# Patient Record
Sex: MALE | Race: WHITE | NOT HISPANIC OR LATINO | Employment: OTHER | URBAN - METROPOLITAN AREA
[De-identification: names, ages, dates, MRNs, and addresses within clinical notes are randomized per-mention and may not be internally consistent; named-entity substitution may affect disease eponyms.]

---

## 2017-01-02 ENCOUNTER — ALLIED HEALTH/NURSE VISIT (OUTPATIENT)
Dept: NURSING | Facility: CLINIC | Age: 82
End: 2017-01-02
Payer: COMMERCIAL

## 2017-01-02 DIAGNOSIS — E53.8 VITAMIN B12 DEFICIENCY (NON ANEMIC): ICD-10-CM

## 2017-01-02 PROCEDURE — 96372 THER/PROPH/DIAG INJ SC/IM: CPT

## 2017-01-02 PROCEDURE — 99207 ZZC NO CHARGE LOS: CPT

## 2017-01-02 NOTE — PROGRESS NOTES
The following medication was given:     MEDICATION: Vitamin B12  1000mcg  ROUTE: IM  SITE: [facility]  DOSE: 1ml  LOT #: [id number]  :  [employer]  EXPIRATION DATE:  7/2018  NDC#: 7480-8747-09      Given at 8:20am, next appt 2/06/2017.     Hanane Murrell CMA

## 2017-02-06 ENCOUNTER — ALLIED HEALTH/NURSE VISIT (OUTPATIENT)
Dept: NURSING | Facility: CLINIC | Age: 82
End: 2017-02-06
Payer: COMMERCIAL

## 2017-02-06 DIAGNOSIS — D51.0 PERNICIOUS ANEMIA: Primary | ICD-10-CM

## 2017-02-06 PROCEDURE — 96372 THER/PROPH/DIAG INJ SC/IM: CPT

## 2017-02-06 PROCEDURE — 99207 ZZC NO CHARGE LOS: CPT

## 2017-02-06 NOTE — PROGRESS NOTES
The following medication was given:     MEDICATION: Vitamin B12  1000mcg  ROUTE: IM  SITE: [facility]  DOSE: 1ml  LOT #: [id number]  :  [employer]  EXPIRATION DATE:  7/2018  NDC#: 5397-8626-34.     Given at 8:27am, next inj. In 1 month.     Hanane Murrell CMA

## 2017-02-07 DIAGNOSIS — K21.9 GASTROESOPHAGEAL REFLUX DISEASE WITHOUT ESOPHAGITIS: Primary | ICD-10-CM

## 2017-02-20 ENCOUNTER — OFFICE VISIT (OUTPATIENT)
Dept: FAMILY MEDICINE | Facility: CLINIC | Age: 82
End: 2017-02-20
Payer: COMMERCIAL

## 2017-02-20 ENCOUNTER — OFFICE VISIT (OUTPATIENT)
Dept: OPTOMETRY | Facility: CLINIC | Age: 82
End: 2017-02-20
Payer: COMMERCIAL

## 2017-02-20 VITALS
OXYGEN SATURATION: 96 % | WEIGHT: 181 LBS | BODY MASS INDEX: 23.99 KG/M2 | SYSTOLIC BLOOD PRESSURE: 102 MMHG | HEIGHT: 73 IN | HEART RATE: 165 BPM | TEMPERATURE: 98 F | DIASTOLIC BLOOD PRESSURE: 65 MMHG

## 2017-02-20 DIAGNOSIS — D51.0 PERNICIOUS ANEMIA: ICD-10-CM

## 2017-02-20 DIAGNOSIS — Z00.00 ROUTINE GENERAL MEDICAL EXAMINATION AT A HEALTH CARE FACILITY: Primary | ICD-10-CM

## 2017-02-20 DIAGNOSIS — K21.9 GASTROESOPHAGEAL REFLUX DISEASE WITHOUT ESOPHAGITIS: ICD-10-CM

## 2017-02-20 DIAGNOSIS — Z13.6 CARDIOVASCULAR SCREENING; LDL GOAL LESS THAN 160: ICD-10-CM

## 2017-02-20 DIAGNOSIS — H01.02A SQUAMOUS BLEPHARITIS OF UPPER AND LOWER EYELIDS OF BOTH EYES: Primary | ICD-10-CM

## 2017-02-20 DIAGNOSIS — E55.9 VITAMIN D DEFICIENCY: ICD-10-CM

## 2017-02-20 DIAGNOSIS — R09.A2 GLOBUS SENSATION: ICD-10-CM

## 2017-02-20 DIAGNOSIS — H01.02B SQUAMOUS BLEPHARITIS OF UPPER AND LOWER EYELIDS OF BOTH EYES: Primary | ICD-10-CM

## 2017-02-20 LAB
ALBUMIN SERPL-MCNC: 4 G/DL (ref 3.4–5)
ALP SERPL-CCNC: 69 U/L (ref 40–150)
ALT SERPL W P-5'-P-CCNC: 22 U/L (ref 0–70)
ANION GAP SERPL CALCULATED.3IONS-SCNC: 4 MMOL/L (ref 3–14)
AST SERPL W P-5'-P-CCNC: 19 U/L (ref 0–45)
BASOPHILS # BLD AUTO: 0 10E9/L (ref 0–0.2)
BASOPHILS NFR BLD AUTO: 0.2 %
BILIRUB SERPL-MCNC: 0.3 MG/DL (ref 0.2–1.3)
BUN SERPL-MCNC: 18 MG/DL (ref 7–30)
CALCIUM SERPL-MCNC: 9.2 MG/DL (ref 8.5–10.1)
CHLORIDE SERPL-SCNC: 102 MMOL/L (ref 94–109)
CHOLEST SERPL-MCNC: 211 MG/DL
CO2 SERPL-SCNC: 32 MMOL/L (ref 20–32)
CREAT SERPL-MCNC: 1.06 MG/DL (ref 0.66–1.25)
DEPRECATED CALCIDIOL+CALCIFEROL SERPL-MC: 55 UG/L (ref 20–75)
DIFFERENTIAL METHOD BLD: ABNORMAL
EOSINOPHIL # BLD AUTO: 0.1 10E9/L (ref 0–0.7)
EOSINOPHIL NFR BLD AUTO: 2 %
ERYTHROCYTE [DISTWIDTH] IN BLOOD BY AUTOMATED COUNT: 12.4 % (ref 10–15)
GFR SERPL CREATININE-BSD FRML MDRD: 67 ML/MIN/1.7M2
GLUCOSE SERPL-MCNC: 102 MG/DL (ref 70–99)
HCT VFR BLD AUTO: 40.5 % (ref 40–53)
HDLC SERPL-MCNC: 69 MG/DL
HGB BLD-MCNC: 13.2 G/DL (ref 13.3–17.7)
LDLC SERPL CALC-MCNC: 121 MG/DL
LYMPHOCYTES # BLD AUTO: 1.3 10E9/L (ref 0.8–5.3)
LYMPHOCYTES NFR BLD AUTO: 26.5 %
MCH RBC QN AUTO: 32.3 PG (ref 26.5–33)
MCHC RBC AUTO-ENTMCNC: 32.6 G/DL (ref 31.5–36.5)
MCV RBC AUTO: 99 FL (ref 78–100)
MONOCYTES # BLD AUTO: 0.5 10E9/L (ref 0–1.3)
MONOCYTES NFR BLD AUTO: 10.2 %
NEUTROPHILS # BLD AUTO: 3.1 10E9/L (ref 1.6–8.3)
NEUTROPHILS NFR BLD AUTO: 61.1 %
NONHDLC SERPL-MCNC: 142 MG/DL
PLATELET # BLD AUTO: 177 10E9/L (ref 150–450)
POTASSIUM SERPL-SCNC: 4.3 MMOL/L (ref 3.4–5.3)
PROT SERPL-MCNC: 7.3 G/DL (ref 6.8–8.8)
RBC # BLD AUTO: 4.09 10E12/L (ref 4.4–5.9)
SODIUM SERPL-SCNC: 138 MMOL/L (ref 133–144)
TRIGL SERPL-MCNC: 104 MG/DL
VIT B12 SERPL-MCNC: 423 PG/ML (ref 193–986)
WBC # BLD AUTO: 5 10E9/L (ref 4–11)

## 2017-02-20 PROCEDURE — 99213 OFFICE O/P EST LOW 20 MIN: CPT | Performed by: OPTOMETRIST

## 2017-02-20 PROCEDURE — 80053 COMPREHEN METABOLIC PANEL: CPT | Performed by: INTERNAL MEDICINE

## 2017-02-20 PROCEDURE — 36415 COLL VENOUS BLD VENIPUNCTURE: CPT | Performed by: INTERNAL MEDICINE

## 2017-02-20 PROCEDURE — 99213 OFFICE O/P EST LOW 20 MIN: CPT | Mod: 25 | Performed by: INTERNAL MEDICINE

## 2017-02-20 PROCEDURE — 85025 COMPLETE CBC W/AUTO DIFF WBC: CPT | Performed by: INTERNAL MEDICINE

## 2017-02-20 PROCEDURE — 99397 PER PM REEVAL EST PAT 65+ YR: CPT | Performed by: INTERNAL MEDICINE

## 2017-02-20 PROCEDURE — 82306 VITAMIN D 25 HYDROXY: CPT | Performed by: INTERNAL MEDICINE

## 2017-02-20 PROCEDURE — 80061 LIPID PANEL: CPT | Performed by: INTERNAL MEDICINE

## 2017-02-20 PROCEDURE — 82607 VITAMIN B-12: CPT | Performed by: INTERNAL MEDICINE

## 2017-02-20 ASSESSMENT — REFRACTION_MANIFEST
OS_SPHERE: PLANO
OD_AXIS: 160
OS_AXIS: 180
OD_CYLINDER: +0.75
OD_SPHERE: -1.00
OS_CYLINDER: +0.50

## 2017-02-20 ASSESSMENT — REFRACTION_WEARINGRX
OD_CYLINDER: +1.00
OD_SPHERE: -0.75
OD_AXIS: 160
OS_AXIS: 180
OS_CYLINDER: +0.50
OS_SPHERE: PLANO
OD_ADD: +2.75
OS_ADD: +2.75

## 2017-02-20 ASSESSMENT — EXTERNAL EXAM - LEFT EYE: OS_EXAM: NORMAL

## 2017-02-20 ASSESSMENT — VISUAL ACUITY
CORRECTION_TYPE: GLASSES
OD_CC+: -1
OD_CC: 20/30
OS_CC: 20/25
METHOD: SNELLEN - LINEAR
OS_CC+: -2

## 2017-02-20 ASSESSMENT — EXTERNAL EXAM - RIGHT EYE: OD_EXAM: NORMAL

## 2017-02-20 NOTE — NURSING NOTE
"Chief Complaint   Patient presents with     Physical       Initial /65 (BP Location: Left arm, Patient Position: Chair, Cuff Size: Adult Regular)  Pulse 165  Temp 98  F (36.7  C) (Oral)  Ht 6' 1\" (1.854 m)  Wt 181 lb (82.1 kg)  SpO2 96%  BMI 23.88 kg/m2 Estimated body mass index is 23.88 kg/(m^2) as calculated from the following:    Height as of this encounter: 6' 1\" (1.854 m).    Weight as of this encounter: 181 lb (82.1 kg).  Medication Reconciliation: complete     Kelechi Francisco MA      "

## 2017-02-20 NOTE — MR AVS SNAPSHOT
After Visit Summary   2/20/2017    Preston Cai    MRN: [id number]           Patient Information     Date Of Birth          1935        Visit Information        Provider Department      2/20/2017 8:20 AM Tyrel Manning MD [facility]        Today's Diagnoses     Routine general medical examination at a health care facility    -  1    Gastroesophageal reflux disease without esophagitis        Globus sensation        Vitamin D deficiency        Vitamin B12 deficiency (non anemic)        CARDIOVASCULAR SCREENING; LDL GOAL LESS THAN 160          Care Instructions      Preventive Health Recommendations:   Male Ages 65 and over    Yearly exam:             See your health care provider every year in order to  o   Review health changes.   o   Discuss preventive care.    o   Review your medicines if your doctor has prescribed any.    Talk with your health care provider about whether you should have a test to screen for prostate cancer (PSA).    Every 3 years, have a diabetes test (fasting glucose). If you are at risk for diabetes, you should have this test more often.    Every 5 years, have a cholesterol test. Have this test more often if you are at risk for high cholesterol or heart disease.     Every 10 years, have a colonoscopy. Or, have a yearly FIT test (stool test). These exams will check for colon cancer.    Talk to with your health care provider about screening for Abdominal Aortic Aneurysm if you have a family history of AAA or have a history of smoking.    Shots:     Get a flu shot each year.     Get a tetanus shot every 10 years.     Talk to your doctor about your pneumonia vaccines. There are now two you should receive - Pneumovax (PPSV 23) and Prevnar (PCV 13).     Talk to your doctor about a shingles vaccine.     Talk to your doctor about the hepatitis B vaccine.  Nutrition:     Eat at least 5 servings of fruits and vegetables each day.     Eat whole-grain bread,  whole-wheat pasta and brown rice instead of white grains and rice.     Talk to your provider about Calcium and Vitamin D.   Lifestyle    Exercise for at least 150 minutes a week (30 minutes a day, 5 days a week). This will help you control your weight and prevent disease.     Limit alcohol to one drink per day.     No smoking.     Wear sunscreen to prevent skin cancer.     See your dentist every six months for an exam and cleaning.     See your eye doctor every 1 to 2 years to screen for conditions such as glaucoma, macular degeneration, cataracts, etc     This summary includes the important diagnoses, test, medications and other important parts of your medical history.  Below are a few good we sites you can use to learn more about these.     Www.[employer] (T.O.H.L.) : Up to date and easily searchable information on multiple topics.  Www.[employer] (T.O.H.L.)/Pharmacy/c_539084.asp : [facility] $4.99 medications  Www.Topicmarks.gov : medication info, interactive tutorials, watch real surgeries online  Www.familydoctor.org : good info from the Academy of Family Physicians  Www.ERPLYinic.com : good info from the [facility]  Www.cdc.gov : public health info, travel advisories, epidemics (H1N1)  Www.aap.org : children's health info, normal development, vaccinations  Www.health.[facility].[place].us : [place] dept of heatl, public health issues in [place], N1N1    Based on your medical history and these are the current health maintenance or preventive care services that you are due for (some may have been done at this visit:)  There are no preventive care reminders to display for this patient.  =================================================================================  Normal Values   Blood pressure  <140/90 for most adults    <130/80 for some chronic diseases (ask your care team about yours)    BMI (body mass index)  18.5-25 kg/m2 (based on height and weight)     Thank you for visiting [facility]    Normal or  non-critical lab and imaging results will be communicated to you by MyChart, letter or phone within 7 days.  If you do not hear from us within 10 days, please call the clinic. If you have a critical or abnormal lab result, we will notify you by phone as soon as possible.     If you have any questions regarding your visit please contact:     Team Comfort:   Clinic Hours Telephone Number   Dr. Jed Winn   7am-5pm  Monday - Friday [phone]  David VARGAS   Pharmacy 8am-8pm Monday-Thursday      8am-6pm Friday  9am-5pm Saturday-Sunday [phone]   Urgent Care 11am-8pm Monday-Friday        9am-5pm Saturday-Sunday [phone]     After hours, weekend or if you need to make an appointment with your primary provider please call [phone].   After Hours nurse advise: call [facility]: [phone]    Medication Refills:  Call your pharmacy and they will forward the refill to us. Please allow 3 business days for your refills to be completed.    Use [employer] (secure email communication and access to your chart) to send your primary care provider a message or make an appointment. Ask someone on your Team how to sign up for T[employer]Club. To log on to [employer] or for more information in [employer] please visit the website at [email or web address].  As of October 8, 2013, all password changes, disabled accounts, or ID changes in TidyClub/MyHealth will be done by our Access Services Department.   If you need help with your account or password, call: [phone]. Clinic staff no longer has the ability to change passwords.           Follow-ups after your visit        Your next 10 appointments already scheduled     Mar 06, 2017  8:20 AM CST   Nurse Only with BK ANCILLARY   [facility]    [address] [place] [id number] [phone]              Future tests that  "were ordered for you today     Open Future Orders        Priority Expected Expires Ordered    XR Esophagram w Upper GI Routine  2/20/2018 2/20/2017            Who to contact     If you have questions or need follow up information about today's clinic visit or your schedule please contact [facility] directly at [phone].  Normal or non-critical lab and imaging results will be communicated to you by MyChart, letter or phone within 4 business days after the clinic has received the results. If you do not hear from us within 7 days, please contact the clinic through [employer] or phone. If you have a critical or abnormal lab result, we will notify you by phone as soon as possible.  Submit refill requests through [employer] or call your pharmacy and they will forward the refill request to us. Please allow 3 business days for your refill to be completed.          Additional Information About Your Visit        MyChart Information     [employer] gives you secure access to your electronic health record. If you see a primary care provider, you can also send messages to your care team and make appointments. If you have questions, please call your primary care clinic.  If you do not have a primary care provider, please call [phone] and they will assist you.        Care EveryWhere ID     This is your Care EveryWhere ID. This could be used by other organizations to access your [place] medical records  [id number]        Your Vitals Were     Pulse Temperature Height Pulse Oximetry BMI (Body Mass Index)       165 98  F (36.7  C) (Oral) 6' 1\" (1.854 m) 96% 23.88 kg/m2        Blood Pressure from Last 3 Encounters:   02/20/17 102/65   10/10/16 102/67   09/22/16 107/67    Weight from Last 3 Encounters:   02/20/17 181 lb (82.1 kg)   10/10/16 182 lb (82.6 kg)   09/22/16 182 lb 3 oz (82.6 kg)              We Performed the Following     CBC with platelets and differential     Comprehensive metabolic panel (BMP + Alb, " Alk Phos, ALT, AST, Total. Bili, TP)     Lipid Profile     Vitamin B12     Vitamin D Deficiency        Primary Care Provider Office Phone # Fax #    Tyrel Manning [phone]       [facility] [address]  [facility] [address]        Thank you!     Thank you for choosing [facility]  for your care. Our goal is always to provide you with excellent care. Hearing back from our patients is one way we can continue to improve our services. Please take a few minutes to complete the written survey that you may receive in the mail after your visit with us. Thank you!             Your Updated Medication List - Protect others around you: Learn how to safely use, store and throw away your medicines at www.disposemymeds.org.          This list is accurate as of: 2/20/17  9:24 AM.  Always use your most recent med list.                   Brand Name Dispense Instructions for use    aspirin 81 MG tablet      1 TABLET DAILY       cyanocobalamin 1000 MCG/ML injection    VITAMIN B12    0.9 mL    Inject 1 mL (1,000 mcg) into the muscle every 30 days       neomycin-polymixin-dexamethasone ophthalmic ointment    MAXITROL    1 Tube    Apply to both eye lids margin at bedtime       primidone 250 MG tablet    MYSOLINE    90 tablet    Take 1 tablet (250 mg) by mouth At Bedtime       RABEprazole 20 MG EC tablet    ACIPHEX    30 tablet    Take 1 tablet (20 mg) by mouth daily       VITAMIN D (CHOLECALCIFEROL) PO      Take 2,000 Units by mouth daily

## 2017-02-20 NOTE — MR AVS SNAPSHOT
After Visit Summary   2/20/2017    Preston Cai    MRN: [id number]           Patient Information     Date Of Birth          1935        Visit Information        Provider Department      2/20/2017 9:20 AM Carlos James OD [facility]        Care Instructions    Use one drop of artificial tears both eyes 3-4 x daily.  Continue to use the drops regardless if your eyes are comfortable.  Artificial tears work best as a preventative and not as well after your eyes are starting to bother you.  It may take 4- 6 weeks of using the drops before you notice improvement.  If after that time you are still having problems schedule an appointment for an evaluation and discussion of different treatments.  Dry eyes are a chronic condition and you may have more symptoms at certain times of the year.    Claridex eyelid scrubs 2 x day for 8 weeks.  You can purchase it online at [employer] or [employer] foam eyelid cleanser.    How to Use Tea Tree Oil for Eyelid Cleaning: always dilute it at least 50% with water or olive oil or coconut oil              This is how to carefully use DILUTED Tea Tree Oil on your eyelashes (or skin which has no ulcers or cuts). Ideally cleaning your eyelids with diluted Tea Tree Oil should be done after you have applied warm/hot compresses (not hot enough to burn skin) to your eyelids/eyelashes for 2-5 minutes before.       1. With clean hands, put 1 drops of Tea Tree Oil in a cup and 2-3 drops of water (or olive oil or coconut oil)  2. CLOSE YOUR EYES  3. Gently cleanse your closed eyelashes.  4. Leave on for about 1 minute.   5. Rinse with warm water.    Return in 4-6 weeks for comprehensive eye exam or sooner if needed.    Carlos James OD            Follow-ups after your visit        Your next 10 appointments already scheduled     Mar 06, 2017  8:20 AM CST   Nurse Only with BK ANCILLARY   [facility]    [address]  [facility] [id number] [phone]              Future tests that were ordered for you today     Open Future Orders        Priority Expected Expires Ordered    XR Esophagram w Upper GI Routine  2/20/2018 2/20/2017            Who to contact     If you have questions or need follow up information about today's clinic visit or your schedule please contact [facility] directly at [phone].  Normal or non-critical lab and imaging results will be communicated to you by Scholarship Consultantshart, letter or phone within 4 business days after the clinic has received the results. If you do not hear from us within 7 days, please contact the clinic through [employer] or phone. If you have a critical or abnormal lab result, we will notify you by phone as soon as possible.  Submit refill requests through [facility] or call your pharmacy and they will forward the refill request to us. Please allow 3 business days for your refill to be completed.          Additional Information About Your Visit        [employer]ants[employer] Information     [facility] gives you secure access to your electronic health record. If you see a primary care provider, you can also send messages to your care team and make appointments. If you have questions, please call your primary care clinic.  If you do not have a primary care provider, please call [phone] and they will assist you.        Care EveryWhere ID     This is your Care EveryWhere ID. This could be used by other organizations to access your [place] medical records  [id number]         Blood Pressure from Last 3 Encounters:   02/20/17 102/65   10/10/16 102/67   09/22/16 107/67    Weight from Last 3 Encounters:   02/20/17 82.1 kg (181 lb)   10/10/16 82.6 kg (182 lb)   09/22/16 82.6 kg (182 lb 3 oz)              Today, you had the following     No orders found for display       Primary Care Provider Office Phone # Fax #    Tyrel Manning [phone]        [facility] [address] [facility] [address]  [facility] [address]        Thank you!     Thank you for choosing [facility] [address] [facility]  for your care. Our goal is always to provide you with excellent care. Hearing back from our patients is one way we can continue to improve our services. Please take a few minutes to complete the written survey that you may receive in the mail after your visit with us. Thank you!             Your Updated Medication List - Protect others around you: Learn how to safely use, store and throw away your medicines at www.disposemymeds.org.          This list is accurate as of: 2/20/17 10:05 AM.  Always use your most recent med list.                   Brand Name Dispense Instructions for use    aspirin 81 MG tablet      1 TABLET DAILY       cyanocobalamin 1000 MCG/ML injection    VITAMIN B12    0.9 mL    Inject 1 mL (1,000 mcg) into the muscle every 30 days       neomycin-polymixin-dexamethasone ophthalmic ointment    MAXITROL    1 Tube    Apply to both eye lids margin at bedtime       primidone 250 MG tablet    MYSOLINE    90 tablet    Take 1 tablet (250 mg) by mouth At Bedtime       RABEprazole 20 MG EC tablet    ACIPHEX    30 tablet    Take 1 tablet (20 mg) by mouth daily       VITAMIN D (CHOLECALCIFEROL) PO      Take 2,000 Units by mouth daily

## 2017-02-20 NOTE — PATIENT INSTRUCTIONS
Preventive Health Recommendations:   Male Ages 65 and over    Yearly exam:             See your health care provider every year in order to  o   Review health changes.   o   Discuss preventive care.    o   Review your medicines if your doctor has prescribed any.    Talk with your health care provider about whether you should have a test to screen for prostate cancer (PSA).    Every 3 years, have a diabetes test (fasting glucose). If you are at risk for diabetes, you should have this test more often.    Every 5 years, have a cholesterol test. Have this test more often if you are at risk for high cholesterol or heart disease.     Every 10 years, have a colonoscopy. Or, have a yearly FIT test (stool test). These exams will check for colon cancer.    Talk to with your health care provider about screening for Abdominal Aortic Aneurysm if you have a family history of AAA or have a history of smoking.    Shots:     Get a flu shot each year.     Get a tetanus shot every 10 years.     Talk to your doctor about your pneumonia vaccines. There are now two you should receive - Pneumovax (PPSV 23) and Prevnar (PCV 13).     Talk to your doctor about a shingles vaccine.     Talk to your doctor about the hepatitis B vaccine.  Nutrition:     Eat at least 5 servings of fruits and vegetables each day.     Eat whole-grain bread, whole-wheat pasta and brown rice instead of white grains and rice.     Talk to your provider about Calcium and Vitamin D.   Lifestyle    Exercise for at least 150 minutes a week (30 minutes a day, 5 days a week). This will help you control your weight and prevent disease.     Limit alcohol to one drink per day.     No smoking.     Wear sunscreen to prevent skin cancer.     See your dentist every six months for an exam and cleaning.     See your eye doctor every 1 to 2 years to screen for conditions such as glaucoma, macular degeneration, cataracts, etc     This summary includes the important diagnoses, test,  medications and other important parts of your medical history.  Below are a few good we sites you can use to learn more about these.     Www.IPXI.org : Up to date and easily searchable information on multiple topics.  Www.IPXI.org/Pharmacy/c_539084.asp : [facility] $4.99 medications  Www.medlineplus.gov : medication info, interactive tutorials, watch real surgeries online  Www.familydoctor.org : good info from the Academy of Family Physicians  Www.mayoBoosterMediainic.com : good info from the [facility]  Www.cdc.gov : public health info, travel advisories, epidemics (H1N1)  Www.aap.org : children's health info, normal development, vaccinations  Www.health.[facility].[place].us : [place] dept of heat, public health issues in [place], N1N1    Based on your medical history and these are the current health maintenance or preventive care services that you are due for (some may have been done at this visit:)  There are no preventive care reminders to display for this patient.  =================================================================================  Normal Values   Blood pressure  <140/90 for most adults    <130/80 for some chronic diseases (ask your care team about yours)    BMI (body mass index)  18.5-25 kg/m2 (based on height and weight)     Thank you for visiting [facility]    Normal or non-critical lab and imaging results will be communicated to you by MyChart, letter or phone within 7 days.  If you do not hear from us within 10 days, please call the clinic. If you have a critical or abnormal lab result, we will notify you by phone as soon as possible.     If you have any questions regarding your visit please contact:     Team Comfort:   Clinic Hours Telephone Number   Dr. Jed Winn   7am-5pm  Monday - Friday [phone]  David RN   Pharmacy 8am-8pm Monday-Thursday      8am-6pm Friday  9am-5pm Saturday-Sunday [phone]   Urgent Care 11am-8pm Monday-Friday        9am-5pm Saturday-Sunday [phone]     After hours, weekend or if you need to make an appointment with your primary provider please call [phone].   After Hours nurse advise: call [facility]: [phone]    Medication Refills:  Call your pharmacy and they will forward the refill to us. Please allow 3 business days for your refills to be completed.    Use [employer] (secure email communication and access to your chart) to send your primary care provider a message or make an appointment. Ask someone on your Team how to sign up for [employer]. To log on to [employer] or for more information in [employer] please visit the website at [email or web address].  As of October 8, 2013, all password changes, disabled accounts, or ID changes in [employer]/MyHealth will be done by our Access Services Department.   If you need help with your account or password, call: [phone]. Clinic staff no longer has the ability to change passwords.

## 2017-02-20 NOTE — PROGRESS NOTES
Chief Complaint   Patient presents with     Eye Problem     6 month jose per rlr       HPI    Affected eye(s):  Both   Symptoms:     Tearing   Burning   Eye discharge      Duration:  4 months   Frequency:  Constant       Do you have eye pain now?:  No      Comments:  Os eye straning all the time. od is worse for burning and watering more after cataract surgery os.           Uses Theratears- occasionally    Medical, surgical and family histories reviewed and updated 2/20/2017.       OBJECTIVE: See Ophthalmology exam    ASSESSMENT:    ICD-10-CM    1. 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  Continue to use the drops regardless if your eyes are comfortable.  Artificial tears work best as a preventative and not as well after your eyes are starting to bother you.  It may take 4- 6 weeks of using the drops before you notice improvement.  If after that time you are still having problems schedule an appointment for an evaluation and discussion of different treatments.  Dry eyes are a chronic condition and you may have more symptoms at certain times of the year.    Claridex eyelid scrubs 2 x day for 8 weeks.  You can purchase it online at [employer] or [employer] foam eyelid cleanser.    Return in 6-8 weeks for recheck/annual exam or sooner if needed.    Carlos James, OD

## 2017-02-20 NOTE — PATIENT INSTRUCTIONS
Use one drop of artificial tears both eyes 3-4 x daily.  Continue to use the drops regardless if your eyes are comfortable.  Artificial tears work best as a preventative and not as well after your eyes are starting to bother you.  It may take 4- 6 weeks of using the drops before you notice improvement.  If after that time you are still having problems schedule an appointment for an evaluation and discussion of different treatments.  Dry eyes are a chronic condition and you may have more symptoms at certain times of the year.    Claridex eyelid scrubs 2 x day for 8 weeks.  You can purchase it online at [employer]-film or Ocusoft Oust foam eyelid cleanser.    Return in 6-8 weeks for recheck/annual exam or sooner if needed.    Carlos James, OD    How to Use Tea Tree Oil for Eyelid Cleaning: always dilute it at least 50% with water or olive oil or coconut oil              This is how to carefully use DILUTED Tea Tree Oil on your eyelashes (or skin which has no ulcers or cuts). Ideally cleaning your eyelids with diluted Tea Tree Oil should be done after you have applied warm/hot compresses (not hot enough to burn skin) to your eyelids/eyelashes for 2-5 minutes before.       1. With clean hands, put 1 drops of Tea Tree Oil in a cup and 2-3 drops of water (or olive oil or coconut oil)  2. CLOSE YOUR EYES  3. Gently cleanse your closed eyelashes.  4. Leave on for about 1 minute.   5. Rinse with warm water.

## 2017-02-27 ENCOUNTER — RADIANT APPOINTMENT (OUTPATIENT)
Dept: GENERAL RADIOLOGY | Facility: CLINIC | Age: 82
End: 2017-02-27
Attending: INTERNAL MEDICINE
Payer: COMMERCIAL

## 2017-02-27 ENCOUNTER — TELEPHONE (OUTPATIENT)
Dept: PSYCHOLOGY | Facility: CLINIC | Age: 82
End: 2017-02-27

## 2017-02-27 DIAGNOSIS — K21.9 GASTROESOPHAGEAL REFLUX DISEASE WITHOUT ESOPHAGITIS: ICD-10-CM

## 2017-02-27 DIAGNOSIS — R09.A2 GLOBUS SENSATION: ICD-10-CM

## 2017-02-27 DIAGNOSIS — K29.50 OTHER CHRONIC GASTRITIS WITHOUT HEMORRHAGE: Primary | ICD-10-CM

## 2017-02-27 PROCEDURE — 74240 X-RAY XM UPR GI TRC 1CNTRST: CPT | Performed by: RADIOLOGY

## 2017-02-27 RX ORDER — DEXLANSOPRAZOLE 60 MG/1
60 CAPSULE, DELAYED RELEASE ORAL DAILY
Qty: 30 CAPSULE | Refills: 11 | Status: SHIPPED | OUTPATIENT
Start: 2017-02-27 | End: 2017-03-06

## 2017-02-27 NOTE — TELEPHONE ENCOUNTER
Pursue prior authorization for Dexilant.  Indication: Chronic gastritis.  Therapies tried: Omeprazole, Aciphex, Ranitidine, Famotidine (all are not effective)  Rationale for Dexilant: Dual action release and better oral availability, making it more effective than other proton pump inhibitors.

## 2017-02-28 DIAGNOSIS — R09.A2 GLOBUS SENSATION: Primary | ICD-10-CM

## 2017-02-28 NOTE — TELEPHONE ENCOUNTER
Medication: dexlansoprazole (DEXILANT) 60 MG CPDR CR capsule    Diagnosis & Code : Other chronic gastritis without hemorrhage [K29.50]  - Primary         Provider: What other medications tried, failed, when and why discontinue?      Prior Authorization Starts (date): 2/28/2017    Insurance Plan: [employer]/medicare   Patient ID: [id number]  Phone: [phone]  On the phone  Route it to the team comfort   Postponed for 3 business days as protocol.  Katherine Hill

## 2017-02-28 NOTE — TELEPHONE ENCOUNTER
Prior Authorization for dexlansoprazole (DEXILANT) 60 MG CPDR CR capsule was approved on 1/29/2017.  Effective date: 2/28/2018  PA reference #: [id number]  Pharmacy was notified by plan  Patient will be notified by the insurance plan. Letter sent by insurance company and letter forwarded to Abstracting team.  Katherine Hill

## 2017-03-06 ENCOUNTER — ALLIED HEALTH/NURSE VISIT (OUTPATIENT)
Dept: NURSING | Facility: CLINIC | Age: 82
End: 2017-03-06
Payer: COMMERCIAL

## 2017-03-06 DIAGNOSIS — D51.0 PERNICIOUS ANEMIA: ICD-10-CM

## 2017-03-06 DIAGNOSIS — D51.0 PERNICIOUS ANEMIA: Primary | ICD-10-CM

## 2017-03-06 DIAGNOSIS — K29.50 OTHER CHRONIC GASTRITIS WITHOUT HEMORRHAGE: ICD-10-CM

## 2017-03-06 PROCEDURE — 96372 THER/PROPH/DIAG INJ SC/IM: CPT | Performed by: INTERNAL MEDICINE

## 2017-03-06 PROCEDURE — 96372 THER/PROPH/DIAG INJ SC/IM: CPT

## 2017-03-06 RX ORDER — DEXLANSOPRAZOLE 60 MG/1
60 CAPSULE, DELAYED RELEASE ORAL DAILY
Qty: 30 CAPSULE | Refills: 11 | Status: SHIPPED | OUTPATIENT
Start: 2017-03-06 | End: 2017-04-03

## 2017-03-06 NOTE — NURSING NOTE
The following medication was given at 8:23am     MEDICATION: Vitamin B12  2000mcg  ROUTE: IM  SITE: LEFT GLUT  DOSE: 2ML   LOT #: [id number]  :Norstel    EXPIRATION DATE:  July/2018  NDC#: 0945-0427-33    The medication has been administered and the patient was instructed to wait 20 minutes before leaving the building in the event of an allergic reaction.    DONALD Hannon MA    New orders per Dr. Manning

## 2017-03-06 NOTE — MR AVS SNAPSHOT
After Visit Summary   3/6/2017    Preston Cai    MRN: [id number]           Patient Information     Date Of Birth          1935        Visit Information        Provider Department      3/6/2017 8:20 AM BK ANCILLARY [facility]        Today's Diagnoses     Pernicious anemia    -  1       Follow-ups after your visit        Your next 10 appointments already scheduled     Mar 14, 2017 10:30 AM CDT   New Visit with Siddharth Ndiaye MD   [facility] ([facility])    [address] [facility] [address] [facility] [id number] [phone]            Apr 03, 2017  9:00 AM CDT   Nurse Only with BK ANCILLARY   [facility] ([facility])    [address] [facility] [id number] [phone]              Who to contact     If you have questions or need follow up information about today's clinic visit or your schedule please contact [facility] directly at [phone].  Normal or non-critical lab and imaging results will be communicated to you by NeoChordhart, letter or phone within 4 business days after the clinic has received the results. If you do not hear from us within 7 days, please contact the clinic through [email or web address] or phone. If you have a critical or abnormal lab result, we will notify you by phone as soon as possible.  Submit refill requests through Inter[employer] or call your pharmacy and they will forward the refill request to us. Please allow 3 business days for your refill to be completed.          Additional Information About Your Visit        NeoChordhart Information     [employer] gives you secure access to your electronic health record. If you see a primary care provider, you can also send messages to your care team and make appointments. If you have questions, please call your primary care clinic.  If you do not have a primary care provider, please call [phone] and they will  assist you.        Care EveryWhere ID     This is your Care EveryWhere ID. This could be used by other organizations to access your [facility] medical records  [id number]         Blood Pressure from Last 3 Encounters:   02/20/17 102/65   10/10/16 102/67   09/22/16 107/67    Weight from Last 3 Encounters:   02/20/17 181 lb (82.1 kg)   10/10/16 182 lb (82.6 kg)   09/22/16 182 lb 3 oz (82.6 kg)              We Performed the Following     B12 - 1000 MCG          Where to get your medicines      These medications were sent [place] [facility] [place] - [address]  [address], [facility] [address]     Phone:  [phone]     dexlansoprazole 60 MG Cpdr CR capsule          Primary Care Provider Office Phone # Fax #    Tyrel Manning [phone]       [facility] [address]  [facility] [address]        Thank you!     Thank you for choosing [facility]  for your care. Our goal is always to provide you with excellent care. Hearing back from our patients is one way we can continue to improve our services. Please take a few minutes to complete the written survey that you may receive in the mail after your visit with us. Thank you!             Your Updated Medication List - Protect others around you: Learn how to safely use, store and throw away your medicines at www.disposemymeds.org.          This list is accurate as of: 3/6/17  3:01 PM.  Always use your most recent med list.                   Brand Name Dispense Instructions for use    aspirin 81 MG tablet      1 TABLET DAILY       cyanocobalamin 1000 MCG/ML injection    VITAMIN B12    0.9 mL    Inject 1 mL (1,000 mcg) into the muscle every 30 days       dexlansoprazole 60 MG Cpdr CR capsule    DEXILANT    30 capsule    Take 1 capsule (60 mg) by mouth daily       neomycin-polymixin-dexamethasone ophthalmic ointment    MAXITROL    1 Tube    Apply to both eye lids  margin at bedtime       primidone 250 MG tablet    MYSOLINE    90 tablet    Take 1 tablet (250 mg) by mouth At Bedtime       RABEprazole 20 MG EC tablet    ACIPHEX    30 tablet    Take 1 tablet (20 mg) by mouth daily       VITAMIN D (CHOLECALCIFEROL) PO      Take 2,000 Units by mouth daily

## 2017-03-14 ENCOUNTER — OFFICE VISIT (OUTPATIENT)
Dept: OTOLARYNGOLOGY | Facility: CLINIC | Age: 82
End: 2017-03-14
Payer: COMMERCIAL

## 2017-03-14 VITALS — BODY MASS INDEX: 23.19 KG/M2 | HEIGHT: 73 IN | WEIGHT: 175 LBS | RESPIRATION RATE: 12 BRPM

## 2017-03-14 DIAGNOSIS — R09.A2 GLOBUS SENSATION: ICD-10-CM

## 2017-03-14 DIAGNOSIS — C32.9 LARYNGEAL CANCER (H): Primary | ICD-10-CM

## 2017-03-14 PROCEDURE — 31575 DIAGNOSTIC LARYNGOSCOPY: CPT | Performed by: OTOLARYNGOLOGY

## 2017-03-14 PROCEDURE — 99214 OFFICE O/P EST MOD 30 MIN: CPT | Mod: 25 | Performed by: OTOLARYNGOLOGY

## 2017-03-14 RX ORDER — PILOCARPINE HYDROCHLORIDE 5 MG/1
5 TABLET, FILM COATED ORAL 2 TIMES DAILY PRN
Qty: 60 TABLET | Refills: 3 | Status: SHIPPED | OUTPATIENT
Start: 2017-03-14 | End: 2017-06-27

## 2017-03-14 RX ORDER — OMEPRAZOLE 40 MG/1
40 CAPSULE, DELAYED RELEASE ORAL DAILY
Qty: 90 CAPSULE | Refills: 1 | Status: CANCELLED | OUTPATIENT
Start: 2017-03-14

## 2017-03-14 NOTE — MR AVS SNAPSHOT
After Visit Summary   3/14/2017    Preston Cai    MRN: [id number]           Patient Information     Date Of Birth          1935        Visit Information        Provider Department      3/14/2017 10:30 AM Siddharth Ndiaye MD [facility]        Today's Diagnoses     Laryngeal cancer (H)    -  1    Globus sensation          Care Instructions    Scheduling Information  To schedule your CT/MRI scan, please contact [facility] at [phone] OR [facility] at [phone]    To schedule your Surgery, please contact our Specialty Schedulers at [phone]      ENT Clinic Locations Clinic Hours Telephone Number     [facility] [place]  [address]  [place] [address]   Monday:           1:00pm -- 5:00pm    Friday:              8:00am - 12:00pm   To schedule/reschedule an appointment with   Dr. Ndiaye,   please contact our   Specialty Scheduling Department at:     [phone]       [facility]  [address] [place] [address] Tuesday:          8:00am -- 2:00pm         Urgent Care Locations Clinic Hours Telephone Numbers     [facility]  [address] [place] [address]     Monday-Friday:     11:00am - 9:00pm    Saturday-Sunday:  9:00am - 5:00pm   [phone]     [facility]  [address] [place] [address]     Monday-Friday:      5:00pm - 9:00pm     Saturday-Sunday:  9:00am - 5:00pm   [phone]               Follow-ups after your visit        Your next 10 appointments already scheduled     Apr 03, 2017  9:00 AM CDT   Nurse Only with BK ANCILLARY   [facility] ([facility])    [address] [id number] [phone]              Future tests that were ordered for you today     Open Future Orders        Priority Expected Expires Ordered    US Carotid Bilateral Routine  3/14/2018 3/14/2017            Who to contact     If you have  "questions or need follow up information about today's clinic visit or your schedule please contact [facility] directly at [phone].  Normal or non-critical lab and imaging results will be communicated to you by Naubohart, letter or phone within 4 business days after the clinic has received the results. If you do not hear from us within 7 days, please contact the clinic through Naubohart or phone. If you have a critical or abnormal lab result, we will notify you by phone as soon as possible.  Submit refill requests through [employer] or call your pharmacy and they will forward the refill request to us. Please allow 3 business days for your refill to be completed.          Additional Information About Your Visit        [employer] Information     [employer] gives you secure access to your electronic health record. If you see a primary care provider, you can also send messages to your care team and make appointments. If you have questions, please call your primary care clinic.  If you do not have a primary care provider, please call [phone] and they will assist you.        Care EveryWhere ID     This is your Care EveryWhere ID. This could be used by other organizations to access your [facility] medical records  [id number]        Your Vitals Were     Respirations Height BMI (Body Mass Index)             12 1.854 m (6' 1\") 23.09 kg/m2          Blood Pressure from Last 3 Encounters:   02/20/17 102/65   10/10/16 102/67   09/22/16 107/67    Weight from Last 3 Encounters:   03/14/17 79.4 kg (175 lb)   02/20/17 82.1 kg (181 lb)   10/10/16 82.6 kg (182 lb)              We Performed the Following     LARYNGOSCOPY FLEX FIBEROPTIC, DIAGNOSTIC          Today's Medication Changes          These changes are accurate as of: 3/14/17 11:05 AM.  If you have any questions, ask your nurse or doctor.               Start taking these medicines.        Dose/Directions    MAGIC MOUTHWASH (ENTER INGREDIENTS IN COMMENTS)   Used " for:  Laryngeal cancer (H), Globus sensation   Started by:  Siddharth Ndiaye MD        Dose:  5-10 mL   Swish and spit 5-10 mLs in mouth every 6 hours as needed   Quantity:  180 mL   Refills:  12       pilocarpine 5 MG tablet   Commonly known as:  SALAGEN   Used for:  Laryngeal cancer (H), Globus sensation   Started by:  Siddharth Ndiaye MD        Dose:  5 mg   Take 1 tablet (5 mg) by mouth 2 times daily as needed   Quantity:  60 tablet   Refills:  3            Where to get your medicines      These medications were sent to [facility] [place] [place] - [address]  [address], [facility] [address]     Phone:  [phone]     pilocarpine 5 MG tablet         Some of these will need a paper prescription and others can be bought over the counter.  Ask your nurse if you have questions.     Bring a paper prescription for each of these medications     MAGIC MOUTHWASH (ENTER INGREDIENTS IN COMMENTS)                Primary Care Provider Office Phone # Fax #    Tyrel Manning [phone]       [facility] [address]  [facility] [address]        Thank you!     Thank you for choosing [facility]  for your care. Our goal is always to provide you with excellent care. Hearing back from our patients is one way we can continue to improve our services. Please take a few minutes to complete the written survey that you may receive in the mail after your visit with us. Thank you!             Your Updated Medication List - Protect others around you: Learn how to safely use, store and throw away your medicines at www.disposemymeds.org.          This list is accurate as of: 3/14/17 11:05 AM.  Always use your most recent med list.                   Brand Name Dispense Instructions for use    aspirin 81 MG tablet      1 TABLET DAILY       cyanocobalamin 1000 MCG/ML injection    VITAMIN B12    0.9 mL    Inject 1 mL (1,000 mcg)  into the muscle every 30 days       dexlansoprazole 60 MG Cpdr CR capsule    DEXILANT    30 capsule    Take 1 capsule (60 mg) by mouth daily       MAGIC MOUTHWASH (ENTER INGREDIENTS IN COMMENTS)     180 mL    Swish and spit 5-10 mLs in mouth every 6 hours as needed       neomycin-polymixin-dexamethasone ophthalmic ointment    MAXITROL    1 Tube    Apply to both eye lids margin at bedtime       pilocarpine 5 MG tablet    SALAGEN    60 tablet    Take 1 tablet (5 mg) by mouth 2 times daily as needed       primidone 250 MG tablet    MYSOLINE    90 tablet    Take 1 tablet (250 mg) by mouth At Bedtime       RABEprazole 20 MG EC tablet    ACIPHEX    30 tablet    Take 1 tablet (20 mg) by mouth daily       VITAMIN D (CHOLECALCIFEROL) PO      Take 2,000 Units by mouth daily

## 2017-03-14 NOTE — PATIENT INSTRUCTIONS
Scheduling Information  To schedule your CT/MRI scan, please contact [facility] at [phone] OR [facility] at [phone]    To schedule your Surgery, please contact our Specialty Schedulers at [phone]      ENT Clinic Locations Clinic Hours Telephone Number     [place]  [address] [place] [address]   Monday:           1:00pm -- 5:00pm    Friday:              8:00am - 12:00pm   To schedule/reschedule an appointment with   Dr. Ndiaye,   please contact our   Specialty Scheduling Department at:     [phone]       [facility]  [address] [place] [address] Tuesday:          8:00am -- 2:00pm         Urgent Care Locations Clinic Hours Telephone Numbers     [facility]  [address] [place] [address]     Monday-Friday:     11:00am - 9:00pm    Saturday-Sunday:  9:00am - 5:00pm   [phone]     [facility]  [address] [place] [address]     Monday-Friday:      5:00pm - 9:00pm     Saturday-Sunday:  9:00am - 5:00pm   [phone]

## 2017-03-14 NOTE — PROGRESS NOTES
History of Present Illness - Preston Cai is a 81 year old male who is here to see me for the first time for foreign body sensation in the throat.  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  He had RTX treatment at [facility] in 1982 for laryngeal CA, and has been MARCELINO since then on serial endoscopies by both Dr. Stewart and Dr. Almazan prior to that.    The main issue at this point is his voice.  He does not like the fact that his voice is often rough and raspy, and he often feels fatigued trying to talk.  He has been managed for laryngopharyngeal reflux by Dr. Manning, and that has helped, but he wanted to see if anything more could be done.  No change in swallowing, no coughing up blood, no pain.    Past Medical History -   Patient Active Problem List   Diagnosis     Seborrheic dermatitis     Allergic rhinitis due to other allergen     Cervical radiculopathy at C6     Laryngeal cancer (H)     Essential tremor     CARDIOVASCULAR SCREENING; LDL GOAL LESS THAN 160     BPH (benign prostatic hypertrophy)     Hoarse voice quality     Health Care Home     Advanced directives, counseling/discussion     History of anemia     Cataract     Pseudophakia     Gastroesophageal reflux disease without esophagitis     BPH without urinary obstruction     Globus sensation     Pernicious anemia     Hypocalcemia     Tremor     H/O magnetic resonance imaging of brain and brain stem     Macular degeneration     Squamous blepharitis of upper and lower eyelids of both eyes       Current Medications -   Current Outpatient Prescriptions:      dexlansoprazole (DEXILANT) 60 MG CPDR CR capsule, Take 1 capsule (60 mg) by mouth daily, Disp: 30 capsule, Rfl: 11     RABEprazole (ACIPHEX) 20 MG EC tablet, Take 1 tablet (20 mg) by mouth daily, Disp: 30 tablet, Rfl: 1     cyanocobalamin (VITAMIN B12) 1000 MCG/ML injection, Inject 1 mL (1,000 mcg) into the muscle every 30 days, Disp: 0.9 mL, Rfl: 11      primidone (MYSOLINE) 250 MG tablet, Take 1 tablet (250 mg) by mouth At Bedtime, Disp: 90 tablet, Rfl: 3     neomycin-polymixin-dexamethasone (MAXITROL) ophthalmic ointment, Apply to both eye lids margin at bedtime, Disp: 1 Tube, Rfl: 0     VITAMIN D, CHOLECALCIFEROL, PO, Take 2,000 Units by mouth daily , Disp: , Rfl:      ASPIRIN 81 MG OR TABS, 1 TABLET DAILY, Disp: , Rfl:     Allergies -   Allergies   Allergen Reactions     Seasonal Allergies      Hay fever        Social History -   Social History     Social History     Marital status:      Spouse name: Edyta Cai     Number of children: N/A     Years of education: N/A     Occupational History      Retired     Social History Main Topics     Smoking status: Former Smoker     Smokeless tobacco: Never Used      Comment: 1968     Alcohol use 0.0 oz/week     0 Standard drinks or equivalent per week      Comment: A beer once in awhile     Drug use: No     Sexual activity: Yes     Partners: Female     Other Topics Concern     Parent/Sibling W/ Cabg, Mi Or Angioplasty Before 65f 55m? No     Social History Narrative    seen by Jose M Diaz. lives in [facility].  to Edyta Cai.    2 sons and 2 daughters    Cancer father    Stroke mother        Daughter in [facility]    Son in [place]    Daughter in [place]    Son in [place]               Family History -   Family History   Problem Relation Age of Onset     CEREBROVASCULAR DISEASE Mother      at 86 yo - possibly TIA - befoer     Macular Degeneration Mother      GASTROINTESTINAL DISEASE Father      Cancer - colorectal Father      Hypertension Father      CANCER Father      CANCER Other      C.A.D. No family hx of      DIABETES No family hx of      Prostate Cancer No family hx of      Glaucoma No family hx of      Thyroid Disease No family hx of        Review of Systems - As per HPI and PMHx, otherwise 10+ system review of the head and neck, and general constitution is negative.    Physical  "Exam  B/P: Data Unavailable, T: Data Unavailable, P: Data Unavailable, R: 12  Vitals: Resp 12  Ht 1.854 m (6' 1\")  Wt 79.4 kg (175 lb)  BMI 23.09 kg/m2  BMI= Body mass index is 23.09 kg/(m^2).    General - The patient is well nourished and well developed, and appears to have good nutritional status.  Alert and oriented to person and place, answers questions and cooperates with examination appropriately.   Head and Face - Normocephalic and atraumatic, with no gross asymmetry noted of the contour of the facial features.  The facial nerve is intact, with strong symmetric movements.  Voice and Breathing - The patient was breathing comfortably without the use of accessory muscles. There was no wheezing, stridor, or stertor.  The patients voice was clear and strong, and had appropriate pitch and quality.  Ears - The tympanic membranes are normal in appearance, bony landmarks are intact.  No retraction, perforation, or masses.  Normal mobility on valsalva maneuver, with no reports of dizziness on insuflation.  No fluid or purulence was seen in the external canal or the middle ear. No evidence of infection of the middle ear or external canal, cerumen was normal in appearance.  Eyes - Extraocular movements intact, and the pupils were reactive to light.  Sclera were not icteric or injected, conjunctiva were pink and moist.  Mouth - Examination of the oral cavity showed pink, healthy oral mucosa. No lesions or ulcerations noted.  The tongue was mobile and midline, and the dentition were in good condition.    Throat - The walls of the oropharynx were smooth, pink, moist, symmetric, and had no lesions or ulcerations.  The tonsillar pillars and soft palate were symmetric.  The uvula was midline on elevation.    Neck - Normal midline excursion of the laryngotracheal complex during swallowing.  Full range of motion on passive movement.  Palpation of the occipital, submental, submandibular, internal jugular chain, and " supraclavicular nodes did not demonstrate any abnormal lymph nodes or masses.  The carotid pulse was palpable bilaterally.  Palpation of the thyroid was soft and smooth, with no nodules or goiter appreciated.  The trachea was mobile and midline.  Nose - External contour is symmetric, no gross deflection or scars.  Nasal mucosa is pink and moist with no abnormal mucus.  The septum was midline and non-obstructive, turbinates of normal size and position.  No polyps, masses, or purulence noted on examination.    Flexible Endoscopy -    Attempts at mirror laryngoscopy were not possible due to gag reflex.  Therefore I proceeded with a fiberoptic examination.  First I sprayed both sides of the nose with a mixture of lidocaine and neosynephrine.  I then passed the scope through the nasal cavity.  Color photographs were taken for the permanent medical record.  The nasal cavity was unremarkable.  The nasopharynx was mucosally covered and symmetric.  The Eustachian tube openings were unobstructed.  Going further down I had a clear view of the base of tongue, which had normal appearing lingual tonsillar tissue.  The base of tongue was free of lesions, and the vallecula was open.  The epiglottis was smooth and mucosally covered.  The supraglottic larynx was then clearly visualized.  The vocal cords moved smoothly and symmetrically, they were slightly edematous but pearly white and no lesions were seen.  I did note a significant amount of edema and redundant mucosa in the interarytenoid soft tissues and posterior surface of the larynx.  The pyriform sinuses were open, and the limited view of the postcricoid region did not show any erosive or mass lesions.        A/P - Preston Cai is a 81 year old male  (C32.9) Laryngeal cancer (H)  (primary encounter diagnosis)  (F45.8) Globus sensation  Comment: I find no evidence of recurrent SCCA of the upper aerodigestive tract.  I suspect that his symptoms are late side effects of  radiation therapy, with subsequent xerostomia causing his symptoms.  I will try magic mouthwash, but also a trial of salagen at a low dose to see if that helps.  follow up in two months to see if this has been effective.    Also, to his recollection he has never had carotid artery imaging done.  Due to the significant increase in incidence as a remote compliaciton of radiation to the neck, I will order a carotid US and call him with results.    Adult lifestyle changes to prevent LPR reviewed      Avoid eating and drinking within two to three hours prior to bedtime    Do not drink alcohol    Eat small meals and slowly    Limit problem foods:    o Caffeine  o Carbonated drinks  o Chocolate  o Peppermint  o Tomato  o Citrus fruits  o Fatty and fried foods      Lose weight    Quit smoking    Wear loose clothing

## 2017-03-14 NOTE — NURSING NOTE
"Chief Complaint   Patient presents with     Consult     throat       Initial Resp 12  Ht 1.854 m (6' 1\")  Wt 79.4 kg (175 lb)  BMI 23.09 kg/m2 Estimated body mass index is 23.09 kg/(m^2) as calculated from the following:    Height as of this encounter: 1.854 m (6' 1\").    Weight as of this encounter: 79.4 kg (175 lb).  Medication Reconciliation: complete     Josr Felton CMA      "

## 2017-03-20 ENCOUNTER — TELEPHONE (OUTPATIENT)
Dept: OTOLARYNGOLOGY | Facility: CLINIC | Age: 82
End: 2017-03-20

## 2017-03-20 ENCOUNTER — RADIANT APPOINTMENT (OUTPATIENT)
Dept: ULTRASOUND IMAGING | Facility: CLINIC | Age: 82
End: 2017-03-20
Attending: OTOLARYNGOLOGY
Payer: COMMERCIAL

## 2017-03-20 DIAGNOSIS — C32.9 LARYNGEAL CANCER (H): ICD-10-CM

## 2017-03-20 DIAGNOSIS — R09.A2 GLOBUS SENSATION: ICD-10-CM

## 2017-03-20 PROCEDURE — 93880 EXTRACRANIAL BILAT STUDY: CPT

## 2017-03-20 NOTE — TELEPHONE ENCOUNTER
New prescription entered and sent to [facility] pharmacy via fax.    Also, I called and spoke with the patient, the Carotid US are normal.

## 2017-03-20 NOTE — TELEPHONE ENCOUNTER
I see that an order for Magic Mouthwash was put in last week but it looks like the rx was printed but never sent to us. Could you please send us the rx for the magic mouthwash so we can make that for the patient?    Thank you,    Fernando Helton, [facility] Ariella Turpin  P. [phone]

## 2017-03-28 ENCOUNTER — MYC MEDICAL ADVICE (OUTPATIENT)
Dept: OTOLARYNGOLOGY | Facility: CLINIC | Age: 82
End: 2017-03-28

## 2017-04-03 ENCOUNTER — ALLIED HEALTH/NURSE VISIT (OUTPATIENT)
Dept: NURSING | Facility: CLINIC | Age: 82
End: 2017-04-03
Payer: COMMERCIAL

## 2017-04-03 DIAGNOSIS — Z86.2 HISTORY OF ANEMIA: Primary | ICD-10-CM

## 2017-04-03 PROCEDURE — 96372 THER/PROPH/DIAG INJ SC/IM: CPT

## 2017-04-03 PROCEDURE — 99207 ZZC NO CHARGE LOS: CPT

## 2017-04-03 NOTE — PROGRESS NOTES
The following medication was given:     MEDICATION: Vitamin B12  1000mcg  ROUTE: IM  SITE: [facility]  DOSE: 1ml  LOT #: [id number]  :  [employer]  EXPIRATION DATE:  7/2018  NDC#: 3224-0182-90    See order placed by MD 4/03/2017.     Hanane Murrell CMA

## 2017-04-03 NOTE — MR AVS SNAPSHOT
After Visit Summary   4/3/2017    Preston Cai    MRN: [id number]           Patient Information     Date Of Birth          1935        Visit Information        Provider Department      4/3/2017 9:00 AM BK ANCILLARY [facility]        Today's Diagnoses     History of anemia    -  1       Follow-ups after your visit        Your next 10 appointments already scheduled     May 01, 2017  9:00 AM CDT   Nurse Only with BK ANCILLARY   [facility] ([facility]    [address] [place] [id number] [phone]              Who to contact     If you have questions or need follow up information about today's clinic visit or your schedule please contact [facility] directly at [phone].  Normal or non-critical lab and imaging results will be communicated to you by [employer], letter or phone within 4 business days after the clinic has received the results. If you do not hear from us within 7 days, please contact the clinic through [employer] or phone. If you have a critical or abnormal lab result, we will notify you by phone as soon as possible.  Submit refill requests through [employer] or call your pharmacy and they will forward the refill request to us. Please allow 3 business days for your refill to be completed.          Additional Information About Your Visit        MyChart Information     [employer] gives you secure access to your electronic health record. If you see a primary care provider, you can also send messages to your care team and make appointments. If you have questions, please call your primary care clinic.  If you do not have a primary care provider, please call [phone] and they will assist you.        Care EveryWhere ID     This is your Care EveryWhere ID. This could be used by other organizations to access your [place] medical records  [id number]         Blood Pressure from Last 3  Encounters:   02/20/17 102/65   10/10/16 102/67   09/22/16 107/67    Weight from Last 3 Encounters:   03/14/17 175 lb (79.4 kg)   02/20/17 181 lb (82.1 kg)   10/10/16 182 lb (82.6 kg)              We Performed the Following     B12 - 1000 MCG        Primary Care Provider Office Phone # Fax #    Tyrel Manning [phone]       [facility] [address]  [facility] [address]        Thank you!     Thank you for choosing [facility]  for your care. Our goal is always to provide you with excellent care. Hearing back from our patients is one way we can continue to improve our services. Please take a few minutes to complete the written survey that you may receive in the mail after your visit with us. Thank you!             Your Updated Medication List - Protect others around you: Learn how to safely use, store and throw away your medicines at www.disposemymeds.org.          This list is accurate as of: 4/3/17  9:12 AM.  Always use your most recent med list.                   Brand Name Dispense Instructions for use    aspirin 81 MG tablet      1 TABLET DAILY       cyanocobalamin 1000 MCG/ML injection    VITAMIN B12    0.9 mL    Inject 1 mL (1,000 mcg) into the muscle every 30 days       dexlansoprazole 60 MG Cpdr CR capsule    DEXILANT    30 capsule    Take 1 capsule (60 mg) by mouth daily       MAGIC MOUTHWASH (ENTER INGREDIENTS IN COMMENTS)     180 mL    Swish and spit 5-10 mLs in mouth every 6 hours as needed       neomycin-polymixin-dexamethasone ophthalmic ointment    MAXITROL    1 Tube    Apply to both eye lids margin at bedtime       pilocarpine 5 MG tablet    SALAGEN    60 tablet    Take 1 tablet (5 mg) by mouth 2 times daily as needed       primidone 250 MG tablet    MYSOLINE    90 tablet    Take 1 tablet (250 mg) by mouth At Bedtime       RABEprazole 20 MG EC tablet    ACIPHEX    30 tablet    Take 1 tablet (20 mg) by mouth daily       VITAMIN D  (CHOLECALCIFEROL) PO      Take 2,000 Units by mouth daily

## 2017-05-01 ENCOUNTER — ALLIED HEALTH/NURSE VISIT (OUTPATIENT)
Dept: NURSING | Facility: CLINIC | Age: 82
End: 2017-05-01
Payer: COMMERCIAL

## 2017-05-01 DIAGNOSIS — D51.0 PERNICIOUS ANEMIA: ICD-10-CM

## 2017-05-01 PROCEDURE — 99207 ZZC NO CHARGE NURSE ONLY: CPT

## 2017-05-01 PROCEDURE — 96372 THER/PROPH/DIAG INJ SC/IM: CPT

## 2017-05-01 NOTE — MR AVS SNAPSHOT
After Visit Summary   5/1/2017    Preston Cai    MRN: [id number]           Patient Information     Date Of Birth          1935        Visit Information        Provider Department      5/1/2017 9:00 AM BK ANCILLARY [facility]        Today's Diagnoses     Pernicious anemia           Follow-ups after your visit        Follow-up notes from your care team     Return for Injection.      Who to contact     If you have questions or need follow up information about today's clinic visit or your schedule please contact [facility] directly at [phone].  Normal or non-critical lab and imaging results will be communicated to you by [employer], letter or phone within 4 business days after the clinic has received the results. If you do not hear from us within 7 days, please contact the clinic through [employer] or phone. If you have a critical or abnormal lab result, we will notify you by phone as soon as possible.  Submit refill requests through [email or web address] or call your pharmacy and they will forward the refill request to us. Please allow 3 business days for your refill to be completed.          Additional Information About Your Visit        MyChart Information     [email or web address] gives you secure access to your electronic health record. If you see a primary care provider, you can also send messages to your care team and make appointments. If you have questions, please call your primary care clinic.  If you do not have a primary care provider, please call [phone] and they will assist you.        Care EveryWhere ID     This is your Care EveryWhere ID. This could be used by other organizations to access your [place] medical records  [id number]         Blood Pressure from Last 3 Encounters:   02/20/17 102/65   10/10/16 102/67   09/22/16 107/67    Weight from Last 3 Encounters:   03/14/17 175 lb (79.4 kg)   02/20/17 181 lb (82.1 kg)   10/10/16 182 lb (82.6 kg)              We  Performed the Following     B12 - 1000 MCG     VITAMIN B12 INJ /1000MCG        Primary Care Provider Office Phone # Fax #    Tyrel Manning [phone]       [facility] [address]  [facility] [address]        Thank you!     Thank you for choosing [facility]  for your care. Our goal is always to provide you with excellent care. Hearing back from our patients is one way we can continue to improve our services. Please take a few minutes to complete the written survey that you may receive in the mail after your visit with us. Thank you!             Your Updated Medication List - Protect others around you: Learn how to safely use, store and throw away your medicines at www.disposemymeds.org.          This list is accurate as of: 5/1/17  9:32 AM.  Always use your most recent med list.                   Brand Name Dispense Instructions for use    aspirin 81 MG tablet      1 TABLET DAILY       cyanocobalamin 1000 MCG/ML injection    VITAMIN B12    0.9 mL    Inject 1 mL (1,000 mcg) into the muscle every 30 days       MAGIC MOUTHWASH (ENTER INGREDIENTS IN COMMENTS)     180 mL    Swish and spit 5-10 mLs in mouth every 6 hours as needed       neomycin-polymixin-dexamethasone ophthalmic ointment    MAXITROL    1 Tube    Apply to both eye lids margin at bedtime       pilocarpine 5 MG tablet    SALAGEN    60 tablet    Take 1 tablet (5 mg) by mouth 2 times daily as needed       primidone 250 MG tablet    MYSOLINE    90 tablet    Take 1 tablet (250 mg) by mouth At Bedtime       RABEprazole 20 MG EC tablet    ACIPHEX    30 tablet    Take 1 tablet (20 mg) by mouth daily       VITAMIN D (CHOLECALCIFEROL) PO      Take 2,000 Units by mouth daily

## 2017-05-01 NOTE — PROGRESS NOTES
The following medication was given:     MEDICATION: Vitamin B12  1,000mcg  ROUTE: IM  SITE: [facility]  DOSE: 1ml  LOT #: [id number]  :  "[employer]  EXPIRATION DATE:  09/2018  NDC#: 7891-4107-66  Katherine Hill

## 2017-06-01 ENCOUNTER — ALLIED HEALTH/NURSE VISIT (OUTPATIENT)
Dept: NURSING | Facility: CLINIC | Age: 82
End: 2017-06-01
Payer: COMMERCIAL

## 2017-06-01 DIAGNOSIS — D51.0 PERNICIOUS ANEMIA: Primary | ICD-10-CM

## 2017-06-01 PROCEDURE — 96372 THER/PROPH/DIAG INJ SC/IM: CPT

## 2017-06-01 PROCEDURE — 99207 ZZC NO CHARGE NURSE ONLY: CPT

## 2017-06-27 ENCOUNTER — OFFICE VISIT (OUTPATIENT)
Dept: FAMILY MEDICINE | Facility: CLINIC | Age: 82
End: 2017-06-27
Payer: COMMERCIAL

## 2017-06-27 ENCOUNTER — RADIANT APPOINTMENT (OUTPATIENT)
Dept: GENERAL RADIOLOGY | Facility: CLINIC | Age: 82
End: 2017-06-27
Attending: INTERNAL MEDICINE
Payer: COMMERCIAL

## 2017-06-27 VITALS
DIASTOLIC BLOOD PRESSURE: 61 MMHG | SYSTOLIC BLOOD PRESSURE: 101 MMHG | HEART RATE: 74 BPM | HEIGHT: 73 IN | WEIGHT: 177 LBS | OXYGEN SATURATION: 100 % | TEMPERATURE: 97.3 F | BODY MASS INDEX: 23.46 KG/M2

## 2017-06-27 DIAGNOSIS — K44.9 HIATAL HERNIA: ICD-10-CM

## 2017-06-27 DIAGNOSIS — G25.0 ESSENTIAL TREMOR: ICD-10-CM

## 2017-06-27 DIAGNOSIS — K29.50 OTHER CHRONIC GASTRITIS: Primary | ICD-10-CM

## 2017-06-27 DIAGNOSIS — D51.0 PERNICIOUS ANEMIA: ICD-10-CM

## 2017-06-27 DIAGNOSIS — M50.122 CERVICAL DISC DISORDER AT C5-C6 LEVEL WITH RADICULOPATHY: ICD-10-CM

## 2017-06-27 DIAGNOSIS — R63.4 WEIGHT LOSS: ICD-10-CM

## 2017-06-27 LAB
ALBUMIN SERPL-MCNC: 4 G/DL (ref 3.4–5)
ALP SERPL-CCNC: 64 U/L (ref 40–150)
ALT SERPL W P-5'-P-CCNC: 23 U/L (ref 0–70)
ANION GAP SERPL CALCULATED.3IONS-SCNC: 8 MMOL/L (ref 3–14)
AST SERPL W P-5'-P-CCNC: 15 U/L (ref 0–45)
BASOPHILS # BLD AUTO: 0 10E9/L (ref 0–0.2)
BASOPHILS NFR BLD AUTO: 0.2 %
BILIRUB SERPL-MCNC: 0.3 MG/DL (ref 0.2–1.3)
BUN SERPL-MCNC: 20 MG/DL (ref 7–30)
CALCIUM SERPL-MCNC: 9.1 MG/DL (ref 8.5–10.1)
CHLORIDE SERPL-SCNC: 101 MMOL/L (ref 94–109)
CO2 SERPL-SCNC: 31 MMOL/L (ref 20–32)
CREAT SERPL-MCNC: 1.09 MG/DL (ref 0.66–1.25)
DIFFERENTIAL METHOD BLD: ABNORMAL
EOSINOPHIL # BLD AUTO: 0.1 10E9/L (ref 0–0.7)
EOSINOPHIL NFR BLD AUTO: 1.1 %
ERYTHROCYTE [DISTWIDTH] IN BLOOD BY AUTOMATED COUNT: 12.5 % (ref 10–15)
GFR SERPL CREATININE-BSD FRML MDRD: 65 ML/MIN/1.7M2
GLUCOSE SERPL-MCNC: 74 MG/DL (ref 70–99)
HCT VFR BLD AUTO: 40.3 % (ref 40–53)
HGB BLD-MCNC: 13.5 G/DL (ref 13.3–17.7)
LIPASE SERPL-CCNC: 128 U/L (ref 73–393)
LYMPHOCYTES # BLD AUTO: 1.7 10E9/L (ref 0.8–5.3)
LYMPHOCYTES NFR BLD AUTO: 32.6 %
MCH RBC QN AUTO: 33 PG (ref 26.5–33)
MCHC RBC AUTO-ENTMCNC: 33.5 G/DL (ref 31.5–36.5)
MCV RBC AUTO: 99 FL (ref 78–100)
MONOCYTES # BLD AUTO: 0.5 10E9/L (ref 0–1.3)
MONOCYTES NFR BLD AUTO: 9.1 %
NEUTROPHILS # BLD AUTO: 3 10E9/L (ref 1.6–8.3)
NEUTROPHILS NFR BLD AUTO: 57 %
PLATELET # BLD AUTO: 172 10E9/L (ref 150–450)
POTASSIUM SERPL-SCNC: 4.4 MMOL/L (ref 3.4–5.3)
PROT SERPL-MCNC: 7.4 G/DL (ref 6.8–8.8)
RBC # BLD AUTO: 4.09 10E12/L (ref 4.4–5.9)
SODIUM SERPL-SCNC: 140 MMOL/L (ref 133–144)
TSH SERPL DL<=0.005 MIU/L-ACNC: 2.43 MU/L (ref 0.4–4)
VIT B12 SERPL-MCNC: 422 PG/ML (ref 193–986)
WBC # BLD AUTO: 5.3 10E9/L (ref 4–11)

## 2017-06-27 PROCEDURE — 83690 ASSAY OF LIPASE: CPT | Performed by: INTERNAL MEDICINE

## 2017-06-27 PROCEDURE — 72040 X-RAY EXAM NECK SPINE 2-3 VW: CPT

## 2017-06-27 PROCEDURE — 36415 COLL VENOUS BLD VENIPUNCTURE: CPT | Performed by: INTERNAL MEDICINE

## 2017-06-27 PROCEDURE — 87338 HPYLORI STOOL AG IA: CPT | Performed by: INTERNAL MEDICINE

## 2017-06-27 PROCEDURE — 99214 OFFICE O/P EST MOD 30 MIN: CPT | Performed by: INTERNAL MEDICINE

## 2017-06-27 PROCEDURE — 82607 VITAMIN B-12: CPT | Performed by: INTERNAL MEDICINE

## 2017-06-27 PROCEDURE — 80050 GENERAL HEALTH PANEL: CPT | Performed by: INTERNAL MEDICINE

## 2017-06-27 RX ORDER — CYANOCOBALAMIN 1000 UG/ML
1 INJECTION, SOLUTION INTRAMUSCULAR; SUBCUTANEOUS
Qty: 2 ML | Refills: 11 | OUTPATIENT
Start: 2017-06-27 | End: 2018-03-12

## 2017-06-27 ASSESSMENT — PAIN SCALES - GENERAL: PAINLEVEL: NO PAIN (0)

## 2017-06-27 NOTE — PROGRESS NOTES
[facility] Internal Medicine Progress Note           Assessment and Plan:     (K29.50) Other chronic gastritis  (primary encounter diagnosis)  Comment: Most probable cause of daily nausea. Denies any epigastric pain or melena. Compliant with Aciphex. Consider adding Sucralfate TID. Proceed with upper endoscopy if symptoms do not improve.  Plan: CBC with platelets differential, Comprehensive         metabolic panel (BMP + Alb, Alk Phos, ALT, AST,        Total. 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 Current complaints of cervical radicular pain is within C5-C6 dermatomal level. Recommend physical therapy since NSAIDs are not effective (and contraindicated due to gastritis). Will start Gabapentin only if symptoms are continuous.  Plan: XR Cervical Spine 2/3 Views            (R63.4) Weight loss  Comment: Rule out intra-abdominal malignancy if first-line tests are unremarkable. Need to rule out H. Pylori. Repeat thyroid function tests.  Plan: CBC with platelets differential, Comprehensive         metabolic panel (BMP + Alb, Alk Phos, ALT, AST,        Total. Bili, TP), Lipase, CT Abdomen Pelvis w         Contrast            (D51.0) Pernicious anemia  Comment: Still complains of weakness two weeks after each Vitamin B12 injection. Since Vitamin B12 level is below recommended range of more than 500, adjust dose of intramuscular cyanocobalamin to 1000 mcg every 2 weeks.  Plan: Vitamin B12            (G25.0) Essential tremor  Comment: Patient is worried that his lower BP is from Primidone.  Plan: Reduce Primidone to 125 mg/day. Revert to usual dose if tremors are worse.    (K44.9) Hiatal hernia  Comment: Etiology of nocturnal cough when laying on his right side. Evident  from 2/2017 esophagram.  Plan: Add Ranitidine 300 mg qHs.               Interval History:     GERD/Heartburn  Onset: got worse in last couple years    Description: sore throat worse when laying on his side.    Burning in chest: no    Intensity: moderate    Progression of Symptoms: worsening    Accompanying Signs & Symptoms:  Does it feel like food gets stuck: no  Nausea: YES  Vomiting (bloody?): no  Abdominal Pain: no  Black-Tarry stools: no:  Bloody stools: no    History:   Previous ulcers: YES    Precipitating factors:   Caffeine use: YES  Alcohol use: YES  NSAID/Aspirin use: YES- 81mg   Tobacco use: no  Worse with no particular food or drink.    Alleviating factors:  none    Therapies Tried and outcome:Aciphex    Neck Pain  Onset: over a year    Description:   Location: both sides  Radiation: into the right shoulder    Intensity: pain with turning head    Progression of Symptoms:  worsening and intermittent    Accompanying Signs & Symptoms:  Burning, prickly sensation (paresthesias) in arm(s): YES right side  Numbness in arm(s): YES- right side  Weakness in arm(s):  no   Fever: no   Headache: no   Nausea and/or vomiting: YES- nausea    History:   Trauma: no   Previous neck pain: no   Previous surgery or injections: no   Previous Imaging (MRI,X ray): no     Precipitating factors:   Does movement increase the pain:  YES    Alleviating factors:  none  Therapies Tried and outcome:  Motrin did not help    Pernicious anemia follow-up      Duration: chronic    Description (location/character/radiation): low normal Vitamin B12 level, associated with positive parietal cell antibody test    Accompanying signs and symptoms: muscular weakness, but denies any memory loss.    History (similar episodes/previous evaluation): YES    Precipitating or alleviating factors: intramuscular Cyanocobalamin but patient still complains of weakness two weeks each injection.       Weight loss      Duration: subacute    Description  (location/character/radiation): persistent weight loss despite excellent appetite    Accompanying signs and symptoms: nausea but denies any melena or hematochezia    History (similar episodes/previous evaluation): no recent CT abdomen/pelvis.    Precipitating or alleviating factors: unknown    Therapies tried and outcome: none                Significant Problems:   Patient Active Problem List   Diagnosis     Seborrheic dermatitis     Allergic rhinitis due to other allergen     Cervical radiculopathy at C6     Laryngeal cancer (H)     Essential tremor     CARDIOVASCULAR SCREENING; LDL GOAL LESS THAN 160     BPH (benign prostatic hypertrophy)     Hoarse voice quality     Health Care Home     Advanced directives, counseling/discussion     History of anemia     Cataract     Pseudophakia     Gastroesophageal reflux disease without esophagitis     BPH without urinary obstruction     Globus sensation     Pernicious anemia     Hypocalcemia     Tremor     H/O magnetic resonance imaging of brain and brain stem     Macular degeneration     Squamous blepharitis of upper and lower eyelids of both eyes              Review of Systems:   C: NEGATIVE for fever, chills.  I: NEGATIVE for worrisome rashes, moles or lesions  E: NEGATIVE for vision changes or irritation  E/M: NEGATIVE for ear, mouth and throat problems  R: NEGATIVE for significant cough or SOB  B: NEGATIVE for masses, tenderness or discharge  CV: NEGATIVE for chest pain, palpitations or peripheral edema  GI: NEGATIVE for hematemesis, hematochezia, jaundice, melena and poor appetite  : NEGATIVE for frequency, dysuria, or hematuria  M: NEGATIVE for significant arthralgias or myalgia  NEURO: NEGATIVE for dysarthria, involuntary movements, gait disturbance, loss of consciousness and memory problems  E: NEGATIVE for temperature intolerance, skin/hair changes  HEME/ALLERGY/IMMUNE: POSITIVE  for pernicious anemia.  P: NEGATIVE for changes in mood or affect             "Medications:     Current Outpatient Prescriptions   Medication Sig     ranitidine HCl 300 MG CAPS Take 1 capsule by mouth At Bedtime     RABEprazole (ACIPHEX) 20 MG EC tablet TAKE ONE TABLET BY MOUTH EVERY DAY     cyanocobalamin (VITAMIN B12) 1000 MCG/ML injection Inject 1 mL (1,000 mcg) into the muscle every 30 days     primidone (MYSOLINE) 250 MG tablet Take 1 tablet (250 mg) by mouth At Bedtime (Patient taking differently: Take 125 mg by mouth At Bedtime )     neomycin-polymixin-dexamethasone (MAXITROL) ophthalmic ointment Apply to both eye lids margin at bedtime     VITAMIN D, CHOLECALCIFEROL, PO Take 2,000 Units by mouth daily      ASPIRIN 81 MG OR TABS 1 TABLET DAILY     No current facility-administered medications for this visit.              Physical Exam:   /61 (BP Location: Left arm, Patient Position: Chair, Cuff Size: Adult Regular)  Pulse 74  Temp 97.3  F (36.3  C) (Oral)  Ht 6' 1\" (1.854 m)  Wt 177 lb (80.3 kg)  SpO2 100%  BMI 23.35 kg/m2   GENERAL: healthy, alert and no distress  EYES: Eyes grossly normal to inspection, PERRL and conjunctivae and sclerae normal  HENT: ear canals and TM's normal, nose and mouth without ulcers or lesions  NECK: no adenopathy, no asymmetry, masses, or scars and thyroid normal to palpation  RESP: lungs clear to auscultation - no rales, rhonchi or wheezes  CV: regular rate and rhythm, normal S1 S2, no S3 or S4, no murmur, click or rub, no peripheral edema and peripheral pulses strong  ABDOMEN: soft, nontender, no hepatosplenomegaly, no masses and bowel sounds normal  MS: no gross musculoskeletal defects noted, no edema  SKIN: no suspicious lesions or rashes  NEURO: Normal strength and tone, mentation intact and speech normal  PSYCH: mentation appears normal, affect normal/bright           Data:   XR ESOPHAGRAM W UPPER GI, 2/27/2017 12:29 PM     Comparison: None.     History: reflux, indigestion, history of gastric ulcers     Fluoroscopy time: 1 min 15 " sec.     Findings:   The esophagus, stomach, and duodenum are unremarkable and free of  filling defects. The fold pattern of the esophagus is normal with no  radiographic evidence of esophagitis. Esophageal motility is slightly  slowed. No tertiary waves. No spontaneous reflux was seen.     Small hiatal hernia.      The rugal pattern within the stomach is prominent with mildly  thickened folds, with no definite ulcer identified. On image 6 of the  gastric body, there are two rounded prominent areas within the rugal  folds, appearance favoring prominent folds and less likely ulceration  or polyps.      No mucosal abnormalities or ulcers were identified in the duodenum.  The visualized jejunum is normal.          Impression:   1. Slightly slowed esophageal motility and small hiatal hernia, with  no definite reflux.   2. Prominent mildly thickened gastric folds/rugae, which can be seen  in the setting of gastritis.   3. There are 2 rounded prominent areas within the rugal folds of the  gastric body, the appearance favors thickened prominent folds, less  likely ulceration or polyps.      KRISTA ROY MD    MRI CERVICAL SPINE WITHOUT CONTRAST  12/30/2008 at 0934 hours     HISTORY: Neck and left arm pain persists after treatment.  Radiculopathy.     TECHNIQUE: Routine pulse sequences without contrast.     FINDINGS: Sagittal images demonstrate normal posterior alignment.  There is no evidence for craniovertebral or cervicomedullary junction  abnormality. The cervical cord is normal in morphology and signal  characteristics. Disc space narrowing is present at C5-C6 and C6-C7.  Bone marrow signal intensity is normal.     C2-C3: Normal.     C3-C4: Minimal posterior osteophyte formation and disc bulging is  present but there is no significant stenosis.     C4-C5: Minimal disc bulging. No stenosis.     C5-C6: Broad-based posterior osteophyte formation and disc bulging is  present causing mild central and mild to moderate  bilateral neural  foraminal stenosis.     C6-C7: Minimal disc bulging and posterior osteophyte formation. No  stenosis.     C7-T1: Normal.     Paraspinal soft tissues: Normal as visualized.     IMPRESSION: Mild multilevel degenerative disc disease most advanced at  C5-C6 where there is mild central and mild/moderate bilateral neural  foraminal stenosis.         Disposition: Follow-up visit if condition worsens.    .    Tyrel Manning MD  Internal Medicine  [facility] Team

## 2017-06-27 NOTE — NURSING NOTE
"Chief Complaint   Patient presents with     Pain     Neck     Gastrophageal Reflux       Initial /61 (BP Location: Left arm, Patient Position: Chair, Cuff Size: Adult Regular)  Pulse 74  Temp 97.3  F (36.3  C) (Oral)  Ht 6' 1\" (1.854 m)  Wt 177 lb (80.3 kg)  SpO2 100%  BMI 23.35 kg/m2 Estimated body mass index is 23.35 kg/(m^2) as calculated from the following:    Height as of this encounter: 6' 1\" (1.854 m).    Weight as of this encounter: 177 lb (80.3 kg).  Medication Reconciliation: complete   DONALD Hannon MA      "

## 2017-06-27 NOTE — PATIENT INSTRUCTIONS
================================================================================  Normal Values   Blood pressure  <140/90 for most adults    <130/80 for some chronic diseases (ask your care team about yours)    BMI (body mass index)  18.5-25 kg/m2 (based on height and weight)     Thank you for visiting [facility]    Normal or non-critical lab and imaging results will be communicated to you by MyChart, letter or phone within 7 days.  If you do not hear from us within 10 days, please call the clinic. If you have a critical or abnormal lab result, we will notify you by phone as soon as possible.     If you have any questions regarding your visit please contact:     Team Comfort:   Clinic Hours Telephone Number   Dr. Jed Manning 7am-5pm  Monday - Friday [phone]  Renetta Warner RN   Pharmacy 8:00am-8pm Monday-Friday    9am-5pm Saturday-Sunday [phone]   Urgent Care 11am-9pm Monday-Friday        9am-5pm Saturday-Sunday [phone]     After hours, weekend or if you need to make an appointment with your primary provider please call [phone].   After Hours nurse advise: call [facility]: [phone]    Medication Refills:  Call your pharmacy and they will forward the refill to us. Please allow 3 business days for your refills to be completed.

## 2017-06-27 NOTE — MR AVS SNAPSHOT
After Visit Summary   6/27/2017    Preston Cai    MRN: [id number]           Patient Information     Date Of Birth          1935        Visit Information        Provider Department      6/27/2017 9:20 AM Tyrel Manning MD [facility]        Today's Diagnoses     Cervical disc disorder at C5-C6 level with radiculopathy    -  1    Weight loss        Other chronic gastritis        Pernicious anemia        Essential tremor        Hiatal hernia          Care Instructions        ================================================================================  Normal Values   Blood pressure  <140/90 for most adults    <130/80 for some chronic diseases (ask your care team about yours)    BMI (body mass index)  18.5-25 kg/m2 (based on height and weight)     Thank you for visiting [facility]    Normal or non-critical lab and imaging results will be communicated to you by MyChart, letter or phone within 7 days.  If you do not hear from us within 10 days, please call the clinic. If you have a critical or abnormal lab result, we will notify you by phone as soon as possible.     If you have any questions regarding your visit please contact:     Team Comfort:   Clinic Hours Telephone Number   Dr. Jed Manning 7am-5pm  Monday - Friday [phone]  Renetta Warner RN   Pharmacy 8:00am-8pm Monday-Friday    9am-5pm Saturday-Sunday [phone]   Urgent Care 11am-9pm Monday-Friday        9am-5pm Saturday-Sunday [phone]     After hours, weekend or if you need to make an appointment with your primary provider please call [phone].   After Hours nurse advise: call [facility]: [phone]    Medication Refills:  Call your pharmacy and they will forward the refill to us. Please allow 3 business days for your refills to be completed.                  Follow-ups after  "your visit        Your next 10 appointments already scheduled     Jul 03, 2017  8:00 AM CDT   Nurse Only with BK ANCILLARY   [facility] ([facility]    [address] [place] [address] [phone]              Who to contact     If you have questions or need follow up information about today's clinic visit or your schedule please contact [facility] directly at [phone].  Normal or non-critical lab and imaging results will be communicated to you by Halon Securityhart, letter or phone within 4 business days after the clinic has received the results. If you do not hear from us within 7 days, please contact the clinic through [employer] or phone. If you have a critical or abnormal lab result, we will notify you by phone as soon as possible.  Submit refill requests through Saisei or call your pharmacy and they will forward the refill request to us. Please allow 3 business days for your refill to be completed.          Additional Information About Your Visit        Halon [employer] Information     Sa[employer] gives you secure access to your electronic health record. If you see a primary care provider, you can also send messages to your care team and make appointments. If you have questions, please call your primary care clinic.  If you do not have a primary care provider, please call [phone] and they will assist you.        Care EveryWhere ID     This is your Care EveryWhere ID. This could be used by other organizations to access your [facility] medical records  [id number]        Your Vitals Were     Pulse Temperature Height Pulse Oximetry BMI (Body Mass Index)       74 97.3  F (36.3  C) (Oral) 6' 1\" (1.854 m) 100% 23.35 kg/m2        Blood Pressure from Last 3 Encounters:   06/27/17 101/61   02/20/17 102/65   10/10/16 102/67    Weight from Last 3 Encounters:   06/27/17 177 lb (80.3 kg)   03/14/17 175 lb (79.4 kg)   02/20/17 181 lb (82.1 kg)              We " Performed the Following     CBC with platelets differential     Comprehensive metabolic panel (BMP + Alb, Alk Phos, ALT, AST, Total. Bili, TP)     Vitamin B12     XR Cervical Spine 2/3 Views          Today's Medication Changes          These changes are accurate as of: 6/27/17  9:54 AM.  If you have any questions, ask your nurse or doctor.               Start taking these medicines.        Dose/Directions    ranitidine HCl 300 MG Caps   Used for:  Other chronic gastritis   Started by:  Tyrel Manning MD        Dose:  1 capsule   Take 1 capsule by mouth At Bedtime   Quantity:  90 capsule   Refills:  1         These medicines have changed or have updated prescriptions.        Dose/Directions    primidone 250 MG tablet   Commonly known as:  MYSOLINE   This may have changed:  how much to take   Used for:  H/O magnetic resonance imaging of brain and brain stem        Dose:  250 mg   Take 1 tablet (250 mg) by mouth At Bedtime   Quantity:  90 tablet   Refills:  3            Where to get your medicines      These medications were sent to [facility] - [place], [place] - [address]  [address], [facility] [address]     Phone:  [phone]     ranitidine HCl 300 MG Caps                Primary Care Provider Office Phone # Fax #    Tyrel Manning [phone]       [facility] [address]  [facility] [address]        Equal Access to Services     JOSE LUIS MEYER : Hadii sridhar ku hadasho Soomaali, waaxda luqadaha, qaybta kaalmada adeegyada, ornny murray. So [facility] [phone].    ATENCIÓN: Si habla español, tiene a dias disposición servicios gratuitos de asistencia lingüística. Llame al [phone].    We comply with applicable federal civil rights laws and [place] laws. We do not discriminate on the basis of race, color, national origin, age, disability sex, sexual orientation or gender identity.            Thank  you!     Thank you for choosing [facility]  for your care. Our goal is always to provide you with excellent care. Hearing back from our patients is one way we can continue to improve our services. Please take a few minutes to complete the written survey that you may receive in the mail after your visit with us. Thank you!             Your Updated Medication List - Protect others around you: Learn how to safely use, store and throw away your medicines at www.disposemymeds.org.          This list is accurate as of: 6/27/17  9:54 AM.  Always use your most recent med list.                   Brand Name Dispense Instructions for use Diagnosis    aspirin 81 MG tablet      1 TABLET DAILY        cyanocobalamin 1000 MCG/ML injection    VITAMIN B12    0.9 mL    Inject 1 mL (1,000 mcg) into the muscle every 30 days    Pernicious anemia       neomycin-polymixin-dexamethasone ophthalmic ointment    MAXITROL    1 Tube    Apply to both eye lids margin at bedtime    Blepharitis of both eyes       primidone 250 MG tablet    MYSOLINE    90 tablet    Take 1 tablet (250 mg) by mouth At Bedtime    H/O magnetic resonance imaging of brain and brain stem       RABEprazole 20 MG EC tablet    ACIPHEX    90 tablet    TAKE ONE TABLET BY MOUTH EVERY DAY    Gastroesophageal reflux disease without esophagitis       ranitidine HCl 300 MG Caps     90 capsule    Take 1 capsule by mouth At Bedtime    Other chronic gastritis       VITAMIN D (CHOLECALCIFEROL) PO      Take 2,000 Units by mouth daily

## 2017-06-28 DIAGNOSIS — K29.50 OTHER CHRONIC GASTRITIS: ICD-10-CM

## 2017-06-29 LAB
H PYLORI AG STL QL IA: NORMAL
MICRO REPORT STATUS: NORMAL
SPECIMEN SOURCE: NORMAL

## 2017-07-03 ENCOUNTER — RADIANT APPOINTMENT (OUTPATIENT)
Dept: CT IMAGING | Facility: CLINIC | Age: 82
End: 2017-07-03
Attending: INTERNAL MEDICINE
Payer: COMMERCIAL

## 2017-07-03 ENCOUNTER — ALLIED HEALTH/NURSE VISIT (OUTPATIENT)
Dept: NURSING | Facility: CLINIC | Age: 82
End: 2017-07-03
Payer: COMMERCIAL

## 2017-07-03 ENCOUNTER — TELEPHONE (OUTPATIENT)
Dept: FAMILY MEDICINE | Facility: CLINIC | Age: 82
End: 2017-07-03

## 2017-07-03 DIAGNOSIS — D51.0 PA (PERNICIOUS ANEMIA): Primary | ICD-10-CM

## 2017-07-03 DIAGNOSIS — D51.0 PERNICIOUS ANEMIA: Primary | ICD-10-CM

## 2017-07-03 DIAGNOSIS — R63.4 WEIGHT LOSS: ICD-10-CM

## 2017-07-03 PROCEDURE — 74177 CT ABD & PELVIS W/CONTRAST: CPT | Performed by: RADIOLOGY

## 2017-07-03 PROCEDURE — 99207 ZZC NO CHARGE LOS: CPT

## 2017-07-03 PROCEDURE — 96372 THER/PROPH/DIAG INJ SC/IM: CPT

## 2017-07-03 RX ORDER — IOPAMIDOL 755 MG/ML
108 INJECTION, SOLUTION INTRAVASCULAR ONCE
Status: COMPLETED | OUTPATIENT
Start: 2017-07-03 | End: 2017-07-03

## 2017-07-03 RX ADMIN — IOPAMIDOL 108 ML: 755 INJECTION, SOLUTION INTRAVASCULAR at 10:37

## 2017-07-03 NOTE — TELEPHONE ENCOUNTER
cyanocobalamin (VITAMIN B12) 1000 MCG/ML injection     --      Sig: Inject 1 mL (1,000 mcg) into the muscle every 14 days     Class: Injection     Route: Intramuscular     Order: [id number]     This is the order listed in the chart.

## 2017-07-03 NOTE — TELEPHONE ENCOUNTER
This writer attempted to contact Preston on 07/03/17      Reason for call update patient B12 dosing  and left message to return call.      When patient calls back, please contact 1st floor [place] Care Team (MA/TC). If no one available, document that pt called and route to care team. routine priority .          Citlaly Hannon MA

## 2017-07-03 NOTE — TELEPHONE ENCOUNTER
Patient was in clinic, 7/03/2017 to received a B12 injection. (see documentation of injection given 7/03/2017)    He said that the provider doubled the dosing after last lab draw.     I reviewed the order placed 6/27/2017, I interpreted the order to read 1ml of B12 every 14 days, to equal 2ml's per month.   Patient asked if it should be 2ml's of B12 every 14 days.     I informed that I would send a message to the provider and clarify the order.     Patient does have 2 more injection appointments set for 7/17/2017, and 7/31/2017.    Hanane Murrell CMA

## 2017-07-03 NOTE — MR AVS SNAPSHOT
After Visit Summary   7/3/2017    Preston Cai    MRN: [id number]           Patient Information     Date Of Birth          1935        Visit Information        Provider Department      7/3/2017 8:00 AM BK ANCILLARY [facility]        Today's Diagnoses     Pernicious anemia    -  1       Follow-ups after your visit        Your next 10 appointments already scheduled     Jul 03, 2017 10:30 AM CDT   CT ABDOMEN PELVIS W CONTRAST with MGCT1   [facility] ([facility])    [id number] [place] [address] [phone]           Please bring any scans or X-rays taken at other hospitals, if similar tests were done. Also bring a list of your medicines, including vitamins, minerals and over-the-counter drugs. It is safest to leave personal items at home.  Be sure to tell your doctor:   If you have any allergies.   If there s any chance you are pregnant.   If you are breastfeeding.   If you have any special needs.  You may have contrast for this exam. To prepare:   Do not eat or drink for 2 hours before your exam. If you need to take medicine, you may take it with small sips of water. (We may ask you to take liquid medicine as well.)   The day before your exam, drink extra fluids at least six 8-ounce glasses (unless your doctor tells you to restrict your fluids).  Patients over 70 or patients with diabetes or kidney problems:   If you haven t had a blood test (creatinine test) within the last 30 days, go to your clinic or Diagnostic Imaging Department for this test.  If you have diabetes:   If your kidney function is normal, continue taking your metformin (Avandamet, Glucophage, Glucovance, Metaglip) on the day of your exam.   If your kidney function is abnormal, wait 48 hours before restarting this medicine.  You will have oral contrast for this exam:   You will drink the contrast at home. Get this from your clinic or Diagnostic Imaging  Department. Please follow the directions given.  Please wear loose clothing, such as a sweat suit or jogging clothes. Avoid snaps, zippers and other metal. We may ask you to undress and put on a hospital gown.  If you have any questions, please call the Imaging Department where you will have your exam.            Jul 17, 2017  9:00 AM CDT   Nurse Only with BK ANCILLARY   [facility] ([facility]    [address] [facility] [id number] [phone]            Jul 31, 2017  9:00 AM CDT   Nurse Only with BK ANCILLARY   [facility] ([facility])    [address] [facility] [address] [phone]              Who to contact     If you have questions or need follow up information about today's clinic visit or your schedule please contact [facility] directly at [phone].  Normal or non-critical lab and imaging results will be communicated to you by [employer] Angelhart, letter or phone within 4 business days after the clinic has received the results. If you do not hear from us within 7 days, please contact the clinic through [employer] or phone. If you have a critical or abnormal lab result, we will notify you by phone as soon as possible.  Submit refill requests through [employer] or call your pharmacy and they will forward the refill request to us. Please allow 3 business days for your refill to be completed.          Additional Information About Your Visit        [employer] Information     [employer] gives you secure access to your electronic health record. If you see a primary care provider, you can also send messages to your care team and make appointments. If you have questions, please call your primary care clinic.  If you do not have a primary care provider, please call [phone] and they will assist you.        Care EveryWhere ID     This is your Care EveryWhere ID. This could be used by other  organizations to access your [place] medical records  [id number]         Blood Pressure from Last 3 Encounters:   06/27/17 101/61   02/20/17 102/65   10/10/16 102/67    Weight from Last 3 Encounters:   06/27/17 177 lb (80.3 kg)   03/14/17 175 lb (79.4 kg)   02/20/17 181 lb (82.1 kg)              We Performed the Following     B12 - 1000 MCG     INJECTION INTRAMUSCULAR OR SUB-Q          Today's Medication Changes          These changes are accurate as of: 7/3/17  8:30 AM.  If you have any questions, ask your nurse or doctor.               These medicines have changed or have updated prescriptions.        Dose/Directions    primidone 250 MG tablet   Commonly known as:  MYSOLINE   This may have changed:  how much to take   Used for:  H/O magnetic resonance imaging of brain and brain stem        Dose:  250 mg   Take 1 tablet (250 mg) by mouth At Bedtime   Quantity:  90 tablet   Refills:  3                Primary Care Provider Office Phone # Fax #    Tyrel Manning [phone]       [facility] [address]  [facility] [address]        Equal Access to Services     [facility] Center[facility]NA : Hadii sridhar miller hadasho Soomaali, waaxda luqadaha, qaybta kaalmada adeegyada, ronny salgado . So [facility] [phone].    ATENCIÓN: Si habla español, tiene a dias disposición servicios gratuitos de asistencia lingüística. Llame al [phone].    We comply with applicable federal civil rights laws and [place] laws. We do not discriminate on the basis of race, color, national origin, age, disability sex, sexual orientation or gender identity.            Thank you!     Thank you for choosing [facility]  for your care. Our goal is always to provide you with excellent care. Hearing back from our patients is one way we can continue to improve our services. Please take a few minutes to complete the written survey that you may receive in the mail after your  visit with us. Thank you!             Your Updated Medication List - Protect others around you: Learn how to safely use, store and throw away your medicines at www.disposemymeds.org.          This list is accurate as of: 7/3/17  8:30 AM.  Always use your most recent med list.                   Brand Name Dispense Instructions for use Diagnosis    aspirin 81 MG tablet      1 TABLET DAILY        cyanocobalamin 1000 MCG/ML injection    VITAMIN B12    2 mL    Inject 1 mL (1,000 mcg) into the muscle every 14 days    Pernicious anemia       neomycin-polymixin-dexamethasone ophthalmic ointment    MAXITROL    1 Tube    Apply to both eye lids margin at bedtime    Blepharitis of both eyes       primidone 250 MG tablet    MYSOLINE    90 tablet    Take 1 tablet (250 mg) by mouth At Bedtime    H/O magnetic resonance imaging of brain and brain stem       RABEprazole 20 MG EC tablet    ACIPHEX    90 tablet    TAKE ONE TABLET BY MOUTH EVERY DAY    Gastroesophageal reflux disease without esophagitis       ranitidine HCl 300 MG Caps     90 capsule    Take 1 capsule by mouth At Bedtime    Other chronic gastritis       VITAMIN D (CHOLECALCIFEROL) PO      Take 2,000 Units by mouth daily

## 2017-07-03 NOTE — TELEPHONE ENCOUNTER
Based on previously administered doses, it appears that the patient was inadvertently given lower doses on 4/3/17 and 5/1/17. Correct dose of 2 ml was last  given on 3/6/17. Therefore, patient is right, it should be 2 ml every two weeks. Standing orders done.       CORNELIO SOLIS May 1, 2017  9:31 AM  The following medication was given:     MEDICATION: Vitamin B12  1,000mcg  ROUTE: IM  SITE: LUQ - Gluteus  DOSE: 1ml  LOT #: [id number]  :  [employer]  EXPIRATION DATE:  09/2018  NDC#: 0877-6873-25  MARIA EUGENIA Knowles Apr 3, 2017  9:05 AM  The following medication was given:     MEDICATION: Vitamin B12  1000mcg  ROUTE: IM  SITE: RUQ - Gluteus  DOSE: 1ml  LOT #: [id number]  :  Sport Ngin  EXPIRATION DATE:  7/2018  NDC#: 3725-4672-86    BRIAN Parkinson CMA   Mon Mar 6, 2017  2:59 PM (CST)  The following medication was given at 8:23am     MEDICATION: Vitamin B12  2000mcg  ROUTE: IM  SITE: LEFT GLUT  DOSE: 2ML   LOT #: [id number]  :Sport Ngin    EXPIRATION DATE:  July/2018  NDC#: 0648-8076-60

## 2017-07-03 NOTE — PROGRESS NOTES
The following medication was given:     MEDICATION: Vitamin B12  1000mcg  ROUTE: IM  SITE: ventral glutel  right  DOSE: 1ml  LOT #: [id number]  :  Leap[employer]   EXPIRATION DATE:  12/2018  NDC#: 5021-6258-27    Given at 8:15am, next due in 14 days,   See physicians order from 6/27/2017.      Hanane Murrell CMA

## 2017-07-17 ENCOUNTER — TELEPHONE (OUTPATIENT)
Dept: FAMILY MEDICINE | Facility: CLINIC | Age: 82
End: 2017-07-17

## 2017-07-17 ENCOUNTER — ALLIED HEALTH/NURSE VISIT (OUTPATIENT)
Dept: NURSING | Facility: CLINIC | Age: 82
End: 2017-07-17
Payer: COMMERCIAL

## 2017-07-17 DIAGNOSIS — D51.0 PA (PERNICIOUS ANEMIA): Primary | ICD-10-CM

## 2017-07-17 PROCEDURE — 99207 ZZC NO CHARGE LOS: CPT

## 2017-07-17 PROCEDURE — 96372 THER/PROPH/DIAG INJ SC/IM: CPT

## 2017-07-17 NOTE — PROGRESS NOTES
The following medication was given:     MEDICATION: Vitamin B12  1000mcg  ROUTE: IM  SITE: Ventrogluteal - Left  DOSE: 1ml  LOT #: [id number]  :  [employer]  EXPIRATION DATE:  12/2018  NDC#: 6855-9278-34  Given at 9:05am, next dose 7/31/2107 in two weeks, per Dr. Jaxon Murrell CMA

## 2017-07-17 NOTE — MR AVS SNAPSHOT
After Visit Summary   7/17/2017    Preston Cai    MRN: [id number]           Patient Information     Date Of Birth          1935        Visit Information        Provider Department      7/17/2017 9:00 AM BK ANCILLARY [facility]        Today's Diagnoses     PA (pernicious anemia)    -  1       Follow-ups after your visit        Your next 10 appointments already scheduled     Jul 31, 2017  9:00 AM CDT   Nurse Only with BK ANCILLARY   [facility] ([facility]    [address] [place] [address] [phone]              Who to contact     If you have questions or need follow up information about today's clinic visit or your schedule please contact [facility] directly at [phone].  Normal or non-critical lab and imaging results will be communicated to you by Flithart, letter or phone within 4 business days after the clinic has received the results. If you do not hear from us within 7 days, please contact the clinic through MyChart or phone. If you have a critical or abnormal lab result, we will notify you by phone as soon as possible.  Submit refill requests through [employer] or call your pharmacy and they will forward the refill request to us. Please allow 3 business days for your refill to be completed.          Additional Information About Your Visit        MyChart Information     [employer] gives you secure access to your electronic health record. If you see a primary care provider, you can also send messages to your care team and make appointments. If you have questions, please call your primary care clinic.  If you do not have a primary care provider, please call [phone] and they will assist you.        Care EveryWhere ID     This is your Care EveryWhere ID. This could be used by other organizations to access your [facility] medical records  [id number]         Blood Pressure from Last 3  Encounters:   06/27/17 101/61   02/20/17 102/65   10/10/16 102/67    Weight from Last 3 Encounters:   06/27/17 177 lb (80.3 kg)   03/14/17 175 lb (79.4 kg)   02/20/17 181 lb (82.1 kg)              We Performed the Following     INJECTION INTRAMUSCULAR OR SUB-Q     Vitamin B12          Today's Medication Changes          These changes are accurate as of: 7/17/17  9:17 AM.  If you have any questions, ask your nurse or doctor.               These medicines have changed or have updated prescriptions.        Dose/Directions    primidone 250 MG tablet   Commonly known as:  MYSOLINE   This may have changed:  how much to take   Used for:  H/O magnetic resonance imaging of brain and brain stem        Dose:  250 mg   Take 1 tablet (250 mg) by mouth At Bedtime   Quantity:  90 tablet   Refills:  3                Primary Care Provider Office Phone # Fax #    Tyrel Manning [phone]       [facility] [address]  [facility] [address]        Equal Access to Services     [facility]: Hadii sridhar ku hadasho Soomaali, waaxda luqadaha, qaybta kaalmada adeegyada, waxay lidain hayjerman salgado . So [facility] [phone].    ATENCIÓN: Si habla español, tiene a dias disposición servicios gratuitos de asistencia lingüística. Llame al [phone].    We comply with applicable federal civil rights laws and [place] laws. We do not discriminate on the basis of race, color, national origin, age, disability sex, sexual orientation or gender identity.            Thank you!     Thank you for choosing [facility]  for your care. Our goal is always to provide you with excellent care. Hearing back from our patients is one way we can continue to improve our services. Please take a few minutes to complete the written survey that you may receive in the mail after your visit with us. Thank you!             Your Updated Medication List - Protect others around you: Learn how to  safely use, store and throw away your medicines at www.disposemymeds.org.          This list is accurate as of: 7/17/17  9:17 AM.  Always use your most recent med list.                   Brand Name Dispense Instructions for use Diagnosis    aspirin 81 MG tablet      1 TABLET DAILY        cyanocobalamin 1000 MCG/ML injection    VITAMIN B12    2 mL    Inject 1 mL (1,000 mcg) into the muscle every 14 days    Pernicious anemia       neomycin-polymixin-dexamethasone ophthalmic ointment    MAXITROL    1 Tube    Apply to both eye lids margin at bedtime    Blepharitis of both eyes       primidone 250 MG tablet    MYSOLINE    90 tablet    Take 1 tablet (250 mg) by mouth At Bedtime    H/O magnetic resonance imaging of brain and brain stem       RABEprazole 20 MG EC tablet    ACIPHEX    90 tablet    TAKE ONE TABLET BY MOUTH EVERY DAY    Gastroesophageal reflux disease without esophagitis       ranitidine HCl 300 MG Caps     90 capsule    Take 1 capsule by mouth At Bedtime    Other chronic gastritis       VITAMIN D (CHOLECALCIFEROL) PO      Take 2,000 Units by mouth daily

## 2017-07-17 NOTE — TELEPHONE ENCOUNTER
While patient was in [facility] 7/17/2017 for an injection, he asked about the results of an X-ray he had done 6/27/2017.     He said he received the results, of the CT, through my-chart, but has not received the X-ray results to date.    Please send through my-chart message, per patient request.     Forwarding to provider to review.       Hanane Murrell CMA

## 2017-07-31 ENCOUNTER — ALLIED HEALTH/NURSE VISIT (OUTPATIENT)
Dept: NURSING | Facility: CLINIC | Age: 82
End: 2017-07-31
Payer: COMMERCIAL

## 2017-07-31 DIAGNOSIS — D51.0 PERNICIOUS ANEMIA: Primary | ICD-10-CM

## 2017-07-31 PROCEDURE — 99207 ZZC NO CHARGE NURSE ONLY: CPT

## 2017-07-31 PROCEDURE — 96372 THER/PROPH/DIAG INJ SC/IM: CPT

## 2017-08-14 ENCOUNTER — ALLIED HEALTH/NURSE VISIT (OUTPATIENT)
Dept: NURSING | Facility: CLINIC | Age: 82
End: 2017-08-14
Payer: COMMERCIAL

## 2017-08-14 DIAGNOSIS — D51.0 PA (PERNICIOUS ANEMIA): ICD-10-CM

## 2017-08-14 PROCEDURE — 96372 THER/PROPH/DIAG INJ SC/IM: CPT

## 2017-08-14 PROCEDURE — 99207 ZZC NO CHARGE LOS: CPT

## 2017-08-14 NOTE — MR AVS SNAPSHOT
After Visit Summary   8/14/2017    Preston Cai    MRN: [id number]           Patient Information     Date Of Birth          1935        Visit Information        Provider Department      8/14/2017 7:20 AM BK ANCILLARY [facility]        Today's Diagnoses     PA (pernicious anemia)           Follow-ups after your visit        Your next 10 appointments already scheduled     Aug 28, 2017  9:00 AM CDT   Nurse Only with BK ANCILLARY   [facility] ([facility]    [address] [place] [address] [phone]              Who to contact     If you have questions or need follow up information about today's clinic visit or your schedule please contact [facility] directly at [phone].  Normal or non-critical lab and imaging results will be communicated to you by MyChart, letter or phone within 4 business days after the clinic has received the results. If you do not hear from us within 7 days, please contact the clinic through MyChart or phone. If you have a critical or abnormal lab result, we will notify you by phone as soon as possible.  Submit refill requests through [employer] or call your pharmacy and they will forward the refill request to us. Please allow 3 business days for your refill to be completed.          Additional Information About Your Visit        MyChart Information     [employer] gives you secure access to your electronic health record. If you see a primary care provider, you can also send messages to your care team and make appointments. If you have questions, please call your primary care clinic.  If you do not have a primary care provider, please call [phone] and they will assist you.        Care EveryWhere ID     This is your Care EveryWhere ID. This could be used by other organizations to access your [place] medical records  [id number]         Blood Pressure from Last 3  Encounters:   06/27/17 101/61   02/20/17 102/65   10/10/16 102/67    Weight from Last 3 Encounters:   06/27/17 177 lb (80.3 kg)   03/14/17 175 lb (79.4 kg)   02/20/17 181 lb (82.1 kg)              We Performed the Following     INJECTION INTRAMUSCULAR OR SUB-Q     Vitamin B12          Today's Medication Changes          These changes are accurate as of: 8/14/17  7:28 AM.  If you have any questions, ask your nurse or doctor.               These medicines have changed or have updated prescriptions.        Dose/Directions    primidone 250 MG tablet   Commonly known as:  MYSOLINE   This may have changed:  how much to take   Used for:  H/O magnetic resonance imaging of brain and brain stem        Dose:  250 mg   Take 1 tablet (250 mg) by mouth At Bedtime   Quantity:  90 tablet   Refills:  3                Primary Care Provider Office Phone # Fax #    Tyrel Manning [phone]       [address]  [facility] [address]        Equal Access to Services     [facility] AH: Hadii sridhar ku hadasho Soomaali, waaxda luqadaha, qaybta kaalmada adeegyada, waxay lidain hayjerman salgado . So [facility] [phone].    ATENCIÓN: Si habla español, tiene a dias disposición servicios gratuitos de asistencia lingüística. Llame al [phone].    We comply with applicable federal civil rights laws and [place] laws. We do not discriminate on the basis of race, color, national origin, age, disability sex, sexual orientation or gender identity.            Thank you!     Thank you for choosing [facility]  for your care. Our goal is always to provide you with excellent care. Hearing back from our patients is one way we can continue to improve our services. Please take a few minutes to complete the written survey that you may receive in the mail after your visit with us. Thank you!             Your Updated Medication List - Protect others around you: Learn how to safely use, store and throw  away your medicines at www.disposemymeds.org.          This list is accurate as of: 8/14/17  7:28 AM.  Always use your most recent med list.                   Brand Name Dispense Instructions for use Diagnosis    aspirin 81 MG tablet      1 TABLET DAILY        cyanocobalamin 1000 MCG/ML injection    VITAMIN B12    2 mL    Inject 1 mL (1,000 mcg) into the muscle every 14 days    Pernicious anemia       neomycin-polymixin-dexamethasone ophthalmic ointment    MAXITROL    1 Tube    Apply to both eye lids margin at bedtime    Blepharitis of both eyes       primidone 250 MG tablet    MYSOLINE    90 tablet    Take 1 tablet (250 mg) by mouth At Bedtime    H/O magnetic resonance imaging of brain and brain stem       RABEprazole 20 MG EC tablet    ACIPHEX    90 tablet    TAKE ONE TABLET BY MOUTH EVERY DAY    Gastroesophageal reflux disease without esophagitis       ranitidine HCl 300 MG Caps     90 capsule    Take 1 capsule by mouth At Bedtime    Other chronic gastritis       VITAMIN D (CHOLECALCIFEROL) PO      Take 2,000 Units by mouth daily

## 2017-08-14 NOTE — PROGRESS NOTES
The following medication was given:     MEDICATION: Vitamin B12  1000mcg  ROUTE: IM  SITE: Ventrogluteal - Left  DOSE: 1ml  LOT #: [id number]  :  [employer]  EXPIRATION DATE:  2/2019  NDC#: 2099-5783-19    Given at 7:25am, next due in 2 weeks.     Hanane Murrell CMA

## 2017-08-28 ENCOUNTER — ALLIED HEALTH/NURSE VISIT (OUTPATIENT)
Dept: NURSING | Facility: CLINIC | Age: 82
End: 2017-08-28
Payer: COMMERCIAL

## 2017-08-28 DIAGNOSIS — D51.0 PA (PERNICIOUS ANEMIA): ICD-10-CM

## 2017-08-28 PROCEDURE — 99207 ZZC NO CHARGE LOS: CPT

## 2017-08-28 PROCEDURE — 96372 THER/PROPH/DIAG INJ SC/IM: CPT

## 2017-08-28 NOTE — PROGRESS NOTES
The following medication was given:     MEDICATION: Vitamin B12  1000mcg  ROUTE: IM  SITE: Vastus Lateralis - Right  DOSE: 1ml  LOT #: [id number]  :  [employer]  EXPIRATION DATE:  9/2018  NDC#: 3555-5007-66    Next due in 2 wks     Hanane Murrell CMA

## 2017-08-28 NOTE — MR AVS SNAPSHOT
After Visit Summary   8/28/2017    Preston Cai    MRN: [id number]           Patient Information     Date Of Birth          1935        Visit Information        Provider Department      8/28/2017 9:00 AM BK ANCILLARY [facility]        Today's Diagnoses     PA (pernicious anemia)           Follow-ups after your visit        Your next 10 appointments already scheduled     Sep 11, 2017  8:40 AM CDT   Nurse Only with BK ANCILLARY   [facility]    [address] [id number] [phone]            Sep 25, 2017  9:00 AM CDT   Nurse Only with BK ANCILLARY   [facility]    [address] [address] [phone]              Who to contact     If you have questions or need follow up information about today's clinic visit or your schedule please contact [facility] directly at [phone].  Normal or non-critical lab and imaging results will be communicated to you by [employer], letter or phone within 4 business days after the clinic has received the results. If you do not hear from us within 7 days, please contact the clinic through [employer] or phone. If you have a critical or abnormal lab result, we will notify you by phone as soon as possible.  Submit refill requests through [employer] or call your pharmacy and they will forward the refill request to us. Please allow 3 business days for your refill to be completed.          Additional Information About Your Visit        [employer] Information     [employer] gives you secure access to your electronic health record. If you see a primary care provider, you can also send messages to your care team and make appointments. If you have questions, please call your primary care clinic.  If you do not have a primary care provider, please call [phone] and they will assist  you.        Care EveryWhere ID     This is your Care EveryWhere ID. This could be used by other organizations to access your [place] medical records  [id number]         Blood Pressure from Last 3 Encounters:   06/27/17 101/61   02/20/17 102/65   10/10/16 102/67    Weight from Last 3 Encounters:   06/27/17 177 lb (80.3 kg)   03/14/17 175 lb (79.4 kg)   02/20/17 181 lb (82.1 kg)              We Performed the Following     INJECTION INTRAMUSCULAR OR SUB-Q     Vitamin B12          Today's Medication Changes          These changes are accurate as of: 8/28/17 10:02 AM.  If you have any questions, ask your nurse or doctor.               These medicines have changed or have updated prescriptions.        Dose/Directions    primidone 250 MG tablet   Commonly known as:  MYSOLINE   This may have changed:  how much to take   Used for:  H/O magnetic resonance imaging of brain and brain stem        Dose:  250 mg   Take 1 tablet (250 mg) by mouth At Bedtime   Quantity:  90 tablet   Refills:  3                Primary Care Provider Office Phone # Fax #    Tyrel Manning [phone]       [address] [place]  [facility] [address]        Equal Access to Services     [facility] AH: Hadii sridhar miller hadasho Sonehemiahali, waaxda luqadaha, qaybta kaalmada adeegyada, ronny murray. So [facility] [phone].    ATENCIÓN: Si habla español, tiene a dias disposición servicios gratuitos de asistencia lingüística. [facility] [phone].    We comply with applicable federal civil rights laws and [place] laws. We do not discriminate on the basis of race, color, national origin, age, disability sex, sexual orientation or gender identity.            Thank you!     Thank you for choosing [facility]  for your care. Our goal is always to provide you with excellent care. Hearing back from our patients is one way we can continue to improve our services. Please take a few minutes to complete the  written survey that you may receive in the mail after your visit with us. Thank you!             Your Updated Medication List - Protect others around you: Learn how to safely use, store and throw away your medicines at www.disposemymeds.org.          This list is accurate as of: 8/28/17 10:02 AM.  Always use your most recent med list.                   Brand Name Dispense Instructions for use Diagnosis    aspirin 81 MG tablet      1 TABLET DAILY        cyanocobalamin 1000 MCG/ML injection    VITAMIN B12    2 mL    Inject 1 mL (1,000 mcg) into the muscle every 14 days    Pernicious anemia       neomycin-polymixin-dexamethasone ophthalmic ointment    MAXITROL    1 Tube    Apply to both eye lids margin at bedtime    Blepharitis of both eyes       primidone 250 MG tablet    MYSOLINE    90 tablet    Take 1 tablet (250 mg) by mouth At Bedtime    H/O magnetic resonance imaging of brain and brain stem       RABEprazole 20 MG EC tablet    ACIPHEX    90 tablet    TAKE ONE TABLET BY MOUTH EVERY DAY    Gastroesophageal reflux disease without esophagitis       ranitidine HCl 300 MG Caps     90 capsule    Take 1 capsule by mouth At Bedtime    Other chronic gastritis       VITAMIN D (CHOLECALCIFEROL) PO      Take 2,000 Units by mouth daily

## 2017-09-11 ENCOUNTER — TELEPHONE (OUTPATIENT)
Dept: FAMILY MEDICINE | Facility: CLINIC | Age: 82
End: 2017-09-11

## 2017-09-11 ENCOUNTER — ALLIED HEALTH/NURSE VISIT (OUTPATIENT)
Dept: NURSING | Facility: CLINIC | Age: 82
End: 2017-09-11
Payer: COMMERCIAL

## 2017-09-11 DIAGNOSIS — D51.0 PA (PERNICIOUS ANEMIA): Primary | ICD-10-CM

## 2017-09-11 PROCEDURE — 96372 THER/PROPH/DIAG INJ SC/IM: CPT

## 2017-09-11 PROCEDURE — 99207 ZZC NO CHARGE LOS: CPT

## 2017-09-11 NOTE — MR AVS SNAPSHOT
After Visit Summary   9/11/2017    Preston Cai    MRN: [id number]           Patient Information     Date Of Birth          1935        Visit Information        Provider Department      9/11/2017 8:40 AM BK ANCILLARY [facility]        Today's Diagnoses     PA (pernicious anemia)    -  1       Follow-ups after your visit        Your next 10 appointments already scheduled     Sep 25, 2017  9:00 AM CDT   Nurse Only with BK ANCILLARY   [facility]    [address] [place] [address] [phone]              Who to contact     If you have questions or need follow up information about today's clinic visit or your schedule please contact [facility] directly at [phone].  Normal or non-critical lab and imaging results will be communicated to you by [employer], letter or phone within 4 business days after the clinic has received the results. If you do not hear from us within 7 days, please contact the clinic through MyChart or phone. If you have a critical or abnormal lab result, we will notify you by phone as soon as possible.  Submit refill requests through [employer] or call your pharmacy and they will forward the refill request to us. Please allow 3 business days for your refill to be completed.          Additional Information About Your Visit        MyChart Information     [employer] gives you secure access to your electronic health record. If you see a primary care provider, you can also send messages to your care team and make appointments. If you have questions, please call your primary care clinic.  If you do not have a primary care provider, please call [phone] and they will assist you.        Care EveryWhere ID     This is your Care EveryWhere ID. This could be used by other organizations to access your [place] medical records  [id number]         Blood Pressure from Last 3  Encounters:   06/27/17 101/61   02/20/17 102/65   10/10/16 102/67    Weight from Last 3 Encounters:   06/27/17 177 lb (80.3 kg)   03/14/17 175 lb (79.4 kg)   02/20/17 181 lb (82.1 kg)              We Performed the Following     INJECTION INTRAMUSCULAR OR SUB-Q     Vitamin B12          Today's Medication Changes          These changes are accurate as of: 9/11/17  8:54 AM.  If you have any questions, ask your nurse or doctor.               These medicines have changed or have updated prescriptions.        Dose/Directions    primidone 250 MG tablet   Commonly known as:  MYSOLINE   This may have changed:  how much to take   Used for:  H/O magnetic resonance imaging of brain and brain stem        Dose:  250 mg   Take 1 tablet (250 mg) by mouth At Bedtime   Quantity:  90 tablet   Refills:  3                Primary Care Provider Office Phone # Fax #    Tyrel Manning [phone]       [address]  [facility] [address]        Equal Access to Services     [facility] AH: Hadii sridhar ku hadasho Soomaali, waaxda luqadaha, qaybta kaalmada adeegyada, waxay lidain hayjerman salgado . So [facility] [phone].    ATENCIÓN: Si habla español, tiene a dias disposición servicios gratuitos de asistencia lingüística. Llame al [phone].    We comply with applicable federal civil rights laws and [place] laws. We do not discriminate on the basis of race, color, national origin, age, disability sex, sexual orientation or gender identity.            Thank you!     Thank you for choosing [facility]  for your care. Our goal is always to provide you with excellent care. Hearing back from our patients is one way we can continue to improve our services. Please take a few minutes to complete the written survey that you may receive in the mail after your visit with us. Thank you!             Your Updated Medication List - Protect others around you: Learn how to safely use, store and throw  away your medicines at www.disposemymeds.org.          This list is accurate as of: 9/11/17  8:54 AM.  Always use your most recent med list.                   Brand Name Dispense Instructions for use Diagnosis    aspirin 81 MG tablet      1 TABLET DAILY        cyanocobalamin 1000 MCG/ML injection    VITAMIN B12    2 mL    Inject 1 mL (1,000 mcg) into the muscle every 14 days    Pernicious anemia       neomycin-polymixin-dexamethasone ophthalmic ointment    MAXITROL    1 Tube    Apply to both eye lids margin at bedtime    Blepharitis of both eyes       primidone 250 MG tablet    MYSOLINE    90 tablet    Take 1 tablet (250 mg) by mouth At Bedtime    H/O magnetic resonance imaging of brain and brain stem       RABEprazole 20 MG EC tablet    ACIPHEX    90 tablet    TAKE ONE TABLET BY MOUTH EVERY DAY    Gastroesophageal reflux disease without esophagitis       ranitidine HCl 300 MG Caps     90 capsule    Take 1 capsule by mouth At Bedtime    Other chronic gastritis       VITAMIN D (CHOLECALCIFEROL) PO      Take 2,000 Units by mouth daily

## 2017-09-11 NOTE — PROGRESS NOTES
The following medication was given:     MEDICATION: Vitamin B12  1000mcg  ROUTE: IM  SITE: Ventrogluteal - Left  DOSE: 1ml  LOT #: [id number]  :  [employer]  EXPIRATION DATE:  9/2018  NDC#: 9842-5815-82    Given at 8:40am, next due in 1 month.     Hanane Murrell CMA

## 2017-09-11 NOTE — TELEPHONE ENCOUNTER
Patient was in clinic for a B12 injection, 9/11/2017.     While I asked when he would need to be seen next.   He said he did not know.     Upon review of chart, he is due for a B12 level.   Would this be a lab only appointment or a viist with the provider.     Please send a my-chart message to patient to inform.     Forwarding to provider to review and address.    Hanane Murrell CMA

## 2017-09-25 DIAGNOSIS — D51.0 PA (PERNICIOUS ANEMIA): ICD-10-CM

## 2017-09-25 LAB — VIT B12 SERPL-MCNC: 592 PG/ML (ref 193–986)

## 2017-09-25 PROCEDURE — 36415 COLL VENOUS BLD VENIPUNCTURE: CPT | Performed by: INTERNAL MEDICINE

## 2017-09-25 PROCEDURE — 82607 VITAMIN B-12: CPT | Performed by: INTERNAL MEDICINE

## 2017-09-27 ENCOUNTER — OFFICE VISIT (OUTPATIENT)
Dept: FAMILY MEDICINE | Facility: CLINIC | Age: 82
End: 2017-09-27
Payer: COMMERCIAL

## 2017-09-27 VITALS
HEART RATE: 61 BPM | HEIGHT: 73 IN | DIASTOLIC BLOOD PRESSURE: 63 MMHG | BODY MASS INDEX: 23.72 KG/M2 | WEIGHT: 179 LBS | TEMPERATURE: 98.2 F | OXYGEN SATURATION: 97 % | SYSTOLIC BLOOD PRESSURE: 119 MMHG

## 2017-09-27 DIAGNOSIS — K44.9 HIATAL HERNIA WITH GASTROESOPHAGEAL REFLUX DISEASE WITHOUT ESOPHAGITIS: Primary | ICD-10-CM

## 2017-09-27 DIAGNOSIS — K21.9 HIATAL HERNIA WITH GASTROESOPHAGEAL REFLUX DISEASE WITHOUT ESOPHAGITIS: Primary | ICD-10-CM

## 2017-09-27 DIAGNOSIS — N40.0 BENIGN PROSTATIC HYPERPLASIA, PRESENCE OF LOWER URINARY TRACT SYMPTOMS UNSPECIFIED: ICD-10-CM

## 2017-09-27 DIAGNOSIS — R49.0 HOARSENESS: ICD-10-CM

## 2017-09-27 DIAGNOSIS — M50.122 CERVICAL DISC DISORDER AT C5-C6 LEVEL WITH RADICULOPATHY: ICD-10-CM

## 2017-09-27 DIAGNOSIS — C32.9 LARYNGEAL CANCER (H): ICD-10-CM

## 2017-09-27 DIAGNOSIS — G25.0 ESSENTIAL TREMOR: ICD-10-CM

## 2017-09-27 PROCEDURE — 99214 OFFICE O/P EST MOD 30 MIN: CPT | Performed by: INTERNAL MEDICINE

## 2017-09-27 RX ORDER — TAMSULOSIN HYDROCHLORIDE 0.4 MG/1
0.4 CAPSULE ORAL AT BEDTIME
Qty: 90 CAPSULE | Refills: 3 | Status: SHIPPED | OUTPATIENT
Start: 2017-09-27 | End: 2018-03-09

## 2017-09-27 NOTE — NURSING NOTE
"Chief Complaint   Patient presents with     Hoarse     losing voice from acid reflux     Musculoskeletal Problem     neck pain     Derm Problem     spot on right arm       Initial /63 (BP Location: Left arm, Patient Position: Chair, Cuff Size: Adult Regular)  Pulse 61  Temp 98.2  F (36.8  C) (Oral)  Ht 6' 1\" (1.854 m)  Wt 179 lb (81.2 kg)  SpO2 97%  BMI 23.62 kg/m2 Estimated body mass index is 23.62 kg/(m^2) as calculated from the following:    Height as of this encounter: 6' 1\" (1.854 m).    Weight as of this encounter: 179 lb (81.2 kg).  Medication Reconciliation: complete     Pa Chi Francisco MA      "

## 2017-09-27 NOTE — PROGRESS NOTES
SUBJECTIVE:   Preston Cai is a 81 year old male who presents to clinic today for the following health issues:  Chief Complaint   Patient presents with     Hoarse     losing voice from acid reflux     Musculoskeletal Problem     neck pain     Derm Problem     spot on right arm     Fatigue     Concern - Hoarse Voice  Onset: ongoing    Description:   Losing voice from acid reflux    Intensity: moderate    Progression of Symptoms:  same    Accompanying Signs & Symptoms:  Soreness and constantly clearing throat    Previous history of similar problem:       Precipitating factors:   Worsened by:     Alleviating factors:  Improved by:     Therapies Tried and outcome: lemon drop, little relief    Fatigue      Duration: chronic    Description (location/character/radiation): Depends on sleep patterns. Sleeps at 10 pm, wakes at 1 am, falls back asleep but no immediately, and then awake by 4 am.      Intensity:  moderate    Accompanying signs and symptoms: Nocturia (2-3x)    History (similar episodes/previous evaluation):     Precipitating or alleviating factors: none    Therapies tried and outcome: Vitamin B12 (     Tremors      Duration: chronic    Description (location/character/radiation): tremors on activity    Intensity:  mild    Accompanying signs and symptoms: none    History (similar episodes/previous evaluation): yes    Precipitating or alleviating factors: Essential tremor    Therapies tried and outcome: Primidone, effective, hence patient reduced dose on his own.         Gerd/Heartburn  Duration of complaint: chronic   Description of symptoms:    food getting stuck: no but infrequent sensation of choking.  nausea/vomiting: none since last visit.  abdominal pain: no   black/tarry or bloody stools: no :  worse with no particular food or drink.  current NSAID/Aspirin use: no   Therapies tried and outcome: Omeprazole (Prilosec), Nexium (not effective)  Aciphex (currently taking and effective) and Zantac (Ranitidine  at bedtime, helpful). However, hoarseness has not resolved.      Urinary () - Male  Duration of complaint: chronic   Description:   Dysuria (painful urination): no   Hematuria (blood in urine): no   Frequency: YES  Are you urinating at night : YES  Hesitancy (delay in urine): YES  Retention (unable to empty): no   Decrease in urinary flow: no   Incontinence: no   Progression of Symptoms:  same  Accompanying Signs & Symptoms:  Fever: no   Back/Flank pain: no   History:   History of frequent UTI's: no   History of kidney stones: no   History of hernias: no   Personal or Family history of Prostate problems: unsure  Sexually active: no   Precipitating factors:   Advanced age.  Alleviating factors:  Urethral discharge: no   Testicle lumps/masses/pain: no   Nausea and/or vomiting: no   Abdominal pain: no   Therapies Tried and outcome: Previously tried Flomax (cannot remember outcome).    Patient Active Problem List   Diagnosis     Seborrheic dermatitis     Allergic rhinitis due to other allergen     Cervical radiculopathy at C6     Laryngeal cancer (H)     Essential tremor     CARDIOVASCULAR SCREENING; LDL GOAL LESS THAN 160     BPH (benign prostatic hypertrophy)     Hoarse voice quality     Health Care Home     Advanced directives, counseling/discussion     History of anemia     Cataract     Pseudophakia     Gastroesophageal reflux disease without esophagitis     BPH without urinary obstruction     Globus sensation     Pernicious anemia     Hypocalcemia     Tremor     H/O magnetic resonance imaging of brain and brain stem     Macular degeneration     Squamous blepharitis of upper and lower eyelids of both eyes     Past Surgical History:   Procedure Laterality Date     APPENDECTOMY  8 years old     Biopsy of the throat - cancerous mass - got radiation for in larynx  1982     CATARACT IOL, RT/LT       COLONOSCOPY  2/25/2004    Normal     COLONOSCOPY N/A 4/28/2015    Procedure: COLONOSCOPY;  Surgeon: Marvin Adams  MD Narayan;  Location: [facility] OR     COLONOSCOPY WITH CO2 INSUFFLATION N/A 4/28/2015    Procedure: COLONOSCOPY WITH CO2 INSUFFLATION;  Surgeon: Marvin Adams MD;  Location: [facility] OR     CYSTOSCOPY  1997    for hematuria - negative     EYE SURGERY Right     Rt eye.macular repair     EYE SURGERY  2003    cataract     GASTROSCOPY,FL  9/2007    hiatal hernia and errosions     NASAL ENDOSCOPY,DX  4/2007    GERD     Pars plana vitrectomy and epiretinal membrane dissection, right eye  11/8/2002     Phacoemulsification with intraocular lens implantation right eye.  3/11/2003     PHACOEMULSIFICATION WITH STANDARD INTRAOCULAR LENS IMPLANT Left 9/22/2016    Procedure: PHACOEMULSIFICATION WITH STANDARD INTRAOCULAR LENS IMPLANT;  Surgeon: Nghia Lara MD;  Location: [facility] OR     Thyroglossal Duct Cyst Removal - 2ndary to radiation.  1982     VASECTOMY  1971       Social History   Substance Use Topics     Smoking status: Former Smoker     Smokeless tobacco: Never Used      Comment: 1969     Alcohol use 0.0 oz/week     0 Standard drinks or equivalent per week      Comment: A beer once in awhile     Family History   Problem Relation Age of Onset     CEREBROVASCULAR DISEASE Mother      at 86 yo - possibly TIA - befoer     Macular Degeneration Mother      GASTROINTESTINAL DISEASE Father      Cancer - colorectal Father      Hypertension Father      CANCER Father      CANCER Other      C.A.D. No family hx of      DIABETES No family hx of      Prostate Cancer No family hx of      Glaucoma No family hx of      Thyroid Disease No family hx of          Allergies   Allergen Reactions     Seasonal Allergies      Hay fever      Recent Labs   Lab Test  06/27/17   1014  06/27/17   1002  02/20/17   0925  09/06/16   0944   06/09/16   1056  02/03/16   0937  03/11/15   0946   02/27/13   1319   A1C   --    --    --    --    --    --    --    --    --   5.3   LDL   --    --   121*   --    --    --   113*  115   < >   --    HDL   --     "--   69   --    --    --   71  65   < >   --    TRIG   --    --   104   --    --    --   79  96   < >   --    ALT   --   23  22  24   --    --   22   --    --    --    CR   --   1.09  1.06  1.09   --    --   1.00   --    --    --    GFRESTIMATED   --   65  67  65   < >   --   72   --    --    --    GFRESTBLACK   --   79  81  79   < >   --   87   --    --    --    POTASSIUM   --   4.4  4.3  4.1   --    --   4.4   --    --    --    TSH  2.43   --    --    --    --   3.21  2.53   --    --    --     < > = values in this interval not displayed.      BP Readings from Last 3 Encounters:   09/27/17 119/63   06/27/17 101/61   02/20/17 102/65    Wt Readings from Last 3 Encounters:   09/27/17 179 lb (81.2 kg)   06/27/17 177 lb (80.3 kg)   03/14/17 175 lb (79.4 kg)                  ROS:  C: NEGATIVE for fever, chills, change in weight  I: NEGATIVE for worrisome rashes, moles or lesions  E: NEGATIVE for vision changes or irritation  ENT/MOUTH: POSITIVE for hoarseness and NEGATIVE for ear pain , sore throat and vertigo  RESP:NEGATIVE for cough-non productive, hemoptysis and wheezing  CV: NEGATIVE for chest pain, palpitations or peripheral edema  GI: NEGATIVE for hematemesis, hematochezia, jaundice and poor appetite  : NEGATIVE for frequency, dysuria, or hematuria  M: NEGATIVE for significant arthralgias or myalgia  N: NEGATIVE for weakness, dizziness or paresthesias  E: NEGATIVE for temperature intolerance, skin/hair changes  H: NEGATIVE for bleeding problems  P: NEGATIVE for changes in mood or affect    OBJECTIVE:     /63 (BP Location: Left arm, Patient Position: Chair, Cuff Size: Adult Regular)  Pulse 61  Temp 98.2  F (36.8  C) (Oral)  Ht 6' 1\" (1.854 m)  Wt 179 lb (81.2 kg)  SpO2 97%  BMI 23.62 kg/m2  Body mass index is 23.62 kg/(m^2).  GENERAL: healthy, alert and no distress  EYES: Eyes grossly normal to inspection and conjunctivae and sclerae normal  HENT: normal cephalic/atraumatic, nose and mouth without " ulcers or lesions and oral mucous membranes moist  NECK: no adenopathy and thyroid normal to palpation  RESP: lungs clear to auscultation - no rales, rhonchi or wheezes  CV: regular rate and rhythm, normal S1 S2, no S3 or S4, no murmur, click or rub, no peripheral edema and peripheral pulses strong  ABDOMEN: soft, nontender, no hepatosplenomegaly, no masses and bowel sounds normal  MS: no gross musculoskeletal defects noted, no edema  SKIN: no suspicious lesions or rashes  NEURO: Normal strength and tone, mentation intact and speech normal    Diagnostic Test Results:  Results for orders placed or performed in visit on 09/25/17   Vitamin B12   Result Value Ref Range    Vitamin B12 592 193 - 986 pg/mL       ASSESSMENT/PLAN:         (K44.9,  K21.9) Hiatal hernia with gastroesophageal reflux disease without esophagitis  (primary encounter diagnosis)  Comment: Improved with current regimen of Aciphex.  Plan: Continue PPIs indefinitely.    (N40.0) Benign prostatic hyperplasia, presence of lower urinary tract symptoms unspecified  Comment: Probable cause of urinary symptoms.  Plan: tamsulosin (FLOMAX) 0.4 MG capsule,    (R49.0) Hoarseness  Comment: Secondary acid reflux.  Plan: OTOLARYNGOLOGY REFERRAL            (C32.9) Laryngeal cancer (H)  Comment: Previous CT of chest shows no recurrence. Due to hoarsness. Re-consult ENT.  Plan: OTOLARYNGOLOGY REFERRAL            (G25.0) Essential tremor  Comment: Patient reduced dose of Primidone  Without  informing his providers.  Plan: Keep Primidone but adjust dose accordingly if worse.    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  Plan: Consult Neurology.    FUTURE APPOINTMENTS:       - Follow-up visit as needed.    Tyrel Manning MD  [facility]

## 2017-09-27 NOTE — PATIENT INSTRUCTIONS
This summary includes the important diagnoses, test, medications and other important parts of your medical history.  Below are a few good we sites you can use to learn more about these.     Www.[employer].org : Up to date and easily searchable information on multiple topics.  Www.[employer].org/Pharmacy/c_[email or web address].asp : [facility] $4.99 medications  Www.medlineplus.gov : medication info, interactive tutorials, watch real surgeries online  Www.familydoctor.org : good info from the Academy of Family Physicians  Www.[employer].com : good info from the [facility]  Www.cdc.gov : public health info, travel advisories, epidemics (H1N1)  Www.aap.org : children's health info, normal development, vaccinations  Www.health.[facility].[place].us : [place] dept of heat, public health issues in [place], N1N1    Based on your medical history and these are the current health maintenance or preventive care services that you are due for (some may have been done at this visit:)  Health Maintenance Due   Topic Date Due     EYE EXAM Q1 YEAR  07/19/2017     INFLUENZA VACCINE (SYSTEM ASSIGNED)  09/01/2017     =================================================================================  Normal Values   Blood pressure  <140/90 for most adults    <130/80 for some chronic diseases (ask your care team about yours)    BMI (body mass index)  18.5-25 kg/m2 (based on height and weight)     Thank you for visiting [facility]    Normal or non-critical lab and imaging results will be communicated to you by MyChart, letter or phone within 7 days.  If you do not hear from us within 10 days, please call the clinic. If you have a critical or abnormal lab result, we will notify you by phone as soon as possible.     If you have any questions regarding your visit please contact:     Team Comfort:   Clinic Hours Telephone Number   Dr. Jed Winn   7am-5pm  Monday -  Friday [phone]  David [facility]ARGAS   Pharmacy 8am-8pm Monday-Thursday      8am-6pm Friday  9am-5pm Saturday-Sunday [phone]   Urgent Care 11am-8pm Monday-Friday        9am-5pm Saturday-Sunday [phone]     After hours, weekend or if you need to make an appointment with your primary provider please call [phone].   After Hours nurse advise: call [facility]: [phone]    Medication Refills:  Call your pharmacy and they will forward the refill to us. Please allow 3 business days for your refills to be completed.    Use [employer] (secure email communication and access to your chart) to send your primary care provider a message or make an appointment. Ask someone on your Team how to sign up for [employer]. To log on to [employer] or for more information in [employer] please visit the website at [email or web address].  As of October 8, 2013, all password changes, disabled accounts, or ID changes in [employer]/MyHealth will be done by our Access Services Department.   If you need help with your account or password, call: [phone]. Clinic staff no longer has the ability to change passwords.

## 2017-09-27 NOTE — MR AVS SNAPSHOT
After Visit Summary   9/27/2017    Preston Cai    MRN: [id number]           Patient Information     Date Of Birth          1935        Visit Information        Provider Department      9/27/2017 9:00 AM Tyrel Manning MD [facility]        Today's Diagnoses     Hiatal hernia with gastroesophageal reflux disease without esophagitis    -  1    Benign prostatic hyperplasia, presence of lower urinary tract symptoms unspecified        Hoarseness        Laryngeal cancer (H)        Essential tremor        Cervical disc disorder at C5-C6 level with right-sided radiculopathy        Need for prophylactic vaccination and inoculation against influenza          Care Instructions    This summary includes the important diagnoses, test, medications and other important parts of your medical history.  Below are a few good we sites you can use to learn more about these.     Www.iHandle.Directa Plus : Up to date and easily searchable information on multiple topics.  Www.iHandle.Directa Plus/Pharmacy/c_539084.asp : [facility] $4.99 medications  Www.SplitSecnd.gov : medication info, interactive tutorials, watch real surgeries online  Www.familydoctor.org : good info from the Academy of Family Physicians  Www.Lenco Mobileinic.Fly Victor : good info from the [facility]  Www.cdc.gov : public health info, travel advisories, epidemics (H1N1)  Www.aap.org : children's health info, normal development, vaccinations  Www.health.[facility].[place].us : [place] dept of heatlh, public health issues in [place], N1N1    Based on your medical history and these are the current health maintenance or preventive care services that you are due for (some may have been done at this visit:)  Health Maintenance Due   Topic Date Due     EYE EXAM Q1 YEAR  07/19/2017     INFLUENZA VACCINE (SYSTEM ASSIGNED)  09/01/2017     =================================================================================  Normal Values   Blood pressure  <140/90 for most  adults    <130/80 for some chronic diseases (ask your care team about yours)    BMI (body mass index)  18.5-25 kg/m2 (based on height and weight)     Thank you for visiting [facility]    Normal or non-critical lab and imaging results will be communicated to you by MyChart, letter or phone within 7 days.  If you do not hear from us within 10 days, please call the clinic. If you have a critical or abnormal lab result, we will notify you by phone as soon as possible.     If you have any questions regarding your visit please contact:     Team Comfort:   Clinic Hours Telephone Number   Dr. Jed Lowry   Anita Winn   7am-5pm  Monday - Friday [phone]  David RN   Pharmacy 8am-8pm Monday-Thursday      8am-6pm Friday  9am-5pm Saturday-Sunday [phone]   Urgent Care 11am-8pm Monday-Friday        9am-5pm Saturday-Sunday [phone]     After hours, weekend or if you need to make an appointment with your primary provider please call [phone].   After Hours nurse advise: call [facility]: [phone]    Medication Refills:  Call your pharmacy and they will forward the refill to us. Please allow 3 business days for your refills to be completed.    Use Reflex (secure email communication and access to your chart) to send your primary care provider a message or make an appointment. Ask someone on your Team how to sign up for Reflex. To log on to Xianguo or for more information in True[employer]and Software please visit the website at [email or web address].  As of October 8, 2013, all password changes, disabled accounts, or ID changes in Reflex/MyHealth will be done by our Access Services Department.   If you need help with your account or password, call: [phone]. Clinic staff no longer has the ability to change passwords.             Follow-ups after your visit        Additional Services     OTOLARYNGOLOGY REFERRAL        Your provider has referred you to: FMG: [facility] [phone]   [email or web address]    Please be aware that coverage of these services is subject to the terms and limitations of your health insurance plan.  Call member services at your health plan with any benefit or coverage questions.      Please bring the following with you to your appointment:    (1) Any X-Rays, CTs or MRIs which have been performed.  Contact the facility where they were done to arrange for  prior to your scheduled appointment.   (2) List of current medications  (3) This referral request   (4) Any documents/labs given to you for this referral                  Your next 10 appointments already scheduled     Oct 02, 2017  9:00 AM CDT   Nurse Only with BK ANCILLARY   [facility]    [address] [address]   [phone]            Oct 16, 2017  9:00 AM CDT   Nurse Only with BK ANCILLARY   [facility]    [address] [address]   [phone]            Oct 18, 2017  9:30 AM CDT   Return Visit with Carlos James OD   [facility]    [address] [place] [id number] [phone]              Future tests that were ordered for you today     Open Future Orders        Priority Expected Expires Ordered    OTOLARYNGOLOGY REFERRAL Routine  9/27/2018 9/27/2017            Who to contact     If you have questions or need follow up information about today's clinic visit or your schedule please contact [facility] directly at [phone].  Normal or non-critical lab and imaging results will be communicated to you by MyChart, letter or phone within 4 business days after the clinic has received the results. If you do not hear from us within 7 days,  "please contact the clinic through Salt Rights or phone. If you have a critical or abnormal lab result, we will notify you by phone as soon as possible.  Submit refill requests through Salt Rights or call your pharmacy and they will forward the refill request to us. Please allow 3 business days for your refill to be completed.          Additional Information About Your Visit        ValeritasharBandPage Information     Salt Rights gives you secure access to your electronic health record. If you see a primary care provider, you can also send messages to your care team and make appointments. If you have questions, please call your primary care clinic.  If you do not have a primary care provider, please call [phone] and they will assist you.        Care EveryWhere ID     This is your Care EveryWhere ID. This could be used by other organizations to access your [place] medical records  [id number]        Your Vitals Were     Pulse Temperature Height Pulse Oximetry BMI (Body Mass Index)       61 98.2  F (36.8  C) (Oral) 6' 1\" (1.854 m) 97% 23.62 kg/m2        Blood Pressure from Last 3 Encounters:   09/27/17 119/63   06/27/17 101/61   02/20/17 102/65    Weight from Last 3 Encounters:   09/27/17 179 lb (81.2 kg)   06/27/17 177 lb (80.3 kg)   03/14/17 175 lb (79.4 kg)                 Today's Medication Changes          These changes are accurate as of: 9/27/17  9:56 AM.  If you have any questions, ask your nurse or doctor.               Start taking these medicines.        Dose/Directions    tamsulosin 0.4 MG capsule   Commonly known as:  FLOMAX   Used for:  Benign prostatic hyperplasia, presence of lower urinary tract symptoms unspecified   Started by:  Tyrel Manning MD        Dose:  0.4 mg   Take 1 capsule (0.4 mg) by mouth At Bedtime   Quantity:  90 capsule   Refills:  3         These medicines have changed or have updated prescriptions.        Dose/Directions    cyanocobalamin 1000 MCG/ML injection   Commonly known as:  VITAMIN B12 "   This may have changed:  how much to take   Used for:  Pernicious anemia        Dose:  1 mL   Inject 1 mL (1,000 mcg) into the muscle every 14 days   Quantity:  2 mL   Refills:  11       primidone 250 MG tablet   Commonly known as:  MYSOLINE   This may have changed:  how much to take   Used for:  H/O magnetic resonance imaging of brain and brain stem        Dose:  250 mg   Take 1 tablet (250 mg) by mouth At Bedtime   Quantity:  90 tablet   Refills:  3       RABEprazole 20 MG EC tablet   Commonly known as:  ACIPHEX   This may have changed:  See the new instructions.   Used for:  Gastroesophageal reflux disease without esophagitis        TAKE ONE TABLET BY MOUTH EVERY DAY   Quantity:  90 tablet   Refills:  3         Stop taking these medicines if you haven't already. Please contact your care team if you have questions.     aspirin 81 MG tablet   Stopped by:  Tyrel Manning MD                Where to get your medicines      These medications were sent to [facility] - [place], [place] - [address]  [address], [facility] [address]     Phone:  [phone]     tamsulosin 0.4 MG capsule                Primary Care Provider Office Phone # Fax #    Tyrel Manning [phone]       [address]  [facility] [address]        Equal Access to Services     JOHN MEYER : Hadii sridhar miller hadasho Soomaali, waaxda luqadaha, qaybta kaalmada adeegyada, ronny salgado . So [facility] [phone].    ATENCIÓN: Si habla español, tiene a dias disposición servicios gratuitos de asistencia lingüística. ame al [phone].    We comply with applicable federal civil rights laws and [place] laws. We do not discriminate on the basis of race, color, national origin, age, disability sex, sexual orientation or gender identity.            Thank you!     Thank you for choosing [facility]  for your care. Our goal is always to provide you  with excellent care. Hearing back from our patients is one way we can continue to improve our services. Please take a few minutes to complete the written survey that you may receive in the mail after your visit with us. Thank you!             Your Updated Medication List - Protect others around you: Learn how to safely use, store and throw away your medicines at www.disposemymeds.org.          This list is accurate as of: 9/27/17  9:56 AM.  Always use your most recent med list.                   Brand Name Dispense Instructions for use Diagnosis    cyanocobalamin 1000 MCG/ML injection    VITAMIN B12    2 mL    Inject 1 mL (1,000 mcg) into the muscle every 14 days    Pernicious anemia       primidone 250 MG tablet    MYSOLINE    90 tablet    Take 1 tablet (250 mg) by mouth At Bedtime    H/O magnetic resonance imaging of brain and brain stem       RABEprazole 20 MG EC tablet    ACIPHEX    90 tablet    TAKE ONE TABLET BY MOUTH EVERY DAY    Gastroesophageal reflux disease without esophagitis       ranitidine HCl 300 MG Caps     90 capsule    Take 1 capsule by mouth At Bedtime    Other chronic gastritis       tamsulosin 0.4 MG capsule    FLOMAX    90 capsule    Take 1 capsule (0.4 mg) by mouth At Bedtime    Benign prostatic hyperplasia, presence of lower urinary tract symptoms unspecified       VITAMIN D (CHOLECALCIFEROL) PO      Take 2,000 Units by mouth daily

## 2017-10-02 ENCOUNTER — ALLIED HEALTH/NURSE VISIT (OUTPATIENT)
Dept: NURSING | Facility: CLINIC | Age: 82
End: 2017-10-02
Payer: COMMERCIAL

## 2017-10-02 DIAGNOSIS — D51.0 PA (PERNICIOUS ANEMIA): ICD-10-CM

## 2017-10-02 PROCEDURE — 96372 THER/PROPH/DIAG INJ SC/IM: CPT

## 2017-10-02 NOTE — NURSING NOTE
The following medication was given at 9:25am:     MEDICATION: Vitamin B12  1000mcg  ROUTE: IM  SITE: Ventrogluteal - Right  DOSE: 1ML  LOT #: [id number]  :  [employer]  EXPIRATION DATE:  12/2018  NDC#: 3054-5243-51    Nolan Fam CMA

## 2017-10-02 NOTE — MR AVS SNAPSHOT
After Visit Summary   10/2/2017    Preston Cai    MRN: [id number]           Patient Information     Date Of Birth          1935        Visit Information        Provider Department      10/2/2017 9:00 AM BK ANCILLARY [facility]        Today's Diagnoses     PA (pernicious anemia)           Follow-ups after your visit        Your next 10 appointments already scheduled     Oct 16, 2017  9:00 AM CDT   Nurse Only with BK ANCILLARY   [facility] ([facility])    [address] [address] [phone]            Oct 18, 2017  9:30 AM CDT   Return Visit with Carlos James OD   [facility]    [address] [address] [place] [address] [phone]              Who to contact     If you have questions or need follow up information about today's clinic visit or your schedule please contact [facility] directly at [phone].  Normal or non-critical lab and imaging results will be communicated to you by YETI Grouphart, letter or phone within 4 business days after the clinic has received the results. If you do not hear from us within 7 days, please contact the clinic through CrowdBouncert or phone. If you have a critical or abnormal lab result, we will notify you by phone as soon as possible.  Submit refill requests through Geofusion or call your pharmacy and they will forward the refill request to us. Please allow 3 business days for your refill to be completed.          Additional Information About Your Visit        YETI Grouphart Information     Geofusion gives you secure access to your electronic health record. If you see a primary care provider, you can also send messages to your care team and make appointments. If you have questions, please call your primary care clinic.  If you do not have a primary care provider, please call [phone] and they will assist  you.        Care EveryWhere ID     This is your Care EveryWhere ID. This could be used by other organizations to access your [place] medical records  [id number]         Blood Pressure from Last 3 Encounters:   09/27/17 119/63   06/27/17 101/61   02/20/17 102/65    Weight from Last 3 Encounters:   09/27/17 179 lb (81.2 kg)   06/27/17 177 lb (80.3 kg)   03/14/17 175 lb (79.4 kg)              We Performed the Following     THER/PROPH/DIAG INJ, SC/IM     Vitamin B12          Today's Medication Changes          These changes are accurate as of: 10/2/17  9:31 AM.  If you have any questions, ask your nurse or doctor.               These medicines have changed or have updated prescriptions.        Dose/Directions    cyanocobalamin 1000 MCG/ML injection   Commonly known as:  VITAMIN B12   This may have changed:  how much to take   Used for:  Pernicious anemia        Dose:  1 mL   Inject 1 mL (1,000 mcg) into the muscle every 14 days   Quantity:  2 mL   Refills:  11       primidone 250 MG tablet   Commonly known as:  MYSOLINE   This may have changed:  how much to take   Used for:  H/O magnetic resonance imaging of brain and brain stem        Dose:  250 mg   Take 1 tablet (250 mg) by mouth At Bedtime   Quantity:  90 tablet   Refills:  3       RABEprazole 20 MG EC tablet   Commonly known as:  ACIPHEX   This may have changed:  See the new instructions.   Used for:  Gastroesophageal reflux disease without esophagitis        TAKE ONE TABLET BY MOUTH EVERY DAY   Quantity:  90 tablet   Refills:  3                Primary Care Provider Office Phone # Fax #    Tyrel Manning [phone]       [address]  [facility] [address]        Equal Access to Services     JOHN [facility] AH: Sussy Romano, lisa cox, ronny marti. So [facility] [phone].    ATENCIÓN: Si habla español, tiene a dias disposición servicios gratuitos de asistencia  lingüística. Myesha al [phone].    We comply with applicable federal civil rights laws and [place] laws. We do not discriminate on the basis of race, color, national origin, age, disability, sex, sexual orientation, or gender identity.            Thank you!     Thank you for choosing [facility]  for your care. Our goal is always to provide you with excellent care. Hearing back from our patients is one way we can continue to improve our services. Please take a few minutes to complete the written survey that you may receive in the mail after your visit with us. Thank you!             Your Updated Medication List - Protect others around you: Learn how to safely use, store and throw away your medicines at www.disposemymeds.org.          This list is accurate as of: 10/2/17  9:31 AM.  Always use your most recent med list.                   Brand Name Dispense Instructions for use Diagnosis    cyanocobalamin 1000 MCG/ML injection    VITAMIN B12    2 mL    Inject 1 mL (1,000 mcg) into the muscle every 14 days    Pernicious anemia       primidone 250 MG tablet    MYSOLINE    90 tablet    Take 1 tablet (250 mg) by mouth At Bedtime    H/O magnetic resonance imaging of brain and brain stem       RABEprazole 20 MG EC tablet    ACIPHEX    90 tablet    TAKE ONE TABLET BY MOUTH EVERY DAY    Gastroesophageal reflux disease without esophagitis       ranitidine HCl 300 MG Caps     90 capsule    Take 1 capsule by mouth At Bedtime    Other chronic gastritis       tamsulosin 0.4 MG capsule    FLOMAX    90 capsule    Take 1 capsule (0.4 mg) by mouth At Bedtime    Benign prostatic hyperplasia, presence of lower urinary tract symptoms unspecified       VITAMIN D (CHOLECALCIFEROL) PO      Take 2,000 Units by mouth daily

## 2017-10-16 ENCOUNTER — ALLIED HEALTH/NURSE VISIT (OUTPATIENT)
Dept: NURSING | Facility: CLINIC | Age: 82
End: 2017-10-16
Payer: COMMERCIAL

## 2017-10-16 DIAGNOSIS — D51.0 PERNICIOUS ANEMIA: ICD-10-CM

## 2017-10-16 PROCEDURE — 99207 ZZC NO CHARGE NURSE ONLY: CPT

## 2017-10-16 PROCEDURE — 96372 THER/PROPH/DIAG INJ SC/IM: CPT

## 2017-10-16 NOTE — NURSING NOTE
The following medication was given at 8:57AM     MEDICATION: Vitamin B12  1000mcg  ROUTE: IM  SITE: Ventrogluteal - Left  DOSE: 1ML   LOT #: [id number]  :[employer]    EXPIRATION DATE:  February/2019  NDC#: 9871-3297-04    The medication has been administered and the patient was instructed to wait 20 minutes before leaving the building in the event of an allergic reaction.    DONALD Hannon MA

## 2017-10-16 NOTE — MR AVS SNAPSHOT
After Visit Summary   10/16/2017    Preston Cai    MRN: [id number]           Patient Information     Date Of Birth          1935        Visit Information        Provider Department      10/16/2017 9:00 AM BK ANCILLARY [facility]        Today's Diagnoses     Pernicious anemia           Follow-ups after your visit        Your next 10 appointments already scheduled     Oct 18, 2017  9:30 AM CDT   Return Visit with Carlos James OD   [facility]    [address] [place] [address]   [phone]            Oct 30, 2017 11:40 AM CDT   Nurse Only with  ANCILLARY   [facility]    [address] [address] [id number] [phone]            Nov 13, 2017  8:20 AM CST   Nurse Only with  ANCILLARY   [facility]    [address] [facility] [address] [phone]              Who to contact     If you have questions or need follow up information about today's clinic visit or your schedule please contact [facility] directly at [phone].  Normal or non-critical lab and imaging results will be communicated to you by Scytlhart, letter or phone within 4 business days after the clinic has received the results. If you do not hear from us within 7 days, please contact the clinic through Scytlhart or phone. If you have a critical or abnormal lab result, we will notify you by phone as soon as possible.  Submit refill requests through Xtraice or call your pharmacy and they will forward the refill request to us. Please allow 3 business days for your refill to be completed.          Additional Information About Your Visit        [email or web address] Information     Xtraice gives you secure access to your electronic health record. If you see a primary care provider, you can also send  messages to your care team and make appointments. If you have questions, please call your primary care clinic.  If you do not have a primary care provider, please call [phone] and they will assist you.        Care EveryWhere ID     This is your Care EveryWhere ID. This could be used by other organizations to access your [place] medical records  [id number]         Blood Pressure from Last 3 Encounters:   09/27/17 119/63   06/27/17 101/61   02/20/17 102/65    Weight from Last 3 Encounters:   09/27/17 179 lb (81.2 kg)   06/27/17 177 lb (80.3 kg)   03/14/17 175 lb (79.4 kg)              We Performed the Following     B12 - 1000 MCG     B12 - 1000 MCG          Today's Medication Changes          These changes are accurate as of: 10/16/17 10:36 AM.  If you have any questions, ask your nurse or doctor.               These medicines have changed or have updated prescriptions.        Dose/Directions    cyanocobalamin 1000 MCG/ML injection   Commonly known as:  VITAMIN B12   This may have changed:  how much to take   Used for:  Pernicious anemia        Dose:  1 mL   Inject 1 mL (1,000 mcg) into the muscle every 14 days   Quantity:  2 mL   Refills:  11       primidone 250 MG tablet   Commonly known as:  MYSOLINE   This may have changed:  how much to take   Used for:  H/O magnetic resonance imaging of brain and brain stem        Dose:  250 mg   Take 1 tablet (250 mg) by mouth At Bedtime   Quantity:  90 tablet   Refills:  3       RABEprazole 20 MG EC tablet   Commonly known as:  ACIPHEX   This may have changed:  See the new instructions.   Used for:  Gastroesophageal reflux disease without esophagitis        TAKE ONE TABLET BY MOUTH EVERY DAY   Quantity:  90 tablet   Refills:  3                Primary Care Provider Office Phone # Fax #    Tyrel Manning [phone] [address]  [facility] [address]        Equal Access to Services     JOSE LUIS MEYER AH: lisa Pascal  brooke kohasmukh rutherfordlevi maryronny ramos. So [facility] [phone].    ATENCIÓN: Si lashae teran, tiene a dias disposición servicios gratuitos de asistencia lingüística. Myesha al [phone].    We comply with applicable federal civil rights laws and [place] laws. We do not discriminate on the basis of race, color, national origin, age, disability, sex, sexual orientation, or gender identity.            Thank you!     Thank you for choosing [facility]  for your care. Our goal is always to provide you with excellent care. Hearing back from our patients is one way we can continue to improve our services. Please take a few minutes to complete the written survey that you may receive in the mail after your visit with us. Thank you!             Your Updated Medication List - Protect others around you: Learn how to safely use, store and throw away your medicines at www.disposemymeds.org.          This list is accurate as of: 10/16/17 10:36 AM.  Always use your most recent med list.                   Brand Name Dispense Instructions for use Diagnosis    cyanocobalamin 1000 MCG/ML injection    VITAMIN B12    2 mL    Inject 1 mL (1,000 mcg) into the muscle every 14 days    Pernicious anemia       primidone 250 MG tablet    MYSOLINE    90 tablet    Take 1 tablet (250 mg) by mouth At Bedtime    H/O magnetic resonance imaging of brain and brain stem       RABEprazole 20 MG EC tablet    ACIPHEX    90 tablet    TAKE ONE TABLET BY MOUTH EVERY DAY    Gastroesophageal reflux disease without esophagitis       ranitidine HCl 300 MG Caps     90 capsule    Take 1 capsule by mouth At Bedtime    Other chronic gastritis       tamsulosin 0.4 MG capsule    FLOMAX    90 capsule    Take 1 capsule (0.4 mg) by mouth At Bedtime    Benign prostatic hyperplasia, presence of lower urinary tract symptoms unspecified       VITAMIN D (CHOLECALCIFEROL) PO      Take 2,000 Units by mouth daily

## 2017-10-18 ENCOUNTER — OFFICE VISIT (OUTPATIENT)
Dept: OPTOMETRY | Facility: CLINIC | Age: 82
End: 2017-10-18
Payer: COMMERCIAL

## 2017-10-18 DIAGNOSIS — H52.4 PRESBYOPIA: ICD-10-CM

## 2017-10-18 DIAGNOSIS — H52.03 HYPERMETROPIA OF BOTH EYES: ICD-10-CM

## 2017-10-18 DIAGNOSIS — H35.30 MACULAR DEGENERATION: ICD-10-CM

## 2017-10-18 DIAGNOSIS — H52.223 REGULAR ASTIGMATISM OF BOTH EYES: Primary | ICD-10-CM

## 2017-10-18 DIAGNOSIS — Z96.1 PSEUDOPHAKIA: ICD-10-CM

## 2017-10-18 PROCEDURE — 92014 COMPRE OPH EXAM EST PT 1/>: CPT | Performed by: OPTOMETRIST

## 2017-10-18 PROCEDURE — 92015 DETERMINE REFRACTIVE STATE: CPT | Performed by: OPTOMETRIST

## 2017-10-18 ASSESSMENT — VISUAL ACUITY
OD_CC+: -1
OD_SC: 20/40
OD_CC: 20/40
OS_SC: 20/25
OS_CC: 20/20
OD_SC+: -1
OD_CC: 20/25
METHOD: SNELLEN - LINEAR
OS_CC: 20/20

## 2017-10-18 ASSESSMENT — REFRACTION_WEARINGRX
OD_CYLINDER: +1.00
OD_AXIS: 160
OD_ADD: +2.75
OS_ADD: +2.75
OS_SPHERE: PLANO
OD_SPHERE: -0.75
OS_AXIS: 180
OS_CYLINDER: +0.50

## 2017-10-18 ASSESSMENT — CONF VISUAL FIELD
METHOD: COUNTING FINGERS
OD_NORMAL: 1
OS_NORMAL: 1

## 2017-10-18 ASSESSMENT — TONOMETRY
OD_IOP_MMHG: 18
IOP_METHOD: TONOPEN
OS_IOP_MMHG: 17

## 2017-10-18 ASSESSMENT — SLIT LAMP EXAM - LIDS
COMMENTS: UPPER LID DERMATOCHALASIS
COMMENTS: UPPER LID DERMATOCHALASIS

## 2017-10-18 ASSESSMENT — CUP TO DISC RATIO
OS_RATIO: 0.25
OD_RATIO: 0.25

## 2017-10-18 ASSESSMENT — EXTERNAL EXAM - RIGHT EYE: OD_EXAM: NORMAL

## 2017-10-18 ASSESSMENT — REFRACTION_MANIFEST
OD_AXIS: 160
OS_CYLINDER: +0.75
OD_SPHERE: -0.75
OS_SPHERE: PLANO
OD_CYLINDER: +1.00
OS_AXIS: 180

## 2017-10-18 ASSESSMENT — EXTERNAL EXAM - LEFT EYE: OS_EXAM: NORMAL

## 2017-10-18 NOTE — PROGRESS NOTES
Chief Complaint   Patient presents with     COMPREHENSIVE EYE EXAM         Last Eye Exam: 9/16  Dilated Previously: Yes    What are you currently using to see?  glasses       Distance Vision Acuity: Noticed gradual change in right eye    Near Vision Acuity: Not satisfied     Eye Comfort: good  Do you use eye drops? : Yes: ann tears- has been cleaning eyelids with baby shampoo- doesn't know if it really helps.  Occupation or Hobbies:  retired           Medical, surgical and family histories reviewed and updated 10/18/2017.       OBJECTIVE: See Ophthalmology exam    ASSESSMENT:    ICD-10-CM    1. Regular astigmatism of both eyes H52.223 REFRACTION   2. 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 Presbyopia H52.4 REFRACTION   4. Macular degeneration H35.30 EYE EXAM (SIMPLE-NONBILLABLE)     VITREORETINAL SURGERY REFERRAL   5. Pseudophakia Z96.1 EYE EXAM (SIMPLE-NONBILLABLE)      PLAN:     Patient Instructions   No change in eyeglass prescription.    Referral to Vitreoretinal Surgery for macula evaluation right eye.    You have mild macular degeneration.  I recommend taking supplements for the eyes, PreserVision AREDS 2 formula daily as recommended dosage on the bottle.  Check with your pharmacist first to make sure there are not any interactions with your medications.    Continue artificial tears 2-4 x day.    Return in 1 year for a complete eye exam or sooner if needed.    Carlos James, CANDI

## 2017-10-18 NOTE — PATIENT INSTRUCTIONS
No change in eyeglass prescription.    Referral to Vitreoretinal Surgery for macula evaluation right eye.    You have mild macular degeneration.  I recommend taking supplements for the eyes, PreserVision AREDS 2 formula daily as recommended dosage on the bottle.  Check with your pharmacist first to make sure there are not any interactions with your medications.    Continue artificial tears 2-4 x day.    Return in 1 year for a complete eye exam or sooner if needed.    Carlos James, CANDI

## 2017-10-18 NOTE — MR AVS SNAPSHOT
After Visit Summary   10/18/2017    Preston Cai    MRN: [id number]           Patient Information     Date Of Birth          1935        Visit Information        Provider Department      10/18/2017 9:30 AM Carlos James, CANDI [facility]        Today's Diagnoses     Regular astigmatism of both eyes    -  1    Hypermetropia of both eyes        Presbyopia        Macular degeneration        Pseudophakia          Care Instructions    No change in eyeglass prescription.    Referral to Vitreoretinal Surgery for macula evaluation right eye.    You have mild macular degeneration.  I recommend taking supplements for the eyes, PreserVision AREDS 2 formula daily as recommended dosage on the bottle.  Check with your pharmacist first to make sure there are not any interactions with your medications.    Continue artificial tears 2-4 x day.    Return in 1 year for a complete eye exam or sooner if needed.    Carlos James OD            Follow-ups after your visit        Additional Services     VITREORETINAL SURGERY REFERRAL       Your provider has referred you to: [facility] [phone]   [email or web address]    Please be aware that coverage of these services is subject to the terms and limitations of your health insurance plan.  Call member services at your health plan with any benefit or coverage questions.      Please bring the following to your appointment:  >>   Any x-rays, CTs or MRIs which have been performed.  Contact the facility where they were done to arrange for  prior to your scheduled appointment.  Any new CT, MRI or other procedures ordered by your specialist must be performed at a [place] facility or coordinated by your clinic's referral office.    >>   List of current medications   >>   This referral request   >>   Any documents/labs given to you for this referral                  Follow-up notes from your care team     Return in  about 1 year (around 10/18/2018), or if symptoms worsen or fail to improve, for Annual Visit.      Your next 10 appointments already scheduled     Oct 30, 2017 11:40 AM CDT   Nurse Only with BK ANCILLARY   [facility]    [address] [facility] [id number] [phone]            Nov 13, 2017  8:20 AM CST   Nurse Only with BK ANCILLARY   [facility] ([facility]    [address] [facility] [id number] [phone]              Who to contact     If you have questions or need follow up information about today's clinic visit or your schedule please contact [facility] directly at [phone].  Normal or non-critical lab and imaging results will be communicated to you by VLinks Mediahart, letter or phone within 4 business days after the clinic has received the results. If you do not hear from us within 7 days, please contact the clinic through VLinks Mediahart or phone. If you have a critical or abnormal lab result, we will notify you by phone as soon as possible.  Submit refill requests through Storactive or call your pharmacy and they will forward the refill request to us. Please allow 3 business days for your refill to be completed.          Additional Information About Your Visit        VLinks Mediahart Information     Storactive gives you secure access to your electronic health record. If you see a primary care provider, you can also send messages to your care team and make appointments. If you have questions, please call your primary care clinic.  If you do not have a primary care provider, please call [phone] and they will assist you.      Storactive is an electronic gateway that provides easy, online access to your medical records. With Storactive, you can request a clinic appointment, read your test results, renew a prescription or communicate with your care team.     To access your existing account,  please contact your [facility] or call [phone] for assistance.        Care EveryWhere ID     This is your Care EveryWhere ID. This could be used by other organizations to access your [place] medical records  [id number]         Blood Pressure from Last 3 Encounters:   09/27/17 119/63   06/27/17 101/61   02/20/17 102/65    Weight from Last 3 Encounters:   09/27/17 81.2 kg (179 lb)   06/27/17 80.3 kg (177 lb)   03/14/17 79.4 kg (175 lb)              We Performed the Following     EYE EXAM (SIMPLE-NONBILLABLE)     REFRACTION     VITREORETINAL SURGERY REFERRAL          Today's Medication Changes          These changes are accurate as of: 10/18/17  1:27 PM.  If you have any questions, ask your nurse or doctor.               These medicines have changed or have updated prescriptions.        Dose/Directions    cyanocobalamin 1000 MCG/ML injection   Commonly known as:  VITAMIN B12   This may have changed:  how much to take   Used for:  Pernicious anemia        Dose:  1 mL   Inject 1 mL (1,000 mcg) into the muscle every 14 days   Quantity:  2 mL   Refills:  11       primidone 250 MG tablet   Commonly known as:  MYSOLINE   This may have changed:  how much to take   Used for:  H/O magnetic resonance imaging of brain and brain stem        Dose:  250 mg   Take 1 tablet (250 mg) by mouth At Bedtime   Quantity:  90 tablet   Refills:  3       RABEprazole 20 MG EC tablet   Commonly known as:  ACIPHEX   This may have changed:  See the new instructions.   Used for:  Gastroesophageal reflux disease without esophagitis        TAKE ONE TABLET BY MOUTH EVERY DAY   Quantity:  90 tablet   Refills:  3                Primary Care Provider Office Phone # Fax #    Tyrel Manning [phone]       [address]  [facility] [address]        Equal Access to Services     JOSE LUIS MEYER : Sussy Romano, lisa cox, ronny marti  valarie salgado ah. So [facility] [phone].    ATENCIÓN: Si lashae teran, tiene a dias disposición servicios gratuitos de asistencia lingüística. Myesha harvey [phone].    We comply with applicable federal civil rights laws and [place] laws. We do not discriminate on the basis of race, color, national origin, age, disability, sex, sexual orientation, or gender identity.            Thank you!     Thank you for choosing [facility]  for your care. Our goal is always to provide you with excellent care. Hearing back from our patients is one way we can continue to improve our services. Please take a few minutes to complete the written survey that you may receive in the mail after your visit with us. Thank you!             Your Updated Medication List - Protect others around you: Learn how to safely use, store and throw away your medicines at www.disposemymeds.org.          This list is accurate as of: 10/18/17  1:27 PM.  Always use your most recent med list.                   Brand Name Dispense Instructions for use Diagnosis    cyanocobalamin 1000 MCG/ML injection    VITAMIN B12    2 mL    Inject 1 mL (1,000 mcg) into the muscle every 14 days    Pernicious anemia       primidone 250 MG tablet    MYSOLINE    90 tablet    Take 1 tablet (250 mg) by mouth At Bedtime    H/O magnetic resonance imaging of brain and brain stem       RABEprazole 20 MG EC tablet    ACIPHEX    90 tablet    TAKE ONE TABLET BY MOUTH EVERY DAY    Gastroesophageal reflux disease without esophagitis       ranitidine HCl 300 MG Caps     90 capsule    Take 1 capsule by mouth At Bedtime    Other chronic gastritis       tamsulosin 0.4 MG capsule    FLOMAX    90 capsule    Take 1 capsule (0.4 mg) by mouth At Bedtime    Benign prostatic hyperplasia, presence of lower urinary tract symptoms unspecified       VITAMIN D (CHOLECALCIFEROL) PO      Take 2,000 Units by mouth daily

## 2017-10-18 NOTE — LETTER
October 18, 2017      Preston Cai    1935       Dear Dr. Gaines,    Your patient was seen in our office on 10/18/2017 for an annual eye exam.  He was last seen in your office 9/17/14.  He has ARMD both eyes.  He has noticed a change in vision right eye with more distortion and blurrier vision than normal.  Metamorphopsia was noted right eye on the [place] [employer], left eye was normal. His vision has dropped to 20/40 with a history of 20/25- 20/30 vision in the past.  He has a history of surgery for a presumed macular hole in 2002.  I have included a copy of my records.      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  Thank you for assisting in his care.          Sincerely,    Carlos James, OD  [address] [address]  [phone]

## 2017-10-30 ENCOUNTER — MYC MEDICAL ADVICE (OUTPATIENT)
Dept: NEUROLOGY | Facility: CLINIC | Age: 82
End: 2017-10-30

## 2017-10-30 ENCOUNTER — ALLIED HEALTH/NURSE VISIT (OUTPATIENT)
Dept: NURSING | Facility: CLINIC | Age: 82
End: 2017-10-30
Payer: COMMERCIAL

## 2017-10-30 DIAGNOSIS — D51.0 PA (PERNICIOUS ANEMIA): ICD-10-CM

## 2017-10-30 DIAGNOSIS — R25.1 TREMOR: Primary | ICD-10-CM

## 2017-10-30 PROCEDURE — 99207 ZZC NO CHARGE LOS: CPT

## 2017-10-30 PROCEDURE — 96372 THER/PROPH/DIAG INJ SC/IM: CPT

## 2017-10-30 NOTE — MR AVS SNAPSHOT
After Visit Summary   10/30/2017    Preston Cai    MRN: [id number]           Patient Information     Date Of Birth          1935        Visit Information        Provider Department      10/30/2017 11:40 AM BK ANCILLARY [facility]        Today's Diagnoses     PA (pernicious anemia)           Follow-ups after your visit        Your next 10 appointments already scheduled     Nov 13, 2017  8:20 AM CST   Nurse Only with BK ANCILLARY   [facility]    [address] [place] [address] [phone]              Who to contact     If you have questions or need follow up information about today's clinic visit or your schedule please contact [facility] directly at [phone].  Normal or non-critical lab and imaging results will be communicated to you by MyChart, letter or phone within 4 business days after the clinic has received the results. If you do not hear from us within 7 days, please contact the clinic through Gifts that Givehart or phone. If you have a critical or abnormal lab result, we will notify you by phone as soon as possible.  Submit refill requests through AltraTech or call your pharmacy and they will forward the refill request to us. Please allow 3 business days for your refill to be completed.          Additional Information About Your Visit        MyChart Information     AltraTech gives you secure access to your electronic health record. If you see a primary care provider, you can also send messages to your care team and make appointments. If you have questions, please call your primary care clinic.  If you do not have a primary care provider, please call [phone] and they will assist you.        Care EveryWhere ID     This is your Care EveryWhere ID. This could be used by other organizations to access your [place] medical records  [id number]         Blood Pressure from Last 3  Encounters:   09/27/17 119/63   06/27/17 101/61   02/20/17 102/65    Weight from Last 3 Encounters:   09/27/17 179 lb (81.2 kg)   06/27/17 177 lb (80.3 kg)   03/14/17 175 lb (79.4 kg)              We Performed the Following     INJECTION INTRAMUSCULAR OR SUB-Q     Vitamin B12          Today's Medication Changes          These changes are accurate as of: 10/30/17 11:52 AM.  If you have any questions, ask your nurse or doctor.               These medicines have changed or have updated prescriptions.        Dose/Directions    cyanocobalamin 1000 MCG/ML injection   Commonly known as:  VITAMIN B12   This may have changed:  how much to take   Used for:  Pernicious anemia        Dose:  1 mL   Inject 1 mL (1,000 mcg) into the muscle every 14 days   Quantity:  2 mL   Refills:  11       primidone 250 MG tablet   Commonly known as:  MYSOLINE   This may have changed:  how much to take   Used for:  H/O magnetic resonance imaging of brain and brain stem        Dose:  250 mg   Take 1 tablet (250 mg) by mouth At Bedtime   Quantity:  90 tablet   Refills:  3       RABEprazole 20 MG EC tablet   Commonly known as:  ACIPHEX   This may have changed:  See the new instructions.   Used for:  Gastroesophageal reflux disease without esophagitis        TAKE ONE TABLET BY MOUTH EVERY DAY   Quantity:  90 tablet   Refills:  3                Primary Care Provider Office Phone # Fax #    Tyrel Manning [phone]       [address] [facility] [address]        Equal Access to Services     JOHN MEYER AH: Sussy millan Soninoska, waaxda luqadaha, qaybta kaalmada aderachel, waxay sasha murray. So [facility] [phone].    ATENCIÓN: Si habla español, tiene a dias disposición servicios gratuitos de asistencia lingüística. Llame al [phone].    We comply with applicable federal civil rights laws and [place] laws. We do not discriminate on the basis of race, color, national origin, age,  disability, sex, sexual orientation, or gender identity.            Thank you!     Thank you for choosing [facility]  for your care. Our goal is always to provide you with excellent care. Hearing back from our patients is one way we can continue to improve our services. Please take a few minutes to complete the written survey that you may receive in the mail after your visit with us. Thank you!             Your Updated Medication List - Protect others around you: Learn how to safely use, store and throw away your medicines at www.disposemymeds.org.          This list is accurate as of: 10/30/17 11:52 AM.  Always use your most recent med list.                   Brand Name Dispense Instructions for use Diagnosis    cyanocobalamin 1000 MCG/ML injection    VITAMIN B12    2 mL    Inject 1 mL (1,000 mcg) into the muscle every 14 days    Pernicious anemia       primidone 250 MG tablet    MYSOLINE    90 tablet    Take 1 tablet (250 mg) by mouth At Bedtime    H/O magnetic resonance imaging of brain and brain stem       RABEprazole 20 MG EC tablet    ACIPHEX    90 tablet    TAKE ONE TABLET BY MOUTH EVERY DAY    Gastroesophageal reflux disease without esophagitis       ranitidine HCl 300 MG Caps     90 capsule    Take 1 capsule by mouth At Bedtime    Other chronic gastritis       tamsulosin 0.4 MG capsule    FLOMAX    90 capsule    Take 1 capsule (0.4 mg) by mouth At Bedtime    Benign prostatic hyperplasia, presence of lower urinary tract symptoms unspecified       VITAMIN D (CHOLECALCIFEROL) PO      Take 2,000 Units by mouth daily

## 2017-10-30 NOTE — PROGRESS NOTES
The following medication was given:     MEDICATION: Vitamin B12  1000mcg  ROUTE: IM  SITE: Ventrogluteal - Right  DOSE: 1ml  LOT #: [id number]  :  [employer]  EXPIRATION DATE:  2/2019  NDC#: 0091-4988-65    Next due in 2 weeks, per Dr. Nigel Murrell CMA

## 2017-11-06 DIAGNOSIS — Z92.89 H/O MAGNETIC RESONANCE IMAGING OF BRAIN AND BRAIN STEM: ICD-10-CM

## 2017-11-06 RX ORDER — PRIMIDONE 250 MG/1
250 TABLET ORAL AT BEDTIME
Qty: 90 TABLET | Refills: 3 | Status: SHIPPED | OUTPATIENT
Start: 2017-11-06 | End: 2018-03-09

## 2017-11-06 NOTE — TELEPHONE ENCOUNTER
Patient needs primidone (MYSOLINE) 250 MG tablet renewed, please send [facility].

## 2017-11-06 NOTE — TELEPHONE ENCOUNTER
Pt sent Sail[employer] message a week ago requesting refill on primidone, pt is now out of meds.  Could someone please look at this?  Thank you very kindly!  Ying Olsen, [facility] [place]

## 2017-11-06 NOTE — TELEPHONE ENCOUNTER
Writer confirmed with the pt that he is going to go back up to the full tablet of Primidone. The refill was entered correctly.  Nikkie Navarro RN

## 2017-11-09 RX ORDER — PRIMIDONE 50 MG/1
TABLET ORAL
Qty: 90 TABLET | Refills: 11 | Status: SHIPPED | OUTPATIENT
Start: 2017-11-09 | End: 2017-11-09

## 2017-11-09 RX ORDER — PRIMIDONE 250 MG/1
250 TABLET ORAL AT BEDTIME
Qty: 90 TABLET | Refills: 3 | Status: SHIPPED | OUTPATIENT
Start: 2017-11-09 | End: 2018-03-09

## 2017-11-09 NOTE — TELEPHONE ENCOUNTER
Pt's MyChart message states he is taking 1/2 the dose of the original 250mg dose (so he is currently taking 125mg). Current Rx states to START 1/2 of a 50mg tablet. Will route to Dr. Munroe to update Rx starting with 125mg and wean up to 250mg dose. Magaly Cyr RN

## 2017-11-09 NOTE — TELEPHONE ENCOUNTER
García Munroe MD   You 2 hours ago (12:18 PM)                 If he is taking 125 - I assume he is taking 1/2 of 250mg so he can just go up to 250mg/day (Routing comment)

## 2017-11-13 ENCOUNTER — ALLIED HEALTH/NURSE VISIT (OUTPATIENT)
Dept: NURSING | Facility: CLINIC | Age: 82
End: 2017-11-13
Payer: COMMERCIAL

## 2017-11-13 DIAGNOSIS — D51.0 PERNICIOUS ANEMIA: Primary | ICD-10-CM

## 2017-11-13 PROCEDURE — 99207 ZZC NO CHARGE LOS: CPT

## 2017-11-13 PROCEDURE — 96372 THER/PROPH/DIAG INJ SC/IM: CPT

## 2017-11-13 NOTE — MR AVS SNAPSHOT
After Visit Summary   11/13/2017    Preston Cai    MRN: [id number]           Patient Information     Date Of Birth          1935        Visit Information        Provider Department      11/13/2017 8:20 AM BK ANCILLARY [facility]        Today's Diagnoses     Pernicious anemia    -  1       Follow-ups after your visit        Your next 10 appointments already scheduled     Nov 27, 2017  8:40 AM CST   Nurse Only with BK ANCILLARY   [facility] ([facility])    [address] [facility] [id number] [phone]            Dec 11, 2017  8:40 AM CST   Nurse Only with BK ANCILLARY   [facility] ([facility])    [address] [facility] [address] [phone]              Who to contact     If you have questions or need follow up information about today's clinic visit or your schedule please contact [facility] directly at [phone].  Normal or non-critical lab and imaging results will be communicated to you by Planitaxhart, letter or phone within 4 business days after the clinic has received the results. If you do not hear from us within 7 days, please contact the clinic through Unique Blog Designst or phone. If you have a critical or abnormal lab result, we will notify you by phone as soon as possible.  Submit refill requests through Hubs1 or call your pharmacy and they will forward the refill request to us. Please allow 3 business days for your refill to be completed.          Additional Information About Your Visit        Planitaxhart Information     Hubs1 gives you secure access to your electronic health record. If you see a primary care provider, you can also send messages to your care team and make appointments. If you have questions, please call your primary care clinic.  If you do not have a primary care provider, please call [phone] and they will assist  you.        Care EveryWhere ID     This is your Care EveryWhere ID. This could be used by other organizations to access your [place] medical records  [id number]         Blood Pressure from Last 3 Encounters:   09/27/17 119/63   06/27/17 101/61   02/20/17 102/65    Weight from Last 3 Encounters:   09/27/17 179 lb (81.2 kg)   06/27/17 177 lb (80.3 kg)   03/14/17 175 lb (79.4 kg)              We Performed the Following     B12 - 1000 MCG     INJECTION INTRAMUSCULAR OR SUB-Q          Today's Medication Changes          These changes are accurate as of: 11/13/17  8:31 AM.  If you have any questions, ask your nurse or doctor.               These medicines have changed or have updated prescriptions.        Dose/Directions    cyanocobalamin 1000 MCG/ML injection   Commonly known as:  VITAMIN B12   This may have changed:  how much to take   Used for:  Pernicious anemia        Dose:  1 mL   Inject 1 mL (1,000 mcg) into the muscle every 14 days   Quantity:  2 mL   Refills:  11       RABEprazole 20 MG EC tablet   Commonly known as:  ACIPHEX   This may have changed:  See the new instructions.   Used for:  Gastroesophageal reflux disease without esophagitis        TAKE ONE TABLET BY MOUTH EVERY DAY   Quantity:  90 tablet   Refills:  3                Primary Care Provider Office Phone # Fax #    Tyrel Manning [phone]       [address] [place]  [facility] [address]        Equal Access to Services     [facility]: Hadii sridhar millan Soninoska, waaxda luqadaha, qaybta kaalmamaddison bhakta, ronny salgado . So [facility] [phone].    ATENCIÓN: Si habla español, tiene a dias disposición servicios gratuitos de asistencia lingüística. Llame al [phone].    We comply with applicable federal civil rights laws and [place] laws. We do not discriminate on the basis of race, color, national origin, age, disability, sex, sexual orientation, or gender identity.            Thank you!      Thank you for choosing [facility]  for your care. Our goal is always to provide you with excellent care. Hearing back from our patients is one way we can continue to improve our services. Please take a few minutes to complete the written survey that you may receive in the mail after your visit with us. Thank you!             Your Updated Medication List - Protect others around you: Learn how to safely use, store and throw away your medicines at www.disposemymeds.org.          This list is accurate as of: 11/13/17  8:31 AM.  Always use your most recent med list.                   Brand Name Dispense Instructions for use Diagnosis    cyanocobalamin 1000 MCG/ML injection    VITAMIN B12    2 mL    Inject 1 mL (1,000 mcg) into the muscle every 14 days    Pernicious anemia       * primidone 250 MG tablet    MYSOLINE    90 tablet    Take 1 tablet (250 mg) by mouth At Bedtime    H/O magnetic resonance imaging of brain and brain stem       * primidone 250 MG tablet    MYSOLINE    90 tablet    Take 1 tablet (250 mg) by mouth At Bedtime    Tremor       RABEprazole 20 MG EC tablet    ACIPHEX    90 tablet    TAKE ONE TABLET BY MOUTH EVERY DAY    Gastroesophageal reflux disease without esophagitis       ranitidine HCl 300 MG Caps     90 capsule    Take 1 capsule by mouth At Bedtime    Other chronic gastritis       tamsulosin 0.4 MG capsule    FLOMAX    90 capsule    Take 1 capsule (0.4 mg) by mouth At Bedtime    Benign prostatic hyperplasia, presence of lower urinary tract symptoms unspecified       VITAMIN D (CHOLECALCIFEROL) PO      Take 2,000 Units by mouth daily        * Notice:  This list has 2 medication(s) that are the same as other medications prescribed for you. Read the directions carefully, and ask your doctor or other care provider to review them with you.

## 2017-11-13 NOTE — PROGRESS NOTES
The following medication was given:     MEDICATION: Vitamin B12  1000mcg  ROUTE: IM  SITE: Ventrogluteal - Left  DOSE: 1ml  LOT #: [id number]  :  [employer]  EXPIRATION DATE:  2/2019  NDC#: 9999-8182-09    Given at 8:19am.  Next due 11/27/2017       Hanane Murrell CMA

## 2017-11-27 ENCOUNTER — ALLIED HEALTH/NURSE VISIT (OUTPATIENT)
Dept: NURSING | Facility: CLINIC | Age: 82
End: 2017-11-27
Payer: COMMERCIAL

## 2017-11-27 DIAGNOSIS — D51.0 PA (PERNICIOUS ANEMIA): ICD-10-CM

## 2017-11-27 PROCEDURE — 99207 ZZC NO CHARGE LOS: CPT

## 2017-11-27 PROCEDURE — 96372 THER/PROPH/DIAG INJ SC/IM: CPT

## 2017-11-27 NOTE — PROGRESS NOTES
The following medication was given:     MEDICATION: Vitamin B12  1000mcg  ROUTE: IM  SITE: Ventrogluteal - Right  DOSE: 1ml  LOT #: [id number]  :  [employer]  EXPIRATION DATE:  2/2019  NDC#: 9177-9694-79    Given at 9:02am, next due in 2 weeks     Hanane Murrell CMA

## 2017-11-27 NOTE — MR AVS SNAPSHOT
After Visit Summary   11/27/2017    Preston Cai    MRN: [id number]           Patient Information     Date Of Birth          1935        Visit Information        Provider Department      11/27/2017 8:40 AM BK ANCILLARY [facility]        Today's Diagnoses     PA (pernicious anemia)           Follow-ups after your visit        Your next 10 appointments already scheduled     Dec 11, 2017  8:40 AM CST   Nurse Only with BK ANCILLARY   [facility] ([facility]    [address] [place] [address] [phone]              Who to contact     If you have questions or need follow up information about today's clinic visit or your schedule please contact [facility] directly at [phone].  Normal or non-critical lab and imaging results will be communicated to you by MyChart, letter or phone within 4 business days after the clinic has received the results. If you do not hear from us within 7 days, please contact the clinic through Quantum Dielectrricshart or phone. If you have a critical or abnormal lab result, we will notify you by phone as soon as possible.  Submit refill requests through GigaBryte or call your pharmacy and they will forward the refill request to us. Please allow 3 business days for your refill to be completed.          Additional Information About Your Visit        MyChart Information     GigaBryte gives you secure access to your electronic health record. If you see a primary care provider, you can also send messages to your care team and make appointments. If you have questions, please call your primary care clinic.  If you do not have a primary care provider, please call [phone] and they will assist you.        Care EveryWhere ID     This is your Care EveryWhere ID. This could be used by other organizations to access your [place] medical records  [id number]         Blood Pressure from Last 3  Encounters:   09/27/17 119/63   06/27/17 101/61   02/20/17 102/65    Weight from Last 3 Encounters:   09/27/17 179 lb (81.2 kg)   06/27/17 177 lb (80.3 kg)   03/14/17 175 lb (79.4 kg)              We Performed the Following     INJECTION INTRAMUSCULAR OR SUB-Q     Vitamin B12          Today's Medication Changes          These changes are accurate as of: 11/27/17  9:40 AM.  If you have any questions, ask your nurse or doctor.               These medicines have changed or have updated prescriptions.        Dose/Directions    cyanocobalamin 1000 MCG/ML injection   Commonly known as:  VITAMIN B12   This may have changed:  how much to take   Used for:  Pernicious anemia        Dose:  1 mL   Inject 1 mL (1,000 mcg) into the muscle every 14 days   Quantity:  2 mL   Refills:  11       RABEprazole 20 MG EC tablet   Commonly known as:  ACIPHEX   This may have changed:  See the new instructions.   Used for:  Gastroesophageal reflux disease without esophagitis        TAKE ONE TABLET BY MOUTH EVERY DAY   Quantity:  90 tablet   Refills:  3                Primary Care Provider Office Phone # Fax #    Tyrel Manning [phone]       [address] [address] [facility] [address]        Equal Access to Services     JOSE LUIS MEYER AH: Sussy miller hadasho Soomaali, waaxda luqadaha, qaybta kaalmada adeegyada, ronny murray. So [facility] [phone].    ATENCIÓN: Si habla español, tiene a dias disposición servicios gratuitos de asistencia lingüística. Llame al [phone].    We comply with applicable federal civil rights laws and [place] laws. We do not discriminate on the basis of race, color, national origin, age, disability, sex, sexual orientation, or gender identity.            Thank you!     Thank you for choosing [facility]  for your care. Our goal is always to provide you with excellent care. Hearing back from our patients is one way we can continue to improve our  services. Please take a few minutes to complete the written survey that you may receive in the mail after your visit with us. Thank you!             Your Updated Medication List - Protect others around you: Learn how to safely use, store and throw away your medicines at www.disposemymeds.org.          This list is accurate as of: 11/27/17  9:40 AM.  Always use your most recent med list.                   Brand Name Dispense Instructions for use Diagnosis    cyanocobalamin 1000 MCG/ML injection    VITAMIN B12    2 mL    Inject 1 mL (1,000 mcg) into the muscle every 14 days    Pernicious anemia       * primidone 250 MG tablet    MYSOLINE    90 tablet    Take 1 tablet (250 mg) by mouth At Bedtime    H/O magnetic resonance imaging of brain and brain stem       * primidone 250 MG tablet    MYSOLINE    90 tablet    Take 1 tablet (250 mg) by mouth At Bedtime    Tremor       RABEprazole 20 MG EC tablet    ACIPHEX    90 tablet    TAKE ONE TABLET BY MOUTH EVERY DAY    Gastroesophageal reflux disease without esophagitis       ranitidine HCl 300 MG Caps     90 capsule    Take 1 capsule by mouth At Bedtime    Other chronic gastritis       tamsulosin 0.4 MG capsule    FLOMAX    90 capsule    Take 1 capsule (0.4 mg) by mouth At Bedtime    Benign prostatic hyperplasia, presence of lower urinary tract symptoms unspecified       VITAMIN D (CHOLECALCIFEROL) PO      Take 2,000 Units by mouth daily        * Notice:  This list has 2 medication(s) that are the same as other medications prescribed for you. Read the directions carefully, and ask your doctor or other care provider to review them with you.

## 2017-12-11 ENCOUNTER — ALLIED HEALTH/NURSE VISIT (OUTPATIENT)
Dept: NURSING | Facility: CLINIC | Age: 82
End: 2017-12-11
Payer: COMMERCIAL

## 2017-12-11 DIAGNOSIS — D51.0 PERNICIOUS ANEMIA: Primary | ICD-10-CM

## 2017-12-11 PROCEDURE — 99207 ZZC NO CHARGE LOS: CPT

## 2017-12-11 PROCEDURE — 96372 THER/PROPH/DIAG INJ SC/IM: CPT

## 2017-12-11 NOTE — PROGRESS NOTES
The following medication was given:     MEDICATION: Vitamin B12  1000mcg  ROUTE: IM  SITE: Ventrogluteal - Right  DOSE: 1ml  LOT #:[id number]   :  iLost  EXPIRATION DATE:  2/2019  NDC#: 2883-2676-54    Given at 8:38am, appointment made for lab drawn before next injection.     Hanane Murrell CMA       
Passed

## 2017-12-11 NOTE — MR AVS SNAPSHOT
After Visit Summary   12/11/2017    Preston Cai    MRN: [id number]           Patient Information     Date Of Birth          1935        Visit Information        Provider Department      12/11/2017 8:40 AM BK ANCILLARY [facility]        Today's Diagnoses     Pernicious anemia    -  1       Follow-ups after your visit        Your next 10 appointments already scheduled     Dec 18, 2017  9:00 AM CST   LAB with BK LAB   [facility]    [address] [place] [address] [phone]           Please do not eat 10-12 hours before your appointment if you are coming in fasting for labs on lipids, cholesterol, or glucose (sugar). This does not apply to pregnant women. Water, hot tea and black coffee (with nothing added) are okay. Do not drink other fluids, diet soda or chew gum.              Who to contact     If you have questions or need follow up information about today's clinic visit or your schedule please contact [facility] directly at [phone].  Normal or non-critical lab and imaging results will be communicated to you by Lingodahart, letter or phone within 4 business days after the clinic has received the results. If you do not hear from us within 7 days, please contact the clinic through Ultralifet or phone. If you have a critical or abnormal lab result, we will notify you by phone as soon as possible.  Submit refill requests through Anchanto or call your pharmacy and they will forward the refill request to us. Please allow 3 business days for your refill to be completed.          Additional Information About Your Visit        Lingodahart Information     Anchanto gives you secure access to your electronic health record. If you see a primary care provider, you can also send messages to your care team and make appointments. If you have questions, please call your primary care clinic.  If you do  not have a primary care provider, please call [phone] and they will assist you.        Care EveryWhere ID     This is your Care EveryWhere ID. This could be used by other organizations to access your [place] medical records  [id number]         Blood Pressure from Last 3 Encounters:   09/27/17 119/63   06/27/17 101/61   02/20/17 102/65    Weight from Last 3 Encounters:   09/27/17 179 lb (81.2 kg)   06/27/17 177 lb (80.3 kg)   03/14/17 175 lb (79.4 kg)              We Performed the Following     B12 - 1000 MCG     INJECTION INTRAMUSCULAR OR SUB-Q          Today's Medication Changes          These changes are accurate as of: 12/11/17  8:48 AM.  If you have any questions, ask your nurse or doctor.               These medicines have changed or have updated prescriptions.        Dose/Directions    cyanocobalamin 1000 MCG/ML injection   Commonly known as:  VITAMIN B12   This may have changed:  how much to take   Used for:  Pernicious anemia        Dose:  1 mL   Inject 1 mL (1,000 mcg) into the muscle every 14 days   Quantity:  2 mL   Refills:  11       RABEprazole 20 MG EC tablet   Commonly known as:  ACIPHEX   This may have changed:  See the new instructions.   Used for:  Gastroesophageal reflux disease without esophagitis        TAKE ONE TABLET BY MOUTH EVERY DAY   Quantity:  90 tablet   Refills:  3                Primary Care Provider Office Phone # Fax #    Tyrel Manning [phone]       [address] [address] [facility] [address]        Equal Access to Services     JOHN MEYER : Hadii sridhar ortegao Soninoska, waaxda luqadaha, qaybta kaalmada livia, waxtarah sasha murray. So [facility] [phone].    ATENCIÓN: Si habla español, tiene a dias disposición servicios gratuitos de asistencia lingüística. Llame al [phone].    We comply with applicable federal civil rights laws and [place] laws. We do not discriminate on the basis of race, color, national origin, age,  disability, sex, sexual orientation, or gender identity.            Thank you!     Thank you for choosing [facility]  for your care. Our goal is always to provide you with excellent care. Hearing back from our patients is one way we can continue to improve our services. Please take a few minutes to complete the written survey that you may receive in the mail after your visit with us. Thank you!             Your Updated Medication List - Protect others around you: Learn how to safely use, store and throw away your medicines at www.disposemymeds.org.          This list is accurate as of: 12/11/17  8:48 AM.  Always use your most recent med list.                   Brand Name Dispense Instructions for use Diagnosis    cyanocobalamin 1000 MCG/ML injection    VITAMIN B12    2 mL    Inject 1 mL (1,000 mcg) into the muscle every 14 days    Pernicious anemia       * primidone 250 MG tablet    MYSOLINE    90 tablet    Take 1 tablet (250 mg) by mouth At Bedtime    H/O magnetic resonance imaging of brain and brain stem       * primidone 250 MG tablet    MYSOLINE    90 tablet    Take 1 tablet (250 mg) by mouth At Bedtime    Tremor       RABEprazole 20 MG EC tablet    ACIPHEX    90 tablet    TAKE ONE TABLET BY MOUTH EVERY DAY    Gastroesophageal reflux disease without esophagitis       ranitidine HCl 300 MG Caps     90 capsule    Take 1 capsule by mouth At Bedtime    Other chronic gastritis       tamsulosin 0.4 MG capsule    FLOMAX    90 capsule    Take 1 capsule (0.4 mg) by mouth At Bedtime    Benign prostatic hyperplasia, presence of lower urinary tract symptoms unspecified       VITAMIN D (CHOLECALCIFEROL) PO      Take 2,000 Units by mouth daily        * Notice:  This list has 2 medication(s) that are the same as other medications prescribed for you. Read the directions carefully, and ask your doctor or other care provider to review them with you.

## 2017-12-18 DIAGNOSIS — D51.0 PERNICIOUS ANEMIA: ICD-10-CM

## 2017-12-18 LAB — VIT B12 SERPL-MCNC: 805 PG/ML (ref 193–986)

## 2017-12-18 PROCEDURE — 82607 VITAMIN B-12: CPT | Performed by: INTERNAL MEDICINE

## 2017-12-18 PROCEDURE — 36415 COLL VENOUS BLD VENIPUNCTURE: CPT | Performed by: INTERNAL MEDICINE

## 2017-12-24 DIAGNOSIS — K21.9 GASTROESOPHAGEAL REFLUX DISEASE WITHOUT ESOPHAGITIS: Primary | ICD-10-CM

## 2017-12-24 NOTE — TELEPHONE ENCOUNTER
Requested Prescriptions   Pending Prescriptions Disp Refills     ranitidine (ZANTAC) 300 MG tablet [Pharmacy Med Name: RANITIDINE HCL 300MG TABS]    Last Written Prescription Date:  6/27/17  Last Fill Quantity: 90,  # refills: 1   Last Office Visit with [facility], [facility] or [facility] prescribing provider:  9/27/17   Future Office Visit:    Next 5 appointments (look out 90 days)     Jan 08, 2018  9:00 AM CST   Nurse Only with BK ANCILLARY   [facility] ([facility])    [address] [address] [facility] [id number]   [phone]            Jan 22, 2018  9:00 AM CST   Nurse Only with BK ANCILLARY   [facility] ([facility])    [address] [address] [id number]   [phone]                  90 tablet 1     Sig: TAKE ONE TABLET BY MOUTH EVERY NIGHT AT BEDTIME    H2 Blockers Protocol Passed    12/24/2017  9:04 AM       Passed - Patient is age 12 or older       Passed - Recent or future visit with authorizing provider's specialty    Patient had office visit in the last year or has a visit in the next 30 days with authorizing provider.  See chart review.                     Valerio Faarax  [facility] Radiology

## 2017-12-28 NOTE — TELEPHONE ENCOUNTER
Prescription approved per [facility] Refill Protocol.  Kourtney Rowland RN

## 2018-01-08 ENCOUNTER — ALLIED HEALTH/NURSE VISIT (OUTPATIENT)
Dept: NURSING | Facility: CLINIC | Age: 83
End: 2018-01-08
Payer: COMMERCIAL

## 2018-01-08 DIAGNOSIS — D51.0 PERNICIOUS ANEMIA: Primary | ICD-10-CM

## 2018-01-08 PROCEDURE — 96372 THER/PROPH/DIAG INJ SC/IM: CPT

## 2018-01-08 PROCEDURE — 99207 ZZC NO CHARGE NURSE ONLY: CPT

## 2018-01-08 NOTE — MR AVS SNAPSHOT
After Visit Summary   1/8/2018    Preston Cai    MRN: [id number]           Patient Information     Date Of Birth          1935        Visit Information        Provider Department      1/8/2018 9:00 AM BK ANCILLARY [facility]        Today's Diagnoses     Pernicious anemia    -  1       Follow-ups after your visit        Your next 10 appointments already scheduled     Jan 22, 2018  9:00 AM CST   Nurse Only with BK ANCILLARY   [facility]    [address] [address] [facility] [id number] [phone]              Who to contact     If you have questions or need follow up information about today's clinic visit or your schedule please contact [facility] directly at [phone].  Normal or non-critical lab and imaging results will be communicated to you by MyChart, letter or phone within 4 business days after the clinic has received the results. If you do not hear from us within 7 days, please contact the clinic through Channel Breezehart or phone. If you have a critical or abnormal lab result, we will notify you by phone as soon as possible.  Submit refill requests through WHOOP or call your pharmacy and they will forward the refill request to us. Please allow 3 business days for your refill to be completed.          Additional Information About Your Visit        MyChart Information     WHOOP gives you secure access to your electronic health record. If you see a primary care provider, you can also send messages to your care team and make appointments. If you have questions, please call your primary care clinic.  If you do not have a primary care provider, please call [phone] and they will assist you.        Care EveryWhere ID     This is your Care EveryWhere ID. This could be used by other organizations to access your [place] medical records  [id number]         Blood Pressure from Last 3  Encounters:   09/27/17 119/63   06/27/17 101/61   02/20/17 102/65    Weight from Last 3 Encounters:   09/27/17 179 lb (81.2 kg)   06/27/17 177 lb (80.3 kg)   03/14/17 175 lb (79.4 kg)              We Performed the Following     INJECTION INTRAMUSCULAR OR SUB-Q     VITAMIN B12 INJ /1000MCG          Today's Medication Changes          These changes are accurate as of: 1/8/18  9:01 AM.  If you have any questions, ask your nurse or doctor.               These medicines have changed or have updated prescriptions.        Dose/Directions    cyanocobalamin 1000 MCG/ML injection   Commonly known as:  VITAMIN B12   This may have changed:  how much to take   Used for:  Pernicious anemia        Dose:  1 mL   Inject 1 mL (1,000 mcg) into the muscle every 14 days   Quantity:  2 mL   Refills:  11       RABEprazole 20 MG EC tablet   Commonly known as:  ACIPHEX   This may have changed:  See the new instructions.   Used for:  Gastroesophageal reflux disease without esophagitis        TAKE ONE TABLET BY MOUTH EVERY DAY   Quantity:  90 tablet   Refills:  3                Primary Care Provider Office Phone # Fax #    Tyrel Manning [phone]       [address] [address] [address]        Equal Access to Services     JOSE LUIS MEYER AH: Hadii sridhar ortegao Sonehemiahali, waaxda luqadaha, qaybta kaalmada adeegyada, ronny murray. So [facility] [phone].    ATENCIÓN: Si habla español, tiene a dias disposición servicios gratuitos de asistencia lingüística. Llame al [phone].    We comply with applicable federal civil rights laws and [place] laws. We do not discriminate on the basis of race, color, national origin, age, disability, sex, sexual orientation, or gender identity.            Thank you!     Thank you for choosing [facility]  for your care. Our goal is always to provide you with excellent care. Hearing back from our patients is one way we can continue to  improve our services. Please take a few minutes to complete the written survey that you may receive in the mail after your visit with us. Thank you!             Your Updated Medication List - Protect others around you: Learn how to safely use, store and throw away your medicines at www.disposemymeds.org.          This list is accurate as of: 1/8/18  9:01 AM.  Always use your most recent med list.                   Brand Name Dispense Instructions for use Diagnosis    cyanocobalamin 1000 MCG/ML injection    VITAMIN B12    2 mL    Inject 1 mL (1,000 mcg) into the muscle every 14 days    Pernicious anemia       * primidone 250 MG tablet    MYSOLINE    90 tablet    Take 1 tablet (250 mg) by mouth At Bedtime    H/O magnetic resonance imaging of brain and brain stem       * primidone 250 MG tablet    MYSOLINE    90 tablet    Take 1 tablet (250 mg) by mouth At Bedtime    Tremor       RABEprazole 20 MG EC tablet    ACIPHEX    90 tablet    TAKE ONE TABLET BY MOUTH EVERY DAY    Gastroesophageal reflux disease without esophagitis       ranitidine 300 MG tablet    ZANTAC    90 tablet    TAKE ONE TABLET BY MOUTH EVERY NIGHT AT BEDTIME    Gastroesophageal reflux disease without esophagitis       tamsulosin 0.4 MG capsule    FLOMAX    90 capsule    Take 1 capsule (0.4 mg) by mouth At Bedtime    Benign prostatic hyperplasia, presence of lower urinary tract symptoms unspecified       VITAMIN D (CHOLECALCIFEROL) PO      Take 2,000 Units by mouth daily        * Notice:  This list has 2 medication(s) that are the same as other medications prescribed for you. Read the directions carefully, and ask your doctor or other care provider to review them with you.

## 2018-01-08 NOTE — PROGRESS NOTES
The following medication was given:     MEDICATION: Vitamin B12  1000mcg  ROUTE: IM  SITE: Deltoid - Left  DOSE: 1ML  LOT #: [id number]  :  [employer]  EXPIRATION DATE:  05/2019  NDC#: 1783-0508-71    Kelechi Francisco MA

## 2018-01-22 ENCOUNTER — ALLIED HEALTH/NURSE VISIT (OUTPATIENT)
Dept: NURSING | Facility: CLINIC | Age: 83
End: 2018-01-22
Payer: COMMERCIAL

## 2018-01-22 DIAGNOSIS — D51.0 PERNICIOUS ANEMIA: Primary | ICD-10-CM

## 2018-01-22 PROCEDURE — 99207 ZZC NO CHARGE NURSE ONLY: CPT

## 2018-01-22 PROCEDURE — 96372 THER/PROPH/DIAG INJ SC/IM: CPT

## 2018-01-22 NOTE — MR AVS SNAPSHOT
After Visit Summary   1/22/2018    Preston Cai    MRN: [id number]           Patient Information     Date Of Birth          1935        Visit Information        Provider Department      1/22/2018 9:00 AM BK ANCILLARY [facility]        Today's Diagnoses     Pernicious anemia    -  1       Follow-ups after your visit        Your next 10 appointments already scheduled     Feb 05, 2018  9:00 AM CST   Nurse Only with BK [facility]LL[facility]   [facility]    [address] [facility] [address] [facility] [id number] [phone]            Feb 19, 2018  9:00 AM CST   Nurse Only with BK ANCILLARY   [facility]    [address] [facility] [address] [facility] [id number] [phone]              Who to contact     If you have questions or need follow up information about today's clinic visit or your schedule please contact [facility] directly at [phone].  Normal or non-critical lab and imaging results will be communicated to you by [employer], letter or phone within 4 business days after the clinic has received the results. If you do not hear from us within 7 days, please contact the clinic through [employer] or phone. If you have a critical or abnormal lab result, we will notify you by phone as soon as possible.  Submit refill requests through [employer] or call your pharmacy and they will forward the refill request to us. Please allow 3 business days for your refill to be completed.          Additional Information About Your Visit        MyChart Information     [employer] gives you secure access to your electronic health record. If you see a primary care provider, you can also send messages to your care team and make appointments. If you have questions, please call your primary care clinic.  If you do not have a primary care provider, please call [phone] and they will assist  you.        Care EveryWhere ID     This is your Care EveryWhere ID. This could be used by other organizations to access your [place] medical records  [id number]         Blood Pressure from Last 3 Encounters:   09/27/17 119/63   06/27/17 101/61   02/20/17 102/65    Weight from Last 3 Encounters:   09/27/17 179 lb (81.2 kg)   06/27/17 177 lb (80.3 kg)   03/14/17 175 lb (79.4 kg)              We Performed the Following     B12 - 1000 MCG     INJECTION INTRAMUSCULAR OR SUB-Q          Today's Medication Changes          These changes are accurate as of: 1/22/18  3:05 PM.  If you have any questions, ask your nurse or doctor.               These medicines have changed or have updated prescriptions.        Dose/Directions    cyanocobalamin 1000 MCG/ML injection   Commonly known as:  VITAMIN B12   This may have changed:  how much to take   Used for:  Pernicious anemia        Dose:  1 mL   Inject 1 mL (1,000 mcg) into the muscle every 14 days   Quantity:  2 mL   Refills:  11       RABEprazole 20 MG EC tablet   Commonly known as:  ACIPHEX   This may have changed:  See the new instructions.   Used for:  Gastroesophageal reflux disease without esophagitis        TAKE ONE TABLET BY MOUTH EVERY DAY   Quantity:  90 tablet   Refills:  3                Primary Care Provider Office Phone # Fax #    Tyrel Manning [phone]       [address] [place]  [facility] [address]        Equal Access to Services     [facility] HospitalTIANNA AH: Hadii sridhar millan Soninoska, waaxda luqadaha, qaybta kaalmamaddison bhakta, ronny salgado . So [facility] [phone].    ATENCIÓN: Si habla español, tiene a dias disposición servicios gratuitos de asistencia lingüística. Llame al [phone].    We comply with applicable federal civil rights laws and [place] laws. We do not discriminate on the basis of race, color, national origin, age, disability, sex, sexual orientation, or gender identity.            Thank you!      Thank you for choosing [facility]  for your care. Our goal is always to provide you with excellent care. Hearing back from our patients is one way we can continue to improve our services. Please take a few minutes to complete the written survey that you may receive in the mail after your visit with us. Thank you!             Your Updated Medication List - Protect others around you: Learn how to safely use, store and throw away your medicines at www.disposemymeds.org.          This list is accurate as of: 1/22/18  3:05 PM.  Always use your most recent med list.                   Brand Name Dispense Instructions for use Diagnosis    cyanocobalamin 1000 MCG/ML injection    VITAMIN B12    2 mL    Inject 1 mL (1,000 mcg) into the muscle every 14 days    Pernicious anemia       * primidone 250 MG tablet    MYSOLINE    90 tablet    Take 1 tablet (250 mg) by mouth At Bedtime    H/O magnetic resonance imaging of brain and brain stem       * primidone 250 MG tablet    MYSOLINE    90 tablet    Take 1 tablet (250 mg) by mouth At Bedtime    Tremor       RABEprazole 20 MG EC tablet    ACIPHEX    90 tablet    TAKE ONE TABLET BY MOUTH EVERY DAY    Gastroesophageal reflux disease without esophagitis       ranitidine 300 MG tablet    ZANTAC    90 tablet    TAKE ONE TABLET BY MOUTH EVERY NIGHT AT BEDTIME    Gastroesophageal reflux disease without esophagitis       tamsulosin 0.4 MG capsule    FLOMAX    90 capsule    Take 1 capsule (0.4 mg) by mouth At Bedtime    Benign prostatic hyperplasia, presence of lower urinary tract symptoms unspecified       VITAMIN D (CHOLECALCIFEROL) PO      Take 2,000 Units by mouth daily        * Notice:  This list has 2 medication(s) that are the same as other medications prescribed for you. Read the directions carefully, and ask your doctor or other care provider to review them with you.

## 2018-01-22 NOTE — NURSING NOTE
The following medication was given at 9:25am     MEDICATION: Vitamin B12  1000mcg  ROUTE: IM  SITE: Arm - Left  DOSE: 1ML   LOT #: [id number]  :[employer]    EXPIRATION DATE:  May/2019  NDC#: 5645-2168-58    The medication has been administered and the patient was instructed to wait 20 minutes before leaving the building in the event of an allergic reaction.    DONALD Hannon MA

## 2018-02-05 ENCOUNTER — ALLIED HEALTH/NURSE VISIT (OUTPATIENT)
Dept: NURSING | Facility: CLINIC | Age: 83
End: 2018-02-05
Payer: COMMERCIAL

## 2018-02-05 DIAGNOSIS — D51.0 PA (PERNICIOUS ANEMIA): ICD-10-CM

## 2018-02-05 PROCEDURE — 96372 THER/PROPH/DIAG INJ SC/IM: CPT

## 2018-02-05 PROCEDURE — 99207 ZZC NO CHARGE NURSE ONLY: CPT

## 2018-02-05 NOTE — MR AVS SNAPSHOT
After Visit Summary   2/5/2018    Preston Cai    MRN: [id number]           Patient Information     Date Of Birth          1935        Visit Information        Provider Department      2/5/2018 9:00 AM BK ANCILLARY [facility]        Today's Diagnoses     PA (pernicious anemia)           Follow-ups after your visit        Your next 10 appointments already scheduled     Feb 19, 2018  9:00 AM CST   Nurse Only with BK ANCILLARY   [facility]    [address] [address] [address] [phone]              Who to contact     If you have questions or need follow up information about today's clinic visit or your schedule please contact [facility] directly at [phone].  Normal or non-critical lab and imaging results will be communicated to you by MyChart, letter or phone within 4 business days after the clinic has received the results. If you do not hear from us within 7 days, please contact the clinic through MyChart or phone. If you have a critical or abnormal lab result, we will notify you by phone as soon as possible.  Submit refill requests through [employer] or call your pharmacy and they will forward the refill request to us. Please allow 3 business days for your refill to be completed.          Additional Information About Your Visit        MyChart Information     [employer] gives you secure access to your electronic health record. If you see a primary care provider, you can also send messages to your care team and make appointments. If you have questions, please call your primary care clinic.  If you do not have a primary care provider, please call [phone] and they will assist you.        Care EveryWhere ID     This is your Care EveryWhere ID. This could be used by other organizations to access your [place] medical records  [id number]         Blood Pressure from Last 3  Encounters:   09/27/17 119/63   06/27/17 101/61   02/20/17 102/65    Weight from Last 3 Encounters:   09/27/17 179 lb (81.2 kg)   06/27/17 177 lb (80.3 kg)   03/14/17 175 lb (79.4 kg)              We Performed the Following     INJECTION INTRAMUSCULAR OR SUB-Q     Vitamin B12          Today's Medication Changes          These changes are accurate as of 2/5/18  9:46 AM.  If you have any questions, ask your nurse or doctor.               These medicines have changed or have updated prescriptions.        Dose/Directions    cyanocobalamin 1000 MCG/ML injection   Commonly known as:  VITAMIN B12   This may have changed:  how much to take   Used for:  Pernicious anemia        Dose:  1 mL   Inject 1 mL (1,000 mcg) into the muscle every 14 days   Quantity:  2 mL   Refills:  11       RABEprazole 20 MG EC tablet   Commonly known as:  ACIPHEX   This may have changed:  See the new instructions.   Used for:  Gastroesophageal reflux disease without esophagitis        TAKE ONE TABLET BY MOUTH EVERY DAY   Quantity:  90 tablet   Refills:  3                Primary Care Provider Office Phone # Fax #    Tyrel Manning [phone]       [address] [facility] [address]        Equal Access to Services     JOSE LUIS MEYER AH: Hadii aad ku hadasho Soomaali, waaxda luqadaha, qaybta kaalmada adeegyada, ronny murray. So [facility] [phone].    ATENCIÓN: Si habla español, tiene a dias disposición servicios gratuitos de asistencia lingüística. Llame al [phone].    We comply with applicable federal civil rights laws and [place] laws. We do not discriminate on the basis of race, color, national origin, age, disability, sex, sexual orientation, or gender identity.            Thank you!     Thank you for choosing [facility]  for your care. Our goal is always to provide you with excellent care. Hearing back from our patients is one way we can continue to improve our  services. Please take a few minutes to complete the written survey that you may receive in the mail after your visit with us. Thank you!             Your Updated Medication List - Protect others around you: Learn how to safely use, store and throw away your medicines at www.disposemymeds.org.          This list is accurate as of 2/5/18  9:46 AM.  Always use your most recent med list.                   Brand Name Dispense Instructions for use Diagnosis    cyanocobalamin 1000 MCG/ML injection    VITAMIN B12    2 mL    Inject 1 mL (1,000 mcg) into the muscle every 14 days    Pernicious anemia       * primidone 250 MG tablet    MYSOLINE    90 tablet    Take 1 tablet (250 mg) by mouth At Bedtime    H/O magnetic resonance imaging of brain and brain stem       * primidone 250 MG tablet    MYSOLINE    90 tablet    Take 1 tablet (250 mg) by mouth At Bedtime    Tremor       RABEprazole 20 MG EC tablet    ACIPHEX    90 tablet    TAKE ONE TABLET BY MOUTH EVERY DAY    Gastroesophageal reflux disease without esophagitis       ranitidine 300 MG tablet    ZANTAC    90 tablet    TAKE ONE TABLET BY MOUTH EVERY NIGHT AT BEDTIME    Gastroesophageal reflux disease without esophagitis       tamsulosin 0.4 MG capsule    FLOMAX    90 capsule    Take 1 capsule (0.4 mg) by mouth At Bedtime    Benign prostatic hyperplasia, presence of lower urinary tract symptoms unspecified       VITAMIN D (CHOLECALCIFEROL) PO      Take 2,000 Units by mouth daily        * Notice:  This list has 2 medication(s) that are the same as other medications prescribed for you. Read the directions carefully, and ask your doctor or other care provider to review them with you.

## 2018-02-05 NOTE — PROGRESS NOTES
The following medication was given:     MEDICATION: Vitamin B12  1000 mcg  ROUTE: IM  SITE: Deltoid - Left  DOSE: 1 ml  LOT #: [id number]  :  [employer] Plus  EXPIRATION DATE:  02/19  NDC: 2163-0521-58      Oxana Cadet 9:43 AM 2/5/2018

## 2018-02-19 ENCOUNTER — ALLIED HEALTH/NURSE VISIT (OUTPATIENT)
Dept: NURSING | Facility: CLINIC | Age: 83
End: 2018-02-19
Payer: COMMERCIAL

## 2018-02-19 DIAGNOSIS — D51.0 PA (PERNICIOUS ANEMIA): ICD-10-CM

## 2018-02-19 PROCEDURE — 96372 THER/PROPH/DIAG INJ SC/IM: CPT

## 2018-02-19 PROCEDURE — 99207 ZZC NO CHARGE NURSE ONLY: CPT

## 2018-02-19 NOTE — MR AVS SNAPSHOT
After Visit Summary   2/19/2018    Preston Cai    MRN: [id number]           Patient Information     Date Of Birth          1935        Visit Information        Provider Department      2/19/2018 9:00 AM BK ANCILLARY [facility]        Today's Diagnoses     PA (pernicious anemia)           Follow-ups after your visit        Your next 10 appointments already scheduled     Mar 05, 2018  9:00 AM CST   Nurse Only with  ANCILLARY   [facility]    [address] [id number] [phone]            Mar 19, 2018  9:00 AM CDT   Nurse Only with BK ANCILLARY   [facility]    [address] [address] [phone]              Who to contact     If you have questions or need follow up information about today's clinic visit or your schedule please contact [facility] directly at [phone].  Normal or non-critical lab and imaging results will be communicated to you by [employer], letter or phone within 4 business days after the clinic has received the results. If you do not hear from us within 7 days, please contact the clinic through [employer] or phone. If you have a critical or abnormal lab result, we will notify you by phone as soon as possible.  Submit refill requests through [employer] or call your pharmacy and they will forward the refill request to us. Please allow 3 business days for your refill to be completed.          Additional Information About Your Visit        MyChart Information     [employer] gives you secure access to your electronic health record. If you see a primary care provider, you can also send messages to your care team and make appointments. If you have questions, please call your primary care clinic.  If you do not have a primary care provider, please call [phone] and they will assist  you.        Care EveryWhere ID     This is your Care EveryWhere ID. This could be used by other organizations to access your [place] medical records  [id number]         Blood Pressure from Last 3 Encounters:   09/27/17 119/63   06/27/17 101/61   02/20/17 102/65    Weight from Last 3 Encounters:   09/27/17 179 lb (81.2 kg)   06/27/17 177 lb (80.3 kg)   03/14/17 175 lb (79.4 kg)              We Performed the Following     B12 - 1000 MCG     Vitamin B12          Today's Medication Changes          These changes are accurate as of 2/19/18 10:01 AM.  If you have any questions, ask your nurse or doctor.               These medicines have changed or have updated prescriptions.        Dose/Directions    cyanocobalamin 1000 MCG/ML injection   Commonly known as:  VITAMIN B12   This may have changed:  how much to take   Used for:  Pernicious anemia        Dose:  1 mL   Inject 1 mL (1,000 mcg) into the muscle every 14 days   Quantity:  2 mL   Refills:  11       RABEprazole 20 MG EC tablet   Commonly known as:  ACIPHEX   This may have changed:  See the new instructions.   Used for:  Gastroesophageal reflux disease without esophagitis        TAKE ONE TABLET BY MOUTH EVERY DAY   Quantity:  90 tablet   Refills:  3                Primary Care Provider Office Phone # Fax #    Tyrel Manning [phone]       [address] [place]  [facility] [address]        Equal Access to Services     JOSE LUIS MEYER AH: Sussy millan Soninoska, waaxda luqadaha, qaybta kaalsheron bhakta, ronny murray. So [facility] [phone].    ATENCIÓN: Si habla español, tiene a dias disposición servicios gratuitos de asistencia lingüística. Myesha al [phone].    We comply with applicable federal civil rights laws and [place] laws. We do not discriminate on the basis of race, color, national origin, age, disability, sex, sexual orientation, or gender identity.            Thank you!     Thank you for choosing  [facility]  for your care. Our goal is always to provide you with excellent care. Hearing back from our patients is one way we can continue to improve our services. Please take a few minutes to complete the written survey that you may receive in the mail after your visit with us. Thank you!             Your Updated Medication List - Protect others around you: Learn how to safely use, store and throw away your medicines at www.disposemymeds.org.          This list is accurate as of 2/19/18 10:01 AM.  Always use your most recent med list.                   Brand Name Dispense Instructions for use Diagnosis    cyanocobalamin 1000 MCG/ML injection    VITAMIN B12    2 mL    Inject 1 mL (1,000 mcg) into the muscle every 14 days    Pernicious anemia       * primidone 250 MG tablet    MYSOLINE    90 tablet    Take 1 tablet (250 mg) by mouth At Bedtime    H/O magnetic resonance imaging of brain and brain stem       * primidone 250 MG tablet    MYSOLINE    90 tablet    Take 1 tablet (250 mg) by mouth At Bedtime    Tremor       RABEprazole 20 MG EC tablet    ACIPHEX    90 tablet    TAKE ONE TABLET BY MOUTH EVERY DAY    Gastroesophageal reflux disease without esophagitis       ranitidine 300 MG tablet    ZANTAC    90 tablet    TAKE ONE TABLET BY MOUTH EVERY NIGHT AT BEDTIME    Gastroesophageal reflux disease without esophagitis       tamsulosin 0.4 MG capsule    FLOMAX    90 capsule    Take 1 capsule (0.4 mg) by mouth At Bedtime    Benign prostatic hyperplasia, presence of lower urinary tract symptoms unspecified       VITAMIN D (CHOLECALCIFEROL) PO      Take 2,000 Units by mouth daily        * Notice:  This list has 2 medication(s) that are the same as other medications prescribed for you. Read the directions carefully, and ask your doctor or other care provider to review them with you.

## 2018-02-19 NOTE — NURSING NOTE
The following medication was given at 9:05am     MEDICATION: Vitamin B12  1000mcg  ROUTE: IM  SITE: Arm - Right  DOSE: 1ML   LOT #: [id number]  :"[employer]    EXPIRATION DATE:  May/2019  NDC#: 6669-8519-97    The medication has been administered and the patient was instructed to wait 20 minutes before leaving the building in the event of an allergic reaction.    DONALD Hannon MA

## 2018-03-05 ENCOUNTER — ALLIED HEALTH/NURSE VISIT (OUTPATIENT)
Dept: NURSING | Facility: CLINIC | Age: 83
End: 2018-03-05
Payer: COMMERCIAL

## 2018-03-05 DIAGNOSIS — D51.0 PERNICIOUS ANEMIA: ICD-10-CM

## 2018-03-05 PROCEDURE — 96372 THER/PROPH/DIAG INJ SC/IM: CPT

## 2018-03-05 NOTE — NURSING NOTE
The following medication was given at 8:58am     MEDICATION: Vitamin B12  1000mcg  ROUTE: IM  SITE: Arm - Left  DOSE: 1ML   LOT #: [id number]  :[employer]    EXPIRATION DATE:  May/2019  NDC#: 9475-2288-79    The medication has been administered and the patient was instructed to wait 20 minutes before leaving the building in the event of an allergic reaction.    DONALD Hannon MA

## 2018-03-05 NOTE — MR AVS SNAPSHOT
After Visit Summary   3/5/2018    Preston Cai    MRN: [id number]           Patient Information     Date Of Birth          1935        Visit Information        Provider Department      3/5/2018 9:00 AM BK ANCILLARY [facility]        Today's Diagnoses     Pernicious anemia           Follow-ups after your visit        Your next 10 appointments already scheduled     Mar 09, 2018  9:20 AM CST   PHYSICAL with Tyrel Manning MD   [facility]    [address] [address] [phone]            Mar 19, 2018  9:00 AM CDT   Nurse Only with BK ANCILLARY   [facility]    [address] [address] [phone]              Who to contact     If you have questions or need follow up information about today's clinic visit or your schedule please contact [facility] directly at [phone].  Normal or non-critical lab and imaging results will be communicated to you by MyChart, letter or phone within 4 business days after the clinic has received the results. If you do not hear from us within 7 days, please contact the clinic through [email or web address] or phone. If you have a critical or abnormal lab result, we will notify you by phone as soon as possible.  Submit refill requests through [employer] or call your pharmacy and they will forward the refill request to us. Please allow 3 business days for your refill to be completed.          Additional Information About Your Visit        MyChart Information     [employer] gives you secure access to your electronic health record. If you see a primary care provider, you can also send messages to your care team and make appointments. If you have questions, please call your primary care clinic.  If you do not have a primary care provider, please call [phone] and they will  assist you.        Care EveryWhere ID     This is your Care EveryWhere ID. This could be used by other organizations to access your [facility] medical records  [id number]         Blood Pressure from Last 3 Encounters:   09/27/17 119/63   06/27/17 101/61   02/20/17 102/65    Weight from Last 3 Encounters:   09/27/17 179 lb (81.2 kg)   06/27/17 177 lb (80.3 kg)   03/14/17 175 lb (79.4 kg)              We Performed the Following     VITAMIN B12 INJ /1000MCG          Today's Medication Changes          These changes are accurate as of 3/5/18 12:24 PM.  If you have any questions, ask your nurse or doctor.               These medicines have changed or have updated prescriptions.        Dose/Directions    cyanocobalamin 1000 MCG/ML injection   Commonly known as:  VITAMIN B12   This may have changed:  how much to take   Used for:  Pernicious anemia        Dose:  1 mL   Inject 1 mL (1,000 mcg) into the muscle every 14 days   Quantity:  2 mL   Refills:  11       RABEprazole 20 MG EC tablet   Commonly known as:  ACIPHEX   This may have changed:  See the new instructions.   Used for:  Gastroesophageal reflux disease without esophagitis        TAKE ONE TABLET BY MOUTH EVERY DAY   Quantity:  90 tablet   Refills:  3                Primary Care Provider Office Phone # Fax #    Tyrel Manning [phone]       [address] [place]  [facility] [address]        Equal Access to Services     JOSE LUIS MEYER AH: Sussy Romano, waaxda brooke, qaybta kaalsheron bhakta, ronny salgado . So [facility] [phone].    ATENCIÓN: Si habla español, tiene a dias disposición servicios gratuitos de asistencia lingüística. Myesha al [phone].    We comply with applicable federal civil rights laws and [place] laws. We do not discriminate on the basis of race, color, national origin, age, disability, sex, sexual orientation, or gender identity.            Thank you!     Thank you for choosing  [facility]  for your care. Our goal is always to provide you with excellent care. Hearing back from our patients is one way we can continue to improve our services. Please take a few minutes to complete the written survey that you may receive in the mail after your visit with us. Thank you!             Your Updated Medication List - Protect others around you: Learn how to safely use, store and throw away your medicines at www.disposemymeds.org.          This list is accurate as of 3/5/18 12:24 PM.  Always use your most recent med list.                   Brand Name Dispense Instructions for use Diagnosis    cyanocobalamin 1000 MCG/ML injection    VITAMIN B12    2 mL    Inject 1 mL (1,000 mcg) into the muscle every 14 days    Pernicious anemia       * primidone 250 MG tablet    MYSOLINE    90 tablet    Take 1 tablet (250 mg) by mouth At Bedtime    H/O magnetic resonance imaging of brain and brain stem       * primidone 250 MG tablet    MYSOLINE    90 tablet    Take 1 tablet (250 mg) by mouth At Bedtime    Tremor       RABEprazole 20 MG EC tablet    ACIPHEX    90 tablet    TAKE ONE TABLET BY MOUTH EVERY DAY    Gastroesophageal reflux disease without esophagitis       ranitidine 300 MG tablet    ZANTAC    90 tablet    TAKE ONE TABLET BY MOUTH EVERY NIGHT AT BEDTIME    Gastroesophageal reflux disease without esophagitis       tamsulosin 0.4 MG capsule    FLOMAX    90 capsule    Take 1 capsule (0.4 mg) by mouth At Bedtime    Benign prostatic hyperplasia, presence of lower urinary tract symptoms unspecified       VITAMIN D (CHOLECALCIFEROL) PO      Take 2,000 Units by mouth daily        * Notice:  This list has 2 medication(s) that are the same as other medications prescribed for you. Read the directions carefully, and ask your doctor or other care provider to review them with you.

## 2018-03-09 ENCOUNTER — OFFICE VISIT (OUTPATIENT)
Dept: FAMILY MEDICINE | Facility: CLINIC | Age: 83
End: 2018-03-09
Payer: COMMERCIAL

## 2018-03-09 VITALS
TEMPERATURE: 97 F | WEIGHT: 180 LBS | OXYGEN SATURATION: 97 % | HEIGHT: 73 IN | BODY MASS INDEX: 23.86 KG/M2 | DIASTOLIC BLOOD PRESSURE: 74 MMHG | HEART RATE: 68 BPM | SYSTOLIC BLOOD PRESSURE: 114 MMHG

## 2018-03-09 DIAGNOSIS — Z00.00 ROUTINE GENERAL MEDICAL EXAMINATION AT A HEALTH CARE FACILITY: Primary | ICD-10-CM

## 2018-03-09 DIAGNOSIS — Z13.6 CARDIOVASCULAR SCREENING; LDL GOAL LESS THAN 160: ICD-10-CM

## 2018-03-09 DIAGNOSIS — L82.1 SEBORRHEIC KERATOSIS: ICD-10-CM

## 2018-03-09 DIAGNOSIS — Z12.11 SPECIAL SCREENING FOR MALIGNANT NEOPLASMS, COLON: ICD-10-CM

## 2018-03-09 DIAGNOSIS — D51.0 PA (PERNICIOUS ANEMIA): ICD-10-CM

## 2018-03-09 DIAGNOSIS — C32.9 LARYNGEAL CANCER (H): ICD-10-CM

## 2018-03-09 DIAGNOSIS — K21.9 GASTROESOPHAGEAL REFLUX DISEASE WITHOUT ESOPHAGITIS: ICD-10-CM

## 2018-03-09 DIAGNOSIS — G25.0 ESSENTIAL TREMOR: ICD-10-CM

## 2018-03-09 LAB
ALBUMIN SERPL-MCNC: 4 G/DL (ref 3.4–5)
ALP SERPL-CCNC: 67 U/L (ref 40–150)
ALT SERPL W P-5'-P-CCNC: 22 U/L (ref 0–70)
ANION GAP SERPL CALCULATED.3IONS-SCNC: 7 MMOL/L (ref 3–14)
AST SERPL W P-5'-P-CCNC: 19 U/L (ref 0–45)
BASOPHILS # BLD AUTO: 0 10E9/L (ref 0–0.2)
BASOPHILS NFR BLD AUTO: 0.2 %
BILIRUB SERPL-MCNC: 0.4 MG/DL (ref 0.2–1.3)
BUN SERPL-MCNC: 17 MG/DL (ref 7–30)
CALCIUM SERPL-MCNC: 8.9 MG/DL (ref 8.5–10.1)
CHLORIDE SERPL-SCNC: 101 MMOL/L (ref 94–109)
CHOLEST SERPL-MCNC: 186 MG/DL
CO2 SERPL-SCNC: 31 MMOL/L (ref 20–32)
CREAT SERPL-MCNC: 1 MG/DL (ref 0.66–1.25)
DIFFERENTIAL METHOD BLD: ABNORMAL
EOSINOPHIL # BLD AUTO: 0.1 10E9/L (ref 0–0.7)
EOSINOPHIL NFR BLD AUTO: 1.2 %
ERYTHROCYTE [DISTWIDTH] IN BLOOD BY AUTOMATED COUNT: 12.5 % (ref 10–15)
GFR SERPL CREATININE-BSD FRML MDRD: 72 ML/MIN/1.7M2
GLUCOSE SERPL-MCNC: 102 MG/DL (ref 70–99)
HCT VFR BLD AUTO: 41.5 % (ref 40–53)
HDLC SERPL-MCNC: 78 MG/DL
HGB BLD-MCNC: 13.6 G/DL (ref 13.3–17.7)
LDLC SERPL CALC-MCNC: 90 MG/DL
LYMPHOCYTES # BLD AUTO: 1.3 10E9/L (ref 0.8–5.3)
LYMPHOCYTES NFR BLD AUTO: 26.9 %
MCH RBC QN AUTO: 32.7 PG (ref 26.5–33)
MCHC RBC AUTO-ENTMCNC: 32.8 G/DL (ref 31.5–36.5)
MCV RBC AUTO: 100 FL (ref 78–100)
MONOCYTES # BLD AUTO: 0.5 10E9/L (ref 0–1.3)
MONOCYTES NFR BLD AUTO: 9.6 %
NEUTROPHILS # BLD AUTO: 3.1 10E9/L (ref 1.6–8.3)
NEUTROPHILS NFR BLD AUTO: 62.1 %
NONHDLC SERPL-MCNC: 108 MG/DL
PLATELET # BLD AUTO: 175 10E9/L (ref 150–450)
POTASSIUM SERPL-SCNC: 4.6 MMOL/L (ref 3.4–5.3)
PROT SERPL-MCNC: 7.5 G/DL (ref 6.8–8.8)
RBC # BLD AUTO: 4.16 10E12/L (ref 4.4–5.9)
SODIUM SERPL-SCNC: 139 MMOL/L (ref 133–144)
TRIGL SERPL-MCNC: 90 MG/DL
VIT B12 SERPL-MCNC: 896 PG/ML (ref 193–986)
WBC # BLD AUTO: 4.9 10E9/L (ref 4–11)

## 2018-03-09 PROCEDURE — 80053 COMPREHEN METABOLIC PANEL: CPT | Performed by: INTERNAL MEDICINE

## 2018-03-09 PROCEDURE — 36415 COLL VENOUS BLD VENIPUNCTURE: CPT | Performed by: INTERNAL MEDICINE

## 2018-03-09 PROCEDURE — 99397 PER PM REEVAL EST PAT 65+ YR: CPT | Performed by: INTERNAL MEDICINE

## 2018-03-09 PROCEDURE — 85025 COMPLETE CBC W/AUTO DIFF WBC: CPT | Performed by: INTERNAL MEDICINE

## 2018-03-09 PROCEDURE — 82607 VITAMIN B-12: CPT | Performed by: INTERNAL MEDICINE

## 2018-03-09 PROCEDURE — 80061 LIPID PANEL: CPT | Performed by: INTERNAL MEDICINE

## 2018-03-09 RX ORDER — TRIAMCINOLONE ACETONIDE 5 MG/G
CREAM TOPICAL
Qty: 30 G | Refills: 5 | Status: SHIPPED | OUTPATIENT
Start: 2018-03-09 | End: 2018-10-17

## 2018-03-09 RX ORDER — PRIMIDONE 250 MG/1
250 TABLET ORAL AT BEDTIME
Qty: 90 TABLET | Refills: 3 | Status: SHIPPED | OUTPATIENT
Start: 2018-03-09 | End: 2019-03-11

## 2018-03-09 ASSESSMENT — ANXIETY QUESTIONNAIRES
7. FEELING AFRAID AS IF SOMETHING AWFUL MIGHT HAPPEN: NOT AT ALL
GAD7 TOTAL SCORE: 1
2. NOT BEING ABLE TO STOP OR CONTROL WORRYING: NOT AT ALL
IF YOU CHECKED OFF ANY PROBLEMS ON THIS QUESTIONNAIRE, HOW DIFFICULT HAVE THESE PROBLEMS MADE IT FOR YOU TO DO YOUR WORK, TAKE CARE OF THINGS AT HOME, OR GET ALONG WITH OTHER PEOPLE: NOT DIFFICULT AT ALL
1. FEELING NERVOUS, ANXIOUS, OR ON EDGE: NOT AT ALL
6. BECOMING EASILY ANNOYED OR IRRITABLE: SEVERAL DAYS
5. BEING SO RESTLESS THAT IT IS HARD TO SIT STILL: NOT AT ALL
3. WORRYING TOO MUCH ABOUT DIFFERENT THINGS: NOT AT ALL

## 2018-03-09 ASSESSMENT — PATIENT HEALTH QUESTIONNAIRE - PHQ9: 5. POOR APPETITE OR OVEREATING: NOT AT ALL

## 2018-03-09 ASSESSMENT — PAIN SCALES - GENERAL: PAINLEVEL: NO PAIN (0)

## 2018-03-09 NOTE — PATIENT INSTRUCTIONS
Preventive Health Recommendations:   Male Ages 65 and over    Yearly exam:             See your health care provider every year in order to  o   Review health changes.   o   Discuss preventive care.    o   Review your medicines if your doctor has prescribed any.    Talk with your health care provider about whether you should have a test to screen for prostate cancer (PSA).    Every 3 years, have a diabetes test (fasting glucose). If you are at risk for diabetes, you should have this test more often.    Every 5 years, have a cholesterol test. Have this test more often if you are at risk for high cholesterol or heart disease.     Every 10 years, have a colonoscopy. Or, have a yearly FIT test (stool test). These exams will check for colon cancer.    Talk to with your health care provider about screening for Abdominal Aortic Aneurysm if you have a family history of AAA or have a history of smoking.    Shots:     Get a flu shot each year.     Get a tetanus shot every 10 years.     Talk to your doctor about your pneumonia vaccines. There are now two you should receive - Pneumovax (PPSV 23) and Prevnar (PCV 13).     Talk to your doctor about a shingles vaccine.     Talk to your doctor about the hepatitis B vaccine.  Nutrition:     Eat at least 5 servings of fruits and vegetables each day.     Eat whole-grain bread, whole-wheat pasta and brown rice instead of white grains and rice.     Talk to your provider about Calcium and Vitamin D.   Lifestyle    Exercise for at least 150 minutes a week (30 minutes a day, 5 days a week). This will help you control your weight and prevent disease.     Limit alcohol to one drink per day.     No smoking.     Wear sunscreen to prevent skin cancer.     See your dentist every six months for an exam and cleaning.     See your eye doctor every 1 to 2 years to screen for conditions such as glaucoma, macular degeneration, cataracts, etc     At [facility], we strive to  deliver an exceptional experience to you, every time we see you.  If you receive a survey in the mail, please send us back your thoughts. We really do value your feedback.    Based on your medical history, these are the current health maintenance/preventive care services that you are due for (some may have been done at this visit.)  There are no preventive care reminders to display for this patient.      Suggested websites for health information:  Www.[employer].org : Up to date and easily searchable information on multiple topics.  Www.medlineplus.gov : medication info, interactive tutorials, watch real surgeries online  Www.familydoctor.org : good info from the Academy of Family Physicians  Www.cdc.gov : public health info, travel advisories, epidemics (H1N1)  Www.aap.org : children's health info, normal development, vaccinations  Www.health.[facility].[place].us : [place] dept of health, public health issues in [place], N1N1    Your care team:                            Family Medicine Internal Medicine   MD Tyrel Mckeon MD Shantel Branch-Fleming, MD Katya Georgiev PA-C Nam Ho, MD Pediatrics   JARROD Lakhani, KRYSTAL Restrepo APRN CNP   MD Yulissa Cali MD Deborah Mielke, MD Kim Thein, APRN CNP      Clinic hours: Monday - Thursday 7 am-7 pm; Fridays 7 am-5 pm.   Urgent care: Monday - Friday 11 am-9 pm; Saturday and Sunday 9 am-5 pm.  Pharmacy : Monday -Thursday 8 am-8 pm; Friday 8 am-6 pm; Saturday and Sunday 9 am-5 pm.     Clinic: [phone]   Pharmacy: [phone]

## 2018-03-09 NOTE — MR AVS SNAPSHOT
After Visit Summary   3/9/2018    Preston Cai    MRN: [id number]           Patient Information     Date Of Birth          1935        Visit Information        Provider Department      3/9/2018 9:20 AM Tyrel Manning MD [facility]        Today's Diagnoses     Routine general medical examination at a health care facility    -  1    Special screening for malignant neoplasms, colon        Benign prostatic hyperplasia with nocturia        Gastroesophageal reflux disease without esophagitis        Laryngeal cancer (H)        CARDIOVASCULAR SCREENING; LDL GOAL LESS THAN 160        PA (pernicious anemia)        Seborrheic keratosis        Essential tremor          Care Instructions      Preventive Health Recommendations:   Male Ages 65 and over    Yearly exam:             See your health care provider every year in order to  o   Review health changes.   o   Discuss preventive care.    o   Review your medicines if your doctor has prescribed any.    Talk with your health care provider about whether you should have a test to screen for prostate cancer (PSA).    Every 3 years, have a diabetes test (fasting glucose). If you are at risk for diabetes, you should have this test more often.    Every 5 years, have a cholesterol test. Have this test more often if you are at risk for high cholesterol or heart disease.     Every 10 years, have a colonoscopy. Or, have a yearly FIT test (stool test). These exams will check for colon cancer.    Talk to with your health care provider about screening for Abdominal Aortic Aneurysm if you have a family history of AAA or have a history of smoking.    Shots:     Get a flu shot each year.     Get a tetanus shot every 10 years.     Talk to your doctor about your pneumonia vaccines. There are now two you should receive - Pneumovax (PPSV 23) and Prevnar (PCV 13).     Talk to your doctor about a shingles vaccine.     Talk to your doctor about the  hepatitis B vaccine.  Nutrition:     Eat at least 5 servings of fruits and vegetables each day.     Eat whole-grain bread, whole-wheat pasta and brown rice instead of white grains and rice.     Talk to your provider about Calcium and Vitamin D.   Lifestyle    Exercise for at least 150 minutes a week (30 minutes a day, 5 days a week). This will help you control your weight and prevent disease.     Limit alcohol to one drink per day.     No smoking.     Wear sunscreen to prevent skin cancer.     See your dentist every six months for an exam and cleaning.     See your eye doctor every 1 to 2 years to screen for conditions such as glaucoma, macular degeneration, cataracts, etc     At [facility], we strive to deliver an exceptional experience to you, every time we see you.  If you receive a survey in the mail, please send us back your thoughts. We really do value your feedback.    Based on your medical history, these are the current health maintenance/preventive care services that you are due for (some may have been done at this visit.)  There are no preventive care reminders to display for this patient.      Suggested websites for health information:  Www.[facility].org : Up to date and easily searchable information on multiple topics.  Www.medlineplus.gov : medication info, interactive tutorials, watch real surgeries online  Www.familydoctor.org : good info from the Academy of Family Physicians  Www.cdc.gov : public health info, travel advisories, epidemics (H1N1)  Www.aap.org : children's health info, normal development, vaccinations  Www.health.[facility].[place].us : [place] dept of health, public health issues in [place], N1N1    Your care team:                            Family Medicine Internal Medicine   MD Tyrel Mckeon MD Shantel Branch-Fleming, MD Katya Georgiev PA-C Nam Ho, MD Pediatrics   JARROD Lakhani, MD Yulissa Omalley CNP  MD Isa Bustillos MD Kim Thein, APRN CNP      Clinic hours: Monday - Thursday 7 am-7 pm; Fridays 7 am-5 pm.   Urgent care: Monday - Friday 11 am-9 pm; Saturday and Sunday 9 am-5 pm.  Pharmacy : Monday -Thursday 8 am-8 pm; Friday 8 am-6 pm; Saturday and Sunday 9 am-5 pm.     Clinic: [phone]   Pharmacy: [phone]            Follow-ups after your visit        Your next 10 appointments already scheduled     Mar 19, 2018  9:00 AM CDT   Nurse Only with BK ANCILLARY   [facility]    [address] [place] [address] [phone]              Future tests that were ordered for you today     Open Future Orders        Priority Expected Expires Ordered    Fecal colorectal cancer screen (FIT) Routine  6/1/2018 3/9/2018            Who to contact     If you have questions or need follow up information about today's clinic visit or your schedule please contact [facility] directly at [phone].  Normal or non-critical lab and imaging results will be communicated to you by MyChart, letter or phone within 4 business days after the clinic has received the results. If you do not hear from us within 7 days, please contact the clinic through [employer] or phone. If you have a critical or abnormal lab result, we will notify you by phone as soon as possible.  Submit refill requests through [employer] or call your pharmacy and they will forward the refill request to us. Please allow 3 business days for your refill to be completed.          Additional Information About Your Visit        [employer] Information     [employer] gives you secure access to your electronic health record. If you see a primary care provider, you can also send messages to your care team and make appointments. If you have questions, please call your primary care clinic.  If you do not have a primary care provider, please call [phone] and they will assist  "you.        Care EveryWhere ID     This is your Care EveryWhere ID. This could be used by other organizations to access your [place] medical records  [id number]        Your Vitals Were     Pulse Temperature Height Pulse Oximetry BMI (Body Mass Index)       68 97  F (36.1  C) (Oral) 6' 1\" (1.854 m) 97% 23.75 kg/m2        Blood Pressure from Last 3 Encounters:   03/09/18 114/74   09/27/17 119/63   06/27/17 101/61    Weight from Last 3 Encounters:   03/09/18 180 lb (81.6 kg)   09/27/17 179 lb (81.2 kg)   06/27/17 177 lb (80.3 kg)              We Performed the Following     CBC with platelets and differential     Comprehensive metabolic panel (BMP + Alb, Alk Phos, ALT, AST, Total. Bili, TP)     Lipid Profile (Chol, Trig, HDL, LDL calc)     Vitamin B12          Today's Medication Changes          These changes are accurate as of 3/9/18 10:03 AM.  If you have any questions, ask your nurse or doctor.               Start taking these medicines.        Dose/Directions    triamcinolone 0.5 % cream   Commonly known as:  KENALOG   Used for:  Seborrheic keratosis   Started by:  Tyrel Manning MD        Apply sparingly to affected area three times daily.   Quantity:  30 g   Refills:  5         These medicines have changed or have updated prescriptions.        Dose/Directions    cyanocobalamin 1000 MCG/ML injection   Commonly known as:  VITAMIN B12   This may have changed:  how much to take   Used for:  Pernicious anemia        Dose:  1 mL   Inject 1 mL (1,000 mcg) into the muscle every 14 days   Quantity:  2 mL   Refills:  11       ranitidine 300 MG tablet   Commonly known as:  ZANTAC   This may have changed:  See the new instructions.   Used for:  Gastroesophageal reflux disease without esophagitis   Changed by:  Tyrel Manning MD        TAKE ONE TABLET BY MOUTH EVERY NIGHT AT BEDTIME   Quantity:  90 tablet   Refills:  3            Where to get your medicines      These medications were [place] [facility] [place] [address]  [address], [facility] [address]     Phone:  [phone]     primidone 250 MG tablet    ranitidine 300 MG tablet    triamcinolone 0.5 % cream                Primary Care Provider Office Phone # Fax #    Tyrel Radha Manning [phone]       [address]  [facility] [address]        Equal Access to Services     [facility] : Hadii aad ku hadasho Soomaali, waaxda luqadaha, qaybta kaalmada adeegyada, waxay idiin hayaan adeeg kharash ladavid . So [facility] [phone].    ATENCIÓN: Si habla español, tiene a dias disposición servicios gratuitos de asistencia lingüística. [facility] [phone].    We comply with applicable federal civil rights laws and [place] laws. We do not discriminate on the basis of race, color, national origin, age, disability, sex, sexual orientation, or gender identity.            Thank you!     Thank you for choosing [facility]  for your care. Our goal is always to provide you with excellent care. Hearing back from our patients is one way we can continue to improve our services. Please take a few minutes to complete the written survey that you may receive in the mail after your visit with us. Thank you!             Your Updated Medication List - Protect others around you: Learn how to safely use, store and throw away your medicines at www.disposemymeds.org.          This list is accurate as of 3/9/18 10:03 AM.  Always use your most recent med list.                   Brand Name Dispense Instructions for use Diagnosis    cyanocobalamin 1000 MCG/ML injection    VITAMIN B12    2 mL    Inject 1 mL (1,000 mcg) into the muscle every 14 days    Pernicious anemia       primidone 250 MG tablet    MYSOLINE    90 tablet    Take 1 tablet (250 mg) by mouth At Bedtime    Essential tremor       ranitidine 300 MG tablet    ZANTAC    90 tablet    TAKE ONE TABLET BY MOUTH EVERY NIGHT AT BEDTIME     Gastroesophageal reflux disease without esophagitis       triamcinolone 0.5 % cream    KENALOG    30 g    Apply sparingly to affected area three times daily.    Seborrheic keratosis       VITAMIN D (CHOLECALCIFEROL) PO      Take 2,000 Units by mouth daily

## 2018-03-09 NOTE — PROGRESS NOTES
SUBJECTIVE:   Preston Cai is a 82 year old male who presents for Preventive Visit.    Are you in the first 12 months of your Medicare Part B coverage?  No    Healthy Habits:    Do you get at least three servings of calcium containing foods daily (dairy, green leafy vegetables, etc.)? yes    Amount of exercise or daily activities, outside of work: 5 day(s) per week    Problems taking medications regularly No    Medication side effects: No    Have you had an eye exam in the past two years? yes    Do you see a dentist twice per year? yes    Do you have sleep apnea, excessive snoring or daytime drowsiness?yes, snoring      Ability to successfully perform activities of daily living: Yes, no assistance needed    Home safety:  none identified     Hearing impairment: No    Fall risk:           COGNITIVE SCREEN  1) Repeat 3 items (Banana, Sunrise, Chair)    2) Clock draw: NORMAL  3) 3 item recall: Recalls 3 objects  Results: 3 items recalled: COGNITIVE IMPAIRMENT LESS LIKELY    Mini-CogTM Copyright S Tony. Licensed by the author for use in [facility] [employer]; reprinted with permission ([email or web address]@[email or web address]). All rights reserved.            Rash  Duration of complaint: 3 weeks ago  Description:   Location: back and chest  Character: round, red  Itching (Pruritis): YES  Progression of Symptoms:  worsening  Accompanying Signs & Symptoms:  Fever: no   Body aches or joint pain: no   Sore throat symptoms: no   Recent cold symptoms: no   History:   Previous similar rash: no   Precipitating factors:   Exposure to similar rash: no   New exposures: None   Recent travel: no   Alleviating factors: none  Therapies Tried and outcome: cortisone 10, some relieve    Reviewed and updated as needed this visit by clinical staff  Tobacco  Allergies  Meds  Med Hx  Surg Hx  Fam Hx  Soc Hx        Reviewed and updated as needed this visit by Provider        Social History   Substance Use Topics     Smoking status: Former Smoker      Smokeless tobacco: Never Used      Comment: 1969     Alcohol use 0.0 oz/week     0 Standard drinks or equivalent per week      Comment: A beer once in awhile       If you drink alcohol do you typically have >3 drinks per day or >7 drinks per week? No                        Today's PHQ-2 Score:   PHQ-2 ( 1999 Pfizer) 2/20/2017 2/17/2017   Q1: Little interest or pleasure in doing things 0 -   Q2: Feeling down, depressed or hopeless 0 -   PHQ-2 Score 0 -   Q1: Little interest or pleasure in doing things - Not at all   Q2: Feeling down, depressed or hopeless - Not at all   PHQ-2 Score - 0       Do you feel safe in your environment - Yes    Do you have a Health Care Directive?: No: Advance care planning was reviewed with patient; patient declined at this time.    Current providers sharing in care for this patient include:   Patient Care Team:  Tyrel Manning MD as PCP - General (Internal Medicine)    The following health maintenance items are reviewed in Epic and correct as of today:  Health Maintenance   Topic Date Due     FALL RISK ASSESSMENT  06/27/2018     INFLUENZA VACCINE (SYSTEM ASSIGNED)  09/01/2018     EYE EXAM Q1 YEAR  10/18/2018     ADVANCE DIRECTIVE PLANNING Q5 YRS  03/20/2019     TETANUS IMMUNIZATION (SYSTEM ASSIGNED)  03/07/2022     PNEUMOCOCCAL  Completed     Labs reviewed in EPIC  BP Readings from Last 3 Encounters:   03/09/18 114/74   09/27/17 119/63   06/27/17 101/61    Wt Readings from Last 3 Encounters:   03/09/18 180 lb (81.6 kg)   09/27/17 179 lb (81.2 kg)   06/27/17 177 lb (80.3 kg)                  Patient Active Problem List   Diagnosis     Seborrheic dermatitis     Allergic rhinitis due to other allergen     Cervical radiculopathy at C6     Laryngeal cancer (H)     Essential tremor     CARDIOVASCULAR SCREENING; LDL GOAL LESS THAN 160     Benign prostatic hyperplasia     Hoarse voice quality     Health Care Home     Advanced directives, counseling/discussion     History of anemia      Pseudophakia     Gastroesophageal reflux disease without esophagitis     BPH without urinary obstruction     Globus sensation     Pernicious anemia     Hypocalcemia     Tremor     H/O magnetic resonance imaging of brain and brain stem     Macular degeneration     Squamous blepharitis of upper and lower eyelids of both eyes     Past Surgical History:   Procedure Laterality Date     APPENDECTOMY  8 years old     Biopsy of the throat - cancerous mass - got radiation for in larynx  1982     CATARACT IOL, RT/LT       COLONOSCOPY  2/25/2004    Normal     COLONOSCOPY N/A 4/28/2015    Procedure: COLONOSCOPY;  Surgeon: Marvin Adams MD;  Location: [facility] OR     COLONOSCOPY WITH CO2 INSUFFLATION N/A 4/28/2015    Procedure: COLONOSCOPY WITH CO2 INSUFFLATION;  Surgeon: Marvin Adams MD;  Location: [facility] OR     CYSTOSCOPY  1997    for hematuria - negative     EYE SURGERY Right     Rt eye.macular repair     EYE SURGERY  2003    cataract     GASTROSCOPY,FL  9/2007    hiatal hernia and errosions     NASAL ENDOSCOPY,DX  4/2007    GERD     Pars plana vitrectomy and epiretinal membrane dissection, right eye  11/8/2002     Phacoemulsification with intraocular lens implantation right eye.  3/11/2003     PHACOEMULSIFICATION WITH STANDARD INTRAOCULAR LENS IMPLANT Left 9/22/2016    Procedure: PHACOEMULSIFICATION WITH STANDARD INTRAOCULAR LENS IMPLANT;  Surgeon: Nghia Lara MD;  Location: [facility] OR     Thyroglossal Duct Cyst Removal - 2ndary to radiation.  1982     VASECTOMY  1971       Social History   Substance Use Topics     Smoking status: Former Smoker     Smokeless tobacco: Never Used      Comment: 1969     Alcohol use 0.0 oz/week     0 Standard drinks or equivalent per week      Comment: A beer once in awhile     Family History   Problem Relation Age of Onset     CEREBROVASCULAR DISEASE Mother      at 84 yo - possibly TIA - befoer     Macular Degeneration Mother      GASTROINTESTINAL DISEASE Father      Cancer  - colorectal Father      Hypertension Father      CANCER Father      CANCER Other      C.A.D. No family hx of      DIABETES No family hx of      Prostate Cancer No family hx of      Glaucoma No family hx of      Thyroid Disease No family hx of          Allergies   Allergen Reactions     Seasonal Allergies      Hay fever      Recent Labs   Lab Test  03/09/18   1006  06/27/17   1014  06/27/17   1002  02/20/17   0925   06/09/16   1056  02/03/16   0937   02/27/13   1319   A1C   --    --    --    --    --    --    --    --   5.3   LDL  90   --    --   121*   --    --   113*   < >   --    HDL  78   --    --   69   --    --   71   < >   --    TRIG  90   --    --   104   --    --   79   < >   --    ALT  22   --   23  22   < >   --   22   --    --    CR  1.00   --   1.09  1.06   < >   --   1.00   --    --    GFRESTIMATED  72   --   65  67   < >   --   72   --    --    GFRESTBLACK  87   --   79  81   < >   --   87   --    --    POTASSIUM  4.6   --   4.4  4.3   < >   --   4.4   --    --    TSH   --   2.43   --    --    --   3.21  2.53   --    --     < > = values in this interval not displayed.            ROS:  CONSTITUTIONAL: NEGATIVE for fever, chills, change in weight  INTEGUMENTARY/SKIN: NEGATIVE for worrisome rashes, moles or lesions  EYES: NEGATIVE for vision changes or irritation  ENT/MOUTH: POSITIVE for chronic hoarseness, most likely due to globus sensation. POSITIVE for history of laryngeal cancer, with recently negative CT of neck (soft tissues) last 2016).  RESP: NEGATIVE for significant cough or SOB  CV: NEGATIVE for chest pain, palpitations or peripheral edema  GI: POSITIVE for chronic GERD.  : NEGATIVE for frequency, dysuria, or hematuria  MUSCULOSKELETAL: NEGATIVE for significant arthralgias or myalgia  NEURO: POSITIVE for essential tremors.  ENDOCRINE: POSITIVE  for pernicious anemia.  HEME: NEGATIVE for bleeding problems  PSYCHIATRIC: NEGATIVE for changes in mood or affect    OBJECTIVE:   /74 (BP  "Location: Left arm, Patient Position: Chair, Cuff Size: Adult Regular)  Pulse 68  Temp 97  F (36.1  C) (Oral)  Ht 6' 1\" (1.854 m)  Wt 180 lb (81.6 kg)  SpO2 97%  BMI 23.75 kg/m2 Estimated body mass index is 23.75 kg/(m^2) as calculated from the following:    Height as of this encounter: 6' 1\" (1.854 m).    Weight as of this encounter: 180 lb (81.6 kg).  EXAM:   GENERAL: healthy, alert and no distress  EYES: Eyes grossly normal to inspection, PERRL and conjunctivae and sclerae normal  HENT: ear canals and TM's normal, nose and mouth without ulcers or lesions  NECK: no adenopathy, no asymmetry, masses, or scars and thyroid normal to palpation  RESP: lungs clear to auscultation - no rales, rhonchi or wheezes  CV: regular rate and rhythm, normal S1 S2, no S3 or S4, no murmur, click or rub, no peripheral edema and peripheral pulses strong  ABDOMEN: soft, nontender, no hepatosplenomegaly, no masses and bowel sounds normal  MS: no gross musculoskeletal defects noted, no edema  SKIN: no suspicious lesions or rashes  NEURO: Normal strength and tone, mentation intact and speech normal  PSYCH: mentation appears normal, affect normal/bright    ASSESSMENT / PLAN:   (Z00.00) Routine general medical examination at a health care facility  (primary encounter diagnosis)  Comment: No family history of premature atherosclerotic heart disease or early-onset colon cancer.  Plan: CBC with platelets and differential,         Comprehensive metabolic panel (BMP + Alb, Alk         Phos, ALT, AST, Total. Bili, TP), Lipid Profile        (Chol, Trig, HDL, LDL calc)            (Z12.11) Special screening for malignant neoplasms, colon  Comment: Due for test.  Plan: Fecal colorectal cancer screen (FIT)            (Z13.6) CARDIOVASCULAR SCREENING; LDL GOAL LESS THAN 160  Comment: Treat accordingly if abnormal.  Plan: Lipid Profile (Chol, Trig, HDL, LDL calc)            (K21.9) Gastroesophageal reflux disease without esophagitis  Comment: No " "complications such as upper GI bleeding.  Plan: ranitidine (ZANTAC) 300 MG tablet          (D51.0) PA (pernicious anemia)  Comment: Stable with current regimen.  Plan: Vitamin B12          (C32.9) Laryngeal cancer (H)  Comment: Previous CT of neck soft tissue does now any recurrence of laryngeal cancer.  Plan: CBC with platelets and differential            (L82.1) Seborrheic keratosis  Comment: Patient reassured about benign nature of such lesions.  Plan: triamcinolone (KENALOG) 0.5 % cream            (G25.0) Essential tremor  Comment: Defer to Neurology for dose adjustment of Primidone.  Plan: primidone (MYSOLINE) 250 MG tablet              End of Life Planning:  Patient currently has an advanced directive: Yes.  Practitioner is supportive of decision.    COUNSELING:  Special attention given to:       Regular exercise       Healthy diet/nutrition       Colon cancer screening       The ASCVD Risk score ([place]braulio LATIF Jr, et al., 2013) failed to calculate for the following reasons:    The 2013 ASCVD risk score is only valid for ages 40 to 79        Estimated body mass index is 23.75 kg/(m^2) as calculated from the following:    Height as of this encounter: 6' 1\" (1.854 m).    Weight as of this encounter: 180 lb (81.6 kg).       reports that he has quit smoking. He has never used smokeless tobacco.      Appropriate preventive services were discussed with this patient, including applicable screening as appropriate for cardiovascular disease, diabetes, osteopenia/osteoporosis, and glaucoma.  As appropriate for age/gender, discussed screening for colorectal cancer, prostate cancer, breast cancer, and cervical cancer. Checklist reviewing preventive services available has been given to the patient.    Reviewed patients plan of care and provided an AVS. The Basic Care Plan (routine screening as documented in Health Maintenance) for Preston meets the Care Plan requirement. This Care Plan has been established and reviewed with the " Patient.    Counseling Resources:  ATP IV Guidelines  Pooled Cohorts Equation Calculator  Breast Cancer Risk Calculator  FRAX Risk Assessment  ICSI Preventive Guidelines  Dietary Guidelines for Americans, 2010  USDA's MyPlate  ASA Prophylaxis  Lung CA Screening    Tyrel Manning MD  [facility]

## 2018-03-10 ASSESSMENT — ANXIETY QUESTIONNAIRES: GAD7 TOTAL SCORE: 1

## 2018-03-10 ASSESSMENT — PATIENT HEALTH QUESTIONNAIRE - PHQ9: SUM OF ALL RESPONSES TO PHQ QUESTIONS 1-9: 1

## 2018-03-12 RX ORDER — LANOLIN ALCOHOL/MO/W.PET/CERES
1000 CREAM (GRAM) TOPICAL DAILY
COMMUNITY
End: 2019-04-18

## 2018-03-13 DIAGNOSIS — Z12.11 SPECIAL SCREENING FOR MALIGNANT NEOPLASMS, COLON: ICD-10-CM

## 2018-03-13 LAB — HEMOCCULT STL QL IA: NEGATIVE

## 2018-03-13 PROCEDURE — 82274 ASSAY TEST FOR BLOOD FECAL: CPT | Performed by: INTERNAL MEDICINE

## 2018-06-19 ENCOUNTER — OFFICE VISIT (OUTPATIENT)
Dept: OPTOMETRY | Facility: CLINIC | Age: 83
End: 2018-06-19
Payer: COMMERCIAL

## 2018-06-19 DIAGNOSIS — H01.02B SQUAMOUS BLEPHARITIS OF UPPER AND LOWER EYELIDS OF BOTH EYES: ICD-10-CM

## 2018-06-19 DIAGNOSIS — H35.30 MACULAR DEGENERATION: ICD-10-CM

## 2018-06-19 DIAGNOSIS — H01.02A SQUAMOUS BLEPHARITIS OF UPPER AND LOWER EYELIDS OF BOTH EYES: ICD-10-CM

## 2018-06-19 DIAGNOSIS — H10.13 ALLERGIC CONJUNCTIVITIS, BILATERAL: Primary | ICD-10-CM

## 2018-06-19 PROCEDURE — 99213 OFFICE O/P EST LOW 20 MIN: CPT | Performed by: OPTOMETRIST

## 2018-06-19 ASSESSMENT — EXTERNAL EXAM - RIGHT EYE: OD_EXAM: NORMAL

## 2018-06-19 ASSESSMENT — REFRACTION_WEARINGRX
OS_SPHERE: PLANO
OD_CYLINDER: +1.00
OD_CYLINDER: +1.00
OD_AXIS: 160
OS_CYLINDER: +0.75
OS_AXIS: 180
OD_SPHERE: -0.75
OS_CYLINDER: +0.75
OD_SPHERE: -0.75
OS_AXIS: 180
OD_AXIS: 160
OS_SPHERE: PLANO

## 2018-06-19 ASSESSMENT — SLIT LAMP EXAM - LIDS
COMMENTS: UPPER LID DERMATOCHALASIS, 1+ BLEPHARITIS
COMMENTS: UPPER LID DERMATOCHALASIS, 1+ BLEPHARITIS

## 2018-06-19 ASSESSMENT — VISUAL ACUITY
OS_CC: 20/20
OD_CC+: -2
CORRECTION_TYPE: GLASSES
METHOD: SNELLEN - LINEAR
OD_CC: 20/25

## 2018-06-19 ASSESSMENT — EXTERNAL EXAM - LEFT EYE: OS_EXAM: NORMAL

## 2018-06-19 ASSESSMENT — REFRACTION_MANIFEST
OS_SPHERE: PLANO
OS_AXIS: 180
OS_CYLINDER: +0.75
OD_SPHERE: -0.75
OD_CYLINDER: +1.00
OD_AXIS: 160

## 2018-06-19 ASSESSMENT — CUP TO DISC RATIO
OS_RATIO: 0.25
OD_RATIO: 0.25

## 2018-06-19 NOTE — PROGRESS NOTES
Chief Complaint   Patient presents with     Eye Problem     od eye blurry x 3 months,watery       HPI    Affected eye(s):  Right   Symptoms:     Blurred vision   Floaters   No flashes   Tearing   Burning      Duration:  3 months   Frequency:  Constant       Do you have eye pain now?:  No              Has not used any AREDS vitamins-  Use TheraTears- as needed-  Medical, surgical and family histories reviewed and updated 6/19/2018.       OBJECTIVE: See Ophthalmology exam    ASSESSMENT:    ICD-10-CM    1. Allergic conjunctivitis, bilateral H10.13 ketotifen (ZADITOR) 0.025 % SOLN ophthalmic solution   2. 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 Macular degeneration H35.30 VITREORETINAL SURGERY REFERRAL      PLAN:     Patient Instructions   Zaditor- 1 drop both eyes 2 x day for 2 weeks then as needed for itchy eyes.    Use one drop of artificial tears both eyes -4 x daily.  Continue to use the drops regardless if your eyes are comfortable.  Artificial tears work best as a preventative and not as well after your eyes are starting to bother you.  It may take 4- 6 weeks of using the drops before you notice improvement.  If after that time you are still having problems schedule an appointment for an evaluation and discussion of different treatments.  Dry eyes are a chronic condition and you may have more symptoms at certain times of the year.    Cleans eyelids with warm washcloth or baby shampoo 2 x day.    Schedule appointment with Vitreoretinal Surgery for evaluation OCT.    Optional single vision reading glasses.    Recommend annual eye exams.    Carlos James, OD

## 2018-06-19 NOTE — LETTER
6/19/2018         RE: Preston Cai  [address]  [facility] [address]        Dear Colleague,    Thank you for referring your patient, Preston Cai, to the [facility]. Please see a copy of my visit note below.    Chief Complaint   Patient presents with     Eye Problem     od eye blurry x 3 months,watery       HPI    Affected eye(s):  Right   Symptoms:     Blurred vision   Floaters   No flashes   Tearing   Burning      Duration:  3 months   Frequency:  Constant       Do you have eye pain now?:  No              Has not used any AREDS vitamins-  Use TheraTears- as needed-  Medical, surgical and family histories reviewed and updated 6/19/2018.       OBJECTIVE: See Ophthalmology exam    ASSESSMENT:    ICD-10-CM    1. Allergic conjunctivitis, bilateral H10.13 ketotifen (ZADITOR) 0.025 % SOLN ophthalmic solution   2. 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 Macular degeneration H35.30 VITREORETINAL SURGERY REFERRAL      PLAN:     Patient Instructions   Zaditor- 1 drop both eyes 2 x day for 2 weeks then as needed for itchy eyes.    Use one drop of artificial tears both eyes -4 x daily.  Continue to use the drops regardless if your eyes are comfortable.  Artificial tears work best as a preventative and not as well after your eyes are starting to bother you.  It may take 4- 6 weeks of using the drops before you notice improvement.  If after that time you are still having problems schedule an appointment for an evaluation and discussion of different treatments.  Dry eyes are a chronic condition and you may have more symptoms at certain times of the year.    Cleans eyelids with warm washcloth or baby shampoo 2 x day.    Schedule appointment with Vitreoretinal Surgery for evaluation OCT.    Optional single vision reading glasses.    Recommend annual eye exams.    Carlos James, OD             Again, thank you for allowing me  to participate in the care of your patient.        Sincerely,        Carlos James OD

## 2018-06-19 NOTE — MR AVS SNAPSHOT
After Visit Summary   6/19/2018    Preston Cai    MRN: [id number]           Patient Information     Date Of Birth          1935        Visit Information        Provider Department      6/19/2018 9:20 AM Carlos James OD [facility]        Today's Diagnoses     Allergic conjunctivitis, bilateral    -  1      Care Instructions    Zaditor- 1 drop both eyes 2 x day for 2 weeks then as needed for itchy eyes.    Use one drop of artificial tears both eyes -4 x daily.  Continue to use the drops regardless if your eyes are comfortable.  Artificial tears work best as a preventative and not as well after your eyes are starting to bother you.  It may take 4- 6 weeks of using the drops before you notice improvement.  If after that time you are still having problems schedule an appointment for an evaluation and discussion of different treatments.  Dry eyes are a chronic condition and you may have more symptoms at certain times of the year.    Cleans eyelids with warm washcloth or baby shampoo 2 x day.    Schedule appointment with Vitreoretinal Surgery for evaluation OCT.    Optional single vision reading glasses.    Recommend annual eye exams.    Carlos James OD              Follow-ups after your visit        Your next 10 appointments already scheduled     Nov 13, 2018  2:30 PM CST   Return Visit with Arabella Sainz MD   [facility] ([facility]    [address] [place] [id number] [phone]              Who to contact     If you have questions or need follow up information about today's clinic visit or your schedule please contact [facility] directly at [phone].  Normal or non-critical lab and imaging results will be communicated to you by MyChart, letter or phone within 4 business days after the clinic has received the results. If you do not hear from us within 7 days, please contact the clinic through [phone]  or phone. If you have a critical or abnormal lab result, we will notify you by phone as soon as possible.  Submit refill requests through [employer] or call your pharmacy and they will forward the refill request to us. Please allow 3 business days for your refill to be completed.          Additional Information About Your Visit        Riskalyzehart Information     [employer] gives you secure access to your electronic health record. If you see a primary care provider, you can also send messages to your care team and make appointments. If you have questions, please call your primary care clinic.  If you do not have a primary care provider, please call [phone] and they will assist you.        Care EveryWhere ID     This is your Care EveryWhere ID. This could be used by other organizations to access your [facility] medical records  [id number]         Blood Pressure from Last 3 Encounters:   03/09/18 114/74   09/27/17 119/63   06/27/17 101/61    Weight from Last 3 Encounters:   03/09/18 81.6 kg (180 lb)   09/27/17 81.2 kg (179 lb)   06/27/17 80.3 kg (177 lb)              Today, you had the following     No orders found for display         Today's Medication Changes          These changes are accurate as of 6/19/18  9:48 AM.  If you have any questions, ask your nurse or doctor.               Start taking these medicines.        Dose/Directions    ketotifen 0.025 % Soln ophthalmic solution   Commonly known as:  ZADITOR   Used for:  Allergic conjunctivitis, bilateral        Dose:  1 drop   Place 1 drop into both eyes 2 times daily as needed for itching   Quantity:  1 Bottle   Refills:  6            Where to get your medicines      These medications were sent to [facility] - [place], [place] - [address]  [address], [facility] [address]     Phone:  [phone]     ketotifen 0.025 % Soln ophthalmic solution                Primary Care Provider Office Phone # Fax #    Tyrel Manning MD  [phone]       [address]  [facility] [address]        Equal Access to Services     JOSE LUIS MEYER : Hadii aad ku hadraman Sonehemiahali, waaxda luqadaha, qaybta kaalmada livia, ronny murray. So [facility] [phone].    ATENCIÓN: Si habla español, tiene a dias disposición servicios gratuitos de asistencia lingüística. Llame al [phone].    We comply with applicable federal civil rights laws and [place] laws. We do not discriminate on the basis of race, color, national origin, age, disability, sex, sexual orientation, or gender identity.            Thank you!     Thank you for choosing [facility]  for your care. Our goal is always to provide you with excellent care. Hearing back from our patients is one way we can continue to improve our services. Please take a few minutes to complete the written survey that you may receive in the mail after your visit with us. Thank you!             Your Updated Medication List - Protect others around you: Learn how to safely use, store and throw away your medicines at www.disposemymeds.org.          This list is accurate as of 6/19/18  9:48 AM.  Always use your most recent med list.                   Brand Name Dispense Instructions for use Diagnosis    cyanocobalamin 1000 MCG tablet    vitamin  B-12     Take 1,000 mcg by mouth daily        ketotifen 0.025 % Soln ophthalmic solution    ZADITOR    1 Bottle    Place 1 drop into both eyes 2 times daily as needed for itching    Allergic conjunctivitis, bilateral       primidone 250 MG tablet    MYSOLINE    90 tablet    Take 1 tablet (250 mg) by mouth At Bedtime    Essential tremor       ranitidine 300 MG tablet    ZANTAC    90 tablet    TAKE ONE TABLET BY MOUTH EVERY NIGHT AT BEDTIME    Gastroesophageal reflux disease without esophagitis       triamcinolone 0.5 % cream    KENALOG    30 g    Apply sparingly to affected area three times daily.    Seborrheic keratosis        VITAMIN D (CHOLECALCIFEROL) PO      Take 2,000 Units by mouth daily

## 2018-06-19 NOTE — PATIENT INSTRUCTIONS
Zaditor- 1 drop both eyes 2 x day for 2 weeks then as needed for itchy eyes.    Use one drop of artificial tears both eyes -4 x daily.  Continue to use the drops regardless if your eyes are comfortable.  Artificial tears work best as a preventative and not as well after your eyes are starting to bother you.  It may take 4- 6 weeks of using the drops before you notice improvement.  If after that time you are still having problems schedule an appointment for an evaluation and discussion of different treatments.  Dry eyes are a chronic condition and you may have more symptoms at certain times of the year.    Cleans eyelids with warm washcloth or baby shampoo 2 x day.    Schedule appointment with Vitreoretinal Surgery for evaluation OCT.    Optional single vision reading glasses.    Recommend annual eye exams.    Carlos James, CANDI

## 2018-07-17 ENCOUNTER — TRANSFERRED RECORDS (OUTPATIENT)
Dept: HEALTH INFORMATION MANAGEMENT | Facility: CLINIC | Age: 83
End: 2018-07-17

## 2018-07-31 ENCOUNTER — MYC MEDICAL ADVICE (OUTPATIENT)
Dept: FAMILY MEDICINE | Facility: CLINIC | Age: 83
End: 2018-07-31

## 2018-10-17 ENCOUNTER — OFFICE VISIT (OUTPATIENT)
Dept: FAMILY MEDICINE | Facility: CLINIC | Age: 83
End: 2018-10-17
Payer: COMMERCIAL

## 2018-10-17 VITALS
RESPIRATION RATE: 18 BRPM | SYSTOLIC BLOOD PRESSURE: 109 MMHG | BODY MASS INDEX: 23.86 KG/M2 | TEMPERATURE: 97.6 F | HEIGHT: 73 IN | HEART RATE: 65 BPM | WEIGHT: 180 LBS | OXYGEN SATURATION: 100 % | DIASTOLIC BLOOD PRESSURE: 69 MMHG

## 2018-10-17 DIAGNOSIS — E55.9 VITAMIN D DEFICIENCY: ICD-10-CM

## 2018-10-17 DIAGNOSIS — Z23 NEED FOR SHINGLES VACCINE: ICD-10-CM

## 2018-10-17 DIAGNOSIS — E53.8 VITAMIN B12 DEFICIENCY (NON ANEMIC): Primary | ICD-10-CM

## 2018-10-17 DIAGNOSIS — R35.1 BENIGN PROSTATIC HYPERPLASIA WITH NOCTURIA: ICD-10-CM

## 2018-10-17 DIAGNOSIS — N40.1 BENIGN PROSTATIC HYPERPLASIA WITH NOCTURIA: ICD-10-CM

## 2018-10-17 LAB — VIT B12 SERPL-MCNC: 388 PG/ML (ref 193–986)

## 2018-10-17 PROCEDURE — 83921 ORGANIC ACID SINGLE QUANT: CPT | Mod: 90 | Performed by: INTERNAL MEDICINE

## 2018-10-17 PROCEDURE — 99000 SPECIMEN HANDLING OFFICE-LAB: CPT | Performed by: INTERNAL MEDICINE

## 2018-10-17 PROCEDURE — 82306 VITAMIN D 25 HYDROXY: CPT | Performed by: INTERNAL MEDICINE

## 2018-10-17 PROCEDURE — 99213 OFFICE O/P EST LOW 20 MIN: CPT | Mod: 25 | Performed by: INTERNAL MEDICINE

## 2018-10-17 PROCEDURE — 82607 VITAMIN B-12: CPT | Performed by: INTERNAL MEDICINE

## 2018-10-17 PROCEDURE — 90750 HZV VACC RECOMBINANT IM: CPT | Performed by: INTERNAL MEDICINE

## 2018-10-17 PROCEDURE — 90471 IMMUNIZATION ADMIN: CPT | Performed by: INTERNAL MEDICINE

## 2018-10-17 PROCEDURE — 36415 COLL VENOUS BLD VENIPUNCTURE: CPT | Performed by: INTERNAL MEDICINE

## 2018-10-17 ASSESSMENT — PAIN SCALES - GENERAL: PAINLEVEL: NO PAIN (0)

## 2018-10-17 NOTE — NURSING NOTE
Screening Questionnaire for Adult Immunization    Are you sick today?   No   Do you have allergies to medications, food, a vaccine component or latex?   No   Have you ever had a serious reaction after receiving a vaccination?   No   Do you have a long-term health problem with heart disease, lung disease, asthma, kidney disease, metabolic disease (e.g. diabetes), anemia, or other blood disorder?   No   Do you have cancer, leukemia, HIV/AIDS, or any other immune system problem?   No   In the past 3 months, have you taken medications that affect  your immune system, such as prednisone, other steroids, or anticancer drugs; drugs for the treatment of rheumatoid arthritis, Crohn s disease, or psoriasis; or have you had radiation treatments?   No   Have you had a seizure, or a brain or other nervous system problem?   No   During the past year, have you received a transfusion of blood or blood     products, or been given immune (gamma) globulin or antiviral drug?   No   For women: Are you pregnant or is there a chance you could become        pregnant during the next month?   No   Have you received any vaccinations in the past 4 weeks?   Yes     Immunization questionnaire was positive for at least one answer.  Notified Nigel.        Per orders of Dr. Manning, injection of Shingrix  given by Pema Hernandez. Patient instructed to remain in clinic for 15 minutes afterwards, and to report any adverse reaction to me immediately.       Screening performed by Pema Hernandez on 10/17/2018 at 9:54 AM.

## 2018-10-17 NOTE — PROGRESS NOTES
[facility] Internal Medicine Progress Note           Assessment and Plan:     (E53.8) Vitamin B12 deficiency (non anemic)  (primary encounter diagnosis)  Comment: Patient is asymptomatic despite discontinuation of oral cyanocobalamin, which is expected due to his normal methylmalonic acid  level.  Plan: Methylmalonic acid, Vitamin B12          (Z23) Need for shingles vaccine  Comment: No absolute contraindications.  Plan: ZOSTER VACCINE RECOMBINANT ADJUVANTED IM NJX,         ADMIN 1st VACCINE          (N40.1,  R35.1) Benign prostatic hyperplasia with nocturia  Comment: Improves with reduced nocturnal fluid intake. IPSS is 1.  Plan:Consider restarting Flomax if symptoms worsen.    (E55.9) Vitamin D deficiency  Comment: Level suggests insufficiency.  Plan: Vitamin D Deficiency            Recommend Cholecalciferol 1000 units once daily.        Interval History:        This is an 82 year old male who comes in today for a follow-up regarding history of Vitamin B12. The patient states that he did not took oral Cyanocobalamin after receiving his last intramuscular dose 7 months ago. Preston claims that he did not experienced any worsening fatigue.        Mr. Cai was first seen by this provider last 2/3/2016 during an annual physical exam. During that visit, the patient stated to this provider about his worsening chronic fatigue, associated with insomnia. Initial workup shows normal EKG, normal thyroid function tests, low normal Vitamin D level and low normal Vitamin B12 level. The patient was then started on intramuscular Cyanocobalamin with eventual improvement of his fatigue, along with rising Vitamin B12 levels. Due to his stable Vitamin B12 levels, this provider recommended switching to oral form 7 months ago. Other concerns include his persistent nocturia secondary to BPH. His previous International Prostate Symptom Score (IPSS) last 2/3/2016 was 17. He does not take any alpha-blockers. Due to  history of Vitamin D insufficiency, the patient is also requesting a repeat Vitamin D level.            Significant Problems:   Patient Active Problem List   Diagnosis     Seborrheic dermatitis     Allergic rhinitis due to other allergen     Cervical radiculopathy at C6     Personal history of malignant neoplasm of larynx     Essential tremor     CARDIOVASCULAR SCREENING; LDL GOAL LESS THAN 160     Benign prostatic hyperplasia     Hoarse voice quality     Advanced directives, counseling/discussion     Pseudophakia     Gastroesophageal reflux disease without esophagitis     Macular degeneration              Review of Systems:   CONSTITUTIONAL: NEGATIVE for fever, chills, change in weight  INTEGUMENTARY/SKIN: NEGATIVE for worrisome rashes, moles or lesions  EYES: NEGATIVE for vision changes or irritation  ENT/MOUTH: NEGATIVE for ear, mouth and throat problems  RESP: NEGATIVE for significant cough or SOB  CV: NEGATIVE for chest pain, palpitations or peripheral edema  GI: NEGATIVE for nausea, abdominal pain, heartburn, or change in bowel habits  : NEGATIVE for frequency, dysuria, or hematuria  MUSCULOSKELETAL: NEGATIVE for significant arthralgias or myalgia  NEURO: NEGATIVE for weakness, dizziness or paresthesias  ENDOCRINE: NEGATIVE for temperature intolerance, skin/hair changes  HEME: NEGATIVE for bleeding problems  PSYCHIATRIC: NEGATIVE for changes in mood or affect            Medications:     Current Outpatient Prescriptions   Medication Sig     primidone (MYSOLINE) 250 MG tablet Take 1 tablet (250 mg) by mouth At Bedtime     ranitidine (ZANTAC) 300 MG tablet TAKE ONE TABLET BY MOUTH EVERY NIGHT AT BEDTIME     cyanocobalamin (VITAMIN  B-12) 1000 MCG tablet Take 1,000 mcg by mouth daily     No current facility-administered medications for this visit.              Physical Exam:   /69 (BP Location: Right arm, Patient Position: Sitting, Cuff Size: Adult Large)  Pulse 65  Temp 97.6  F (36.4  C) (Oral)  Resp  "18  Ht 6' 1\" (1.854 m)  Wt 180 lb (81.6 kg)  SpO2 100%  BMI 23.75 kg/m2  Constitutional: awake  Eyes: sclera clear and conjunctiva normal  ENT: normocepalic, without obvious abnormality  Lungs: No increased work of breathing, good air exchange, clear to auscultation bilaterally, no crackles or wheezing.  Cardiovascular: Regular rate and rhythm, normal S1 and S2, no S3 or S4, and no murmur noted.  Musculoskeletal: no lower extremity pitting edema present  Neurologic: Motor Exam:  moves all extremities well and symmetrically  Sensory:  Sensory intact  Neuropsychiatric: Normal affect, mood, orientation, memory and insight.  Skin: No rashes, erythema, pallor, petechia or purpura.          Data:   International Prostate Symptom Score (IPSS)  1 -7: mild  8-19: moderate  20-35 severe    Incomplete emptying - 0  Frequency - 3  Intermittency- 1  Urgency - 4  Weak stream - 4  Straining - 0  Nocturia - 2    Total score = 14 (moderate symptoms)       Disposition:  Follow-up visit if condition worsens.    Tyrel Manning MD  Internal Medicine  [facility]        "

## 2018-10-17 NOTE — MR AVS SNAPSHOT
After Visit Summary   10/17/2018    Preston Cai    MRN: [id number]           Patient Information     Date Of Birth          1935        Visit Information        Provider Department      10/17/2018 8:40 AM Nigel, Tyrel Garcia MD [facility]        Today's Diagnoses     Vitamin B12 deficiency (non anemic)    -  1    Need for shingles vaccine        Benign prostatic hyperplasia with nocturia        Vitamin D deficiency           Follow-ups after your visit        Your next 10 appointments already scheduled     Nov 13, 2018  2:30 PM CST   Return Visit with Arabella Sainz MD   [facility]    [id number] [place] [address] [phone]              Future tests that were ordered for you today     Open Standing Orders        Priority Remaining Interval Expires Ordered    ZOSTER VACCINE RECOMBINANT ADJUVANTED IM NJX Routine 1/2 every 6 months 4/30/2019 10/17/2018            Who to contact     If you have questions or need follow up information about today's clinic visit or your schedule please contact [facility] directly at [phone].  Normal or non-critical lab and imaging results will be communicated to you by [employer], letter or phone within 4 business days after the clinic has received the results. If you do not hear from us within 7 days, please contact the clinic through [employer] or phone. If you have a critical or abnormal lab result, we will notify you by phone as soon as possible.  Submit refill requests through [employer] or call your pharmacy and they will forward the refill request to us. Please allow 3 business days for your refill to be completed.          Additional Information About Your Visit        [employer] Information     [employer] gives you secure access to your electronic health record. If you see a primary care provider, you can also send messages to your care team and make  "appointments. If you have questions, please call your primary care clinic.  If you do not have a primary care provider, please call [phone] and they will assist you.        Care EveryWhere ID     This is your Care EveryWhere ID. This could be used by other organizations to access your [place] medical records  [id number]        Your Vitals Were     Pulse Temperature Respirations Height Pulse Oximetry BMI (Body Mass Index)    65 97.6  F (36.4  C) (Oral) 18 6' 1\" (1.854 m) 100% 23.75 kg/m2       Blood Pressure from Last 3 Encounters:   10/17/18 109/69   03/09/18 114/74   09/27/17 119/63    Weight from Last 3 Encounters:   10/17/18 180 lb (81.6 kg)   03/09/18 180 lb (81.6 kg)   09/27/17 179 lb (81.2 kg)              We Performed the Following     Methylmalonic acid     Vitamin B12     Vitamin D Deficiency        Primary Care Provider Office Phone # Fax #    Tyrel Manning [phone]       [address] [facility] [address]        Equal Access to Services     [facility]: Hadii aad ku hadasho Soomaali, waaxda luqadaha, qaybta kaalmada adeegyada, ronny salgado . So [facility] [phone].    ATENCIÓN: Si habla español, tiene a dias disposición servicios gratuitos de asistencia lingüística. Ll[facility] [phone].    We comply with applicable federal civil rights laws and [place] laws. We do not discriminate on the basis of race, color, national origin, age, disability, sex, sexual orientation, or gender identity.            Thank you!     Thank you for choosing [facility]  for your care. Our goal is always to provide you with excellent care. Hearing back from our patients is one way we can continue to improve our services. Please take a few minutes to complete the written survey that you may receive in the mail after your visit with us. Thank you!             Your Updated Medication List - Protect others around you: Learn how to safely use, " store and throw away your medicines at www.disposemymeds.org.          This list is accurate as of 10/17/18  9:41 AM.  Always use your most recent med list.                   Brand Name Dispense Instructions for use Diagnosis    cyanocobalamin 1000 MCG tablet    vitamin  B-12     Take 1,000 mcg by mouth daily        primidone 250 MG tablet    MYSOLINE    90 tablet    Take 1 tablet (250 mg) by mouth At Bedtime    Essential tremor       ranitidine 300 MG tablet    ZANTAC    90 tablet    TAKE ONE TABLET BY MOUTH EVERY NIGHT AT BEDTIME    Gastroesophageal reflux disease without esophagitis

## 2018-10-18 LAB — DEPRECATED CALCIDIOL+CALCIFEROL SERPL-MC: 27 UG/L (ref 20–75)

## 2018-10-19 LAB — METHYLMALONATE SERPL-SCNC: 0.19 UMOL/L (ref 0–0.4)

## 2018-11-13 ENCOUNTER — OFFICE VISIT (OUTPATIENT)
Dept: DERMATOLOGY | Facility: CLINIC | Age: 83
End: 2018-11-13
Payer: COMMERCIAL

## 2018-11-13 DIAGNOSIS — L30.9 DERMATITIS: ICD-10-CM

## 2018-11-13 DIAGNOSIS — D48.5 NEOPLASM OF UNCERTAIN BEHAVIOR OF SKIN: Primary | ICD-10-CM

## 2018-11-13 DIAGNOSIS — L57.0 AK (ACTINIC KERATOSIS): ICD-10-CM

## 2018-11-13 PROCEDURE — 99213 OFFICE O/P EST LOW 20 MIN: CPT | Mod: 25 | Performed by: DERMATOLOGY

## 2018-11-13 PROCEDURE — 11101 HC BIOPSY SKIN/SUBQ/MUC MEM, EACH ADDTL LESION: CPT | Performed by: DERMATOLOGY

## 2018-11-13 PROCEDURE — 17000 DESTRUCT PREMALG LESION: CPT | Performed by: DERMATOLOGY

## 2018-11-13 PROCEDURE — 11100 HC BIOPSY SKIN/SUBQ/MUC MEM, SINGLE LESION: CPT | Mod: 59 | Performed by: DERMATOLOGY

## 2018-11-13 PROCEDURE — 88305 TISSUE EXAM BY PATHOLOGIST: CPT | Mod: TC | Performed by: DERMATOLOGY

## 2018-11-13 PROCEDURE — 17003 DESTRUCT PREMALG LES 2-14: CPT | Performed by: DERMATOLOGY

## 2018-11-13 RX ORDER — TRIAMCINOLONE ACETONIDE 1 MG/G
CREAM TOPICAL
Qty: 454 G | Refills: 0 | Status: SHIPPED | OUTPATIENT
Start: 2018-11-13 | End: 2018-12-20

## 2018-11-13 RX ORDER — TRIAMCINOLONE ACETONIDE 1 MG/G
CREAM TOPICAL 2 TIMES DAILY
COMMUNITY
End: 2018-11-13

## 2018-11-13 ASSESSMENT — PAIN SCALES - GENERAL: PAINLEVEL: NO PAIN (0)

## 2018-11-13 NOTE — NURSING NOTE
Preston Cai's goals for this visit include:   Chief Complaint   Patient presents with     Skin Check     areas of concern on right neck and back hx BCC     Rash     itchy rash had since March seen at [facility] and started on Triamcinolone without improvement       He requests these members of his care team be copied on today's visit information:     PCP: Tyrel Manning    Referring Provider:  No referring provider defined for this encounter.    There were no vitals taken for this visit.    Do you need any medication refills at today's visit? Heather Jane LPN

## 2018-11-13 NOTE — MR AVS SNAPSHOT
After Visit Summary   11/13/2018    Preston Cai    MRN: [id number]           Patient Information     Date Of Birth          1935        Visit Information        Provider Department      11/13/2018 2:30 PM Arabella Sainz MD [facility]        Today's Diagnoses     Neoplasm of uncertain behavior of skin    -  1    Dermatitis        AK (actinic keratosis)          Care Instructions    Sarna-with menthol, recommend for grovers  Cetaphil wash    Cryotherapy    What is it?    Use of a very cold liquid, such as liquid nitrogen, to freeze and destroy abnormal skin cells that need to be removed    What should I expect?    Tenderness and redness    A small blister that might grow and fill with dark purple blood. There may be crusting.    More than one treatment may be needed if the lesions do not go away.    How do I care for the treated area?    Gently wash the area with your hands when bathing.    Use a thin layer of Vaseline to help with healing. You may use a Band-Aid.     The area should heal within 7-10 days and may leave behind a pink or lighter color.     Do not use an antibiotic or Neosporin ointment.     You may take acetaminophen (Tylenol) for pain.     Call your Doctor if you have:    Severe pain    Signs of infection (warmth, redness, cloudy yellow drainage, and or a bad smell)    Questions or concerns    Who should I call with questions?       [facility]: [phone]       [facility]: [phone]       For urgent needs outside of business hours call the [facility] at [phone]        and ask for the dermatology resident on call  Wound Care After a Biopsy    What is a skin biopsy?  A skin biopsy allows the doctor to examine a very small piece of tissue under the microscope to determine the diagnosis and the best treatment for the skin condition. A local anesthetic (numbing medicine)  is injected  with a very small needle into the skin area to be tested. A small piece of skin is taken from the area. Sometimes a suture (stitch) is used.     What are the risks of a skin biopsy?  I will experience scar, bleeding, swelling, pain, crusting and redness. I may experience incomplete removal or recurrence. Risks of this procedure are excessive bleeding, bruising, infection, nerve damage, numbness, thick (hypertrophic or keloidal) scar and non-diagnostic biopsy.    How should I care for my wound for the first 24 hours?    Keep the wound dry and covered for 24 hours    If it bleeds, hold direct pressure on the area for 15 minutes. If bleeding does not stop then go to the emergency room    Avoid strenuous exercise the first 1-2 days or as your doctor instructs you    How should I care for the wound after 24 hours?    After 24 hours, remove the bandage    You may bathe or shower as normal    If you had a scalp biopsy, you can shampoo as usual and can use shower water to clean the biopsy site daily    Clean the wound twice a day with gentle soap and water    Do not scrub, be gentle    Apply white petroleum/Vaseline after cleaning the wound with a cotton swab or a clean finger, and keep the site covered with a Bandaid /bandage. Bandages are not necessary with a scalp biopsy    If you are unable to cover the site with a Bandaid /bandage, re-apply ointment 2-3 times a day to keep the site moist. Moisture will help with healing    Avoid strenuous activity for first 1-2 days    Avoid lakes, rivers, pools, and oceans until the stitches are removed or the site is healed    How do I clean my wound?    Wash hands thoroughly with soap or use hand  before all wound care    Clean the wound with gentle soap and water    Apply white petroleum/Vaseline  to wound after it is clean    Replace the Bandaid /bandage to keep the wound covered for the first few days or as instructed by your doctor    If you had a scalp biopsy, warm  shower water to the area on a daily basis should suffice    What should I use to clean my wound?     Cotton-tipped applicators (Qtips )    White petroleum jelly (Vaseline ). Use a clean new container and use Q-tips to apply.    Bandaids   as needed    Gentle soap     How should I care for my wound long term?    Do not get your wound dirty    Keep up with wound care for one week or until the area is healed.    A small scab will form and fall off by itself when the area is completely healed. The area will be red and will become pink in color as it heals. Sun protection is very important for how your scar will turn out. Sunscreen with an SPF 30 or greater is recommended once the area is healed.    If you have stitches, stitches need to be removed in 5-7 days. You may return to our clinic for this or you may have it done locally at your doctor s office.    You should have some soreness but it should be mild and slowly go away over several days. Talk to your doctor about using tylenol for pain,    When should I call my doctor?  If you have increased:     Pain or swelling    Pus or drainage (clear or slightly yellow drainage is ok)    Temperature over 100F    Spreading redness or warmth around wound    When will I hear about my results?  The biopsy results can take 2-3 weeks to come back. The clinic will call you with the results, send you a MessageGate message, or have you schedule a follow-up clinic or phone time to discuss the results. Contact our clinics if you do not hear from us in 3 weeks.     Who should I call with questions?    [facility]: [phone]     [facility]: [phone]    For urgent needs outside of business hours call the [facility] at [phone] and ask for the dermatology resident on call              Follow-ups after your visit        Follow-up notes from your care team     Return in about 3 months (around 2/13/2019) for Skin  Check - Hx of non melanoma skin cancer.      Your next 10 appointments already scheduled     Nov 23, 2018  9:00 AM CST   Nurse Only with NURSE ONLY [facility] DERM   [facility]    [address] [facility] [address] [phone]            Feb 15, 2019  9:00 AM CST   Return Visit with Arabella Sainz MD   [facility])    [address] [facility] [address] [phone]              Who to contact     If you have questions or need follow up information about today's clinic visit or your schedule please contact [facility] directly at [phone].  Normal or non-critical lab and imaging results will be communicated to you by [employer], letter or phone within 4 business days after the clinic has received the results. If you do not hear from us within 7 days, please contact the clinic through [employer] or phone. If you have a critical or abnormal lab result, we will notify you by phone as soon as possible.  Submit refill requests through [employer] or call your pharmacy and they will forward the refill request to us. Please allow 3 business days for your refill to be completed.          Additional Information About Your Visit        [employer] Information     [employer] gives you secure access to your electronic health record. If you see a primary care provider, you can also send messages to your care team and make appointments. If you have questions, please call your primary care clinic.  If you do not have a primary care provider, please call [phone] and they will assist you.      [employer] is an electronic gateway that provides easy, online access to your medical records. With Issue, you can request a clinic appointment, read your test results, renew a prescription or communicate with your care team.     To access your existing account, please contact your [facility]  or call [phone] for assistance.        Care EveryWhere ID     This is your Care EveryWhere ID. This could be used by other organizations to access your [place] medical records  [id number]         Blood Pressure from Last 3 Encounters:   10/17/18 109/69   03/09/18 114/74   09/27/17 119/63    Weight from Last 3 Encounters:   10/17/18 81.6 kg (180 lb)   03/09/18 81.6 kg (180 lb)   09/27/17 81.2 kg (179 lb)              We Performed the Following     BIOPSY SKIN/SUBQ/MUC MEM, EACH ADDTL LESION     BIOPSY SKIN/SUBQ/MUC MEM, SINGLE LESION     Dermatological path order and indications     DESTRUCT PREMALIGNANT LESION, 2-14     DESTRUCT PREMALIGNANT LESION, FIRST          Today's Medication Changes          These changes are accurate as of 11/13/18  3:15 PM.  If you have any questions, ask your nurse or doctor.               These medicines have changed or have updated prescriptions.        Dose/Directions    triamcinolone 0.1 % cream   Commonly known as:  KENALOG   This may have changed:    - how to take this  - when to take this  - additional instructions   Used for:  Dermatitis   Changed by:  Arabella Sainz MD        Apply twice daily for 2 weeks to shoulder   Quantity:  454 g   Refills:  0            Where to get your medicines      These medications were sent to [facility] - [place], [place] - [address]  [address], [facility] [address]     Phone:  [phone]     triamcinolone 0.1 % cream                Primary Care Provider Office Phone # Fax #    Tyrel Manning [phone]       [address]  [facility] [address]        Equal Access to Services     [facility]: Hadii sridhar miller hadashmerrill Sonehemiahali, waaxda luqadaha, qaybta kaalmada adeegyada, ronny murray. So [facility] [phone].    ATENCIÓN: Si habla español, tiene a dias disposición servicios gratuitos de asistencia lingüística. Llame al [phone].    We comply with  applicable federal civil rights laws and [place] laws. We do not discriminate on the basis of race, color, national origin, age, disability, sex, sexual orientation, or gender identity.            Thank you!     Thank you for choosing [facility]  for your care. Our goal is always to provide you with excellent care. Hearing back from our patients is one way we can continue to improve our services. Please take a few minutes to complete the written survey that you may receive in the mail after your visit with us. Thank you!             Your Updated Medication List - Protect others around you: Learn how to safely use, store and throw away your medicines at www.disposemymeds.org.          This list is accurate as of 11/13/18  3:15 PM.  Always use your most recent med list.                   Brand Name Dispense Instructions for use Diagnosis    cyanocobalamin 1000 MCG tablet    vitamin  B-12     Take 1,000 mcg by mouth daily        primidone 250 MG tablet    MYSOLINE    90 tablet    Take 1 tablet (250 mg) by mouth At Bedtime    Essential tremor       ranitidine 300 MG tablet    ZANTAC    90 tablet    TAKE ONE TABLET BY MOUTH EVERY NIGHT AT BEDTIME    Gastroesophageal reflux disease without esophagitis       triamcinolone 0.1 % cream    KENALOG    454 g    Apply twice daily for 2 weeks to shoulder    Dermatitis       VITAMIN D (CHOLECALCIFEROL) PO      Take 1,000 Units by mouth daily

## 2018-11-13 NOTE — PROGRESS NOTES
[facility] Dermatology Note      Dermatology Problem List:  1. Actinic keratosis  -s/p cryotherapy  2. HAK, right hand, 5/2009  -s/p excision   3. Inflamed seborrheic keratosis, anterior, lateral and posterior scalp  -s/p removal 4/9/12  4. Hx of NMSC, outside system  - BCC, back, treated with ED & C    Encounter Date: Nov 13, 2018    CC:  Chief Complaint   Patient presents with     Skin Check     areas of concern on right neck and back hx BCC     Rash     itchy rash had since March seen at [facility] Dr. Varags and started on Triamcinolone without improvement         History of Present Illness:  Mr. 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 The patient was last seen in our system in 2015 when he had a few SKs biopsied. Today, the patient reports that he has diagnosed with Angus's Disease prior to this appointment and was prescribed triamcinolone. This seems to help with the pruritus, but the rash is still present. Other than this, the patient reports that he had a BCC on the back treated with ED & C this year. No other specific skin concerns.     Past Medical History:   Patient Active Problem List   Diagnosis     Seborrheic dermatitis     Allergic rhinitis due to other allergen     Cervical radiculopathy at C6     Personal history of malignant neoplasm of larynx     Essential tremor     CARDIOVASCULAR SCREENING; LDL GOAL LESS THAN 160     Benign prostatic hyperplasia     Hoarse voice quality     Advanced directives, counseling/discussion     Pseudophakia     Gastroesophageal reflux disease without esophagitis     Macular degeneration     Past Medical History:   Diagnosis Date     Acid reflux disease      Allergic rhinitis, cause unspecified      H/O magnetic resonance imaging of brain and brain stem 10/10/2016    MRI BRAIN WITH AND WITHOUT CONTRAST August 17, 2009 8:07:00 PM   HISTORY: Severe dizzy spells with imbalance and  vomiting.   TECHNIQUE: Routine pulse sequences without and with contrast were performed including thin section images through the IACs. 20 mL Magnevist given.   FINDINGS: Diffusion-weighted images are normal. The brain parenchyma, brainstem, ventricular system, and subarachnoid spaces a     Hematuria     Hematuria  - in 1990's      History of anemia 2002    Anemia-Iron Deficit     History of gastric ulcer      Macular degeneration      Malignant neoplasm of laryngeal cartilages (H)     Laryngeal CA (Radiation 1982)     Nonsenile cataract      PONV (postoperative nausea and vomiting)      Tremor 10/3/2016     Past Surgical History:   Procedure Laterality Date     APPENDECTOMY  8 years old     Biopsy of the throat - cancerous mass - got radiation for in larynx  1982     CATARACT IOL, RT/LT       COLONOSCOPY  2/25/2004    Normal     COLONOSCOPY N/A 4/28/2015    Procedure: COLONOSCOPY;  Surgeon: Marvin Adams MD;  Location: [facility] OR     COLONOSCOPY WITH CO2 INSUFFLATION N/A 4/28/2015    Procedure: COLONOSCOPY WITH CO2 INSUFFLATION;  Surgeon: Marvin Adams MD;  Location: [facility] OR     CYSTOSCOPY  1997    for hematuria - negative     EYE SURGERY Right     Rt eye.macular repair     EYE SURGERY  2003    cataract     NASAL ENDOSCOPY,DX  4/2007    GERD     Pars plana vitrectomy and epiretinal membrane dissection, right eye  11/8/2002     Phacoemulsification with intraocular lens implantation right eye.  3/11/2003     PHACOEMULSIFICATION WITH STANDARD INTRAOCULAR LENS IMPLANT Left 9/22/2016    Procedure: PHACOEMULSIFICATION WITH STANDARD INTRAOCULAR LENS IMPLANT;  Surgeon: Nghia Lara MD;  Location: [facility] OR     Thyroglossal Duct Cyst Removal - 2ndary to radiation.  1982     VASECTOMY  1971     ZZ GASTROSCOPY,FL  9/2007    hiatal hernia and errosions       Social History:  Patient  reports that he has quit smoking. He has never used smokeless tobacco. He reports that he drinks alcohol. He reports that  he does not use illicit drugs.    Family History:  Family History   Problem Relation Age of Onset     Cerebrovascular Disease Mother      at 86 yo - possibly TIA - befoer     Macular Degeneration Mother      GASTROINTESTINAL DISEASE Father      Cancer - colorectal Father      Hypertension Father      Cancer Father      Cancer Other      C.A.D. No family hx of      Diabetes No family hx of      Prostate Cancer No family hx of      Glaucoma No family hx of      Thyroid Disease No family hx of        Medications:  Current Outpatient Prescriptions   Medication Sig Dispense Refill     primidone (MYSOLINE) 250 MG tablet Take 1 tablet (250 mg) by mouth At Bedtime 90 tablet 3     ranitidine (ZANTAC) 300 MG tablet TAKE ONE TABLET BY MOUTH EVERY NIGHT AT BEDTIME 90 tablet 3     triamcinolone (KENALOG) 0.1 % cream Apply topically 2 times daily       VITAMIN D, CHOLECALCIFEROL, PO Take 1,000 Units by mouth daily       cyanocobalamin (VITAMIN  B-12) 1000 MCG tablet Take 1,000 mcg by mouth daily         Allergies   Allergen Reactions     Seasonal Allergies      Hay fever        Review of Systems:  -Constitutional: Otherwise feeling well today, in usual state of health.     -Skin: As above in HPI. No additional skin concerns.    Physical exam:  Vitals: There were no vitals taken for this visit.  GEN: This is a well developed, well-nourished male in no acute distress, in a pleasant mood.    SKIN: Total skin excluding the undergarment areas was performed. The exam included the head/face, neck, both arms, chest, back, abdomen, both legs, digits and/or nails. Declines genital exam  - On the right postauricular there is a 5 mm keratotic papule.    - Ill defined red patch on the right posterior shoulder, 1.3 cm.   -There is an erythematous macule with overyling adherent scale on the left cheek, right tragus  - Ill defined pigmented patch, 4 x 3 cm, on the left jaw.   - Eczematous macules and patches, upper back and shoulders  -There are  waxy stuck on tan to brown papules on the back.  - Verrucous papule on the right ventral forearm, 5mm  -No other lesions of concern on areas examined.       Impression/Plan:  1. Hx of NMSC, no evidence of recurrence.     Recommend sunscreens SPF #30 or greater, protective clothing and avoidance of tanning beds.    2. On the right postauricular there is a 5 mm keratotic papule. NUB. Differential includes SCC vs verruca    Shave biopsy:  After discussion of benefits and risks including but not limited to bleeding/bruising, pain/swelling, infection, scar, incomplete removal, nerve damage/numbness, recurrence, and non-diagnostic biopsy, written consent, verbal consent and photographs were obtained. Time-out was performed. The area was cleaned with isopropyl alcohol. 0.5mL of 1% lidocaine with epinephrine was injected to obtain adequate anesthesia of the lesion on the right postauricular. A shave biopsy was performed. Hemostasis was achieved with aluminium chloride. Vaseline and a sterile dressing were applied. The patient tolerated the procedure and no complications were noted. The patient was provided with verbal and written post care instructions.     3. Ill defined pigmented patch, 4 x 3 cm, on the left jaw. Neoplasm of uncertain behavior. Differential includes pigmented AK vs lentigo maligna.   Punch biopsy:  After discussion of benefits and risks including but not limited to bleeding/bruising, pain/swelling, infection, scar, incomplete removal, nerve damage/numbness, recurrence, and non-diagnostic biopsy, written consent, verbal consent and photographs were obtained. Time-out was performed. The area was cleaned with isopropyl alcohol. 0.5mL of 1% lidocaine with epinephrine was injected to obtain adequate anesthesia of the lesion on the left jaw. Two 3 mm punch biopsies were performed.  6-0 prolene sutures were utilized to approximate the epidermal edges.  White petroleum jelly/VaselineTM and a bandage was applied  to the wound.  Explicit verbal and written wound care instructions were provided.  The patient left the Dermatology Clinic in good condition. The patient was counseled to follow up for suture removal in approximately 5-7 days.    4. Actinic keratoses, right tragus, left cheek  Cryotherapy procedure note: After verbal consent and discussion of risks and benefits including but no limited to dyspigmentation/scar, blister, and pain, 2 was(were) treated with 1-2mm freeze border for 2 cycles with liquid nitrogen. Post cryotherapy instructions were provided.     5. Verrucous papule on the right ventral forearm  approx 5m NUB. Differential includes verruca or sk or other    Shave biopsy:  After discussion of benefits and risks including but not limited to bleeding/bruising, pain/swelling, infection, scar, incomplete removal, nerve damage/numbness, recurrence, and non-diagnostic biopsy, written consent, verbal consent and photographs were obtained. Time-out was performed. The area was cleaned with isopropyl alcohol. 0.5mL of 1% lidocaine with epinephrine was injected to obtain adequate anesthesia of the lesion on the right ventral forearm. A shave biopsy was performed. Hemostasis was achieved with aluminium chloride. Vaseline and a sterile dressing were applied. The patient tolerated the procedure and no complications were noted. The patient was provided with verbal and written post care instructions.     6. Eczematous dermatitis. No Midland's observed today, I think he needs more regimen oriented treatment of his dermatitis. IF this fails, then biopsy and or patch testing.     Start triamcinolone 0.1% cream, apply BID to the affected areas, then apply Vaseline on top    If this does not improve in 2 weeks, we will reconsider a biopsy of the right posterior shoulder. He will message us via [employer] in 2 weeks.     7. Seborrheic keratoses, back    No further intervention needed.     Follow-up in 3 months, earlier for new or  changing lesions.       Staff Involved:  Scribe/Staff    Scribe Disclosure  I, Marvin Abraham, am serving as a scribe to document services personally performed by Dr. Arabella Sainz MD, based on data collection and the provider's statements to me.     Provider Disclosure:   The documentation recorded by the scribe accurately reflects the services I personally performed and the decisions made by me.    Arabella Sainz MD    Department of Dermatology  [facility]: Phone: [phone], Fax:[phone]  [facility]: Phone: [phone], Fax: [phone]

## 2018-11-13 NOTE — PATIENT INSTRUCTIONS
Sarna-with menthol, recommend for iglesia  Cetaphil wash    Cryotherapy    What is it?    Use of a very cold liquid, such as liquid nitrogen, to freeze and destroy abnormal skin cells that need to be removed    What should I expect?    Tenderness and redness    A small blister that might grow and fill with dark purple blood. There may be crusting.    More than one treatment may be needed if the lesions do not go away.    How do I care for the treated area?    Gently wash the area with your hands when bathing.    Use a thin layer of Vaseline to help with healing. You may use a Band-Aid.     The area should heal within 7-10 days and may leave behind a pink or lighter color.     Do not use an antibiotic or Neosporin ointment.     You may take acetaminophen (Tylenol) for pain.     Call your Doctor if you have:    Severe pain    Signs of infection (warmth, redness, cloudy yellow drainage, and or a bad smell)    Questions or concerns    Who should I call with questions?       [facility]: [phone]       [facility]: [phone]       For urgent needs outside of business hours call the [facility] at [phone]        and ask for the dermatology resident on call  Wound Care After a Biopsy    What is a skin biopsy?  A skin biopsy allows the doctor to examine a very small piece of tissue under the microscope to determine the diagnosis and the best treatment for the skin condition. A local anesthetic (numbing medicine)  is injected with a very small needle into the skin area to be tested. A small piece of skin is taken from the area. Sometimes a suture (stitch) is used.     What are the risks of a skin biopsy?  I will experience scar, bleeding, swelling, pain, crusting and redness. I may experience incomplete removal or recurrence. Risks of this procedure are excessive bleeding, bruising, infection, nerve damage, numbness, thick (hypertrophic or  keloidal) scar and non-diagnostic biopsy.    How should I care for my wound for the first 24 hours?    Keep the wound dry and covered for 24 hours    If it bleeds, hold direct pressure on the area for 15 minutes. If bleeding does not stop then go to the emergency room    Avoid strenuous exercise the first 1-2 days or as your doctor instructs you    How should I care for the wound after 24 hours?    After 24 hours, remove the bandage    You may bathe or shower as normal    If you had a scalp biopsy, you can shampoo as usual and can use shower water to clean the biopsy site daily    Clean the wound twice a day with gentle soap and water    Do not scrub, be gentle    Apply white petroleum/Vaseline after cleaning the wound with a cotton swab or a clean finger, and keep the site covered with a Bandaid /bandage. Bandages are not necessary with a scalp biopsy    If you are unable to cover the site with a Bandaid /bandage, re-apply ointment 2-3 times a day to keep the site moist. Moisture will help with healing    Avoid strenuous activity for first 1-2 days    Avoid lakes, rivers, pools, and oceans until the stitches are removed or the site is healed    How do I clean my wound?    Wash hands thoroughly with soap or use hand  before all wound care    Clean the wound with gentle soap and water    Apply white petroleum/Vaseline  to wound after it is clean    Replace the Bandaid /bandage to keep the wound covered for the first few days or as instructed by your doctor    If you had a scalp biopsy, warm shower water to the area on a daily basis should suffice    What should I use to clean my wound?     Cotton-tipped applicators (Qtips )    White petroleum jelly (Vaseline ). Use a clean new container and use Q-tips to apply.    Bandaids   as needed    Gentle soap     How should I care for my wound long term?    Do not get your wound dirty    Keep up with wound care for one week or until the area is healed.    A small scab  will form and fall off by itself when the area is completely healed. The area will be red and will become pink in color as it heals. Sun protection is very important for how your scar will turn out. Sunscreen with an SPF 30 or greater is recommended once the area is healed.    If you have stitches, stitches need to be removed in 5-7 days. You may return to our clinic for this or you may have it done locally at your doctor s office.    You should have some soreness but it should be mild and slowly go away over several days. Talk to your doctor about using tylenol for pain,    When should I call my doctor?  If you have increased:     Pain or swelling    Pus or drainage (clear or slightly yellow drainage is ok)    Temperature over 100F    Spreading redness or warmth around wound    When will I hear about my results?  The biopsy results can take 2-3 weeks to come back. The clinic will call you with the results, send you a [employer] message, or have you schedule a follow-up clinic or phone time to discuss the results. Contact our clinics if you do not hear from us in 3 weeks.     Who should I call with questions?    [facility]: [phone]     [facility]: [phone]    For urgent needs outside of business hours call the [facility] at [phone] and ask for the dermatology resident on call

## 2018-11-14 RX ORDER — TAMSULOSIN HYDROCHLORIDE 0.4 MG/1
0.4 CAPSULE ORAL DAILY
Qty: 90 CAPSULE | Refills: 3 | Status: SHIPPED | OUTPATIENT
Start: 2018-11-14 | End: 2019-03-11

## 2018-11-16 LAB — COPATH REPORT: NORMAL

## 2018-11-19 ENCOUNTER — TELEPHONE (OUTPATIENT)
Dept: DERMATOLOGY | Facility: CLINIC | Age: 83
End: 2018-11-19

## 2018-11-19 DIAGNOSIS — L57.0 AK (ACTINIC KERATOSIS): Primary | ICD-10-CM

## 2018-11-19 RX ORDER — FLUOROURACIL 50 MG/G
CREAM TOPICAL
Qty: 40 G | Refills: 0 | Status: SHIPPED | OUTPATIENT
Start: 2018-11-19 | End: 2018-12-20

## 2018-11-19 NOTE — TELEPHONE ENCOUNTER
Notes Recorded by Katia Jane LPN on 11/19/2018 at 10:55 AM  Patient returned clinics call and was informed of results. Patient verbalized understanding and will make one month post efudex follow up at suture removal on 11-23. Patient requesting medication be sent to [facility]. No further questions or concerns per patient at this time.     Katia Jane LPN    ------    Notes Recorded by Katia Jane LPN on 11/19/2018 at 10:37 AM  Left message for patient to call  [facility] in [place] back at [phone]    Katia Jane LPN    ------    Notes Recorded by Jeremiah Sanders MD on 11/16/2018 at 12:48 PM  Lesion is precancer on jawline. When healed, lets do efudex for 3 weeks.     After biopsy site has healed, start Efudex twice daily for 3 weeks or until the onset of irritation. Discussed that we would expect irritation form this medications. Recommend the patient wash hands after use or use gloves to apply. Keep medication away from pets. Avoid sun exposure to treated area. Do not occlude treated area with bandages. Follow up 4 weeks after the last application.      a and b are harmless          6d ago       Copath Report Patient Name: GINA SHOOK   MR#: [id number]   Specimen #: [id number]   Collected: 11/13/2018   Received: 11/14/2018   Reported: 11/16/2018 11:56   Ordering Phy(s): JEREMIAH SANDERS     For improved result formatting, select 'View Enhanced Report Format' under    Linked Documents section.     SPECIMEN(S):   A: Shave, right post auricular   B: Shave, right ventral forearm   C: Punch biopsy, left jaw     FINAL DIAGNOSIS:   A. Shave, right post auricular:   - Verrucous keratosis, inflamed - (see description)     B. Shave, right ventral forearm:   - Seborrheic keratosis, inflamed - (see description)     C. Punch biopsy, left jaw:   - Pigmented actinic keratosis - (see description               Katia Jane LPN

## 2018-11-23 ENCOUNTER — ALLIED HEALTH/NURSE VISIT (OUTPATIENT)
Dept: NURSING | Facility: CLINIC | Age: 83
End: 2018-11-23
Payer: COMMERCIAL

## 2018-11-23 DIAGNOSIS — Z48.02 ENCOUNTER FOR REMOVAL OF SUTURES: Primary | ICD-10-CM

## 2018-11-23 PROCEDURE — 99207 ZZC NO CHARGE NURSE ONLY: CPT

## 2018-11-23 ASSESSMENT — PAIN SCALES - GENERAL: PAINLEVEL: NO PAIN (0)

## 2018-11-23 NOTE — NURSING NOTE
Preston Cai's goals for this visit include:   Chief Complaint   Patient presents with     Allied Health Visit     Suture removal left jaw      He requests these members of his care team be copied on today's visit information:     PCP: Tyrel Manning    Referring Provider:  Arabella Sainz MD  [address]  [place] [address]    There were no vitals taken for this visit.    Do you need any medication refills at today's visit? No    Preston Cai comes into clinic today at the request of Dr. Sainz Ordering Provider for suture removal.    This service provided today was under the supervising provider of the day Dr. Del Rosario, who was available if needed.      Dermatology Suture Removal Record  S: Preston presents to the clinic today for  removal of sutures.  The patient has had the sutures in place for 10 days.    There has been no history of infection or drainage.    O: 4 sutures are seen located on the left jaw.  The wound is healing well with no signs of infection.      A: Suture removal.    P:  All sutures were easily removed today.  Wound appeared to be healing well, no signs or symptoms of infection noted. Routine wound care discussed.  The patient will follow up as scheduled. Appointment made to follow up in 1 month post effudex treatment to left jaw.     Tierra Mcdaniel LPN

## 2018-11-23 NOTE — MR AVS SNAPSHOT
After Visit Summary   11/23/2018    Preston Cai    MRN: [id number]           Patient Information     Date Of Birth          1935        Visit Information        Provider Department      11/23/2018 9:00 AM NURSE ONLY [facility]         Follow-ups after your visit        Your next 10 appointments already scheduled     Dec 20, 2018 10:30 AM CST   [facility] Arabella [facility]z [facility]   [facility]    [address] [place] [id number] [phone]            Feb 15, 2019  9:00 AM CST   Return Visit with Arabella Sainz MD   [facility]    [address] [address] [address] [phone]              Who to contact     If you have questions or need follow up information about today's clinic visit or your schedule please contact [facility] directly at [phone].  Normal or non-critical lab and imaging results will be communicated to you by [facility] [employer], letter or phone within 4 business days after the clinic has received the results. If you do not hear from us within 7 days, please contact the clinic through [employer] or phone. If you have a critical or abnormal lab result, we will notify you by phone as soon as possible.  Submit refill requests through [employer] or call your pharmacy and they will forward the refill request to us. Please allow 3 business days for your refill to be completed.          Additional Information About Your Visit        ENT [employer] Information     [employer] gives you secure access to your electronic health record. If you see a primary care provider, you can also send messages to your care team and make appointments. If you have questions, please call your primary care clinic.  If you do not have a primary care provider, please call [phone] and they will assist you.      [employer] is an electronic gateway that provides  easy, online access to your medical records. With [employer], you can request a clinic appointment, read your test results, renew a prescription or communicate with your care team.     To access your existing account, please contact your [facility] or call [phone] for assistance.        Care EveryWhere ID     This is your Care EveryWhere ID. This could be used by other organizations to access your [facility] medical records  [id number]         Blood Pressure from Last 3 Encounters:   10/17/18 109/69   03/09/18 114/74   09/27/17 119/63    Weight from Last 3 Encounters:   10/17/18 81.6 kg (180 lb)   03/09/18 81.6 kg (180 lb)   09/27/17 81.2 kg (179 lb)              Today, you had the following     No orders found for display       Primary Care Provider Office Phone # Fax #    Tyrel Manning [phone]       [address]  [facility] [address]        Equal Access to Services     [facility] CenterTIANNA : Hadii aad ku hadasho Soomaali, waaxda luqadaha, qaybta kaalmada adeegyada, waxay idiin hayjerman simpsonarastacey salgado . So [facility] [phone].    ATENCIÓN: Si habla español, tiene a dias disposición servicios gratuitos de asistencia lingüística. Llame al [phone].    We comply with applicable federal civil rights laws and [place] laws. We do not discriminate on the basis of race, color, national origin, age, disability, sex, sexual orientation, or gender identity.            Thank you!     Thank you for choosing [facility]  for your care. Our goal is always to provide you with excellent care. Hearing back from our patients is one way we can continue to improve our services. Please take a few minutes to complete the written survey that you may receive in the mail after your visit with us. Thank you!             Your Updated Medication List - Protect others around you: Learn how to safely use, store and throw away your medicines at  www.disposemymeds.org.          This list is accurate as of 11/23/18  9:01 AM.  Always use your most recent med list.                   Brand Name Dispense Instructions for use Diagnosis    cyanocobalamin 1000 MCG tablet    vitamin  B-12     Take 1,000 mcg by mouth daily        fluorouracil 5 % cream    EFUDEX    40 g    Once left jawline is healed apply twice daily for 3 weeks or until onset of irritation.    AK (actinic keratosis)       primidone 250 MG tablet    MYSOLINE    90 tablet    Take 1 tablet (250 mg) by mouth At Bedtime    Essential tremor       ranitidine 300 MG tablet    ZANTAC    90 tablet    TAKE ONE TABLET BY MOUTH EVERY NIGHT AT BEDTIME    Gastroesophageal reflux disease without esophagitis       tamsulosin 0.4 MG capsule    FLOMAX    90 capsule    Take 1 capsule (0.4 mg) by mouth daily    Benign prostatic hyperplasia with nocturia       triamcinolone 0.1 % cream    KENALOG    454 g    Apply twice daily for 2 weeks to shoulder    Dermatitis       VITAMIN D (CHOLECALCIFEROL) PO      Take 1,000 Units by mouth daily

## 2018-12-20 ENCOUNTER — OFFICE VISIT (OUTPATIENT)
Dept: DERMATOLOGY | Facility: CLINIC | Age: 83
End: 2018-12-20
Payer: COMMERCIAL

## 2018-12-20 DIAGNOSIS — L82.1 SEBORRHEIC KERATOSIS: ICD-10-CM

## 2018-12-20 DIAGNOSIS — R21 RASH: ICD-10-CM

## 2018-12-20 DIAGNOSIS — L57.0 ACTINIC KERATOSIS: Primary | ICD-10-CM

## 2018-12-20 PROCEDURE — 99213 OFFICE O/P EST LOW 20 MIN: CPT | Performed by: DERMATOLOGY

## 2018-12-20 ASSESSMENT — PAIN SCALES - GENERAL: PAINLEVEL: NO PAIN (0)

## 2018-12-20 NOTE — NURSING NOTE
Preston Cai's goals for this visit include:   Chief Complaint   Patient presents with     RECHECK     S/p Efudex on left jawline.  Used for 2.5 weeks.     Derm Problem     Itchy spots on back and scalp       He requests these members of his care team be copied on today's visit information: Tyrel Manning      PCP: Tyrel Manning    Referring Provider:  No referring provider defined for this encounter.    Angeles Irizarry RN

## 2018-12-20 NOTE — LETTER
"    12/20/2018         RE: Preston Cai  [address]  [place] [address]        Dear Colleague,    Thank you for referring your patient, Preston Cai, to the [facility]. Please see a copy of my visit note below.    [facility] Note      Dermatology Problem List:  1. Actinic keratosis  -s/p cryotherapy  2. HAK, right hand, 5/2009  -s/p excision   3. Inflamed seborrheic keratosis, anterior, lateral and posterior scalp  -s/p removal 4/9/12  4. Hx of NMSC, outside system  - BCC, back, treated with ED & C    Encounter Date: Dec 20, 2018    CC:  Chief Complaint   Patient presents with     RECHECK     S/p Efudex on left jawline.  Used for 2.5 weeks.     Derm Problem     Itchy spots on back and scalp         History of Present Illness:  Mr. 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 Last seen when he had an AK on the left jaw biopsied and started Efudex. Today, the patient reports that he used this cream for 2.5 weeks; just stopped using it on the 16th (four days ago). This site turned red after the 2.5 weeks. The rash previously diagnosed as Angus's has \"cleared up\" significantly, he thinks.  In addition to these concerns, the patient has some itchy spots on the back and scalp that he would like looked at.     Past Medical History:   Patient Active Problem List   Diagnosis     Seborrheic dermatitis     Allergic rhinitis due to other allergen     Cervical radiculopathy at C6     Personal history of malignant neoplasm of larynx     Essential tremor     CARDIOVASCULAR SCREENING; LDL GOAL LESS THAN 160     Benign prostatic hyperplasia     Hoarse voice quality     Advanced directives, counseling/discussion     Pseudophakia     Gastroesophageal reflux disease without esophagitis     Macular degeneration     Past Medical History:   Diagnosis Date     Acid reflux disease      Allergic rhinitis, cause " unspecified      H/O magnetic resonance imaging of brain and brain stem 10/10/2016    MRI BRAIN WITH AND WITHOUT CONTRAST August 17, 2009 8:07:00 PM   HISTORY: Severe dizzy spells with imbalance and vomiting.   TECHNIQUE: Routine pulse sequences without and with contrast were performed including thin section images through the IACs. 20 mL Magnevist given.   FINDINGS: Diffusion-weighted images are normal. The brain parenchyma, brainstem, ventricular system, and subarachnoid spaces a     Hematuria     Hematuria  - in 1990's      History of anemia 2002    Anemia-Iron Deficit     History of gastric ulcer      Macular degeneration      Malignant neoplasm of laryngeal cartilages (H)     Laryngeal CA (Radiation 1982)     Nonsenile cataract      PONV (postoperative nausea and vomiting)      Tremor 10/3/2016     Past Surgical History:   Procedure Laterality Date     APPENDECTOMY  8 years old     Biopsy of the throat - cancerous mass - got radiation for in larynx  1982     CATARACT IOL, RT/LT       COLONOSCOPY  2/25/2004    Normal     COLONOSCOPY N/A 4/28/2015    Procedure: COLONOSCOPY;  Surgeon: Marvin Adams MD;  Location: [facility]     COLONOSCOPY WITH CO2 INSUFFLATION N/A 4/28/2015    Procedure: COLONOSCOPY WITH CO2 INSUFFLATION;  Surgeon: Marvin Adams MD;  Location: [facility] OR     CYSTOSCOPY  1997    for hematuria - negative     EYE SURGERY Right     Rt eye.macular repair     EYE SURGERY  2003    cataract     NASAL ENDOSCOPY,DX  4/2007    GERD     Pars plana vitrectomy and epiretinal membrane dissection, right eye  11/8/2002     Phacoemulsification with intraocular lens implantation right eye.  3/11/2003     PHACOEMULSIFICATION WITH STANDARD INTRAOCULAR LENS IMPLANT Left 9/22/2016    Procedure: PHACOEMULSIFICATION WITH STANDARD INTRAOCULAR LENS IMPLANT;  Surgeon: Nghia Lara MD;  Location: [facility] OR     Thyroglossal Duct Cyst Removal - 2ndary to radiation.  1982     VASECTOMY  1971     ZZ  GASTROSCOPY,FL  9/2007    hiatal hernia and errosions       Social History:  Patient  reports that he has quit smoking. he has never used smokeless tobacco. He reports that he drinks alcohol. He reports that he does not use drugs.    Family History:  Family History   Problem Relation Age of Onset     Cerebrovascular Disease Mother         at 84 yo - possibly TIA - befoer     Macular Degeneration Mother      Gastrointestinal Disease Father      Cancer - colorectal Father      Hypertension Father      Cancer Father      Cancer Other      C.A.D. No family hx of      Diabetes No family hx of      Prostate Cancer No family hx of      Glaucoma No family hx of      Thyroid Disease No family hx of        Medications:  Current Outpatient Medications   Medication Sig Dispense Refill     cyanocobalamin (VITAMIN  B-12) 1000 MCG tablet Take 1,000 mcg by mouth daily       primidone (MYSOLINE) 250 MG tablet Take 1 tablet (250 mg) by mouth At Bedtime 90 tablet 3     ranitidine (ZANTAC) 300 MG tablet TAKE ONE TABLET BY MOUTH EVERY NIGHT AT BEDTIME 90 tablet 3     tamsulosin (FLOMAX) 0.4 MG capsule Take 1 capsule (0.4 mg) by mouth daily 90 capsule 3     VITAMIN D, CHOLECALCIFEROL, PO Take 1,000 Units by mouth daily         Allergies   Allergen Reactions     Seasonal Allergies      Hay fever        Review of Systems:  -Constitutional: Otherwise feeling well today, in usual state of health.  -Skin: As above in HPI. No additional skin concerns.    Physical exam:  Vitals: There were no vitals taken for this visit.  GEN: This is a well developed, well-nourished male in no acute distress, in a pleasant mood.    SKIN: Focused exam of the face, back, scalp was performed.  - Erythematous patch, 2.5 cm with hemorrhagic crust on left jaw  - There are waxy stuck on tan to brown papules on the back.  - Keratotic papule on the right forehead s/p Efudex     -No other lesions of concern on areas examined.       Impression/Plan:  1. Hx of NMSC, no  evidence of recurrence.     Recommend sunscreens SPF #30 or greater, protective clothing and avoidance of tanning beds.    2. Pigmented AK s/p biopsy and Efudex- still healing    We will recheck this spot in 2-3 months    3. Papule,  right forehead s/p Efudex-pt self treated, will await area to settle after efudex use to assess    We will recheck this spot in 2-3 months. USe vaseline    4. Eczematous dermatitis - none observed today     Was on triamcinolone    5. Seborrheic keratoses, back    No further intervention needed.     Follow-up in march, recheck right forehead and left jawline after efudex      Staff Involved:  Scribe/Staff    Scribe Disclosure  I, Marvin Abraham, am serving as a scribe to document services personally performed by Dr. Arabella Sainz MD, based on data collection and the provider's statements to me.     Provider Disclosure:   The documentation recorded by the scribe accurately reflects the services I personally performed and the decisions made by me.    Arabella Sainz MD    Department of Dermatology  [facility]: Phone: [phone], Fax:[phone]  [facility]: Phone: [phone], Fax: [phone]        Again, thank you for allowing me to participate in the care of your patient.        Sincerely,        Arabella Sainz MD

## 2018-12-20 NOTE — PROGRESS NOTES
"[facility] Dermatology Note      Dermatology Problem List:  1. Actinic keratosis  -s/p cryotherapy  2. HAK, right hand, 5/2009  -s/p excision   3. Inflamed seborrheic keratosis, anterior, lateral and posterior scalp  -s/p removal 4/9/12  4. Hx of NMSC, outside system  - BCC, back, treated with ED & C    Encounter Date: Dec 20, 2018    CC:  Chief Complaint   Patient presents with     RECHECK     S/p Efudex on left jawline.  Used for 2.5 weeks.     Derm Problem     Itchy spots on back and scalp         History of Present Illness:  Mr. 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 Last seen when he had an AK on the left jaw biopsied and started Efudex. Today, the patient reports that he used this cream for 2.5 weeks; just stopped using it on the 16th (four days ago). This site turned red after the 2.5 weeks. The rash previously diagnosed as Angus's has \"cleared up\" significantly, he thinks.  In addition to these concerns, the patient has some itchy spots on the back and scalp that he would like looked at.     Past Medical History:   Patient Active Problem List   Diagnosis     Seborrheic dermatitis     Allergic rhinitis due to other allergen     Cervical radiculopathy at C6     Personal history of malignant neoplasm of larynx     Essential tremor     CARDIOVASCULAR SCREENING; LDL GOAL LESS THAN 160     Benign prostatic hyperplasia     Hoarse voice quality     Advanced directives, counseling/discussion     Pseudophakia     Gastroesophageal reflux disease without esophagitis     Macular degeneration     Past Medical History:   Diagnosis Date     Acid reflux disease      Allergic rhinitis, cause unspecified      H/O magnetic resonance imaging of brain and brain stem 10/10/2016    MRI BRAIN WITH AND WITHOUT CONTRAST August 17, 2009 8:07:00 PM   HISTORY: Severe dizzy spells with imbalance and vomiting.   TECHNIQUE: Routine pulse sequences " without and with contrast were performed including thin section images through the IACs. 20 mL Magnevist given.   FINDINGS: Diffusion-weighted images are normal. The brain parenchyma, brainstem, ventricular system, and subarachnoid spaces a     Hematuria     Hematuria  - in 1990's      History of anemia 2002    Anemia-Iron Deficit     History of gastric ulcer      Macular degeneration      Malignant neoplasm of laryngeal cartilages (H)     Laryngeal CA (Radiation 1982)     Nonsenile cataract      PONV (postoperative nausea and vomiting)      Tremor 10/3/2016     Past Surgical History:   Procedure Laterality Date     APPENDECTOMY  8 years old     Biopsy of the throat - cancerous mass - got radiation for in larynx  1982     CATARACT IOL, RT/LT       COLONOSCOPY  2/25/2004    Normal     COLONOSCOPY N/A 4/28/2015    Procedure: COLONOSCOPY;  Surgeon: Marvin Adams MD;  Location: [facility]     COLONOSCOPY WITH CO2 INSUFFLATION N/A 4/28/2015    Procedure: COLONOSCOPY WITH CO2 INSUFFLATION;  Surgeon: Marvin Adams MD;  Location: [facility] OR     CYSTOSCOPY  1997    for hematuria - negative     EYE SURGERY Right     Rt eye.macular repair     EYE SURGERY  2003    cataract     NASAL ENDOSCOPY,DX  4/2007    GERD     Pars plana vitrectomy and epiretinal membrane dissection, right eye  11/8/2002     Phacoemulsification with intraocular lens implantation right eye.  3/11/2003     PHACOEMULSIFICATION WITH STANDARD INTRAOCULAR LENS IMPLANT Left 9/22/2016    Procedure: PHACOEMULSIFICATION WITH STANDARD INTRAOCULAR LENS IMPLANT;  Surgeon: Nghia Lara MD;  Location: [facility] OR     Thyroglossal Duct Cyst Removal - 2ndary to radiation.  1982     VASECTOMY  1971     ZZ GASTROSCOPY,FL  9/2007    hiatal hernia and errosions       Social History:  Patient  reports that he has quit smoking. he has never used smokeless tobacco. He reports that he drinks alcohol. He reports that he does not use drugs.    Family  History:  Family History   Problem Relation Age of Onset     Cerebrovascular Disease Mother         at 86 yo - possibly TIA - befoer     Macular Degeneration Mother      Gastrointestinal Disease Father      Cancer - colorectal Father      Hypertension Father      Cancer Father      Cancer Other      C.A.D. No family hx of      Diabetes No family hx of      Prostate Cancer No family hx of      Glaucoma No family hx of      Thyroid Disease No family hx of        Medications:  Current Outpatient Medications   Medication Sig Dispense Refill     cyanocobalamin (VITAMIN  B-12) 1000 MCG tablet Take 1,000 mcg by mouth daily       primidone (MYSOLINE) 250 MG tablet Take 1 tablet (250 mg) by mouth At Bedtime 90 tablet 3     ranitidine (ZANTAC) 300 MG tablet TAKE ONE TABLET BY MOUTH EVERY NIGHT AT BEDTIME 90 tablet 3     tamsulosin (FLOMAX) 0.4 MG capsule Take 1 capsule (0.4 mg) by mouth daily 90 capsule 3     VITAMIN D, CHOLECALCIFEROL, PO Take 1,000 Units by mouth daily         Allergies   Allergen Reactions     Seasonal Allergies      Hay fever        Review of Systems:  -Constitutional: Otherwise feeling well today, in usual state of health.  -Skin: As above in HPI. No additional skin concerns.    Physical exam:  Vitals: There were no vitals taken for this visit.  GEN: This is a well developed, well-nourished male in no acute distress, in a pleasant mood.    SKIN: Focused exam of the face, back, scalp was performed.  - Erythematous patch, 2.5 cm with hemorrhagic crust on left jaw  - There are waxy stuck on tan to brown papules on the back.  - Keratotic papule on the right forehead s/p Efudex     -No other lesions of concern on areas examined.       Impression/Plan:  1. Hx of NMSC, no evidence of recurrence.     Recommend sunscreens SPF #30 or greater, protective clothing and avoidance of tanning beds.    2. Pigmented AK s/p biopsy and Efudex- still healing    We will recheck this spot in 2-3 months    3. Papule,  right  forehead s/p Efudex-pt self treated, will await area to settle after efudex use to assess    We will recheck this spot in 2-3 months. USe vaseline    4. Eczematous dermatitis - none observed today     Was on triamcinolone    5. Seborrheic keratoses, back    No further intervention needed.     Follow-up in march, recheck right forehead and left jawline after efudex      Staff Involved:  Scribe/Staff    Scribe Disclosure  I, Marvin Abraham, am serving as a scribe to document services personally performed by Dr. Arabella Sainz MD, based on data collection and the provider's statements to me.     Provider Disclosure:   The documentation recorded by the scribe accurately reflects the services I personally performed and the decisions made by me.    Arabella Sainz MD    Department of Dermatology  [facility]: Phone: [phone], Fax:[phone]  [facility]: Phone: [phone], Fax: [phone]

## 2019-02-23 ENCOUNTER — DOCUMENTATION ONLY (OUTPATIENT)
Dept: LAB | Facility: CLINIC | Age: 84
End: 2019-02-23

## 2019-02-23 DIAGNOSIS — E53.8 VITAMIN B12 DEFICIENCY (NON ANEMIC): ICD-10-CM

## 2019-02-23 DIAGNOSIS — Z00.00 ANNUAL PHYSICAL EXAM: Primary | ICD-10-CM

## 2019-02-23 DIAGNOSIS — Z13.6 CARDIOVASCULAR SCREENING; LDL GOAL LESS THAN 100: ICD-10-CM

## 2019-02-23 DIAGNOSIS — E55.9 VITAMIN D DEFICIENCY: ICD-10-CM

## 2019-02-23 NOTE — PROGRESS NOTES
Patient has a lab appointment on 03/04/2019. Per the lab schedule scrubbing protocol they are not due for anything. Please review chart and send orders or let patient know appointment is not needed.    Thank you,  Nelda Hernandez

## 2019-03-04 DIAGNOSIS — Z13.6 CARDIOVASCULAR SCREENING; LDL GOAL LESS THAN 100: ICD-10-CM

## 2019-03-04 DIAGNOSIS — E55.9 VITAMIN D DEFICIENCY: ICD-10-CM

## 2019-03-04 DIAGNOSIS — E53.8 VITAMIN B12 DEFICIENCY (NON ANEMIC): ICD-10-CM

## 2019-03-04 DIAGNOSIS — Z00.00 ANNUAL PHYSICAL EXAM: ICD-10-CM

## 2019-03-04 LAB
ALBUMIN SERPL-MCNC: 4 G/DL (ref 3.4–5)
ALP SERPL-CCNC: 77 U/L (ref 40–150)
ALT SERPL W P-5'-P-CCNC: 21 U/L (ref 0–70)
ANION GAP SERPL CALCULATED.3IONS-SCNC: 5 MMOL/L (ref 3–14)
AST SERPL W P-5'-P-CCNC: 23 U/L (ref 0–45)
BASOPHILS # BLD AUTO: 0 10E9/L (ref 0–0.2)
BASOPHILS NFR BLD AUTO: 0.2 %
BILIRUB SERPL-MCNC: 0.4 MG/DL (ref 0.2–1.3)
BUN SERPL-MCNC: 21 MG/DL (ref 7–30)
CALCIUM SERPL-MCNC: 8.8 MG/DL (ref 8.5–10.1)
CHLORIDE SERPL-SCNC: 102 MMOL/L (ref 94–109)
CHOLEST SERPL-MCNC: 197 MG/DL
CO2 SERPL-SCNC: 31 MMOL/L (ref 20–32)
CREAT SERPL-MCNC: 1.22 MG/DL (ref 0.66–1.25)
DEPRECATED CALCIDIOL+CALCIFEROL SERPL-MC: 31 UG/L (ref 20–75)
DIFFERENTIAL METHOD BLD: ABNORMAL
EOSINOPHIL # BLD AUTO: 0.1 10E9/L (ref 0–0.7)
EOSINOPHIL NFR BLD AUTO: 1.4 %
ERYTHROCYTE [DISTWIDTH] IN BLOOD BY AUTOMATED COUNT: 12.4 % (ref 10–15)
GFR SERPL CREATININE-BSD FRML MDRD: 55 ML/MIN/{1.73_M2}
GLUCOSE SERPL-MCNC: 94 MG/DL (ref 70–99)
HCT VFR BLD AUTO: 41.2 % (ref 40–53)
HDLC SERPL-MCNC: 71 MG/DL
HGB BLD-MCNC: 13.9 G/DL (ref 13.3–17.7)
LDLC SERPL CALC-MCNC: 109 MG/DL
LYMPHOCYTES # BLD AUTO: 1.1 10E9/L (ref 0.8–5.3)
LYMPHOCYTES NFR BLD AUTO: 27 %
MCH RBC QN AUTO: 33.3 PG (ref 26.5–33)
MCHC RBC AUTO-ENTMCNC: 33.7 G/DL (ref 31.5–36.5)
MCV RBC AUTO: 99 FL (ref 78–100)
MONOCYTES # BLD AUTO: 0.4 10E9/L (ref 0–1.3)
MONOCYTES NFR BLD AUTO: 8.9 %
NEUTROPHILS # BLD AUTO: 2.6 10E9/L (ref 1.6–8.3)
NEUTROPHILS NFR BLD AUTO: 62.5 %
NONHDLC SERPL-MCNC: 126 MG/DL
PLATELET # BLD AUTO: 183 10E9/L (ref 150–450)
POTASSIUM SERPL-SCNC: 4 MMOL/L (ref 3.4–5.3)
PROT SERPL-MCNC: 7.5 G/DL (ref 6.8–8.8)
RBC # BLD AUTO: 4.17 10E12/L (ref 4.4–5.9)
SODIUM SERPL-SCNC: 138 MMOL/L (ref 133–144)
TRIGL SERPL-MCNC: 85 MG/DL
VIT B12 SERPL-MCNC: 390 PG/ML (ref 193–986)
WBC # BLD AUTO: 4.2 10E9/L (ref 4–11)

## 2019-03-04 PROCEDURE — 99000 SPECIMEN HANDLING OFFICE-LAB: CPT | Performed by: INTERNAL MEDICINE

## 2019-03-04 PROCEDURE — 85025 COMPLETE CBC W/AUTO DIFF WBC: CPT | Performed by: INTERNAL MEDICINE

## 2019-03-04 PROCEDURE — 83921 ORGANIC ACID SINGLE QUANT: CPT | Mod: 90 | Performed by: INTERNAL MEDICINE

## 2019-03-04 PROCEDURE — 36415 COLL VENOUS BLD VENIPUNCTURE: CPT | Performed by: INTERNAL MEDICINE

## 2019-03-04 PROCEDURE — 82607 VITAMIN B-12: CPT | Performed by: INTERNAL MEDICINE

## 2019-03-04 PROCEDURE — 80053 COMPREHEN METABOLIC PANEL: CPT | Performed by: INTERNAL MEDICINE

## 2019-03-04 PROCEDURE — 82306 VITAMIN D 25 HYDROXY: CPT | Performed by: INTERNAL MEDICINE

## 2019-03-04 PROCEDURE — 80061 LIPID PANEL: CPT | Performed by: INTERNAL MEDICINE

## 2019-03-06 LAB — METHYLMALONATE SERPL-SCNC: 0.18 UMOL/L (ref 0–0.4)

## 2019-03-11 ENCOUNTER — OFFICE VISIT (OUTPATIENT)
Dept: FAMILY MEDICINE | Facility: CLINIC | Age: 84
End: 2019-03-11
Payer: COMMERCIAL

## 2019-03-11 VITALS
WEIGHT: 179 LBS | RESPIRATION RATE: 16 BRPM | SYSTOLIC BLOOD PRESSURE: 104 MMHG | HEIGHT: 73 IN | BODY MASS INDEX: 23.72 KG/M2 | HEART RATE: 77 BPM | DIASTOLIC BLOOD PRESSURE: 64 MMHG | TEMPERATURE: 97.8 F | OXYGEN SATURATION: 97 %

## 2019-03-11 DIAGNOSIS — R35.1 BENIGN PROSTATIC HYPERPLASIA WITH NOCTURIA: ICD-10-CM

## 2019-03-11 DIAGNOSIS — K21.9 GASTROESOPHAGEAL REFLUX DISEASE WITHOUT ESOPHAGITIS: ICD-10-CM

## 2019-03-11 DIAGNOSIS — M54.17 LUMBOSACRAL RADICULOPATHY AT L5: ICD-10-CM

## 2019-03-11 DIAGNOSIS — Z00.00 ANNUAL PHYSICAL EXAM: Primary | ICD-10-CM

## 2019-03-11 DIAGNOSIS — N40.1 BENIGN PROSTATIC HYPERPLASIA WITH NOCTURIA: ICD-10-CM

## 2019-03-11 DIAGNOSIS — G25.0 ESSENTIAL TREMOR: ICD-10-CM

## 2019-03-11 DIAGNOSIS — S46.012S ROTATOR CUFF STRAIN, LEFT, SEQUELA: ICD-10-CM

## 2019-03-11 PROCEDURE — G0439 PPPS, SUBSEQ VISIT: HCPCS | Performed by: INTERNAL MEDICINE

## 2019-03-11 RX ORDER — TAMSULOSIN HYDROCHLORIDE 0.4 MG/1
0.8 CAPSULE ORAL DAILY
Qty: 180 CAPSULE | Refills: 3 | Status: SHIPPED | OUTPATIENT
Start: 2019-03-11 | End: 2020-03-02

## 2019-03-11 RX ORDER — PRIMIDONE 250 MG/1
250 TABLET ORAL AT BEDTIME
Qty: 90 TABLET | Refills: 3 | Status: SHIPPED | OUTPATIENT
Start: 2019-03-11 | End: 2020-03-02

## 2019-03-11 ASSESSMENT — MIFFLIN-ST. JEOR: SCORE: 1565.82

## 2019-03-11 ASSESSMENT — PAIN SCALES - GENERAL: PAINLEVEL: EXTREME PAIN (8)

## 2019-03-11 NOTE — PROGRESS NOTES
"  SUBJECTIVE:   Preston Cai is a 83 year old male who presents for Preventive Visit.    Are you in the first 12 months of your Medicare Part B coverage?  No    Physical Health:    In general, how would you rate your overall physical health? good    Outside of work, how many days during the week do you exercise? 2-3 days/week    Outside of work, approximately how many minutes a day do you exercise?30-45 minutes    If you drink alcohol do you typically have >3 drinks per day or >7 drinks per week? No    Do you usually eat at least 4 servings of fruit and vegetables a day, include whole grains & fiber and avoid regularly eating high fat or \"junk\" foods? Yes    Do you have any problems taking medications regularly?  No    Do you have any side effects from medications? drowsy     Needs assistance for the following daily activities: no assistance needed    Which of the following safety concerns are present in your home?  none identified     Hearing impairment: No    In the past 6 months, have you been bothered by leaking of urine? no    Mental Health:    In general, how would you rate your overall mental or emotional health? good  PHQ-2 Score:      Do you feel safe in your environment? Yes    Do you have a Health Care Directive? No: Advance care planning was reviewed with patient; patient declined at this time.    Additional concerns to address?  YES    Fall risk:  Fallen 2 or more times in the past year?: No  Any fall with injury in the past year?: No  click delete button to remove this line now  Cognitive Screenin) Repeat 3 items (Leader, Season, Table)    2) Clock draw: NORMAL  3) 3 item recall: Recalls 3 objects  Results: 3 items recalled: COGNITIVE IMPAIRMENT LESS LIKELY    Mini-CogTM Copyright NEENA Jimenez. Licensed by the author for use in [facility]; reprinted with permission ([email or web address]). All rights reserved.      Do you have sleep apnea, excessive snoring or daytime drowsiness?: " yes            Reviewed and updated as needed this visit by clinical staff         Reviewed and updated as needed this visit by Provider        Social History     Tobacco Use     Smoking status: Former Smoker     Smokeless tobacco: Never Used     Tobacco comment: 1969   Substance Use Topics     Alcohol use: Yes     Alcohol/week: 0.0 oz     Comment: A beer once in awhile                           Current providers sharing in care for this patient include:   Patient Care Team:  Tyrel Manning MD as PCP - General (Internal Medicine)  Tyrel Manning MD as Assigned PCP    The following health maintenance items are reviewed in Epic and correct as of today:  Health Maintenance   Topic Date Due     ZOSTER IMMUNIZATION (3 of 3) 12/12/2018     MEDICARE ANNUAL WELLNESS VISIT  03/09/2019     FALL RISK ASSESSMENT  03/09/2019     PHQ-2 Q1 YR  03/09/2019     ADVANCE DIRECTIVE PLANNING Q5 YRS  03/20/2019     EYE EXAM Q1 YEAR  06/19/2019     DTAP/TDAP/TD IMMUNIZATION (2 - Td) 03/07/2022     INFLUENZA VACCINE  Completed     IPV IMMUNIZATION  Aged Out     MENINGITIS IMMUNIZATION  Aged Out     Labs reviewed in EPIC  BP Readings from Last 3 Encounters:   03/11/19 104/64   10/17/18 109/69   03/09/18 114/74    Wt Readings from Last 3 Encounters:   03/11/19 81.2 kg (179 lb)   10/17/18 81.6 kg (180 lb)   03/09/18 81.6 kg (180 lb)                  Patient Active Problem List   Diagnosis     Seborrheic dermatitis     Allergic rhinitis due to other allergen     Cervical radiculopathy at C6     Personal history of malignant neoplasm of larynx     Essential tremor     CARDIOVASCULAR SCREENING; LDL GOAL LESS THAN 160     Benign prostatic hyperplasia     Hoarse voice quality     Advanced directives, counseling/discussion     Pseudophakia     Gastroesophageal reflux disease without esophagitis     Macular degeneration     Past Surgical History:   Procedure Laterality Date     APPENDECTOMY  8 years old     Biopsy of the throat -  cancerous mass - got radiation for in larynx  1982     CATARACT IOL, RT/LT       COLONOSCOPY  2/25/2004    Normal     COLONOSCOPY N/A 4/28/2015    Procedure: COLONOSCOPY;  Surgeon: Marvin Adams MD;  Location: [facility] OR     COLONOSCOPY WITH CO2 INSUFFLATION N/A 4/28/2015    Procedure: COLONOSCOPY WITH CO2 INSUFFLATION;  Surgeon: Marvin Adams MD;  Location: [facility] OR     CYSTOSCOPY  1997    for hematuria - negative     EYE SURGERY Right     Rt eye.macular repair     EYE SURGERY  2003    cataract     NASAL ENDOSCOPY,DX  4/2007    GERD     Pars plana vitrectomy and epiretinal membrane dissection, right eye  11/8/2002     Phacoemulsification with intraocular lens implantation right eye.  3/11/2003     PHACOEMULSIFICATION WITH STANDARD INTRAOCULAR LENS IMPLANT Left 9/22/2016    Procedure: PHACOEMULSIFICATION WITH STANDARD INTRAOCULAR LENS IMPLANT;  Surgeon: Nghia Lara MD;  Location: [facility] OR     Thyroglossal Duct Cyst Removal - 2ndary to radiation.  1982     VASECTOMY  1971     ZZ GASTROSCOPY,FL  9/2007    hiatal hernia and errosions       Social History     Tobacco Use     Smoking status: Former Smoker     Smokeless tobacco: Never Used     Tobacco comment: 1969   Substance Use Topics     Alcohol use: Yes     Alcohol/week: 0.0 oz     Comment: A beer once in awhile     Family History   Problem Relation Age of Onset     Cerebrovascular Disease Mother         at 84 yo - possibly TIA - befoer     Macular Degeneration Mother      Gastrointestinal Disease Father      Cancer - colorectal Father      Hypertension Father      Cancer Father      Cancer Other      C.A.D. No family hx of      Diabetes No family hx of      Prostate Cancer No family hx of      Glaucoma No family hx of      Thyroid Disease No family hx of          Allergies   Allergen Reactions     Seasonal Allergies      Hay fever      Recent Labs   Lab Test 03/04/19  0750 03/09/18  1006 06/27/17  1014 06/27/17  1002 02/20/17  0925   "06/09/16  1056  02/27/13  1319   A1C  --   --   --   --   --   --   --   --  5.3   * 90  --   --  121*  --   --    < >  --    HDL 71 78  --   --  69  --   --    < >  --    TRIG 85 90  --   --  104  --   --    < >  --    ALT 21 22  --  23 22   < >  --    < >  --    CR 1.22 1.00  --  1.09 1.06   < >  --    < >  --    GFRESTIMATED 55* 72  --  65 67   < >  --    < >  --    GFRESTBLACK 64 87  --  79 81   < >  --    < >  --    POTASSIUM 4.0 4.6  --  4.4 4.3   < >  --    < >  --    TSH  --   --  2.43  --   --   --  3.21   < >  --     < > = values in this interval not displayed.          ROS:  CONSTITUTIONAL: NEGATIVE for fever, chills, change in weight  INTEGUMENTARY/SKIN: NEGATIVE for worrisome rashes, moles or lesions  EYES: NEGATIVE for vision changes or irritation  ENT/MOUTH: NEGATIVE for ear, mouth and throat problems  RESP: NEGATIVE for significant cough or SOB  CV: NEGATIVE for chest pain, palpitations or peripheral edema  GI: NEGATIVE for nausea, abdominal pain, heartburn, or change in bowel habits  : NEGATIVE for frequency, dysuria, or hematuria  MUSCULOSKELETAL: NEGATIVE for significant arthralgias or myalgia  NEURO: NEGATIVE for weakness, dizziness or paresthesias  ENDOCRINE: NEGATIVE for temperature intolerance, skin/hair changes  HEME: NEGATIVE for bleeding problems  PSYCHIATRIC: NEGATIVE for changes in mood or affect    OBJECTIVE:   There were no vitals taken for this visit. Estimated body mass index is 23.75 kg/m  as calculated from the following:    Height as of 10/17/18: 1.854 m (6' 1\").    Weight as of 10/17/18: 81.6 kg (180 lb).  EXAM:   GENERAL: healthy, alert and no distress  EYES: Eyes grossly normal to inspection, PERRL and conjunctivae and sclerae normal  HENT: ear canals and TM's normal, nose and mouth without ulcers or lesions  NECK: no adenopathy, no asymmetry, masses, or scars and thyroid normal to palpation  RESP: lungs clear to auscultation - no rales, rhonchi or wheezes  CV: regular " rate and rhythm, normal S1 S2, no S3 or S4, no murmur, click or rub, no peripheral edema and peripheral pulses strong  ABDOMEN: soft, nontender, no hepatosplenomegaly, no masses and bowel sounds normal  MS: no gross musculoskeletal defects noted, no edema  SKIN: no suspicious lesions or rashes  NEURO: Normal strength and tone, mentation intact and speech normal  PSYCH: mentation appears normal, affect normal/bright    Diagnostic Test Results:  Results for orders placed or performed in visit on 03/04/19   Lipid panel reflex to direct LDL Non-fasting   Result Value Ref Range    Cholesterol 197 <200 mg/dL    Triglycerides 85 <150 mg/dL    HDL Cholesterol 71 >39 mg/dL    LDL Cholesterol Calculated 109 (H) <100 mg/dL    Non HDL Cholesterol 126 <130 mg/dL   Comprehensive metabolic panel (BMP + Alb, Alk Phos, ALT, AST, Total. Bili, TP)   Result Value Ref Range    Sodium 138 133 - 144 mmol/L    Potassium 4.0 3.4 - 5.3 mmol/L    Chloride 102 94 - 109 mmol/L    Carbon Dioxide 31 20 - 32 mmol/L    Anion Gap 5 3 - 14 mmol/L    Glucose 94 70 - 99 mg/dL    Urea Nitrogen 21 7 - 30 mg/dL    Creatinine 1.22 0.66 - 1.25 mg/dL    GFR Estimate 55 (L) >60 mL/min/[1.73_m2]    GFR Estimate If Black 64 >60 mL/min/[1.73_m2]    Calcium 8.8 8.5 - 10.1 mg/dL    Bilirubin Total 0.4 0.2 - 1.3 mg/dL    Albumin 4.0 3.4 - 5.0 g/dL    Protein Total 7.5 6.8 - 8.8 g/dL    Alkaline Phosphatase 77 40 - 150 U/L    ALT 21 0 - 70 U/L    AST 23 0 - 45 U/L   CBC with platelets and differential   Result Value Ref Range    WBC 4.2 4.0 - 11.0 10e9/L    RBC Count 4.17 (L) 4.4 - 5.9 10e12/L    Hemoglobin 13.9 13.3 - 17.7 g/dL    Hematocrit 41.2 40.0 - 53.0 %    MCV 99 78 - 100 fl    MCH 33.3 (H) 26.5 - 33.0 pg    MCHC 33.7 31.5 - 36.5 g/dL    RDW 12.4 10.0 - 15.0 %    Platelet Count 183 150 - 450 10e9/L    % Neutrophils 62.5 %    % Lymphocytes 27.0 %    % Monocytes 8.9 %    % Eosinophils 1.4 %    % Basophils 0.2 %    Absolute Neutrophil 2.6 1.6 - 8.3 10e9/L     Absolute Lymphocytes 1.1 0.8 - 5.3 10e9/L    Absolute Monocytes 0.4 0.0 - 1.3 10e9/L    Absolute Eosinophils 0.1 0.0 - 0.7 10e9/L    Absolute Basophils 0.0 0.0 - 0.2 10e9/L    Diff Method Automated Method    Vitamin D Deficiency   Result Value Ref Range    Vitamin D Deficiency screening 31 20 - 75 ug/L   Methylmalonic acid   Result Value Ref Range    Methylmalonic Acid 0.18 0.00 - 0.40 umol/L   Vitamin B12   Result Value Ref Range    Vitamin B12 390 193 - 986 pg/mL       ASSESSMENT / PLAN:   1. Annual physical exam  Comments: No family history of premature atherosclerotic heart disease or early-onset colon cancer.  -CBC, Comprehensive metabolic panel, lipid panel.    2. Rotator cuff strain, left, sequela  Comments: Suspected cause of left shoulder pain.  - ORTHOPEDICS ADULT REFERRAL; Future    3. Lumbosacral radiculopathy at L5  Comments: Probable cause of paresthesia of both legs.  - NEUROLOGY ADULT REFERRAL; Future  - EMG; Future    4. Essential tremor  Comments: No drug side effects.  - primidone (MYSOLINE) 250 MG tablet; Take 1 tablet (250 mg) by mouth At Bedtime  Dispense: 90 tablet; Refill: 3    5. Gastroesophageal reflux disease without esophagitis  Comments: H2 receptor blockers are better long-term option than PPIs.  - ranitidine (ZANTAC) 300 MG tablet; TAKE ONE TABLET BY MOUTH EVERY NIGHT AT BEDTIME  Dispense: 90 tablet; Refill: 3    6. Benign prostatic hyperplasia with nocturia  Comments: No complaints of urinary retention.  - tamsulosin (FLOMAX) 0.4 MG capsule; Take 2 capsules (0.8 mg) by mouth daily  Dispense: 180 capsule; Refill: 3    End of Life Planning:  Patient currently has an advanced directive: Yes.  Practitioner is supportive of decision.    COUNSELING:  Special attention given to:       Regular exercise       Healthy diet/nutrition       The ASCVD Risk score (Stacey SALOMON Jr., et al., 2013) failed to calculate for the following reasons:    The 2013 ASCVD risk score is only valid for ages 40 to  "79    BP Readings from Last 1 Encounters:   10/17/18 109/69     Estimated body mass index is 23.75 kg/m  as calculated from the following:    Height as of 10/17/18: 1.854 m (6' 1\").    Weight as of 10/17/18: 81.6 kg (180 lb).           reports that he has quit smoking. he has never used smokeless tobacco.      Appropriate preventive services were discussed with this patient, including applicable screening as appropriate for cardiovascular disease, diabetes, osteopenia/osteoporosis, and glaucoma.  As appropriate for age/gender, discussed screening for colorectal cancer, prostate cancer, breast cancer, and cervical cancer. Checklist reviewing preventive services available has been given to the patient.    Reviewed patients plan of care and provided an AVS. The Basic Care Plan (routine screening as documented in Health Maintenance) for Preston meets the Care Plan requirement. This Care Plan has been established and reviewed with the Patient.    Counseling Resources:  ATP IV Guidelines  Pooled Cohorts Equation Calculator  Breast Cancer Risk Calculator  FRAX Risk Assessment  ICSI Preventive Guidelines  Dietary Guidelines for Americans, 2010  USDA's MyPlate  ASA Prophylaxis  Lung CA Screening    Tyrel Manning MD  [facility]  "

## 2019-03-11 NOTE — PATIENT INSTRUCTIONS
At [facility], we strive to deliver an exceptional experience to you, every time we see you.  If you receive a survey in the mail, please send us back your thoughts. We really do value your feedback.    Based on your medical history, these are the current health maintenance/preventive care services that you are due for (some may have been done at this visit.)  Health Maintenance Due   Topic Date Due     ZOSTER IMMUNIZATION (3 of 3) 12/12/2018     MEDICARE ANNUAL WELLNESS VISIT  03/09/2019     FALL RISK ASSESSMENT  03/09/2019     PHQ-2 Q1 YR  03/09/2019     ADVANCE DIRECTIVE PLANNING Q5 YRS  03/20/2019         Suggested websites for health information:  Www.[email or web address].org : Up to date and easily searchable information on multiple topics.  Www.medlineplus.gov : medication info, interactive tutorials, watch real surgeries online  Www.familydoctor.org : good info from the Academy of Family Physicians  Www.cdc.gov : public health info, travel advisories, epidemics (H1N1)  Www.aap.org : children's health info, normal development, vaccinations  Www.health.[facility].[place].us : [place] dept of health, public health issues in [place], N1N1    Your care team:                            Family Medicine Internal Medicine   MD Tyrel Mckeon MD Shantel Branch-Fleming, MD Katya Georgiev PA-C Nam Ho, MD Pediatrics   JARROD Lakhani, KRYSTAL Restrepo APRN MD Yulissa Prabhakar MD Deborah Mielke, MD Kim Thein, APRN [facility]      Clinic hours: Monday - Thursday 7 am-7 pm; Fridays 7 am-5 pm.   Urgent care: Monday - Friday 11 am-9 pm; Saturday and Sunday 9 am-5 pm.  Pharmacy : Monday -Thursday 8 am-8 pm; Friday 8 am-6 pm; Saturday and Sunday 9 am-5 pm.     Clinic: [phone]   Pharmacy: [phone]    Preventive Health Recommendations:     See your health care provider every year to    Review health changes.     Discuss preventive care.      Review your medicines  if your doctor has prescribed any.      Talk with your health care provider about whether you should have a test to screen for prostate cancer (PSA).    Every 3 years, have a diabetes test (fasting glucose). If you are at risk for diabetes, you should have this test more often.    Every 5 years, have a cholesterol test. Have this test more often if you are at risk for high cholesterol or heart disease.     Every 10 years, have a colonoscopy. Or, have a yearly FIT test (stool test). These exams will check for colon cancer.    Talk to with your health care provider about screening for Abdominal Aortic Aneurysm if you have a family history of AAA or have a history of smoking.    Shots:     Get a flu shot each year.     Get a tetanus shot every 10 years.     Talk to your doctor about your pneumonia vaccines. There are now two you should receive - Pneumovax (PPSV 23) and Prevnar (PCV 13).     Talk to your pharmacist about a shingles vaccine.     Talk to your doctor about the hepatitis B vaccine.  Nutrition:     Eat at least 5 servings of fruits and vegetables each day.     Eat whole-grain bread, whole-wheat pasta and brown rice instead of white grains and rice.     Get adequate Calcium and Vitamin D.   Lifestyle    Exercise for at least 150 minutes a week (30 minutes a day, 5 days a week). This will help you control your weight and prevent disease.     Limit alcohol to one drink per day.     No smoking.     Wear sunscreen to prevent skin cancer.    See your dentist every six months for an exam and cleaning.    See your eye doctor every 1 to 2 years to screen for conditions such as glaucoma, macular degeneration, cataracts, etc.    Personalized Prevention Plan  You are due for the preventive services outlined below.  Your care team is available to assist you in scheduling these services.  If you have already completed any of these items, please share that information with your care team to update in your medical  record.  Health Maintenance Due   Topic Date Due     Zoster (Shingles) Vaccine (3 of 3) 12/12/2018     Annual Wellness Visit  03/09/2019     FALL RISK ASSESSMENT  03/09/2019     Depression Assessment 2 - yearly  03/09/2019     Discuss Advance Directive Planning  03/20/2019

## 2019-03-19 ENCOUNTER — OFFICE VISIT (OUTPATIENT)
Dept: DERMATOLOGY | Facility: CLINIC | Age: 84
End: 2019-03-19
Payer: COMMERCIAL

## 2019-03-19 DIAGNOSIS — L82.1 SEBORRHEIC KERATOSIS: Primary | ICD-10-CM

## 2019-03-19 PROCEDURE — 88305 TISSUE EXAM BY PATHOLOGIST: CPT | Mod: TC | Performed by: DERMATOLOGY

## 2019-03-19 PROCEDURE — 11311 SHAVE SKIN LESION 0.6-1.0 CM: CPT | Mod: 59 | Performed by: DERMATOLOGY

## 2019-03-19 PROCEDURE — 17110 DESTRUCTION B9 LES UP TO 14: CPT | Performed by: DERMATOLOGY

## 2019-03-19 NOTE — LETTER
"    Patient:  Preston Shook  :   1935  MRN:     [id number]        Mr.Jesse ANGELITA Shook  [address]  [place] [address]        2019    Dear ,    We are writing to inform you of your test results that show a harmless keratosis.     Thank you for taking the time to be seen in our dermatology clinic. If you have further questions or concerns, please contact the clinic(see phone number listed below).       Sincerely,     Arabella Sainz MD      Department of Dermatology   [facility]: Phone: [phone], Fax:[phone]   [facility]: Phone: [phone], Fax: [phone]     Resulted Orders   Dermatological path order and indications   Result Value Ref Range    Copath Report       Patient Name: PRESTON SHOOK  MR#: [id number]  Specimen #: [id number]  Collected: 3/19/2019  Received: 3/20/2019  Reported: 3/21/2019 12:15  Ordering Phy(s): ARABELLA SAINZ    For improved result formatting, select 'View Enhanced Report Format' under   Linked Documents section.    SPECIMEN(S):  Skin, left forehead    FINAL DIAGNOSIS:  Skin, left forehead:  - Seborrheic keratosis, inflamed - (see description)    I have personally reviewed all specimens and/or slides, including the   listed special stains, and used them  with my medical judgement to determine or confirm the final diagnosis.    Electronically signed out by:    Siddharth Monsalve M.D., [facility]    CLINICAL HISTORY:  The patient is an 83-year-old male.    GROSS:  The specimen is received in formalin with proper patient identification,   labeled \"L forehead\" and the specimen  consists of a single, irregular skin shave measuring 0.9 x 0.7 cm.  The   skin surface displays a 0.7 x 0.5 cm  blue tan lesion.  The resection margin  is inked black.  It is trisected   and entirely submitted in cassette A1.  (Dictated by: Nilda Kothari 3/20/2019 " 09:08 AM)    MICROSCOPIC:  The specimen exhibits compact orthokeratosis, papillomatous epidermal   hyperplasia with horn cyst formation and  a patchy lichenoid lymphocytic infiltrate.    The technical component of this testing was completed at the [facility], with the professional component performed   at the [facility], [address] [place] [id number] ([phone])    CPT Codes:  A: 07301-QV6.P, 02812-HZ0.T    COLLECTION SITE:  Client: [facility]  Location: [facility] ()

## 2019-03-19 NOTE — LETTER
3/19/2019         RE: Preston Cai  [address]  [place] [address]        Dear Colleague,    Thank you for referring your patient, Preston Cai, to the [facility]. Please see a copy of my visit note below.    [facility] Dermatology Note      Dermatology Problem List:  1. Actinic keratosis  -s/p cryotherapy  2. HAK, right hand, 5/2009  -s/p excision   3. Inflamed seborrheic keratosis, anterior, lateral and posterior scalp  -s/p removal 4/9/12  4. Hx of NMSC, outside system  - BCC, back, treated with ED & C    Encounter Date: Mar 19, 2019    CC:  Chief Complaint   Patient presents with     Derm Problem     spots on back scalp and face          History of Present Illness:  Mr. Preston Cai is a 83 year old male who presents as a follow-up for spot check of lesions on the left jaw line and right forehead. He was last seen in dermatology on 12/20/18 when these lesions were identified. Today, the patient has several areas of concern, all of which are very itchy. One on the right cheek was previously treated with cryotherapy, he thinks. There are similar itchy lesions on the left temple, right lower cheek, left preauricular, right preauricular, scalp and on the back, although these might not have been treated in the past, he says. The left jaw line has cleared up since the last visit.     Past Medical History:   Patient Active Problem List   Diagnosis     Seborrheic dermatitis     Allergic rhinitis due to other allergen     Cervical radiculopathy at C6     Personal history of malignant neoplasm of larynx     Essential tremor     CARDIOVASCULAR SCREENING; LDL GOAL LESS THAN 160     Benign prostatic hyperplasia     Hoarse voice quality     Advanced directives, counseling/discussion     Pseudophakia     Gastroesophageal reflux disease without esophagitis     Macular degeneration     Past Medical History:   Diagnosis Date     Acid reflux disease      Allergic  rhinitis, cause unspecified      H/O magnetic resonance imaging of brain and brain stem 10/10/2016    MRI BRAIN WITH AND WITHOUT CONTRAST August 17, 2009 8:07:00 PM   HISTORY: Severe dizzy spells with imbalance and vomiting.   TECHNIQUE: Routine pulse sequences without and with contrast were performed including thin section images through the IACs. 20 mL Magnevist given.   FINDINGS: Diffusion-weighted images are normal. The brain parenchyma, brainstem, ventricular system, and subarachnoid spaces a     Hematuria     Hematuria  - in 1990's      History of anemia 2002    Anemia-Iron Deficit     History of gastric ulcer      Macular degeneration      Malignant neoplasm of laryngeal cartilages (H)     Laryngeal CA (Radiation 1982)     Nonsenile cataract      PONV (postoperative nausea and vomiting)      Tremor 10/3/2016     Past Surgical History:   Procedure Laterality Date     APPENDECTOMY  8 years old     Biopsy of the throat - cancerous mass - got radiation for in larynx  1982     CATARACT IOL, RT/LT       COLONOSCOPY  2/25/2004    Normal     COLONOSCOPY N/A 4/28/2015    Procedure: COLONOSCOPY;  Surgeon: Marvin Adams MD;  Location: [facility] OR     COLONOSCOPY WITH CO2 INSUFFLATION N/A 4/28/2015    Procedure: COLONOSCOPY WITH CO2 INSUFFLATION;  Surgeon: Marvin Adams MD;  Location: [facility] OR     CYSTOSCOPY  1997    for hematuria - negative     EYE SURGERY Right     Rt eye.macular repair     EYE SURGERY  2003    cataract     NASAL ENDOSCOPY,DX  4/2007    GERD     Pars plana vitrectomy and epiretinal membrane dissection, right eye  11/8/2002     Phacoemulsification with intraocular lens implantation right eye.  3/11/2003     PHACOEMULSIFICATION WITH STANDARD INTRAOCULAR LENS IMPLANT Left 9/22/2016    Procedure: PHACOEMULSIFICATION WITH STANDARD INTRAOCULAR LENS IMPLANT;  Surgeon: Nghia Lara MD;  Location: [facility] OR     Thyroglossal Duct Cyst Removal - 2ndary to radiation.  1982     VASECTOMY  1971      SHIV GASTROSCOPY,FL  9/2007    hiatal hernia and errosions       Social History:  Patient  reports that he has quit smoking. he has never used smokeless tobacco. He reports that he drinks alcohol. He reports that he does not use drugs.    Family History:  Family History   Problem Relation Age of Onset     Cerebrovascular Disease Mother         at 86 yo - possibly TIA - befoer     Macular Degeneration Mother      Gastrointestinal Disease Father      Cancer - colorectal Father      Hypertension Father      Cancer Father      Cancer Other      C.A.D. No family hx of      Diabetes No family hx of      Prostate Cancer No family hx of      Glaucoma No family hx of      Thyroid Disease No family hx of        Medications:  Current Outpatient Medications   Medication Sig Dispense Refill     primidone (MYSOLINE) 250 MG tablet Take 1 tablet (250 mg) by mouth At Bedtime 90 tablet 3     ranitidine (ZANTAC) 300 MG tablet TAKE ONE TABLET BY MOUTH EVERY NIGHT AT BEDTIME 90 tablet 3     tamsulosin (FLOMAX) 0.4 MG capsule Take 2 capsules (0.8 mg) by mouth daily 180 capsule 3     VITAMIN D, CHOLECALCIFEROL, PO Take 1,000 Units by mouth daily       cyanocobalamin (VITAMIN  B-12) 1000 MCG tablet Take 1,000 mcg by mouth daily         Allergies   Allergen Reactions     Seasonal Allergies      Hay fever        Review of Systems:  -Constitutional: Otherwise feeling well today, in usual state of health.  -Skin: As above in HPI. No additional skin concerns.    Physical exam:  Vitals: There were no vitals taken for this visit.  GEN: This is a well developed, well-nourished male in no acute distress, in a pleasant mood.    SKIN: Focused exam of the face, scalp, back  - Left forehead, there is a stuck on 6 mm macule  - Left jaw line, right forehead are clear  - There are waxy stuck on tan to brown papules on the left temple, right lower cheek, left preauricular, right preauricular, frontal scalp, left mid back, right mid back x8, right lower  cheek.  - There is a tan to brown waxy stuck on papule with surrounding erythema on the left posterior thigh.   -No other lesions of concern on areas examined.       Impression/Plan:  1. Hx of NMSC, not addressed - skin exam due 11/2019.     2. Pigmented AK s/p biopsy and Efudex- resolved.     No further intervention needed.     3. Papule,  right forehead s/p Efudex - resolved.     No further intervention needed.     4.  Left forehead, there is a stuck on 6 mm macule. Likely an SK - hx of pruritus    After discussion of benefits and risks including but not limited to bleeding, infection, scar, incomplete removal, and non-diagnostic biopsy, written consent and photographs were obtained. The area was cleaned with isopropyl alcohol. 0.5 mL of 1% lidocaine was injected to obtain adequate anesthesia of the lesion on the left forehead, measuring 6 mm. A shave removal was performed. Hemostasis was achieved with aluminium chloride. Vaseline and a sterile dressing were applied. The patient tolerated the procedure and no complications were noted. The patient was provided with verbal and written post care instructions.     5. Seborrheic keratoses, left temple, right lower cheek, left preauricular, right preauricular, frontal scalp, left mid back, right mid back x8, right lower cheek.all itchy  Cryotherapy procedure note: After verbal consent and discussion of risks and benefits including but no limited to dyspigmentation/scar, blister, and pain, 14 was(were) treated with 1-2mm freeze border for 2 cycles with liquid nitrogen. Post cryotherapy instructions were provided.     6. Likely an ISK on the left posterior thigh - the patient self treated this area with Efudex    Will recheck at the patient's next appt.     Follow-up in 6 months due for skin exam      Staff Involved:  Scribe/Staff    Scribe Disclosure  I, Marvin Abraham, am serving as a scribe to document services personally performed by Dr. Arabella Sainz MD, based on data  collection and the provider's statements to me.     Provider Disclosure:   The documentation recorded by the scribe accurately reflects the services I personally performed and the decisions made by me.    Arabella Sainz MD    Department of Dermatology  [facility]: Phone: [phone], Fax:[phone]  [facility]: Phone: [phone], Fax: [phone]          Again, thank you for allowing me to participate in the care of your patient.        Sincerely,        Arabella Sainz MD

## 2019-03-19 NOTE — PROGRESS NOTES
[facility] Dermatology Note      Dermatology Problem List:  1. Actinic keratosis  -s/p cryotherapy  2. HAK, right hand, 5/2009  -s/p excision   3. Inflamed seborrheic keratosis, anterior, lateral and posterior scalp  -s/p removal 4/9/12  4. Hx of NMSC, outside system  - BCC, back, treated with ED & C    Encounter Date: Mar 19, 2019    CC:  Chief Complaint   Patient presents with     Derm Problem     spots on back scalp and face          History of Present Illness:  Mr. Preston Cai is a 83 year old male who presents as a follow-up for spot check of lesions on the left jaw line and right forehead. He was last seen in dermatology on 12/20/18 when these lesions were identified. Today, the patient has several areas of concern, all of which are very itchy. One on the right cheek was previously treated with cryotherapy, he thinks. There are similar itchy lesions on the left temple, right lower cheek, left preauricular, right preauricular, scalp and on the back, although these might not have been treated in the past, he says. The left jaw line has cleared up since the last visit.     Past Medical History:   Patient Active Problem List   Diagnosis     Seborrheic dermatitis     Allergic rhinitis due to other allergen     Cervical radiculopathy at C6     Personal history of malignant neoplasm of larynx     Essential tremor     CARDIOVASCULAR SCREENING; LDL GOAL LESS THAN 160     Benign prostatic hyperplasia     Hoarse voice quality     Advanced directives, counseling/discussion     Pseudophakia     Gastroesophageal reflux disease without esophagitis     Macular degeneration     Past Medical History:   Diagnosis Date     Acid reflux disease      Allergic rhinitis, cause unspecified      H/O magnetic resonance imaging of brain and brain stem 10/10/2016    MRI BRAIN WITH AND WITHOUT CONTRAST August 17, 2009 8:07:00 PM   HISTORY: Severe dizzy spells with imbalance and vomiting.   TECHNIQUE: Routine pulse  sequences without and with contrast were performed including thin section images through the IACs. 20 mL Magnevist given.   FINDINGS: Diffusion-weighted images are normal. The brain parenchyma, brainstem, ventricular system, and subarachnoid spaces a     Hematuria     Hematuria  - in 1990's      History of anemia 2002    Anemia-Iron Deficit     History of gastric ulcer      Macular degeneration      Malignant neoplasm of laryngeal cartilages (H)     Laryngeal CA (Radiation 1982)     Nonsenile cataract      PONV (postoperative nausea and vomiting)      Tremor 10/3/2016     Past Surgical History:   Procedure Laterality Date     APPENDECTOMY  8 years old     Biopsy of the throat - cancerous mass - got radiation for in larynx  1982     CATARACT IOL, RT/LT       COLONOSCOPY  2/25/2004    Normal     COLONOSCOPY N/A 4/28/2015    Procedure: COLONOSCOPY;  Surgeon: Marvin Adams MD;  Location: [facility] OR     COLONOSCOPY WITH CO2 INSUFFLATION N/A 4/28/2015    Procedure: COLONOSCOPY WITH CO2 INSUFFLATION;  Surgeon: Marvin Adams MD;  Location: [facility] OR     CYSTOSCOPY  1997    for hematuria - negative     EYE SURGERY Right     Rt eye.macular repair     EYE SURGERY  2003    cataract     NASAL ENDOSCOPY,DX  4/2007    GERD     Pars plana vitrectomy and epiretinal membrane dissection, right eye  11/8/2002     Phacoemulsification with intraocular lens implantation right eye.  3/11/2003     PHACOEMULSIFICATION WITH STANDARD INTRAOCULAR LENS IMPLANT Left 9/22/2016    Procedure: PHACOEMULSIFICATION WITH STANDARD INTRAOCULAR LENS IMPLANT;  Surgeon: Nghia Lara MD;  Location: [facility] OR     Thyroglossal Duct Cyst Removal - 2ndary to radiation.  1982     VASECTOMY  1971     ZZ GASTROSCOPY,FL  9/2007    hiatal hernia and errosions       Social History:  Patient  reports that he has quit smoking. he has never used smokeless tobacco. He reports that he drinks alcohol. He reports that he does not use drugs.    Family  History:  Family History   Problem Relation Age of Onset     Cerebrovascular Disease Mother         at 84 yo - possibly TIA - befoer     Macular Degeneration Mother      Gastrointestinal Disease Father      Cancer - colorectal Father      Hypertension Father      Cancer Father      Cancer Other      C.A.D. No family hx of      Diabetes No family hx of      Prostate Cancer No family hx of      Glaucoma No family hx of      Thyroid Disease No family hx of        Medications:  Current Outpatient Medications   Medication Sig Dispense Refill     primidone (MYSOLINE) 250 MG tablet Take 1 tablet (250 mg) by mouth At Bedtime 90 tablet 3     ranitidine (ZANTAC) 300 MG tablet TAKE ONE TABLET BY MOUTH EVERY NIGHT AT BEDTIME 90 tablet 3     tamsulosin (FLOMAX) 0.4 MG capsule Take 2 capsules (0.8 mg) by mouth daily 180 capsule 3     VITAMIN D, CHOLECALCIFEROL, PO Take 1,000 Units by mouth daily       cyanocobalamin (VITAMIN  B-12) 1000 MCG tablet Take 1,000 mcg by mouth daily         Allergies   Allergen Reactions     Seasonal Allergies      Hay fever        Review of Systems:  -Constitutional: Otherwise feeling well today, in usual state of health.  -Skin: As above in HPI. No additional skin concerns.    Physical exam:  Vitals: There were no vitals taken for this visit.  GEN: This is a well developed, well-nourished male in no acute distress, in a pleasant mood.    SKIN: Focused exam of the face, scalp, back  - Left forehead, there is a stuck on 6 mm macule  - Left jaw line, right forehead are clear  - There are waxy stuck on tan to brown papules on the left temple, right lower cheek, left preauricular, right preauricular, frontal scalp, left mid back, right mid back x8, right lower cheek.  - There is a tan to brown waxy stuck on papule with surrounding erythema on the left posterior thigh.   -No other lesions of concern on areas examined.       Impression/Plan:  1. Hx of NMSC, not addressed - skin exam due 11/2019.     2.  Pigmented AK s/p biopsy and Efudex- resolved.     No further intervention needed.     3. Papule,  right forehead s/p Efudex - resolved.     No further intervention needed.     4.  Left forehead, there is a stuck on 6 mm macule. Likely an SK - hx of pruritus    After discussion of benefits and risks including but not limited to bleeding, infection, scar, incomplete removal, and non-diagnostic biopsy, written consent and photographs were obtained. The area was cleaned with isopropyl alcohol. 0.5 mL of 1% lidocaine was injected to obtain adequate anesthesia of the lesion on the left forehead, measuring 6 mm. A shave removal was performed. Hemostasis was achieved with aluminium chloride. Vaseline and a sterile dressing were applied. The patient tolerated the procedure and no complications were noted. The patient was provided with verbal and written post care instructions.     5. Seborrheic keratoses, left temple, right lower cheek, left preauricular, right preauricular, frontal scalp, left mid back, right mid back x8, right lower cheek.all itchy  Cryotherapy procedure note: After verbal consent and discussion of risks and benefits including but no limited to dyspigmentation/scar, blister, and pain, 14 was(were) treated with 1-2mm freeze border for 2 cycles with liquid nitrogen. Post cryotherapy instructions were provided.     6. Likely an ISK on the left posterior thigh - the patient self treated this area with Efudex    Will recheck at the patient's next appt.     Follow-up in 6 months due for skin exam      Staff Involved:  Scribe/Staff    Scribe Disclosure  I, Marvin Abraham, am serving as a scribe to document services personally performed by Dr. Arabella Sainz MD, based on data collection and the provider's statements to me.     Provider Disclosure:   The documentation recorded by the scribe accurately reflects the services I personally performed and the decisions made by me.    Arabella Sainz MD  Assistant  Professor  Department of Dermatology  [facility]: Phone: [phone], Fax:[phone]  [facility]: Phone: [phone], Fax: [phone]

## 2019-03-19 NOTE — PATIENT INSTRUCTIONS
Cryotherapy    What is it?    Use of a very cold liquid, such as liquid nitrogen, to freeze and destroy abnormal skin cells that need to be removed    What should I expect?    Tenderness and redness    A small blister that might grow and fill with dark purple blood. There may be crusting.    More than one treatment may be needed if the lesions do not go away.    How do I care for the treated area?    Gently wash the area with your hands when bathing.    Use a thin layer of Vaseline to help with healing. You may use a Band-Aid.     The area should heal within 7-10 days and may leave behind a pink or lighter color.     Do not use an antibiotic or Neosporin ointment.     You may take acetaminophen (Tylenol) for pain.     Call your Doctor if you have:    Severe pain    Signs of infection (warmth, redness, cloudy yellow drainage, and or a bad smell)    Questions or concerns    Who should I call with questions?       [facility]: [phone]       [facility]: [phone]       For urgent needs outside of business hours call the [facility] at [phone]        and ask for the dermatology resident on call    Wound Care After a Biopsy    What is a skin biopsy?  A skin biopsy allows the doctor to examine a very small piece of tissue under the microscope to determine the diagnosis and the best treatment for the skin condition. A local anesthetic (numbing medicine)  is injected with a very small needle into the skin area to be tested. A small piece of skin is taken from the area. Sometimes a suture (stitch) is used.     What are the risks of a skin biopsy?  I will experience scar, bleeding, swelling, pain, crusting and redness. I may experience incomplete removal or recurrence. Risks of this procedure are excessive bleeding, bruising, infection, nerve damage, numbness, thick (hypertrophic or keloidal) scar and non-diagnostic biopsy.    How should I care  for my wound for the first 24 hours?    Keep the wound dry and covered for 24 hours    If it bleeds, hold direct pressure on the area for 15 minutes. If bleeding does not stop then go to the emergency room    Avoid strenuous exercise the first 1-2 days or as your doctor instructs you    How should I care for the wound after 24 hours?    After 24 hours, remove the bandage    You may bathe or shower as normal    If you had a scalp biopsy, you can shampoo as usual and can use shower water to clean the biopsy site daily    Clean the wound twice a day with gentle soap and water    Do not scrub, be gentle    Apply white petroleum/Vaseline after cleaning the wound with a cotton swab or a clean finger, and keep the site covered with a Bandaid /bandage. Bandages are not necessary with a scalp biopsy    If you are unable to cover the site with a Bandaid /bandage, re-apply ointment 2-3 times a day to keep the site moist. Moisture will help with healing    Avoid strenuous activity for first 1-2 days    Avoid lakes, rivers, pools, and oceans until the stitches are removed or the site is healed    How do I clean my wound?    Wash hands thoroughly with soap or use hand  before all wound care    Clean the wound with gentle soap and water    Apply white petroleum/Vaseline  to wound after it is clean    Replace the Bandaid /bandage to keep the wound covered for the first few days or as instructed by your doctor    If you had a scalp biopsy, warm shower water to the area on a daily basis should suffice    What should I use to clean my wound?     Cotton-tipped applicators (Qtips )    White petroleum jelly (Vaseline ). Use a clean new container and use Q-tips to apply.    Bandaids   as needed    Gentle soap     How should I care for my wound long term?    Do not get your wound dirty    Keep up with wound care for one week or until the area is healed.    A small scab will form and fall off by itself when the area is completely  healed. The area will be red and will become pink in color as it heals. Sun protection is very important for how your scar will turn out. Sunscreen with an SPF 30 or greater is recommended once the area is healed.    You should have some soreness but it should be mild and slowly go away over several days. Talk to your doctor about using tylenol for pain,    When should I call my doctor?  If you have increased:     Pain or swelling    Pus or drainage (clear or slightly yellow drainage is ok)    Temperature over 100F    Spreading redness or warmth around wound    When will I hear about my results?  The biopsy results can take 2-3 weeks to come back. The clinic will call you with the results, send you a [employer] message, or have you schedule a follow-up clinic or phone time to discuss the results. Contact our clinics if you do not hear from us in 3 weeks.     Who should I call with questions?    [facility]: [phone]     [facility]: [phone]    For urgent needs outside of business hours call the [facility] at [phone] and ask for the dermatology resident on call

## 2019-03-20 ENCOUNTER — ANCILLARY PROCEDURE (OUTPATIENT)
Dept: GENERAL RADIOLOGY | Facility: CLINIC | Age: 84
End: 2019-03-20
Attending: ORTHOPAEDIC SURGERY
Payer: COMMERCIAL

## 2019-03-20 ENCOUNTER — OFFICE VISIT (OUTPATIENT)
Dept: ORTHOPEDICS | Facility: CLINIC | Age: 84
End: 2019-03-20
Payer: COMMERCIAL

## 2019-03-20 VITALS
OXYGEN SATURATION: 97 % | SYSTOLIC BLOOD PRESSURE: 122 MMHG | BODY MASS INDEX: 23.72 KG/M2 | HEIGHT: 73 IN | WEIGHT: 179 LBS | RESPIRATION RATE: 16 BRPM | DIASTOLIC BLOOD PRESSURE: 76 MMHG | HEART RATE: 81 BPM

## 2019-03-20 DIAGNOSIS — G89.29 CHRONIC LEFT SHOULDER PAIN: Primary | ICD-10-CM

## 2019-03-20 DIAGNOSIS — M25.512 CHRONIC LEFT SHOULDER PAIN: ICD-10-CM

## 2019-03-20 DIAGNOSIS — M25.512 CHRONIC LEFT SHOULDER PAIN: Primary | ICD-10-CM

## 2019-03-20 DIAGNOSIS — G89.29 CHRONIC LEFT SHOULDER PAIN: ICD-10-CM

## 2019-03-20 PROCEDURE — 73030 X-RAY EXAM OF SHOULDER: CPT | Mod: LT

## 2019-03-20 PROCEDURE — 99203 OFFICE O/P NEW LOW 30 MIN: CPT | Mod: 25 | Performed by: ORTHOPAEDIC SURGERY

## 2019-03-20 PROCEDURE — 20610 DRAIN/INJ JOINT/BURSA W/O US: CPT | Mod: LT | Performed by: ORTHOPAEDIC SURGERY

## 2019-03-20 RX ORDER — BUPIVACAINE HYDROCHLORIDE 2.5 MG/ML
3 INJECTION, SOLUTION INFILTRATION; PERINEURAL
Status: DISCONTINUED | OUTPATIENT
Start: 2019-03-20 | End: 2020-01-22

## 2019-03-20 RX ORDER — TRIAMCINOLONE ACETONIDE 40 MG/ML
80 INJECTION, SUSPENSION INTRA-ARTICULAR; INTRAMUSCULAR
Status: DISCONTINUED | OUTPATIENT
Start: 2019-03-20 | End: 2020-01-22

## 2019-03-20 RX ORDER — LIDOCAINE HYDROCHLORIDE 10 MG/ML
4 INJECTION, SOLUTION INFILTRATION; PERINEURAL
Status: DISCONTINUED | OUTPATIENT
Start: 2019-03-20 | End: 2020-01-22

## 2019-03-20 RX ADMIN — LIDOCAINE HYDROCHLORIDE 4 ML: 10 INJECTION, SOLUTION INFILTRATION; PERINEURAL at 15:36

## 2019-03-20 RX ADMIN — BUPIVACAINE HYDROCHLORIDE 3 ML: 2.5 INJECTION, SOLUTION INFILTRATION; PERINEURAL at 15:36

## 2019-03-20 RX ADMIN — TRIAMCINOLONE ACETONIDE 80 MG: 40 INJECTION, SUSPENSION INTRA-ARTICULAR; INTRAMUSCULAR at 15:36

## 2019-03-20 ASSESSMENT — PAIN SCALES - GENERAL: PAINLEVEL: EXTREME PAIN (8)

## 2019-03-20 ASSESSMENT — MIFFLIN-ST. JEOR: SCORE: 1565.82

## 2019-03-20 NOTE — PROGRESS NOTES
CHIEF COMPLAINT:   Chief Complaint   Patient presents with     Left Shoulder - Pain     Onset: many years. Pain is on top of the shoulder and down the upper arm. Has a lot of soreness in his neck. His right shoulder will hurt at night too. No treatments. At times he will have N/T in his arms depending on how he holds his neck.     Shoulder Pain     Has pain when he lifts his arm up.        HISTORY:  Preston Cai is a 83 year old male, right  -hand dominant, who is seen in consultation at the request of Tyrel Marmolejo  for left shoulder pain that started many years ago. Locates pain side/front of shoulder with reaching, at night. No specific injury. Also as neck pain, arthritis. Ok at rest. Will have numbness and tingling in his arm depending on how he holds his neck. No treatment.    Onset: years ago.  Symptoms have been worsening since that time.  Aggrevated by: reaching, lifting, night.  Relieved by: rest  Present symptoms: pain with ADL's (dressing),  pain with overhead activities,  pain reaching behind back,  pain reaching out or away from body (flexion/ abduction),  positional night pain,  pain lifting,  Pain location: lateral shoulder, deltoid and upper arm and anterior  Pain severity: 8/10  Pain quality: sharp  Frequency of symptoms: frequently  Associated symptoms: neck pain, numbness/tingling down the arm.    Treatment up to this point:nothing    Usual level of work activity: retired.    Other PMH:  has a past medical history of Acid reflux disease, Allergic rhinitis, cause unspecified, H/O magnetic resonance imaging of brain and brain stem (10/10/2016), Hematuria, History of anemia (2002), History of gastric ulcer, Macular degeneration, Malignant neoplasm of laryngeal cartilages (H), Nonsenile cataract, PONV (postoperative nausea and vomiting), and Tremor (10/3/2016).  Patient Active Problem List    Diagnosis Date Noted     Macular degeneration 10/18/2016     Priority: Medium      "Gastroesophageal reflux disease without esophagitis 06/17/2015     Priority: Medium     Pseudophakia 07/02/2014     Priority: Medium     Advanced directives, counseling/discussion 02/17/2014     Priority: Medium     Advance Care Planning:   ACP Review and Resources Provided:  Reviewed chart for advance care plan.  Preston Cai has no plan or code status on file. Discussed available resources and provided with information on 02/17/2014. Confirmed code status reflects current choices pending further ACP discussions.  Confirmed/documented designated decision maker(s). See permanent comments section of demographics in clinical tab.   Added by Lindsey Gongora on 3/20/2014  Patient does not have an Advance/Health Care Directive (HCD), given \"What is Advance Care Planning?\" flyer and requests blank HCD form.  Angelina Anthony  February 17, 2014       Hoarse voice quality 03/07/2012     Priority: Medium     Benign prostatic hyperplasia 03/24/2011     Priority: Medium     CARDIOVASCULAR SCREENING; LDL GOAL LESS THAN 160 10/31/2010     Priority: Medium     Essential tremor 04/28/2010     Priority: Medium     Followed by Dr Diaz ( [facility] ) : takes primodone        Personal history of malignant neoplasm of larynx 10/05/2009     Priority: Medium     1982         Cervical radiculopathy at C6 01/02/2009     Priority: Medium     C5-C6  mild central and mild/moderate bilateral neural foraminal stenosis. with Left radicular pain. trial epidural, PNB           Seborrheic dermatitis 04/10/2007     Priority: Medium     scalp and ear canals - triamcilnolone to knock down, then coal tar to maintain.  Problem list name updated by automated process. Provider to review       Allergic rhinitis due to other allergen 04/10/2007     Priority: Medium     continue flonase - good control with.  Didn't use over winter.           Surgical Hx:  has a past surgical history that includes appendectomy (8 years old); nasal endoscopy,dx (4/2007); " cystoscopy (1997); zz gastroscopy,fl (9/2007); Phacoemulsification with intraocular lens implantation right eye. (3/11/2003); Pars plana vitrectomy and epiretinal membrane dissection, right eye (11/8/2002); Biopsy of the throat - cancerous mass - got radiation for in larynx (1982); Thyroglossal Duct Cyst Removal - 2ndary to radiation. (1982); Vasectomy (1971); Colonoscopy (2/25/2004); Eye surgery (Right); Colonoscopy (N/A, 4/28/2015); Colonoscopy with CO2 insufflation (N/A, 4/28/2015); Phacoemulsification with standard intraocular lens implant (Left, 9/22/2016); Eye surgery (2003); and cataract iol, rt/lt.    Medications:   Current Outpatient Medications:      cyanocobalamin (VITAMIN  B-12) 1000 MCG tablet, Take 1,000 mcg by mouth daily, Disp: , Rfl:      primidone (MYSOLINE) 250 MG tablet, Take 1 tablet (250 mg) by mouth At Bedtime, Disp: 90 tablet, Rfl: 3     ranitidine (ZANTAC) 300 MG tablet, TAKE ONE TABLET BY MOUTH EVERY NIGHT AT BEDTIME, Disp: 90 tablet, Rfl: 3     tamsulosin (FLOMAX) 0.4 MG capsule, Take 2 capsules (0.8 mg) by mouth daily, Disp: 180 capsule, Rfl: 3     VITAMIN D, CHOLECALCIFEROL, PO, Take 1,000 Units by mouth daily, Disp: , Rfl:     Allergies:   Allergies   Allergen Reactions     Seasonal Allergies      Hay fever        Social Hx: retired.  reports that he has quit smoking. he has never used smokeless tobacco. He reports that he drinks alcohol. He reports that he does not use drugs.    Family Hx: family history includes Cancer in his father and another family member; Cancer - colorectal in his father; Cerebrovascular Disease in his mother; Gastrointestinal Disease in his father; Hypertension in his father; Macular Degeneration in his mother..    REVIEW OF SYSTEMS: 10 point ROS neg other than the symptoms noted above in the HPI and PMH. Notables include  CONSTITUTIONAL:NEGATIVE for fever, chills, change in weight  INTEGUMENTARY/SKIN: NEGATIVE for worrisome rashes, moles or  "lesions  MUSCULOSKELETAL:See HPI above  NEURO: NEGATIVE for weakness, dizziness or paresthesias    PHYSICAL EXAM:  /76   Pulse 81   Resp 16   Ht 1.854 m (6' 1\")   Wt 81.2 kg (179 lb)   SpO2 97%   BMI 23.62 kg/m     GENERAL APPEARANCE: healthy, alert, no distress  SKIN: no suspicious lesions or rashes  NEURO: Normal strength and tone, mentation intact and speech normal  PSYCH:  mentation appears normal and affect normal, not anxious  RESPIRATORY: No increased work of breathing.  VASCULAR: Radial pulses 2+ and brisk cappillary refill   LYMPH: no palpable axillary lymphadenopathy or cervical neck lymphadenopathy.      MUSCULOSKELETAL:      RIGHT UPPER EXTREMITY:  Sensation intact to light touch in median, radial, ulnar and axillary nerve distributions  Palpable 2+ radial pulse, brisk capillary refill to all fingers, wwp  Intact epl fpl fdp edc wrist flexion/extension biceps triceps deltoid    RIGHT SHOULDER:  Shoulder Inspection: no swelling, bruising, discoloration, or obvious deformity or asymmetry  mild muscle atrophy diffuse    Tender: none.  Range of Motion:   Active:forward flexion 150 degrees, external rotation 50 degrees, internal rotation  T10   Passive: external rotation  60 degrees  Strength: forward flexion 5/5, External rotation 5/5  Impingement: negative.  Special tests: Empty Can: Negative    LEFT UPPER EXTREMITY:  Sensation intact to light touch in median, radial, ulnar and axillary nerve distributions  Palpable 2+ radial pulse, brisk capillary refill to all fingers, wwp  Intact epl fpl fdp edc wrist flexion/extension biceps triceps deltoid    LEFT SHOULDER:  Shoulder Inspection: no swelling, bruising, discoloration, or obvious deformity or asymmetry  Mild-moderate muscle atrophy diffuse    Tender: acromion and greater tuberosity  Non-tender: AC joint  Range of Motion:   Active:forward flexion 100 degrees, external rotation 40 degrees, internal rotation  hip pocket   Passive: forward flexion " 100 degrees, external rotation  50 degrees  Strength: forward flexion 4+/5, External rotation 4+/5   Impingement: all grade 2 positive  Special tests: Empty Can: Positive    X-RAY INTERPRETATION: 3 views left  shoulder obtained 3/20/2019 were reviewed personally in clinic today with the patient. On my review, moderate gleno-humeral degenerative changes.    ASSESSMENT: Preston Cai is a 83 year old male, right  -hand dominant with chronic left shoulder pain, primary gleno-humeral osteoarthritis, impingement syndrome, likely chronic rotator cuff tear and arthropathy.    PLAN:   * xrays reviewed showing osteoarthritis. Given weakness, likely rotator cuff tear.  * treatment nonsurgical with injections, Physical Therapy, over the counter pain control  * surgical treatment with shoulder arthroplasty, primary versus reverse total shoulder arthroplasty depending on rotator cuff integrity.  * at this time, he's not interested in surgery.  * will proceed with injection. Given likelihood of rotator cuff tear, will proceed with subacromial injection today. If doesn't help, consider gleno-humeral injection.  * activity modification, nsaids versus tylenol, Physical Therapy.  * return to clinic as needed.        Marlo Rubalcava M.D., M.S.  Dept. of Orthopaedic Surgery  [facility]    PROCEDURE NOTE:  The risks, perceived benefits and potential complications (including but not limited to: bleeding, infection, pain, scar, damage to adjacent structures, atrophy or necrosis of soft tissue, skin blanching, failure to relieve symptoms, worsening of symptoms, allergic reaction) of injection were discussed with the patient. Questions were addressed and answered.The patient elected to proceed. Written informed consent was obtained. The correct procedural site was identified and confirmed. A Left Shoulder subacromial injection was performed using 2mL Kenalog-40 40mg per mL and 7mL (4mL 1% lidocaine, 3mL 0.25% marcaine)  of  local anesthetic after sterile prep, to the correct procedural site. Sterile bandaid applied. This was tolerated well by the patient. No apparent complications. Did also discuss that if diabetic, recommend close monitoring of blood sugars over the next week as cortisone injections can temporarily elevate blood sugars.      Marlo Rubalcava M.D., M.S.  Dept. of Orthopaedic Surgery  [facility]      Large Joint Injection/Arthocentesis: L subacromial bursa  Date/Time: 3/20/2019 3:36 PM  Performed by: Giuliano Raymundo PA  Authorized by: Marlo Rubalcava MD     Indications:  Pain  Indications comment:  Impingement  Needle Size:  22 G  Guidance: landmark guided    Approach:  Posterolateral  Location:  Shoulder  Site:  L subacromial bursa  Medications:  3 mL bupivacaine 0.25 %; 4 mL lidocaine 1 %; 80 mg triamcinolone 40 MG/ML  Outcome:  Tolerated well, no immediate complications  Procedure discussed: discussed risks, benefits, and alternatives    Consent Given by:  Patient  Prep: patient was prepped and draped in usual sterile fashion

## 2019-03-20 NOTE — LETTER
3/20/2019         RE: Preston Cai  [address]  [facility] [place] [address]        Dear Colleague,    Thank you for referring your patient, Preston Cai, to the [facility]. Please see a copy of my visit note below.    CHIEF COMPLAINT:   Chief Complaint   Patient presents with     Left Shoulder - Pain     Onset: many years. Pain is on top of the shoulder and down the upper arm. Has a lot of soreness in his neck. His right shoulder will hurt at night too. No treatments. At times he will have N/T in his arms depending on how he holds his neck.     Shoulder Pain     Has pain when he lifts his arm up.        HISTORY:  Preston Cai is a 83 year old male, right  -hand dominant, who is seen in consultation at the request of Tyrel Marmolejo  for left shoulder pain that started many years ago. Locates pain side/front of shoulder with reaching, at night. No specific injury. Also as neck pain, arthritis. Ok at rest. Will have numbness and tingling in his arm depending on how he holds his neck. No treatment.    Onset: years ago.  Symptoms have been worsening since that time.  Aggrevated by: reaching, lifting, night.  Relieved by: rest  Present symptoms: pain with ADL's (dressing),  pain with overhead activities,  pain reaching behind back,  pain reaching out or away from body (flexion/ abduction),  positional night pain,  pain lifting,  Pain location: lateral shoulder, deltoid and upper arm and anterior  Pain severity: 8/10  Pain quality: sharp  Frequency of symptoms: frequently  Associated symptoms: neck pain, numbness/tingling down the arm.    Treatment up to this point:nothing    Usual level of work activity: retired.    Other PMH:  has a past medical history of Acid reflux disease, Allergic rhinitis, cause unspecified, H/O magnetic resonance imaging of brain and brain stem (10/10/2016), Hematuria, History of anemia (2002), History of gastric ulcer, Macular degeneration,  "Malignant neoplasm of laryngeal cartilages (H), Nonsenile cataract, PONV (postoperative nausea and vomiting), and Tremor (10/3/2016).  Patient Active Problem List    Diagnosis Date Noted     Macular degeneration 10/18/2016     Priority: Medium     Gastroesophageal reflux disease without esophagitis 06/17/2015     Priority: Medium     Pseudophakia 07/02/2014     Priority: Medium     Advanced directives, counseling/discussion 02/17/2014     Priority: Medium     Advance Care Planning:   ACP Review and Resources Provided:  Reviewed chart for advance care plan.  Preston Cai has no plan or code status on file. Discussed available resources and provided with information on 02/17/2014. Confirmed code status reflects current choices pending further ACP discussions.  Confirmed/documented designated decision maker(s). See permanent comments section of demographics in clinical tab.   Added by Lindsey Gongora on 3/20/2014  Patient does not have an Advance/Health Care Directive (HCD), given \"What is Advance Care Planning?\" flyer and requests blank HCD form.  Angelina Cruz  February 17, 2014       Hoarse voice quality 03/07/2012     Priority: Medium     Benign prostatic hyperplasia 03/24/2011     Priority: Medium     CARDIOVASCULAR SCREENING; LDL GOAL LESS THAN 160 10/31/2010     Priority: Medium     Essential tremor 04/28/2010     Priority: Medium     Followed by Dr Diaz ( [facility] ) : takes primodone        Personal history of malignant neoplasm of larynx 10/05/2009     Priority: Medium     1982         Cervical radiculopathy at C6 01/02/2009     Priority: Medium     C5-C6  mild central and mild/moderate bilateral neural foraminal stenosis. with Left radicular pain. trial epidural, PNB           Seborrheic dermatitis 04/10/2007     Priority: Medium     scalp and ear canals - triamcilnolone to knock down, then coal tar to maintain.  Problem list name updated by automated process. Provider to review       Allergic rhinitis " due to other allergen 04/10/2007     Priority: Medium     continue flonase - good control with.  Didn't use over winter.           Surgical Hx:  has a past surgical history that includes appendectomy (8 years old); nasal endoscopy,dx (4/2007); cystoscopy (1997); zz gastroscopy,fl (9/2007); Phacoemulsification with intraocular lens implantation right eye. (3/11/2003); Pars plana vitrectomy and epiretinal membrane dissection, right eye (11/8/2002); Biopsy of the throat - cancerous mass - got radiation for in larynx (1982); Thyroglossal Duct Cyst Removal - 2ndary to radiation. (1982); Vasectomy (1971); Colonoscopy (2/25/2004); Eye surgery (Right); Colonoscopy (N/A, 4/28/2015); Colonoscopy with CO2 insufflation (N/A, 4/28/2015); Phacoemulsification with standard intraocular lens implant (Left, 9/22/2016); Eye surgery (2003); and cataract iol, rt/lt.    Medications:   Current Outpatient Medications:      cyanocobalamin (VITAMIN  B-12) 1000 MCG tablet, Take 1,000 mcg by mouth daily, Disp: , Rfl:      primidone (MYSOLINE) 250 MG tablet, Take 1 tablet (250 mg) by mouth At Bedtime, Disp: 90 tablet, Rfl: 3     ranitidine (ZANTAC) 300 MG tablet, TAKE ONE TABLET BY MOUTH EVERY NIGHT AT BEDTIME, Disp: 90 tablet, Rfl: 3     tamsulosin (FLOMAX) 0.4 MG capsule, Take 2 capsules (0.8 mg) by mouth daily, Disp: 180 capsule, Rfl: 3     VITAMIN D, CHOLECALCIFEROL, PO, Take 1,000 Units by mouth daily, Disp: , Rfl:     Allergies:   Allergies   Allergen Reactions     Seasonal Allergies      Hay fever        Social Hx: retired.  reports that he has quit smoking. he has never used smokeless tobacco. He reports that he drinks alcohol. He reports that he does not use drugs.    Family Hx: family history includes Cancer in his father and another family member; Cancer - colorectal in his father; Cerebrovascular Disease in his mother; Gastrointestinal Disease in his father; Hypertension in his father; Macular Degeneration in his mother..    REVIEW  "OF SYSTEMS: 10 point ROS neg other than the symptoms noted above in the HPI and PMH. Notables include  CONSTITUTIONAL:NEGATIVE for fever, chills, change in weight  INTEGUMENTARY/SKIN: NEGATIVE for worrisome rashes, moles or lesions  MUSCULOSKELETAL:See HPI above  NEURO: NEGATIVE for weakness, dizziness or paresthesias    PHYSICAL EXAM:  /76   Pulse 81   Resp 16   Ht 1.854 m (6' 1\")   Wt 81.2 kg (179 lb)   SpO2 97%   BMI 23.62 kg/m      GENERAL APPEARANCE: healthy, alert, no distress  SKIN: no suspicious lesions or rashes  NEURO: Normal strength and tone, mentation intact and speech normal  PSYCH:  mentation appears normal and affect normal, not anxious  RESPIRATORY: No increased work of breathing.  VASCULAR: Radial pulses 2+ and brisk cappillary refill   LYMPH: no palpable axillary lymphadenopathy or cervical neck lymphadenopathy.      MUSCULOSKELETAL:      RIGHT UPPER EXTREMITY:  Sensation intact to light touch in median, radial, ulnar and axillary nerve distributions  Palpable 2+ radial pulse, brisk capillary refill to all fingers, wwp  Intact epl fpl fdp edc wrist flexion/extension biceps triceps deltoid    RIGHT SHOULDER:  Shoulder Inspection: no swelling, bruising, discoloration, or obvious deformity or asymmetry  mild muscle atrophy diffuse    Tender: none.  Range of Motion:   Active:forward flexion 150 degrees, external rotation 50 degrees, internal rotation  T10   Passive: external rotation  60 degrees  Strength: forward flexion 5/5, External rotation 5/5  Impingement: negative.  Special tests: Empty Can: Negative    LEFT UPPER EXTREMITY:  Sensation intact to light touch in median, radial, ulnar and axillary nerve distributions  Palpable 2+ radial pulse, brisk capillary refill to all fingers, wwp  Intact epl fpl fdp edc wrist flexion/extension biceps triceps deltoid    LEFT SHOULDER:  Shoulder Inspection: no swelling, bruising, discoloration, or obvious deformity or asymmetry  Mild-moderate " muscle atrophy diffuse    Tender: acromion and greater tuberosity  Non-tender: AC joint  Range of Motion:   Active:forward flexion 100 degrees, external rotation 40 degrees, internal rotation  hip pocket   Passive: forward flexion 100 degrees, external rotation  50 degrees  Strength: forward flexion 4+/5, External rotation 4+/5   Impingement: all grade 2 positive  Special tests: Empty Can: Positive    X-RAY INTERPRETATION: 3 views left  shoulder obtained 3/20/2019 were reviewed personally in clinic today with the patient. On my review, moderate gleno-humeral degenerative changes.    ASSESSMENT: Preston Cai is a 83 year old male, right  -hand dominant with chronic left shoulder pain, primary gleno-humeral osteoarthritis, impingement syndrome, likely chronic rotator cuff tear and arthropathy.    PLAN:   * xrays reviewed showing osteoarthritis. Given weakness, likely rotator cuff tear.  * treatment nonsurgical with injections, Physical Therapy, over the counter pain control  * surgical treatment with shoulder arthroplasty, primary versus reverse total shoulder arthroplasty depending on rotator cuff integrity.  * at this time, he's not interested in surgery.  * will proceed with injection. Given likelihood of rotator cuff tear, will proceed with subacromial injection today. If doesn't help, consider gleno-humeral injection.  * activity modification, nsaids versus tylenol, Physical Therapy.  * return to clinic as needed.        Marlo Rubalcava M.D., M.S.  Dept. of Orthopaedic Surgery  [facility]    PROCEDURE NOTE:  The risks, perceived benefits and potential complications (including but not limited to: bleeding, infection, pain, scar, damage to adjacent structures, atrophy or necrosis of soft tissue, skin blanching, failure to relieve symptoms, worsening of symptoms, allergic reaction) of injection were discussed with the patient. Questions were addressed and answered.The patient elected to proceed.  Written informed consent was obtained. The correct procedural site was identified and confirmed. A Left Shoulder subacromial injection was performed using 2mL Kenalog-40 40mg per mL and 7mL (4mL 1% lidocaine, 3mL 0.25% marcaine)  of local anesthetic after sterile prep, to the correct procedural site. Sterile bandaid applied. This was tolerated well by the patient. No apparent complications. Did also discuss that if diabetic, recommend close monitoring of blood sugars over the next week as cortisone injections can temporarily elevate blood sugars.      Marlo Rubalcava M.D., M.S.  Dept. of Orthopaedic Surgery  [facility]      Large Joint Injection/Arthocentesis: L subacromial bursa  Date/Time: 3/20/2019 3:36 PM  Performed by: Giuliano Raymundo PA  Authorized by: Marlo Rubalcava MD     Indications:  Pain  Indications comment:  Impingement  Needle Size:  22 G  Guidance: landmark guided    Approach:  Posterolateral  Location:  Shoulder  Site:  L subacromial bursa  Medications:  3 mL bupivacaine 0.25 %; 4 mL lidocaine 1 %; 80 mg triamcinolone 40 MG/ML  Outcome:  Tolerated well, no immediate complications  Procedure discussed: discussed risks, benefits, and alternatives    Consent Given by:  Patient  Prep: patient was prepped and draped in usual sterile fashion            Again, thank you for allowing me to participate in the care of your patient.        Sincerely,        Marlo Rubalcava MD

## 2019-03-21 LAB — COPATH REPORT: NORMAL

## 2019-04-01 ENCOUNTER — THERAPY VISIT (OUTPATIENT)
Dept: PHYSICAL THERAPY | Facility: CLINIC | Age: 84
End: 2019-04-01
Attending: PHYSICIAN ASSISTANT
Payer: COMMERCIAL

## 2019-04-01 DIAGNOSIS — M25.512 LEFT SHOULDER PAIN, UNSPECIFIED CHRONICITY: Primary | ICD-10-CM

## 2019-04-01 DIAGNOSIS — G89.29 CHRONIC LEFT SHOULDER PAIN: ICD-10-CM

## 2019-04-01 DIAGNOSIS — M25.512 CHRONIC LEFT SHOULDER PAIN: ICD-10-CM

## 2019-04-01 PROCEDURE — 97110 THERAPEUTIC EXERCISES: CPT | Mod: GP | Performed by: PHYSICAL THERAPIST

## 2019-04-01 PROCEDURE — 97112 NEUROMUSCULAR REEDUCATION: CPT | Mod: GP | Performed by: PHYSICAL THERAPIST

## 2019-04-01 PROCEDURE — 97161 PT EVAL LOW COMPLEX 20 MIN: CPT | Mod: GP | Performed by: PHYSICAL THERAPIST

## 2019-04-01 NOTE — PROGRESS NOTES
[facility] Initial Evaluation  Subjective:  The history is provided by the patient. No  was used.   Preston Cai is a 83 year old male with a left shoulder condition.  Condition occurred with:  Unknown cause.  Condition occurred: for unknown reasons.  This is a recurrent and chronic condition  Patient presents to PT Today with c/o L shoulder pain.  Patient does have a history of neck pain; received some PT for Neck pain ~ 10 years ago.  Patient stated the L shoulder pain started many years ago and has progressively worsened in recent months.  Pt also reports some discomfort in R shoulder as well.    Referred to PT: 3/20/19.    Patient reports pain:  Anterior, in the joint and lateral.  Radiates to:  Upper arm.  Pain is described as aching and sharp and is intermittent and reported as 8/10 (with reaching).  Associated symptoms:  Painful arc and loss of motion/stiffness. Pain is worse during the day.  Symptoms are exacerbated by lifting, using arm behind back, using arm overhead, lying on extremity and using arm at shoulder level and relieved by rest, activity/movement and NSAID's.  Since onset symptoms are gradually improving.  Special tests:  X-ray (see epic).  Previous treatment includes other (cortison injection 2 weeks ago).  There was mild improvement following previous treatment.  General health as reported by patient is good.  Pertinent medical history includes:  Cancer, concussions/dizziness, numbness/tingling and smoking (previous smoker).  Medical allergies: no.  Other surgeries include:  Cancer surgery and other (throat CA 1982; apendectomy (age 8)and thyroid surgery).  Current medications:  Anti-inflammatory and other (tremor and Acid reflux; and medication for incontinence, Vit D.).  Current occupation is Retired;  Likes to walk and do some arm exercises.  Patient lives with his wife;  [facility]        Barriers include:  None as reported by the  patient.    Red flags:  None as reported by the patient.                        Objective:  Standing Alignment:    Cervical/Thoracic:  Forward head, cervical lordosis decreased and thoracic kyphosis decreased  Shoulder/UE:  Rounded shoulders  Lumbar:  Lordosis decr                                Cervical/Thoracic Evaluation    AROM:  AROM Cervical:    Flexion:          Chin to chest  Extension:       Mod loss  Rotation:         Left: min loss     Right: min loss  Side Bend:      Left:     Right:                 Cervical Palpation:    Tenderness present at Left:    Upper Trap and Levator  Tenderness present at Right:    Upper Trap and Levator               Shoulder Evaluation:  ROM:  AROM:    Flexion:  Left:  105    Right:  166  Extension: Left: 43Right: 52  Abduction:  Left: 81   Right:  152            Elbow Flexion:  Left:  WNL    Right:  WNL  Elbow Extension:  Left:  WNL   Right:  WNL    Extension/Internal Rotation:  Left:  Waist    Right:  T9          Strength:    Flexion: Left:4-/5    Pain: -      Extension:  Left: 5/5    Pain:    Right: 5/5    Pain:  Abduction:  Left: 4-/5   Pain:-    Right: 4/5     Pain:  Adduction:  Left: 5/5    Pain:    Right: 5/5     Pain:  Internal Rotation:  Left:5/5     Pain:    Right: 5/5     Pain:  External Rotation:   Left:4-/5      Pain:-   Right:4+/5     Pain:        Elbow Flexion:  Left:4+/5      Pain:-    Right:5/5     Pain:  Elbow Extension:  Left:4/5     Pain:    Right:4/5     Pain:    Special Tests:    Left shoulder positive for the following special tests:  Impingement    Palpation:    Left shoulder tenderness present at:  Acrimioclavicular; Supraspinatus; Infraspinatus; Levator; Upper Trap and Bicipital Groove    Mobility Tests:      Glenohumeral posterior left:  Hypomobile    Glenohumeral inferior left:  Hypomobile                                             General     ROS    Assessment/Plan:    Patient is a 83 year old male with left side shoulder complaints.    Patient  has the following significant findings with corresponding treatment plan.                Diagnosis 1:  L Shoulder Pain  Pain -  hot/cold therapy, US and manual therapy  Decreased ROM/flexibility - manual therapy, therapeutic exercise and therapeutic activity  Decreased joint mobility - manual therapy, therapeutic exercise and therapeutic activity  Decreased strength - therapeutic exercise, therapeutic activities and home program  Impaired muscle performance - neuro re-education  Decreased function - therapeutic activities  Impaired posture - neuro re-education and therapeutic activities    Therapy Evaluation Codes:   1) History comprised of:   Personal factors that impact the plan of care:      Age.    Comorbidity factors that impact the plan of care are:      None.     Medications impacting care: Anti-inflammatory.  2) Examination of Body Systems comprised of:   Body structures and functions that impact the plan of care:      Shoulder.   Activity limitations that impact the plan of care are:      Bathing, Dressing, Lifting, Laying down and reaching.  3) Clinical presentation characteristics are:   Stable/Uncomplicated.  4) Decision-Making    Low complexity using standardized patient assessment instrument and/or measureable assessment of functional outcome.  Cumulative Therapy Evaluation is: Low complexity.    Previous and current functional limitations:  (See Goal Flow Sheet for this information)    Short term and Long term goals: (See Goal Flow Sheet for this information)     Communication ability:  Patient appears to be able to clearly communicate and understand verbal and written communication and follow directions correctly.  Treatment Explanation - The following has been discussed with the patient:   RX ordered/plan of care  Anticipated outcomes  Possible risks and side effects  This patient would benefit from PT intervention to resume normal activities.   Rehab potential is good.    Frequency:  1 X week, once  daily  Duration:  for 6 visits  Discharge Plan:  Achieve all LTG.  Independent in home treatment program.  Reach maximal therapeutic benefit.    Please refer to the daily flowsheet for treatment today, total treatment time and time spent performing 1:1 timed codes.

## 2019-04-03 ENCOUNTER — MYC MEDICAL ADVICE (OUTPATIENT)
Dept: FAMILY MEDICINE | Facility: CLINIC | Age: 84
End: 2019-04-03

## 2019-04-15 ENCOUNTER — THERAPY VISIT (OUTPATIENT)
Dept: PHYSICAL THERAPY | Facility: CLINIC | Age: 84
End: 2019-04-15
Payer: COMMERCIAL

## 2019-04-15 DIAGNOSIS — M25.512 LEFT SHOULDER PAIN, UNSPECIFIED CHRONICITY: ICD-10-CM

## 2019-04-15 PROCEDURE — 97110 THERAPEUTIC EXERCISES: CPT | Mod: GP | Performed by: PHYSICAL THERAPIST

## 2019-04-15 PROCEDURE — 97140 MANUAL THERAPY 1/> REGIONS: CPT | Mod: GP | Performed by: PHYSICAL THERAPIST

## 2019-04-16 ENCOUNTER — ALLIED HEALTH/NURSE VISIT (OUTPATIENT)
Dept: NURSING | Facility: CLINIC | Age: 84
End: 2019-04-16
Payer: COMMERCIAL

## 2019-04-16 DIAGNOSIS — Z23 NEED FOR VACCINATION: Primary | ICD-10-CM

## 2019-04-16 PROCEDURE — 90471 IMMUNIZATION ADMIN: CPT

## 2019-04-16 PROCEDURE — 90750 HZV VACC RECOMBINANT IM: CPT

## 2019-04-16 PROCEDURE — 99207 ZZC NO CHARGE LOS: CPT

## 2019-04-16 NOTE — NURSING NOTE
1.  Has the patient received the information for the sHINGRIX vaccine? YES    2.  Does the patient have any of the following contraindications?     Allergy to Neomycin or Gelatin? No       Current moderate or severe illness? No  Temp = 97.9  If temp > 99.0 degrees orally or patient has above allergies, vaccine is deferred    3.  The vaccine has been administered in the usual fashion and the patient was instructed to wait 15 minutes before leaving the building in the event of an allergic reaction: YES    Vaccination given by DONALD Hannon MA    Recorded by Citlaly Hannon

## 2019-04-17 ENCOUNTER — PRE VISIT (OUTPATIENT)
Dept: NEUROLOGY | Facility: CLINIC | Age: 84
End: 2019-04-17

## 2019-04-17 NOTE — TELEPHONE ENCOUNTER
[facility] CLINICAL DOCUMENTATION    Pre-Visit Planning   PREVISIT INFORMATION                                                    Preston GOLDSTEIN Andreekaterina scheduled for future visit at [facility].    Patient is scheduled to see Una (provider) on 4/18/19 (date)  Reason for visit: Lumbosacral radiculopathy at L5 [M54.17]   Referring provider Vocal  Has patient seen previous specialist? Yes.  Name of provider [place], Clinic/Facility [facility].   Medical Records:  Available in chart.  Patient was previously seen at a [place] or [facility] facility.    REVIEW                                                      New patient packet mailed to patient: Yes  Medication reconciliation complete: No      Current Outpatient Medications   Medication Sig Dispense Refill     cyanocobalamin (VITAMIN  B-12) 1000 MCG tablet Take 1,000 mcg by mouth daily       primidone (MYSOLINE) 250 MG tablet Take 1 tablet (250 mg) by mouth At Bedtime 90 tablet 3     ranitidine (ZANTAC) 300 MG tablet TAKE ONE TABLET BY MOUTH EVERY NIGHT AT BEDTIME 90 tablet 3     tamsulosin (FLOMAX) 0.4 MG capsule Take 2 capsules (0.8 mg) by mouth daily 180 capsule 3     VITAMIN D, CHOLECALCIFEROL, PO Take 1,000 Units by mouth daily         Allergies: Seasonal allergies    (insert provider dot-phrase for provider specific visit requirements)    PLAN/FOLLOW-UP NEEDED                                                      Previsit review complete.  Patient will see provider at future scheduled appointment.     Patient Reminders Given:  Please, make sure you bring an updated list of your medications.   If you are having a procedure, please, present 15 minutes early.  If you need to cancel or reschedule,please call [phone].    Darla Severin-Brown

## 2019-04-18 ENCOUNTER — MYC MEDICAL ADVICE (OUTPATIENT)
Dept: FAMILY MEDICINE | Facility: CLINIC | Age: 84
End: 2019-04-18

## 2019-04-18 ENCOUNTER — OFFICE VISIT (OUTPATIENT)
Dept: NEUROLOGY | Facility: CLINIC | Age: 84
End: 2019-04-18
Payer: COMMERCIAL

## 2019-04-18 VITALS
BODY MASS INDEX: 22.95 KG/M2 | HEART RATE: 74 BPM | DIASTOLIC BLOOD PRESSURE: 68 MMHG | SYSTOLIC BLOOD PRESSURE: 112 MMHG | HEIGHT: 73 IN | WEIGHT: 173.2 LBS | OXYGEN SATURATION: 98 %

## 2019-04-18 DIAGNOSIS — Z13.1 SCREENING FOR DIABETES MELLITUS: ICD-10-CM

## 2019-04-18 DIAGNOSIS — G62.9 SENSORY NEUROPATHY: Primary | ICD-10-CM

## 2019-04-18 DIAGNOSIS — G25.0 ESSENTIAL TREMOR: ICD-10-CM

## 2019-04-18 PROCEDURE — 99214 OFFICE O/P EST MOD 30 MIN: CPT | Performed by: PSYCHIATRY & NEUROLOGY

## 2019-04-18 RX ORDER — GABAPENTIN 100 MG/1
CAPSULE ORAL
Qty: 60 CAPSULE | Refills: 2 | Status: SHIPPED | OUTPATIENT
Start: 2019-04-18 | End: 2019-05-29

## 2019-04-18 ASSESSMENT — PAIN SCALES - GENERAL: PAINLEVEL: NO PAIN (0)

## 2019-04-18 ASSESSMENT — MIFFLIN-ST. JEOR: SCORE: 1539.51

## 2019-04-18 NOTE — PATIENT INSTRUCTIONS
Patient Education     Having EMG and NCS Tests  You will be having electromyography (EMG) and nerve conduction studies (NCS) to measure your muscle and nerve function. In most cases, both tests are done. NCS is most often done first. You will be asked to lie on an exam table with a blanket over you. You may have 1 or both of the following:    Nerve conduction study (NCS)  During NCS, mild electrical currents are used to test how fast electrical signals move along your nerves. The healthcare provider will put small metal disks (electrodes) on your skin on the area of your body being tested. This will be done using water-based gel or paste. A doctor or technologist will apply mild electrical currents to your skin. Your muscles will twitch, but the test won t harm you. Currents are usually applied to the same area several times. Usually the intensity of the electrical stimulation is increased on each body part. There may be some increasing mild pain that varies from person to person. But the electrical shock is not dangerous. The test may continue on other parts of your body unless the reason for doing the test is limited to a small part of the body.  Electromyography (EMG)  Most of the electrodes will be removed for EMG. The healthcare provider will clean the area being tested with alcohol. A very thin sterile needle will be put into the muscles in this area. When the needle is inserted, you may feel as if your skin is being pinched. Try to relax and do as instructed. You will be asked to relax and contract the muscle being tested. Following instructions will allow your provider to interpret the test results.  Let the technologist know  For your safety and for the success of your test, tell the technologist if you:    Have any bleeding problems    Take blood thinners (anticoagulants) or other medicines, including aspirin    Have any immune system problems    Have had neck or back surgery  You may also be asked  questions about your overall health.  Before the test  Prepare for your test as instructed. Shower or bathe, but don't use powder, oil, or lotion. Your skin should be clean and free of excess oil. Wear loose clothes. But know that you may be asked to change into a hospital gown. The entire test will take about 60 minutes. Allow extra time to check in.  After your test  Before you leave, all electrodes will be removed. You can then get right back to your normal routine. If you feel tired or have some mild pain, take it easy. If you were told to stop taking any medicines for your test, ask when you can start taking them again. Your healthcare provider will let you know when your test results are ready.  Date Last Reviewed: 12/1/2017 2000-2018 [employer] [address] [place] [address], [place] [address]. All rights reserved. This information is not intended as a substitute for professional medical care. Always follow your healthcare professional's instructions.

## 2019-04-18 NOTE — NURSING NOTE
"Preston Cai's goals for this visit include: consult  He requests these members of his care team be copied on today's visit information:     PCP: Tyrel Manning    Referring Provider:  No referring provider defined for this encounter.    /68   Pulse 74   Ht 1.854 m (6' 1\")   Wt 78.6 kg (173 lb 3.2 oz)   SpO2 98%   BMI 22.85 kg/m      Do you need any medication refills at today's visit? n  "

## 2019-04-18 NOTE — TELEPHONE ENCOUNTER
Patient sig states 2 capsules daily:  Outpatient Medication Detail      Disp Refills Start End ELYSSA   tamsulosin (FLOMAX) 0.4 MG capsule 180 capsule 3 3/11/2019  No   Sig - Route: Take 2 capsules (0.8 mg) by mouth daily - Oral   Sent to pharmacy as: tamsulosin (FLOMAX) 0.4 MG capsule   Class: E-Prescribe   Notes to Pharmacy: New dose.   Order: [id number]   E-Prescribing Status: Receipt confirmed by pharmacy (3/11/2019  9:31 AM CDT)     Patient stating in [employer] message that the past two weeks he has been taking 1 capsule but now wants to return to 2 capsules daily as he believes more effective. This appears to be an FYI, does provider want care team to provider any advisement. If not, please close encounter.    Henrietta Waddlel RN

## 2019-04-18 NOTE — LETTER
4/18/2019         RE: Preston Cai  [address]  [place] [address]        Dear Colleague,    Thank you for referring your patient, Preston Cai, to the [facility]. Please see a copy of my visit note below.    Visit Date:   04/18/2019      HISTORY OF PRESENT ILLNESS:  Preston Cai is an 83-year-old male referred for evaluation of burning discomfort in his feet.  He was referred by Dr. Manning.        He first noted this is a few years ago.  Initially it was just his right foot but as time has gone on, particularly in the past several months, he has noted it bilaterally.  He describes the discomfort as his lower legs and feet feeling hot.  This is particularly problematic at night, although he started to notice it in the evening as well.  He also feels like he is walking on pads.  During the day when he is active he is really not having a lot of pain.  He does not report any tingling or numbness to touch.      He does have a history of left sciatica about 3 years ago, but this resolved.  He is currently not now experiencing any radicular pain or low back pain.      He does report chronic neck pain and limited cervical range of motion, and occasionally he will get tingling in his upper extremities with change in his head and neck posture, but he denies a Lhermitte phenomenon.      He denies any upper or lower extremity weakness.  There are no cranial nerve symptoms.  He does have some issues with his bladder related to prostatism.      The patient also has a history of essential tremor and he saw Dr. García Munroe for this a few years ago.      Recent laboratory work done in March of this year included a normal B12 of 390.  Normal methylmalonic acid level, CBC, comprehensive metabolic panel, and vitamin D level.      He was evaluated, I believe for fatigue back in 2016.  He had positive parietal cell antibodies but I think his B12 levels have always been normal.  He was on B12  replacement for a while but has been off it for the past year.      PAST MEDICAL HISTORY:  Notable for essential tremor, prostatism and reflux.      CURRENT MEDICATIONS:  Primidone 250 mg at bedtime, Zantac, Flomax, and Vitamin D.      ALLERGIES:  He has no medical allergies.      FAMILY HISTORY:  There is no family history of neuropathy.  There is no family history of tremor.      SOCIAL HISTORY:  He does not smoke and uses very little alcohol.      PHYSICAL EXAMINATION:   GENERAL:  Reveals a patient who appears younger than his stated age.   VITAL SIGNS:  Heart rate 74.  Blood pressure 112/68.   HEENT:  Pupils are equal, round and react well to light.  He has full extraocular motility.  Facial strength and sensation are normal.   CRANIAL NERVES:  Cranial nerves II-XII are intact.   MOTOR:  Normal upper and lower extremity strength.   SENSORY:  He has a distal impairment in the lower extremities.  Specifically, there is a moderately depressed position sense in the toes.  He has absent vibratory sense in the toes and reduced vibratory appreciation at the ankles.  He reports decrease pin, and cold to above the ankles at about the sock line.  There is also a relative decrease in touch involving his feet.   The patient does have a mild postural and action tremor.  He is not ataxic.   GAIT:  Unremarkable.   REFLEXES:  1+ at the biceps, 2+ at the triceps, 2+ at the knees and absent at the ankles.  Plantar responses are flexor.      IMPRESSION:   1.  Sensory neuropathy.   2.  Essential tremor.      PLAN:  I did discuss the diagnosis with the patient.      I would like to explore some additional laboratory testing.  As noted above, he recently had a normal CBC, comprehensive metabolic panel, B12, methylmalonic acid level, and vitamin D level.      For further evaluation, he is going to have a TSH, serum immunoelectrophoresis, hepatitis C, Lyme serology, JESENIA, SSA, and SSB checked.  He will also have a 2-hour glucose  tolerance test.      Additional testing will also include a lower extremity electrodiagnostics to better define the neuropathy.      He is interested in trying some symptomatic treatment for the burning he is experiencing.  I have suggested starting on a low dose of gabapentin.  I reviewed potential side effects.  He will start at 100 mg in the evening with the latitude to go to 200 mg as tolerated.      I will communicate the results of the laboratory testing to the patient when available and also plan to see him back after the EMG and nerve conduction studies.         BJORN HEART MD             D: 2019   T: 2019   MT: JOANNE      Name:     GINA SHOOK   MRN:      [id number]        Account:      [id number]   :      1935           Visit Date:   2019      Document: [id number]       cc: Tyrel Manning MD       Again, thank you for allowing me to participate in the care of your patient.        Sincerely,        Bjorn Heart MD

## 2019-04-19 NOTE — PROGRESS NOTES
Visit Date:   04/18/2019      HISTORY OF PRESENT ILLNESS:  Preston Cai is an 83-year-old male referred for evaluation of burning discomfort in his feet.  He was referred by Dr. Manning.        He first noted this is a few years ago.  Initially it was just his right foot but as time has gone on, particularly in the past several months, he has noted it bilaterally.  He describes the discomfort as his lower legs and feet feeling hot.  This is particularly problematic at night, although he started to notice it in the evening as well.  He also feels like he is walking on pads.  During the day when he is active he is really not having a lot of pain.  He does not report any tingling or numbness to touch.      He does have a history of left sciatica about 3 years ago, but this resolved.  He is currently not now experiencing any radicular pain or low back pain.      He does report chronic neck pain and limited cervical range of motion, and occasionally he will get tingling in his upper extremities with change in his head and neck posture, but he denies a Lhermitte phenomenon.      He denies any upper or lower extremity weakness.  There are no cranial nerve symptoms.  He does have some issues with his bladder related to prostatism.      The patient also has a history of essential tremor and he saw Dr. García Munroe for this a few years ago.      Recent laboratory work done in March of this year included a normal B12 of 390.  Normal methylmalonic acid level, CBC, comprehensive metabolic panel, and vitamin D level.      He was evaluated, I believe for fatigue back in 2016.  He had positive parietal cell antibodies but I think his B12 levels have always been normal.  He was on B12 replacement for a while but has been off it for the past year.      PAST MEDICAL HISTORY:  Notable for essential tremor, prostatism and reflux.      CURRENT MEDICATIONS:  Primidone 250 mg at bedtime, Zantac, Flomax, and Vitamin D.      ALLERGIES:  He has  no medical allergies.      FAMILY HISTORY:  There is no family history of neuropathy.  There is no family history of tremor.      SOCIAL HISTORY:  He does not smoke and uses very little alcohol.      PHYSICAL EXAMINATION:   GENERAL:  Reveals a patient who appears younger than his stated age.   VITAL SIGNS:  Heart rate 74.  Blood pressure 112/68.   HEENT:  Pupils are equal, round and react well to light.  He has full extraocular motility.  Facial strength and sensation are normal.   CRANIAL NERVES:  Cranial nerves II-XII are intact.   MOTOR:  Normal upper and lower extremity strength.   SENSORY:  He has a distal impairment in the lower extremities.  Specifically, there is a moderately depressed position sense in the toes.  He has absent vibratory sense in the toes and reduced vibratory appreciation at the ankles.  He reports decrease pin, and cold to above the ankles at about the sock line.  There is also a relative decrease in touch involving his feet.   The patient does have a mild postural and action tremor.  He is not ataxic.   GAIT:  Unremarkable.   REFLEXES:  1+ at the biceps, 2+ at the triceps, 2+ at the knees and absent at the ankles.  Plantar responses are flexor.      IMPRESSION:   1.  Sensory neuropathy.   2.  Essential tremor.      PLAN:  I did discuss the diagnosis with the patient.      I would like to explore some additional laboratory testing.  As noted above, he recently had a normal CBC, comprehensive metabolic panel, B12, methylmalonic acid level, and vitamin D level.      For further evaluation, he is going to have a TSH, serum immunoelectrophoresis, hepatitis C, Lyme serology, JESENIA, SSA, and SSB checked.  He will also have a 2-hour glucose tolerance test.     ADDENDUM 4/29/19: ABOVE LAB TESTS NORMAL/NEGATIVE. CALLED PATIENT     Additional testing will also include a lower extremity electrodiagnostics to better define the neuropathy.      He is interested in trying some symptomatic treatment for  the burning he is experiencing.  I have suggested starting on a low dose of gabapentin.  I reviewed potential side effects.  He will start at 100 mg in the evening with the latitude to go to 200 mg as tolerated.      I will communicate the results of the laboratory testing to the patient when available and also plan to see him back after the EMG and nerve conduction studies.         STEFANY OLEARY MD             D: 2019   T: 2019   MT: WT      Name:     GINA SHOOK   MRN:      [id number]        Account:      [id number]   :      1935           Visit Date:   2019      Document: [id number]       cc: Tyrel Manning MD

## 2019-04-26 DIAGNOSIS — G62.9 SENSORY NEUROPATHY: ICD-10-CM

## 2019-04-26 DIAGNOSIS — G25.0 ESSENTIAL TREMOR: ICD-10-CM

## 2019-04-26 DIAGNOSIS — Z13.1 SCREENING FOR DIABETES MELLITUS: ICD-10-CM

## 2019-04-26 LAB
GLUCOSE 2H P 75 G GLC PO SERPL-MCNC: 133 MG/DL (ref 60–200)
GLUCOSE BLDC GLUCOMTR-MCNC: 88 MG/DL (ref 70–99)
GLUCOSE PRE 75 G GLC PO SERPL-MCNC: 94 MG/DL (ref 60–126)
HCV AB SERPL QL IA: NONREACTIVE
TSH SERPL DL<=0.005 MIU/L-ACNC: 3.74 MU/L (ref 0.4–4)

## 2019-04-26 PROCEDURE — 82784 ASSAY IGA/IGD/IGG/IGM EACH: CPT | Performed by: PSYCHIATRY & NEUROLOGY

## 2019-04-26 PROCEDURE — 82950 GLUCOSE TEST: CPT | Performed by: PSYCHIATRY & NEUROLOGY

## 2019-04-26 PROCEDURE — 86235 NUCLEAR ANTIGEN ANTIBODY: CPT | Performed by: PSYCHIATRY & NEUROLOGY

## 2019-04-26 PROCEDURE — 82947 ASSAY GLUCOSE BLOOD QUANT: CPT | Performed by: PSYCHIATRY & NEUROLOGY

## 2019-04-26 PROCEDURE — 86803 HEPATITIS C AB TEST: CPT | Performed by: PSYCHIATRY & NEUROLOGY

## 2019-04-26 PROCEDURE — 86038 ANTINUCLEAR ANTIBODIES: CPT | Performed by: PSYCHIATRY & NEUROLOGY

## 2019-04-26 PROCEDURE — 36415 COLL VENOUS BLD VENIPUNCTURE: CPT | Performed by: PSYCHIATRY & NEUROLOGY

## 2019-04-26 PROCEDURE — 86618 LYME DISEASE ANTIBODY: CPT | Performed by: PSYCHIATRY & NEUROLOGY

## 2019-04-26 PROCEDURE — 86334 IMMUNOFIX E-PHORESIS SERUM: CPT | Performed by: PSYCHIATRY & NEUROLOGY

## 2019-04-26 PROCEDURE — 84443 ASSAY THYROID STIM HORMONE: CPT | Performed by: PSYCHIATRY & NEUROLOGY

## 2019-04-27 LAB
ENA SS-A IGG SER IA-ACNC: <0.2 AI (ref 0–0.9)
ENA SS-B IGG SER IA-ACNC: <0.2 AI (ref 0–0.9)

## 2019-04-29 ENCOUNTER — THERAPY VISIT (OUTPATIENT)
Dept: PHYSICAL THERAPY | Facility: CLINIC | Age: 84
End: 2019-04-29
Payer: COMMERCIAL

## 2019-04-29 DIAGNOSIS — M25.512 LEFT SHOULDER PAIN, UNSPECIFIED CHRONICITY: ICD-10-CM

## 2019-04-29 LAB
ANA SER QL IF: NEGATIVE
B BURGDOR IGG+IGM SER QL: 0.02 (ref 0–0.89)
IGA SERPL-MCNC: 137 MG/DL (ref 70–380)
IGG SERPL-MCNC: 994 MG/DL (ref 695–1620)
IGM SERPL-MCNC: 157 MG/DL (ref 60–265)
PROT PATTERN SERPL IFE-IMP: NORMAL

## 2019-04-29 PROCEDURE — 97110 THERAPEUTIC EXERCISES: CPT | Mod: GP | Performed by: PHYSICAL THERAPIST

## 2019-04-29 PROCEDURE — 97140 MANUAL THERAPY 1/> REGIONS: CPT | Mod: GP | Performed by: PHYSICAL THERAPIST

## 2019-04-29 NOTE — PATIENT INSTRUCTIONS
Thank you for referring  Angel Luis to the [facility] for Physical Therapy.  I have attached a Current PT progress note for you to review prior to you visit Preston.  Please message me or call me at [phone] if you have any questions.    Thank You again for referring to the [facility].  Kourtney Morillo, MPT  [facility] [place].

## 2019-04-29 NOTE — PROGRESS NOTES
Subjective:  HPI                    Objective:  System    Physical Exam    General     ROS    Assessment/Plan:    PROGRESS  REPORT    Progress reporting period is from 4/1/19 to 4/29/19.       SUBJECTIVE  Subjective changes noted by patient:  Patient seen 3 times for treatment of L shoulder pain.  Patient stated overall there has been little to no change in his pain complaints but there has been improvement in mobility.    Current pain level is : (0-8/10PL).     Previous pain level was: 8/10.   Changes in function:  None  Adverse reaction to treatment or activity: activity - increase pain.    OBJECTIVE  Changes noted in objective findings:  Yes, Patient has been given illustrated HEP to follow.    L shoulder AROM:     Flex= 118   Ext= 52   Abd= 89    L shoulder PROM:   Flex= 132   Abd= 92    L Shoulder Strength:  All have pain   Flex= 4-/5   Ext= 4/5   Abd= 3/5    ASSESSMENT/PLAN  Updated problem list and treatment plan: Diagnosis 1:  L shoulder pain  Pain -  hot/cold therapy and manual therapy  Decreased ROM/flexibility - manual therapy and therapeutic exercise  Decreased strength - therapeutic exercise and therapeutic activities  Impaired muscle performance - neuro re-education  Decreased function - therapeutic activities  Impaired posture - neuro re-education and therapeutic activities  STG/LTGs have been met or progress has been made towards goals:  None  Assessment of Progress: The patient's condition is improving.  Self Management Plans:  Patient has been instructed in a home treatment program.  I have re-evaluated this patient and find that the nature, scope, duration and intensity of the therapy is appropriate for the medical condition of the patient.      Recommendations:  Patient would like to follow up with MD before continuing with PT.    Please refer to the daily flowsheet for treatment today, total treatment time and time spent performing 1:1 timed codes.

## 2019-05-15 ENCOUNTER — OFFICE VISIT (OUTPATIENT)
Dept: ORTHOPEDICS | Facility: CLINIC | Age: 84
End: 2019-05-15
Payer: COMMERCIAL

## 2019-05-15 VITALS
SYSTOLIC BLOOD PRESSURE: 105 MMHG | HEIGHT: 73 IN | DIASTOLIC BLOOD PRESSURE: 68 MMHG | WEIGHT: 173.6 LBS | BODY MASS INDEX: 23.01 KG/M2 | RESPIRATION RATE: 16 BRPM

## 2019-05-15 DIAGNOSIS — M19.012 PRIMARY OSTEOARTHRITIS OF LEFT SHOULDER: Primary | ICD-10-CM

## 2019-05-15 DIAGNOSIS — G89.29 CHRONIC LEFT SHOULDER PAIN: ICD-10-CM

## 2019-05-15 DIAGNOSIS — M25.512 CHRONIC LEFT SHOULDER PAIN: ICD-10-CM

## 2019-05-15 PROCEDURE — 20610 DRAIN/INJ JOINT/BURSA W/O US: CPT | Mod: LT | Performed by: ORTHOPAEDIC SURGERY

## 2019-05-15 PROCEDURE — 99213 OFFICE O/P EST LOW 20 MIN: CPT | Mod: 25 | Performed by: ORTHOPAEDIC SURGERY

## 2019-05-15 RX ORDER — TRIAMCINOLONE ACETONIDE 40 MG/ML
80 INJECTION, SUSPENSION INTRA-ARTICULAR; INTRAMUSCULAR
Status: DISCONTINUED | OUTPATIENT
Start: 2019-05-15 | End: 2020-01-22

## 2019-05-15 RX ORDER — LIDOCAINE HYDROCHLORIDE 10 MG/ML
4 INJECTION, SOLUTION INFILTRATION; PERINEURAL
Status: DISCONTINUED | OUTPATIENT
Start: 2019-05-15 | End: 2020-01-22

## 2019-05-15 RX ORDER — BUPIVACAINE HYDROCHLORIDE 2.5 MG/ML
3 INJECTION, SOLUTION INFILTRATION; PERINEURAL
Status: DISCONTINUED | OUTPATIENT
Start: 2019-05-15 | End: 2020-01-22

## 2019-05-15 RX ADMIN — TRIAMCINOLONE ACETONIDE 80 MG: 40 INJECTION, SUSPENSION INTRA-ARTICULAR; INTRAMUSCULAR at 14:52

## 2019-05-15 RX ADMIN — LIDOCAINE HYDROCHLORIDE 4 ML: 10 INJECTION, SOLUTION INFILTRATION; PERINEURAL at 14:52

## 2019-05-15 RX ADMIN — BUPIVACAINE HYDROCHLORIDE 3 ML: 2.5 INJECTION, SOLUTION INFILTRATION; PERINEURAL at 14:52

## 2019-05-15 ASSESSMENT — PAIN SCALES - GENERAL: PAINLEVEL: SEVERE PAIN (6)

## 2019-05-15 ASSESSMENT — MIFFLIN-ST. JEOR: SCORE: 1541.32

## 2019-05-15 NOTE — PROGRESS NOTES
CHIEF COMPLAINT:   Chief Complaint   Patient presents with     Left Shoulder - Pain     Left shoulder arthritis, possible cuff tear. Had subacromial cortisone injection on 3/20/19. He thinks this helped a little, but went to Physical Therapy and pushed him to new limits which did irritate shoulder some.        HISTORY:  Preston Cai is a 83 year old male, right  -hand dominant, who is seen in followup for left shoulder pain that started many years ago. Locates pain side/front of shoulder with reaching, at night. No specific injury. Also as neck pain, arthritis. Ok at rest. Will have numbness and tingling in his arm depending on how he holds his neck. Had subacromial injection 3/20/2019 that seemed to help a little, as well as Physical Therapy with mobility. Can sleep on his shoulder now. Still pain with lifting his arm. We had talked about possible intra-articula gleno-humeral injection at last visit. He'd like to try that today.    Onset: years ago.  Symptoms have been worsening since that time.  Aggrevated by: reaching, lifting, night.  Relieved by: rest  Present symptoms: pain with ADL's (dressing),  pain with overhead activities,  pain reaching behind back,  pain reaching out or away from body (flexion/ abduction),  positional night pain,  pain lifting,  Pain location: lateral shoulder, deltoid and upper arm and anterior  Pain severity: 6/10  Pain quality: sharp  Frequency of symptoms: frequently  Associated symptoms: neck pain, numbness/tingling down the arm.    Treatment up to this point: subacromial injection 3/20/2019, Physical Therapy.    Usual level of work activity: retired.    Other PMH:  has a past medical history of Acid reflux disease, Allergic rhinitis, cause unspecified, H/O magnetic resonance imaging of brain and brain stem (10/10/2016), Hematuria, History of anemia (2002), History of gastric ulcer, Macular degeneration, Malignant neoplasm of laryngeal cartilages (H), Nonsenile cataract, PONV  "(postoperative nausea and vomiting), and Tremor (10/3/2016).  Patient Active Problem List    Diagnosis Date Noted     Left shoulder pain 04/01/2019     Priority: Medium     Macular degeneration 10/18/2016     Priority: Medium     Gastroesophageal reflux disease without esophagitis 06/17/2015     Priority: Medium     Pseudophakia 07/02/2014     Priority: Medium     Advanced directives, counseling/discussion 02/17/2014     Priority: Medium     Advance Care Planning:   ACP Review and Resources Provided:  Reviewed chart for advance care plan.  Preston Cai has no plan or code status on file. Discussed available resources and provided with information on 02/17/2014. Confirmed code status reflects current choices pending further ACP discussions.  Confirmed/documented designated decision maker(s). See permanent comments section of demographics in clinical tab.   Added by Lindsey Gongora on 3/20/2014  Patient does not have an Advance/Health Care Directive (HCD), given \"What is Advance Care Planning?\" flyer and requests blank HCD form.  Angelina Cruz  February 17, 2014       Hoarse voice quality 03/07/2012     Priority: Medium     Benign prostatic hyperplasia 03/24/2011     Priority: Medium     CARDIOVASCULAR SCREENING; LDL GOAL LESS THAN 160 10/31/2010     Priority: Medium     Essential tremor 04/28/2010     Priority: Medium     Followed by Dr Diaz ( [facility] ) : takes primodone        Personal history of malignant neoplasm of larynx 10/05/2009     Priority: Medium     1982         Cervical radiculopathy at C6 01/02/2009     Priority: Medium     C5-C6  mild central and mild/moderate bilateral neural foraminal stenosis. with Left radicular pain. trial epidural, PNB           Seborrheic dermatitis 04/10/2007     Priority: Medium     scalp and ear canals - triamcilnolone to knock down, then coal tar to maintain.  Problem list name updated by automated process. Provider to review       Allergic rhinitis due to other " allergen 04/10/2007     Priority: Medium     continue flonase - good control with.  Didn't use over winter.           Surgical Hx:  has a past surgical history that includes appendectomy (8 years old); nasal endoscopy,dx (4/2007); cystoscopy (1997); zz gastroscopy,fl (9/2007); Phacoemulsification with intraocular lens implantation right eye. (3/11/2003); Pars plana vitrectomy and epiretinal membrane dissection, right eye (11/8/2002); Biopsy of the throat - cancerous mass - got radiation for in larynx (1982); Thyroglossal Duct Cyst Removal - 2ndary to radiation. (1982); Vasectomy (1971); Colonoscopy (2/25/2004); Eye surgery (Right); Colonoscopy (N/A, 4/28/2015); Colonoscopy with CO2 insufflation (N/A, 4/28/2015); Phacoemulsification with standard intraocular lens implant (Left, 9/22/2016); Eye surgery (2003); and cataract iol, rt/lt.    Medications:   Current Outpatient Medications:      gabapentin (NEURONTIN) 100 MG capsule, One every evening. May increase to 2 if needed, Disp: 60 capsule, Rfl: 2     primidone (MYSOLINE) 250 MG tablet, Take 1 tablet (250 mg) by mouth At Bedtime, Disp: 90 tablet, Rfl: 3     ranitidine (ZANTAC) 300 MG tablet, TAKE ONE TABLET BY MOUTH EVERY NIGHT AT BEDTIME, Disp: 90 tablet, Rfl: 3     tamsulosin (FLOMAX) 0.4 MG capsule, Take 2 capsules (0.8 mg) by mouth daily, Disp: 180 capsule, Rfl: 3     VITAMIN D, CHOLECALCIFEROL, PO, Take 1,000 Units by mouth daily, Disp: , Rfl:     Current Facility-Administered Medications:      bupivacaine (MARCAINE) 0.25 % injection 3 mL, 3 mL, , , Marlo Rubalcava MD, 3 mL at 03/20/19 1536     lidocaine 1 % injection 4 mL, 4 mL, , , Marlo Rubalcava MD, 4 mL at 03/20/19 1536     triamcinolone (KENALOG-40) injection 80 mg, 80 mg, , , Marlo Rubalcava MD, 80 mg at 03/20/19 1536    Allergies:   Allergies   Allergen Reactions     Seasonal Allergies      Hay fever        Social Hx: retired.  reports that he has quit smoking. He has never used smokeless  "tobacco. He reports that he drinks alcohol. He reports that he does not use drugs.    Family Hx: family history includes Cancer in his father and another family member; Cancer - colorectal in his father; Cerebrovascular Disease in his mother; Gastrointestinal Disease in his father; Hypertension in his father; Macular Degeneration in his mother..    REVIEW OF SYSTEMS:   CONSTITUTIONAL:NEGATIVE for fever, chills, change in weight  INTEGUMENTARY/SKIN: NEGATIVE for worrisome rashes, moles or lesions  MUSCULOSKELETAL:See HPI above  NEURO: NEGATIVE for weakness, dizziness or paresthesias    PHYSICAL EXAM:  /68   Resp 16   Ht 1.854 m (6' 1\")   Wt 78.7 kg (173 lb 9.6 oz)   BMI 22.90 kg/m     GENERAL APPEARANCE: healthy, alert, no distress; accompanied by his wife.  SKIN: no suspicious lesions or rashes  NEURO: Normal strength and tone, mentation intact and speech normal  PSYCH:  mentation appears normal and affect normal, not anxious  RESPIRATORY: No increased work of breathing.  VASCULAR: Radial pulses 2+ and brisk cappillary refill     MUSCULOSKELETAL:      RIGHT UPPER EXTREMITY:  Sensation intact to light touch in median, radial, ulnar and axillary nerve distributions  Palpable 2+ radial pulse, brisk capillary refill to all fingers, wwp  Intact epl fpl fdp edc wrist flexion/extension biceps triceps deltoid    RIGHT SHOULDER:  Shoulder Inspection: no swelling, bruising, discoloration, or obvious deformity or asymmetry  mild muscle atrophy diffuse    Tender: none.  Range of Motion:   Active:forward flexion 150 degrees, external rotation 50 degrees, internal rotation  T10   Passive: external rotation  60 degrees  Strength: forward flexion 5/5, External rotation 5/5  Impingement: negative.  Special tests: Empty Can: Negative    LEFT UPPER EXTREMITY:  Sensation intact to light touch in median, radial, ulnar and axillary nerve distributions  Palpable 2+ radial pulse, brisk capillary refill to all fingers, wwp  Intact " epl fpl fdp edc wrist flexion/extension biceps triceps deltoid    LEFT SHOULDER:  Shoulder Inspection: no swelling, bruising, discoloration, or obvious deformity or asymmetry  Mild-moderate muscle atrophy diffuse    Tender: acromion and greater tuberosity  Non-tender: AC joint  Range of Motion:   Active:forward flexion 110 degrees, external rotation 50 degrees, internal rotation  hip pocket   Passive: forward flexion 130 degrees, external rotation  50 degrees  Strength: forward flexion 4+/5, External rotation 4+/5   Impingement: all grade 2 positive  Special tests: Empty Can: Positive    X-RAY INTERPRETATION: no new images today.     3 views left  shoulder obtained 3/20/2019 were reviewed personally in clinic today with the patient. On my review, moderate gleno-humeral degenerative changes.        ASSESSMENT: Preston Cai is a 83 year old male, right  -hand dominant with chronic left shoulder pain, primary gleno-humeral osteoarthritis, impingement syndrome, likely chronic rotator cuff tear and arthropathy.    PLAN:   * xrays reviewed showing osteoarthritis. Cannot rule out rotator cuff tear given weakness.  * treatment nonsurgical with injections, Physical Therapy, over the counter pain control  * surgical treatment with shoulder arthroplasty, primary versus reverse total shoulder arthroplasty depending on rotator cuff integrity.  * at this time, he's not interested in surgery.  * will proceed with intra-articular gleno-humeral injection.   * activity modification, nsaids versus tylenol, Physical Therapy and home exercise program.  * return to clinic as needed.        Marlo Rubalcava M.D., M.S.  Dept. of Orthopaedic Surgery  [facility]    PROCEDURE NOTE:  The risks, perceived benefits and potential complications (including but not limited to: bleeding, infection, pain, scar, damage to adjacent structures, atrophy or necrosis of soft tissue, skin blanching, failure to relieve symptoms, worsening of  symptoms, allergic reaction) of injection were discussed with the patient. Questions were addressed and answered.The patient elected to proceed. Written informed consent was obtained. The correct procedural site was identified and confirmed. A Left Shoulder gleno-humeral intra-articular injection was performed using 2mL Kenalog-40 40mg per mL and 7mL (4mL 1% lidocaine, 3mL 0.25% marcaine)  of local anesthetic after sterile prep, to the correct procedural site. Sterile bandaid applied. This was tolerated well by the patient. No apparent complications. Did also discuss that if diabetic, recommend close monitoring of blood sugars over the next week as cortisone injections can temporarily elevate blood sugars.      Marlo Rubalcava M.D., M.S.  Dept. of Orthopaedic Surgery  [facility]      Large Joint Injection/Arthocentesis: L glenohumeral joint  Date/Time: 5/15/2019 2:52 PM  Performed by: Giuliano Raymundo PA  Authorized by: Marlo Rubalcava MD     Indications:  Pain and osteoarthritis  Indications comment:  Impingement  Needle Size:  22 G  Guidance: landmark guided    Approach:  Anterior  Location:  Shoulder      Site:  L glenohumeral joint  Medications:  3 mL bupivacaine 0.25 %; 4 mL lidocaine 1 %; 80 mg triamcinolone 40 MG/ML  Outcome:  Tolerated well, no immediate complications  Procedure discussed: discussed risks, benefits, and alternatives    Consent Given by:  Patient  Prep: patient was prepped and draped in usual sterile fashion

## 2019-05-15 NOTE — LETTER
5/15/2019         RE: Preston Cai  [address]  [place] [address]        Dear Colleague,    Thank you for referring your patient, Preston Cia, to the [facility]. Please see a copy of my visit note below.    CHIEF COMPLAINT:   Chief Complaint   Patient presents with     Left Shoulder - Pain     Left shoulder arthritis, possible cuff tear. Had subacromial cortisone injection on 3/20/19. He thinks this helped a little, but went to Physical Therapy and pushed him to new limits which did irritate shoulder some.        HISTORY:  Preston Cai is a 83 year old male, right  -hand dominant, who is seen in followup for left shoulder pain that started many years ago. Locates pain side/front of shoulder with reaching, at night. No specific injury. Also as neck pain, arthritis. Ok at rest. Will have numbness and tingling in his arm depending on how he holds his neck. Had subacromial injection 3/20/2019 that seemed to help a little, as well as Physical Therapy with mobility. Can sleep on his shoulder now. Still pain with lifting his arm. We had talked about possible intra-articula gleno-humeral injection at last visit. He'd like to try that today.    Onset: years ago.  Symptoms have been worsening since that time.  Aggrevated by: reaching, lifting, night.  Relieved by: rest  Present symptoms: pain with ADL's (dressing),  pain with overhead activities,  pain reaching behind back,  pain reaching out or away from body (flexion/ abduction),  positional night pain,  pain lifting,  Pain location: lateral shoulder, deltoid and upper arm and anterior  Pain severity: 6/10  Pain quality: sharp  Frequency of symptoms: frequently  Associated symptoms: neck pain, numbness/tingling down the arm.    Treatment up to this point: subacromial injection 3/20/2019, Physical Therapy.    Usual level of work activity: retired.    Other PMH:  has a past medical history of Acid reflux disease, Allergic  "rhinitis, cause unspecified, H/O magnetic resonance imaging of brain and brain stem (10/10/2016), Hematuria, History of anemia (2002), History of gastric ulcer, Macular degeneration, Malignant neoplasm of laryngeal cartilages (H), Nonsenile cataract, PONV (postoperative nausea and vomiting), and Tremor (10/3/2016).  Patient Active Problem List    Diagnosis Date Noted     Left shoulder pain 04/01/2019     Priority: Medium     Macular degeneration 10/18/2016     Priority: Medium     Gastroesophageal reflux disease without esophagitis 06/17/2015     Priority: Medium     Pseudophakia 07/02/2014     Priority: Medium     Advanced directives, counseling/discussion 02/17/2014     Priority: Medium     Advance Care Planning:   ACP Review and Resources Provided:  Reviewed chart for advance care plan.  Preston GOLDSTEIN Andreekaterina has no plan or code status on file. Discussed available resources and provided with information on 02/17/2014. Confirmed code status reflects current choices pending further ACP discussions.  Confirmed/documented designated decision maker(s). See permanent comments section of demographics in clinical tab.   Added by Lindsey Gongora on 3/20/2014  Patient does not have an Advance/Health Care Directive (HCD), given \"What is Advance Care Planning?\" flyer and requests blank HCD form.  Angelina Anthony  February 17, 2014       Hoarse voice quality 03/07/2012     Priority: Medium     Benign prostatic hyperplasia 03/24/2011     Priority: Medium     CARDIOVASCULAR SCREENING; LDL GOAL LESS THAN 160 10/31/2010     Priority: Medium     Essential tremor 04/28/2010     Priority: Medium     Followed by Dr Diaz ( [facility] ) : takes primodone        Personal history of malignant neoplasm of larynx 10/05/2009     Priority: Medium     1982         Cervical radiculopathy at C6 01/02/2009     Priority: Medium     C5-C6  mild central and mild/moderate bilateral neural foraminal stenosis. with Left radicular pain. trial epidural, " PNB           Seborrheic dermatitis 04/10/2007     Priority: Medium     scalp and ear canals - triamcilnolone to knock down, then coal tar to maintain.  Problem list name updated by automated process. Provider to review       Allergic rhinitis due to other allergen 04/10/2007     Priority: Medium     continue flonase - good control with.  Didn't use over winter.           Surgical Hx:  has a past surgical history that includes appendectomy (8 years old); nasal endoscopy,dx (4/2007); cystoscopy (1997); zz gastroscopy,fl (9/2007); Phacoemulsification with intraocular lens implantation right eye. (3/11/2003); Pars plana vitrectomy and epiretinal membrane dissection, right eye (11/8/2002); Biopsy of the throat - cancerous mass - got radiation for in larynx (1982); Thyroglossal Duct Cyst Removal - 2ndary to radiation. (1982); Vasectomy (1971); Colonoscopy (2/25/2004); Eye surgery (Right); Colonoscopy (N/A, 4/28/2015); Colonoscopy with CO2 insufflation (N/A, 4/28/2015); Phacoemulsification with standard intraocular lens implant (Left, 9/22/2016); Eye surgery (2003); and cataract iol, rt/lt.    Medications:   Current Outpatient Medications:      gabapentin (NEURONTIN) 100 MG capsule, One every evening. May increase to 2 if needed, Disp: 60 capsule, Rfl: 2     primidone (MYSOLINE) 250 MG tablet, Take 1 tablet (250 mg) by mouth At Bedtime, Disp: 90 tablet, Rfl: 3     ranitidine (ZANTAC) 300 MG tablet, TAKE ONE TABLET BY MOUTH EVERY NIGHT AT BEDTIME, Disp: 90 tablet, Rfl: 3     tamsulosin (FLOMAX) 0.4 MG capsule, Take 2 capsules (0.8 mg) by mouth daily, Disp: 180 capsule, Rfl: 3     VITAMIN D, CHOLECALCIFEROL, PO, Take 1,000 Units by mouth daily, Disp: , Rfl:     Current Facility-Administered Medications:      bupivacaine (MARCAINE) 0.25 % injection 3 mL, 3 mL, , , Marlo Rubalcava MD, 3 mL at 03/20/19 1536     lidocaine 1 % injection 4 mL, 4 mL, , , Marlo Rubalcava MD, 4 mL at 03/20/19 1536     triamcinolone  "(KENALOG-40) injection 80 mg, 80 mg, , , Marlo Rubalcava MD, 80 mg at 03/20/19 4974    Allergies:   Allergies   Allergen Reactions     Seasonal Allergies      Hay fever        Social Hx: retired.  reports that he has quit smoking. He has never used smokeless tobacco. He reports that he drinks alcohol. He reports that he does not use drugs.    Family Hx: family history includes Cancer in his father and another family member; Cancer - colorectal in his father; Cerebrovascular Disease in his mother; Gastrointestinal Disease in his father; Hypertension in his father; Macular Degeneration in his mother..    REVIEW OF SYSTEMS:   CONSTITUTIONAL:NEGATIVE for fever, chills, change in weight  INTEGUMENTARY/SKIN: NEGATIVE for worrisome rashes, moles or lesions  MUSCULOSKELETAL:See HPI above  NEURO: NEGATIVE for weakness, dizziness or paresthesias    PHYSICAL EXAM:  /68   Resp 16   Ht 1.854 m (6' 1\")   Wt 78.7 kg (173 lb 9.6 oz)   BMI 22.90 kg/m      GENERAL APPEARANCE: healthy, alert, no distress; accompanied by his wife.  SKIN: no suspicious lesions or rashes  NEURO: Normal strength and tone, mentation intact and speech normal  PSYCH:  mentation appears normal and affect normal, not anxious  RESPIRATORY: No increased work of breathing.  VASCULAR: Radial pulses 2+ and brisk cappillary refill     MUSCULOSKELETAL:      RIGHT UPPER EXTREMITY:  Sensation intact to light touch in median, radial, ulnar and axillary nerve distributions  Palpable 2+ radial pulse, brisk capillary refill to all fingers, wwp  Intact epl fpl fdp edc wrist flexion/extension biceps triceps deltoid    RIGHT SHOULDER:  Shoulder Inspection: no swelling, bruising, discoloration, or obvious deformity or asymmetry  mild muscle atrophy diffuse    Tender: none.  Range of Motion:   Active:forward flexion 150 degrees, external rotation 50 degrees, internal rotation  T10   Passive: external rotation  60 degrees  Strength: forward flexion 5/5, External " rotation 5/5  Impingement: negative.  Special tests: Empty Can: Negative    LEFT UPPER EXTREMITY:  Sensation intact to light touch in median, radial, ulnar and axillary nerve distributions  Palpable 2+ radial pulse, brisk capillary refill to all fingers, wwp  Intact epl fpl fdp edc wrist flexion/extension biceps triceps deltoid    LEFT SHOULDER:  Shoulder Inspection: no swelling, bruising, discoloration, or obvious deformity or asymmetry  Mild-moderate muscle atrophy diffuse    Tender: acromion and greater tuberosity  Non-tender: AC joint  Range of Motion:   Active:forward flexion 110 degrees, external rotation 50 degrees, internal rotation  hip pocket   Passive: forward flexion 130 degrees, external rotation  50 degrees  Strength: forward flexion 4+/5, External rotation 4+/5   Impingement: all grade 2 positive  Special tests: Empty Can: Positive    X-RAY INTERPRETATION: no new images today.     3 views left  shoulder obtained 3/20/2019 were reviewed personally in clinic today with the patient. On my review, moderate gleno-humeral degenerative changes.        ASSESSMENT: Preston Cai is a 83 year old male, right  -hand dominant with chronic left shoulder pain, primary gleno-humeral osteoarthritis, impingement syndrome, likely chronic rotator cuff tear and arthropathy.    PLAN:   * xrays reviewed showing osteoarthritis. Cannot rule out rotator cuff tear given weakness.  * treatment nonsurgical with injections, Physical Therapy, over the counter pain control  * surgical treatment with shoulder arthroplasty, primary versus reverse total shoulder arthroplasty depending on rotator cuff integrity.  * at this time, he's not interested in surgery.  * will proceed with intra-articular gleno-humeral injection.   * activity modification, nsaids versus tylenol, Physical Therapy and home exercise program.  * return to clinic as needed.        Marlo Rubalcava M.D., M.S.  Dept. of Orthopaedic Surgery  [facility]  Services    PROCEDURE NOTE:  The risks, perceived benefits and potential complications (including but not limited to: bleeding, infection, pain, scar, damage to adjacent structures, atrophy or necrosis of soft tissue, skin blanching, failure to relieve symptoms, worsening of symptoms, allergic reaction) of injection were discussed with the patient. Questions were addressed and answered.The patient elected to proceed. Written informed consent was obtained. The correct procedural site was identified and confirmed. A Left Shoulder gleno-humeral intra-articular injection was performed using 2mL Kenalog-40 40mg per mL and 7mL (4mL 1% lidocaine, 3mL 0.25% marcaine)  of local anesthetic after sterile prep, to the correct procedural site. Sterile bandaid applied. This was tolerated well by the patient. No apparent complications. Did also discuss that if diabetic, recommend close monitoring of blood sugars over the next week as cortisone injections can temporarily elevate blood sugars.      Marlo Rubalcava M.D., M.S.  Dept. of Orthopaedic Surgery  [facility]      Large Joint Injection/Arthocentesis: L glenohumeral joint  Date/Time: 5/15/2019 2:52 PM  Performed by: Giuliano Raymundo PA  Authorized by: Marlo Rubalcava MD     Indications:  Pain and osteoarthritis  Indications comment:  Impingement  Needle Size:  22 G  Guidance: landmark guided    Approach:  Anterior  Location:  Shoulder      Site:  L glenohumeral joint  Medications:  3 mL bupivacaine 0.25 %; 4 mL lidocaine 1 %; 80 mg triamcinolone 40 MG/ML  Outcome:  Tolerated well, no immediate complications  Procedure discussed: discussed risks, benefits, and alternatives    Consent Given by:  Patient  Prep: patient was prepped and draped in usual sterile fashion            Again, thank you for allowing me to participate in the care of your patient.        Sincerely,        Marlo Rubalcava MD

## 2019-05-21 ENCOUNTER — OFFICE VISIT (OUTPATIENT)
Dept: OPTOMETRY | Facility: CLINIC | Age: 84
End: 2019-05-21
Payer: COMMERCIAL

## 2019-05-21 DIAGNOSIS — H52.4 PRESBYOPIA: ICD-10-CM

## 2019-05-21 DIAGNOSIS — Z96.1 PSEUDOPHAKIA OF BOTH EYES: ICD-10-CM

## 2019-05-21 DIAGNOSIS — H52.03 HYPERMETROPIA OF BOTH EYES: ICD-10-CM

## 2019-05-21 DIAGNOSIS — H35.3132 INTERMEDIATE STAGE NONEXUDATIVE AGE-RELATED MACULAR DEGENERATION OF BOTH EYES: ICD-10-CM

## 2019-05-21 DIAGNOSIS — H52.223 REGULAR ASTIGMATISM OF BOTH EYES: ICD-10-CM

## 2019-05-21 DIAGNOSIS — H10.13 ALLERGIC CONJUNCTIVITIS, BILATERAL: ICD-10-CM

## 2019-05-21 DIAGNOSIS — Z01.00 EXAMINATION OF EYES AND VISION: Primary | ICD-10-CM

## 2019-05-21 DIAGNOSIS — H01.02B SQUAMOUS BLEPHARITIS OF UPPER AND LOWER EYELIDS OF BOTH EYES: ICD-10-CM

## 2019-05-21 DIAGNOSIS — H01.02A SQUAMOUS BLEPHARITIS OF UPPER AND LOWER EYELIDS OF BOTH EYES: ICD-10-CM

## 2019-05-21 PROCEDURE — 92015 DETERMINE REFRACTIVE STATE: CPT | Performed by: OPTOMETRIST

## 2019-05-21 PROCEDURE — 92014 COMPRE OPH EXAM EST PT 1/>: CPT | Performed by: OPTOMETRIST

## 2019-05-21 RX ORDER — AZELASTINE HYDROCHLORIDE 0.5 MG/ML
1 SOLUTION/ DROPS OPHTHALMIC 2 TIMES DAILY PRN
Qty: 1 BOTTLE | Refills: 11 | Status: SHIPPED | OUTPATIENT
Start: 2019-05-21 | End: 2019-09-11

## 2019-05-21 ASSESSMENT — REFRACTION_WEARINGRX
OS_AXIS: 180
OS_CYLINDER: +0.75
OD_AXIS: 160
OD_CYLINDER: +1.00
OS_SPHERE: PLANO
OD_CYLINDER: +1.00
OD_AXIS: 160
OS_SPHERE: PLANO
OD_CYLINDER: +1.00
OS_AXIS: 180
SPECS_TYPE: TRIFOCAL
OS_SPHERE: PLANO
OD_AXIS: 160
OS_CYLINDER: +0.75
OD_SPHERE: -0.75
OS_AXIS: 180
OD_SPHERE: -0.75
OD_CYLINDER: +1.00
OD_AXIS: 160
OS_SPHERE: PLANO
OD_SPHERE: -0.75
OS_CYLINDER: +0.75
OD_SPHERE: -0.75
OS_AXIS: 180
OS_CYLINDER: +0.75

## 2019-05-21 ASSESSMENT — CONF VISUAL FIELD
OD_NORMAL: 1
METHOD: COUNTING FINGERS
OS_NORMAL: 1

## 2019-05-21 ASSESSMENT — VISUAL ACUITY
METHOD: SNELLEN - LINEAR
OS_CC+: -2
OD_SC+: +1
OS_CC: 20/30-2
OS_SC: 20/20
OS_SC+: -3
OS_CC: 20/20
OD_CC: 20/40
OD_CC: 20/25
OD_CC+: -2
OD_SC: 20/40

## 2019-05-21 ASSESSMENT — TONOMETRY
IOP_METHOD: TONOPEN
OS_IOP_MMHG: 15
OD_IOP_MMHG: 20

## 2019-05-21 ASSESSMENT — REFRACTION_MANIFEST
OS_CYLINDER: +1.00
OD_CYLINDER: +1.00
OS_AXIS: 180
OD_AXIS: 160
OD_SPHERE: -0.75
OS_SPHERE: -0.25

## 2019-05-21 ASSESSMENT — CUP TO DISC RATIO
OS_RATIO: 0.25
OD_RATIO: 0.25

## 2019-05-21 ASSESSMENT — EXTERNAL EXAM - RIGHT EYE: OD_EXAM: NORMAL

## 2019-05-21 ASSESSMENT — EXTERNAL EXAM - LEFT EYE: OS_EXAM: NORMAL

## 2019-05-21 NOTE — PROGRESS NOTES
Chief Complaint   Patient presents with     Annual Eye Exam         Last Eye Exam: 6/2018  Dilated Previously: Yes    What are you currently using to see?  glasses       Distance Vision Acuity: Noticed gradual change in right eye    Near Vision Acuity: Satisfied with vision while reading  with glasses - feels like right eye affects vision - easier to close right eye.    Eye Comfort: When he wakes up his eye burn sometimes.   Do you use eye drops? : Yes: Thera tears bid - Pt feels the thera tears causes his eyes to be puffy  Occupation or Hobbies:     [employer]  [employer]    Saw Vitreoretinal Surgery- Dr. Burns 7/17/18 and was dismissed from his practice unless any changes.            Medical, surgical and family histories reviewed and updated 5/21/2019.       OBJECTIVE: See Ophthalmology exam    ASSESSMENT:    ICD-10-CM    1. Examination of eyes and vision Z01.00 EYE EXAM (SIMPLE-NONBILLABLE)   2. Presbyopia H52.4 REFRACTION   3. Hypermetropia of both eyes H52.03 REFRACTION   4. Regular astigmatism of both eyes H52.223 EYE EXAM (SIMPLE-NONBILLABLE)   5. Allergic conjunctivitis, bilateral H10.13 EYE EXAM (SIMPLE-NONBILLABLE)     azelastine (OPTIVAR) 0.05 % ophthalmic solution   6. Squamous blepharitis of upper and lower eyelids of both eyes H01.02A EYE EXAM (SIMPLE-NONBILLABLE)    H01.02B    7. Intermediate stage nonexudative age-related macular degeneration of both eyes H35.3132 EYE EXAM (SIMPLE-NONBILLABLE)   8. Pseudophakia of both eyes Z96.1       PLAN:     Patient Instructions   Eyeglass prescription given.    Optivar- 1 drop both eyes 2 x day as needed for itchy eyes.    Systane Balance- 1 drop both eyes 4 x day.    You have mild macular degeneration.  I recommend taking supplements for the eyes, PreserVision AREDS 2 formula daily as recommended dosage on the bottle.  Check with your pharmacist first to make sure there are not any interactions with your medications.    Monitor macular degeneration  with yearly eye exams or sooner if any changes.    Return in 1 year for a complete eye exam or sooner if needed.    Carlos James, OD

## 2019-05-21 NOTE — PROGRESS NOTES
Chief Complaint   Patient presents with   • Heartburn     not having any problems needs esomeprazole refilled 3 month refills       PCP: Annemarie Laird MD  REFER: No ref. provider found    Subjective     HPI    Ruperto Eastman is a 75 y.o. male who presents with hx of GERD for several years.  This is described as stable.   Pt is maintained on esomeprazole daily.  Pt has a hx of Barretts esophagus determined by biopsy (dx over 11 years ago). The course is constant.  Last EGD from 2017 reviewed.  He does not have complaints of :N/V, no changes in bowels, no wt loss, no BRBPR.  No melena.  No abdominal pain.   No dysphagia.      Past Medical History:   Diagnosis Date   • Atrial fibrillation (CMS/HCC)    • Hypertension      Outpatient Medications Marked as Taking for the 2/14/19 encounter (Office Visit) with Timmy Wall APRN   Medication Sig Dispense Refill   • amLODIPine (NORVASC) 5 MG tablet Take 5 mg by mouth Daily. 1/2 daily     • aspirin 81 MG tablet Take 81 mg by mouth daily     • esomeprazole (nexIUM) 40 MG capsule Take 1 capsule by mouth Daily. 90 capsule 3   • lisinopril (PRINIVIL,ZESTRIL) 20 MG tablet Take 20 mg by mouth Daily.     • [DISCONTINUED] esomeprazole (nexIUM) 40 MG capsule Take 1 capsule by mouth Daily. 90 capsule 3   • [DISCONTINUED] esomeprazole (nexIUM) 40 MG capsule Take 1 capsule by mouth Daily. 90 capsule 3     Allergies   Allergen Reactions   • Novocain [Procaine]    • Sulfa Antibiotics      Social History     Socioeconomic History   • Marital status:      Spouse name: Not on file   • Number of children: Not on file   • Years of education: Not on file   • Highest education level: Not on file   Social Needs   • Financial resource strain: Not on file   • Food insecurity - worry: Not on file   • Food insecurity - inability: Not on file   • Transportation needs - medical: Not on file   • Transportation needs - non-medical: Not on file   Occupational History   • Not on file  Chief Complaint   Patient presents with     Annual Eye Exam         Last Eye Exam: 6/2018  Dilated Previously: Yes    What are you currently using to see?  glasses       Distance Vision Acuity: Noticed gradual change in right eye    Near Vision Acuity: Satisfied with vision while reading  with glasses - feels like right eye affects vision - easier to close right eye.    Eye Comfort: When he wakes up his eye burn sometimes.   Do you use eye drops? : Yes: Thera tears bid - Pt feels the thera tears causes his eyes to be puffy  Occupation or Hobbies:     [employer]  [employer]            Medical, surgical and family histories reviewed and updated 5/21/2019.       OBJECTIVE: See Ophthalmology exam    ASSESSMENT:  No diagnosis found.   PLAN:     There are no Patient Instructions on file for this visit.    "  Tobacco Use   • Smoking status: Former Smoker   • Smokeless tobacco: Never Used   Substance and Sexual Activity   • Alcohol use: Yes     Comment: 1 time weekly   • Drug use: No   • Sexual activity: Defer   Other Topics Concern   • Not on file   Social History Narrative   • Not on file     Family History   Problem Relation Age of Onset   • Colon cancer Neg Hx    • Esophageal cancer Neg Hx    • Liver cancer Neg Hx      Review of Systems   Constitutional: Negative for fatigue, fever and unexpected weight change.   HENT: Negative for hearing loss, sore throat and voice change.    Eyes: Negative for visual disturbance.   Respiratory: Negative for cough, shortness of breath and wheezing.    Cardiovascular: Negative for chest pain and palpitations.   Gastrointestinal: Negative for abdominal pain, blood in stool and vomiting.   Endocrine: Negative for polydipsia and polyuria.   Genitourinary: Negative for difficulty urinating, dysuria, hematuria and urgency.   Musculoskeletal: Negative for joint swelling and myalgias.   Skin: Negative for color change, rash and wound.   Neurological: Negative for dizziness, tremors, seizures and syncope.   Hematological: Does not bruise/bleed easily.   Psychiatric/Behavioral: Negative for agitation and confusion. The patient is not nervous/anxious.      Objective   Vitals:    02/14/19 0829   BP: 140/80   Pulse: 78   Temp: 97 °F (36.1 °C)   SpO2: 98%   Weight: 127 kg (280 lb)   Height: 188 cm (74\")     Physical Exam   Constitutional: He is oriented to person, place, and time. He appears well-developed and well-nourished. He is cooperative.   HENT:   Head: Normocephalic and atraumatic.   Eyes: Conjunctivae are normal. Pupils are equal, round, and reactive to light. No scleral icterus.   Neck: Normal range of motion. Neck supple. No JVD present. No thyroid mass and no thyromegaly present.   Cardiovascular: Normal rate, regular rhythm and normal heart sounds. Exam reveals no gallop and no " friction rub.   No murmur heard.  Pulmonary/Chest: Effort normal and breath sounds normal. No accessory muscle usage. No respiratory distress. He has no wheezes. He has no rales.   Abdominal: Soft. Normal appearance and bowel sounds are normal. He exhibits no distension, no ascites and no mass. There is no hepatosplenomegaly. There is no tenderness. There is no rebound and no guarding.   Musculoskeletal: Normal range of motion. He exhibits no edema or tenderness.     Vascular Status -  His right foot exhibits normal foot vasculature  and no edema. His left foot exhibits normal foot vasculature  and no edema.  Lymphadenopathy:     He has no cervical adenopathy.   Neurological: He is alert and oriented to person, place, and time. He has normal strength. Gait normal.   Skin: Skin is warm, dry and intact. No rash noted.     Imaging Results (most recent)     None        Body mass index is 35.95 kg/m².  Assessment/Plan   Ruperto was seen today for heartburn.    Diagnoses and all orders for this visit:    Cardenas's esophagus determined by biopsy    Gastroesophageal reflux disease, esophagitis presence not specified  -     Discontinue: esomeprazole (nexIUM) 40 MG capsule; Take 1 capsule by mouth Daily.  -     esomeprazole (nexIUM) 40 MG capsule; Take 1 capsule by mouth Daily.        * Surgery not found *    Cont Nexium daily, take 30 min prior to breakfast  EGD due next 2020  UTD on cscope  Pt advised to contact office if he develops worsening reflux or dysphagia and EGD will be performed at earlier date, he verbalized understanding    Patient's Body mass index is 35.95 kg/m². BMI is above normal parameters. Recommendations include: no follow-up required.

## 2019-05-21 NOTE — PATIENT INSTRUCTIONS
Eyeglass prescription given.    Optivar- 1 drop both eyes 2 x day as needed for itchy eyes.    Systane Balance- 1 drop both eyes 4 x day.    You have mild macular degeneration.  I recommend taking supplements for the eyes, PreserVision AREDS 2 formula daily as recommended dosage on the bottle.  Check with your pharmacist first to make sure there are not any interactions with your medications.    Monitor macular degeneration with yearly eye exams or sooner if any changes.    Return in 1 year for a complete eye exam or sooner if needed.    Carlos James, OD

## 2019-05-21 NOTE — LETTER
5/21/2019         RE: Preston Cai  [address]  [place] [address]        Dear Colleague,    Thank you for referring your patient, Preston Cai, to the [facility]. Please see a copy of my visit note below.    Chief Complaint   Patient presents with     Annual Eye Exam         Last Eye Exam: 6/2018  Dilated Previously: Yes    What are you currently using to see?  glasses       Distance Vision Acuity: Noticed gradual change in right eye    Near Vision Acuity: Satisfied with vision while reading  with glasses - feels like right eye affects vision - easier to close right eye.    Eye Comfort: When he wakes up his eye burn sometimes.   Do you use eye drops? : Yes: Thera tears bid - Pt feels the thera tears causes his eyes to be puffy  Occupation or Hobbies:     [employer]  [employer]    Saw Vitreoretinal Surgery- Dr. Burns 7/17/18 and was dismissed from his practice unless any changes.            Medical, surgical and family histories reviewed and updated 5/21/2019.       OBJECTIVE: See Ophthalmology exam    ASSESSMENT:    ICD-10-CM    1. Examination of eyes and vision Z01.00 EYE EXAM (SIMPLE-NONBILLABLE)   2. Presbyopia H52.4 REFRACTION   3. Hypermetropia of both eyes H52.03 REFRACTION   4. Regular astigmatism of both eyes H52.223 EYE EXAM (SIMPLE-NONBILLABLE)   5. Allergic conjunctivitis, bilateral H10.13 EYE EXAM (SIMPLE-NONBILLABLE)     azelastine (OPTIVAR) 0.05 % ophthalmic solution   6. Squamous blepharitis of upper and lower eyelids of both eyes H01.02A EYE EXAM (SIMPLE-NONBILLABLE)    H01.02B    7. Intermediate stage nonexudative age-related macular degeneration of both eyes H35.3132 EYE EXAM (SIMPLE-NONBILLABLE)   8. Pseudophakia of both eyes Z96.1       PLAN:     Patient Instructions   Eyeglass prescription given.    Optivar- 1 drop both eyes 2 x day as needed for itchy eyes.    Systane Balance- 1 drop both eyes 4 x day.    You have mild macular  degeneration.  I recommend taking supplements for the eyes, PreserVision AREDS 2 formula daily as recommended dosage on the bottle.  Check with your pharmacist first to make sure there are not any interactions with your medications.    Monitor macular degeneration with yearly eye exams or sooner if any changes.    Return in 1 year for a complete eye exam or sooner if needed.    Carlos James, OD           Again, thank you for allowing me to participate in the care of your patient.        Sincerely,        Carlos James, OD

## 2019-05-24 ENCOUNTER — OFFICE VISIT (OUTPATIENT)
Dept: NEUROLOGY | Facility: CLINIC | Age: 84
End: 2019-05-24
Payer: COMMERCIAL

## 2019-05-24 DIAGNOSIS — G62.9 SENSORY NEUROPATHY: Primary | ICD-10-CM

## 2019-05-24 PROCEDURE — 95910 NRV CNDJ TEST 7-8 STUDIES: CPT | Performed by: PSYCHIATRY & NEUROLOGY

## 2019-05-24 NOTE — PROGRESS NOTES
[facility]  Electrodiagnostic Laboratory    Nerve Conduction & EMG Report          Patient: Preston Cai YOB: 1935  Patient ID: [id number] Age: 83 Years 4 Months  Gender: Male      History & Examination:  Preston Cai is an 83 year old man with sensory symptoms in both feet. He is referred for evaluation of possible polyneuropathy.    Techniques:  Motor conduction studies were done with surface recording electrodes. Sensory conduction studies were performed with surface electrodes, unless indicated otherwise by (n), designating the use of subdermal recording electrodes. Temperature was monitored and recorded throughout the study. Upper extremities were maintained at a temperature of 32 degrees Centigrade or higher. Lower extremities were maintained at a temperature of 31 degrees Centigrade or higher.      Results:  Sural sensory conduction studies demonstrated moderately severe attenuation of amplitude and mild slowing of conduction bilaterally. The left radial sensory nerve action potential was moderately attenuated. Bilateral peroneal and left tibial compound muscle action potential amplitudes were attenuated to a moderately severe degree. Right peroneal and left tibial motor conduction velocities were mildly slowed.    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       Mau Kelly M.D.              Sensory NCS      Nerve / Sites Rec. Site Onset Peak NP Amp Ref. PP Amp Dist Paul Ref. Temp     ms ms  V  V  V cm m/s m/s  C   L RADIAL - Snuff      Forearm Snuff 2.50 3.28 9.4 15.0 10.7 12.5 50.0 48.0 33.1   L SURAL - Lat Mall 60      Calf Ankle 3.91 5.16 1.9 5.0 1.7 14 35.8 38.0 31.2   R SURAL - Lat Mall 60      Calf Ankle 4.32 5.83 2.1 5.0 1.3 14 32.4 38.0 31.7       Motor NCS      Nerve / Sites Rec. Site Lat Ref. Amp Ref. Rel Amp Dist Paul Ref. Dur. Area Temp.      ms ms mV mV % cm m/s m/s ms %  C   L ULNAR - ADM      Wrist ADM 2.86 3.50 5.9 5.0 100 8   7.14 100 33.8      B.Elbow ADM 6.82  5.5  92.7 21 53.1 48.0 7.24 96.6 33.9      A.Elbow ADM 8.80  5.5  92.2 11.5 58.1 48.0 7.29 91.5 33.9   L DEEP PERONEAL - EDB 60      Ankle EDB 5.00 6.00 0.6 2.0 100 8   5.10 100 31.9      FibHead EDB 13.02  0.6  95.4 33 41.1 38.0 6.15 81.6 31   R DEEP PERONEAL - EDB 60      Ankle EDB 5.26 6.00 1.6 2.0 100 8   5.52 100 32.2      FibHead EDB 13.65  1.4  87.8 31.7 37.8 38.0 5.36 94.8 31.6   L TIBIAL - AH      Ankle AH 5.31 6.00 1.1 4.0 100 8   7.24 100 31.3      Pop Fos AH 16.82  0.6  57.4 41.5 36.1 38.0 57.66 569 31.5

## 2019-05-24 NOTE — LETTER
5/24/2019         RE: Preston Cai  [address]  [place] [address]        Dear Colleague,    Thank you for referring your patient, Preston Cai, to the [facility]. Please see a copy of my visit note below.    [facility]  Electrodiagnostic Laboratory    Nerve Conduction & EMG Report          Patient: Preston Cai YOB: 1935  Patient ID: [id number] Age: 83 Years 4 Months  Gender: Male      History & Examination:  Preston Cai is an 83 year old man with sensory symptoms in both feet. He is referred for evaluation of possible polyneuropathy.    Techniques:  Motor conduction studies were done with surface recording electrodes. Sensory conduction studies were performed with surface electrodes, unless indicated otherwise by (n), designating the use of subdermal recording electrodes. Temperature was monitored and recorded throughout the study. Upper extremities were maintained at a temperature of 32 degrees Centigrade or higher. Lower extremities were maintained at a temperature of 31 degrees Centigrade or higher.      Results:  Sural sensory conduction studies demonstrated moderately severe attenuation of amplitude and mild slowing of conduction bilaterally. The left radial sensory nerve action potential was moderately attenuated. Bilateral peroneal and left tibial compound muscle action potential amplitudes were attenuated to a moderately severe degree. Right peroneal and left tibial motor conduction velocities were mildly slowed.    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       Mau Kelly M.D.              Sensory NCS      Nerve / Sites Rec. Site Onset Peak NP Amp Ref. PP Amp Dist Paul Ref. Temp     ms ms  V  V  V cm m/s m/s  C   L RADIAL - Snuff      Forearm Snuff 2.50 3.28 9.4 15.0 10.7 12.5 50.0 48.0 33.1   L  SURAL - Lat Mall 60      Calf Ankle 3.91 5.16 1.9 5.0 1.7 14 35.8 38.0 31.2   R SURAL - Lat Mall 60      Calf Ankle 4.32 5.83 2.1 5.0 1.3 14 32.4 38.0 31.7       Motor NCS      Nerve / Sites Rec. Site Lat Ref. Amp Ref. Rel Amp Dist Paul Ref. Dur. Area Temp.     ms ms mV mV % cm m/s m/s ms %  C   L ULNAR - ADM      Wrist ADM 2.86 3.50 5.9 5.0 100 8   7.14 100 33.8      B.Elbow ADM 6.82  5.5  92.7 21 53.1 48.0 7.24 96.6 33.9      A.Elbow ADM 8.80  5.5  92.2 11.5 58.1 48.0 7.29 91.5 33.9   L DEEP PERONEAL - EDB 60      Ankle EDB 5.00 6.00 0.6 2.0 100 8   5.10 100 31.9      FibHead EDB 13.02  0.6  95.4 33 41.1 38.0 6.15 81.6 31   R DEEP PERONEAL - EDB 60      Ankle EDB 5.26 6.00 1.6 2.0 100 8   5.52 100 32.2      FibHead EDB 13.65  1.4  87.8 31.7 37.8 38.0 5.36 94.8 31.6   L TIBIAL - AH      Ankle AH 5.31 6.00 1.1 4.0 100 8   7.24 100 31.3      Pop Fos AH 16.82  0.6  57.4 41.5 36.1 38.0 57.66 569 31.5                          Again, thank you for allowing me to participate in the care of your patient.        Sincerely,        Mau Kelly MD

## 2019-05-29 ENCOUNTER — OFFICE VISIT (OUTPATIENT)
Dept: NEUROLOGY | Facility: CLINIC | Age: 84
End: 2019-05-29
Payer: COMMERCIAL

## 2019-05-29 VITALS
BODY MASS INDEX: 22.74 KG/M2 | HEART RATE: 71 BPM | DIASTOLIC BLOOD PRESSURE: 71 MMHG | OXYGEN SATURATION: 99 % | HEIGHT: 73 IN | SYSTOLIC BLOOD PRESSURE: 110 MMHG | WEIGHT: 171.6 LBS

## 2019-05-29 DIAGNOSIS — G60.9 IDIOPATHIC PERIPHERAL NEUROPATHY: Primary | ICD-10-CM

## 2019-05-29 DIAGNOSIS — G62.9 SENSORY NEUROPATHY: ICD-10-CM

## 2019-05-29 PROCEDURE — 99213 OFFICE O/P EST LOW 20 MIN: CPT | Performed by: PSYCHIATRY & NEUROLOGY

## 2019-05-29 RX ORDER — GABAPENTIN 100 MG/1
CAPSULE ORAL
Qty: 60 CAPSULE | Refills: 4 | Status: SHIPPED | OUTPATIENT
Start: 2019-05-29 | End: 2019-09-11

## 2019-05-29 ASSESSMENT — PAIN SCALES - GENERAL: PAINLEVEL: NO PAIN (0)

## 2019-05-29 ASSESSMENT — MIFFLIN-ST. JEOR: SCORE: 1532.25

## 2019-05-29 NOTE — NURSING NOTE
"Preston Cai's goals for this visit include: return  He requests these members of his care team be copied on today's visit information:     PCP: Tyrel Manning    Referring Provider:  No referring provider defined for this encounter.    /71   Pulse 71   Ht 1.854 m (6' 1\")   Wt 77.8 kg (171 lb 9.6 oz)   SpO2 99%   BMI 22.64 kg/m      Do you need any medication refills at today's visit? n  "

## 2019-05-29 NOTE — PROGRESS NOTES
Visit Date:   05/29/2019      DATE OF VISIT:  05/29/2019      HISTORY OF PRESENT ILLNESS:  Preston Cai returns for followup.  He is a patient I saw last month for evaluation of burning discomfort in his feet, particularly at night.  I thought he likely had a sensory-predominant peripheral neuropathy.      Laboratory work done, prior to seeing me, included a normal CBC, comprehensive metabolic panel, B12, methylmalonic acid level and vitamin D level.  He did have subsequent testing done that also prove normal or negative and this included a TSH, serum immunoelectrophoresis, hepatitis C, Lyme serology, JESENIA, SSA, SSB and 2-hour glucose tolerance test.  I did communicate those tests results to him.      He did have an EMG and nerve conduction study done by Dr. Kelly, on 05/24/2019.  I reviewed the test results.  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      As I explained to him, I unfortunately have not identified a cause of his neuropathy, despite the extensive laboratory testing.      He did try gabapentin as I recommended.  He went up to a dose of 200 mg in the evening.  He definitely thinks it has helped his pain at night, but he stopped the drug.  He felt fatigued and lightheaded, although he was not certain as to whether or not this was from the gabapentin, as this is not an entirely a new problem for him.  He also stopped Flomax.      I discussed other treatment options, such as a low dose of duloxetine.  He would actually be interested in retrying the gabapentin.  I told him he could discontinue it if he has any significant side effects.  He will again start on 100 mg in the evening with the latitude to increase to 200 mg.      I will be seeing him back in 4 months.         STEFANY OLEARY MD             D: 05/29/2019   T: 05/29/2019   MT: JOSE      Name:     PRESTON CAI   MRN:      [id number]        Account:       [id number]   :      1935           Visit Date:   2019      Document: [id number]

## 2019-05-29 NOTE — LETTER
5/29/2019         RE: Preston Cai  [address]  [place] [address]        Dear Colleague,    Thank you for referring your patient, Preston Cai, to the [facility]. Please see a copy of my visit note below.    Visit Date:   05/29/2019      DATE OF VISIT:  05/29/2019      HISTORY OF PRESENT ILLNESS:  Preston Cai returns for followup.  He is a patient I saw last month for evaluation of burning discomfort in his feet, particularly at night.  I thought he likely had a sensory-predominant peripheral neuropathy.      Laboratory work done, prior to seeing me, included a normal CBC, comprehensive metabolic panel, B12, methylmalonic acid level and vitamin D level.  He did have subsequent testing done that also prove normal or negative and this included a TSH, serum immunoelectrophoresis, hepatitis C, Lyme serology, JESENIA, SSA, SSB and 2-hour glucose tolerance test.  I did communicate those tests results to him.      He did have an EMG and nerve conduction study done by Dr. Kelly, on 05/24/2019.  I reviewed the test results.  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      As I explained to him, I unfortunately have not identified a cause of his neuropathy, despite the extensive laboratory testing.      He did try gabapentin as I recommended.  He went up to a dose of 200 mg in the evening.  He definitely thinks it has helped his pain at night, but he stopped the drug.  He felt fatigued and lightheaded, although he was not certain as to whether or not this was from the gabapentin, as this is not an entirely a new problem for him.  He also stopped Flomax.      I discussed other treatment options, such as a low dose of duloxetine.  He would actually be interested in retrying the gabapentin.  I told him he could discontinue it if he has any significant side effects.  He will again start on 100 mg  in the evening with the latitude to increase to 200 mg.      I will be seeing him back in 4 months.         BJORN HEART MD             D: 2019   T: 2019   MT: JOSE      Name:     GINA SHOOK   MRN:      [id number]        Account:      [id number]   :      1935           Visit Date:   2019      Document: [id number]        Again, thank you for allowing me to participate in the care of your patient.        Sincerely,        Bjorn Heart MD

## 2019-06-05 ENCOUNTER — OFFICE VISIT (OUTPATIENT)
Dept: FAMILY MEDICINE | Facility: CLINIC | Age: 84
End: 2019-06-05
Payer: COMMERCIAL

## 2019-06-05 ENCOUNTER — OFFICE VISIT (OUTPATIENT)
Dept: AUDIOLOGY | Facility: CLINIC | Age: 84
End: 2019-06-05
Payer: COMMERCIAL

## 2019-06-05 VITALS
SYSTOLIC BLOOD PRESSURE: 128 MMHG | OXYGEN SATURATION: 97 % | TEMPERATURE: 97.9 F | RESPIRATION RATE: 16 BRPM | WEIGHT: 171 LBS | HEART RATE: 83 BPM | DIASTOLIC BLOOD PRESSURE: 73 MMHG | BODY MASS INDEX: 22.66 KG/M2 | HEIGHT: 73 IN

## 2019-06-05 DIAGNOSIS — H90.3 SNHL (SENSORY-NEURAL HEARING LOSS), ASYMMETRICAL: Primary | ICD-10-CM

## 2019-06-05 DIAGNOSIS — R42 DIZZINESS: Primary | ICD-10-CM

## 2019-06-05 DIAGNOSIS — H93.8X1 EAR PRESSURE, RIGHT: Primary | ICD-10-CM

## 2019-06-05 PROCEDURE — 99213 OFFICE O/P EST LOW 20 MIN: CPT | Performed by: NURSE PRACTITIONER

## 2019-06-05 PROCEDURE — 92550 TYMPANOMETRY & REFLEX THRESH: CPT | Performed by: AUDIOLOGIST

## 2019-06-05 PROCEDURE — 92557 COMPREHENSIVE HEARING TEST: CPT | Performed by: AUDIOLOGIST

## 2019-06-05 ASSESSMENT — MIFFLIN-ST. JEOR: SCORE: 1529.53

## 2019-06-05 ASSESSMENT — PAIN SCALES - GENERAL: PAINLEVEL: NO PAIN (0)

## 2019-06-05 NOTE — PROGRESS NOTES
Subjective     Preston Cai is a 83 year old male who presents to clinic today for the following health issues:    HPI   Concern - Ear problem  Onset: 1 week ago    Description:   Plugged ear: right    Intensity: moderate    Progression of Symptoms:  worsening    Accompanying Signs & Symptoms:  [ressure, lightheadedness    Previous history of similar problem:   none    Precipitating factors:   Worsened by: none    Alleviating factors:  Improved by: none    Therapies Tried and outcome: None    Pleasant 83-year-old male presents with his wife with concerns for right ear pressure that started last week.  He had decreased hearing at that time as well. Symptoms improved over the weekend however worsened again yesterday.  Denies pain.  No recent airplane or swimming.  Feels like balance is a little off today.  No headaches. No change in vision. Has some seasonal allergies with a runny nose. He's felt like this in the past when he's had ear infections.    Patient Active Problem List   Diagnosis     Seborrheic dermatitis     Allergic rhinitis due to other allergen     Cervical radiculopathy at C6     Personal history of malignant neoplasm of larynx     Essential tremor     CARDIOVASCULAR SCREENING; LDL GOAL LESS THAN 160     Benign prostatic hyperplasia     Hoarse voice quality     Advanced directives, counseling/discussion     Pseudophakia     Gastroesophageal reflux disease without esophagitis     Macular degeneration     Left shoulder pain     Past Surgical History:   Procedure Laterality Date     APPENDECTOMY  8 years old     Biopsy of the throat - cancerous mass - got radiation for in larynx  1982     CATARACT IOL, RT/LT       COLONOSCOPY  2/25/2004    Normal     COLONOSCOPY N/A 4/28/2015    Procedure: COLONOSCOPY;  Surgeon: Marvin Adams MD;  Location: [facility] OR     COLONOSCOPY WITH CO2 INSUFFLATION N/A 4/28/2015    Procedure: COLONOSCOPY WITH CO2 INSUFFLATION;  Surgeon: Marvin Adams MD;   Location: [facility] OR     CYSTOSCOPY  1997    for hematuria - negative     EYE SURGERY Right     Rt eye.macular repair     EYE SURGERY  2003    cataract     NASAL ENDOSCOPY,DX  4/2007    GERD     Pars plana vitrectomy and epiretinal membrane dissection, right eye  11/8/2002     Phacoemulsification with intraocular lens implantation right eye.  3/11/2003     PHACOEMULSIFICATION WITH STANDARD INTRAOCULAR LENS IMPLANT Left 9/22/2016    Procedure: PHACOEMULSIFICATION WITH STANDARD INTRAOCULAR LENS IMPLANT;  Surgeon: Nghia Lara MD;  Location: [facility] OR     Thyroglossal Duct Cyst Removal - 2ndary to radiation.  1982     VASECTOMY  1971     ZZ GASTROSCOPY,FL  9/2007    hiatal hernia and errosions       Social History     Tobacco Use     Smoking status: Former Smoker     Smokeless tobacco: Never Used     Tobacco comment: 1969   Substance Use Topics     Alcohol use: Yes     Alcohol/week: 0.0 oz     Comment: A beer once in awhile     Family History   Problem Relation Age of Onset     Cerebrovascular Disease Mother         at 84 yo - possibly TIA - befoer     Macular Degeneration Mother      Gastrointestinal Disease Father      Cancer - colorectal Father      Hypertension Father      Cancer Father      Cancer Other      C.A.D. No family hx of      Diabetes No family hx of      Prostate Cancer No family hx of      Glaucoma No family hx of      Thyroid Disease No family hx of          Current Outpatient Medications   Medication Sig Dispense Refill     azelastine (OPTIVAR) 0.05 % ophthalmic solution Apply 1 drop to eye 2 times daily as needed (for itchy, watery eyes) 1 Bottle 11     gabapentin (NEURONTIN) 100 MG capsule One every evening. May increase to 2 if needed 60 capsule 4     primidone (MYSOLINE) 250 MG tablet Take 1 tablet (250 mg) by mouth At Bedtime 90 tablet 3     ranitidine (ZANTAC) 300 MG tablet TAKE ONE TABLET BY MOUTH EVERY NIGHT AT BEDTIME 90 tablet 3     VITAMIN D, CHOLECALCIFEROL, PO Take 1,000 Units by  "mouth daily       tamsulosin (FLOMAX) 0.4 MG capsule Take 2 capsules (0.8 mg) by mouth daily (Patient not taking: Reported on 6/5/2019) 180 capsule 3     Allergies   Allergen Reactions     Seasonal Allergies      Hay fever      BP Readings from Last 3 Encounters:   06/05/19 128/73   05/29/19 110/71   05/15/19 105/68    Wt Readings from Last 3 Encounters:   06/05/19 77.6 kg (171 lb)   05/29/19 77.8 kg (171 lb 9.6 oz)   05/15/19 78.7 kg (173 lb 9.6 oz)                      Reviewed and updated as needed this visit by Provider  Tobacco  Allergies  Meds  Problems  Med Hx  Surg Hx  Fam Hx         Review of Systems   ROS COMP: Constitutional, HEENT, cardiovascular, pulmonary, gi and gu systems are negative, except as otherwise noted.      Objective    /73 (BP Location: Right arm, Patient Position: Chair, Cuff Size: Adult Regular)   Pulse 83   Temp 97.9  F (36.6  C) (Oral)   Resp 16   Ht 1.854 m (6' 1\")   Wt 77.6 kg (171 lb)   SpO2 97%   BMI 22.56 kg/m    Body mass index is 22.56 kg/m .  Physical Exam   GENERAL: healthy, alert and no distress  EYES: Eyes grossly normal to inspection, PERRL and conjunctivae and sclerae normal  HENT: ear canals and TM's normal, nose and mouth without ulcers or lesions  NECK: no adenopathy, no asymmetry, masses, or scars and thyroid normal to palpation  RESP: lungs clear to auscultation - no rales, rhonchi or wheezes  CV: regular rate and rhythm, normal S1 S2, no S3 or S4, no murmur, click or rub, no peripheral edema and peripheral pulses strong  MS: no gross musculoskeletal defects noted, no edema  PSYCH: mentation appears normal, affect normal/bright    Diagnostic Test Results:  none         Assessment & Plan     1. Ear pressure, right  Exam unremarkable  - OTOLARYNGOLOGY REFERRAL  Start fluticasone (Flonase) 2 sprays each nostril daily x2-3 weeks (he will  OTC)  Lie on a warm pack with right ear down for 10-15 minutes 4-6 times daily  If not improving, follow up " with otolaryngology (ENT)        Return in about 3 days (around 6/8/2019), or if symptoms worsen or fail to improve.     The benefits, risks and potential side effects were discussed in detail. Black box warnings discussed as relevant. All patient questions were answered. The patient was instructed to follow up immediately if any adverse reactions develop.    Return precautions discussed, including when to seek urgent/emergent care.    Patient verbalizes understanding and agrees with plan of care. Patient stable for discharge.      VALENTÍN Gil [facility]

## 2019-06-05 NOTE — PROGRESS NOTES
AUDIOLOGY REPORT    SUMMARY: Audiology visit completed. See audiogram for results.    RECOMMENDATIONS: Follow-up with ENT.    Aisha Darling  Doctor of Audiology  [place] License # [id number]

## 2019-06-05 NOTE — PATIENT INSTRUCTIONS
Start fluticasone (Flonase) 2 sprays each nostril daily x2-3 weeks  Lie on a warm pack with right ear down for 10-15 minutes 4-6 times daily  If not improving, follow up with otolaryngology (ENT)

## 2019-06-06 DIAGNOSIS — M19.012 PRIMARY OSTEOARTHRITIS OF LEFT SHOULDER: Primary | ICD-10-CM

## 2019-06-07 ENCOUNTER — OFFICE VISIT (OUTPATIENT)
Dept: OTOLARYNGOLOGY | Facility: CLINIC | Age: 84
End: 2019-06-07
Attending: NURSE PRACTITIONER
Payer: COMMERCIAL

## 2019-06-07 VITALS
WEIGHT: 170 LBS | HEART RATE: 68 BPM | OXYGEN SATURATION: 98 % | SYSTOLIC BLOOD PRESSURE: 111 MMHG | HEIGHT: 73 IN | TEMPERATURE: 98.7 F | DIASTOLIC BLOOD PRESSURE: 68 MMHG | BODY MASS INDEX: 22.53 KG/M2 | RESPIRATION RATE: 18 BRPM

## 2019-06-07 DIAGNOSIS — K21.9 GASTROESOPHAGEAL REFLUX DISEASE WITHOUT ESOPHAGITIS: Primary | ICD-10-CM

## 2019-06-07 PROCEDURE — 31575 DIAGNOSTIC LARYNGOSCOPY: CPT | Performed by: OTOLARYNGOLOGY

## 2019-06-07 PROCEDURE — 99213 OFFICE O/P EST LOW 20 MIN: CPT | Mod: 25 | Performed by: OTOLARYNGOLOGY

## 2019-06-07 RX ORDER — PANTOPRAZOLE SODIUM 40 MG/1
40 TABLET, DELAYED RELEASE ORAL DAILY
Qty: 90 TABLET | Refills: 3 | Status: SHIPPED | OUTPATIENT
Start: 2019-06-07 | End: 2020-05-15

## 2019-06-07 ASSESSMENT — ENCOUNTER SYMPTOMS
VOMITING: 0
TREMORS: 0
NAUSEA: 0
SINUS PAIN: 0
TINGLING: 0
HEADACHES: 0
DOUBLE VISION: 0
HEMOPTYSIS: 0
BRUISES/BLEEDS EASILY: 0
CONSTITUTIONAL NEGATIVE: 1
COUGH: 0
BLURRED VISION: 0
HEARTBURN: 0
SORE THROAT: 0
DIZZINESS: 1

## 2019-06-07 ASSESSMENT — PAIN SCALES - GENERAL: PAINLEVEL: NO PAIN (0)

## 2019-06-07 ASSESSMENT — MIFFLIN-ST. JEOR: SCORE: 1524.99

## 2019-06-07 NOTE — PROGRESS NOTES
HPI    This is an 83 year old patient who has been having aural fullness in his right ear for the past several weeks. He did have a vertigo episode last week and ended up in ER. His vertigo lasted several hours with spinning sensation and nausea. Denies any vision issues, weakness or numbness. He did have similar episode of vertigo more than a decade ago. He has a hx of  service, laryngeal ca, radiotherapy. Describes hoarseness for the past several years and he tried PPIs with some benefit. He has no hx of difficulty swallowing or weight changes.     Review of Systems   Constitutional: Negative.    HENT: Positive for hearing loss and tinnitus. Negative for congestion, ear discharge, ear pain, nosebleeds, sinus pain and sore throat.    Eyes: Negative for blurred vision and double vision.   Respiratory: Negative for cough and hemoptysis.    Gastrointestinal: Negative for heartburn, nausea and vomiting.   Skin: Negative.    Neurological: Positive for dizziness. Negative for tingling, tremors and headaches.   Endo/Heme/Allergies: Negative for environmental allergies. Does not bruise/bleed easily.         Physical Exam   Constitutional: He appears well-developed and well-nourished.   HENT:   Head: Normocephalic and atraumatic.   Right Ear: Tympanic membrane, external ear and ear canal normal. No drainage, swelling or tenderness. No middle ear effusion. Decreased hearing is noted.   Left Ear: Tympanic membrane, external ear and ear canal normal. No drainage, swelling or tenderness.  No middle ear effusion. Decreased hearing is noted.   Nose: Mucosal edema, rhinorrhea and septal deviation present.   Mouth/Throat: Uvula is midline, oropharynx is clear and moist and mucous membranes are normal.   Eyes: Pupils are equal, round, and reactive to light. EOM are normal.   Neck: Normal range of motion. Neck supple.     Diagnostic nasal endoscopy:     He was seen in the room and identified. Pros and cons of the procedure  were explained to the patient. The procedure and its alternatives were explained to the patient in lay terms. His questions were answered. His symptoms required a diagnostic endoscopic evaluation under local anesthesia. After obtaining an informed consent from the patient, 1% Lidocaine with 1: 100.000 epinephrine spray was applied to each nostril. Then a flexible scopic exam was performed. Ostiomeatal complexes are free of disease. No pus or polyp seen. Nasopharynx is normal. Rosenmüller fossa and torus tubarius are normal. Epiglottis, hypopharynx, false vocal folds are normal. No pooling in pyriform fossae. Vocal cords are mobile and close with a gap. His voice is breathy. He tolerated the procedure well and left the room with no complications.    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 He just started a topical steroid spray and I will continue with that. Options discussed. 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 I will continue with his reflux medication, Pantoprazole 40 mg once a day. I will see him in the f/u. His questions were answered.

## 2019-06-07 NOTE — NURSING NOTE
"Preston Cai's goals for this visit include:   Chief Complaint   Patient presents with     Consult     Ear pressure, right [H93.8X1]. Advised by referring doctor to be seen this week.       He requests these members of his care team be copied on today's visit information: Yes    PCP: Tyrel Manning    Referring Provider:  VALENTÍN Lawson CNP  [id number] [place] [address]    /68 (BP Location: Right arm, Patient Position: Sitting, Cuff Size: Adult Large)   Pulse 68   Temp 98.7  F (37.1  C) (Oral)   Resp 18   Ht 1.854 m (6' 1\")   Wt 77.1 kg (170 lb)   SpO2 98%   BMI 22.43 kg/m      Do you need any medication refills at today's visit? No    Richard Page CMA (AAMA)      "

## 2019-06-07 NOTE — LETTER
6/7/2019         RE: Preston Cai  [address]  [place] [address]        Dear Colleague,    Thank you for referring your patient, Preston Cai, to the [facility]. Please see a copy of my visit note below.            HPI    This is an 83 year old patient who has been having aural fullness in his right ear for the past several weeks. He did have a vertigo episode last week and ended up in ER. His vertigo lasted several hours with spinning sensation and nausea. Denies any vision issues, weakness or numbness. He did have similar episode of vertigo more than a decade ago. He has a hx of  service, laryngeal ca, radiotherapy. Describes hoarseness for the past several years and he tried PPIs with some benefit. He has no hx of difficulty swallowing or weight changes.     Review of Systems   Constitutional: Negative.    HENT: Positive for hearing loss and tinnitus. Negative for congestion, ear discharge, ear pain, nosebleeds, sinus pain and sore throat.    Eyes: Negative for blurred vision and double vision.   Respiratory: Negative for cough and hemoptysis.    Gastrointestinal: Negative for heartburn, nausea and vomiting.   Skin: Negative.    Neurological: Positive for dizziness. Negative for tingling, tremors and headaches.   Endo/Heme/Allergies: Negative for environmental allergies. Does not bruise/bleed easily.         Physical Exam   Constitutional: He appears well-developed and well-nourished.   HENT:   Head: Normocephalic and atraumatic.   Right Ear: Tympanic membrane, external ear and ear canal normal. No drainage, swelling or tenderness. No middle ear effusion. Decreased hearing is noted.   Left Ear: Tympanic membrane, external ear and ear canal normal. No drainage, swelling or tenderness.  No middle ear effusion. Decreased hearing is noted.   Nose: Mucosal edema, rhinorrhea and septal deviation present.   Mouth/Throat: Uvula is midline, oropharynx is clear and moist  and mucous membranes are normal.   Eyes: Pupils are equal, round, and reactive to light. EOM are normal.   Neck: Normal range of motion. Neck supple.     Diagnostic nasal endoscopy:     He was seen in the room and identified. Pros and cons of the procedure were explained to the patient. The procedure and its alternatives were explained to the patient in lay terms. His questions were answered. His symptoms required a diagnostic endoscopic evaluation under local anesthesia. After obtaining an informed consent from the patient, 1% Lidocaine with 1: 100.000 epinephrine spray was applied to each nostril. Then a flexible scopic exam was performed. Ostiomeatal complexes are free of disease. No pus or polyp seen. Nasopharynx is normal. Rosenmüller fossa and torus tubarius are normal. Epiglottis, hypopharynx, false vocal folds are normal. No pooling in pyriform fossae. Vocal cords are mobile and close with a gap. His voice is breathy. He tolerated the procedure well and left the room with no complications.    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 He just started a topical steroid spray and I will continue with that. Options discussed. 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 I will continue with his reflux medication, Pantoprazole 40 mg once a day. I will see him in the f/u. His questions were answered.      Again, thank you for allowing me to participate in the care of your patient.        Sincerely,        Tosha Ron MD

## 2019-06-12 DIAGNOSIS — R42 DIZZINESS: Primary | ICD-10-CM

## 2019-06-12 RX ORDER — METHYLPREDNISOLONE 4 MG
TABLET, DOSE PACK ORAL
Qty: 21 TABLET | Refills: 0 | Status: SHIPPED | OUTPATIENT
Start: 2019-06-12 | End: 2019-07-01

## 2019-06-12 NOTE — PROGRESS NOTES
Pt presents to clinic with symptoms of right ear fullness since last night, now with dizziness today.  Pt last saw Dr Ron on 6/7 for cochlear hydropic changes.  Per phone conversation with Dr Ron, he recommends pt start medrol dosepack for symptoms, and return to clinic to discuss ongoing plan of care.  Discussed with pt, who verbalized he would like to try medrol dosepack.  Medication order sent to [facility] pharmacy as requested.  Pt to call back clinic with further questions or concerns.  Steph Wilde RN

## 2019-06-18 ENCOUNTER — THERAPY VISIT (OUTPATIENT)
Dept: PHYSICAL THERAPY | Facility: CLINIC | Age: 84
End: 2019-06-18
Payer: COMMERCIAL

## 2019-06-18 DIAGNOSIS — M25.512 LEFT SHOULDER PAIN, UNSPECIFIED CHRONICITY: ICD-10-CM

## 2019-06-18 PROCEDURE — 97110 THERAPEUTIC EXERCISES: CPT | Mod: GP | Performed by: PHYSICAL THERAPIST

## 2019-06-18 PROCEDURE — 97164 PT RE-EVAL EST PLAN CARE: CPT | Mod: GP | Performed by: PHYSICAL THERAPIST

## 2019-06-20 NOTE — PROGRESS NOTES
Subjective:  HPI                    Objective:  System    Physical Exam    General     ROS    Assessment/Plan:    PROGRESS  REPORT    Progress reporting period as of 6/18/19.       SUBJECTIVE  Subjective changes noted by patient:  Pateint returned to PT today after receiving 2nd cortisone injection ~ 3 weeks ago.  Patient stated he stopped coming to PT previously because he had so much pain and after receiving the 2nd cortisone injection the pain in the L shoulder is so much less.  Patient reports difficulty with reaching in different directions (motion) and weakness.  Current pain level is : 3/10.     Previous pain level was  8/10  .   Changes in function:  Yes, Goals re-established  Adverse reaction to treatment or activity: activity - increase pain    OBJECTIVE  Changes noted in objective findings:  Yes, Patient has been given illustrated HEP to follow; Discussion on importance of good Mobility in the L shoulder.  Current L shoulder AROM:   110 flex  PROM: 118 flex      56 ext         95 abd    105 abd    L Shoulder Strength (within available ROM):  Flexion= 4-/5         Abd= 4-/5         Ext= 4+/5    L Shoulder Mobility: Hypomobile posterior and inferior capsule    ASSESSMENT/PLAN  Updated problem list and treatment plan: Diagnosis 1:  L shoulder Pain  Pain -  hot/cold therapy and manual therapy  Decreased ROM/flexibility - manual therapy and therapeutic exercise  Decreased joint mobility - manual therapy and therapeutic exercise  Decreased strength - therapeutic exercise and therapeutic activities  Impaired muscle performance - neuro re-education  Decreased function - therapeutic activities  STG/LTGs have been met or progress has been made towards goals:  Yes (See Goal flow sheet completed today.)  Assessment of Progress: The patient's condition has potential to improve.  Self Management Plans:  Patient has been instructed in a home treatment program.  I have re-evaluated this patient and find that the nature,  scope, duration and intensity of the therapy is appropriate for the medical condition of the patient.  Preston continues to require the following intervention to meet STG and LTG's:  PT    Recommendations:  This patient would benefit from continued therapy.     Frequency:  1 X week, once daily  Duration:  for 6 weeks        Please refer to the daily flowsheet for treatment today, total treatment time and time spent performing 1:1 timed codes.

## 2019-06-22 ENCOUNTER — OFFICE VISIT (OUTPATIENT)
Dept: URGENT CARE | Facility: URGENT CARE | Age: 84
End: 2019-06-22
Payer: COMMERCIAL

## 2019-06-22 ENCOUNTER — NURSE TRIAGE (OUTPATIENT)
Dept: NURSING | Facility: CLINIC | Age: 84
End: 2019-06-22

## 2019-06-22 VITALS
WEIGHT: 171.6 LBS | BODY MASS INDEX: 22.64 KG/M2 | SYSTOLIC BLOOD PRESSURE: 103 MMHG | HEART RATE: 72 BPM | RESPIRATION RATE: 16 BRPM | OXYGEN SATURATION: 96 % | TEMPERATURE: 98.2 F | DIASTOLIC BLOOD PRESSURE: 60 MMHG

## 2019-06-22 DIAGNOSIS — A69.20 ERYTHEMA MIGRANS (LYME DISEASE): Primary | ICD-10-CM

## 2019-06-22 PROCEDURE — 36415 COLL VENOUS BLD VENIPUNCTURE: CPT | Performed by: FAMILY MEDICINE

## 2019-06-22 PROCEDURE — 86618 LYME DISEASE ANTIBODY: CPT | Performed by: FAMILY MEDICINE

## 2019-06-22 PROCEDURE — 99214 OFFICE O/P EST MOD 30 MIN: CPT | Performed by: FAMILY MEDICINE

## 2019-06-22 RX ORDER — DOXYCYCLINE HYCLATE 100 MG
100 TABLET ORAL 2 TIMES DAILY
Qty: 20 TABLET | Refills: 0 | Status: SHIPPED | OUTPATIENT
Start: 2019-06-22 | End: 2019-07-01

## 2019-06-22 ASSESSMENT — PAIN SCALES - GENERAL: PAINLEVEL: NO PAIN (0)

## 2019-06-22 NOTE — TELEPHONE ENCOUNTER
"Patient calling reporting he started having itching on his leg four days ago. Thinks he was bitten by a tick. Reports the area appears to that \"white dot and another red around it\" has the appearance of a bull's eye rash. Denies fever. Per guideline, advised patient to be seen within 24 hours. Patient states he will go to [facility] today.     Javi Carlisle RN  [facility]     Reason for Disposition    Red ring or bull's-eye rash occurs at tick bite    Additional Information    Negative: Sounds like a life-threatening emergency to the triager    Negative: [1] 2 to 14 days following tick bite AND [2] severe headache with fever occurs    Negative: Patient sounds very sick or weak to the triager    Negative: [1] 2 to 14 days following tick bite AND [2] widespread rash with fever occurs    Negative: [1] Fever AND [2] red area    Negative: [1] Fever AND [2] area is very tender to touch    Negative: [1] Red streak or red line AND [2] length > 2 inches (5 cm)    Negative: Can't remove live tick (after trying Care Advice)    Negative: Can't remove tick's head that was broken off in the skin (after trying Care Advice)    Negative: [1] 2 to 14 days following tick bite AND [2] fever AND [3] no rash or headache    Negative: [1] 2 to 14 days following tick bite AND [2] widespread rash or headache AND [3] no fever    Negative: [1] Red or very tender (to touch) area AND [2] started over 24 hours after the bite    Protocols used: TICK BITE-A-      "

## 2019-06-22 NOTE — PROGRESS NOTES
SUBJECTIVE:   Chief Complaint   Patient presents with     Insect Bites     a spot on left leg, noticed some itchy about 4 days ago       Rash/Lesion  Onset: Four days ago    Description:   Location: Back of his left leg   Color: Red   Character: round  Itching (Pruritis): Yes D  Pain?:no    Progression of Symptoms:  same    Accompanying Signs & Symptoms:  Fever: no  Body aches or joint pain:  no  Sore throat symptoms:no  Recent cold symptoms: no    History:   Previous similar rash: no    Precipitating factors:   Exposure to similar rash: no  New exposures: {Yes Details: He noticed a tick on his skin two weeks ago  Recent travel: no  New Medication: no    What makes it better?:  Nothing   Therapies Tried and outcome:  Nothing    Review of Systems review of system negative except as mentioned in HPI.       Past Medical History:   Diagnosis Date     Acid reflux disease      Allergic rhinitis, cause unspecified      H/O magnetic resonance imaging of brain and brain stem 10/10/2016    MRI BRAIN WITH AND WITHOUT CONTRAST August 17, 2009 8:07:00 PM   HISTORY: Severe dizzy spells with imbalance and vomiting.   TECHNIQUE: Routine pulse sequences without and with contrast were performed including thin section images through the IACs. 20 mL Magnevist given.   FINDINGS: Diffusion-weighted images are normal. The brain parenchyma, brainstem, ventricular system, and subarachnoid spaces a     Hematuria     Hematuria  - in 1990's      History of anemia 2002    Anemia-Iron Deficit     History of gastric ulcer      Macular degeneration      Malignant neoplasm of laryngeal cartilages (H)     Laryngeal CA (Radiation 1982)     Nonsenile cataract      PONV (postoperative nausea and vomiting)      Tremor 10/3/2016     Family History   Problem Relation Age of Onset     Cerebrovascular Disease Mother         at 84 yo - possibly TIA - befoer     Macular Degeneration Mother      Gastrointestinal Disease Father      Cancer - colorectal Father       Hypertension Father      Cancer Father      Cancer Other      C.A.D. No family hx of      Diabetes No family hx of      Prostate Cancer No family hx of      Glaucoma No family hx of      Thyroid Disease No family hx of      Current Outpatient Medications   Medication Sig Dispense Refill     azelastine (OPTIVAR) 0.05 % ophthalmic solution Apply 1 drop to eye 2 times daily as needed (for itchy, watery eyes) 1 Bottle 11     doxycycline hyclate (VIBRA-TABS) 100 MG tablet Take 1 tablet (100 mg) by mouth 2 times daily for 10 days 20 tablet 0     gabapentin (NEURONTIN) 100 MG capsule One every evening. May increase to 2 if needed 60 capsule 4     methylPREDNISolone (MEDROL DOSEPAK) 4 MG tablet therapy pack Follow Package Directions 21 tablet 0     pantoprazole (PROTONIX) 40 MG EC tablet Take 1 tablet (40 mg) by mouth daily 90 tablet 3     primidone (MYSOLINE) 250 MG tablet Take 1 tablet (250 mg) by mouth At Bedtime 90 tablet 3     ranitidine (ZANTAC) 300 MG tablet TAKE ONE TABLET BY MOUTH EVERY NIGHT AT BEDTIME 90 tablet 3     tamsulosin (FLOMAX) 0.4 MG capsule Take 2 capsules (0.8 mg) by mouth daily 180 capsule 3     VITAMIN D, CHOLECALCIFEROL, PO Take 1,000 Units by mouth daily       Social History     Tobacco Use     Smoking status: Former Smoker     Smokeless tobacco: Never Used     Tobacco comment: 1969   Substance Use Topics     Alcohol use: Yes     Alcohol/week: 0.0 oz     Comment: A beer once in awhile       OBJECTIVE  /60 (BP Location: Left arm, Patient Position: Sitting, Cuff Size: Adult Regular)   Pulse 72   Temp 98.2  F (36.8  C) (Oral)   Resp 16   Wt 77.8 kg (171 lb 9.6 oz)   SpO2 96%   BMI 22.64 kg/m      Physical Exam   Constitutional: He appears well-developed and well-nourished. No distress.   HENT:   Head: Normocephalic and atraumatic.   Eyes: Conjunctivae are normal.   Cardiovascular: Normal rate and regular rhythm.   Pulmonary/Chest: Effort normal and breath sounds normal. No stridor. No  respiratory distress.   Musculoskeletal: Normal range of motion.   Neurological: He is alert.   Skin: He is not diaphoretic.   2-3 cm round erythematous rash with a concentric ring (bull's eye pattern) noted on the back of the left leg (Popliteal area). Rash blanches.        Labs:  No results found for this or any previous visit (from the past 24 hour(s)).    X-Ray was not done.    ASSESSMENT:    Preston was seen today for insect bites.    Diagnoses and all orders for this visit:    Erythema migrans (Lyme disease)  -     doxycycline hyclate (VIBRA-TABS) 100 MG tablet; Take 1 tablet (100 mg) by mouth 2 times daily for 10 days  -     Lyme Disease Zaina with reflex to WB Serum  -     Shared decision was made to initiate therapy. He will be called with result and if negative he will discontinue antibiotics.   -     He will follow up with PCP for close monitoring      Followup:    In 4  day(s) follow up with  your Primary Care Provider    Dominick Fernandez MD

## 2019-06-22 NOTE — PATIENT INSTRUCTIONS
Patient Education     Lyme Disease  Lyme disease is caused by bacteria. The infection is most often passed during the bite of a deer tick. The tick is very small, so many people with Lyme disease don't know they have been bitten. Tests for Lyme disease are not always accurate early in the disease. If the disease is suspected, treatment may start before testing confirms the infection. A long course of antibiotics is the standard treatment.  If untreated, Lyme disease can cause symptoms in many parts of the body that may worsen.    Early symptoms limited to a small area may appear within a few days to a month after the tick bite. These symptoms may include a round, red rash that sometimes looks like a bull's-eye target with darker outer ring and a darker center. There may fever, chills, fatigue, body aches, and headache. In time, the rash goes away, even without treatment. That doesn't mean the infection has gone away, however. In some cases, early local symptoms never develop.    Early body-wide symptoms may appear weeks to months after the bite. These can include rashes on the skin of various parts of the body, muscle aches, fatigue, fever, headache, stiff neck, weakness on one side of the face, dizziness, palpitations, passing out, and joint pain and swelling.    Late-stage symptoms can include weakness in an arm, or leg, headache, fever, and numbness and tingling in the arms or legs, joint pain and swelling, confusion, and memory loss.    Many people will have left over symptoms even after treatment and cure of the Lyme disease. These are called post-Lyme symptoms and may include fatigue, body aches, joint aches, and headaches, which generally improve with time. Repeated courses of antibiotics don't help these symptoms to resolve faster. And, having the symptoms after a course of treatment does not mean that the Lyme bacteria is still active in the body.  Testing is done to check for the bacteria. When the  infection is treated early, it can be cured. In some cases, a second or third course of antibiotics may be needed. Be sure to follow your healthcare providers directions about treatment.  Home care  If antibiotic pills have been prescribed, take them exactly as directed until they are completely gone. Don't stop taking them until you have taken the full course or your healthcare provider has told you to stop.  Ask your healthcare provider about taking over-the-counter medicines to control symptoms such as aches and fever.  Follow-up care  Follow up with your healthcare provider, or as advised. Be sure to return for follow-up testing as directed to be sure the infection has been treated.  When to seek medical advice  Call your healthcare provider right away if any of the following occur:    Current symptoms get worse    Unexplained fever, neck pain or stiffness, or headache    Arm, leg or facial weakness    Joint pain or swelling    Numbness and tingling in the arms or legs    Confusion or memory loss    Irregular or rapid heartbeat, palpitations, dizziness, or passing out  Date Last Reviewed: 3/1/2018    5146-3292 [employer]. [address] [place] [address]. All rights reserved. This information is not intended as a substitute for professional medical care. Always follow your healthcare professional's instructions.           Patient Education     Lyme Disease  Lyme disease is caused by bacteria. The infection is most often passed during the bite of a deer tick. The tick is very small, so many people with Lyme disease don't know they have been bitten. Tests for Lyme disease are not always accurate early in the disease. If the disease is suspected, treatment may start before testing confirms the infection. A long course of antibiotics is the standard treatment.  If untreated, Lyme disease can cause symptoms in many parts of the body that may worsen.    Early symptoms limited to a small area  may appear within a few days to a month after the tick bite. These symptoms may include a round, red rash that sometimes looks like a bull's-eye target with darker outer ring and a darker center. There may fever, chills, fatigue, body aches, and headache. In time, the rash goes away, even without treatment. That doesn't mean the infection has gone away, however. In some cases, early local symptoms never develop.    Early body-wide symptoms may appear weeks to months after the bite. These can include rashes on the skin of various parts of the body, muscle aches, fatigue, fever, headache, stiff neck, weakness on one side of the face, dizziness, palpitations, passing out, and joint pain and swelling.    Late-stage symptoms can include weakness in an arm, or leg, headache, fever, and numbness and tingling in the arms or legs, joint pain and swelling, confusion, and memory loss.    Many people will have left over symptoms even after treatment and cure of the Lyme disease. These are called post-Lyme symptoms and may include fatigue, body aches, joint aches, and headaches, which generally improve with time. Repeated courses of antibiotics don't help these symptoms to resolve faster. And, having the symptoms after a course of treatment does not mean that the Lyme bacteria is still active in the body.  Testing is done to check for the bacteria. When the infection is treated early, it can be cured. In some cases, a second or third course of antibiotics may be needed. Be sure to follow your healthcare providers directions about treatment.  Home care  If antibiotic pills have been prescribed, take them exactly as directed until they are completely gone. Don't stop taking them until you have taken the full course or your healthcare provider has told you to stop.  Ask your healthcare provider about taking over-the-counter medicines to control symptoms such as aches and fever.  Follow-up care  Follow up with your healthcare  provider, or as advised. Be sure to return for follow-up testing as directed to be sure the infection has been treated.  When to seek medical advice  Call your healthcare provider right away if any of the following occur:    Current symptoms get worse    Unexplained fever, neck pain or stiffness, or headache    Arm, leg or facial weakness    Joint pain or swelling    Numbness and tingling in the arms or legs    Confusion or memory loss    Irregular or rapid heartbeat, palpitations, dizziness, or passing out  Date Last Reviewed: 3/1/2018    0177-5349 [employer]. [address] [place] [address]. All rights reserved. This information is not intended as a substitute for professional medical care. Always follow your healthcare professional's instructions.

## 2019-06-24 LAB — B BURGDOR IGG+IGM SER QL: 0.04 (ref 0–0.89)

## 2019-07-01 ENCOUNTER — THERAPY VISIT (OUTPATIENT)
Dept: PHYSICAL THERAPY | Facility: CLINIC | Age: 84
End: 2019-07-01
Payer: COMMERCIAL

## 2019-07-01 ENCOUNTER — OFFICE VISIT (OUTPATIENT)
Dept: FAMILY MEDICINE | Facility: CLINIC | Age: 84
End: 2019-07-01
Payer: COMMERCIAL

## 2019-07-01 VITALS
HEART RATE: 74 BPM | TEMPERATURE: 97.5 F | HEIGHT: 73 IN | WEIGHT: 171 LBS | OXYGEN SATURATION: 95 % | RESPIRATION RATE: 16 BRPM | DIASTOLIC BLOOD PRESSURE: 61 MMHG | BODY MASS INDEX: 22.66 KG/M2 | SYSTOLIC BLOOD PRESSURE: 105 MMHG

## 2019-07-01 DIAGNOSIS — M25.512 LEFT SHOULDER PAIN, UNSPECIFIED CHRONICITY: ICD-10-CM
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
## 2019-07-01 DIAGNOSIS — K21.9 GASTROESOPHAGEAL REFLUX DISEASE, ESOPHAGITIS PRESENCE NOT SPECIFIED: ICD-10-CM

## 2019-07-01 PROCEDURE — 97110 THERAPEUTIC EXERCISES: CPT | Mod: GP | Performed by: PHYSICAL THERAPIST

## 2019-07-01 PROCEDURE — 97140 MANUAL THERAPY 1/> REGIONS: CPT | Mod: GP | Performed by: PHYSICAL THERAPIST

## 2019-07-01 PROCEDURE — 99214 OFFICE O/P EST MOD 30 MIN: CPT | Performed by: INTERNAL MEDICINE

## 2019-07-01 RX ORDER — DOXYCYCLINE HYCLATE 100 MG
100 TABLET ORAL 2 TIMES DAILY
Qty: 20 TABLET | Refills: 0 | Status: SHIPPED | OUTPATIENT
Start: 2019-07-01 | End: 2019-09-11

## 2019-07-01 ASSESSMENT — PAIN SCALES - GENERAL: PAINLEVEL: NO PAIN (0)

## 2019-07-01 ASSESSMENT — MIFFLIN-ST. JEOR: SCORE: 1529.53

## 2019-07-01 NOTE — PATIENT INSTRUCTIONS
At [facility], we strive to deliver an exceptional experience to you, every time we see you.  If you receive a survey in the mail, please send us back your thoughts. We really do value your feedback.    Based on your medical history, these are the current health maintenance/preventive care services that you are due for (some may have been done at this visit.)  Health Maintenance Due   Topic Date Due     ADVANCE CARE PLANNING  03/20/2019         Suggested websites for health information:  Www.[place].org : Up to date and easily searchable information on multiple topics.  Www.medlineplus.gov : medication info, interactive tutorials, watch real surgeries online  Www.familydoctor.org : good info from the Academy of Family Physicians  Www.cdc.gov : public health info, travel advisories, epidemics (H1N1)  Www.aap.org : children's health info, normal development, vaccinations  Www.health.Replaced by [facility].[place].us : [place] dept of health, public health issues in [place], N1N1    Your care team:                            Family Medicine Internal Medicine   MD Tyrel Mckeon MD Shantel Branch-Fleming, MD Katya Georgiev PA-C Nam Ho, MD Pediatrics   JARROD Lakhani, MD Yulissa Omalley CNP, MD Deborah Mielke, MD Kim Thein, APRN CNP      Clinic hours: Monday - Thursday 7 am-7 pm; Fridays 7 am-5 pm.   Urgent care: Monday - Friday 11 am-9 pm; Saturday and Sunday 9 am-5 pm.  Pharmacy : Monday -Thursday 8 am-8 pm; Friday 8 am-6 pm; Saturday and Sunday 9 am-5 pm.     Clinic: [phone]   Pharmacy: [phone]

## 2019-07-01 NOTE — PROGRESS NOTES
Subjective:  HPI                    Objective:  System    Physical Exam    General     ROS    Assessment/Plan:    DISCHARGE REPORT    Progress reporting period as of 7/1/19       SUBJECTIVE  Subjective changes noted by patient:   Patient presents to PT today with c/o mild L shoulder pain.  Patient feels now that he is back on track with exercises he is good to go for the L shoulder.  Patient stated he does have some pain with reaching overhead.    Current pain level is : (0-1/10PL).     Previous pain level was  Unknown.   Changes in function:  Yes (See Goal flowsheet attached for changes in current functional level)  Adverse reaction to treatment or activity: None    OBJECTIVE  Changes noted in objective findings:  Yes, Patient has been given illustrated HEP to follow  L shoulder AROM: 118 flexion, 55 ext, 105 abd     ASSESSMENT/PLAN  Updated problem list and treatment plan: Diagnosis 1:  L shoulder  Pain -  hot/cold therapy  Decreased ROM/flexibility - manual therapy, therapeutic exercise and therapeutic activity  Impaired muscle performance - neuro re-education and home program  Decreased function - therapeutic activities  STG/LTGs have been met or progress has been made towards goals:  Yes (See Goal flow sheet completed today.)  Assessment of Progress: The patient's condition is improving.  Self Management Plans:  Patient has been instructed in a home treatment program.  Patient is independent in a home treatment program.    Preston continues to require the following intervention to meet STG and LTG's:  PT intervention is no longer required to meet STG/LTG.    Recommendations:  This patient is ready to be discharged from therapy and continue their home treatment program.    Please refer to the daily flowsheet for treatment today, total treatment time and time spent performing 1:1 timed codes.

## 2019-07-01 NOTE — PROGRESS NOTES
Subjective     Preston Cai is a 83 year old male who presents to clinic today for the following health issues:    HPI   ED/UC Followup:    Facility:  [facility]  Date of visit: 6/22/19  Reason for visit: Tick bite  Current Status: Left leg redness improved. Medication treatment completed. Initial Lyme disease antibody test is negative. Patient denies any fever, chills, myalgia, reduced appetite nor generalized weakness.   Treatment: Patient was empirically treated with Doxycycline 100 mg BID x 10 days.  Complications: None.         Gerd/Heartburn  Duration of complaint: chronic   Description of symptoms:    food getting stuck: no   nausea/vomiting: no   abdominal pain: no   black/tarry or bloody stools: no :  worse with no particular food or drink.  current NSAID/Aspirin use: YES  Therapies tried and outcome: Zantac (Ranitidine) is not effective.    Patient Active Problem List   Diagnosis     Seborrheic dermatitis     Allergic rhinitis due to other allergen     Cervical radiculopathy at C6     Personal history of malignant neoplasm of larynx     Essential tremor     CARDIOVASCULAR SCREENING; LDL GOAL LESS THAN 160     Benign prostatic hyperplasia     Hoarse voice quality     Advanced directives, counseling/discussion     Pseudophakia     Gastroesophageal reflux disease without esophagitis     Macular degeneration     Past Surgical History:   Procedure Laterality Date     APPENDECTOMY  8 years old     Biopsy of the throat - cancerous mass - got radiation for in larynx  1982     CATARACT IOL, RT/LT       COLONOSCOPY  2/25/2004    Normal     COLONOSCOPY N/A 4/28/2015    Procedure: COLONOSCOPY;  Surgeon: Marvin Adams MD;  Location: [facility] OR     COLONOSCOPY WITH CO2 INSUFFLATION N/A 4/28/2015    Procedure: COLONOSCOPY WITH CO2 INSUFFLATION;  Surgeon: Marvin Adams MD;  Location: [facility] OR     CYSTOSCOPY  1997    for hematuria - negative     EYE SURGERY Right     Rt eye.macular  repair     EYE SURGERY  2003    cataract     NASAL ENDOSCOPY,DX  4/2007    GERD     Pars plana vitrectomy and epiretinal membrane dissection, right eye  11/8/2002     Phacoemulsification with intraocular lens implantation right eye.  3/11/2003     PHACOEMULSIFICATION WITH STANDARD INTRAOCULAR LENS IMPLANT Left 9/22/2016    Procedure: PHACOEMULSIFICATION WITH STANDARD INTRAOCULAR LENS IMPLANT;  Surgeon: Nghia Lara MD;  Location: [facility] OR     Thyroglossal Duct Cyst Removal - 2ndary to radiation.  1982     VASECTOMY  1971     ZZ GASTROSCOPY,FL  9/2007    hiatal hernia and errosions       Social History     Tobacco Use     Smoking status: Former Smoker     Smokeless tobacco: Never Used     Tobacco comment: 1969   Substance Use Topics     Alcohol use: Yes     Alcohol/week: 0.0 oz     Comment: A beer once in awhile     Family History   Problem Relation Age of Onset     Cerebrovascular Disease Mother         at 84 yo - possibly TIA - befoer     Macular Degeneration Mother      Gastrointestinal Disease Father      Cancer - colorectal Father      Hypertension Father      Cancer Father      Cancer Other      C.A.D. No family hx of      Diabetes No family hx of      Prostate Cancer No family hx of      Glaucoma No family hx of      Thyroid Disease No family hx of          Allergies   Allergen Reactions     Seasonal Allergies      Hay fever      Recent Labs   Lab Test 04/26/19  0847 03/04/19  0750 03/09/18  1006 06/27/17  1014 06/27/17  1002 02/20/17  0925  02/27/13  1319   A1C  --   --   --   --   --   --   --  5.3   LDL  --  109* 90  --   --  121*   < >  --    HDL  --  71 78  --   --  69   < >  --    TRIG  --  85 90  --   --  104   < >  --    ALT  --  21 22  --  23 22   < >  --    CR  --  1.22 1.00  --  1.09 1.06   < >  --    GFRESTIMATED  --  55* 72  --  65 67   < >  --    GFRESTBLACK  --  64 87  --  79 81   < >  --    POTASSIUM  --  4.0 4.6  --  4.4 4.3   < >  --    TSH 3.74  --   --  2.43  --   --    < >  --      "< > = values in this interval not displayed.      BP Readings from Last 3 Encounters:   07/01/19 105/61   06/22/19 103/60   06/07/19 111/68    Wt Readings from Last 3 Encounters:   07/01/19 77.6 kg (171 lb)   06/22/19 77.8 kg (171 lb 9.6 oz)   06/07/19 77.1 kg (170 lb)                    Reviewed and updated as needed this visit by Provider         Review of Systems   ROS COMP: CONSTITUTIONAL: NEGATIVE for fever, chills, change in weight  INTEGUMENTARY/SKIN: As above.  EYES: NEGATIVE for vision changes or irritation  ENT/MOUTH: NEGATIVE for ear, mouth and throat problems  RESP: NEGATIVE for significant cough or SOB  CV: NEGATIVE for chest pain, palpitations or peripheral edema  GI: NEGATIVE for hematemesis, hematochezia, jaundice and melena  : NEGATIVE for frequency, dysuria, or hematuria  MUSCULOSKELETAL: NEGATIVE for significant arthralgias or myalgia  NEURO: NEGATIVE for weakness, dizziness or paresthesias  ENDOCRINE: NEGATIVE for temperature intolerance, skin/hair changes  HEME: NEGATIVE for bleeding problems  PSYCHIATRIC: NEGATIVE for changes in mood or affect      Objective    /61 (BP Location: Left arm, Patient Position: Chair, Cuff Size: Adult Regular)   Pulse 74   Temp 97.5  F (36.4  C) (Oral)   Resp 16   Ht 1.854 m (6' 1\")   Wt 77.6 kg (171 lb)   SpO2 95%   BMI 22.56 kg/m    Body mass index is 22.56 kg/m .  Physical Exam   GENERAL: healthy, alert and no distress  EYES: Eyes grossly normal to inspection, PERRL and conjunctivae and sclerae normal  HENT: ear canals and TM's normal, nose and mouth without ulcers or lesions  NECK: no adenopathy, no asymmetry, masses, or scars and thyroid normal to palpation  RESP: lungs clear to auscultation - no rales, rhonchi or wheezes  CV: regular rate and rhythm, normal S1 S2, no S3 or S4, no murmur, click or rub, no peripheral edema and peripheral pulses strong  ABDOMEN: soft, nontender, no hepatosplenomegaly, no masses and bowel sounds normal  MS: no gross " musculoskeletal defects noted, no edema  SKIN: no suspicious lesions or rashes  NEURO: Normal strength and tone, mentation intact and speech normal  PSYCH: mentation appears normal, affect normal/bright    Diagnostic Test Results:  Results for orders placed or performed in visit on 06/22/19   Lyme Disease Zaina with reflex to WB Serum   Result Value Ref Range    Lyme Disease Antibodies Serum 0.04 0.00 - 0.89           Assessment & Plan     1. Tick bite, subsequent encounter    - Lyme Disease Zaina with reflex to WB Serum; Future  - doxycycline hyclate (VIBRA-TABS) 100 MG tablet; Take 1 tablet (100 mg) by mouth 2 times daily  Dispense: 20 tablet; Refill: 0    2. Gastroesophageal reflux disease, esophagitis presence not specified    - H Pylori antigen, stool; Future       FUTURE APPOINTMENTS:       - Follow-up visit in 5 - 6 months.      Tyrel Manning MD  [facility]
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
## 2019-07-07 PROCEDURE — 87338 HPYLORI STOOL AG IA: CPT | Performed by: INTERNAL MEDICINE

## 2019-07-07 PROCEDURE — 36415 COLL VENOUS BLD VENIPUNCTURE: CPT | Performed by: INTERNAL MEDICINE

## 2019-07-07 PROCEDURE — 86618 LYME DISEASE ANTIBODY: CPT | Performed by: INTERNAL MEDICINE

## 2019-07-08 DIAGNOSIS — K21.9 GASTROESOPHAGEAL REFLUX DISEASE, ESOPHAGITIS PRESENCE NOT SPECIFIED: ICD-10-CM

## 2019-07-08 LAB — B BURGDOR IGG+IGM SER QL: 0.04 (ref 0–0.89)

## 2019-07-09 ENCOUNTER — OFFICE VISIT (OUTPATIENT)
Dept: OTOLARYNGOLOGY | Facility: CLINIC | Age: 84
End: 2019-07-09
Payer: COMMERCIAL

## 2019-07-09 VITALS — HEART RATE: 70 BPM | SYSTOLIC BLOOD PRESSURE: 106 MMHG | OXYGEN SATURATION: 95 % | DIASTOLIC BLOOD PRESSURE: 62 MMHG

## 2019-07-09 DIAGNOSIS — R42 DIZZINESS: ICD-10-CM

## 2019-07-09 DIAGNOSIS — H81.01 MENIERE'S DISEASE, RIGHT: Primary | ICD-10-CM

## 2019-07-09 LAB
H PYLORI AG STL QL IA: NORMAL
SPECIMEN SOURCE: NORMAL

## 2019-07-09 PROCEDURE — 99213 OFFICE O/P EST LOW 20 MIN: CPT | Performed by: OTOLARYNGOLOGY

## 2019-07-09 ASSESSMENT — PAIN SCALES - GENERAL: PAINLEVEL: NO PAIN (0)

## 2019-07-09 NOTE — LETTER
7/9/2019         RE: Preston Cai  [address]  [place] [address]        Dear Colleague,    Thank you for referring your patient, Preston Cai, to the [facility]. Please see a copy of my visit note below.    HPI    This is an 83 year old patient who has been having aural fullness in his right ear for the past several weeks. He did have a vertigo episode last week and ended up in ER. His vertigo lasted several hours with spinning sensation and nausea. He started to have positional dizzy spells when he turns his head. Denies any n/v, vision issues, weakness or numbness. His BP is always low. He did have similar episode of vertigo more than a decade ago. He has a hx of  service, laryngeal ca, radiotherapy. Describes hoarseness for the past several years and he tried PPIs with some benefit. He has no hx of difficulty swallowing or weight changes.     Review of Systems   Constitutional: Negative.    HENT: Positive for hearing loss and tinnitus. Negative for congestion, ear discharge, ear pain, nosebleeds, sinus pain and sore throat.    Eyes: Negative for blurred vision and double vision.   Respiratory: Negative for cough and hemoptysis.    Gastrointestinal: Negative for heartburn, nausea and vomiting.   Skin: Negative.    Neurological: Positive for dizziness. Negative for tingling, tremors and headaches.   Endo/Heme/Allergies: Negative for environmental allergies. Does not bruise/bleed easily.         Physical Exam   Constitutional: He appears well-developed and well-nourished.   HENT:   Head: Normocephalic and atraumatic.   Right Ear: Tympanic membrane, external ear and ear canal normal. No drainage, swelling or tenderness. No middle ear effusion. Decreased hearing is noted.   Left Ear: Tympanic membrane, external ear and ear canal normal. No drainage, swelling or tenderness.  No middle ear effusion. Decreased hearing is noted.   Nose: Mucosal edema, rhinorrhea and  septal deviation present.   Mouth/Throat: Uvula is midline, oropharynx is clear and moist and mucous membranes are normal.   Eyes: Pupils are equal, round, and reactive to light. EOM are normal.   Neck: Normal range of motion. Neck supple.       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 He has been having positional dizzy spells since the episodic vertigo. He just started a topical steroid spray and I will continue with that. Options discussed. We will request an MRI of the brain and refer him to vestibular rehab for [place] Hallpike maneuver. 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 I will continue with his reflux medication, Pantoprazole 40 mg once a day. I will see him in the f/u. His questions were answered.      Again, thank you for allowing me to participate in the care of your patient.        Sincerely,        Tosha Ron MD

## 2019-07-09 NOTE — PROGRESS NOTES
HPI    This is an 83 year old patient who has been having aural fullness in his right ear for the past several weeks. He did have a vertigo episode last week and ended up in ER. His vertigo lasted several hours with spinning sensation and nausea. He started to have positional dizzy spells when he turns his head. Denies any n/v, vision issues, weakness or numbness. His BP is always low. He did have similar episode of vertigo more than a decade ago. He has a hx of  service, laryngeal ca, radiotherapy. Describes hoarseness for the past several years and he tried PPIs with some benefit. He has no hx of difficulty swallowing or weight changes.     Review of Systems   Constitutional: Negative.    HENT: Positive for hearing loss and tinnitus. Negative for congestion, ear discharge, ear pain, nosebleeds, sinus pain and sore throat.    Eyes: Negative for blurred vision and double vision.   Respiratory: Negative for cough and hemoptysis.    Gastrointestinal: Negative for heartburn, nausea and vomiting.   Skin: Negative.    Neurological: Positive for dizziness. Negative for tingling, tremors and headaches.   Endo/Heme/Allergies: Negative for environmental allergies. Does not bruise/bleed easily.         Physical Exam   Constitutional: He appears well-developed and well-nourished.   HENT:   Head: Normocephalic and atraumatic.   Right Ear: Tympanic membrane, external ear and ear canal normal. No drainage, swelling or tenderness. No middle ear effusion. Decreased hearing is noted.   Left Ear: Tympanic membrane, external ear and ear canal normal. No drainage, swelling or tenderness.  No middle ear effusion. Decreased hearing is noted.   Nose: Mucosal edema, rhinorrhea and septal deviation present.   Mouth/Throat: Uvula is midline, oropharynx is clear and moist and mucous membranes are normal.   Eyes: Pupils are equal, round, and reactive to light. EOM are normal.   Neck: Normal range of motion. Neck supple.       A/P  This  pleasant patient is having aural fullness due to cochlear hydropic changes. 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 He has been having positional dizzy spells since the episodic vertigo. He just started a topical steroid spray and I will continue with that. Options discussed. We will request an MRI of the brain and refer him to vestibular rehab for [place] Hallpike maneuver. 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 I will continue with his reflux medication, Pantoprazole 40 mg once a day. I will see him in the f/u. His questions were answered.

## 2019-07-09 NOTE — NURSING NOTE
Preston Cai's goals for this visit include:   Chief Complaint   Patient presents with     RECHECK     One month follow up     He requests these members of his care team be copied on today's visit information: Yes    PCP: Tyrel Manning    Referring Provider:  No referring provider defined for this encounter.    /62 (BP Location: Right arm, Patient Position: Sitting, Cuff Size: Adult Regular)   Pulse 70   SpO2 95%     Do you need any medication refills at today's visit? No    Yolande Rivas LPN

## 2019-07-12 ENCOUNTER — HOSPITAL ENCOUNTER (OUTPATIENT)
Dept: PHYSICAL THERAPY | Facility: CLINIC | Age: 84
Setting detail: THERAPIES SERIES
End: 2019-07-12
Attending: OTOLARYNGOLOGY
Payer: COMMERCIAL

## 2019-07-12 DIAGNOSIS — R42 DIZZINESS: ICD-10-CM

## 2019-07-12 PROCEDURE — 97161 PT EVAL LOW COMPLEX 20 MIN: CPT | Mod: GP | Performed by: PHYSICAL THERAPIST

## 2019-07-12 PROCEDURE — 97112 NEUROMUSCULAR REEDUCATION: CPT | Mod: GP | Performed by: PHYSICAL THERAPIST

## 2019-07-12 NOTE — PROGRESS NOTES
[facility]        OUTPATIENT PHYSICAL THERAPY FUNCTIONAL EVALUATION  PLAN OF TREATMENT FOR OUTPATIENT REHABILITATION  (COMPLETE FOR INITIAL CLAIMS ONLY)  Patient's Last Name, First Name, M.I.  YOB: 1935  Preston Cai     Provider's Name   [facility]   Medical Record No.  [id number]     Start of Care Date:  07/12/19   Onset Date:  07/09/19   Type:     _X__PT   ____OT  ____SLP Medical Diagnosis:  dizziness      PT Diagnosis:  dizziness in setting of Meniere's disease  Visits from SOC:  1                              __________________________________________________________________________________  Plan of Treatment/Functional Goals:  neuromuscular re-education, balance training           GOALS  HEP   Preston will demonstrate independence with HEP for gaze stabilization and balance in order to show improved function with daily activites closer to baseline level of mobility   10/09/19             Therapy Frequency:  other (see comments)(2-3 more visits )   Predicted Duration of Therapy Intervention:  up to 90 days    Patience Santana, PT                                    I CERTIFY THE NEED FOR THESE SERVICES FURNISHED UNDER        THIS PLAN OF TREATMENT AND WHILE UNDER MY CARE     (Physician co-signature of this document indicates review and certification of the therapy plan).                Certification Date From:  07/12/19   Certification Date To:  10/09/19    Referring Provider:  Dr. Tosha Ron     Initial Assessment  See Epic Evaluation- Start of Care Date: 07/12/19

## 2019-07-12 NOTE — PROGRESS NOTES
07/12/19 0800   Quick Adds   Quick Adds Vestibular Eval;Certification   Type of Visit Initial OP PT Evaluation   General Information   Start of Care Date 07/12/19   Referring Physician Dr. Luciana Ron    Orders Evaluate and Treat as Indicated   Order Date 07/09/19   Medical Diagnosis dizziness    Onset of illness/injury or Date of Surgery 07/09/19   Surgical/Medical history reviewed Yes   Pertinent history of current vestibular problem (include personal factors and/or comorbidities that impact the POC)  Hearing loss   Hearing Loss Comments R with decreased hearing compared to L, tinntius in his R ear, full feeling during dizzy episodes.   Pertinent history of current problem (include personal factors and/or comorbidities that impact the POC) 10 years ago did have some dizziness for 1.5 years, thought it might be Meniere's, he has intense spell of dizziness and then just never felt like he recovered. Did have some pills that he would take, not sure what they were but does not take them anymore. A month ago, he felt dizzy for 12 hours and then had one a couple weeks ago with dizziness, R ear fullness and R hearing loss. 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 Denies positional vertigo feeling-- only noticed when he was having dizzy episode. Saw Dr. Campuzano who dx'd with possible Meniere's. He is not feeling very dizzy today-- just with quick head turns. He otherwise feels like he recovered since his last episode. Having MRI on Monday to rule out any other issues.   Pertinent Visual History  wears glasses, no changes in vision    Prior level of function comment walking for exercise most days of the week, retired    Current Community Support Family/friend caregiver   Patient role/Employment history Retired   [facility]   Home/Community Accessibility Comments no stairs at home    Patient/Family Goals Statement improve his dizziness with head turning    Fall Risk Screen   Fall screen completed  by PT   Have you fallen 2 or more times in the past year? No   Have you fallen and had an injury in the past year? No   Timed Up and Go score (seconds) not tested    Is patient a fall risk? No   Functional Scales   Functional Scales and Outcomes 8/100 DHI    Pain   Pain comments no pain today    Cognitive Status Examination   Orientation orientation to person, place and time   Level of Consciousness alert   Follows Commands and Answers Questions 100% of the time   Personal Safety and Judgment intact   Memory intact   Posture   Posture Comments mild foward shoulder and head posture    Range of Motion (ROM)   ROM Comment grossly WNLs    Strength   Strength Comments appears functional with mobility seen in session    Bed Mobility   Bed Mobility Comments independent    Transfer Skills   Transfer Comments sit<>stand with and without UEs of UEs    Gait   Gait Comments ambulates with overall steady gait, arm swing B, gait speed normal for age    Gait Special Tests   Gait Special Tests 25 FOOT TIMED WALK;FUNCTIONAL GAIT ASSESSMENT   Gait Special Tests 25 Foot Timed Walk   Seconds 6.91   Steps 11 Steps   Gait Special Tests Functional Gait Assessment Score out of 30   Score out of 30 19   Comments see progress note    Sensory Examination   Sensory Perception Comments 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 No   Exam completed with Infrared Goggles   Spontaneous Nystagmus Negative   Gaze Evoked Nystagmus Negative   Head Shake Horizontal Nystagmus Negative   Positional testing Negative   Positional Testing comments negative for BPPV    [place]-Hallpike (right) Negative    [place]-Hallpike (Left) Negative   [facility] Supine Roll Test (Right) Negative   [facility] Supine Roll Test (Left) Negative   Planned Therapy Interventions   Planned Therapy Interventions neuromuscular re-education;balance training   Clinical Impression   Criteria for Skilled Therapeutic Interventions Met yes, treatment indicated   PT Diagnosis dizziness in setting of Meniere's disease    Influenced by the following impairments gait instability, dizziness with head movements, impaired VOR and head thrust testing    Functional limitations due to impairments decreased ease of functional mobility, increased risk of falls    Clinical Presentation Stable/Uncomplicated   Clinical Presentation Rationale stable medically, PT impairments, clinical judgement    Clinical Decision Making (Complexity) Low complexity   Therapy Frequency other (see comments)  (2-3 more visits )   Predicted Duration of Therapy Intervention (days/wks) up to 90 days   Risk & Benefits of therapy have been explained Yes   Patient, Family & other staff in agreement with plan of care Yes   Clinical Impression Comments Patient presents today with recent dx of Meniere's disease including R SNHL, hx of vertigo episodes with aural fullness and tinnitus and impaired VOR and head thrust test today. Patient overall scores an 8/100 on DHI with main complaint of dizziness with quicker head movements. Patient otherwise feels like he is moving close to his baseline. Will see for 2-3 sessions for balance and gaze stab work in order to return to prior level of function as well as provided education on vestibular system. No BPPV present today.    GOALS   PT Eval Goals 1   Goal 1   Goal Identifier HEP    Goal Description Preston will demonstrate independence with HEP for gaze stabilization and balance in order to show improved function with daily activites closer to baseline level of mobility    Target Date 10/09/19   Total Evaluation Time   PT Gina Low Complexity Minutes (46790)  40   Therapy Certification   Certification date from 07/12/19   Certification date to 10/09/19   Medical Diagnosis dizziness    Certification I certify the need for these services furnished under this plan of treatment and while under my care.  (Physician co-signature of this document indicates review and certification of the therapy plan).

## 2019-07-12 NOTE — PROGRESS NOTES
Functional Gait Assessment (FGA): The FGA assesses postural stability during various walking tasks.   Gait assistive device used: none     Patient Score: 19/30  Scores of <22/30 have been correlated with predicting falls in community-dwelling older adults according to Maddie & Girish 2010.   Scores of <18/30 have been correlated with increased risk for falls in patients with Parkinsons Disease according to Raj Francisco Zhou et al 2014.  Minimal Detectable Change for patients with acute/chronic stroke = 4.2 according to Butch & Millischel 2009  Minimal Detectable Change for patients with vestibular disorder = 8 according to Maddie Stout 2010    Assessment (rationale for performing, application to patient s function & care plan): Performed to assess balance with gait-- scoring in fall risk category d/t dizziness with head turning and inability to perform higher level balance tasks like tandem walking and walking with eyes closed.  Minutes billed as physical performance test: 10       07/12/19 0800   Signing Clinician's Name / Credentials   Signing clinician's name / credentials Patience Santana, PT, DPT   Functional Gait Assessment (DONALD Perkins, Pepito, G. F., et al. (2004))   1. GAIT LEVEL SURFACE 2   2. CHANGE IN GAIT SPEED 2   3. GAIT WITH HORIZONTAL HEAD TURNS 2   4. GAIT WITH VERTICAL HEAD TURNS 3   5. GAIT AND PIVOT TURN 2   6. STEP OVER OBSTACLE 3   7. GAIT WITH NARROW BASE OF SUPPORT 0   8. GAIT WITH EYES CLOSED 1   9. AMBULATING BACKWARDS 2   10. STEPS 2   Total Functional Gait Assessment Score   TOTAL SCORE: (MAXIMUM SCORE 30) 19

## 2019-07-22 ENCOUNTER — ANCILLARY PROCEDURE (OUTPATIENT)
Dept: MRI IMAGING | Facility: CLINIC | Age: 84
End: 2019-07-22
Attending: OTOLARYNGOLOGY
Payer: COMMERCIAL

## 2019-07-22 DIAGNOSIS — H81.01 MENIERE'S DISEASE, RIGHT: ICD-10-CM

## 2019-07-22 LAB
CREAT BLD-MCNC: 1 MG/DL (ref 0.66–1.25)
GFR SERPL CREATININE-BSD FRML MDRD: 72 ML/MIN/{1.73_M2}

## 2019-07-22 PROCEDURE — A9585 GADOBUTROL INJECTION: HCPCS | Performed by: OTOLARYNGOLOGY

## 2019-07-22 PROCEDURE — 70553 MRI BRAIN STEM W/O & W/DYE: CPT | Performed by: RADIOLOGY

## 2019-07-22 RX ORDER — GADOBUTROL 604.72 MG/ML
7.5 INJECTION INTRAVENOUS ONCE
Status: COMPLETED | OUTPATIENT
Start: 2019-07-22 | End: 2019-07-22

## 2019-07-22 RX ADMIN — GADOBUTROL 7.5 ML: 604.72 INJECTION INTRAVENOUS at 11:39

## 2019-07-30 ENCOUNTER — TELEPHONE (OUTPATIENT)
Dept: OTOLARYNGOLOGY | Facility: CLINIC | Age: 84
End: 2019-07-30

## 2019-07-30 NOTE — TELEPHONE ENCOUNTER
MRI is within normal limits. We will continue with vestibular rehab and routine f/u as needed.        Spoke with patient to discuss MRI results. Patient understood and had no further questions. Stated he began Vestibular Rehab and received at-home exercises and has had good success. Will call with any concerns.    Yolande Rivas LPN

## 2019-09-11 ENCOUNTER — OFFICE VISIT (OUTPATIENT)
Dept: DERMATOLOGY | Facility: CLINIC | Age: 84
End: 2019-09-11
Payer: COMMERCIAL

## 2019-09-11 ENCOUNTER — OFFICE VISIT (OUTPATIENT)
Dept: NEUROLOGY | Facility: CLINIC | Age: 84
End: 2019-09-11
Payer: COMMERCIAL

## 2019-09-11 VITALS
SYSTOLIC BLOOD PRESSURE: 105 MMHG | OXYGEN SATURATION: 95 % | DIASTOLIC BLOOD PRESSURE: 62 MMHG | HEART RATE: 62 BPM | WEIGHT: 175.7 LBS | HEIGHT: 73 IN | BODY MASS INDEX: 23.29 KG/M2

## 2019-09-11 DIAGNOSIS — D23.9 ACQUIRED DIGITAL FIBROKERATOMA: ICD-10-CM

## 2019-09-11 DIAGNOSIS — L57.8 SUN-DAMAGED SKIN: ICD-10-CM

## 2019-09-11 DIAGNOSIS — L73.8 SENILE SEBACEOUS GLAND HYPERPLASIA: ICD-10-CM

## 2019-09-11 DIAGNOSIS — R25.1 TREMOR: ICD-10-CM

## 2019-09-11 DIAGNOSIS — L82.1 SEBORRHEIC KERATOSIS: ICD-10-CM

## 2019-09-11 DIAGNOSIS — L82.0 INFLAMED SEBORRHEIC KERATOSIS: ICD-10-CM

## 2019-09-11 DIAGNOSIS — D23.9 DILATED PORE OF WINER: ICD-10-CM

## 2019-09-11 DIAGNOSIS — D18.01 CHERRY ANGIOMA: ICD-10-CM

## 2019-09-11 DIAGNOSIS — R20.2 NOTALGIA PARESTHETICA: ICD-10-CM

## 2019-09-11 DIAGNOSIS — G60.9 HEREDITARY AND IDIOPATHIC PERIPHERAL NEUROPATHY: Primary | ICD-10-CM

## 2019-09-11 DIAGNOSIS — L85.3 XEROSIS CUTIS: ICD-10-CM

## 2019-09-11 DIAGNOSIS — D22.9 MULTIPLE BENIGN NEVI: ICD-10-CM

## 2019-09-11 DIAGNOSIS — Z85.828 HISTORY OF NONMELANOMA SKIN CANCER: Primary | ICD-10-CM

## 2019-09-11 DIAGNOSIS — K21.9 GASTROESOPHAGEAL REFLUX DISEASE WITHOUT ESOPHAGITIS: ICD-10-CM

## 2019-09-11 DIAGNOSIS — L81.4 SOLAR LENTIGO: ICD-10-CM

## 2019-09-11 PROCEDURE — 17110 DESTRUCTION B9 LES UP TO 14: CPT | Performed by: DERMATOLOGY

## 2019-09-11 PROCEDURE — 99214 OFFICE O/P EST MOD 30 MIN: CPT | Mod: 25 | Performed by: DERMATOLOGY

## 2019-09-11 PROCEDURE — 99213 OFFICE O/P EST LOW 20 MIN: CPT | Mod: 24 | Performed by: PSYCHIATRY & NEUROLOGY

## 2019-09-11 RX ORDER — GABAPENTIN 100 MG/1
CAPSULE ORAL
Qty: 60 CAPSULE | Refills: 4 | Status: SHIPPED | OUTPATIENT
Start: 2019-09-11 | End: 2020-02-18

## 2019-09-11 ASSESSMENT — PAIN SCALES - GENERAL
PAINLEVEL: NO PAIN (0)
PAINLEVEL: NO PAIN (0)

## 2019-09-11 ASSESSMENT — MIFFLIN-ST. JEOR: SCORE: 1550.85

## 2019-09-11 NOTE — NURSING NOTE
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    Do you need any medication refills at today's visit? NO    Susan Mchugh, [facility]

## 2019-09-11 NOTE — NURSING NOTE
"Preston Cai's goals for this visit include: return  He requests these members of his care team be copied on today's visit information:     PCP: Tyrel Manning    Referring Provider:  Bjorn Heart MD  [address] [place] [address]    /62   Pulse 62   Ht 1.854 m (6' 1\")   Wt 79.7 kg (175 lb 11.2 oz)   SpO2 95%   BMI 23.18 kg/m      Do you need any medication refills at today's visit? ?  "

## 2019-09-11 NOTE — LETTER
9/11/2019         RE: Preston Cai  [address]  [place] [address]        Dear Colleague,    Thank you for referring your patient, Preston Cai, to the [facility]. Please see a copy of my visit note below.    Visit Date:   09/11/2019      HISTORY OF PRESENT ILLNESS:  Preston Cai returns for followup of a painful peripheral neuropathy.  Laboratory evaluation for his neuropathy has been negative.  EMG studies done by Dr. 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      When I saw him in May, he did indicate that he would like to try gabapentin for some of the burning discomfort he experiences.  He is now taking 200 mg in the evening.  He probably is experiencing some benefit.  While he still has a sense of numbness in his distal lower extremities and feels like he is wearing boots, it seems like the burning pain is somewhat better.  His feet feel warm in the evening and he will run cool water over them.  If he wakes up in the middle of the night, he is not having a lot of burning pain.  He seems to be tolerating the gabapentin well.      PHYSICAL EXAMINATION:   VITAL SIGNS:  His heart rate is 62.  Blood pressure 105/62.   MOTOR:  Intact strength aside from some equivocal weakness of toe flexion bilaterally.   SENSORY:  He has absent vibratory sense in the toes and a reduction of vibratory appreciation at the ankles, although it is present.  He has a very mild loss of position sense in the toes.  He reports decreased pinprick to below the ankle on the right and to around the midfoot on the left.  There is an equivocal gradient impairment of cold sensation from about the midcalf down to the feet.   REFLEXES:  2+ in the upper extremities, 1+ at the knees, and absent at the ankles.      The patient does have a mild asymmetric postural and action tremor affecting the left upper extremity  more than the right.      IMPRESSION:     1.  Idiopathic peripheral neuropathy.   2.  Tremor.      PLAN:  He is comfortable on his current dose of gabapentin and does not want to increase it further.  On examination today, there has not been a significant progression in his neuropathy findings compared to when I originally saw him this spring.      He continues on primidone for tremor.  This was prescribed by Dr. Munroe who follows him for tremor.  I am not certain if he has followup with Dr. Munroe, but I advised him that if he feels the tremor is worsening he should see Dr. Munroe back.      I am going to see him back in 6 months.         BJORN OLEARY MD             D: 2019   T: 2019   MT: WT      Name:     GINA SHOOK   MRN:      [id number]        Account:      [id number]   :      1935           Visit Date:   2019      Document: [id number]        Again, thank you for allowing me to participate in the care of your patient.        Sincerely,        Bjorn Oleary MD

## 2019-09-11 NOTE — PROGRESS NOTES
Visit Date:   09/11/2019      HISTORY OF PRESENT ILLNESS:  Preston Cai returns for followup of a painful peripheral neuropathy.  Laboratory evaluation for his neuropathy has been negative.  EMG studies done by Dr. 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      When I saw him in May, he did indicate that he would like to try gabapentin for some of the burning discomfort he experiences.  He is now taking 200 mg in the evening.  He probably is experiencing some benefit.  While he still has a sense of numbness in his distal lower extremities and feels like he is wearing boots, it seems like the burning pain is somewhat better.  His feet feel warm in the evening and he will run cool water over them.  If he wakes up in the middle of the night, he is not having a lot of burning pain.  He seems to be tolerating the gabapentin well.      PHYSICAL EXAMINATION:   VITAL SIGNS:  His heart rate is 62.  Blood pressure 105/62.   MOTOR:  Intact strength aside from some equivocal weakness of toe flexion bilaterally.   SENSORY:  He has absent vibratory sense in the toes and a reduction of vibratory appreciation at the ankles, although it is present.  He has a very mild loss of position sense in the toes.  He reports decreased pinprick to below the ankle on the right and to around the midfoot on the left.  There is an equivocal gradient impairment of cold sensation from about the midcalf down to the feet.   REFLEXES:  2+ in the upper extremities, 1+ at the knees, and absent at the ankles.      The patient does have a mild asymmetric postural and action tremor affecting the left upper extremity more than the right.      IMPRESSION:     1.  Idiopathic peripheral neuropathy.   2.  Tremor.      PLAN:  He is comfortable on his current dose of gabapentin and does not want to increase it further.  On examination today, there has not been a significant  progression in his neuropathy findings compared to when I originally saw him this spring.      He continues on primidone for tremor.  This was prescribed by Dr. Munroe who follows him for tremor.  I am not certain if he has followup with Dr. Munroe, but I advised him that if he feels the tremor is worsening he should see Dr. Munroe back.      I am going to see him back in 6 months.         STEFANY OLEARY MD             D: 2019   T: 2019   MT: JOANNE      Name:     GINA SHOOK   MRN:      [id number]        Account:      [id number]   :      1935           Visit Date:   2019      Document: [id number]

## 2019-09-11 NOTE — LETTER
9/11/2019         RE: Preston Cai  [address]  [place] [address]        Dear Colleague,    Thank you for referring your patient, Preston Cai, to the [facility]. Please see a copy of my visit note below.    [facility] Dermatology Note    Dermatology Problem List:  1. Actinic keratosis  - s/p cryotherapy  - pigmented AK, left jaw, s/p biopsy 11/13/18  - HAK, right hand, s/p excision 5/2009  3. Inflamed seborrheic keratosis: treated with shave removals and cryo  4. 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 *Clinically monitor  - Right mid to inferior helix are of hemorraghic crust, easily removed with alcohol    Encounter Date: Sep 11, 2019    CC:  Chief Complaint   Patient presents with     Skin Check     Hx of NMSC - Lesions on left shoulder and right temple     History of Present Illness:  Mr. Preston Cai is a 83 year old male who presents as a follow-up for skin check. He has a history of NMSC. He was last seen on 3/19/19 when an inflamed SK was biopsied from his left forehead. Today, he is concerned about a lesion on his right temple that is flaky and crusty as well a a lesion on his left shoulder. The spot on his left shoulder is very itchy to him. Additionally he believes there is a splinter in his right ear that hurts and bleeds. It is currently crusty over the top. He states that his wife typically removes splinters for him, but her arm is in a cast currently so she cannot help. He is very itchy on numerous spots of his skin. He typically applies hydrocortisone cream and vaseline over this when itch is bothersome. He notes that if he scratches at a spot, itch gets worse. If he avoids scratching, itch will resolve. Does not use moisturizer regularly. Uses Dove sensitive skin bar soap in the shower. No other concerns addressed today.    Past Medical History:   Patient Active Problem List   Diagnosis      Seborrheic dermatitis     Allergic rhinitis due to other allergen     Cervical radiculopathy at C6     Personal history of malignant neoplasm of larynx     Essential tremor     CARDIOVASCULAR SCREENING; LDL GOAL LESS THAN 160     Benign prostatic hyperplasia     Hoarse voice quality     Advanced directives, counseling/discussion     Pseudophakia     Gastroesophageal reflux disease without esophagitis     Macular degeneration     Past Medical History:   Diagnosis Date     Acid reflux disease      Allergic rhinitis, cause unspecified      H/O magnetic resonance imaging of brain and brain stem 10/10/2016    MRI BRAIN WITH AND WITHOUT CONTRAST August 17, 2009 8:07:00 PM   HISTORY: Severe dizzy spells with imbalance and vomiting.   TECHNIQUE: Routine pulse sequences without and with contrast were performed including thin section images through the IACs. 20 mL Magnevist given.   FINDINGS: Diffusion-weighted images are normal. The brain parenchyma, brainstem, ventricular system, and subarachnoid spaces a     Hematuria     Hematuria  - in 1990's      History of anemia 2002    Anemia-Iron Deficit     History of gastric ulcer      Macular degeneration      Malignant neoplasm of laryngeal cartilages (H)     Laryngeal CA (Radiation 1982)     Nonsenile cataract      PONV (postoperative nausea and vomiting)      Tremor 10/3/2016     Past Surgical History:   Procedure Laterality Date     APPENDECTOMY  8 years old     Biopsy of the throat - cancerous mass - got radiation for in larynx  1982     CATARACT IOL, RT/LT       COLONOSCOPY  2/25/2004    Normal     COLONOSCOPY N/A 4/28/2015    Procedure: COLONOSCOPY;  Surgeon: Marvin Adams MD;  Location: [facility] OR     COLONOSCOPY WITH CO2 INSUFFLATION N/A 4/28/2015    Procedure: COLONOSCOPY WITH CO2 INSUFFLATION;  Surgeon: Marvin Adams MD;  Location: [facility] OR     CYSTOSCOPY  1997    for hematuria - negative     EYE SURGERY Right     Rt eye.macular repair     EYE SURGERY   2003    cataract     NASAL ENDOSCOPY,DX  4/2007    GERD     Pars plana vitrectomy and epiretinal membrane dissection, right eye  11/8/2002     Phacoemulsification with intraocular lens implantation right eye.  3/11/2003     PHACOEMULSIFICATION WITH STANDARD INTRAOCULAR LENS IMPLANT Left 9/22/2016    Procedure: PHACOEMULSIFICATION WITH STANDARD INTRAOCULAR LENS IMPLANT;  Surgeon: Nghia Lara MD;  Location: [facility] OR     Thyroglossal Duct Cyst Removal - 2ndary to radiation.  1982     VASECTOMY  1971     ZZ GASTROSCOPY,FL  9/2007    hiatal hernia and errosions       Social History:  Patient is .     Family History:  Not addressed today.     Medications:  Current Outpatient Medications   Medication Sig Dispense Refill     gabapentin (NEURONTIN) 100 MG capsule One every evening. May increase to 2 if needed 60 capsule 4     pantoprazole (PROTONIX) 40 MG EC tablet Take 1 tablet (40 mg) by mouth daily 90 tablet 3     primidone (MYSOLINE) 250 MG tablet Take 1 tablet (250 mg) by mouth At Bedtime 90 tablet 3     tamsulosin (FLOMAX) 0.4 MG capsule Take 2 capsules (0.8 mg) by mouth daily (Patient taking differently: Take 0.4 mg by mouth daily ) 180 capsule 3     VITAMIN D, CHOLECALCIFEROL, PO Take 1,000 Units by mouth daily         Allergies   Allergen Reactions     Seasonal Allergies      Hay fever        Review of Systems:  -Constitutional: Otherwise feeling well today, in usual state of health.  -Skin: As above in HPI. No additional skin concerns.    Physical exam:  Vitals: There were no vitals taken for this visit.  GEN: This is a well developed, well-nourished male in no acute distress, in a pleasant mood.    SKIN: Total skin excluding the undergarment areas was performed. The exam included the head/face, neck, both arms, chest, back, abdomen, both legs, digits and/or nails and buttocks.   - Glez Type II  - Scattered brown macules on sun exposed areas.  - There are yellow oily papules with central  umbilication located on the face.  - Right forearm, left posterior thigh: clinically inflamed waxy stuck on papules  - Acquired digital fibrokeratoma on the left first finger   - In areas of past NMSC, well healed linear scar. No pearly appearance or telangiectasias  - There are dome shaped bright red papules on the trunk.   - Multiple regular brown pigmented macules and papules are identified on the body.   - There are waxy stuck on tan to brown papules on the trunk.  - Right mid to inferior helix: there is a 3mm area of hemorraghic crust, easily removed with alcohol. No foreign body seen at base. No concerning features of NMSC on dermoscopy.  - central lower back, dilated pore of grey  - Skin is mildly xerotic today. There is no rash in areas of itch. There are no SKs in area of itch on left paraspinal back medial to the scapula.   - No other lesions of concern on areas examined.       Impression/Plan:  1. Sun damaged skin with solar lentigines    Recommend sunscreens SPF #30 or greater, protective clothing and avoidance of tanning beds.    2. Benign findings including: seborrheic keratoses, cherry angiomas, dilated pore of grey, fibrokeratoma, sebaceous hyperplasia    No further intervention required. Patient to report changes.     Patient reassured of the benign nature of these lesions.    3. Multiple clinically benign nevi    No further intervention required. Patient to report changes.     Patient reassured of the benign nature of these lesions.    4. Inflamed/Irritated seborrheic keratosis. Based on the location and chronic irritation to this lesion, treatment with cryotherapy is warranted.     Cryotherapy procedure note: After verbal consent and discussion of risks and benefits including but no limited to dyspigmentation/scar, blister, and pain, 1 was(were) treated with 1-2mm freeze border for 2 cycles with liquid nitrogen. Post cryotherapy instructions were provided.    5. Area of hemorrhagic crust on the  right helix. Advised patient to return in 1 month if this is not resolved.     6. Pruritus of the skin: Suspect this is related to xerosis mostly, but does have an area consistent with notalgia paresthetic on the left upper back.  Empathized the importance of gentle skin care and cream based moisturizer. Provided gentle skin care handout.     Follow-up in 1year for skin exam    Staff Involved:  Scribe/Staff    Scribe Disclosure  I, Justine Esteves, am serving as a scribe to document services personally performed by Dr. Radha Hartman MD, based on data collection and the provider's statements to me.     Provider Disclosure:   The documentation recorded by the scribe accurately reflects the services I personally performed and the decisions made by me.    Radha Hartman MD    Department of Dermatology  [facility]: Phone: [phone], Fax:[phone]  [facility]: Phone: [phone], Fax: [phone]              Again, thank you for allowing me to participate in the care of your patient.        Sincerely,        Radha Hartman MD

## 2019-09-11 NOTE — PROGRESS NOTES
[facility] Dermatology Note    Dermatology Problem List:  1. Actinic keratosis  - s/p cryotherapy  - pigmented AK, left jaw, s/p biopsy 11/13/18  - HAK, right hand, s/p excision 5/2009  3. Inflamed seborrheic keratosis: treated with shave removals and cryo  4. 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 *Clinically monitor  - Right mid to inferior helix are of hemorraghic crust, easily removed with alcohol    Encounter Date: Sep 11, 2019    CC:  Chief Complaint   Patient presents with     Skin Check     Hx of NMSC - Lesions on left shoulder and right temple     History of Present Illness:  Mr. Preston Cai is a 83 year old male who presents as a follow-up for skin check. He has a history of NMSC. He was last seen on 3/19/19 when an inflamed SK was biopsied from his left forehead. Today, he is concerned about a lesion on his right temple that is flaky and crusty as well a a lesion on his left shoulder. The spot on his left shoulder is very itchy to him. Additionally he believes there is a splinter in his right ear that hurts and bleeds. It is currently crusty over the top. He states that his wife typically removes splinters for him, but her arm is in a cast currently so she cannot help. He is very itchy on numerous spots of his skin. He typically applies hydrocortisone cream and vaseline over this when itch is bothersome. He notes that if he scratches at a spot, itch gets worse. If he avoids scratching, itch will resolve. Does not use moisturizer regularly. Uses Dove sensitive skin bar soap in the shower. No other concerns addressed today.    Past Medical History:   Patient Active Problem List   Diagnosis     Seborrheic dermatitis     Allergic rhinitis due to other allergen     Cervical radiculopathy at C6     Personal history of malignant neoplasm of larynx     Essential tremor     CARDIOVASCULAR SCREENING; LDL GOAL LESS THAN 160     Benign prostatic hyperplasia     Hoarse  voice quality     Advanced directives, counseling/discussion     Pseudophakia     Gastroesophageal reflux disease without esophagitis     Macular degeneration     Past Medical History:   Diagnosis Date     Acid reflux disease      Allergic rhinitis, cause unspecified      H/O magnetic resonance imaging of brain and brain stem 10/10/2016    MRI BRAIN WITH AND WITHOUT CONTRAST August 17, 2009 8:07:00 PM   HISTORY: Severe dizzy spells with imbalance and vomiting.   TECHNIQUE: Routine pulse sequences without and with contrast were performed including thin section images through the IACs. 20 mL Magnevist given.   FINDINGS: Diffusion-weighted images are normal. The brain parenchyma, brainstem, ventricular system, and subarachnoid spaces a     Hematuria     Hematuria  - in 1990's      History of anemia 2002    Anemia-Iron Deficit     History of gastric ulcer      Macular degeneration      Malignant neoplasm of laryngeal cartilages (H)     Laryngeal CA (Radiation 1982)     Nonsenile cataract      PONV (postoperative nausea and vomiting)      Tremor 10/3/2016     Past Surgical History:   Procedure Laterality Date     APPENDECTOMY  8 years old     Biopsy of the throat - cancerous mass - got radiation for in larynx  1982     CATARACT IOL, RT/LT       COLONOSCOPY  2/25/2004    Normal     COLONOSCOPY N/A 4/28/2015    Procedure: COLONOSCOPY;  Surgeon: Marvin Adams MD;  Location: [facility] OR     COLONOSCOPY WITH CO2 INSUFFLATION N/A 4/28/2015    Procedure: COLONOSCOPY WITH CO2 INSUFFLATION;  Surgeon: Marvin Adams MD;  Location: [facility] OR     CYSTOSCOPY  1997    for hematuria - negative     EYE SURGERY Right     Rt eye.macular repair     EYE SURGERY  2003    cataract     NASAL ENDOSCOPY,DX  4/2007    GERD     Pars plana vitrectomy and epiretinal membrane dissection, right eye  11/8/2002     Phacoemulsification with intraocular lens implantation right eye.  3/11/2003     PHACOEMULSIFICATION WITH STANDARD INTRAOCULAR  LENS IMPLANT Left 9/22/2016    Procedure: PHACOEMULSIFICATION WITH STANDARD INTRAOCULAR LENS IMPLANT;  Surgeon: Nghia Lara MD;  Location: [facility] OR     Thyroglossal Duct Cyst Removal - 2ndary to radiation.  1982     VASECTOMY  1971     ZZ GASTROSCOPY,FL  9/2007    hiatal hernia and errosions       Social History:  Patient is .     Family History:  Not addressed today.     Medications:  Current Outpatient Medications   Medication Sig Dispense Refill     gabapentin (NEURONTIN) 100 MG capsule One every evening. May increase to 2 if needed 60 capsule 4     pantoprazole (PROTONIX) 40 MG EC tablet Take 1 tablet (40 mg) by mouth daily 90 tablet 3     primidone (MYSOLINE) 250 MG tablet Take 1 tablet (250 mg) by mouth At Bedtime 90 tablet 3     tamsulosin (FLOMAX) 0.4 MG capsule Take 2 capsules (0.8 mg) by mouth daily (Patient taking differently: Take 0.4 mg by mouth daily ) 180 capsule 3     VITAMIN D, CHOLECALCIFEROL, PO Take 1,000 Units by mouth daily         Allergies   Allergen Reactions     Seasonal Allergies      Hay fever        Review of Systems:  -Constitutional: Otherwise feeling well today, in usual state of health.  -Skin: As above in HPI. No additional skin concerns.    Physical exam:  Vitals: There were no vitals taken for this visit.  GEN: This is a well developed, well-nourished male in no acute distress, in a pleasant mood.    SKIN: Total skin excluding the undergarment areas was performed. The exam included the head/face, neck, both arms, chest, back, abdomen, both legs, digits and/or nails and buttocks.   - Glez Type II  - Scattered brown macules on sun exposed areas.  - There are yellow oily papules with central umbilication located on the face.  - Right forearm, left posterior thigh: clinically inflamed waxy stuck on papules  - Acquired digital fibrokeratoma on the left first finger   - In areas of past NMSC, well healed linear scar. No pearly appearance or telangiectasias  -  There are dome shaped bright red papules on the trunk.   - Multiple regular brown pigmented macules and papules are identified on the body.   - There are waxy stuck on tan to brown papules on the trunk.  - Right mid to inferior helix: there is a 3mm area of hemorraghic crust, easily removed with alcohol. No foreign body seen at base. No concerning features of NMSC on dermoscopy.  - central lower back, dilated pore of grey  - Skin is mildly xerotic today. There is no rash in areas of itch. There are no SKs in area of itch on left paraspinal back medial to the scapula.   - No other lesions of concern on areas examined.       Impression/Plan:  1. Sun damaged skin with solar lentigines    Recommend sunscreens SPF #30 or greater, protective clothing and avoidance of tanning beds.    2. Benign findings including: seborrheic keratoses, cherry angiomas, dilated pore of grey, fibrokeratoma, sebaceous hyperplasia    No further intervention required. Patient to report changes.     Patient reassured of the benign nature of these lesions.    3. Multiple clinically benign nevi    No further intervention required. Patient to report changes.     Patient reassured of the benign nature of these lesions.    4. Inflamed/Irritated seborrheic keratosis. Based on the location and chronic irritation to this lesion, treatment with cryotherapy is warranted.     Cryotherapy procedure note: After verbal consent and discussion of risks and benefits including but no limited to dyspigmentation/scar, blister, and pain, 1 was(were) treated with 1-2mm freeze border for 2 cycles with liquid nitrogen. Post cryotherapy instructions were provided.    5. Area of hemorrhagic crust on the right helix. Advised patient to return in 1 month if this is not resolved.     6. Pruritus of the skin: Suspect this is related to xerosis mostly, but does have an area consistent with notalgia paresthetic on the left upper back.  Empathized the importance of gentle  skin care and cream based moisturizer. Provided gentle skin care handout.     Follow-up in 1year for skin exam    Staff Involved:  Scribe/Staff    Scribe Disclosure  I, Justine Esteves, am serving as a scribe to document services personally performed by Dr. Radha Hartman MD, based on data collection and the provider's statements to me.     Provider Disclosure:   The documentation recorded by the scribe accurately reflects the services I personally performed and the decisions made by me.    Radha Hartman MD    Department of Dermatology  [facility]: Phone: [phone], Fax:[phone]  [facility]: Phone: [phone], Fax: [phone]

## 2019-11-05 ENCOUNTER — HEALTH MAINTENANCE LETTER (OUTPATIENT)
Age: 84
End: 2019-11-05

## 2020-01-22 ENCOUNTER — OFFICE VISIT (OUTPATIENT)
Dept: ORTHOPEDICS | Facility: CLINIC | Age: 85
End: 2020-01-22
Payer: COMMERCIAL

## 2020-01-22 VITALS
HEART RATE: 77 BPM | BODY MASS INDEX: 22.77 KG/M2 | HEIGHT: 73 IN | OXYGEN SATURATION: 96 % | DIASTOLIC BLOOD PRESSURE: 68 MMHG | WEIGHT: 171.8 LBS | SYSTOLIC BLOOD PRESSURE: 121 MMHG

## 2020-01-22 DIAGNOSIS — M19.012 GLENOHUMERAL ARTHRITIS, LEFT: Primary | ICD-10-CM

## 2020-01-22 PROCEDURE — 20610 DRAIN/INJ JOINT/BURSA W/O US: CPT | Mod: LT | Performed by: ORTHOPAEDIC SURGERY

## 2020-01-22 RX ORDER — BUPIVACAINE HYDROCHLORIDE 2.5 MG/ML
3 INJECTION, SOLUTION INFILTRATION; PERINEURAL
Status: DISCONTINUED | OUTPATIENT
Start: 2020-01-22 | End: 2020-03-13

## 2020-01-22 RX ORDER — LIDOCAINE HYDROCHLORIDE 10 MG/ML
4 INJECTION, SOLUTION INFILTRATION; PERINEURAL
Status: DISCONTINUED | OUTPATIENT
Start: 2020-01-22 | End: 2020-03-13

## 2020-01-22 RX ORDER — TRIAMCINOLONE ACETONIDE 40 MG/ML
80 INJECTION, SUSPENSION INTRA-ARTICULAR; INTRAMUSCULAR
Status: DISCONTINUED | OUTPATIENT
Start: 2020-01-22 | End: 2020-07-10

## 2020-01-22 RX ADMIN — BUPIVACAINE HYDROCHLORIDE 3 ML: 2.5 INJECTION, SOLUTION INFILTRATION; PERINEURAL at 15:25

## 2020-01-22 RX ADMIN — TRIAMCINOLONE ACETONIDE 80 MG: 40 INJECTION, SUSPENSION INTRA-ARTICULAR; INTRAMUSCULAR at 15:25

## 2020-01-22 RX ADMIN — LIDOCAINE HYDROCHLORIDE 4 ML: 10 INJECTION, SOLUTION INFILTRATION; PERINEURAL at 15:25

## 2020-01-22 ASSESSMENT — MIFFLIN-ST. JEOR: SCORE: 1528.16

## 2020-01-22 ASSESSMENT — PAIN SCALES - GENERAL: PAINLEVEL: EXTREME PAIN (8)

## 2020-01-22 NOTE — LETTER
1/22/2020         RE: Preston Cai  [address]  [place] [place] [address]        Dear Colleague,    Thank you for referring your patient, Preston Cai, to the [facility]. Please see a copy of my visit note below.    CHIEF COMPLAINT:   Chief Complaint   Patient presents with     Left Shoulder - Pain, Follow Up     Last GH injection: 5/15/19. Patient notes the injection worked good until he moved and had to do a lot of heavy lifting in September. He was doing some physical therapy exercises after the move and his shoulder became sore again.        HISTORY:  Preston Cai is a 84 year old male, right  -hand dominant, who is seen in followup for left shoulder pain that started many years ago. Locates pain side/front of shoulder with reaching, at night. No specific injury. Also as neck pain, arthritis. Ok at rest. Will have numbness and tingling in his arm depending on how he holds his neck. Had subacromial injection 3/20/2019 that seemed to help a little, as well as Physical Therapy with mobility. Last seen 5/15/2019 and had intra-articular injection which he states worked a lot better. He states he and his wife moved 9/2019 and was doing a lot of lifting that aggravated the shoulder and now pain is returning. He's resumed his home exercise program. He would like to try another injection today.      He's also having some right neck pain that radiates down to the right shoulder and arm. He notes problems with C5 in his neck. He will discuss with primary care provider.      Onset: years ago.  Symptoms have been worsening since that time.  Aggrevated by: reaching, lifting, night.  Relieved by: rest  Present symptoms: pain with ADL's (dressing),  pain with overhead activities,  pain reaching behind back,  pain reaching out or away from body (flexion/ abduction),  positional night pain,  pain lifting,  Pain location: lateral shoulder, deltoid and upper arm and anterior  Pain  "severity: 8/10  Pain quality: sharp  Frequency of symptoms: frequently  Associated symptoms: neck pain, numbness/tingling down the arm.    Treatment up to this point: subacromial injection 3/20/2019, Physical Therapy. Intra-articular injection 5/2019.    Usual level of work activity: retired.    Other PMH:  has a past medical history of Acid reflux disease, Allergic rhinitis, cause unspecified, H/O magnetic resonance imaging of brain and brain stem (10/10/2016), Hematuria, History of anemia (2002), History of gastric ulcer, Macular degeneration, Malignant neoplasm of laryngeal cartilages (H), Nonsenile cataract, PONV (postoperative nausea and vomiting), and Tremor (10/3/2016).  Patient Active Problem List    Diagnosis Date Noted     Macular degeneration 10/18/2016     Priority: Medium     Gastroesophageal reflux disease without esophagitis 06/17/2015     Priority: Medium     Pseudophakia 07/02/2014     Priority: Medium     Advanced directives, counseling/discussion 02/17/2014     Priority: Medium     Advance Care Planning:   ACP Review and Resources Provided:  Reviewed chart for advance care plan.  Preston Cai has no plan or code status on file. Discussed available resources and provided with information on 02/17/2014. Confirmed code status reflects current choices pending further ACP discussions.  Confirmed/documented designated decision maker(s). See permanent comments section of demographics in clinical tab.   Added by Lindsey Gongora on 3/20/2014  Patient does not have an Advance/Health Care Directive (HCD), given \"What is Advance Care Planning?\" flyer and requests blank HCD form.  Angelina Anthony  February 17, 2014       Hoarse voice quality 03/07/2012     Priority: Medium     Benign prostatic hyperplasia 03/24/2011     Priority: Medium     CARDIOVASCULAR SCREENING; LDL GOAL LESS THAN 160 10/31/2010     Priority: Medium     Essential tremor 04/28/2010     Priority: Medium     Followed by Dr Diaz ( [facility] " ) : takes primodone        Personal history of malignant neoplasm of larynx 10/05/2009     Priority: Medium     1982         Cervical radiculopathy at C6 01/02/2009     Priority: Medium     C5-C6  mild central and mild/moderate bilateral neural foraminal stenosis. with Left radicular pain. trial epidural, PNB           Seborrheic dermatitis 04/10/2007     Priority: Medium     scalp and ear canals - triamcilnolone to knock down, then coal tar to maintain.  Problem list name updated by automated process. Provider to review       Allergic rhinitis due to other allergen 04/10/2007     Priority: Medium     continue flonase - good control with.  Didn't use over winter.           Surgical Hx:  has a past surgical history that includes appendectomy (8 years old); nasal endoscopy,dx (4/2007); cystoscopy (1997); zz gastroscopy,fl (9/2007); Phacoemulsification with intraocular lens implantation right eye. (3/11/2003); Pars plana vitrectomy and epiretinal membrane dissection, right eye (11/8/2002); Biopsy of the throat - cancerous mass - got radiation for in larynx (1982); Thyroglossal Duct Cyst Removal - 2ndary to radiation. (1982); Vasectomy (1971); Colonoscopy (2/25/2004); Eye surgery (Right); Colonoscopy (N/A, 4/28/2015); Colonoscopy with CO2 insufflation (N/A, 4/28/2015); Phacoemulsification with standard intraocular lens implant (Left, 9/22/2016); Eye surgery (2003); and cataract iol, rt/lt.    Medications:   Current Outpatient Medications:      gabapentin (NEURONTIN) 100 MG capsule, 2 capsules each evening, Disp: 60 capsule, Rfl: 4     pantoprazole (PROTONIX) 40 MG EC tablet, Take 1 tablet (40 mg) by mouth daily, Disp: 90 tablet, Rfl: 3     primidone (MYSOLINE) 250 MG tablet, Take 1 tablet (250 mg) by mouth At Bedtime, Disp: 90 tablet, Rfl: 3     tamsulosin (FLOMAX) 0.4 MG capsule, Take 2 capsules (0.8 mg) by mouth daily (Patient taking differently: Take 0.4 mg by mouth daily ), Disp: 180 capsule, Rfl: 3     VITAMIN D,  "CHOLECALCIFEROL, PO, Take 1,000 Units by mouth daily, Disp: , Rfl:     Current Facility-Administered Medications:      bupivacaine (MARCAINE) 0.25 % injection 3 mL, 3 mL, , , Marlo Rubalcava MD, 3 mL at 05/15/19 1452     bupivacaine (MARCAINE) 0.25 % injection 3 mL, 3 mL, , , Marlo Rubalcava MD, 3 mL at 03/20/19 1536     lidocaine 1 % injection 4 mL, 4 mL, , , Marlo Rubalcava MD, 4 mL at 05/15/19 1452     lidocaine 1 % injection 4 mL, 4 mL, , , Marlo Rubalcava MD, 4 mL at 03/20/19 1536     triamcinolone (KENALOG-40) injection 80 mg, 80 mg, , , Marlo Rubalcava MD, 80 mg at 05/15/19 1452     triamcinolone (KENALOG-40) injection 80 mg, 80 mg, , , Marlo Rubalcava MD, 80 mg at 03/20/19 1536    Allergies:   Allergies   Allergen Reactions     Seasonal Allergies      Hay fever        Social Hx: retired.  reports that he has quit smoking. He has never used smokeless tobacco. He reports current alcohol use. He reports that he does not use drugs.    Family Hx: family history includes Cancer in his father and another family member; Cancer - colorectal in his father; Cerebrovascular Disease in his mother; Gastrointestinal Disease in his father; Hypertension in his father; Macular Degeneration in his mother..    REVIEW OF SYSTEMS:   CONSTITUTIONAL:NEGATIVE for fever, chills, change in weight  INTEGUMENTARY/SKIN: NEGATIVE for worrisome rashes, moles or lesions  MUSCULOSKELETAL:See HPI above  NEURO: NEGATIVE for weakness, dizziness or paresthesias    PHYSICAL EXAM:  /68   Pulse 77   Ht 1.854 m (6' 1\")   Wt 77.9 kg (171 lb 12.8 oz)   SpO2 96%   BMI 22.67 kg/m      GENERAL APPEARANCE: healthy, alert, no distress; accompanied by his wife.  SKIN: no suspicious lesions or rashes  NEURO: Normal strength and tone, mentation intact and speech normal  PSYCH:  mentation appears normal and affect normal, not anxious  RESPIRATORY: No increased work of breathing.  VASCULAR: Radial pulses 2+ and brisk cappillary " refill     MUSCULOSKELETAL:      RIGHT UPPER EXTREMITY:  Sensation intact to light touch in median, radial, ulnar and axillary nerve distributions  Palpable 2+ radial pulse, brisk capillary refill to all fingers, wwp  Intact epl fpl fdp edc wrist flexion/extension biceps triceps deltoid    RIGHT SHOULDER:  Shoulder Inspection: no swelling, bruising, discoloration, or obvious deformity or asymmetry  mild muscle atrophy diffuse    Tender: neck/paraspinals, trapezius  Range of Motion:   Active:forward flexion 150 degrees, external rotation 50 degrees, internal rotation  T10   Passive: external rotation  60 degrees  Strength: forward flexion 5/5, External rotation 5/5  Impingement: mild positive.  Special tests: Empty Can: Negative    LEFT UPPER EXTREMITY:  Sensation intact to light touch in median, radial, ulnar and axillary nerve distributions  Palpable 2+ radial pulse, brisk capillary refill to all fingers, wwp  Intact epl fpl fdp edc wrist flexion/extension biceps triceps deltoid    LEFT SHOULDER:  Shoulder Inspection: no swelling, bruising, discoloration, or obvious deformity or asymmetry  Mild-moderate muscle atrophy diffuse    Tender: acromion and greater tuberosity  Non-tender: AC joint  Range of Motion:   Active:forward flexion 140 degrees, external rotation 50 degrees, internal rotation  hip pocket  Strength: forward flexion 4+/5, External rotation 4+/5   Impingement: all grade 2 positive  Special tests: Empty Can: Positive    X-RAY INTERPRETATION: no new images today.     3 views left  shoulder obtained 3/20/2019 were again reviewed personally in clinic today with the patient. On my review, moderate gleno-humeral degenerative changes.        ASSESSMENT: Preston Cai is a 84 year old male, right  -hand dominant with chronic left shoulder pain, primary gleno-humeral osteoarthritis, impingement syndrome, likely chronic rotator cuff tear and arthropathy.    PLAN:   * xrays reviewed showing osteoarthritis.  Cannot rule out rotator cuff tear given weakness.  * treatment nonsurgical with injections, Physical Therapy, over the counter pain control  * surgical treatment with shoulder arthroplasty, primary versus reverse total shoulder arthroplasty depending on rotator cuff integrity.  * at this time, he's not interested in surgery.  * will proceed with intra-articular gleno-humeral injection.   * activity modification, nsaids versus tylenol, Physical Therapy and home exercise program.  * return to clinic as needed.        Marlo Rubalcava M.D., M.S.  Dept. of Orthopaedic Surgery  [facility]        Large Joint Injection/Arthocentesis: L glenohumeral joint  Date/Time: 1/22/2020 3:25 PM  Performed by: Giuliano Raymundo PA  Authorized by: Marlo Rubalcava MD     Indications:  Pain  Indications comment:  Impingement  Needle Size:  22 G  Guidance: landmark guided    Approach:  Anterior  Location:  Shoulder      Site:  L glenohumeral joint  Medications:  3 mL bupivacaine 0.25 %; 4 mL lidocaine 1 %; 80 mg triamcinolone 40 MG/ML  Outcome:  Tolerated well, no immediate complications  Procedure discussed: discussed risks, benefits, and alternatives    Consent Given by:  Patient  Prep: patient was prepped and draped in usual sterile fashion            Again, thank you for allowing me to participate in the care of your patient.        Sincerely,        Marlo Rubalcava MD

## 2020-01-22 NOTE — PROGRESS NOTES
CHIEF COMPLAINT:   Chief Complaint   Patient presents with     Left Shoulder - Pain, Follow Up     Last [facility] injection: 5/15/19. Patient notes the injection worked good until he moved and had to do a lot of heavy lifting in September. He was doing some physical therapy exercises after the move and his shoulder became sore again.        HISTORY:  Preston Cai is a 84 year old male, right  -hand dominant, who is seen in followup for left shoulder pain that started many years ago. Locates pain side/front of shoulder with reaching, at night. No specific injury. Also as neck pain, arthritis. Ok at rest. Will have numbness and tingling in his arm depending on how he holds his neck. Had subacromial injection 3/20/2019 that seemed to help a little, as well as Physical Therapy with mobility. Last seen 5/15/2019 and had intra-articular injection which he states worked a lot better. He states he and his wife moved 9/2019 and was doing a lot of lifting that aggravated the shoulder and now pain is returning. He's resumed his home exercise program. He would like to try another injection today.      He's also having some right neck pain that radiates down to the right shoulder and arm. He notes problems with C5 in his neck. He will discuss with primary care provider.      Onset: years ago.  Symptoms have been worsening since that time.  Aggrevated by: reaching, lifting, night.  Relieved by: rest  Present symptoms: pain with ADL's (dressing),  pain with overhead activities,  pain reaching behind back,  pain reaching out or away from body (flexion/ abduction),  positional night pain,  pain lifting,  Pain location: lateral shoulder, deltoid and upper arm and anterior  Pain severity: 8/10  Pain quality: sharp  Frequency of symptoms: frequently  Associated symptoms: neck pain, numbness/tingling down the arm.    Treatment up to this point: subacromial injection 3/20/2019, Physical Therapy. Intra-articular injection 5/2019.    Usual  "level of work activity: retired.    Other PMH:  has a past medical history of Acid reflux disease, Allergic rhinitis, cause unspecified, H/O magnetic resonance imaging of brain and brain stem (10/10/2016), Hematuria, History of anemia (2002), History of gastric ulcer, Macular degeneration, Malignant neoplasm of laryngeal cartilages (H), Nonsenile cataract, PONV (postoperative nausea and vomiting), and Tremor (10/3/2016).  Patient Active Problem List    Diagnosis Date Noted     Macular degeneration 10/18/2016     Priority: Medium     Gastroesophageal reflux disease without esophagitis 06/17/2015     Priority: Medium     Pseudophakia 07/02/2014     Priority: Medium     Advanced directives, counseling/discussion 02/17/2014     Priority: Medium     Advance Care Planning:   ACP Review and Resources Provided:  Reviewed chart for advance care plan.  Preston Cai has no plan or code status on file. Discussed available resources and provided with information on 02/17/2014. Confirmed code status reflects current choices pending further ACP discussions.  Confirmed/documented designated decision maker(s). See permanent comments section of demographics in clinical tab.   Added by Lindsey Gongora on 3/20/2014  Patient does not have an Advance/Health Care Directive (HCD), given \"What is Advance Care Planning?\" flyer and requests blank HCD form.  Angelina Anthony  February 17, 2014       Hoarse voice quality 03/07/2012     Priority: Medium     Benign prostatic hyperplasia 03/24/2011     Priority: Medium     CARDIOVASCULAR SCREENING; LDL GOAL LESS THAN 160 10/31/2010     Priority: Medium     Essential tremor 04/28/2010     Priority: Medium     Followed by Dr Diaz ( [facility] ) : takes primodone        Personal history of malignant neoplasm of larynx 10/05/2009     Priority: Medium     1982         Cervical radiculopathy at C6 01/02/2009     Priority: Medium     C5-C6  mild central and mild/moderate bilateral neural foraminal " stenosis. with Left radicular pain. trial epidural, PNB           Seborrheic dermatitis 04/10/2007     Priority: Medium     scalp and ear canals - triamcilnolone to knock down, then coal tar to maintain.  Problem list name updated by automated process. Provider to review       Allergic rhinitis due to other allergen 04/10/2007     Priority: Medium     continue flonase - good control with.  Didn't use over winter.           Surgical Hx:  has a past surgical history that includes appendectomy (8 years old); nasal endoscopy,dx (4/2007); cystoscopy (1997); zz gastroscopy,fl (9/2007); Phacoemulsification with intraocular lens implantation right eye. (3/11/2003); Pars plana vitrectomy and epiretinal membrane dissection, right eye (11/8/2002); Biopsy of the throat - cancerous mass - got radiation for in larynx (1982); Thyroglossal Duct Cyst Removal - 2ndary to radiation. (1982); Vasectomy (1971); Colonoscopy (2/25/2004); Eye surgery (Right); Colonoscopy (N/A, 4/28/2015); Colonoscopy with CO2 insufflation (N/A, 4/28/2015); Phacoemulsification with standard intraocular lens implant (Left, 9/22/2016); Eye surgery (2003); and cataract iol, rt/lt.    Medications:   Current Outpatient Medications:      gabapentin (NEURONTIN) 100 MG capsule, 2 capsules each evening, Disp: 60 capsule, Rfl: 4     pantoprazole (PROTONIX) 40 MG EC tablet, Take 1 tablet (40 mg) by mouth daily, Disp: 90 tablet, Rfl: 3     primidone (MYSOLINE) 250 MG tablet, Take 1 tablet (250 mg) by mouth At Bedtime, Disp: 90 tablet, Rfl: 3     tamsulosin (FLOMAX) 0.4 MG capsule, Take 2 capsules (0.8 mg) by mouth daily (Patient taking differently: Take 0.4 mg by mouth daily ), Disp: 180 capsule, Rfl: 3     VITAMIN D, CHOLECALCIFEROL, PO, Take 1,000 Units by mouth daily, Disp: , Rfl:     Current Facility-Administered Medications:      bupivacaine (MARCAINE) 0.25 % injection 3 mL, 3 mL, , , Marlo Rubalcava MD, 3 mL at 05/15/19 1452     bupivacaine (MARCAINE) 0.25 %  "injection 3 mL, 3 mL, , , Marlo Rubalcava MD, 3 mL at 03/20/19 1536     lidocaine 1 % injection 4 mL, 4 mL, , , Marlo Rubalcava MD, 4 mL at 05/15/19 1452     lidocaine 1 % injection 4 mL, 4 mL, , , Marlo Rubalcava MD, 4 mL at 03/20/19 1536     triamcinolone (KENALOG-40) injection 80 mg, 80 mg, , , Marlo Rubalcava MD, 80 mg at 05/15/19 1452     triamcinolone (KENALOG-40) injection 80 mg, 80 mg, , , Marlo Rubalcava MD, 80 mg at 03/20/19 1536    Allergies:   Allergies   Allergen Reactions     Seasonal Allergies      Hay fever        Social Hx: retired.  reports that he has quit smoking. He has never used smokeless tobacco. He reports current alcohol use. He reports that he does not use drugs.    Family Hx: family history includes Cancer in his father and another family member; Cancer - colorectal in his father; Cerebrovascular Disease in his mother; Gastrointestinal Disease in his father; Hypertension in his father; Macular Degeneration in his mother..    REVIEW OF SYSTEMS:   CONSTITUTIONAL:NEGATIVE for fever, chills, change in weight  INTEGUMENTARY/SKIN: NEGATIVE for worrisome rashes, moles or lesions  MUSCULOSKELETAL:See HPI above  NEURO: NEGATIVE for weakness, dizziness or paresthesias    PHYSICAL EXAM:  /68   Pulse 77   Ht 1.854 m (6' 1\")   Wt 77.9 kg (171 lb 12.8 oz)   SpO2 96%   BMI 22.67 kg/m     GENERAL APPEARANCE: healthy, alert, no distress; accompanied by his wife.  SKIN: no suspicious lesions or rashes  NEURO: Normal strength and tone, mentation intact and speech normal  PSYCH:  mentation appears normal and affect normal, not anxious  RESPIRATORY: No increased work of breathing.  VASCULAR: Radial pulses 2+ and brisk cappillary refill     MUSCULOSKELETAL:      RIGHT UPPER EXTREMITY:  Sensation intact to light touch in median, radial, ulnar and axillary nerve distributions  Palpable 2+ radial pulse, brisk capillary refill to all fingers, wwp  Intact epl fpl fdp edc wrist " flexion/extension biceps triceps deltoid    RIGHT SHOULDER:  Shoulder Inspection: no swelling, bruising, discoloration, or obvious deformity or asymmetry  mild muscle atrophy diffuse    Tender: neck/paraspinals, trapezius  Range of Motion:   Active:forward flexion 150 degrees, external rotation 50 degrees, internal rotation  T10   Passive: external rotation  60 degrees  Strength: forward flexion 5/5, External rotation 5/5  Impingement: mild positive.  Special tests: Empty Can: Negative    LEFT UPPER EXTREMITY:  Sensation intact to light touch in median, radial, ulnar and axillary nerve distributions  Palpable 2+ radial pulse, brisk capillary refill to all fingers, wwp  Intact epl fpl fdp edc wrist flexion/extension biceps triceps deltoid    LEFT SHOULDER:  Shoulder Inspection: no swelling, bruising, discoloration, or obvious deformity or asymmetry  Mild-moderate muscle atrophy diffuse    Tender: acromion and greater tuberosity  Non-tender: AC joint  Range of Motion:   Active:forward flexion 140 degrees, external rotation 50 degrees, internal rotation  hip pocket  Strength: forward flexion 4+/5, External rotation 4+/5   Impingement: all grade 2 positive  Special tests: Empty Can: Positive    X-RAY INTERPRETATION: no new images today.     3 views left  shoulder obtained 3/20/2019 were again reviewed personally in clinic today with the patient. On my review, moderate gleno-humeral degenerative changes.        ASSESSMENT: Preston Cai is a 84 year old male, right  -hand dominant with chronic left shoulder pain, primary gleno-humeral osteoarthritis, impingement syndrome, likely chronic rotator cuff tear and arthropathy.    PLAN:   * xrays reviewed showing osteoarthritis. Cannot rule out rotator cuff tear given weakness.  * treatment nonsurgical with injections, Physical Therapy, over the counter pain control  * surgical treatment with shoulder arthroplasty, primary versus reverse total shoulder arthroplasty depending  on rotator cuff integrity.  * at this time, he's not interested in surgery.  * will proceed with intra-articular gleno-humeral injection.   * activity modification, nsaids versus tylenol, Physical Therapy and home exercise program.  * return to clinic as needed.        Marlo Rubalcava M.D., M.S.  Dept. of Orthopaedic Surgery  [facility]        Large Joint Injection/Arthocentesis: L glenohumeral joint  Date/Time: 1/22/2020 3:25 PM  Performed by: Giuliano Raymundo PA  Authorized by: Marlo Rubalcava MD     Indications:  Pain  Indications comment:  Impingement  Needle Size:  22 G  Guidance: landmark guided    Approach:  Anterior  Location:  Shoulder      Site:  L glenohumeral joint  Medications:  3 mL bupivacaine 0.25 %; 4 mL lidocaine 1 %; 80 mg triamcinolone 40 MG/ML  Outcome:  Tolerated well, no immediate complications  Procedure discussed: discussed risks, benefits, and alternatives    Consent Given by:  Patient  Prep: patient was prepped and draped in usual sterile fashion

## 2020-02-11 NOTE — PROGRESS NOTES
Diagnosis/Summary/Recommendations:    PATIENT: Preston Cai  84 year old male     : 1935    BEATRIZ: 2020    Neuropathy  Tremor     Medication Review    Last seen by me 2016    3/2019 LFTs was normal       Medications            Gabapentin neurontin 100mg     2   Pantoprazole (protonix) 40mg EC tablet 1       Primidone mysoline 250mg      1   Tamsulosin flomax 0.4mg  1       Vitamin D, Cholecalciferol 1000 units  1                                                                                                   History obtained from patient    Talked about seeing occupational therapist for adaptive items for his tremor    Discussed that his blood pressure and heart rate are pretty low that would not recommend a beta blocker except a 10mg dose as needed if he wanted anything    Would not push up the primidone further    Talked about surgical approaches like deep brain stimulation, focused beam ultrasound, gamma knife    Last seen 4 years.     Return in one year or as needed.       Coding statement:   Duration of  Services: patient care and care coordination was 25 minutes  Greater than 50% of this visit was spent in counseling and coordination of care.     García Munroe MD     ______________________________________    Last visit date and details:     Walk and Hyser notes.         ______________________________________      Patient was asked about 14 Review of systems including changes in vision (dry eyes, double vision), hearing, heart, lungs, musculoskeletal, depression, anxiety, snoring, RBD, insomnia, urinary frequency, urinary urgency, constipation, swallowing problems, hematological, ID, allergies, skin problems: seborrhea, endocrinological: thyroid, diabetes, cholesterol; balance, weight changes, and other neurological problems and these were not significant at this time except for   Patient Active Problem List   Diagnosis     Seborrheic dermatitis     Allergic rhinitis due to other allergen      Cervical radiculopathy at C6     Personal history of malignant neoplasm of larynx     Essential tremor     CARDIOVASCULAR SCREENING; LDL GOAL LESS THAN 160     Benign prostatic hyperplasia     Hoarse voice quality     Advanced directives, counseling/discussion     Pseudophakia     Gastroesophageal reflux disease without esophagitis     Macular degeneration          Allergies   Allergen Reactions     Seasonal Allergies      Hay fever      Past Surgical History:   Procedure Laterality Date     APPENDECTOMY  8 years old     Biopsy of the throat - cancerous mass - got radiation for in larynx  1982     CATARACT IOL, RT/LT       COLONOSCOPY  2/25/2004    Normal     COLONOSCOPY N/A 4/28/2015    Procedure: COLONOSCOPY;  Surgeon: Marvin Adams MD;  Location: [facility]     COLONOSCOPY WITH CO2 INSUFFLATION N/A 4/28/2015    Procedure: COLONOSCOPY WITH CO2 INSUFFLATION;  Surgeon: Marvin Adams MD;  Location: [facility]     CYSTOSCOPY  1997    for hematuria - negative     EYE SURGERY Right     Rt eye.macular repair     EYE SURGERY  2003    cataract     NASAL ENDOSCOPY,DX  4/2007    GERD     Pars plana vitrectomy and epiretinal membrane dissection, right eye  11/8/2002     Phacoemulsification with intraocular lens implantation right eye.  3/11/2003     PHACOEMULSIFICATION WITH STANDARD INTRAOCULAR LENS IMPLANT Left 9/22/2016    Procedure: PHACOEMULSIFICATION WITH STANDARD INTRAOCULAR LENS IMPLANT;  Surgeon: Nghia Lara MD;  Location: [facility] OR     Thyroglossal Duct Cyst Removal - 2ndary to radiation.  1982     VASECTOMY  1971     ZZ GASTROSCOPY,FL  9/2007    hiatal hernia and errosions     Past Medical History:   Diagnosis Date     Acid reflux disease      Allergic rhinitis, cause unspecified      H/O magnetic resonance imaging of brain and brain stem 10/10/2016    MRI BRAIN WITH AND WITHOUT CONTRAST August 17, 2009 8:07:00 PM   HISTORY: Severe dizzy spells with imbalance and vomiting.   TECHNIQUE: Routine  pulse sequences without and with contrast were performed including thin section images through the IACs. 20 mL Magnevist given.   FINDINGS: Diffusion-weighted images are normal. The brain parenchyma, brainstem, ventricular system, and subarachnoid spaces a     Hematuria     Hematuria  - in 1990's      History of anemia 2002    Anemia-Iron Deficit     History of gastric ulcer      Macular degeneration      Malignant neoplasm of laryngeal cartilages (H)     Laryngeal CA (Radiation 1982)     Nonsenile cataract      PONV (postoperative nausea and vomiting)      Tremor 10/3/2016     Social History     Socioeconomic History     Marital status:      Spouse name: Edyta Cai     Number of children: Not on file     Years of education: Not on file     Highest education level: Not on file   Occupational History     Employer: RETIRED   Social Needs     Financial resource strain: Not on file     Food insecurity:     Worry: Not on file     Inability: Not on file     Transportation needs:     Medical: Not on file     Non-medical: Not on file   Tobacco Use     Smoking status: Former Smoker     Smokeless tobacco: Never Used     Tobacco comment: 1969   Substance and Sexual Activity     Alcohol use: Yes     Alcohol/week: 0.0 standard drinks     Comment: A beer once in awhile     Drug use: No     Sexual activity: Yes     Partners: Female     Birth control/protection: None   Lifestyle     Physical activity:     Days per week: Not on file     Minutes per session: Not on file     Stress: Not on file   Relationships     Social connections:     Talks on phone: Not on file     Gets together: Not on file     Attends Sabianist service: Not on file     Active member of club or organization: Not on file     Attends meetings of clubs or organizations: Not on file     Relationship status: Not on file     Intimate partner violence:     Fear of current or ex partner: Not on file     Emotionally abused: Not on file     Physically abused: Not  on file     Forced sexual activity: Not on file   Other Topics Concern     Parent/sibling w/ CABG, MI or angioplasty before 65F 55M? No   Social History Narrative    seen by Jose M Diaz. lives in [facility].  to Edyta Cai.    2 sons and 2 daughters    Cancer father    Stroke mother        Daughter in [facility]    Son in [place]    Daughter in [place]    Son in [place]           Drug and lactation database from the [place] [employer]:  http://toxnet.nlm.nih.gov/cgi-bin/sis/htmlgen?LACT      B/P: Data Unavailable, T: Data Unavailable, P: Data Unavailable, R: Data Unavailable 0 lbs 0 oz  There were no vitals taken for this visit., There is no height or weight on file to calculate BMI.  Medications and Vitals not listed above were documented in the cart and reviewed by me.     Current Outpatient Medications   Medication Sig Dispense Refill     gabapentin (NEURONTIN) 100 MG capsule 2 capsules each evening 60 capsule 4     pantoprazole (PROTONIX) 40 MG EC tablet Take 1 tablet (40 mg) by mouth daily 90 tablet 3     primidone (MYSOLINE) 250 MG tablet Take 1 tablet (250 mg) by mouth At Bedtime 90 tablet 3     tamsulosin (FLOMAX) 0.4 MG capsule Take 2 capsules (0.8 mg) by mouth daily (Patient taking differently: Take 0.4 mg by mouth daily ) 180 capsule 3     VITAMIN D, CHOLECALCIFEROL, PO Take 1,000 Units by mouth daily           García Munroe MD      Visit Date:   09/11/2019      HISTORY OF PRESENT ILLNESS:  Preston Cai returns for followup of a painful peripheral neuropathy.  Laboratory evaluation for his neuropathy has been negative.  EMG studies done by Dr. 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      When I saw him in May, he did indicate that he would like to try gabapentin for some of the burning discomfort he experiences.  He is now taking 200 mg in the evening.  He probably is  experiencing some benefit.  While he still has a sense of numbness in his distal lower extremities and feels like he is wearing boots, it seems like the burning pain is somewhat better.  His feet feel warm in the evening and he will run cool water over them.  If he wakes up in the middle of the night, he is not having a lot of burning pain.  He seems to be tolerating the gabapentin well.      PHYSICAL EXAMINATION:   VITAL SIGNS:  His heart rate is 62.  Blood pressure 105/62.   MOTOR:  Intact strength aside from some equivocal weakness of toe flexion bilaterally.   SENSORY:  He has absent vibratory sense in the toes and a reduction of vibratory appreciation at the ankles, although it is present.  He has a very mild loss of position sense in the toes.  He reports decreased pinprick to below the ankle on the right and to around the midfoot on the left.  There is an equivocal gradient impairment of cold sensation from about the midcalf down to the feet.   REFLEXES:  2+ in the upper extremities, 1+ at the knees, and absent at the ankles.      The patient does have a mild asymmetric postural and action tremor affecting the left upper extremity more than the right.      IMPRESSION:     1.  Idiopathic peripheral neuropathy.   2.  Tremor.      PLAN:  He is comfortable on his current dose of gabapentin and does not want to increase it further.  On examination today, there has not been a significant progression in his neuropathy findings compared to when I originally saw him this spring.      He continues on primidone for tremor.  This was prescribed by Dr. Munroe who follows him for tremor.  I am not certain if he has followup with Dr. Munroe, but I advised him that if he feels the tremor is worsening he should see Dr. Munroe back.      I am going to see him back in 6 months.         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     PERIPHERAL NEUROPATHY  He has pernicious anemia  He is on vitamin b12  He has had other studies for causes of neuropathy - tsh was normal. He has slight low hemoglobin.   He has had a fasting sugar in Feb that was fine.  He has not had an A1c. Patient states he may have had higher glucose levels.  He has not had a paraneoplastic antibody, copper or JESENIA or cryoglobulins.   He has not had an EMG.   Neuropathy could play a role in the tremor, ie it could increase the tremor; and rarely some neuropathies are the cause of tremor.      Essential tremor  No clear family history of tremor.   Mother had frequent throat clearing which he has had. She did not have an obvious vocal tremor or tremor in limbs.      His blood pressure was 102/67 and heart rate was 69.   He has not been on a beta blocker.      He has vocal changes and has had laryngeal cancer and is monitored for recurrence.      Discussed surgical options for tremor including focused beam ultrasound, gamma knife surgery and deep brain stimulation (DBS).      PLAN  1. Consideration for neuropathy workup, emg and neuromuscular consultation with DR. Kelly if he still is having problems with his neuropathy after continued vitamin b12 therapy. Revisit with pcp this matter     2. In regards to chel gómez, would like to be able to give him 10mg of propranolol as needed, which is unlikely to affect blood pressure or heart rate but he is running lowish on both HR and BP so may want pcp for input.      3. He is probably getting benefit from mysoline/primidone and that by increasing the dose any higher will likely be fraught with side effects and not additional benefit.      4. Discussed the use of  heavy utensils - larger  - heavy spoon.     5. Reviewed surgical options - he is not ready for brain surgical therapy for his tremor.         García Munroe MD

## 2020-02-17 DIAGNOSIS — G60.9 HEREDITARY AND IDIOPATHIC PERIPHERAL NEUROPATHY: ICD-10-CM

## 2020-02-17 NOTE — TELEPHONE ENCOUNTER
Hello,    Pt is transferring all rx's to our pharmacy, this is the only one that did not have any refills left.  If ok please send new rx.    Thank You,  Anita Garcia  Pharmacy Technician  [facility]  [phone]

## 2020-02-18 RX ORDER — GABAPENTIN 100 MG/1
CAPSULE ORAL
Qty: 60 CAPSULE | Refills: 4 | Status: SHIPPED | OUTPATIENT
Start: 2020-02-18 | End: 2020-03-11

## 2020-03-02 ENCOUNTER — OFFICE VISIT (OUTPATIENT)
Dept: NEUROLOGY | Facility: CLINIC | Age: 85
End: 2020-03-02
Payer: COMMERCIAL

## 2020-03-02 VITALS
HEIGHT: 73 IN | HEART RATE: 71 BPM | SYSTOLIC BLOOD PRESSURE: 106 MMHG | BODY MASS INDEX: 23.05 KG/M2 | WEIGHT: 173.9 LBS | OXYGEN SATURATION: 97 % | DIASTOLIC BLOOD PRESSURE: 64 MMHG

## 2020-03-02 DIAGNOSIS — N40.1 BENIGN PROSTATIC HYPERPLASIA WITH NOCTURIA: ICD-10-CM

## 2020-03-02 DIAGNOSIS — R25.1 TREMOR: Primary | ICD-10-CM

## 2020-03-02 DIAGNOSIS — G25.0 ESSENTIAL TREMOR: ICD-10-CM

## 2020-03-02 DIAGNOSIS — R35.1 BENIGN PROSTATIC HYPERPLASIA WITH NOCTURIA: ICD-10-CM

## 2020-03-02 DIAGNOSIS — G60.9 IDIOPATHIC PERIPHERAL NEUROPATHY: ICD-10-CM

## 2020-03-02 PROCEDURE — 99214 OFFICE O/P EST MOD 30 MIN: CPT | Performed by: PSYCHIATRY & NEUROLOGY

## 2020-03-02 RX ORDER — PRIMIDONE 250 MG/1
250 TABLET ORAL AT BEDTIME
Qty: 90 TABLET | Refills: 3 | Status: SHIPPED | OUTPATIENT
Start: 2020-03-02 | End: 2021-05-12

## 2020-03-02 RX ORDER — TAMSULOSIN HYDROCHLORIDE 0.4 MG/1
0.4 CAPSULE ORAL DAILY
COMMUNITY
Start: 2020-03-02 | End: 2020-04-27

## 2020-03-02 ASSESSMENT — MIFFLIN-ST. JEOR: SCORE: 1537.69

## 2020-03-02 ASSESSMENT — PAIN SCALES - GENERAL: PAINLEVEL: NO PAIN (0)

## 2020-03-02 NOTE — PATIENT INSTRUCTIONS
Talked about seeing occupational therapist for adaptive items for his tremor    Discussed that his blood pressure and heart rate are pretty low that would not recommend a beta blocker except a 10mg dose as needed if he wanted anything    Would not push up the primidone further    Talked about surgical approaches like deep brain stimulation, focused beam ultrasound, gamma knife    Last seen 4 years.     Return in one year or as needed.

## 2020-03-02 NOTE — LETTER
3/2/2020         RE: Preston Cai  [address]  [facility] [address]        Dear Colleague,    Thank you for referring your patient, Preston Cai, to the [facility]. Please see a copy of my visit note below.    Diagnosis/Summary/Recommendations:    PATIENT: Preston Cai  84 year old male     : 1935    BEATRIZ: 2020    Neuropathy  Tremor     Medication Review    Last seen by me 2016    3/2019 LFTs was normal       Medications            Gabapentin neurontin 100mg     2   Pantoprazole (protonix) 40mg EC tablet 1       Primidone mysoline 250mg      1   Tamsulosin flomax 0.4mg  1       Vitamin D, Cholecalciferol 1000 units  1                                                                                                   History obtained from patient    Talked about seeing occupational therapist for adaptive items for his tremor    Discussed that his blood pressure and heart rate are pretty low that would not recommend a beta blocker except a 10mg dose as needed if he wanted anything    Would not push up the primidone further    Talked about surgical approaches like deep brain stimulation, focused beam ultrasound, gamma knife    Last seen 4 years.     Return in one year or as needed.       Coding statement:   Duration of  Services: patient care and care coordination was 25 minutes  Greater than 50% of this visit was spent in counseling and coordination of care.     García Munroe MD     ______________________________________    Last visit date and details:     Leticia and Una notes.         ______________________________________      Patient was asked about 14 Review of systems including changes in vision (dry eyes, double vision), hearing, heart, lungs, musculoskeletal, depression, anxiety, snoring, RBD, insomnia, urinary frequency, urinary urgency, constipation, swallowing problems, hematological, ID, allergies, skin problems: seborrhea, endocrinological:  thyroid, diabetes, cholesterol; balance, weight changes, and other neurological problems and these were not significant at this time except for   Patient Active Problem List   Diagnosis     Seborrheic dermatitis     Allergic rhinitis due to other allergen     Cervical radiculopathy at C6     Personal history of malignant neoplasm of larynx     Essential tremor     CARDIOVASCULAR SCREENING; LDL GOAL LESS THAN 160     Benign prostatic hyperplasia     Hoarse voice quality     Advanced directives, counseling/discussion     Pseudophakia     Gastroesophageal reflux disease without esophagitis     Macular degeneration          Allergies   Allergen Reactions     Seasonal Allergies      Hay fever      Past Surgical History:   Procedure Laterality Date     APPENDECTOMY  8 years old     Biopsy of the throat - cancerous mass - got radiation for in larynx  1982     CATARACT IOL, RT/LT       COLONOSCOPY  2/25/2004    Normal     COLONOSCOPY N/A 4/28/2015    Procedure: COLONOSCOPY;  Surgeon: Marvin Adams MD;  Location: [facility] OR     COLONOSCOPY WITH CO2 INSUFFLATION N/A 4/28/2015    Procedure: COLONOSCOPY WITH CO2 INSUFFLATION;  Surgeon: Marvin Adams MD;  Location: [facility] OR     CYSTOSCOPY  1997    for hematuria - negative     EYE SURGERY Right     Rt eye.macular repair     EYE SURGERY  2003    cataract     NASAL ENDOSCOPY,DX  4/2007    GERD     Pars plana vitrectomy and epiretinal membrane dissection, right eye  11/8/2002     Phacoemulsification with intraocular lens implantation right eye.  3/11/2003     PHACOEMULSIFICATION WITH STANDARD INTRAOCULAR LENS IMPLANT Left 9/22/2016    Procedure: PHACOEMULSIFICATION WITH STANDARD INTRAOCULAR LENS IMPLANT;  Surgeon: Nghia Lara MD;  Location: [facility] OR     Thyroglossal Duct Cyst Removal - 2ndary to radiation.  1982     VASECTOMY  1971     ZZ GASTROSCOPY,FL  9/2007    hiatal hernia and errosions     Past Medical History:   Diagnosis Date     Acid reflux disease       Allergic rhinitis, cause unspecified      H/O magnetic resonance imaging of brain and brain stem 10/10/2016    MRI BRAIN WITH AND WITHOUT CONTRAST August 17, 2009 8:07:00 PM   HISTORY: Severe dizzy spells with imbalance and vomiting.   TECHNIQUE: Routine pulse sequences without and with contrast were performed including thin section images through the IACs. 20 mL Magnevist given.   FINDINGS: Diffusion-weighted images are normal. The brain parenchyma, brainstem, ventricular system, and subarachnoid spaces a     Hematuria     Hematuria  - in 1990's      History of anemia 2002    Anemia-Iron Deficit     History of gastric ulcer      Macular degeneration      Malignant neoplasm of laryngeal cartilages (H)     Laryngeal CA (Radiation 1982)     Nonsenile cataract      PONV (postoperative nausea and vomiting)      Tremor 10/3/2016     Social History     Socioeconomic History     Marital status:      Spouse name: Edyta Cai     Number of children: Not on file     Years of education: Not on file     Highest education level: Not on file   Occupational History     Employer: RETIRED   Social Needs     Financial resource strain: Not on file     Food insecurity:     Worry: Not on file     Inability: Not on file     Transportation needs:     Medical: Not on file     Non-medical: Not on file   Tobacco Use     Smoking status: Former Smoker     Smokeless tobacco: Never Used     Tobacco comment: 1969   Substance and Sexual Activity     Alcohol use: Yes     Alcohol/week: 0.0 standard drinks     Comment: A beer once in awhile     Drug use: No     Sexual activity: Yes     Partners: Female     Birth control/protection: None   Lifestyle     Physical activity:     Days per week: Not on file     Minutes per session: Not on file     Stress: Not on file   Relationships     Social connections:     Talks on phone: Not on file     Gets together: Not on file     Attends [employer] service: Not on file     Active member of club or  organization: Not on file     Attends meetings of clubs or organizations: Not on file     Relationship status: Not on file     Intimate partner violence:     Fear of current or ex partner: Not on file     Emotionally abused: Not on file     Physically abused: Not on file     Forced sexual activity: Not on file   Other Topics Concern     Parent/sibling w/ CABG, MI or angioplasty before 65F 55M? No   Social History Narrative    seen by Jose M Diaz. lives in [facility].  to Edyta Cai.    2 sons and 2 daughters    Cancer father    Stroke mother        Daughter in [facility]    Son in [place]    Daughter in [place]    Son in [place]           Drug and lactation database from the [place] [employer]:  http://toxnet.nlm.nih.gov/cgi-bin/sis/htmlgen?LACT      B/P: Data Unavailable, T: Data Unavailable, P: Data Unavailable, R: Data Unavailable 0 lbs 0 oz  There were no vitals taken for this visit., There is no height or weight on file to calculate BMI.  Medications and Vitals not listed above were documented in the cart and reviewed by me.     Current Outpatient Medications   Medication Sig Dispense Refill     gabapentin (NEURONTIN) 100 MG capsule 2 capsules each evening 60 capsule 4     pantoprazole (PROTONIX) 40 MG EC tablet Take 1 tablet (40 mg) by mouth daily 90 tablet 3     primidone (MYSOLINE) 250 MG tablet Take 1 tablet (250 mg) by mouth At Bedtime 90 tablet 3     tamsulosin (FLOMAX) 0.4 MG capsule Take 2 capsules (0.8 mg) by mouth daily (Patient taking differently: Take 0.4 mg by mouth daily ) 180 capsule 3     VITAMIN D, CHOLECALCIFEROL, PO Take 1,000 Units by mouth daily           García Munroe MD      Visit Date:   09/11/2019      HISTORY OF PRESENT ILLNESS:  Preston Cai returns for followup of a painful peripheral neuropathy.  Laboratory evaluation for his neuropathy has been negative.  EMG studies done by Dr. 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      When I saw him in May, he did indicate that he would like to try gabapentin for some of the burning discomfort he experiences.  He is now taking 200 mg in the evening.  He probably is experiencing some benefit.  While he still has a sense of numbness in his distal lower extremities and feels like he is wearing boots, it seems like the burning pain is somewhat better.  His feet feel warm in the evening and he will run cool water over them.  If he wakes up in the middle of the night, he is not having a lot of burning pain.  He seems to be tolerating the gabapentin well.      PHYSICAL EXAMINATION:   VITAL SIGNS:  His heart rate is 62.  Blood pressure 105/62.   MOTOR:  Intact strength aside from some equivocal weakness of toe flexion bilaterally.   SENSORY:  He has absent vibratory sense in the toes and a reduction of vibratory appreciation at the ankles, although it is present.  He has a very mild loss of position sense in the toes.  He reports decreased pinprick to below the ankle on the right and to around the midfoot on the left.  There is an equivocal gradient impairment of cold sensation from about the midcalf down to the feet.   REFLEXES:  2+ in the upper extremities, 1+ at the knees, and absent at the ankles.      The patient does have a mild asymmetric postural and action tremor affecting the left upper extremity more than the right.      IMPRESSION:     1.  Idiopathic peripheral neuropathy.   2.  Tremor.      PLAN:  He is comfortable on his current dose of gabapentin and does not want to increase it further.  On examination today, there has not been a significant progression in his neuropathy findings compared to when I originally saw him this spring.      He continues on primidone for tremor.  This was prescribed by Dr. Munroe who follows him for tremor.  I am not certain if he has followup with Dr. Munroe, but I advised him that if  he feels the tremor is worsening he should see Dr. Munroe back.      I am going to see him back in 6 months.         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     PERIPHERAL NEUROPATHY  He has pernicious anemia  He is on vitamin b12  He has had other studies for causes of neuropathy - tsh was normal. He has slight low hemoglobin.   He has had a fasting sugar in Feb that was fine.  He has not had an A1c. Patient states he may have had higher glucose levels.  He has not had a paraneoplastic antibody, copper or JESENIA or cryoglobulins.   He has not had an EMG.   Neuropathy could play a role in the tremor, ie it could increase the tremor; and rarely some neuropathies are the cause of tremor.      Essential tremor  No clear family history of tremor.   Mother had frequent throat clearing which he has had. She did not have an obvious vocal tremor or tremor in limbs.      His blood pressure was 102/67 and heart rate was 69.   He has not been on a beta blocker.      He has vocal changes and has had laryngeal cancer and is monitored for recurrence.      Discussed surgical options for tremor including focused beam ultrasound, gamma knife surgery and deep brain stimulation (DBS).      PLAN  1. Consideration for neuropathy workup, emg and neuromuscular consultation with DR. Kelly if he still is having problems with his neuropathy after continued vitamin b12 therapy. Revisit with pcp this matter     2. In regards to t rico, would like to be able to give him 10mg of propranolol as needed, which is unlikely to affect blood pressure or heart rate but he is  running lowish on both HR and BP so may want pcp for input.      3. He is probably getting benefit from mysoline/primidone and that by increasing the dose any higher will likely be fraught with side effects and not additional benefit.      4. Discussed the use of heavy utensils - larger  - heavy spoon.     5. Reviewed surgical options - he is not ready for brain surgical therapy for his tremor.         García Munroe MD    Again, thank you for allowing me to participate in the care of your patient.        Sincerely,        García Munroe MD

## 2020-03-02 NOTE — NURSING NOTE
"Preston Cai's goals for this visit include:   Chief Complaint   Patient presents with     Tremors       He requests these members of his care team be copied on today's visit information:     PCP: Tyrel Manning    Referring Provider:  No referring provider defined for this encounter.    /64   Pulse 71   Ht 1.854 m (6' 1\")   Wt 78.9 kg (173 lb 14.4 oz)   SpO2 97%   BMI 22.94 kg/m      "

## 2020-03-11 ENCOUNTER — OFFICE VISIT (OUTPATIENT)
Dept: NEUROLOGY | Facility: CLINIC | Age: 85
End: 2020-03-11
Payer: COMMERCIAL

## 2020-03-11 VITALS
DIASTOLIC BLOOD PRESSURE: 67 MMHG | RESPIRATION RATE: 12 BRPM | OXYGEN SATURATION: 98 % | WEIGHT: 175.6 LBS | HEIGHT: 73 IN | BODY MASS INDEX: 23.27 KG/M2 | HEART RATE: 77 BPM | SYSTOLIC BLOOD PRESSURE: 111 MMHG

## 2020-03-11 DIAGNOSIS — G60.9 HEREDITARY AND IDIOPATHIC PERIPHERAL NEUROPATHY: ICD-10-CM

## 2020-03-11 PROCEDURE — 99213 OFFICE O/P EST LOW 20 MIN: CPT | Performed by: PSYCHIATRY & NEUROLOGY

## 2020-03-11 RX ORDER — GABAPENTIN 100 MG/1
CAPSULE ORAL
Qty: 270 CAPSULE | Refills: 2 | Status: SHIPPED | OUTPATIENT
Start: 2020-03-11 | End: 2020-12-16

## 2020-03-11 ASSESSMENT — MIFFLIN-ST. JEOR: SCORE: 1545.4

## 2020-03-11 NOTE — PROGRESS NOTES
"Visit Date:   03/11/2020      Preston Cai returns for followup of painful idiopathic peripheral neuropathy.      He is having a bit more pain in his feet at night.  He tells me he moved down the road 6 months ago and they did a lot of the moving on their own.  Since then, he has had a little more \"heat\" in his feet at night.  He will before bed put Vaseline on his feet, then socks and then massage his feet and take his socks off before going to bed and he finds this helpful.      He continues on gabapentin 200 mg and tolerates the drug well.  He tried going down to 100 mg and he found his pain was worse.      He did follow up with Dr. Munroe concerning his tremor.  For now, he is going to continue on Primidone at a dose of 250 mg a day.      PHYSICAL EXAMINATION:    VITAL SIGNS:  His heart rate is 77, blood pressure 111/67.     NEUROLOGIC:  He does have a very mild postural tremor.  On neuromuscular examination, he has intact strength aside from some equivocal weakness of toe flexion.  His sensory examination is about the same as when I saw him in 09/2019.  He has absent vibratory sense at the toes and a moderate reduction at the ankles.  He has a moderate reduction of position sense in the toes.  He has decreased cold appreciation to the midfoot and decreased pinprick appreciation to approximately the ankles.  His gait is unremarkable.  Reflexes are 1-2+ except for absent ankle jerks.  Plantar responses are flexor.      IMPRESSION:  Idiopathic peripheral neuropathy.      PLAN:  I discussed increasing his gabapentin dose further.  He is on a low dose currently.  He is going to try going up to 300 mg at night.  He is concerned about memory if he is on a higher dose.  I told him if he develops any issues with his memory on the higher dose, he can go back down to 200 mg.      I am going to be seeing him back in 6 months.         STEFANY OLEARY MD             D: 03/11/2020   T: 03/11/2020   MT: IRAIS      Name:     " ALONDRAGINA TONY   MRN:      [id number]        Account:      [id number]   :      1935           Visit Date:   2020      Document: [id number]       cc: Tyrel Manning MD

## 2020-03-11 NOTE — NURSING NOTE
"Preston Cai's goals for this visit include: Return  He requests these members of his care team be copied on today's visit information:     PCP: Tyrel Manning    Referring Provider:  Bjorn Heart MD  [address] [place] [address]    /67   Pulse 77   Resp 12   Ht 1.854 m (6' 1\")   Wt 79.7 kg (175 lb 9.6 oz)   SpO2 98%   BMI 23.17 kg/m      Do you need any medication refills at today's visit? Yes  "

## 2020-03-11 NOTE — LETTER
"    3/11/2020         RE: Preston Cai  [address]  [place] [address]        Dear Colleague,    Thank you for referring your patient, Preston Cai, to the [facility]. Please see a copy of my visit note below.    Visit Date:   03/11/2020      Preston Cai returns for followup of painful idiopathic peripheral neuropathy.      He is having a bit more pain in his feet at night.  He tells me he moved down the road 6 months ago and they did a lot of the moving on their own.  Since then, he has had a little more \"heat\" in his feet at night.  He will before bed put Vaseline on his feet, then socks and then massage his feet and take his socks off before going to bed and he finds this helpful.      He continues on gabapentin 200 mg and tolerates the drug well.  He tried going down to 100 mg and he found his pain was worse.      He did follow up with Dr. Munroe concerning his tremor.  For now, he is going to continue on Primidone at a dose of 250 mg a day.      PHYSICAL EXAMINATION:    VITAL SIGNS:  His heart rate is 77, blood pressure 111/67.     NEUROLOGIC:  He does have a very mild postural tremor.  On neuromuscular examination, he has intact strength aside from some equivocal weakness of toe flexion.  His sensory examination is about the same as when I saw him in 09/2019.  He has absent vibratory sense at the toes and a moderate reduction at the ankles.  He has a moderate reduction of position sense in the toes.  He has decreased cold appreciation to the midfoot and decreased pinprick appreciation to approximately the ankles.  His gait is unremarkable.  Reflexes are 1-2+ except for absent ankle jerks.  Plantar responses are flexor.      IMPRESSION:  Idiopathic peripheral neuropathy.      PLAN:  I discussed increasing his gabapentin dose further.  He is on a low dose currently.  He is going to try going up to 300 mg at night.  He is concerned about memory if he is on a higher " dose.  I told him if he develops any issues with his memory on the higher dose, he can go back down to 200 mg.      I am going to be seeing him back in 6 months.         BJORN HEART MD             D: 2020   T: 2020   MT: IRAIS      Name:     GINA SHOKO   MRN:      [id number]        Account:      [id number]   :      1935           Visit Date:   2020      Document: [id number]       cc: Tyrel Manning MD       Again, thank you for allowing me to participate in the care of your patient.        Sincerely,        Bjorn Heart MD

## 2020-03-13 ENCOUNTER — OFFICE VISIT (OUTPATIENT)
Dept: FAMILY MEDICINE | Facility: CLINIC | Age: 85
End: 2020-03-13
Payer: COMMERCIAL

## 2020-03-13 VITALS
TEMPERATURE: 97.8 F | DIASTOLIC BLOOD PRESSURE: 63 MMHG | HEIGHT: 73 IN | OXYGEN SATURATION: 97 % | HEART RATE: 72 BPM | WEIGHT: 175 LBS | SYSTOLIC BLOOD PRESSURE: 101 MMHG | BODY MASS INDEX: 23.19 KG/M2

## 2020-03-13 DIAGNOSIS — N40.1 BENIGN PROSTATIC HYPERPLASIA WITH NOCTURIA: ICD-10-CM

## 2020-03-13 DIAGNOSIS — D64.9 MILD CHRONIC ANEMIA: ICD-10-CM

## 2020-03-13 DIAGNOSIS — Z13.29 SCREENING FOR THYROID DISORDER: ICD-10-CM

## 2020-03-13 DIAGNOSIS — Z13.6 CARDIOVASCULAR SCREENING; LDL GOAL LESS THAN 100: ICD-10-CM

## 2020-03-13 DIAGNOSIS — R35.1 BENIGN PROSTATIC HYPERPLASIA WITH NOCTURIA: ICD-10-CM

## 2020-03-13 DIAGNOSIS — E55.9 VITAMIN D DEFICIENCY: ICD-10-CM

## 2020-03-13 DIAGNOSIS — K21.9 GASTROESOPHAGEAL REFLUX DISEASE, ESOPHAGITIS PRESENCE NOT SPECIFIED: ICD-10-CM

## 2020-03-13 DIAGNOSIS — Z00.00 ENCOUNTER FOR MEDICARE ANNUAL WELLNESS EXAM: Primary | ICD-10-CM

## 2020-03-13 DIAGNOSIS — S46.811A SUPRASPINATUS SPRAIN, RIGHT, INITIAL ENCOUNTER: ICD-10-CM

## 2020-03-13 LAB
ALBUMIN SERPL-MCNC: 3.8 G/DL (ref 3.4–5)
ALP SERPL-CCNC: 67 U/L (ref 40–150)
ALT SERPL W P-5'-P-CCNC: 20 U/L (ref 0–70)
ANION GAP SERPL CALCULATED.3IONS-SCNC: 7 MMOL/L (ref 3–14)
AST SERPL W P-5'-P-CCNC: 20 U/L (ref 0–45)
BASOPHILS # BLD AUTO: 0 10E9/L (ref 0–0.2)
BASOPHILS NFR BLD AUTO: 0.2 %
BILIRUB SERPL-MCNC: 0.4 MG/DL (ref 0.2–1.3)
BUN SERPL-MCNC: 20 MG/DL (ref 7–30)
CALCIUM SERPL-MCNC: 9.1 MG/DL (ref 8.5–10.1)
CHLORIDE SERPL-SCNC: 102 MMOL/L (ref 94–109)
CHOLEST SERPL-MCNC: 199 MG/DL
CO2 SERPL-SCNC: 31 MMOL/L (ref 20–32)
CREAT SERPL-MCNC: 0.97 MG/DL (ref 0.66–1.25)
DIFFERENTIAL METHOD BLD: ABNORMAL
EOSINOPHIL # BLD AUTO: 0 10E9/L (ref 0–0.7)
EOSINOPHIL NFR BLD AUTO: 0.7 %
ERYTHROCYTE [DISTWIDTH] IN BLOOD BY AUTOMATED COUNT: 12.6 % (ref 10–15)
GFR SERPL CREATININE-BSD FRML MDRD: 72 ML/MIN/{1.73_M2}
GLUCOSE SERPL-MCNC: 98 MG/DL (ref 70–99)
HCT VFR BLD AUTO: 38.5 % (ref 40–53)
HDLC SERPL-MCNC: 75 MG/DL
HGB BLD-MCNC: 12.6 G/DL (ref 13.3–17.7)
LDLC SERPL CALC-MCNC: 104 MG/DL
LYMPHOCYTES # BLD AUTO: 1.4 10E9/L (ref 0.8–5.3)
LYMPHOCYTES NFR BLD AUTO: 24.2 %
MCH RBC QN AUTO: 32.7 PG (ref 26.5–33)
MCHC RBC AUTO-ENTMCNC: 32.7 G/DL (ref 31.5–36.5)
MCV RBC AUTO: 100 FL (ref 78–100)
MONOCYTES # BLD AUTO: 0.5 10E9/L (ref 0–1.3)
MONOCYTES NFR BLD AUTO: 8.4 %
NEUTROPHILS # BLD AUTO: 3.7 10E9/L (ref 1.6–8.3)
NEUTROPHILS NFR BLD AUTO: 66.5 %
NONHDLC SERPL-MCNC: 124 MG/DL
PLATELET # BLD AUTO: 175 10E9/L (ref 150–450)
POTASSIUM SERPL-SCNC: 4.4 MMOL/L (ref 3.4–5.3)
PROT SERPL-MCNC: 6.9 G/DL (ref 6.8–8.8)
RBC # BLD AUTO: 3.85 10E12/L (ref 4.4–5.9)
SODIUM SERPL-SCNC: 140 MMOL/L (ref 133–144)
TRIGL SERPL-MCNC: 100 MG/DL
TSH SERPL DL<=0.005 MIU/L-ACNC: 3.2 MU/L (ref 0.4–4)
WBC # BLD AUTO: 5.6 10E9/L (ref 4–11)

## 2020-03-13 PROCEDURE — 99397 PER PM REEVAL EST PAT 65+ YR: CPT | Performed by: INTERNAL MEDICINE

## 2020-03-13 PROCEDURE — 36415 COLL VENOUS BLD VENIPUNCTURE: CPT | Performed by: INTERNAL MEDICINE

## 2020-03-13 PROCEDURE — 80061 LIPID PANEL: CPT | Performed by: INTERNAL MEDICINE

## 2020-03-13 PROCEDURE — 84443 ASSAY THYROID STIM HORMONE: CPT | Performed by: INTERNAL MEDICINE

## 2020-03-13 PROCEDURE — 99214 OFFICE O/P EST MOD 30 MIN: CPT | Mod: 25 | Performed by: INTERNAL MEDICINE

## 2020-03-13 PROCEDURE — 80053 COMPREHEN METABOLIC PANEL: CPT | Performed by: INTERNAL MEDICINE

## 2020-03-13 PROCEDURE — 82306 VITAMIN D 25 HYDROXY: CPT | Performed by: INTERNAL MEDICINE

## 2020-03-13 PROCEDURE — 85025 COMPLETE CBC W/AUTO DIFF WBC: CPT | Performed by: INTERNAL MEDICINE

## 2020-03-13 ASSESSMENT — PAIN SCALES - GENERAL: PAINLEVEL: EXTREME PAIN (8)

## 2020-03-13 ASSESSMENT — MIFFLIN-ST. JEOR: SCORE: 1542.67

## 2020-03-13 NOTE — PROGRESS NOTES
"  SUBJECTIVE:   Preston Cai is a 84 year old male who presents for Preventive Visit.    Are you in the first 12 months of your Medicare Part B coverage?  No    Physical Health:    In general, how would you rate your overall physical health? good    Outside of work, how many days during the week do you exercise? 2-3 days/week    Outside of work, approximately how many minutes a day do you exercise?15-30 minutes    If you drink alcohol do you typically have >3 drinks per day or >7 drinks per week? No    Do you usually eat at least 4 servings of fruit and vegetables a day, include whole grains & fiber and avoid regularly eating high fat or \"junk\" foods? Yes    Do you have any problems taking medications regularly?  No    Do you have any side effects from medications? none    Needs assistance for the following daily activities: no assistance needed    Which of the following safety concerns are present in your home?  none identified     Hearing impairment: No    In the past 6 months, have you been bothered by leaking of urine? yes    Mental Health:    In general, how would you rate your overall mental or emotional health? good  PHQ-2 Score:      Do you feel safe in your environment? YES    Have you ever done Advance Care Planning? (For example, a Health Directive, POLST, or a discussion with a medical provider or your loved ones about your wishes): Yes, advance care planning is on file.    Additional concerns to address?  No    Fall risk:  Fallen 2 or more times in the past year?: No  Any fall with injury in the past year?: No    Cognitive Screenin) Repeat 3 items (Leader, Season, Table)  YES  2) Clock draw: NORMAL  3) 3 item recall: Recalls 3 objects  Results: NORMAL clock, 3 items recalled: COGNITIVE IMPAIRMENT LESS LIKELY    Mini-CogTM Copyright NEENA Jimenez. Licensed by the author for use in [facility]; reprinted with permission (willian@.[facility]). All rights reserved.      Do you have sleep apnea, " excessive snoring or daytime drowsiness?: no        Joint or Musculoskeletal Pain  Duration of complaint: chronic, but worse in the past few days.    Description:   Location: neck  Character: Sharp and Dull ache  Intensity: moderate, severe  Progression of Symptoms: worse, doesn't let up and last night was bad.  Accompanying Signs & Symptoms: Other symptoms: radiation of pain to lateral aspect of right shoulder, described as burning sensation.  History: Previous similar pain: YES    Precipitating factors: Trauma: No                                     Overuse: YES                                     Degenerative disc disease at C5 with hypertrophic changes.  Alleviating factors: Improved by: nothing.   Therapies Tried and outcome: Motrin (3x/week), will lessen pain. Laying supine improves it. Extending neck while exercising worsens it. Taking Gabapentin 300 mg at bedtime for neuropathy.        Gerd/Heartburn  Duration of complaint: chronic   Description of symptoms:    food getting stuck: YES  nausea/vomiting: no   abdominal pain: YES  black/tarry or bloody stools: no :  worse with no particular food or drink.  current NSAID/Aspirin use: no   Therapies tried and outcome: Omeprazole (Prilosec) is not helpful. Pantoprazole (effective).    Reviewed and updated as needed this visit by clinical staff  Tobacco  Allergies  Meds  Problems  Med Hx  Surg Hx  Fam Hx  Soc Hx        Always tired due to Vitamin B12 deficiency, sleep during daytime.  Reviewed and updated as needed this visit by Provider        Social History     Tobacco Use     Smoking status: Former Smoker     Smokeless tobacco: Never Used     Tobacco comment: 1968   Substance Use Topics     Alcohol use: Yes     Alcohol/week: 0.0 standard drinks     Comment: A beer once in awhile                           Current providers sharing in care for this patient include:   Patient Care Team:  Tyrel Manning MD as PCP - General (Internal Medicine)  Nigel  Tyrel Garcia MD as Assigned PCP    The following health maintenance items are reviewed in Epic and correct as of today:  Health Maintenance   Topic Date Due     ADVANCE CARE PLANNING  03/20/2019     FALL RISK ASSESSMENT  03/11/2020     MEDICARE ANNUAL WELLNESS VISIT  03/11/2020     EYE EXAM  05/21/2020     DTAP/TDAP/TD IMMUNIZATION (2 - Td) 03/07/2022     PHQ-2  Completed     INFLUENZA VACCINE  Completed     PNEUMOCOCCAL IMMUNIZATION 65+ LOW/MEDIUM RISK  Completed     ZOSTER IMMUNIZATION  Completed     IPV IMMUNIZATION  Aged Out     MENINGITIS IMMUNIZATION  Aged Out     Labs reviewed in EPIC  BP Readings from Last 3 Encounters:   03/13/20 101/63   03/11/20 111/67   03/02/20 106/64    Wt Readings from Last 3 Encounters:   03/13/20 79.4 kg (175 lb)   03/11/20 79.7 kg (175 lb 9.6 oz)   03/02/20 78.9 kg (173 lb 14.4 oz)                  Patient Active Problem List   Diagnosis     Seborrheic dermatitis     Allergic rhinitis due to other allergen     Cervical radiculopathy at C6     Personal history of malignant neoplasm of larynx     Essential tremor     CARDIOVASCULAR SCREENING; LDL GOAL LESS THAN 160     Benign prostatic hyperplasia     Hoarse voice quality     Advanced directives, counseling/discussion     Pseudophakia     Gastroesophageal reflux disease, esophagitis presence not specified     Macular degeneration     Supraspinatus sprain, right, initial encounter     CARDIOVASCULAR SCREENING; LDL GOAL LESS THAN 100     Vitamin D deficiency     Past Surgical History:   Procedure Laterality Date     APPENDECTOMY  8 years old     Biopsy of the throat - cancerous mass - got radiation for in larynx  1982     CATARACT IOL, RT/LT       COLONOSCOPY  2/25/2004    Normal     COLONOSCOPY N/A 4/28/2015    Procedure: COLONOSCOPY;  Surgeon: Marvin Adams MD;  Location: [facility] OR     COLONOSCOPY WITH CO2 INSUFFLATION N/A 4/28/2015    Procedure: COLONOSCOPY WITH CO2 INSUFFLATION;  Surgeon: Marvin Adams MD;   Location: [facility] OR     CYSTOSCOPY  1997    for hematuria - negative     EYE SURGERY Right     Rt eye.macular repair     EYE SURGERY  2003    cataract     NASAL ENDOSCOPY,DX  4/2007    GERD     Pars plana vitrectomy and epiretinal membrane dissection, right eye  11/8/2002     Phacoemulsification with intraocular lens implantation right eye.  3/11/2003     PHACOEMULSIFICATION WITH STANDARD INTRAOCULAR LENS IMPLANT Left 9/22/2016    Procedure: PHACOEMULSIFICATION WITH STANDARD INTRAOCULAR LENS IMPLANT;  Surgeon: Nghia Lara MD;  Location: [facility] OR     Thyroglossal Duct Cyst Removal - 2ndary to radiation.  1982     VASECTOMY  1971     ZZ GASTROSCOPY,FL  9/2007    hiatal hernia and errosions       Social History     Tobacco Use     Smoking status: Former Smoker     Smokeless tobacco: Never Used     Tobacco comment: 1969   Substance Use Topics     Alcohol use: Yes     Alcohol/week: 0.0 standard drinks     Comment: A beer once in awhile     Family History   Problem Relation Age of Onset     Cerebrovascular Disease Mother         at 84 yo - possibly TIA - befoer     Macular Degeneration Mother      Gastrointestinal Disease Father      Cancer - colorectal Father      Hypertension Father      Cancer Father      Cancer Other      C.A.D. No family hx of      Diabetes No family hx of      Prostate Cancer No family hx of      Glaucoma No family hx of      Thyroid Disease No family hx of          Allergies   Allergen Reactions     Seasonal Allergies      Hay fever      Recent Labs   Lab Test 03/13/20  1016 07/22/19  1105 04/26/19  0847 03/04/19  0750 03/09/18  1006  02/27/13  1319   A1C  --   --   --   --   --   --  5.3   *  --   --  109* 90   < >  --    HDL 75  --   --  71 78   < >  --    TRIG 100  --   --  85 90   < >  --    ALT 20  --   --  21 22   < >  --    CR 0.97  --   --  1.22 1.00   < >  --    GFRESTIMATED 72 72  --  55* 72   < >  --    GFRESTBLACK 83 87  --  64 87   < >  --    POTASSIUM 4.4  --   --  4.0  "4.6   < >  --    TSH 3.20  --  3.74  --   --    < >  --     < > = values in this interval not displayed.          ROS:  CONSTITUTIONAL: NEGATIVE for fever, chills, change in weight  INTEGUMENTARY/SKIN: NEGATIVE for worrisome rashes, moles or lesions  EYES: NEGATIVE for vision changes or irritation  ENT/MOUTH: NEGATIVE for ear, mouth and throat problems  RESP: NEGATIVE for significant cough or SOB  CV: NEGATIVE for chest pain, palpitations or peripheral edema  GI: NEGATIVE for nausea, abdominal pain, heartburn, or change in bowel habits  : NEGATIVE for frequency, dysuria, or hematuria  MUSCULOSKELETAL: NEGATIVE for significant arthralgias or myalgia  NEURO: NEGATIVE for weakness, dizziness or paresthesias  ENDOCRINE: NEGATIVE for temperature intolerance, skin/hair changes  HEME: NEGATIVE for bleeding problems  PSYCHIATRIC: NEGATIVE for changes in mood or affect    OBJECTIVE:   /63 (BP Location: Left arm, Patient Position: Chair, Cuff Size: Adult Large)   Pulse 72   Temp 97.8  F (36.6  C) (Oral)   Ht 1.854 m (6' 1\")   Wt 79.4 kg (175 lb)   SpO2 97%   BMI 23.09 kg/m   Estimated body mass index is 23.09 kg/m  as calculated from the following:    Height as of this encounter: 1.854 m (6' 1\").    Weight as of this encounter: 79.4 kg (175 lb).  EXAM:   GENERAL: healthy, alert and no distress  EYES: Eyes grossly normal to inspection, PERRL and conjunctivae and sclerae normal  HENT: ear canals and TM's normal, nose and mouth without ulcers or lesions  NECK: no adenopathy, no asymmetry, masses, or scars and thyroid normal to palpation  RESP: lungs clear to auscultation - no rales, rhonchi or wheezes  CV: regular rate and rhythm, normal S1 S2, no S3 or S4, no murmur, click or rub, no peripheral edema and peripheral pulses strong  ABDOMEN: soft, nontender, no hepatosplenomegaly, no masses and bowel sounds normal  MS: no gross musculoskeletal defects noted, no edema  SKIN: no suspicious lesions or rashes  NEURO: " Normal strength and tone, mentation intact and speech normal  PSYCH: mentation appears normal, affect normal/bright    Diagnostic Test Results:  Results for orders placed or performed in visit on 03/13/20   CBC with platelets and differential     Status: Abnormal   Result Value Ref Range    WBC 5.6 4.0 - 11.0 10e9/L    RBC Count 3.85 (L) 4.4 - 5.9 10e12/L    Hemoglobin 12.6 (L) 13.3 - 17.7 g/dL    Hematocrit 38.5 (L) 40.0 - 53.0 %     78 - 100 fl    MCH 32.7 26.5 - 33.0 pg    MCHC 32.7 31.5 - 36.5 g/dL    RDW 12.6 10.0 - 15.0 %    Platelet Count 175 150 - 450 10e9/L    % Neutrophils 66.5 %    % Lymphocytes 24.2 %    % Monocytes 8.4 %    % Eosinophils 0.7 %    % Basophils 0.2 %    Absolute Neutrophil 3.7 1.6 - 8.3 10e9/L    Absolute Lymphocytes 1.4 0.8 - 5.3 10e9/L    Absolute Monocytes 0.5 0.0 - 1.3 10e9/L    Absolute Eosinophils 0.0 0.0 - 0.7 10e9/L    Absolute Basophils 0.0 0.0 - 0.2 10e9/L    Diff Method Automated Method    Comprehensive metabolic panel (BMP + Alb, Alk Phos, ALT, AST, Total. Bili, TP)     Status: None   Result Value Ref Range    Sodium 140 133 - 144 mmol/L    Potassium 4.4 3.4 - 5.3 mmol/L    Chloride 102 94 - 109 mmol/L    Carbon Dioxide 31 20 - 32 mmol/L    Anion Gap 7 3 - 14 mmol/L    Glucose 98 70 - 99 mg/dL    Urea Nitrogen 20 7 - 30 mg/dL    Creatinine 0.97 0.66 - 1.25 mg/dL    GFR Estimate 72 >60 mL/min/[1.73_m2]    GFR Estimate If Black 83 >60 mL/min/[1.73_m2]    Calcium 9.1 8.5 - 10.1 mg/dL    Bilirubin Total 0.4 0.2 - 1.3 mg/dL    Albumin 3.8 3.4 - 5.0 g/dL    Protein Total 6.9 6.8 - 8.8 g/dL    Alkaline Phosphatase 67 40 - 150 U/L    ALT 20 0 - 70 U/L    AST 20 0 - 45 U/L   Lipid panel reflex to direct LDL Fasting     Status: Abnormal   Result Value Ref Range    Cholesterol 199 <200 mg/dL    Triglycerides 100 <150 mg/dL    HDL Cholesterol 75 >39 mg/dL    LDL Cholesterol Calculated 104 (H) <100 mg/dL    Non HDL Cholesterol 124 <130 mg/dL   TSH with free T4 reflex     Status: None  "  Result Value Ref Range    TSH 3.20 0.40 - 4.00 mU/L   Vitamin D Deficiency     Status: None   Result Value Ref Range    Vitamin D Deficiency screening 30 20 - 75 ug/L       ASSESSMENT / PLAN:   1. Encounter for Medicare annual wellness exam  Other than advanced age, no other risk factors for atherosclerotic heart disease/CVA.    2. Supraspinatus sprain, right, initial encounter  Most probable etiology of right-sided neck pain.  - IR Trigger Point Injection Right; Future    3. 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  - CBC with platelets and differential    4. Benign prostatic hyperplasia with nocturia    - Comprehensive metabolic panel (BMP + Alb, Alk Phos, ALT, AST, Total. Bili, TP)    5. CARDIOVASCULAR SCREENING; LDL GOAL LESS THAN 100  Controlled without statin use.  - Lipid panel reflex to direct LDL Fasting    6. Screening for thyroid disorder  Necessary due to advanced age and risk of memory impairment.  - TSH with free T4 reflex    7. Vitamin D deficiency  Over-the-counter Cholecalciferol is sufficient.  - Vitamin D Deficiency    COUNSELING:  Special attention given to:       Regular exercise       Healthy diet/nutrition       The ASCVD Risk score (Stacey DC Jr., et al., 2013) failed to calculate for the following reasons:    The 2013 ASCVD risk score is only valid for ages 40 to 79    Estimated body mass index is 23.09 kg/m  as calculated from the following:    Height as of this encounter: 1.854 m (6' 1\").    Weight as of this encounter: 79.4 kg (175 lb).         reports that he has quit smoking. He has never used smokeless tobacco.      Appropriate preventive services were discussed with this patient, including applicable screening as appropriate for cardiovascular disease, diabetes, osteopenia/osteoporosis, and " glaucoma.  As appropriate for age/gender, discussed screening for colorectal cancer, prostate cancer, breast cancer, and cervical cancer. Checklist reviewing preventive services available has been given to the patient.    Reviewed patients plan of care and provided an AVS. The Basic Care Plan (routine screening as documented in Health Maintenance) for Preston meets the Care Plan requirement. This Care Plan has been established and reviewed with the Patient.    Counseling Resources:  ATP IV Guidelines  Pooled Cohorts Equation Calculator  Breast Cancer Risk Calculator  FRAX Risk Assessment  ICSI Preventive Guidelines  Dietary Guidelines for Americans, 2010  USDA's MyPlate  ASA Prophylaxis  Lung CA Screening    Tyrel Manning MD  [facility]

## 2020-03-13 NOTE — PATIENT INSTRUCTIONS
At [facility], we strive to deliver an exceptional experience to you, every time we see you. If you receive a survey, please complete it as we do value your feedback.  If you have MyChart, you can expect to receive results automatically within 24 hours of their completion.  Your provider will send a note interpreting your results as well.   If you do not have MyChart, you should receive your results in about a week by mail.    Your care team:                            Family Medicine Internal Medicine   MD Tyrel Mckeon MD Shantel Branch-Fleming, MD Katya Georgiev PA-C Megan Hill, APRN CNP    Alen Del Rosario, MD Pediatrics   David Sykes, PAJUAQUIN Fox, MD Nancy Tapia APRN CNP   MD Yulissa Cali MD Deborah Mielke, MD Kim Thein, APRN CNP      Clinic hours: Monday - Thursday 7 am-7 pm; Fridays 7 am-5 pm.   Urgent care: Monday - Friday 11 am-9 pm; Saturday and Sunday 9 am-5 pm.    Clinic: [phone]       [facility]: Monday - Thursday 8 am - 7 pm; Friday 8 am - 6 pm  [facility]: [phone]     Use www.[email or web address] for 24/7 diagnosis and treatment of dozens of conditions.    Patient Education   Personalized Prevention Plan  You are due for the preventive services outlined below.  Your care team is available to assist you in scheduling these services.  If you have already completed any of these items, please share that information with your care team to update in your medical record.  Health Maintenance Due   Topic Date Due     Discuss Advance Care Planning  03/20/2019     FALL RISK ASSESSMENT  03/11/2020     Annual Wellness Visit  03/11/2020

## 2020-03-16 LAB — DEPRECATED CALCIDIOL+CALCIFEROL SERPL-MC: 30 UG/L (ref 20–75)

## 2020-03-18 ENCOUNTER — TELEPHONE (OUTPATIENT)
Dept: FAMILY MEDICINE | Facility: CLINIC | Age: 85
End: 2020-03-18

## 2020-03-18 NOTE — TELEPHONE ENCOUNTER
Reason for Call:  Other referral/order    Detailed comments: Was seen on 03/13 and was referred to  for Trigger Point injection. He called to schedule that appointment and they did not receive any orders. Please can you put that in and either call or send a message to him on [place] letting him know it has been sent.  Thank you     Phone Number Patient can be reached at: Home number on file [phone] (home)    Best Time: Any     Can we leave a detailed message on this number? YES    Call taken on 3/18/2020 at 9:46 AM by Emile Gay

## 2020-03-18 NOTE — TELEPHONE ENCOUNTER
Trigger Point Injection referral from 03/13/20 faxed to [facility],   [phone]. Updated pt via [employer].    TRAVON Urias.

## 2020-03-20 ENCOUNTER — MYC MEDICAL ADVICE (OUTPATIENT)
Dept: FAMILY MEDICINE | Facility: CLINIC | Age: 85
End: 2020-03-20

## 2020-03-23 PROBLEM — E55.9 VITAMIN D DEFICIENCY: Status: ACTIVE | Noted: 2020-03-23

## 2020-03-23 PROBLEM — S46.811A: Status: ACTIVE | Noted: 2020-03-23

## 2020-03-23 PROBLEM — Z13.6 CARDIOVASCULAR SCREENING; LDL GOAL LESS THAN 100: Status: ACTIVE | Noted: 2020-03-23

## 2020-03-30 ENCOUNTER — MYC MEDICAL ADVICE (OUTPATIENT)
Dept: FAMILY MEDICINE | Facility: CLINIC | Age: 85
End: 2020-03-30

## 2020-03-30 DIAGNOSIS — M60.88 OTHER MYOSITIS, OTHER SITE: ICD-10-CM

## 2020-03-30 DIAGNOSIS — S46.811A SUPRASPINATUS SPRAIN, RIGHT, INITIAL ENCOUNTER: Primary | ICD-10-CM

## 2020-04-02 ENCOUNTER — TELEPHONE (OUTPATIENT)
Dept: FAMILY MEDICINE | Facility: CLINIC | Age: 85
End: 2020-04-02

## 2020-04-02 NOTE — TELEPHONE ENCOUNTER
Re: orders for trigger point injection    All symptoms say right but then the bilateral box was checked    Can you call to confirm    [phone] ok to leave confirmation with any rep

## 2020-04-06 NOTE — TELEPHONE ENCOUNTER
Reason for Call:  Other call back    Detailed comments: [facility] calling to follow up to clarify on order for they have not received a call back and would like a call back as soon as possible.    Phone Number Patient can be reached at: Other phone number:  [phone]    Best Time: anytime    Can we leave a detailed message on this number? YES    Call taken on 4/6/2020 at 8:48 AM by Merritt Stewart

## 2020-04-27 ENCOUNTER — TELEPHONE (OUTPATIENT)
Dept: FAMILY MEDICINE | Facility: CLINIC | Age: 85
End: 2020-04-27

## 2020-04-27 DIAGNOSIS — N40.1 BENIGN PROSTATIC HYPERPLASIA WITH NOCTURIA: ICD-10-CM

## 2020-04-27 DIAGNOSIS — R35.1 BENIGN PROSTATIC HYPERPLASIA WITH NOCTURIA: ICD-10-CM

## 2020-04-27 RX ORDER — TAMSULOSIN HYDROCHLORIDE 0.4 MG/1
CAPSULE ORAL
Qty: 180 CAPSULE | Refills: 3 | Status: SHIPPED | OUTPATIENT
Start: 2020-04-27 | End: 2021-05-05

## 2020-04-27 NOTE — TELEPHONE ENCOUNTER
I spoke with Mr. Colungajae and he prefers to wait until COVID-19 pandemic is over before he receives a trigger point procedure.    This information was relayed to Angelica.

## 2020-04-27 NOTE — TELEPHONE ENCOUNTER
Hello,  last fill date:10-  Last quantity:180    Thank You,  Anita Garcia  Pharmacy Technician  [facility]  [phone]

## 2020-04-27 NOTE — TELEPHONE ENCOUNTER
Reason for Call:  question    Detailed comments: steven is calling from [facility], and they have received orders, for a tiggerpoint  but was not sure was this essential. Please call and advise    Phone Number Patient can be reached at: Other phone number:  [phone]    Best Time: any    Can we leave a detailed message on this number? YES    Call taken on 4/27/2020 at 12:23 PM by Alejandro Akins

## 2020-05-15 DIAGNOSIS — K21.9 GASTROESOPHAGEAL REFLUX DISEASE WITHOUT ESOPHAGITIS: ICD-10-CM

## 2020-05-15 RX ORDER — PANTOPRAZOLE SODIUM 40 MG/1
40 TABLET, DELAYED RELEASE ORAL DAILY
Qty: 90 TABLET | Refills: 0 | Status: SHIPPED | OUTPATIENT
Start: 2020-05-15 | End: 2020-08-17

## 2020-06-26 ENCOUNTER — TRANSFERRED RECORDS (OUTPATIENT)
Dept: HEALTH INFORMATION MANAGEMENT | Facility: CLINIC | Age: 85
End: 2020-06-26

## 2020-07-05 ENCOUNTER — MYC MEDICAL ADVICE (OUTPATIENT)
Dept: FAMILY MEDICINE | Facility: CLINIC | Age: 85
End: 2020-07-05

## 2020-07-06 NOTE — TELEPHONE ENCOUNTER
E-visit/Virtual visit/Telephone visit instructions given to Preston  to further discuss buttocks pain.     Gloria Vargas RN, BSN, PHN

## 2020-07-10 ENCOUNTER — VIRTUAL VISIT (OUTPATIENT)
Dept: FAMILY MEDICINE | Facility: CLINIC | Age: 85
End: 2020-07-10
Payer: COMMERCIAL

## 2020-07-10 DIAGNOSIS — M54.42 ACUTE BILATERAL LOW BACK PAIN WITH BILATERAL SCIATICA: Primary | ICD-10-CM

## 2020-07-10 DIAGNOSIS — M54.41 ACUTE BILATERAL LOW BACK PAIN WITH BILATERAL SCIATICA: Primary | ICD-10-CM

## 2020-07-10 DIAGNOSIS — M47.816 LUMBAR FACET ARTHROPATHY: ICD-10-CM

## 2020-07-10 PROCEDURE — 99213 OFFICE O/P EST LOW 20 MIN: CPT | Mod: 95 | Performed by: INTERNAL MEDICINE

## 2020-07-10 ASSESSMENT — PAIN SCALES - GENERAL: PAINLEVEL: NO PAIN (0)

## 2020-07-10 NOTE — PATIENT INSTRUCTIONS
At [facility], we strive to deliver an exceptional experience to you, every time we see you. If you receive a survey, please complete it as we do value your feedback.  If you have MyChart, you can expect to receive results automatically within 24 hours of their completion.  Your provider will send a note interpreting your results as well.   If you do not have MyChart, you should receive your results in about a week by mail.    Your care team:                            Family Medicine Internal Medicine   MD Tyrel Mckeon MD Shantel Branch-Fleming, MD Katya Georgiev PA-C Megan Hill, APRARLETTE Del Rosario, MD Pediatrics   David Sykes, PAJUAQUIN Fox, MD Nancy Tapia APRN CNP   MD Yulissa Cali MD Deborah Mielke, MD Kim Thein, APRN [facility]      Clinic hours: Monday - Thursday 7 am-7 pm; Fridays 7 am-5 pm.   Urgent care: Monday - Friday 11 am-9 pm; Saturday and Sunday 9 am-5 pm.    Clinic: [phone]       [facility]: Monday - Thursday 8 am - 7 pm; Friday 8 am - 6 pm  [facility]: [phone]     Use www.[email or web address] for 24/7 diagnosis and treatment of dozens of conditions.

## 2020-07-10 NOTE — PROGRESS NOTES
"Preston Cai is a 84 year old male who is being evaluated via a billable telephone visit.      The patient has been notified of following:     \"This telephone visit will be conducted via a call between you and your physician/provider. We have found that certain health care needs can be provided without the need for a physical exam.  This service lets us provide the care you need with a short phone conversation.  If a prescription is necessary we can send it directly to your pharmacy.  If lab work is needed we can place an order for that and you can then stop by our lab to have the test done at a later time.    Telephone visits are billed at different rates depending on your insurance coverage. During this emergency period, for some insurers they may be billed the same as an in-person visit.  Please reach out to your insurance provider with any questions.    If during the course of the call the physician/provider feels a telephone visit is not appropriate, you will not be charged for this service.\"    Patient has given verbal consent for Telephone visit?  Yes    What phone number would you like to be contacted at? [phone]    How would you like to obtain your AVS? Curtis    Subjective     Preston Cai is a 84 year old male who presents via phone visit today for the following health issues:    HPI  Joint Pain    Onset: 3 weeks    Description:   Location: back of thighs, worse in the morning, sitting up in a rocker helps. Sciatica few years ago that's relieved by exercises. It's higher or more persistent.  Character: Sharp and Dull ache    Intensity: severe    Progression of Symptoms: worse    Accompanying Signs & Symptoms:  Other symptoms: radiation of pain to lower legs    History:   Previous similar pain:       Precipitating factors:   Trauma or overuse: no. Moved to new housing ([place]) .     Alleviating factors:  Improved by: nothing  Therapies Tried and outcome: Ibuprofen.      Patient Active " Problem List   Diagnosis     Seborrheic dermatitis     Allergic rhinitis due to other allergen     Cervical radiculopathy at C6     Personal history of malignant neoplasm of larynx     Essential tremor     CARDIOVASCULAR SCREENING; LDL GOAL LESS THAN 160     Benign prostatic hyperplasia     Hoarse voice quality     Advanced directives, counseling/discussion     Pseudophakia     Gastroesophageal reflux disease, esophagitis presence not specified     Macular degeneration     Supraspinatus sprain, right, initial encounter     CARDIOVASCULAR SCREENING; LDL GOAL LESS THAN 100     Vitamin D deficiency     Past Surgical History:   Procedure Laterality Date     APPENDECTOMY  8 years old     Biopsy of the throat - cancerous mass - got radiation for in larynx  1982     CATARACT IOL, RT/LT       COLONOSCOPY  2/25/2004    Normal     COLONOSCOPY N/A 4/28/2015    Procedure: COLONOSCOPY;  Surgeon: Marvin Adams MD;  Location: [facility]     COLONOSCOPY WITH CO2 INSUFFLATION N/A 4/28/2015    Procedure: COLONOSCOPY WITH CO2 INSUFFLATION;  Surgeon: Marvin Adams MD;  Location: [facility]     CYSTOSCOPY  1997    for hematuria - negative     EYE SURGERY Right     Rt eye.macular repair     EYE SURGERY  2003    cataract     NASAL ENDOSCOPY,DX  4/2007    GERD     Pars plana vitrectomy and epiretinal membrane dissection, right eye  11/8/2002     Phacoemulsification with intraocular lens implantation right eye.  3/11/2003     PHACOEMULSIFICATION WITH STANDARD INTRAOCULAR LENS IMPLANT Left 9/22/2016    Procedure: PHACOEMULSIFICATION WITH STANDARD INTRAOCULAR LENS IMPLANT;  Surgeon: Nghia Lara MD;  Location: [facility] OR     Thyroglossal Duct Cyst Removal - 2ndary to radiation.  1982     VASECTOMY  1971     ZZ GASTROSCOPY,FL  9/2007    hiatal hernia and errosions       Social History     Tobacco Use     Smoking status: Former Smoker     Smokeless tobacco: Never Used     Tobacco comment: 1969   Substance Use Topics      Alcohol use: Yes     Alcohol/week: 0.0 standard drinks     Comment: A beer once in awhile     Family History   Problem Relation Age of Onset     Cerebrovascular Disease Mother         at 84 yo - possibly TIA - befoer     Macular Degeneration Mother      Gastrointestinal Disease Father      Cancer - colorectal Father      Hypertension Father      Cancer Father      Cancer Other      C.A.D. No family hx of      Diabetes No family hx of      Prostate Cancer No family hx of      Glaucoma No family hx of      Thyroid Disease No family hx of          Current Outpatient Medications   Medication Sig Dispense Refill     gabapentin (NEURONTIN) 100 MG capsule 3 capsules each evening 270 capsule 2     pantoprazole (PROTONIX) 40 MG EC tablet Take 1 tablet (40 mg) by mouth daily 90 tablet 0     primidone (MYSOLINE) 250 MG tablet Take 1 tablet (250 mg) by mouth At Bedtime 90 tablet 3     tamsulosin (FLOMAX) 0.4 MG capsule TAKE TWO CAPSULES BY MOUTH ONCE DAILY 180 capsule 3     VITAMIN D, CHOLECALCIFEROL, PO Take 1,000 Units by mouth daily       Allergies   Allergen Reactions     Seasonal Allergies      Hay fever      Recent Labs   Lab Test 03/13/20  1016 07/22/19  1105 04/26/19  0847 03/04/19  0750 03/09/18  1006  02/27/13  1319   A1C  --   --   --   --   --   --  5.3   *  --   --  109* 90   < >  --    HDL 75  --   --  71 78   < >  --    TRIG 100  --   --  85 90   < >  --    ALT 20  --   --  21 22   < >  --    CR 0.97  --   --  1.22 1.00   < >  --    GFRESTIMATED 72 72  --  55* 72   < >  --    GFRESTBLACK 83 87  --  64 87   < >  --    POTASSIUM 4.4  --   --  4.0 4.6   < >  --    TSH 3.20  --  3.74  --   --    < >  --     < > = values in this interval not displayed.      BP Readings from Last 3 Encounters:   03/13/20 101/63   03/11/20 111/67   03/02/20 106/64    Wt Readings from Last 3 Encounters:   03/13/20 79.4 kg (175 lb)   03/11/20 79.7 kg (175 lb 9.6 oz)   03/02/20 78.9 kg (173 lb 14.4 oz)                    Reviewed  and updated as needed this visit by Provider         Review of Systems   CONSTITUTIONAL: NEGATIVE for fever, chills, change in weight  INTEGUMENTARY/SKIN: NEGATIVE for worrisome rashes, moles or lesions  EYES: NEGATIVE for vision changes or irritation  ENT/MOUTH: NEGATIVE for ear, mouth and throat problems  RESP: NEGATIVE for significant cough or SOB  CV: NEGATIVE for chest pain, palpitations or peripheral edema  GI: NEGATIVE for nausea, abdominal pain, heartburn, or change in bowel habits  : NEGATIVE for frequency, dysuria, or hematuria  MUSCULOSKELETAL:As above.  NEURO: NEGATIVE for weakness, dizziness or paresthesias  ENDOCRINE: NEGATIVE for temperature intolerance, skin/hair changes  HEME: NEGATIVE for bleeding problems  PSYCHIATRIC: NEGATIVE for changes in mood or affect       Objective   Reported vitals:  There were no vitals taken for this visit.     Remainder of exam unable to be completed due to telephone visits    Diagnostic Test Results:  Labs reviewed in Epic        Assessment/Plan:  1. Acute bilateral low back pain with bilateral sciatica    - XR Lumbar Spine 2/3 Views; Future  - [facility]  Referral; Future    2. Lumbar facet arthropathy    - [facility]  Referral; Future    Follow-up in 4 weeks.    Phone call duration:  12 minutes  Start: 8:58 AM  End: 9:10 AM    Tyrel Manning MD

## 2020-07-13 ENCOUNTER — ANCILLARY PROCEDURE (OUTPATIENT)
Dept: GENERAL RADIOLOGY | Facility: CLINIC | Age: 85
End: 2020-07-13
Attending: INTERNAL MEDICINE
Payer: COMMERCIAL

## 2020-07-13 DIAGNOSIS — M54.42 ACUTE BILATERAL LOW BACK PAIN WITH BILATERAL SCIATICA: ICD-10-CM

## 2020-07-13 DIAGNOSIS — M54.41 ACUTE BILATERAL LOW BACK PAIN WITH BILATERAL SCIATICA: ICD-10-CM

## 2020-07-13 PROCEDURE — 72100 X-RAY EXAM L-S SPINE 2/3 VWS: CPT | Performed by: RADIOLOGY

## 2020-07-17 ENCOUNTER — THERAPY VISIT (OUTPATIENT)
Dept: PHYSICAL THERAPY | Facility: CLINIC | Age: 85
End: 2020-07-17
Attending: INTERNAL MEDICINE
Payer: COMMERCIAL

## 2020-07-17 DIAGNOSIS — M54.41 ACUTE BILATERAL LOW BACK PAIN WITH BILATERAL SCIATICA: ICD-10-CM

## 2020-07-17 DIAGNOSIS — G89.29 CHRONIC MIDLINE LOW BACK PAIN WITHOUT SCIATICA: ICD-10-CM

## 2020-07-17 DIAGNOSIS — M54.50 CHRONIC MIDLINE LOW BACK PAIN WITHOUT SCIATICA: ICD-10-CM

## 2020-07-17 DIAGNOSIS — M54.42 ACUTE BILATERAL LOW BACK PAIN WITH BILATERAL SCIATICA: ICD-10-CM

## 2020-07-17 PROCEDURE — 97110 THERAPEUTIC EXERCISES: CPT | Mod: GP | Performed by: PHYSICAL THERAPIST

## 2020-07-17 PROCEDURE — 97161 PT EVAL LOW COMPLEX 20 MIN: CPT | Mod: GP | Performed by: PHYSICAL THERAPIST

## 2020-07-17 NOTE — PROGRESS NOTES
[facility] Initial Evaluation  Subjective:  The history is provided by the patient. No  was used.   Patient Health History  Preston Cai being seen for Chronic Back Pain.     Problem began: 7/13/2020.   Problem occurred: unknown   Pain is reported as 3/10 on pain scale.  General health as reported by patient is good.  Pertinent medical history includes: cancer.   Red flags:  Severe dizziness.  Medical allergies: none.   Surgeries include:  Cancer surgery. Other surgery history details: throat.    Current medications:  Anti-seizure medication (Primadone (for his tremors)).    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                     Therapist Generated HPI Evaluation  Problem details: Patient presents to PT with c/o back and upper leg pain.  Pt stated the pain started 2 months ago for no reason.    Recently moved but did not do a lot of lifting  Patient also stated he is not as active as he was due to COVID restrictions and fear of getting it (sitting more at home)  .         Type of problem:  Lumbar.    This is a chronic condition.  Condition occurred with:  Insidious onset.  Where condition occurred: for unknown reasons.  Patient reports pain:  Lumbar spine right, lumbar spine left and central lumbar spine.  Pain is described as aching and other (tightness) and is intermittent.  Pain radiates to:  Gluteals right, gluteals left, thigh left and thigh right (R side > L side). Pain is worse in the A.M..  Since onset symptoms are unchanged.  Associated symptoms:  Loss of motion/stiffness and fatigue. Symptoms are exacerbated by bending, standing and walking  and relieved by rest and activity/movement.      Barriers include:  None as reported by patient.      Oswestry Score: 8.89 %                 Objective:  Standing Alignment:    Cervical/Thoracic:  Forward head  Shoulder/UE:  Rounded shoulders  Lumbar:  Lordosis decr            Gait:    Gait Type:  Antalgic    Assistive Devices:  None  Deviations:  Lumbar:  Trunk flexionHip:  Normal               Lumbar/SI Evaluation  ROM:    AROM Lumbar:   Flexion:          Touches toes  Ext:                    Mod loss   Side Bend:        Left:  Mid thigh    Right:  Mid thigh  Rotation:           Left:     Right:   Side Glide:        Left:     Right:           Lumbar Myotomes:  normal                  Neural Tension/Mobility:    Left side:  SLR positive.   Right side:   SLR positive.  Lumbar Palpation:    Tenderness present at Left:    Quadratus Lumborum and Erector Spinae  Tenderness present at Right: Quadratus Lumborum and Erector Spinae      Spinal Segmental Conclusions:     Level: Hypo noted at L1, L2, L3, L4 and L5                                        Hip Evaluation  Hip PROM:  Hip PROM:  Left Hip:    Normal  Right Hip:  Normal                                           General     ROS    Assessment/Plan:    Patient is a 84 year old male with lumbar complaints.    Patient has the following significant findings with corresponding treatment plan.                Diagnosis 1:  LBP with leg pain  Pain -  hot/cold therapy, US and manual therapy  Decreased ROM/flexibility - manual therapy and therapeutic exercise  Decreased joint mobility - manual therapy and therapeutic exercise  Impaired muscle performance - neuro re-education  Decreased function - therapeutic activities  Impaired posture - neuro re-education    Therapy Evaluation Codes:   1) History comprised of:   Personal factors that impact the plan of care:      Past/current experiences.    Comorbidity factors that impact the plan of care are:      Cancer.     Medications impacting care: anti seizure medication.  2) Examination of Body Systems comprised of:   Body structures and functions that impact the plan of care:      Lumbar spine.   Activity limitations that impact the plan of care are:      Bending, Lifting, Sleeping and transfers.  3) Clinical presentation characteristics  are:   Stable/Uncomplicated.  4) Decision-Making    Low complexity using standardized patient assessment instrument and/or measureable assessment of functional outcome.  Cumulative Therapy Evaluation is: Low complexity.    Previous and current functional limitations:  (See Goal Flow Sheet for this information)    Short term and Long term goals: (See Goal Flow Sheet for this information)     Communication ability:  Patient appears to be able to clearly communicate and understand verbal and written communication and follow directions correctly.  Treatment Explanation - The following has been discussed with the patient:   RX ordered/plan of care  Anticipated outcomes  Possible risks and side effects  This patient would benefit from PT intervention to resume normal activities.   Rehab potential is good.    Frequency:  1 X week, once daily  Duration:  for 8 weeks  Discharge Plan:  Achieve all LTG.  Independent in home treatment program.  Reach maximal therapeutic benefit.    Please refer to the daily flowsheet for treatment today, total treatment time and time spent performing 1:1 timed codes.

## 2020-08-17 DIAGNOSIS — K21.9 GASTROESOPHAGEAL REFLUX DISEASE WITHOUT ESOPHAGITIS: ICD-10-CM

## 2020-08-17 NOTE — TELEPHONE ENCOUNTER
Refills pended for Pantoprazole after receiving refill request from [facility] pharmacy. Patient due for annual visit to recheck symptoms. Phone call to patient to get him scheduled - appt set for 8/28 at 1:30. Refills pended and routed to provider to review and sign.      Yolande Rivas LPN

## 2020-08-18 RX ORDER — PANTOPRAZOLE SODIUM 40 MG/1
40 TABLET, DELAYED RELEASE ORAL DAILY
Qty: 90 TABLET | Refills: 3 | Status: SHIPPED | OUTPATIENT
Start: 2020-08-18 | End: 2021-09-22

## 2020-08-20 ENCOUNTER — OFFICE VISIT (OUTPATIENT)
Dept: ORTHOPEDICS | Facility: CLINIC | Age: 85
End: 2020-08-20
Payer: COMMERCIAL

## 2020-08-20 VITALS — HEART RATE: 80 BPM | DIASTOLIC BLOOD PRESSURE: 58 MMHG | SYSTOLIC BLOOD PRESSURE: 101 MMHG

## 2020-08-20 DIAGNOSIS — M54.16 LUMBAR RADICULAR PAIN: ICD-10-CM

## 2020-08-20 DIAGNOSIS — M50.30 DDD (DEGENERATIVE DISC DISEASE), CERVICAL: ICD-10-CM

## 2020-08-20 DIAGNOSIS — M51.369 DDD (DEGENERATIVE DISC DISEASE), LUMBAR: Primary | ICD-10-CM

## 2020-08-20 PROCEDURE — 99214 OFFICE O/P EST MOD 30 MIN: CPT | Performed by: PREVENTIVE MEDICINE

## 2020-08-20 RX ORDER — DICLOFENAC SODIUM 75 MG/1
75 TABLET, DELAYED RELEASE ORAL 2 TIMES DAILY PRN
Qty: 40 TABLET | Refills: 1 | Status: SHIPPED | OUTPATIENT
Start: 2020-08-20 | End: 2021-05-05

## 2020-08-20 ASSESSMENT — PAIN SCALES - GENERAL: PAINLEVEL: MILD PAIN (2)

## 2020-08-20 NOTE — PATIENT INSTRUCTIONS
Thanks for coming today.  Ortho[facility]Sports Medicine Clinic  [address] [place] [address]    To schedule future appointments in Ortho Clinic, you may call [phone].    To schedule ordered imaging by your provider:   Call Central Imaging [phone]    To schedule an injection ordered by your provider:  Call [facility] Injection scheduling line: [phone]  [employer] available online at:  [email or web address]    Please call if any further questions or concerns ([phone]).  Clinic hours 8 am to 5 pm.    Return to clinic (call) if symptoms worsen or fail to improve.

## 2020-08-20 NOTE — LETTER
8/20/2020         RE: Preston Cai  [address]  [place] [address]        Dear Colleague,    Thank you for referring your patient, Preston Cai, to the [facility]. Please see a copy of my visit note below.    HISTORY OF PRESENT ILLNESS  Mr. Cai is a pleasant 84 year old year old male who presents to clinic today with chronic neck and low back pain  Reports 'neuropathy' in feet for the past few years  Has had more pain in the mornings in his low back and hips  And some cramping in legs  He has had a cervical MRI in the past showing ddd, but got better from his neck pain with PT  Now he is having more neck pain that travels down his right arm and causes tingling    Preston explains that he wants to improve his symptoms so he can do more physical activity  Location: neck and low back  Quality:  achy pain    Severity: 7/10 at worst    Duration: years  Timing: occurs intermittently  Context: occurs while exercising and lifting, lying flat, walking and standing  Modifying factors:  resting and non-use makes it better, movement and use makes it worse  Associated signs & symptoms: right arm tingling, tinglingi n both feet, pain in hips  MEDICAL HISTORY  Patient Active Problem List   Diagnosis     Seborrheic dermatitis     Allergic rhinitis due to other allergen     Cervical radiculopathy at C6     Personal history of malignant neoplasm of larynx     Essential tremor     CARDIOVASCULAR SCREENING; LDL GOAL LESS THAN 160     Benign prostatic hyperplasia     Hoarse voice quality     Advanced directives, counseling/discussion     Pseudophakia     Gastroesophageal reflux disease, esophagitis presence not specified     Macular degeneration     Supraspinatus sprain, right, initial encounter     CARDIOVASCULAR SCREENING; LDL GOAL LESS THAN 100     Vitamin D deficiency     Chronic midline low back pain without sciatica       Current Outpatient Medications   Medication Sig Dispense  Refill     gabapentin (NEURONTIN) 100 MG capsule 3 capsules each evening 270 capsule 2     pantoprazole (PROTONIX) 40 MG EC tablet Take 1 tablet (40 mg) by mouth daily 90 tablet 3     primidone (MYSOLINE) 250 MG tablet Take 1 tablet (250 mg) by mouth At Bedtime 90 tablet 3     tamsulosin (FLOMAX) 0.4 MG capsule TAKE TWO CAPSULES BY MOUTH ONCE DAILY 180 capsule 3     VITAMIN D, CHOLECALCIFEROL, PO Take 1,000 Units by mouth daily         Allergies   Allergen Reactions     Seasonal Allergies      Hay fever        Family History   Problem Relation Age of Onset     Cerebrovascular Disease Mother         at 86 yo - possibly TIA - befoer     Macular Degeneration Mother      Gastrointestinal Disease Father      Cancer - colorectal Father      Hypertension Father      Cancer Father      Cancer Other      C.A.D. No family hx of      Diabetes No family hx of      Prostate Cancer No family hx of      Glaucoma No family hx of      Thyroid Disease No family hx of      Social History     Socioeconomic History     Marital status:      Spouse name: Edyta Cai     Number of children: Not on file     Years of education: Not on file     Highest education level: Not on file   Occupational History     Employer: RETIRED   Social Needs     Financial resource strain: Not on file     Food insecurity     Worry: Not on file     Inability: Not on file     Transportation needs     Medical: Not on file     Non-medical: Not on file   Tobacco Use     Smoking status: Former Smoker     Smokeless tobacco: Never Used     Tobacco comment: 1969   Substance and Sexual Activity     Alcohol use: Yes     Alcohol/week: 0.0 standard drinks     Comment: A beer once in awhile     Drug use: No     Sexual activity: Yes     Partners: Female     Birth control/protection: None   Lifestyle     Physical activity     Days per week: Not on file     Minutes per session: Not on file     Stress: Not on file   Relationships     Social connections     Talks on phone:  Not on file     Gets together: Not on file     Attends Caodaism service: Not on file     Active member of club or organization: Not on file     Attends meetings of clubs or organizations: Not on file     Relationship status: Not on file     Intimate partner violence     Fear of current or ex partner: Not on file     Emotionally abused: Not on file     Physically abused: Not on file     Forced sexual activity: Not on file   Other Topics Concern     Parent/sibling w/ CABG, MI or angioplasty before 65F 55M? No   Social History Narrative    seen by Jose M Diaz. lives in [facility] [place] [facility].  to Edyta Cai.    2 sons and 2 daughters    Cancer father    Stroke mother        Daughter in [place]    Son in [place]    Daughter in [place]    Son in [place]           Additional medical/Social/Surgical histories reviewed in [facility] and updated as appropriate.     REVIEW OF SYSTEMS (8/20/2020)  10 point ROS of systems including Constitutional, Eyes, Respiratory, Cardiovascular, Gastroenterology, Genitourinary, Integumentary, Musculoskeletal, Psychiatric, Allergic/Immunologic were all negative except for pertinent positives noted in my HPI.     PHYSICAL EXAM  Vitals:    08/20/20 0918   BP: 101/58   Pulse: 80     Vital Signs: /58   Pulse 80  Patient declined being weighed. There is no height or weight on file to calculate BMI.    General  - normal appearance, in no obvious distress  HEENT  - conjunctivae not injected, moist mucous membranes, normocephalic/atraumatic head, ears normal appearance, no lesions, mouth normal appearance, no scars, normal dentition and teeth present  CV  - normal peripheral perfusion  Pulm  - normal respiratory pattern, non-labored  Musculoskeletal - lumbar spine  - stance: normal gait without limp, no obvious leg length discrepancy, normal heel and toe walk  - inspection: normal bone and joint alignment, no obvious scoliosis  - palpation: no paravertebral or bony tenderness  - ROM:  flexion exacerbates pain, normal extension, sidebending, rotation  - strength: lower extremities 5/5 in all planes  - special tests:  (+) straight leg raise  (+) slump test  Neuro  - patellar and Achilles DTRs 2+ bilaterally, some lower extremity sensory deficit throughout L5 distribution, grossly normal coordination, normal muscle tone  Skin  - no ecchymosis, erythema, warmth, or induration, no obvious rash  Psych  - interactive, appropriate, normal mood and affect  Neck: has some pain with flexion and extension, with positive spurlings on right  ASSESSMENT & PLAN  83 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 HEP and PT   Start voltaren  Cont. Gabapentin nightly      Mau Sage MD, CAQSM      Again, thank you for allowing me to participate in the care of your patient.        Sincerely,        Mau Sage MD

## 2020-08-28 ENCOUNTER — OFFICE VISIT (OUTPATIENT)
Dept: OTOLARYNGOLOGY | Facility: CLINIC | Age: 85
End: 2020-08-28
Payer: COMMERCIAL

## 2020-08-28 VITALS — HEART RATE: 69 BPM | DIASTOLIC BLOOD PRESSURE: 67 MMHG | SYSTOLIC BLOOD PRESSURE: 112 MMHG | OXYGEN SATURATION: 95 %

## 2020-08-28 DIAGNOSIS — K21.9 GASTROESOPHAGEAL REFLUX DISEASE WITHOUT ESOPHAGITIS: Primary | ICD-10-CM

## 2020-08-28 DIAGNOSIS — J38.7 PRESBYLARYNX: ICD-10-CM

## 2020-08-28 PROCEDURE — 99213 OFFICE O/P EST LOW 20 MIN: CPT | Performed by: OTOLARYNGOLOGY

## 2020-08-28 NOTE — LETTER
8/28/2020         RE: Preston Cai  [address]  [place] [address]        Dear Colleague,    Thank you for referring your patient, Preston Cai, to the [facility]. Please see a copy of my visit note below.    HPI    This is an 83 year old patient who is here for the f/u. He continues to have soft voice. Denies any n/v, difficulty swallowing, vision issues, weakness or numbness. His BP is always low. He did have similar episode of vertigo more than a decade ago. He has a hx of  service, laryngeal ca,  And radiotherapy. Describes hoarseness for the past several years and he tried PPIs with some benefit. He has no hx of difficulty swallowing or weight changes.     Review of Systems   Constitutional: Negative.    HENT: Positive for hearing loss and tinnitus. Negative for congestion, ear discharge, ear pain, nosebleeds, sinus pain and sore throat.    Eyes: Negative for blurred vision and double vision.   Respiratory: Negative for cough and hemoptysis.    Gastrointestinal: Negative for heartburn, nausea and vomiting.   Skin: Negative.    Neurological: Positive for dizziness. Negative for tingling, tremors and headaches.   Endo/Heme/Allergies: Negative for environmental allergies. Does not bruise/bleed easily.         Physical Exam   Constitutional: He appears well-developed and well-nourished.   HENT:   Head: Normocephalic and atraumatic.   Right Ear: Tympanic membrane, external ear and ear canal normal. No drainage, swelling or tenderness. No middle ear effusion. Decreased hearing is noted.   Left Ear: Tympanic membrane, external ear and ear canal normal. No drainage, swelling or tenderness.  No middle ear effusion. Decreased hearing is noted.   Nose: Mucosal edema, rhinorrhea and septal deviation present.   Mouth/Throat: Uvula is midline, oropharynx is clear and moist and mucous membranes are normal.   Eyes: Pupils are equal, round, and reactive to light. EOM are  normal.   Neck: Normal range of motion. Neck supple.       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 I will continue with his reflux medication, Pantoprazole 40 mg once a day. I will see him in the f/u in 3 months. His questions were answered.      Again, thank you for allowing me to participate in the care of your patient.        Sincerely,        Tosha Ron MD

## 2020-08-28 NOTE — PROGRESS NOTES
HPI    This is an 83 year old patient who is here for the f/u. He continues to have soft voice. Denies any n/v, difficulty swallowing, vision issues, weakness or numbness. His BP is always low. He did have similar episode of vertigo more than a decade ago. He has a hx of  service, laryngeal ca,  And radiotherapy. Describes hoarseness for the past several years and he tried PPIs with some benefit. He has no hx of difficulty swallowing or weight changes.     Review of Systems   Constitutional: Negative.    HENT: Positive for hearing loss and tinnitus. Negative for congestion, ear discharge, ear pain, nosebleeds, sinus pain and sore throat.    Eyes: Negative for blurred vision and double vision.   Respiratory: Negative for cough and hemoptysis.    Gastrointestinal: Negative for heartburn, nausea and vomiting.   Skin: Negative.    Neurological: Positive for dizziness. Negative for tingling, tremors and headaches.   Endo/Heme/Allergies: Negative for environmental allergies. Does not bruise/bleed easily.         Physical Exam   Constitutional: He appears well-developed and well-nourished.   HENT:   Head: Normocephalic and atraumatic.   Right Ear: Tympanic membrane, external ear and ear canal normal. No drainage, swelling or tenderness. No middle ear effusion. Decreased hearing is noted.   Left Ear: Tympanic membrane, external ear and ear canal normal. No drainage, swelling or tenderness.  No middle ear effusion. Decreased hearing is noted.   Nose: Mucosal edema, rhinorrhea and septal deviation present.   Mouth/Throat: Uvula is midline, oropharynx is clear and moist and mucous membranes are normal.   Eyes: Pupils are equal, round, and reactive to light. EOM are normal.   Neck: Normal range of motion. Neck supple.       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 I will continue with his reflux medication, Pantoprazole 40 mg once a day. I will  see him in the f/u in 3 months. His questions were answered.

## 2020-08-28 NOTE — NURSING NOTE
Preston Cai's goals for this visit include:   Chief Complaint   Patient presents with     Follow Up     Annual follow up for GERD     He requests these members of his care team be copied on today's visit information: Yes    PCP: Tyrel Manning    Referring Provider:  No referring provider defined for this encounter.    /67 (BP Location: Left arm, Patient Position: Sitting, Cuff Size: Adult Regular)   Pulse 69   SpO2 95%     Do you need any medication refills at today's visit? No    Yolande Rivas LPN

## 2020-09-01 ENCOUNTER — OFFICE VISIT (OUTPATIENT)
Dept: OPTOMETRY | Facility: CLINIC | Age: 85
End: 2020-09-01
Payer: COMMERCIAL

## 2020-09-01 DIAGNOSIS — H35.3132 INTERMEDIATE STAGE NONEXUDATIVE AGE-RELATED MACULAR DEGENERATION OF BOTH EYES: ICD-10-CM

## 2020-09-01 DIAGNOSIS — Z96.1 PSEUDOPHAKIA OF BOTH EYES: ICD-10-CM

## 2020-09-01 DIAGNOSIS — H52.03 HYPERMETROPIA OF BOTH EYES: ICD-10-CM

## 2020-09-01 DIAGNOSIS — H52.4 PRESBYOPIA: ICD-10-CM

## 2020-09-01 DIAGNOSIS — H52.223 REGULAR ASTIGMATISM OF BOTH EYES: ICD-10-CM

## 2020-09-01 DIAGNOSIS — H40.051 BORDERLINE GLAUCOMA WITH OCULAR HYPERTENSION, RIGHT: ICD-10-CM

## 2020-09-01 DIAGNOSIS — H10.13 ALLERGIC CONJUNCTIVITIS, BILATERAL: ICD-10-CM

## 2020-09-01 DIAGNOSIS — H01.02A SQUAMOUS BLEPHARITIS OF UPPER AND LOWER EYELIDS OF BOTH EYES: ICD-10-CM

## 2020-09-01 DIAGNOSIS — Z01.00 EXAMINATION OF EYES AND VISION: Primary | ICD-10-CM

## 2020-09-01 DIAGNOSIS — H01.02B SQUAMOUS BLEPHARITIS OF UPPER AND LOWER EYELIDS OF BOTH EYES: ICD-10-CM

## 2020-09-01 PROCEDURE — 92015 DETERMINE REFRACTIVE STATE: CPT | Performed by: OPTOMETRIST

## 2020-09-01 PROCEDURE — 92014 COMPRE OPH EXAM EST PT 1/>: CPT | Performed by: OPTOMETRIST

## 2020-09-01 ASSESSMENT — CONF VISUAL FIELD
OD_NORMAL: 1
OS_NORMAL: 1

## 2020-09-01 ASSESSMENT — TONOMETRY
OS_IOP_MMHG: 15
IOP_METHOD: TONOPEN
IOP_METHOD: TONOPEN
OS_IOP_MMHG: 15
OD_IOP_MMHG: 26
IOP_METHOD: TONOPEN

## 2020-09-01 ASSESSMENT — REFRACTION_MANIFEST
OD_CYLINDER: +1.00
OS_AXIS: 180
OS_SPHERE: -0.50
OS_CYLINDER: +1.00
OD_AXIS: 160
OD_ADD: +2.75
OS_ADD: +2.75
OD_SPHERE: -0.75

## 2020-09-01 ASSESSMENT — REFRACTION_WEARINGRX
OS_CYLINDER: +1.00
OS_AXIS: 180
OS_SPHERE: -0.25
SPECS_TYPE: TRIFOCAL
OD_SPHERE: -0.75
OD_AXIS: 160
OD_ADD: +2.75
OS_ADD: +2.75
OD_CYLINDER: +1.00

## 2020-09-01 ASSESSMENT — VISUAL ACUITY
METHOD: SNELLEN - LINEAR
OD_SC: 20/40
OD_CC+: -1
CORRECTION_TYPE: GLASSES
OS_CC: 20/20 -1
OD_CC: 20/30 -2
OS_CC: 20/20
OS_SC: 20/20
OD_SC+: -1
OD_CC: 20/40

## 2020-09-01 ASSESSMENT — EXTERNAL EXAM - RIGHT EYE: OD_EXAM: NORMAL

## 2020-09-01 ASSESSMENT — CUP TO DISC RATIO
OS_RATIO: 0.25
OD_RATIO: 0.25

## 2020-09-01 ASSESSMENT — EXTERNAL EXAM - LEFT EYE: OS_EXAM: NORMAL

## 2020-09-01 NOTE — PATIENT INSTRUCTIONS
Eyeglass prescription given.  No change in eyeglass prescription.    Referral to Dr. Lara at [facility] for baseline glaucoma testing and macula OCT.    OTC Pataday to be used once or twice daily for itchy eyes.  Use as needed.    Heat to the eyes daily for 10-15 minutes nightly with warm washcloth or reusable gel masks from the pharmacy or  117go heat masks can be purchased at [place].    Ocusoft Hypochlor- spray solution onto cotton pad.  Close eyes and gently apply to eyelids and eyelashes using side to side motion.  Use morning and evening.  Or Cliradex wipes.    Blink tears- 1 drop both eyes 2-4 x daily.    Continue AREDS 2 vitamins.  Macula will be checked with glaucoma testing.    Return in 1 year for a complete eye exam or sooner if needed.    Carlos James, OD    The affects of the dilating drops last for 4- 6 hours.  You will be more sensitive to light and vision will be blurry up close.  Do not drive if you do not feel comfortable.  Mydriatic sunglasses were given if needed.    There is a combination of three treatments which can greatly improve symptoms of dry eyes.    1.  Artificial tears  2. Heat (eyes closed)  3. Eyelid and eyelash cleansing (eyes closed)     Use one drop of artificial tears both eyes 4 x daily.  Once in the morning, lunch, dinner and bedtime. Continue to use the drops regardless if your eyes are comfortable or not.  Artificial tears work best as a preventative and not as well after your eyes are starting to bother you.  It may take 4- 6 weeks of using the drops before you notice improvement.  If after that time you are still having problems schedule an appointment for an evaluation and discussion of different treatments such as Restasis or Xiidra.  Dry eyes are a chronic condition and you may have more symptoms at certain times of the year.    Excess tearing can be due to the right tears not working properly or a blockage in the tear drainage system.  You can try  using artificial tears 1 drop right eye 4 x day.  If the excess tearing is bothersome after 4-6 weeks of treatment then we can send you for further testing.  This would entail a referral to our oculoplastic specialist Dr. Billy Maldonado at the [facility]-[phone].    Recommended brands are:    Systane Complete  Systane Ultra  Systane Balance  Refresh Advanced Optive  Refresh Relieva  Blink    Recommended brands for contact lens wearers are:    Systane contacts  Refresh contacts  Blink contacts    If you are using drops more than 4 x day or have sensitivities to preservatives I recommend non preserved artificial tears.  These come in 1 use vials.  They can be used every 1-2 hours.  Do not reuse the vials.    Recommended brands are:    Refresh Optive Dawson-3  Systane- preservative free  Refresh-  preservative free  Blink- preservative free    Gels or ointment can be used at night.    Recommended brands are:    Systane Gel  Refresh Gel  Blink Gel  Genteal Gel    Systane night time (ointment)  Refresh Celluvisc  Refresh PM (ointment)      Visine, Clear Eyes or Murine (drops that get the red out) can irritate the eyes and cause a rebound effect where the eyes become more red and you end up using more drops.  Avoid drops containing tetrahydrozoline, naphazoline, phenylephrine, oxymetazoline.      OTC Lumify is a newer product that gives immediate redness relief without the rebound effect.  Use as needed to take the redness out.    Artificial tears may be used with other drops (such as allergy, glaucoma, antibiotics) around the same time.  Be sure to wait 5 minutes in between drops.    Heat to the eyelids can also improve your symptoms of dry eyes.  Deidra heat masks can be purchased at [employer] to be used nightly for 10-15 minutes.  Other options are gel masks that can be put in the microwave and purchased at most pharmacies.      Tea Tree Oil eyelid cleansers recommended are Ocusoft Oust foam cleanser to cleanse  eyelids/lashes at night and in the am. Other options are Blephadex or Cliradex eyelid wipes.  KEEP EYES CLOSED when using these products.  These can be purchased on amazon.com   A good product for make up remover with tea tree oil is WeLoveEyes.  This can be found at www.[employer] or [employer].    Other good eyelid cleansers have hypochlorous acid which removes excess bacteria and is safe around the eyes. Products are Avenova, Ocusoft Hypochlor or Heyedrate. Spray solution onto cotton pad, close eyes and gently apply to eyelids and eyelashes using side to side motion.  You can also KEEP EYES CLOSED spray and rub into eyelashes.  You do not need to rinse it off. Use morning and evening. These products can be found on [employer].  You can check with your local pharmacy and see if they can order if for you if they don't have it.    Other brands of eyelid cleansing wipes are:    Ocusoft wipes  Systane wipes      Optometry Providers       Clinic Locations                                 Telephone Number   Dr. Shahla Chávez    [facility] [phone]     [facility] Hours:                [facility] Hours:       [facility] Hours:   [address] [facility] [address]   [address]     [address] [place] [address]   [place] [address]    [place] [address]  Phone: [phone]                    Phone: [phone]     Phone: [phone]                      Monday 8:00-7:00                          Monday 8:00-7:00                          Monday 8:00-7:00              Tuesday 8:00-6:00                          Tuesday 8:00-7:00                          Tuesday 8:00-7:00              Wednesday 8:00-6:00                  Wednesday 8:00-7:00                   Wednesday 8:00-7:00      Thursday 8:00-6:00                        Thursday 8:00-7:00                         Thursday 8:00-7:00            Friday 8:00-5:00                               Friday 8:00-5:00                              Friday 8:00-5:00    [facility] Hours:   [address] [place] [address]  [phone]    Monday 8:00-7:00  Tuesday 8:00-7:00  Wednesday 8:00-7:00  Thursday 8:00-7:00  Friday 8:00-5:00  Please log on to [email or web address] to order your contact lenses.  The link is found on the [facility] page.  As always, Thank you for trusting us with your health care needs!

## 2020-09-01 NOTE — LETTER
9/1/2020         RE: Preston Cai  [address]  [place] [place] [address]        Dear Colleague,    Thank you for referring your patient, Preston Cai, to the [facility]. Please see a copy of my visit note below.    Chief Complaint   Patient presents with     COMPREHENSIVE EYE EXAM         Last Eye Exam: 05/21/2019  Dilated Previously: Yes    What are you currently using to see?  glasses       Distance Vision Acuity: Satisfied with vision, the right eye does not see as well as the left    Near Vision Acuity: Satisfied with vision while reading  with glasses    Eye Comfort: watery, sore in the morning  Do you use eye drops? : Yes: thera tears once a day in the morning. He has also been using allergy drops from [employer] as needed. He used some this morning  Occupation or Hobbies: retired    Had been seeing Dr. Burns at [facility] for macular degeneration he was dismissed from his practice in 2019.    Kaci Honeycutt [facility]          Medical, surgical and family histories reviewed and updated 9/1/2020.       OBJECTIVE: See Ophthalmology exam    ASSESSMENT:    ICD-10-CM    1. Examination of eyes and vision  Z01.00 EYE EXAM (SIMPLE-NONBILLABLE)   2. Presbyopia  H52.4 REFRACTION   3. Hypermetropia of both eyes  H52.03 REFRACTION   4. Regular astigmatism of both eyes  H52.223 REFRACTION   5. Borderline glaucoma with ocular hypertension, right  H40.051 EYE EXAM (SIMPLE-NONBILLABLE)     EYE ADULT REFERRAL   6. Allergic conjunctivitis, bilateral  H10.13 EYE EXAM (SIMPLE-NONBILLABLE)   7. Squamous blepharitis of upper and lower eyelids of both eyes  H01.02A EYE EXAM (SIMPLE-NONBILLABLE)    H01.02B    8. Intermediate stage nonexudative age-related macular degeneration of both eyes  H35.3132 EYE EXAM (SIMPLE-NONBILLABLE)     EYE ADULT REFERRAL   9. Pseudophakia of both eyes  Z96.1 EYE EXAM (SIMPLE-NONBILLABLE)      PLAN:     Patient Instructions   Eyeglass prescription given.  No change in  eyeglass prescription.    Referral to Dr. Lara at [facility] for baseline glaucoma testing and macula OCT.    OTC Pataday to be used once or twice daily for itchy eyes.  Use as needed.    Heat to the eyes daily for 10-15 minutes nightly with warm washcloth or reusable gel masks from the pharmacy or  GINKGOTREE heat masks can be purchased at [employer].    Ocusoft Hypochlor- spray solution onto cotton pad.  Close eyes and gently apply to eyelids and eyelashes using side to side motion.  Use morning and evening.  Or Cliradex wipes.    Blink tears- 1 drop both eyes 2-4 x daily.    Continue AREDS 2 vitamins.  Macula will be checked with glaucoma testing.    Return in 1 year for a complete eye exam or sooner if needed.    Carlos James, OD                  Again, thank you for allowing me to participate in the care of your patient.        Sincerely,        Carlos James, OD

## 2020-09-01 NOTE — PROGRESS NOTES
Chief Complaint   Patient presents with     COMPREHENSIVE EYE EXAM         Last Eye Exam: 05/21/2019  Dilated Previously: Yes    What are you currently using to see?  glasses       Distance Vision Acuity: Satisfied with vision, the right eye does not see as well as the left    Near Vision Acuity: Satisfied with vision while reading  with glasses    Eye Comfort: watery, sore in the morning  Do you use eye drops? : Yes: thera tears once a day in the morning. He has also been using allergy drops from [employer] as needed. He used some this morning  Occupation or Hobbies: retired    Had been seeing Dr. Burns at [facility] for macular degeneration he was dismissed from his practice in 2019.    Kaci Honeycutt, [facility]          Medical, surgical and family histories reviewed and updated 9/1/2020.       OBJECTIVE: See Ophthalmology exam    ASSESSMENT:    ICD-10-CM    1. Examination of eyes and vision  Z01.00 EYE EXAM (SIMPLE-NONBILLABLE)   2. Presbyopia  H52.4 REFRACTION   3. Hypermetropia of both eyes  H52.03 REFRACTION   4. Regular astigmatism of both eyes  H52.223 REFRACTION   5. Borderline glaucoma with ocular hypertension, right  H40.051 EYE EXAM (SIMPLE-NONBILLABLE)     EYE ADULT REFERRAL   6. Allergic conjunctivitis, bilateral  H10.13 EYE EXAM (SIMPLE-NONBILLABLE)   7. Squamous blepharitis of upper and lower eyelids of both eyes  H01.02A EYE EXAM (SIMPLE-NONBILLABLE)    H01.02B    8. Intermediate stage nonexudative age-related macular degeneration of both eyes  H35.3132 EYE EXAM (SIMPLE-NONBILLABLE)     EYE ADULT REFERRAL   9. Pseudophakia of both eyes  Z96.1 EYE EXAM (SIMPLE-NONBILLABLE)      PLAN:     Patient Instructions   Eyeglass prescription given.  No change in eyeglass prescription.    Referral to Dr. Lara at [facility] for baseline glaucoma testing and macula OCT.    OTC Pataday to be used once or twice daily for itchy eyes.  Use as needed.    Heat to the eyes daily for 10-15 minutes nightly with  warm washcloth or reusable gel masks from the pharmacy or  Deidra heat masks can be purchased at [employer].    Ocusoft Hypochlor- spray solution onto cotton pad.  Close eyes and gently apply to eyelids and eyelashes using side to side motion.  Use morning and evening.  Or Cliradex wipes.    Blink tears- 1 drop both eyes 2-4 x daily.    Continue AREDS 2 vitamins.  Macula will be checked with glaucoma testing.    Return in 1 year for a complete eye exam or sooner if needed.    Carlos James, OD

## 2020-09-16 ENCOUNTER — ANCILLARY PROCEDURE (OUTPATIENT)
Dept: MRI IMAGING | Facility: CLINIC | Age: 85
End: 2020-09-16
Attending: PREVENTIVE MEDICINE
Payer: COMMERCIAL

## 2020-09-16 DIAGNOSIS — M51.369 DDD (DEGENERATIVE DISC DISEASE), LUMBAR: ICD-10-CM

## 2020-09-16 DIAGNOSIS — M50.30 DDD (DEGENERATIVE DISC DISEASE), CERVICAL: ICD-10-CM

## 2020-09-16 PROCEDURE — 72148 MRI LUMBAR SPINE W/O DYE: CPT | Performed by: RADIOLOGY

## 2020-09-16 PROCEDURE — 72141 MRI NECK SPINE W/O DYE: CPT | Performed by: STUDENT IN AN ORGANIZED HEALTH CARE EDUCATION/TRAINING PROGRAM

## 2020-09-24 DIAGNOSIS — Z11.59 ENCOUNTER FOR SCREENING FOR OTHER VIRAL DISEASES: Primary | ICD-10-CM

## 2020-10-05 ENCOUNTER — OFFICE VISIT (OUTPATIENT)
Dept: DERMATOLOGY | Facility: CLINIC | Age: 85
End: 2020-10-05
Payer: COMMERCIAL

## 2020-10-05 DIAGNOSIS — L81.4 SOLAR LENTIGO: ICD-10-CM

## 2020-10-05 DIAGNOSIS — L82.1 SEBORRHEIC KERATOSIS: ICD-10-CM

## 2020-10-05 DIAGNOSIS — L82.0 INFLAMED SEBORRHEIC KERATOSIS: Primary | ICD-10-CM

## 2020-10-05 DIAGNOSIS — L57.0 ACTINIC KERATOSIS: ICD-10-CM

## 2020-10-05 DIAGNOSIS — Z85.828 HISTORY OF NONMELANOMA SKIN CANCER: ICD-10-CM

## 2020-10-05 DIAGNOSIS — L11.1 GROVER'S DISEASE: ICD-10-CM

## 2020-10-05 DIAGNOSIS — D22.9 MULTIPLE BENIGN NEVI: ICD-10-CM

## 2020-10-05 PROCEDURE — 17000 DESTRUCT PREMALG LESION: CPT | Mod: 59 | Performed by: DERMATOLOGY

## 2020-10-05 PROCEDURE — 99214 OFFICE O/P EST MOD 30 MIN: CPT | Mod: 25 | Performed by: DERMATOLOGY

## 2020-10-05 PROCEDURE — 17110 DESTRUCTION B9 LES UP TO 14: CPT | Performed by: DERMATOLOGY

## 2020-10-05 PROCEDURE — 17003 DESTRUCT PREMALG LES 2-14: CPT | Mod: 59 | Performed by: DERMATOLOGY

## 2020-10-05 RX ORDER — TRIAMCINOLONE ACETONIDE 1 MG/G
CREAM TOPICAL
Qty: 454 G | Refills: 3 | Status: SHIPPED | OUTPATIENT
Start: 2020-10-05 | End: 2022-01-05

## 2020-10-05 ASSESSMENT — PAIN SCALES - GENERAL: PAINLEVEL: NO PAIN (0)

## 2020-10-05 NOTE — PROGRESS NOTES
Preston Cai's goals for this visit include:   Chief Complaint   Patient presents with     Skin Check     Personal hx NMSC, no family hx SC. Not immunosuppressed. Areas of concern: scalp, upper right arm and back.       He requests these members of his care team be copied on today's visit information: no    PCP: Tyrel Manning    Referring Provider:  No referring provider defined for this encounter.    There were no vitals taken for this visit.    Do you need any medication refills at today's visit? No  Jasmine Alcala LPN

## 2020-10-05 NOTE — PATIENT INSTRUCTIONS
Cryotherapy    What is it?    Use of a very cold liquid, such as liquid nitrogen, to freeze and destroy abnormal skin cells that need to be removed    What should I expect?    Tenderness and redness    A small blister that might grow and fill with dark purple blood. There may be crusting.    More than one treatment may be needed if the lesions do not go away.    How do I care for the treated area?    Gently wash the area with your hands when bathing.    Use a thin layer of Vaseline to help with healing. You may use a Band-Aid.     The area should heal within 7-10 days and may leave behind a pink or lighter color.     Do not use an antibiotic or Neosporin ointment.     You may take acetaminophen (Tylenol) for pain.     Call your Doctor if you have:    Severe pain    Signs of infection (warmth, redness, cloudy yellow drainage, and or a bad smell)    Questions or concerns    Who should I call with questions?       [facility]: [phone]       [facility]: [phone]       For urgent needs outside of business hours call the [facility] at [phone]        and ask for the dermatology resident on call

## 2020-10-05 NOTE — PROGRESS NOTES
[facility] Dermatology Note      Dermatology Problem List:  1. Actinic keratosis  - s/p cryotherapy  - pigmented AK, left jaw, s/p biopsy 11/13/18  - HAK, right hand, s/p excision 5/2009  3. Inflamed seborrheic keratosis: treated with shave removals and cryo  4. 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     Encounter Date: Oct 5, 2020    CC:  Chief Complaint   Patient presents with     Skin Check     Personal hx NMSC, no family hx SC. Not immunosuppressed. Areas of concern: scalp, upper right arm and back.     History of Present Illness:  Mr. Preston Cai is a 84 year old male with a history of BCC and AK who presents for skin exam. Last skin exam in 2019.     Lesions of concern:   - Scalp: a couple of rough, itchy lesions on scalp. Present over a year. No change or bleeding. No current treatment. History of AK treated with cryo on the scalp.   - Right ear: lesion on right mid ear is rough and painful. No bleeding. Present over a year, but growing. Applying Vaseline with no improvement.   - Right temple: dark, rapidly growing, itchy lesion of the right temple. No bleeding. Developed over the past year. Applying Vaseline with no improvement.   - Shoulders: several itchy areas. Previously diagnosed with Angus's and treated with prescription topical steroid cream. Ran out, and currently using OTC topical steroid.     The patient otherwise denies any new or concerning lesions. No bleeding, painful, pruritic, or changing lesions. Health otherwise stable. No other skin concerns.     Past Medical History:   Patient Active Problem List   Diagnosis     Seborrheic dermatitis     Allergic rhinitis due to other allergen     Cervical radiculopathy at C6     Personal history of malignant neoplasm of larynx     Essential tremor     CARDIOVASCULAR SCREENING; LDL GOAL LESS THAN 160     Benign  prostatic hyperplasia     Hoarse voice quality     Advanced directives, counseling/discussion     Pseudophakia     Gastroesophageal reflux disease, esophagitis presence not specified     Macular degeneration     Supraspinatus sprain, right, initial encounter     CARDIOVASCULAR SCREENING; LDL GOAL LESS THAN 100     Vitamin D deficiency     Chronic midline low back pain without sciatica     Past Medical History:   Diagnosis Date     Acid reflux disease      Allergic rhinitis, cause unspecified      H/O magnetic resonance imaging of brain and brain stem 10/10/2016    MRI BRAIN WITH AND WITHOUT CONTRAST August 17, 2009 8:07:00 PM   HISTORY: Severe dizzy spells with imbalance and vomiting.   TECHNIQUE: Routine pulse sequences without and with contrast were performed including thin section images through the IACs. 20 mL Magnevist given.   FINDINGS: Diffusion-weighted images are normal. The brain parenchyma, brainstem, ventricular system, and subarachnoid spaces a     Hematuria     Hematuria  - in 1990's      History of anemia 2002    Anemia-Iron Deficit     History of gastric ulcer      Macular degeneration      Malignant neoplasm of laryngeal cartilages (H)     Laryngeal CA (Radiation 1982)     Nonsenile cataract      PONV (postoperative nausea and vomiting)      Tremor 10/3/2016     Past Surgical History:   Procedure Laterality Date     APPENDECTOMY  8 years old     Biopsy of the throat - cancerous mass - got radiation for in larynx  1982     CATARACT IOL, RT/LT       COLONOSCOPY  2/25/2004    Normal     COLONOSCOPY N/A 4/28/2015    Procedure: COLONOSCOPY;  Surgeon: Marvin Adams MD;  Location: [facility] OR     COLONOSCOPY WITH CO2 INSUFFLATION N/A 4/28/2015    Procedure: COLONOSCOPY WITH CO2 INSUFFLATION;  Surgeon: Marvin Adams MD;  Location: [facility] OR     CYSTOSCOPY  1997    for hematuria - negative     EYE SURGERY Right     Rt eye.macular repair     EYE SURGERY  2003    cataract     NASAL ENDOSCOPY,DX   4/2007    GERD     Pars plana vitrectomy and epiretinal membrane dissection, right eye  11/8/2002     Phacoemulsification with intraocular lens implantation right eye.  3/11/2003     PHACOEMULSIFICATION WITH STANDARD INTRAOCULAR LENS IMPLANT Left 9/22/2016    Procedure: PHACOEMULSIFICATION WITH STANDARD INTRAOCULAR LENS IMPLANT;  Surgeon: Nghia Lara MD;  Location: [facility] OR     Thyroglossal Duct Cyst Removal - 2ndary to radiation.  1982     VASECTOMY  1971     ZZ GASTROSCOPY,FL  9/2007    hiatal hernia and errosions       Social History:  Patient reports that he has quit smoking. He has never used smokeless tobacco. He reports current alcohol use. He reports that he does not use drugs.    Family History:  Family History   Problem Relation Age of Onset     Cerebrovascular Disease Mother         at 84 yo - possibly TIA - befoer     Macular Degeneration Mother      Gastrointestinal Disease Father      Cancer - colorectal Father      Hypertension Father      Cancer Father      Cancer Other      C.A.D. No family hx of      Diabetes No family hx of      Prostate Cancer No family hx of      Glaucoma No family hx of      Thyroid Disease No family hx of        Medications:  Current Outpatient Medications   Medication Sig Dispense Refill     diclofenac (VOLTAREN) 75 MG EC tablet Take 1 tablet (75 mg) by mouth 2 times daily as needed 40 tablet 1     gabapentin (NEURONTIN) 100 MG capsule 3 capsules each evening 270 capsule 2     pantoprazole (PROTONIX) 40 MG EC tablet Take 1 tablet (40 mg) by mouth daily 90 tablet 3     primidone (MYSOLINE) 250 MG tablet Take 1 tablet (250 mg) by mouth At Bedtime 90 tablet 3     tamsulosin (FLOMAX) 0.4 MG capsule TAKE TWO CAPSULES BY MOUTH ONCE DAILY 180 capsule 3     VITAMIN D, CHOLECALCIFEROL, PO Take 1,000 Units by mouth daily       methylPREDNISolone (MEDROL) 4 MG tablet therapy pack Follow Package Directions (Patient not taking: Reported on 10/5/2020) 21 tablet 0     Allergies    Allergen Reactions     Seasonal Allergies      Hay fever      Review of Systems:  -As per HPI  -Constitutional: Otherwise feeling well today, in usual state of health.  -Skin: As above in HPI. No additional skin concerns.    Physical exam:  Vitals: There were no vitals taken for this visit.  GEN: This is a well developed, well-nourished male in no acute distress, in a pleasant mood.    SKIN: Waist-up skin, which includes the head/face, neck, both arms, chest, back, abdomen, digits and/or nails was examined.  -Glez skin type: II  -5 mm rough, scaly papule of the right mid to inferior helix  -4 mm rough, scaly papule with erythema of the right lateral cheek.   -2 cm brown, waxy, hyperkeratotic plaque of the right temple.   -Two, ~2cm brown-dark brown, rough, waxy, hyperkeratotic plaque on the vertex of the scalp.   -Three brown, waxy, hyperkeratotic plaques on the back; right scapula (1 cm), left upper back (2 cm), left lower back (4 cm in length).   -Numerous other light-brown/tan, waxy hyperkeratotic papules and plaques of the head, neck, trunk and arms.   -5 mm in length x 3 mm in height, verrucous papule of the right upper arm.   -Several scattered pink pruritic papules and plaques of the upper shoulders bilaterally.   -Well healed scar at prior BCC ED&C site without evidence of recurrence.   -Brown macules on sun exposed areas  -Brown macules and papules on trunk and extremities without concerning dermoscopy features.   -No other lesions of concern on areas examined.     Impression/Plan:  1. Actinic keratosis  - Cryotherapy procedure note (performed by faculty): After verbal consent and discussion of risks and benefits including but no limited to dyspigmentation/scar, blister, infection, recurrence, 2 was(were) treated with 1-2mm freeze border for 2 cycles with liquid nitrogen. Post cryotherapy instructions were provided.     2. Seborrheic keratosis, inflamed  - Cryotherapy procedure note (with medical  student participation): see faculty procedure note for 7 lesions treated.   - May apply Vaseline when crusting to help with scab removal.     3. 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   - May apply lotion, but avoid Vaseline.   - Discussed the goal is not to cure, but manage the symptoms.     4. Hx NMSC. NERD. Sun protection advised. Continue annual skin examinations.     5. Benign lesions: Multiple benign nevi, solar lentigos, seborrheic keratoses. Explained to patient benign nature of lesion. No treatment is necessary at this time unless the lesion changes or becomes symptomatic.     - ABCDs of melanoma were discussed and self skin checks were advised.  - Sun precaution was advised including the use of sun screens of SPF 30 or higher, sun protective clothing, and avoidance of tanning beds.    Follow-up in 3 months to recheck right ear, earlier for new or changing lesions.     Staff Involved:  I, Loren Styles MS4, saw and examined the patient in the presence of Dr. Oseguera.    Staff Physician:  I was present with the medical student who participated in the service and in the documentation of the note. I have verified the history and personally performed the physical exam and medical decision making. I agree with the assessment and plan of care as documented in the note.     Medical Student Loren Styles participated in this procedure. I was present for the procedure and personally supervised, MSIV as he/she performed.     Cryotherapy procedure note: After verbal consent and discussion of risks and benefits, 7 inflamed SKs were treated with liquid nitrogen cryotherapy for 1  freeze/thaw cycles with 1-2mm borders. Post-cryotherapy wound care instructions were discussed and provided in written format.     Thefollowing  procedure(s) was(were) performed by myself.  Cryotherapy procedure note: After verbal consent and discussion of  risks and benefits, 2 AKs were treated with liquid nitrogen cryotherapy for 1  freeze/thaw cycles with 1-2mm borders. Post-cryotherapy wound care instructions were discussed and provided in written format.     Andry Oseguera MD  Pronouns: he/him/his    Department of Dermatology  [facility]: Phone: [phone], Fax:[phone]  [facility]: Phone: [phone] Fax: [phone]

## 2020-10-05 NOTE — LETTER
10/5/2020         RE: Preston Cai  [address]  [place] [facility] [address]        Dear Colleague,    Thank you for referring your patient, Preston Cai, to the [facility]. Please see a copy of my visit note below.    Preston Cai's goals for this visit include:   Chief Complaint   Patient presents with     Skin Check     Personal hx NMSC, no family hx SC. Not immunosuppressed. Areas of concern: scalp, upper right arm and back.       He requests these members of his care team be copied on today's visit information: no    PCP: Tyrel Manning    Referring Provider:  No referring provider defined for this encounter.    There were no vitals taken for this visit.    Do you need any medication refills at today's visit? No  Jasmine Alcala LPN        [facility] Dermatology Note      Dermatology Problem List:  1. Actinic keratosis  - s/p cryotherapy  - pigmented AK, left jaw, s/p biopsy 11/13/18  - HAK, right hand, s/p excision 5/2009  3. Inflamed seborrheic keratosis: treated with shave removals and cryo  4. 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     Encounter Date: Oct 5, 2020    CC:  Chief Complaint   Patient presents with     Skin Check     Personal hx NMSC, no family hx SC. Not immunosuppressed. Areas of concern: scalp, upper right arm and back.     History of Present Illness:  Mr. Preston Cai is a 84 year old male with a history of BCC and AK who presents for skin exam. Last skin exam in 2019.     Lesions of concern:   - Scalp: a couple of rough, itchy lesions on scalp. Present over a year. No change or bleeding. No current treatment. History of AK treated with cryo on the scalp.   - Right ear: lesion on right mid ear is rough and painful. No bleeding. Present over a year, but growing. Applying Vaseline with no improvement.    - Right temple: dark, rapidly growing, itchy lesion of the right temple. No bleeding. Developed over the past year. Applying Vaseline with no improvement.   - Shoulders: several itchy areas. Previously diagnosed with Bullhead's and treated with prescription topical steroid cream. Ran out, and currently using OTC topical steroid.     The patient otherwise denies any new or concerning lesions. No bleeding, painful, pruritic, or changing lesions. Health otherwise stable. No other skin concerns.     Past Medical History:   Patient Active Problem List   Diagnosis     Seborrheic dermatitis     Allergic rhinitis due to other allergen     Cervical radiculopathy at C6     Personal history of malignant neoplasm of larynx     Essential tremor     CARDIOVASCULAR SCREENING; LDL GOAL LESS THAN 160     Benign prostatic hyperplasia     Hoarse voice quality     Advanced directives, counseling/discussion     Pseudophakia     Gastroesophageal reflux disease, esophagitis presence not specified     Macular degeneration     Supraspinatus sprain, right, initial encounter     CARDIOVASCULAR SCREENING; LDL GOAL LESS THAN 100     Vitamin D deficiency     Chronic midline low back pain without sciatica     Past Medical History:   Diagnosis Date     Acid reflux disease      Allergic rhinitis, cause unspecified      H/O magnetic resonance imaging of brain and brain stem 10/10/2016    MRI BRAIN WITH AND WITHOUT CONTRAST August 17, 2009 8:07:00 PM   HISTORY: Severe dizzy spells with imbalance and vomiting.   TECHNIQUE: Routine pulse sequences without and with contrast were performed including thin section images through the IACs. 20 mL Magnevist given.   FINDINGS: Diffusion-weighted images are normal. The brain parenchyma, brainstem, ventricular system, and subarachnoid spaces a     Hematuria     Hematuria  - in 1990's      History of anemia 2002    Anemia-Iron Deficit     History of gastric ulcer      Macular degeneration      Malignant neoplasm  of laryngeal cartilages (H)     Laryngeal CA (Radiation 1982)     Nonsenile cataract      PONV (postoperative nausea and vomiting)      Tremor 10/3/2016     Past Surgical History:   Procedure Laterality Date     APPENDECTOMY  8 years old     Biopsy of the throat - cancerous mass - got radiation for in larynx  1982     CATARACT IOL, RT/LT       COLONOSCOPY  2/25/2004    Normal     COLONOSCOPY N/A 4/28/2015    Procedure: COLONOSCOPY;  Surgeon: Marvin Adams MD;  Location: [facility] OR     COLONOSCOPY WITH CO2 INSUFFLATION N/A 4/28/2015    Procedure: COLONOSCOPY WITH CO2 INSUFFLATION;  Surgeon: Marvin Adams MD;  Location: [facility] OR     CYSTOSCOPY  1997    for hematuria - negative     EYE SURGERY Right     Rt eye.macular repair     EYE SURGERY  2003    cataract     NASAL ENDOSCOPY,DX  4/2007    GERD     Pars plana vitrectomy and epiretinal membrane dissection, right eye  11/8/2002     Phacoemulsification with intraocular lens implantation right eye.  3/11/2003     PHACOEMULSIFICATION WITH STANDARD INTRAOCULAR LENS IMPLANT Left 9/22/2016    Procedure: PHACOEMULSIFICATION WITH STANDARD INTRAOCULAR LENS IMPLANT;  Surgeon: Nghia Lara MD;  Location: [facility] OR     Thyroglossal Duct Cyst Removal - 2ndary to radiation.  1982     VASECTOMY  1971     ZZ GASTROSCOPY,FL  9/2007    hiatal hernia and errosions       Social History:  Patient reports that he has quit smoking. He has never used smokeless tobacco. He reports current alcohol use. He reports that he does not use drugs.    Family History:  Family History   Problem Relation Age of Onset     Cerebrovascular Disease Mother         at 86 yo - possibly TIA - befoer     Macular Degeneration Mother      Gastrointestinal Disease Father      Cancer - colorectal Father      Hypertension Father      Cancer Father      Cancer Other      C.A.D. No family hx of      Diabetes No family hx of      Prostate Cancer No family hx of      Glaucoma No family hx of       Thyroid Disease No family hx of        Medications:  Current Outpatient Medications   Medication Sig Dispense Refill     diclofenac (VOLTAREN) 75 MG EC tablet Take 1 tablet (75 mg) by mouth 2 times daily as needed 40 tablet 1     gabapentin (NEURONTIN) 100 MG capsule 3 capsules each evening 270 capsule 2     pantoprazole (PROTONIX) 40 MG EC tablet Take 1 tablet (40 mg) by mouth daily 90 tablet 3     primidone (MYSOLINE) 250 MG tablet Take 1 tablet (250 mg) by mouth At Bedtime 90 tablet 3     tamsulosin (FLOMAX) 0.4 MG capsule TAKE TWO CAPSULES BY MOUTH ONCE DAILY 180 capsule 3     VITAMIN D, CHOLECALCIFEROL, PO Take 1,000 Units by mouth daily       methylPREDNISolone (MEDROL) 4 MG tablet therapy pack Follow Package Directions (Patient not taking: Reported on 10/5/2020) 21 tablet 0     Allergies   Allergen Reactions     Seasonal Allergies      Hay fever      Review of Systems:  -As per HPI  -Constitutional: Otherwise feeling well today, in usual state of health.  -Skin: As above in HPI. No additional skin concerns.    Physical exam:  Vitals: There were no vitals taken for this visit.  GEN: This is a well developed, well-nourished male in no acute distress, in a pleasant mood.    SKIN: Waist-up skin, which includes the head/face, neck, both arms, chest, back, abdomen, digits and/or nails was examined.  -Glez skin type: II  -5 mm rough, scaly papule of the right mid to inferior helix  -4 mm rough, scaly papule with erythema of the right lateral cheek.   -2 cm brown, waxy, hyperkeratotic plaque of the right temple.   -Two, ~2cm brown-dark brown, rough, waxy, hyperkeratotic plaque on the vertex of the scalp.   -Three brown, waxy, hyperkeratotic plaques on the back; right scapula (1 cm), left upper back (2 cm), left lower back (4 cm in length).   -Numerous other light-brown/tan, waxy hyperkeratotic papules and plaques of the head, neck, trunk and arms.   -5 mm in length x 3 mm in height, verrucous papule of the  right upper arm.   -Several scattered pink pruritic papules and plaques of the upper shoulders bilaterally.   -Well healed scar at prior BCC ED&C site without evidence of recurrence.   -Brown macules on sun exposed areas  -Brown macules and papules on trunk and extremities without concerning dermoscopy features.   -No other lesions of concern on areas examined.     Impression/Plan:  1. Actinic keratosis  - Cryotherapy procedure note (performed by faculty): After verbal consent and discussion of risks and benefits including but no limited to dyspigmentation/scar, blister, infection, recurrence, 2 was(were) treated with 1-2mm freeze border for 2 cycles with liquid nitrogen. Post cryotherapy instructions were provided.     2. Seborrheic keratosis, inflamed  - Cryotherapy procedure note (with medical student participation): see faculty procedure note for 7 lesions treated.   - May apply Vaseline when crusting to help with scab removal.     3. 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   - May apply lotion, but avoid Vaseline.   - Discussed the goal is not to cure, but manage the symptoms.     4. Hx NMSC. NERD. Sun protection advised. Continue annual skin examinations.     5. Benign lesions: Multiple benign nevi, solar lentigos, seborrheic keratoses. Explained to patient benign nature of lesion. No treatment is necessary at this time unless the lesion changes or becomes symptomatic.     - ABCDs of melanoma were discussed and self skin checks were advised.  - Sun precaution was advised including the use of sun screens of SPF 30 or higher, sun protective clothing, and avoidance of tanning beds.    Follow-up in 3 months to recheck right ear, earlier for new or changing lesions.     Staff Involved:  Loren KIM MS4, saw and examined the patient in the presence of Dr. Oseguera.    Staff Physician:  I was present with the medical student  who participated in the service and in the documentation of the note. I have verified the history and personally performed the physical exam and medical decision making. I agree with the assessment and plan of care as documented in the note.     Medical Student Loren Styles participated in this procedure. I was present for the procedure and personally supervised, MSIV as he/she performed.     Cryotherapy procedure note: After verbal consent and discussion of risks and benefits, 7 inflamed SKs were treated with liquid nitrogen cryotherapy for 1  freeze/thaw cycles with 1-2mm borders. Post-cryotherapy wound care instructions were discussed and provided in written format.     Thefollowing  procedure(s) was(were) performed by myself.  Cryotherapy procedure note: After verbal consent and discussion of risks and benefits, 2 AKs were treated with liquid nitrogen cryotherapy for 1  freeze/thaw cycles with 1-2mm borders. Post-cryotherapy wound care instructions were discussed and provided in written format.     Andry Oseguera MD  Pronouns: he/him/his    Department of Dermatology  [facility]: Phone: [phone], Fax:[phone]  [facility]: Phone: [phone] Fax: [phone]          Again, thank you for allowing me to participate in the care of your patient.        Sincerely,        Andry Oseguera MD

## 2020-10-05 NOTE — PROGRESS NOTES
[facility] Dermatology Note      Dermatology Problem List:  1. Actinic keratosis  - s/p cryotherapy  - pigmented AK, left jaw, s/p biopsy 11/13/18  - HAK, right hand, s/p excision 5/2009  3. Inflamed seborrheic keratosis: treated with shave removals and cryo  4. 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 *Clinically monitor  - Right mid to inferior helix are of hemorraghic crust, easily removed with alcohol    CC:   Chief Complaint   Patient presents with     Skin Check     Personal hx NMSC, no family hx SC. Not immunosuppressed. Areas of concern: scalp, upper right arm and back.         Encounter Date: Oct 5, 2020    History of Present Illness:  Mr. Preston Cai is a 84 year old male who presents as a follow-up for full body skin examination. The patient was last seen in 2019 when had cryotherapy for inflamed SK. Was also noted to have a crusted spot on the right helix, recommended PRN follow-up if not resolved in 1 month.    Todaym,      The patient otherwise denies any new or concerning lesions. No bleeding, painful, pruritic, or changing lesions. Health otherwise stable. No other skin concerns.        Past Medical History:   Patient Active Problem List   Diagnosis     Seborrheic dermatitis     Allergic rhinitis due to other allergen     Cervical radiculopathy at C6     Personal history of malignant neoplasm of larynx     Essential tremor     CARDIOVASCULAR SCREENING; LDL GOAL LESS THAN 160     Benign prostatic hyperplasia     Hoarse voice quality     Advanced directives, counseling/discussion     Pseudophakia     Gastroesophageal reflux disease, esophagitis presence not specified     Macular degeneration     Supraspinatus sprain, right, initial encounter     CARDIOVASCULAR SCREENING; LDL GOAL LESS THAN 100     Vitamin D deficiency     Chronic midline low back pain without sciatica     Past Medical History:   Diagnosis Date     Acid reflux disease      Allergic  rhinitis, cause unspecified      H/O magnetic resonance imaging of brain and brain stem 10/10/2016    MRI BRAIN WITH AND WITHOUT CONTRAST August 17, 2009 8:07:00 PM   HISTORY: Severe dizzy spells with imbalance and vomiting.   TECHNIQUE: Routine pulse sequences without and with contrast were performed including thin section images through the IACs. 20 mL Magnevist given.   FINDINGS: Diffusion-weighted images are normal. The brain parenchyma, brainstem, ventricular system, and subarachnoid spaces a     Hematuria     Hematuria  - in 1990's      History of anemia 2002    Anemia-Iron Deficit     History of gastric ulcer      Macular degeneration      Malignant neoplasm of laryngeal cartilages (H)     Laryngeal CA (Radiation 1982)     Nonsenile cataract      PONV (postoperative nausea and vomiting)      Tremor 10/3/2016     Past Surgical History:   Procedure Laterality Date     APPENDECTOMY  8 years old     Biopsy of the throat - cancerous mass - got radiation for in larynx  1982     CATARACT IOL, RT/LT       COLONOSCOPY  2/25/2004    Normal     COLONOSCOPY N/A 4/28/2015    Procedure: COLONOSCOPY;  Surgeon: Marvin Adams MD;  Location: [facility] OR     COLONOSCOPY WITH CO2 INSUFFLATION N/A 4/28/2015    Procedure: COLONOSCOPY WITH CO2 INSUFFLATION;  Surgeon: Marvin Adams MD;  Location: [facility] OR     CYSTOSCOPY  1997    for hematuria - negative     EYE SURGERY Right     Rt eye.macular repair     EYE SURGERY  2003    cataract     NASAL ENDOSCOPY,DX  4/2007    GERD     Pars plana vitrectomy and epiretinal membrane dissection, right eye  11/8/2002     Phacoemulsification with intraocular lens implantation right eye.  3/11/2003     PHACOEMULSIFICATION WITH STANDARD INTRAOCULAR LENS IMPLANT Left 9/22/2016    Procedure: PHACOEMULSIFICATION WITH STANDARD INTRAOCULAR LENS IMPLANT;  Surgeon: Nghia Lara MD;  Location: [facility] OR     Thyroglossal Duct Cyst Removal - 2ndary to radiation.  1982     VASECTOMY  1971      SHIV GASTROSCOPY,FL  9/2007    hiatal hernia and errosions       Social History:  Patient reports that he has quit smoking. He has never used smokeless tobacco. He reports current alcohol use. He reports that he does not use drugs.    Family History:  Family History   Problem Relation Age of Onset     Cerebrovascular Disease Mother         at 86 yo - possibly TIA - befoer     Macular Degeneration Mother      Gastrointestinal Disease Father      Cancer - colorectal Father      Hypertension Father      Cancer Father      Cancer Other      C.A.D. No family hx of      Diabetes No family hx of      Prostate Cancer No family hx of      Glaucoma No family hx of      Thyroid Disease No family hx of        Medications:  Current Outpatient Medications   Medication Sig Dispense Refill     diclofenac (VOLTAREN) 75 MG EC tablet Take 1 tablet (75 mg) by mouth 2 times daily as needed 40 tablet 1     gabapentin (NEURONTIN) 100 MG capsule 3 capsules each evening 270 capsule 2     pantoprazole (PROTONIX) 40 MG EC tablet Take 1 tablet (40 mg) by mouth daily 90 tablet 3     primidone (MYSOLINE) 250 MG tablet Take 1 tablet (250 mg) by mouth At Bedtime 90 tablet 3     tamsulosin (FLOMAX) 0.4 MG capsule TAKE TWO CAPSULES BY MOUTH ONCE DAILY 180 capsule 3     VITAMIN D, CHOLECALCIFEROL, PO Take 1,000 Units by mouth daily       methylPREDNISolone (MEDROL) 4 MG tablet therapy pack Follow Package Directions (Patient not taking: Reported on 10/5/2020) 21 tablet 0     Allergies   Allergen Reactions     Seasonal Allergies      Hay fever          Review of Systems:  -{ROS:541131}  -Constitutional: Otherwise feeling well today, in usual state of health.  -HEENT: Patient denies nonhealing oral sores.  -Skin: As above in HPI. No additional skin concerns.    Physical exam:  Vitals: There were no vitals taken for this visit.  GEN: {RFGeneralAppearance:136818}   SKIN: {Skin Exam:145485}  -Glez skin type: {INES NUMERALS  "I-VI:722227::\"NA\"}  -{Skin Exam Derm:914522}  -{Skin Exam Derm:193198}  -{Skin Exam Derm:117688}  -{Skin Exam Derm:188160}  -No other lesions of concern on areas examined.     Impression/Plan:  1. {Diagnosesderm:041171}  - {rfplan:603006}  - ***    2. {Diagnosesderm:394018}  - {rfplan:674829}  - ***    3. {Diagnosesderm:808656}  - {rfplan:539421}  - ***    4. {Diagnosesderm:941651}  - {rfplan:551911}  - ***    CC No referring provider defined for this encounter. on close of this encounter.  Follow-up {rfmultiple:845750} {follow up in days/weeks/months:378977}.       Staff Involved:  Staff Only    Andry Oseguera MD  Pronouns: he/him/his    Department of Dermatology  [facility]: Phone: [phone], Fax:[phone]  [facility]: Phone: [phone] Fax: [phone]        "

## 2020-10-13 NOTE — PROGRESS NOTES
Patient seen for one time evaluation and treatment.  Patient did not return for further treatment and current status is unknown.  Please see initial evaluation for further information.    Cancelled last scheduled PT appt

## 2020-10-24 DIAGNOSIS — Z11.59 ENCOUNTER FOR SCREENING FOR OTHER VIRAL DISEASES: ICD-10-CM

## 2020-10-24 PROCEDURE — U0003 INFECTIOUS AGENT DETECTION BY NUCLEIC ACID (DNA OR RNA); SEVERE ACUTE RESPIRATORY SYNDROME CORONAVIRUS 2 (SARS-COV-2) (CORONAVIRUS DISEASE [COVID-19]), AMPLIFIED PROBE TECHNIQUE, MAKING USE OF HIGH THROUGHPUT TECHNOLOGIES AS DESCRIBED BY CMS-2020-01-R: HCPCS | Performed by: PREVENTIVE MEDICINE

## 2020-10-25 LAB
SARS-COV-2 RNA SPEC QL NAA+PROBE: NOT DETECTED
SPECIMEN SOURCE: NORMAL

## 2020-10-27 ENCOUNTER — ANCILLARY PROCEDURE (OUTPATIENT)
Dept: GENERAL RADIOLOGY | Facility: CLINIC | Age: 85
End: 2020-10-27
Attending: PREVENTIVE MEDICINE
Payer: COMMERCIAL

## 2020-10-27 VITALS — OXYGEN SATURATION: 97 % | SYSTOLIC BLOOD PRESSURE: 108 MMHG | DIASTOLIC BLOOD PRESSURE: 70 MMHG | HEART RATE: 76 BPM

## 2020-10-27 DIAGNOSIS — M51.16 LUMBAR DISC HERNIATION WITH RADICULOPATHY: ICD-10-CM

## 2020-10-27 PROCEDURE — 62323 NJX INTERLAMINAR LMBR/SAC: CPT | Performed by: RADIOLOGY

## 2020-10-27 RX ORDER — LIDOCAINE HYDROCHLORIDE 10 MG/ML
5 INJECTION, SOLUTION EPIDURAL; INFILTRATION; INTRACAUDAL; PERINEURAL ONCE
Status: COMPLETED | OUTPATIENT
Start: 2020-10-27 | End: 2020-10-27

## 2020-10-27 RX ORDER — BUPIVACAINE HYDROCHLORIDE 5 MG/ML
5 INJECTION, SOLUTION PERINEURAL ONCE
Status: COMPLETED | OUTPATIENT
Start: 2020-10-27 | End: 2020-10-27

## 2020-10-27 RX ORDER — IOPAMIDOL 408 MG/ML
10 INJECTION, SOLUTION INTRATHECAL ONCE
Status: COMPLETED | OUTPATIENT
Start: 2020-10-27 | End: 2020-10-27

## 2020-10-27 RX ORDER — METHYLPREDNISOLONE ACETATE 80 MG/ML
80 INJECTION, SUSPENSION INTRA-ARTICULAR; INTRALESIONAL; INTRAMUSCULAR; SOFT TISSUE ONCE
Status: COMPLETED | OUTPATIENT
Start: 2020-10-27 | End: 2020-10-27

## 2020-10-27 RX ADMIN — IOPAMIDOL 3 ML: 408 INJECTION, SOLUTION INTRATHECAL at 11:08

## 2020-10-27 RX ADMIN — BUPIVACAINE HYDROCHLORIDE 2 ML: 5 INJECTION, SOLUTION PERINEURAL at 11:08

## 2020-10-27 RX ADMIN — METHYLPREDNISOLONE ACETATE 80 MG: 80 INJECTION, SUSPENSION INTRA-ARTICULAR; INTRALESIONAL; INTRAMUSCULAR; SOFT TISSUE at 11:08

## 2020-10-27 RX ADMIN — LIDOCAINE HYDROCHLORIDE 5 ML: 10 INJECTION, SOLUTION EPIDURAL; INFILTRATION; INTRACAUDAL; PERINEURAL at 11:08

## 2020-10-27 NOTE — PROGRESS NOTES
Preston was seen in X-ray today for a lumbar epidural injection. Patient rated pain before procedure 5/10(left leg) 2/10(right leg). After procedure patient rated pain 2/10(left leg) 2/10(right leg).   This pain level is acceptable to patient. Patient discharged home with .

## 2020-10-27 NOTE — PROGRESS NOTES
AFTER YOU GO HOME    ? DO relax; minimize your activity for 24 hours  ? You may resume normal activity tomorrow  ? You may remove the bandage in the evening or next morning  ? You may resume bathing the next day  ? Drink at least 4 extra glasses of fluid today if not on fluid restrictions  ? DO NOT drive or operate machinery at home or at work for at least 24 hours      VISIT THE EMERGENCY ROOM OR URGENT CARE IF:    ? There is redness or swelling at the injection site  ? There is discharge from the injection site  ? You develop a temperature of 101  F or greater      ADDITIONAL INSTRUCTIONS:     ? You may resume your Coumadin or other blood thinner at your regular dose today.  Follow up with your physician to have your INR rechecked if indicated.  ? If you gain no relief from the injection after two (2) weeks, follow-up with your provider for your options.        Contacts:    During business hours from 8 to 5 pm, you may call [phone] to reach a nurse advisor at [facility].  After hours, call [facility]  [phone].  Ask for the Radiologist on-call.  Someone is on-call 24 hrs/day.  [facility] Toll Free Number   [phone]

## 2020-11-06 DIAGNOSIS — Z11.59 ENCOUNTER FOR SCREENING FOR OTHER VIRAL DISEASES: ICD-10-CM

## 2020-11-06 PROCEDURE — U0003 INFECTIOUS AGENT DETECTION BY NUCLEIC ACID (DNA OR RNA); SEVERE ACUTE RESPIRATORY SYNDROME CORONAVIRUS 2 (SARS-COV-2) (CORONAVIRUS DISEASE [COVID-19]), AMPLIFIED PROBE TECHNIQUE, MAKING USE OF HIGH THROUGHPUT TECHNOLOGIES AS DESCRIBED BY CMS-2020-01-R: HCPCS | Performed by: PREVENTIVE MEDICINE

## 2020-11-07 LAB
SARS-COV-2 RNA SPEC QL NAA+PROBE: NOT DETECTED
SPECIMEN SOURCE: NORMAL

## 2020-11-09 ENCOUNTER — OFFICE VISIT (OUTPATIENT)
Dept: OPHTHALMOLOGY | Facility: CLINIC | Age: 85
End: 2020-11-09
Attending: OPTOMETRIST
Payer: COMMERCIAL

## 2020-11-09 DIAGNOSIS — H35.3132 INTERMEDIATE STAGE NONEXUDATIVE AGE-RELATED MACULAR DEGENERATION OF BOTH EYES: ICD-10-CM

## 2020-11-09 DIAGNOSIS — H40.051 BORDERLINE GLAUCOMA WITH OCULAR HYPERTENSION, RIGHT: ICD-10-CM

## 2020-11-09 PROCEDURE — 99207 PR NO BILLABLE SERVICE THIS VISIT: CPT | Performed by: OPHTHALMOLOGY

## 2020-11-09 ASSESSMENT — CUP TO DISC RATIO
OD_RATIO: 0.25
OS_RATIO: 0.25

## 2020-11-09 ASSESSMENT — VISUAL ACUITY
METHOD: SNELLEN - LINEAR
OD_CC+: -2
OS_CC: 20/20
CORRECTION_TYPE: GLASSES
OD_CC: 20/30

## 2020-11-09 ASSESSMENT — EXTERNAL EXAM - RIGHT EYE: OD_EXAM: NORMAL

## 2020-11-09 ASSESSMENT — TONOMETRY
IOP_METHOD: ICARE
OD_IOP_MMHG: 23
OS_IOP_MMHG: 14

## 2020-11-09 ASSESSMENT — EXTERNAL EXAM - LEFT EYE: OS_EXAM: NORMAL

## 2020-11-09 NOTE — NURSING NOTE
Chief Complaints and History of Present Illnesses   Patient presents with     Glaucoma     Macular Degeneration Evaluation       Chief Complaint(s) and History of Present Illness(es)     Glaucoma     Laterality: both eyes              Macular Degeneration Evaluation     Laterality: both eyes              Comments     Patient here for glaucoma eval and macular degeneration. Referred by Dr. James. Full exam done with her 9/1/2020. Pt states right eye is blurred, pt states Dr. James mentioned possible YAG?. History of macular repair right eye 2002. Eyes are dry overnight and in the morning. Pt doesn't like using drops before bed as he states there is a pressure feeling in the ams when he uses drops at night. Uses cold water in the mornings to help with the dryness. Using Systane prn.                Nadira Ceron, COA

## 2020-11-09 NOTE — PROGRESS NOTES
1. Examination of eyes and vision Z01.00 EYE EXAM (SIMPLE-NONBILLABLE)   2. Presbyopia H52.4 REFRACTION   3. Hypermetropia of both eyes H52.03 REFRACTION   4. Regular astigmatism of both eyes H52.223 EYE EXAM (SIMPLE-NONBILLABLE)   5. Allergic conjunctivitis, bilateral H10.13 EYE EXAM (SIMPLE-NONBILLABLE)       azelastine (OPTIVAR) 0.05 % ophthalmic solution   6. Squamous blepharitis of upper and lower eyelids of both eyes H01.02A EYE EXAM (SIMPLE-NONBILLABLE)     H01.02B     7. Intermediate stage nonexudative age-related macular degeneration of both eyes H35.3132 EYE EXAM (SIMPLE-NONBILLABLE)   8. Pseudophakia of both eyes Z96.1         IOP too high right eye    Attending Physician Attestation:  Complete documentation of historical and exam elements from today's encounter can be found in the full encounter summary report (not reduplicated in this progress note).  I personally obtained the chief complaint(s) and history of present illness.  I confirmed and edited as necessary the review of systems, past medical/surgical history, family history, social history, and examination findings as documented by others; and I examined the patient myself.  I personally reviewed the relevant tests, images, and reports as documented above.  I formulated and edited as necessary the assessment and plan and discussed the findings and management plan with the patient and family. - Nghia Lara MD

## 2020-11-10 ENCOUNTER — ANCILLARY PROCEDURE (OUTPATIENT)
Dept: GENERAL RADIOLOGY | Facility: CLINIC | Age: 85
End: 2020-11-10
Attending: PREVENTIVE MEDICINE
Payer: COMMERCIAL

## 2020-11-10 DIAGNOSIS — M50.30 DDD (DEGENERATIVE DISC DISEASE), CERVICAL: ICD-10-CM

## 2020-11-10 DIAGNOSIS — M50.20 CERVICAL DISC HERNIATION: ICD-10-CM

## 2020-11-10 PROCEDURE — 62321 NJX INTERLAMINAR CRV/THRC: CPT | Performed by: RADIOLOGY

## 2020-11-10 RX ORDER — LIDOCAINE HYDROCHLORIDE 10 MG/ML
5 INJECTION, SOLUTION EPIDURAL; INFILTRATION; INTRACAUDAL; PERINEURAL ONCE
Status: COMPLETED | OUTPATIENT
Start: 2020-11-10 | End: 2020-11-10

## 2020-11-10 RX ORDER — BETAMETHASONE SODIUM PHOSPHATE AND BETAMETHASONE ACETATE 3; 3 MG/ML; MG/ML
6 INJECTION, SUSPENSION INTRA-ARTICULAR; INTRALESIONAL; INTRAMUSCULAR; SOFT TISSUE ONCE
Status: COMPLETED | OUTPATIENT
Start: 2020-11-10 | End: 2020-11-10

## 2020-11-10 RX ORDER — IOPAMIDOL 408 MG/ML
10 INJECTION, SOLUTION INTRATHECAL ONCE
Status: COMPLETED | OUTPATIENT
Start: 2020-11-10 | End: 2020-11-10

## 2020-11-10 RX ADMIN — BETAMETHASONE SODIUM PHOSPHATE AND BETAMETHASONE ACETATE 18 MG: 3; 3 INJECTION, SUSPENSION INTRA-ARTICULAR; INTRALESIONAL; INTRAMUSCULAR; SOFT TISSUE at 10:48

## 2020-11-10 RX ADMIN — LIDOCAINE HYDROCHLORIDE 5 ML: 10 INJECTION, SOLUTION EPIDURAL; INFILTRATION; INTRACAUDAL; PERINEURAL at 10:48

## 2020-11-10 RX ADMIN — IOPAMIDOL 1 ML: 408 INJECTION, SOLUTION INTRATHECAL at 10:48

## 2020-12-01 ENCOUNTER — OFFICE VISIT (OUTPATIENT)
Dept: OTOLARYNGOLOGY | Facility: CLINIC | Age: 85
End: 2020-12-01
Payer: COMMERCIAL

## 2020-12-01 VITALS
OXYGEN SATURATION: 98 % | SYSTOLIC BLOOD PRESSURE: 99 MMHG | RESPIRATION RATE: 20 BRPM | DIASTOLIC BLOOD PRESSURE: 65 MMHG | HEART RATE: 75 BPM

## 2020-12-01 DIAGNOSIS — K21.9 LPRD (LARYNGOPHARYNGEAL REFLUX DISEASE): Primary | ICD-10-CM

## 2020-12-01 PROCEDURE — 31575 DIAGNOSTIC LARYNGOSCOPY: CPT | Performed by: OTOLARYNGOLOGY

## 2020-12-01 PROCEDURE — 99213 OFFICE O/P EST LOW 20 MIN: CPT | Mod: 25 | Performed by: OTOLARYNGOLOGY

## 2020-12-01 ASSESSMENT — PAIN SCALES - GENERAL: PAINLEVEL: NO PAIN (0)

## 2020-12-01 NOTE — PROGRESS NOTES
HPI    This is an 83 year old patient who is here for the f/u. He continues to have soft voice. Denies any n/v, difficulty swallowing, vision issues, weakness or numbness. His BP is always low. He did have similar episode of vertigo more than a decade ago. He has a hx of  service, laryngeal ca,  And radiotherapy. Describes hoarseness for the past several years and he tried PPIs with some benefit. He has no hx of difficulty swallowing or weight changes.     Review of Systems   Constitutional: Negative.    HENT: Positive for hearing loss and tinnitus. Negative for congestion, ear discharge, ear pain, nosebleeds, sinus pain and sore throat.    Eyes: Negative for blurred vision and double vision.   Respiratory: Negative for cough and hemoptysis.    Gastrointestinal: Negative for heartburn, nausea and vomiting.   Skin: Negative.    Neurological: Positive for dizziness. Negative for tingling, tremors and headaches.   Endo/Heme/Allergies: Negative for environmental allergies. Does not bruise/bleed easily.         Physical Exam   Constitutional: He appears well-developed and well-nourished.   HENT:   Head: Normocephalic and atraumatic.   Right Ear: Tympanic membrane, external ear and ear canal normal. No drainage, swelling or tenderness. No middle ear effusion. Decreased hearing is noted.   Left Ear: Tympanic membrane, external ear and ear canal normal. No drainage, swelling or tenderness.  No middle ear effusion. Decreased hearing is noted.   Nose: Mucosal edema, rhinorrhea and septal deviation present.   Mouth/Throat: Uvula is midline, oropharynx is clear and moist and mucous membranes are normal.   Eyes: Pupils are equal, round, and reactive to light. EOM are normal.   Neck: Normal range of motion. Neck supple.       Diagnostic nasal endoscopy:     He was seen in the room and identified. Pros and cons of the procedure were explained to the patient. The procedure and its alternatives were explained to the patient  in lay terms. His questions were answered. His symptoms required a diagnostic endoscopic evaluation under local anesthesia. After obtaining an informed consent from the patient, 1% Lidocaine with 1: 100.000 epinephrine spray was applied to each nostril. Then a flexible scopic exam was performed. Ostiomeatal complexes are free of disease. No pus or polyp seen. Nasopharynx is normal. Rosenmüller fossa and torus tubarius are normal. Epiglottis, hypopharynx, false vocal folds are normal. No pooling in pyriform fossae. Vocal cords are mobile and close with a gap. Interarytenoid and post cricoid edema were seen otherwise no mass. His voice is breathy. He tolerated the procedure well and left the room with no complications.      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 I will continue with his reflux medication, Pantoprazole 40 mg once a day, Guaifenesin 600 mg. Once a day. I will see him in the f/u in 4 months. His questions were answered.

## 2020-12-01 NOTE — LETTER
12/1/2020         RE: Preston Cai  [address]  [place] [address]        Dear Colleague,    Thank you for referring your patient, Preston Cai, to the [facility]. Please see a copy of my visit note below.    HPI    This is an 83 year old patient who is here for the f/u. He continues to have soft voice. Denies any n/v, difficulty swallowing, vision issues, weakness or numbness. His BP is always low. He did have similar episode of vertigo more than a decade ago. He has a hx of  service, laryngeal ca,  And radiotherapy. Describes hoarseness for the past several years and he tried PPIs with some benefit. He has no hx of difficulty swallowing or weight changes.     Review of Systems   Constitutional: Negative.    HENT: Positive for hearing loss and tinnitus. Negative for congestion, ear discharge, ear pain, nosebleeds, sinus pain and sore throat.    Eyes: Negative for blurred vision and double vision.   Respiratory: Negative for cough and hemoptysis.    Gastrointestinal: Negative for heartburn, nausea and vomiting.   Skin: Negative.    Neurological: Positive for dizziness. Negative for tingling, tremors and headaches.   Endo/Heme/Allergies: Negative for environmental allergies. Does not bruise/bleed easily.         Physical Exam   Constitutional: He appears well-developed and well-nourished.   HENT:   Head: Normocephalic and atraumatic.   Right Ear: Tympanic membrane, external ear and ear canal normal. No drainage, swelling or tenderness. No middle ear effusion. Decreased hearing is noted.   Left Ear: Tympanic membrane, external ear and ear canal normal. No drainage, swelling or tenderness.  No middle ear effusion. Decreased hearing is noted.   Nose: Mucosal edema, rhinorrhea and septal deviation present.   Mouth/Throat: Uvula is midline, oropharynx is clear and moist and mucous membranes are normal.   Eyes: Pupils are equal, round, and reactive to light.  EOM are normal.   Neck: Normal range of motion. Neck supple.       Diagnostic nasal endoscopy:     He was seen in the room and identified. Pros and cons of the procedure were explained to the patient. The procedure and its alternatives were explained to the patient in lay terms. His questions were answered. His symptoms required a diagnostic endoscopic evaluation under local anesthesia. After obtaining an informed consent from the patient, 1% Lidocaine with 1: 100.000 epinephrine spray was applied to each nostril. Then a flexible scopic exam was performed. Ostiomeatal complexes are free of disease. No pus or polyp seen. Nasopharynx is normal. Rosenmüller fossa and torus tubarius are normal. Epiglottis, hypopharynx, false vocal folds are normal. No pooling in pyriform fossae. Vocal cords are mobile and close with a gap. Interarytenoid and post cricoid edema were seen otherwise no mass. His voice is breathy. He tolerated the procedure well and left the room with no complications.      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 I will continue with his reflux medication, Pantoprazole 40 mg once a day, Guaifenesin 600 mg. Once a day. I will see him in the f/u in 4 months. His questions were answered.        Again, thank you for allowing me to participate in the care of your patient.        Sincerely,        Tosha Ron MD

## 2020-12-01 NOTE — NURSING NOTE
"Preston Cai's goals for this visit include:   Chief Complaint   Patient presents with     Follow Up     Pt states that throat is almost sore all the time. He is not having the \"bitter stuff\" coming up any more, but during the night things get worse then in the morning he gets mucous. Also states is getting more hoarse.      He requests these members of his care team be copied on today's visit information:     PCP: Tyrel Manning    Referring Provider:  No referring provider defined for this encounter.    BP 99/65 (BP Location: Right arm, Patient Position: Sitting, Cuff Size: Adult Regular)   Pulse 75   Resp 20   SpO2 98%     Do you need any medication refills at today's visit? No    Tierra Mcdaniel LPN      "

## 2020-12-16 ENCOUNTER — VIRTUAL VISIT (OUTPATIENT)
Dept: ORTHOPEDICS | Facility: CLINIC | Age: 85
End: 2020-12-16
Payer: COMMERCIAL

## 2020-12-16 DIAGNOSIS — M51.16 LUMBAR DISC HERNIATION WITH RADICULOPATHY: Primary | ICD-10-CM

## 2020-12-16 PROCEDURE — 99442 PR PHYSICIAN TELEPHONE EVALUATION 11-20 MIN: CPT | Mod: 95 | Performed by: PREVENTIVE MEDICINE

## 2020-12-16 RX ORDER — DICLOFENAC SODIUM 75 MG/1
75 TABLET, DELAYED RELEASE ORAL 2 TIMES DAILY PRN
Qty: 60 TABLET | Refills: 1 | Status: SHIPPED | OUTPATIENT
Start: 2020-12-16 | End: 2021-05-05

## 2020-12-16 NOTE — LETTER
"    12/16/2020         RE: Preston Cai  [address]  [facility] [address]        Dear Colleague,    Thank you for referring your patient, Preston Cai, to the [facility] [place]. Please see a copy of my visit note below.    Preston Cai is a 84 year old male who is being evaluated via a billable telephone visit.      The patient has been notified of following:     \"This telephone visit will be conducted via a call between you and your physician/provider. We have found that certain health care needs can be provided without the need for a physical exam.  This service lets us provide the care you need with a short phone conversation.  If a prescription is necessary we can send it directly to your pharmacy.  If lab work is needed we can place an order for that and you can then stop by our lab to have the test done at a later time.    Telephone visits are billed at different rates depending on your insurance coverage. During this emergency period, for some insurers they may be billed the same as an in-person visit.  Please reach out to your insurance provider with any questions.    If during the course of the call the physician/provider feels a telephone visit is not appropriate, you will not be charged for this service.\"    Patient has given verbal consent for Telephone visit?  Yes    What phone number would you like to be contacted at? cell    How would you like to obtain your AVS? MyChart  HISTORY OF PRESENT ILLNESS  Mr. Cai is a pleasant 84 year old year old male who presents to followup for low back and neck pain  Preston explains that he had several weeks of improvement after his cervical and lumbar injections, but they have worn off    Location: neck and low back  Quality:  achy pain    Severity: 5/10 at worst    Duration: past year  Timing: occurs intermittently  Context: occurs while exercising and lifting  Modifying factors:  resting and non-use " makes it better, movement and use makes it worse  Associated signs & symptoms: radiation from low back into buttocks  MEDICAL HISTORY  Patient Active Problem List   Diagnosis     Seborrheic dermatitis     Allergic rhinitis due to other allergen     Cervical radiculopathy at C6     Personal history of malignant neoplasm of larynx     Essential tremor     CARDIOVASCULAR SCREENING; LDL GOAL LESS THAN 160     Benign prostatic hyperplasia     Hoarse voice quality     Advanced directives, counseling/discussion     Pseudophakia     Gastroesophageal reflux disease, esophagitis presence not specified     Macular degeneration     Supraspinatus sprain, right, initial encounter     CARDIOVASCULAR SCREENING; LDL GOAL LESS THAN 100     Vitamin D deficiency     Chronic midline low back pain without sciatica       Current Outpatient Medications   Medication Sig Dispense Refill     diclofenac (VOLTAREN) 75 MG EC tablet Take 1 tablet (75 mg) by mouth 2 times daily as needed (Patient not taking: Reported on 12/1/2020) 40 tablet 1     gabapentin (NEURONTIN) 100 MG capsule 3 capsules each evening 270 capsule 2     methylPREDNISolone (MEDROL) 4 MG tablet therapy pack Follow Package Directions (Patient not taking: Reported on 10/5/2020) 21 tablet 0     pantoprazole (PROTONIX) 40 MG EC tablet Take 1 tablet (40 mg) by mouth daily 90 tablet 3     primidone (MYSOLINE) 250 MG tablet Take 1 tablet (250 mg) by mouth At Bedtime 90 tablet 3     tamsulosin (FLOMAX) 0.4 MG capsule TAKE TWO CAPSULES BY MOUTH ONCE DAILY 180 capsule 3     triamcinolone (KENALOG) 0.1 % external cream Apply twice daily for rash on shoulders until resolved 454 g 3     VITAMIN D, CHOLECALCIFEROL, PO Take 1,000 Units by mouth daily         Allergies   Allergen Reactions     Seasonal Allergies      Hay fever        Family History   Problem Relation Age of Onset     Cerebrovascular Disease Mother         at 84 yo - possibly TIA - befoer     Macular Degeneration Mother       Gastrointestinal Disease Father      Cancer - colorectal Father      Hypertension Father      Cancer Father      Cancer Other      C.A.D. No family hx of      Diabetes No family hx of      Prostate Cancer No family hx of      Glaucoma No family hx of      Thyroid Disease No family hx of      Social History     Socioeconomic History     Marital status:      Spouse name: Edyta Cai     Number of children: Not on file     Years of education: Not on file     Highest education level: Not on file   Occupational History     Employer: RETIRED   Social Needs     Financial resource strain: Not on file     Food insecurity     Worry: Not on file     Inability: Not on file     Transportation needs     Medical: Not on file     Non-medical: Not on file   Tobacco Use     Smoking status: Former Smoker     Smokeless tobacco: Never Used     Tobacco comment: 1969   Substance and Sexual Activity     Alcohol use: Yes     Alcohol/week: 0.0 standard drinks     Comment: A beer once in awhile     Drug use: No     Sexual activity: Yes     Partners: Female     Birth control/protection: None   Lifestyle     Physical activity     Days per week: Not on file     Minutes per session: Not on file     Stress: Not on file   Relationships     Social connections     Talks on phone: Not on file     Gets together: Not on file     Attends Mu-ism service: Not on file     Active member of club or organization: Not on file     Attends meetings of clubs or organizations: Not on file     Relationship status: Not on file     Intimate partner violence     Fear of current or ex partner: Not on file     Emotionally abused: Not on file     Physically abused: Not on file     Forced sexual activity: Not on file   Other Topics Concern     Parent/sibling w/ CABG, MI or angioplasty before 65F 55M? No   Social History Narrative    seen by Jose M Diaz. lives in [facility].  to Edyta Cai.    2 sons and 2 daughters    Cancer father    Stroke mother         Daughter in [facility]    Son in [place]    Daughter in [place]    Son in [place]           Additional medical/Social/Surgical histories reviewed in [facility] and updated as appropriate.     REVIEW OF SYSTEMS (12/16/2020)  10 point ROS of systems including Constitutional, Eyes, Respiratory, Cardiovascular, Gastroenterology, Genitourinary, Integumentary, Musculoskeletal, Psychiatric, Allergic/Immunologic were all negative except for pertinent positives noted in my HPI.       ASSESSMENT & PLAN  83 yo male with cervical and lumbar ddd, radicular pain, not resolved  Reviewed lumbar MRI: shows ddd  Ordered a lumbar JULISA  F/u with me in person after that  Given RX for voltaren bid PRN    Mau Sage MD, CAQSM    Phone call duration: 14 minutes  Phone call start: 3:40pm  Phone call end: 3:54pm  Mau Sage MD        Again, thank you for allowing me to participate in the care of your patient.        Sincerely,        Mau Sage MD

## 2020-12-16 NOTE — PROGRESS NOTES
"Preston Cai is a 84 year old male who is being evaluated via a billable telephone visit.      The patient has been notified of following:     \"This telephone visit will be conducted via a call between you and your physician/provider. We have found that certain health care needs can be provided without the need for a physical exam.  This service lets us provide the care you need with a short phone conversation.  If a prescription is necessary we can send it directly to your pharmacy.  If lab work is needed we can place an order for that and you can then stop by our lab to have the test done at a later time.    Telephone visits are billed at different rates depending on your insurance coverage. During this emergency period, for some insurers they may be billed the same as an in-person visit.  Please reach out to your insurance provider with any questions.    If during the course of the call the physician/provider feels a telephone visit is not appropriate, you will not be charged for this service.\"    Patient has given verbal consent for Telephone visit?  Yes    What phone number would you like to be contacted at? cell    How would you like to obtain your AVS? MyChart  HISTORY OF PRESENT ILLNESS  Mr. Cai is a pleasant 84 year old year old male who presents to followup for low back and neck pain  Preston explains that he had several weeks of improvement after his cervical and lumbar injections, but they have worn off    Location: neck and low back  Quality:  achy pain    Severity: 5/10 at worst    Duration: past year  Timing: occurs intermittently  Context: occurs while exercising and lifting  Modifying factors:  resting and non-use makes it better, movement and use makes it worse  Associated signs & symptoms: radiation from low back into buttocks  MEDICAL HISTORY  Patient Active Problem List   Diagnosis     Seborrheic dermatitis     Allergic rhinitis due to other allergen     Cervical radiculopathy at C6     " Personal history of malignant neoplasm of larynx     Essential tremor     CARDIOVASCULAR SCREENING; LDL GOAL LESS THAN 160     Benign prostatic hyperplasia     Hoarse voice quality     Advanced directives, counseling/discussion     Pseudophakia     Gastroesophageal reflux disease, esophagitis presence not specified     Macular degeneration     Supraspinatus sprain, right, initial encounter     CARDIOVASCULAR SCREENING; LDL GOAL LESS THAN 100     Vitamin D deficiency     Chronic midline low back pain without sciatica       Current Outpatient Medications   Medication Sig Dispense Refill     diclofenac (VOLTAREN) 75 MG EC tablet Take 1 tablet (75 mg) by mouth 2 times daily as needed (Patient not taking: Reported on 12/1/2020) 40 tablet 1     gabapentin (NEURONTIN) 100 MG capsule 3 capsules each evening 270 capsule 2     methylPREDNISolone (MEDROL) 4 MG tablet therapy pack Follow Package Directions (Patient not taking: Reported on 10/5/2020) 21 tablet 0     pantoprazole (PROTONIX) 40 MG EC tablet Take 1 tablet (40 mg) by mouth daily 90 tablet 3     primidone (MYSOLINE) 250 MG tablet Take 1 tablet (250 mg) by mouth At Bedtime 90 tablet 3     tamsulosin (FLOMAX) 0.4 MG capsule TAKE TWO CAPSULES BY MOUTH ONCE DAILY 180 capsule 3     triamcinolone (KENALOG) 0.1 % external cream Apply twice daily for rash on shoulders until resolved 454 g 3     VITAMIN D, CHOLECALCIFEROL, PO Take 1,000 Units by mouth daily         Allergies   Allergen Reactions     Seasonal Allergies      Hay fever        Family History   Problem Relation Age of Onset     Cerebrovascular Disease Mother         at 86 yo - possibly TIA - befoer     Macular Degeneration Mother      Gastrointestinal Disease Father      Cancer - colorectal Father      Hypertension Father      Cancer Father      Cancer Other      C.A.D. No family hx of      Diabetes No family hx of      Prostate Cancer No family hx of      Glaucoma No family hx of      Thyroid Disease No family hx  of      Social History     Socioeconomic History     Marital status:      Spouse name: Edyta Cai     Number of children: Not on file     Years of education: Not on file     Highest education level: Not on file   Occupational History     Employer: RETIRED   Social Needs     Financial resource strain: Not on file     Food insecurity     Worry: Not on file     Inability: Not on file     Transportation needs     Medical: Not on file     Non-medical: Not on file   Tobacco Use     Smoking status: Former Smoker     Smokeless tobacco: Never Used     Tobacco comment: 1969   Substance and Sexual Activity     Alcohol use: Yes     Alcohol/week: 0.0 standard drinks     Comment: A beer once in awhile     Drug use: No     Sexual activity: Yes     Partners: Female     Birth control/protection: None   Lifestyle     Physical activity     Days per week: Not on file     Minutes per session: Not on file     Stress: Not on file   Relationships     Social connections     Talks on phone: Not on file     Gets together: Not on file     Attends Sabianism service: Not on file     Active member of club or organization: Not on file     Attends meetings of clubs or organizations: Not on file     Relationship status: Not on file     Intimate partner violence     Fear of current or ex partner: Not on file     Emotionally abused: Not on file     Physically abused: Not on file     Forced sexual activity: Not on file   Other Topics Concern     Parent/sibling w/ CABG, MI or angioplasty before 65F 55M? No   Social History Narrative    seen by Jose M Diaz. lives in [facility].  to Edyta Cai.    2 sons and 2 daughters    Cancer father    Stroke mother        Daughter in [facility] [facility]    Son in [place]    Daughter in [place]    Son in [place]           Additional medical/Social/Surgical histories reviewed in [facility] and updated as appropriate.     REVIEW OF SYSTEMS (12/16/2020)  10 point ROS of systems including Constitutional,  Eyes, Respiratory, Cardiovascular, Gastroenterology, Genitourinary, Integumentary, Musculoskeletal, Psychiatric, Allergic/Immunologic were all negative except for pertinent positives noted in my HPI.       ASSESSMENT & PLAN  85 yo male with cervical and lumbar ddd, radicular pain, not resolved  Reviewed lumbar MRI: shows ddd  Ordered a lumbar JULISA  F/u with me in person after that  Given RX for voltaren bid PRN    Mau Sage MD, CAQSM    Phone call duration: 14 minutes  Phone call start: 3:40pm  Phone call end: 3:54pm  Mau Sage MD

## 2020-12-16 NOTE — LETTER
"    12/16/2020         RE: Preston Cai  [address]  [facility] [address]        Dear Colleague,    Thank you for referring your patient, Preston Cai, to the [facility] [place]. Please see a copy of my visit note below.    Preston Cai is a 84 year old male who is being evaluated via a billable telephone visit.      The patient has been notified of following:     \"This telephone visit will be conducted via a call between you and your physician/provider. We have found that certain health care needs can be provided without the need for a physical exam.  This service lets us provide the care you need with a short phone conversation.  If a prescription is necessary we can send it directly to your pharmacy.  If lab work is needed we can place an order for that and you can then stop by our lab to have the test done at a later time.    Telephone visits are billed at different rates depending on your insurance coverage. During this emergency period, for some insurers they may be billed the same as an in-person visit.  Please reach out to your insurance provider with any questions.    If during the course of the call the physician/provider feels a telephone visit is not appropriate, you will not be charged for this service.\"    Patient has given verbal consent for Telephone visit?  Yes    What phone number would you like to be contacted at? cell    How would you like to obtain your AVS? MyChart  HISTORY OF PRESENT ILLNESS  Mr. Cai is a pleasant 84 year old year old male who presents to followup for low back and neck pain  Preston explains that he had several weeks of improvement after his cervical and lumbar injections, but they have worn off    Location: neck and low back  Quality:  achy pain    Severity: 5/10 at worst    Duration: past year  Timing: occurs intermittently  Context: occurs while exercising and lifting  Modifying factors:  resting and non-use " makes it better, movement and use makes it worse  Associated signs & symptoms: radiation from low back into buttocks  MEDICAL HISTORY  Patient Active Problem List   Diagnosis     Seborrheic dermatitis     Allergic rhinitis due to other allergen     Cervical radiculopathy at C6     Personal history of malignant neoplasm of larynx     Essential tremor     CARDIOVASCULAR SCREENING; LDL GOAL LESS THAN 160     Benign prostatic hyperplasia     Hoarse voice quality     Advanced directives, counseling/discussion     Pseudophakia     Gastroesophageal reflux disease, esophagitis presence not specified     Macular degeneration     Supraspinatus sprain, right, initial encounter     CARDIOVASCULAR SCREENING; LDL GOAL LESS THAN 100     Vitamin D deficiency     Chronic midline low back pain without sciatica       Current Outpatient Medications   Medication Sig Dispense Refill     diclofenac (VOLTAREN) 75 MG EC tablet Take 1 tablet (75 mg) by mouth 2 times daily as needed (Patient not taking: Reported on 12/1/2020) 40 tablet 1     gabapentin (NEURONTIN) 100 MG capsule 3 capsules each evening 270 capsule 2     methylPREDNISolone (MEDROL) 4 MG tablet therapy pack Follow Package Directions (Patient not taking: Reported on 10/5/2020) 21 tablet 0     pantoprazole (PROTONIX) 40 MG EC tablet Take 1 tablet (40 mg) by mouth daily 90 tablet 3     primidone (MYSOLINE) 250 MG tablet Take 1 tablet (250 mg) by mouth At Bedtime 90 tablet 3     tamsulosin (FLOMAX) 0.4 MG capsule TAKE TWO CAPSULES BY MOUTH ONCE DAILY 180 capsule 3     triamcinolone (KENALOG) 0.1 % external cream Apply twice daily for rash on shoulders until resolved 454 g 3     VITAMIN D, CHOLECALCIFEROL, PO Take 1,000 Units by mouth daily         Allergies   Allergen Reactions     Seasonal Allergies      Hay fever        Family History   Problem Relation Age of Onset     Cerebrovascular Disease Mother         at 86 yo - possibly TIA - befoer     Macular Degeneration Mother       Gastrointestinal Disease Father      Cancer - colorectal Father      Hypertension Father      Cancer Father      Cancer Other      C.A.D. No family hx of      Diabetes No family hx of      Prostate Cancer No family hx of      Glaucoma No family hx of      Thyroid Disease No family hx of      Social History     Socioeconomic History     Marital status:      Spouse name: Edyta Cai     Number of children: Not on file     Years of education: Not on file     Highest education level: Not on file   Occupational History     Employer: RETIRED   Social Needs     Financial resource strain: Not on file     Food insecurity     Worry: Not on file     Inability: Not on file     Transportation needs     Medical: Not on file     Non-medical: Not on file   Tobacco Use     Smoking status: Former Smoker     Smokeless tobacco: Never Used     Tobacco comment: 1969   Substance and Sexual Activity     Alcohol use: Yes     Alcohol/week: 0.0 standard drinks     Comment: A beer once in awhile     Drug use: No     Sexual activity: Yes     Partners: Female     Birth control/protection: None   Lifestyle     Physical activity     Days per week: Not on file     Minutes per session: Not on file     Stress: Not on file   Relationships     Social connections     Talks on phone: Not on file     Gets together: Not on file     Attends Anabaptist service: Not on file     Active member of club or organization: Not on file     Attends meetings of clubs or organizations: Not on file     Relationship status: Not on file     Intimate partner violence     Fear of current or ex partner: Not on file     Emotionally abused: Not on file     Physically abused: Not on file     Forced sexual activity: Not on file   Other Topics Concern     Parent/sibling w/ CABG, MI or angioplasty before 65F 55M? No   Social History Narrative    seen by Jose M Diaz. lives in [facility].  to Edyta Cai.    2 sons and 2 daughters    Cancer father    Stroke mother         Daughter in [facility]    Son in [place]    Daughter in [place]    Son in [place]           Additional medical/Social/Surgical histories reviewed in [facility] and updated as appropriate.     REVIEW OF SYSTEMS (12/16/2020)  10 point ROS of systems including Constitutional, Eyes, Respiratory, Cardiovascular, Gastroenterology, Genitourinary, Integumentary, Musculoskeletal, Psychiatric, Allergic/Immunologic were all negative except for pertinent positives noted in my HPI.       ASSESSMENT & PLAN  85 yo male with cervical and lumbar ddd, radicular pain, not resolved  Reviewed lumbar MRI: shows ddd  Ordered a lumbar JULISA  F/u with me in person after that  Given RX for voltaren bid PRN    Mau Sage MD, CAQSM    Phone call duration: 14 minutes  Phone call start: 3:40pm  Phone call end: 3:54pm  Mau Sage MD        Again, thank you for allowing me to participate in the care of your patient.        Sincerely,        Mau Sage MD

## 2020-12-18 DIAGNOSIS — Z11.59 ENCOUNTER FOR SCREENING FOR OTHER VIRAL DISEASES: Primary | ICD-10-CM

## 2021-01-11 ENCOUNTER — OFFICE VISIT (OUTPATIENT)
Dept: DERMATOLOGY | Facility: CLINIC | Age: 86
End: 2021-01-11
Payer: COMMERCIAL

## 2021-01-11 DIAGNOSIS — L82.0 SEBORRHEIC KERATOSES, INFLAMED: ICD-10-CM

## 2021-01-11 DIAGNOSIS — Z11.59 ENCOUNTER FOR SCREENING FOR OTHER VIRAL DISEASES: ICD-10-CM

## 2021-01-11 DIAGNOSIS — Z85.828 HISTORY OF NONMELANOMA SKIN CANCER: Primary | ICD-10-CM

## 2021-01-11 DIAGNOSIS — L11.1 GROVER'S DISEASE: ICD-10-CM

## 2021-01-11 PROCEDURE — 99213 OFFICE O/P EST LOW 20 MIN: CPT | Mod: 25 | Performed by: DERMATOLOGY

## 2021-01-11 PROCEDURE — U0003 INFECTIOUS AGENT DETECTION BY NUCLEIC ACID (DNA OR RNA); SEVERE ACUTE RESPIRATORY SYNDROME CORONAVIRUS 2 (SARS-COV-2) (CORONAVIRUS DISEASE [COVID-19]), AMPLIFIED PROBE TECHNIQUE, MAKING USE OF HIGH THROUGHPUT TECHNOLOGIES AS DESCRIBED BY CMS-2020-01-R: HCPCS | Performed by: PREVENTIVE MEDICINE

## 2021-01-11 PROCEDURE — U0005 INFEC AGEN DETEC AMPLI PROBE: HCPCS | Performed by: PREVENTIVE MEDICINE

## 2021-01-11 PROCEDURE — 17110 DESTRUCTION B9 LES UP TO 14: CPT | Performed by: DERMATOLOGY

## 2021-01-11 ASSESSMENT — PAIN SCALES - GENERAL: PAINLEVEL: NO PAIN (0)

## 2021-01-11 NOTE — PROGRESS NOTES
[facility] Dermatology Note  Encounter Date: Jan 11, 2021  Office Visit     Dermatology Problem List:  1. Actinic keratosis  - s/p cryotherapy  - pigmented AK, left jaw, s/p biopsy 11/13/18  - HAK, right hand, s/p excision 5/2009  3. Inflamed seborrheic keratosis: treated with shave removals and cryo  4. 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     ____________________________________________    Assessment & Plan:    # History of nonmelanoma skin cancer, no clincial evidence of recurrence.   - ABCDEs: Counseled ABCDEs of melanoma: Asymmetry, Border (irregularity), Color (not uniform, changes in color), Diameter (greater than 6 mm which is about the size of a pencil eraser), and Evolving (any changes in preexisting moles).  - Sun protection: Counseled SPF30+ sunscreen, UPF clothing, sun avoidance, tanning bed avoidance.     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   - Resolved; continue triamcinolone cream as needed.   - Follow up if symptoms worsen.    # Seborrheic keratosis, inflamed.   - See procedure note.    Procedures Performed:   - Cryotherapy procedure note: After verbal consent and discussion of risks and benefits including but no limited to dyspigmentation/scar, blister, and pain, 4 lesions was(were) treated with 1-2mm freeze border for 3 cycles with liquid nitrogen. Post cryotherapy instructions were provided.     Follow-up: 6 month(s) for a skin check.    Staff and Scribe:     Scribe Disclosure:   I, Vinny Gerard, am serving as a scribe to document services personally performed by this physician, Dr. Andry Oseguera, based on data collection and the provider's statements to me.     Provider Disclosure:   The documentation recorded by the scribe accurately reflects the services I personally performed and the decisions made by me.    Andry Oseguera MD  Assistant  Professor  Department of Dermatology  [facility]: Phone: [phone], Fax:[phone]  [facility]: Phone: [phone] Fax: [phone]    ____________________________________________    CC: Derm Problem (3 months to recheck right ear, moles on left cheek, then a mole on left back, hx of NMSC. Patient reported itching of upper shoulders and back resolved. )    HPI:  Mr. Preston Cai is a(n) 85 year old male who presents today as a return patient for a recheck of his right ear. Last seen on 10/05/2020 when a 5 mm rough, scaly papule was noted on the right mid to inferior helix. Multiple AKs and inflamed SKs were treated with cryo.    Today, he has areas of concern on the left jaw line and left back. These areas are not particularly bothersome. He also reports the rash on his upper shoulders and back has resolved. He occasionally uses the triamcinolone cream for treatment of symptoms.    Patient is otherwise feeling well, without additional concerns.    ROS: As per HPI    Labs:  None reviewed.    Physical Exam:  Vitals: There were no vitals taken for this visit.  SKIN: Focused examination of right ear, left jaw line, back was performed.  - There are waxy stuck on tan to brown papules on the left jaw line, back and right flank.   - No other lesions of concern on areas examined.     Medications:  Current Outpatient Medications   Medication     diclofenac (VOLTAREN) 75 MG EC tablet     diclofenac (VOLTAREN) 75 MG EC tablet     gabapentin (NEURONTIN) 100 MG capsule     methylPREDNISolone (MEDROL) 4 MG tablet therapy pack     pantoprazole (PROTONIX) 40 MG EC tablet     primidone (MYSOLINE) 250 MG tablet     tamsulosin (FLOMAX) 0.4 MG capsule     triamcinolone (KENALOG) 0.1 % external cream     VITAMIN D, CHOLECALCIFEROL, PO     No current facility-administered medications for this visit.       Past  Medical/Surgical History:   Patient Active Problem List   Diagnosis     Seborrheic dermatitis     Allergic rhinitis due to other allergen     Cervical radiculopathy at C6     Personal history of malignant neoplasm of larynx     Essential tremor     CARDIOVASCULAR SCREENING; LDL GOAL LESS THAN 160     Benign prostatic hyperplasia     Hoarse voice quality     Advanced directives, counseling/discussion     Pseudophakia     Gastroesophageal reflux disease, esophagitis presence not specified     Macular degeneration     Supraspinatus sprain, right, initial encounter     CARDIOVASCULAR SCREENING; LDL GOAL LESS THAN 100     Vitamin D deficiency     Chronic midline low back pain without sciatica     Past Medical History:   Diagnosis Date     Acid reflux disease      Allergic rhinitis, cause unspecified      H/O magnetic resonance imaging of brain and brain stem 10/10/2016    MRI BRAIN WITH AND WITHOUT CONTRAST August 17, 2009 8:07:00 PM   HISTORY: Severe dizzy spells with imbalance and vomiting.   TECHNIQUE: Routine pulse sequences without and with contrast were performed including thin section images through the IACs. 20 mL Magnevist given.   FINDINGS: Diffusion-weighted images are normal. 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 on close of this encounter.

## 2021-01-11 NOTE — LETTER
1/11/2021         RE: Preston Cai  [address]  [place] [address]        Dear Colleague,    Thank you for referring your patient, Preston Cai, to the [facility]. Please see a copy of my visit note below.    [facility] Dermatology Note  Encounter Date: Jan 11, 2021  Office Visit     Dermatology Problem List:  1. Actinic keratosis  - s/p cryotherapy  - pigmented AK, left jaw, s/p biopsy 11/13/18  - HAK, right hand, s/p excision 5/2009  3. Inflamed seborrheic keratosis: treated with shave removals and cryo  4. 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     ____________________________________________    Assessment & Plan:    # History of nonmelanoma skin cancer, no clincial evidence of recurrence.   - ABCDEs: Counseled ABCDEs of melanoma: Asymmetry, Border (irregularity), Color (not uniform, changes in color), Diameter (greater than 6 mm which is about the size of a pencil eraser), and Evolving (any changes in preexisting moles).  - Sun protection: Counseled SPF30+ sunscreen, UPF clothing, sun avoidance, tanning bed avoidance.     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   - Resolved; continue triamcinolone cream as needed.   - Follow up if symptoms worsen.    # Seborrheic keratosis, inflamed.   - See procedure note.    Procedures Performed:   - Cryotherapy procedure note: After verbal consent and discussion of risks and benefits including but no limited to dyspigmentation/scar, blister, and pain, 4 lesions was(were) treated with 1-2mm freeze border for 3 cycles with liquid nitrogen. Post cryotherapy instructions were provided.     Follow-up: 6 month(s) for a skin check.    Staff and Scribe:     Scribe Disclosure:   Vinny KIM, am serving as a scribe to document services personally performed by this physician,  Dr. Andry Oseguera, based on data collection and the provider's statements to me.     Provider Disclosure:   The documentation recorded by the scribe accurately reflects the services I personally performed and the decisions made by me.    Andry Oseguera MD    Department of Dermatology  [facility]: Phone: [phone], Fax:[phone]  [facility]: Phone: [phone] Fax: [phone]    ____________________________________________    CC: Derm Problem (3 months to recheck right ear, moles on left cheek, then a mole on left back, hx of NMSC. Patient reported itching of upper shoulders and back resolved. )    HPI:  Mr. Preston Cai is a(n) 85 year old male who presents today as a return patient for a recheck of his right ear. Last seen on 10/05/2020 when a 5 mm rough, scaly papule was noted on the right mid to inferior helix. Multiple AKs and inflamed SKs were treated with cryo.    Today, he has areas of concern on the left jaw line and left back. These areas are not particularly bothersome. He also reports the rash on his upper shoulders and back has resolved. He occasionally uses the triamcinolone cream for treatment of symptoms.    Patient is otherwise feeling well, without additional concerns.    ROS: As per HPI    Labs:  None reviewed.    Physical Exam:  Vitals: There were no vitals taken for this visit.  SKIN: Focused examination of right ear, left jaw line, back was performed.  - There are waxy stuck on tan to brown papules on the left jaw line, back and right flank.   - No other lesions of concern on areas examined.     Medications:  Current Outpatient Medications   Medication     diclofenac (VOLTAREN) 75 MG EC tablet     diclofenac (VOLTAREN) 75 MG EC tablet     gabapentin (NEURONTIN) 100 MG capsule     methylPREDNISolone (MEDROL) 4 MG tablet therapy pack     pantoprazole (PROTONIX)  40 MG EC tablet     primidone (MYSOLINE) 250 MG tablet     tamsulosin (FLOMAX) 0.4 MG capsule     triamcinolone (KENALOG) 0.1 % external cream     VITAMIN D, CHOLECALCIFEROL, PO     No current facility-administered medications for this visit.       Past Medical/Surgical History:   Patient Active Problem List   Diagnosis     Seborrheic dermatitis     Allergic rhinitis due to other allergen     Cervical radiculopathy at C6     Personal history of malignant neoplasm of larynx     Essential tremor     CARDIOVASCULAR SCREENING; LDL GOAL LESS THAN 160     Benign prostatic hyperplasia     Hoarse voice quality     Advanced directives, counseling/discussion     Pseudophakia     Gastroesophageal reflux disease, esophagitis presence not specified     Macular degeneration     Supraspinatus sprain, right, initial encounter     CARDIOVASCULAR SCREENING; LDL GOAL LESS THAN 100     Vitamin D deficiency     Chronic midline low back pain without sciatica     Past Medical History:   Diagnosis Date     Acid reflux disease      Allergic rhinitis, cause unspecified      H/O magnetic resonance imaging of brain and brain stem 10/10/2016    MRI BRAIN WITH AND WITHOUT CONTRAST August 17, 2009 8:07:00 PM   HISTORY: Severe dizzy spells with imbalance and vomiting.   TECHNIQUE: Routine pulse sequences without and with contrast were performed including thin section images through the IACs. 20 mL Magnevist given.   FINDINGS: Diffusion-weighted images are normal. 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 on close of this  encounter.      Again, thank you for allowing me to participate in the care of your patient.        Sincerely,        Andry Oseguera MD

## 2021-01-11 NOTE — NURSING NOTE
Preston Cai's goals for this visit include:   Chief Complaint   Patient presents with     Derm Problem     3 months to recheck right ear, moles on left cheek, then a mole on left back, hx of NMSC. Patient reported itching of upper shoulders and back resolved.        He requests these members of his care team be copied on today's visit information: Yes     PCP: Tyrel Manning    Referring Provider:  No referring provider defined for this encounter.    There were no vitals taken for this visit.    Do you need any medication refills at today's visit? No   [id number], MEDICAL ASSISTANT

## 2021-01-11 NOTE — PATIENT INSTRUCTIONS
Cryotherapy    What is it?    Use of a very cold liquid, such as liquid nitrogen, to freeze and destroy abnormal skin cells that need to be removed    What should I expect?    Tenderness and redness    A small blister that might grow and fill with dark purple blood. There may be crusting.    More than one treatment may be needed if the lesions do not go away.    How do I care for the treated area?    Gently wash the area with your hands when bathing.    Use a thin layer of Vaseline to help with healing. You may use a Band-Aid.     The area should heal within 7-10 days and may leave behind a pink or lighter color.     Do not use an antibiotic or Neosporin ointment.     You may take acetaminophen (Tylenol) for pain.     Call your Doctor if you have:    Severe pain    Signs of infection (warmth, redness, cloudy yellow drainage, and or a bad smell)    Questions or concerns    Who should I call with questions?       [facility]: [phone]       [facility]: [phone]       For urgent needs outside of business hours call the [facility] at [phone]        and ask for the dermatology resident on call

## 2021-01-12 LAB
SARS-COV-2 RNA RESP QL NAA+PROBE: NOT DETECTED
SPECIMEN SOURCE: NORMAL

## 2021-01-15 ENCOUNTER — ANCILLARY PROCEDURE (OUTPATIENT)
Dept: GENERAL RADIOLOGY | Facility: CLINIC | Age: 86
End: 2021-01-15
Attending: PREVENTIVE MEDICINE
Payer: COMMERCIAL

## 2021-01-15 DIAGNOSIS — M51.16 LUMBAR DISC HERNIATION WITH RADICULOPATHY: ICD-10-CM

## 2021-01-15 PROCEDURE — 62323 NJX INTERLAMINAR LMBR/SAC: CPT | Performed by: RADIOLOGY

## 2021-01-15 RX ORDER — LIDOCAINE HYDROCHLORIDE 10 MG/ML
5 INJECTION, SOLUTION EPIDURAL; INFILTRATION; INTRACAUDAL; PERINEURAL ONCE
Status: COMPLETED | OUTPATIENT
Start: 2021-01-15 | End: 2021-01-15

## 2021-01-15 RX ORDER — METHYLPREDNISOLONE ACETATE 80 MG/ML
80 INJECTION, SUSPENSION INTRA-ARTICULAR; INTRALESIONAL; INTRAMUSCULAR; SOFT TISSUE ONCE
Status: COMPLETED | OUTPATIENT
Start: 2021-01-15 | End: 2021-01-15

## 2021-01-15 RX ORDER — BUPIVACAINE HYDROCHLORIDE 5 MG/ML
5 INJECTION, SOLUTION EPIDURAL; INTRACAUDAL ONCE
Status: COMPLETED | OUTPATIENT
Start: 2021-01-15 | End: 2021-01-15

## 2021-01-15 RX ORDER — IOPAMIDOL 408 MG/ML
10 INJECTION, SOLUTION INTRATHECAL ONCE
Status: COMPLETED | OUTPATIENT
Start: 2021-01-15 | End: 2021-01-15

## 2021-01-15 RX ADMIN — BUPIVACAINE HYDROCHLORIDE 10 MG: 5 INJECTION, SOLUTION EPIDURAL; INTRACAUDAL at 09:28

## 2021-01-15 RX ADMIN — METHYLPREDNISOLONE ACETATE 80 MG: 80 INJECTION, SUSPENSION INTRA-ARTICULAR; INTRALESIONAL; INTRAMUSCULAR; SOFT TISSUE at 09:29

## 2021-01-15 RX ADMIN — IOPAMIDOL 1 ML: 408 INJECTION, SOLUTION INTRATHECAL at 09:28

## 2021-01-15 RX ADMIN — LIDOCAINE HYDROCHLORIDE 5 ML: 10 INJECTION, SOLUTION EPIDURAL; INFILTRATION; INTRACAUDAL; PERINEURAL at 09:29

## 2021-01-15 NOTE — PROGRESS NOTES
: Preston was seen in X-ray today for a lumbar epidural injection. Patient rated pain before procedure 7/10. After procedure patient rated pain 0/10. This pain level is (is/is not) acceptable to patient. Patient discharged home with Wife.      AFTER YOU GO HOME    ? DO relax; minimize your activity for 24 hours  ? You may resume normal activity tomorrow  ? You may remove the bandage in the evening or next morning  ? You may resume bathing the next day  ? Drink at least 4 extra glasses of fluid today if not on fluid restrictions  ? DO NOT drive or operate machinery at home or at work for at least 24 hours      VISIT THE EMERGENCY ROOM OR URGENT CARE IF:    ? There is redness or swelling at the injection site  ? There is discharge from the injection site  ? You develop a temperature of 101  F or greater      ADDITIONAL INSTRUCTIONS:     ? You may resume your Coumadin or other blood thinner at your regular dose today.  Follow up with your physician to have your INR rechecked if indicated.  ? If you gain no relief from the injection after two (2) weeks, follow-up with your provider for your options.        Contacts:    During business hours from 8 to 5 pm, you may call [phone] to reach a nurse advisor at [facility].  After hours, call [facility]  [phone].  Ask for the Radiologist on-call.  Someone is on-call 24 hrs/day.  [facility] Toll Free Number   [phone]

## 2021-03-28 ENCOUNTER — MYC MEDICAL ADVICE (OUTPATIENT)
Dept: OTOLARYNGOLOGY | Facility: CLINIC | Age: 86
End: 2021-03-28

## 2021-05-05 ENCOUNTER — OFFICE VISIT (OUTPATIENT)
Dept: FAMILY MEDICINE | Facility: CLINIC | Age: 86
End: 2021-05-05
Payer: COMMERCIAL

## 2021-05-05 VITALS
HEIGHT: 73 IN | BODY MASS INDEX: 22 KG/M2 | OXYGEN SATURATION: 98 % | HEART RATE: 73 BPM | SYSTOLIC BLOOD PRESSURE: 126 MMHG | DIASTOLIC BLOOD PRESSURE: 68 MMHG | WEIGHT: 166 LBS

## 2021-05-05 DIAGNOSIS — R35.1 BENIGN PROSTATIC HYPERPLASIA WITH NOCTURIA: ICD-10-CM

## 2021-05-05 DIAGNOSIS — Z00.00 ROUTINE GENERAL MEDICAL EXAMINATION AT A HEALTH CARE FACILITY: Primary | ICD-10-CM

## 2021-05-05 DIAGNOSIS — Z13.6 CARDIOVASCULAR SCREENING; LDL GOAL LESS THAN 100: ICD-10-CM

## 2021-05-05 DIAGNOSIS — N40.1 BENIGN PROSTATIC HYPERPLASIA WITH NOCTURIA: ICD-10-CM

## 2021-05-05 DIAGNOSIS — E55.9 VITAMIN D INSUFFICIENCY: ICD-10-CM

## 2021-05-05 LAB
ALBUMIN SERPL-MCNC: 4.2 G/DL (ref 3.4–5)
ALP SERPL-CCNC: 70 U/L (ref 40–150)
ALT SERPL W P-5'-P-CCNC: 26 U/L (ref 0–70)
ANION GAP SERPL CALCULATED.3IONS-SCNC: 3 MMOL/L (ref 3–14)
AST SERPL W P-5'-P-CCNC: 21 U/L (ref 0–45)
BASOPHILS # BLD AUTO: 0 10E9/L (ref 0–0.2)
BASOPHILS NFR BLD AUTO: 0.2 %
BILIRUB SERPL-MCNC: 0.6 MG/DL (ref 0.2–1.3)
BUN SERPL-MCNC: 16 MG/DL (ref 7–30)
CALCIUM SERPL-MCNC: 9 MG/DL (ref 8.5–10.1)
CHLORIDE SERPL-SCNC: 101 MMOL/L (ref 94–109)
CHOLEST SERPL-MCNC: 227 MG/DL
CO2 SERPL-SCNC: 30 MMOL/L (ref 20–32)
CREAT SERPL-MCNC: 1.04 MG/DL (ref 0.66–1.25)
DIFFERENTIAL METHOD BLD: ABNORMAL
EOSINOPHIL # BLD AUTO: 0.1 10E9/L (ref 0–0.7)
EOSINOPHIL NFR BLD AUTO: 1.2 %
ERYTHROCYTE [DISTWIDTH] IN BLOOD BY AUTOMATED COUNT: 12.3 % (ref 10–15)
GFR SERPL CREATININE-BSD FRML MDRD: 65 ML/MIN/{1.73_M2}
GLUCOSE SERPL-MCNC: 94 MG/DL (ref 70–99)
HCT VFR BLD AUTO: 36.5 % (ref 40–53)
HDLC SERPL-MCNC: 77 MG/DL
HGB BLD-MCNC: 12.1 G/DL (ref 13.3–17.7)
LDLC SERPL CALC-MCNC: 131 MG/DL
LYMPHOCYTES # BLD AUTO: 1.4 10E9/L (ref 0.8–5.3)
LYMPHOCYTES NFR BLD AUTO: 32.5 %
MCH RBC QN AUTO: 32.8 PG (ref 26.5–33)
MCHC RBC AUTO-ENTMCNC: 33.2 G/DL (ref 31.5–36.5)
MCV RBC AUTO: 99 FL (ref 78–100)
MONOCYTES # BLD AUTO: 0.4 10E9/L (ref 0–1.3)
MONOCYTES NFR BLD AUTO: 8.8 %
NEUTROPHILS # BLD AUTO: 2.5 10E9/L (ref 1.6–8.3)
NEUTROPHILS NFR BLD AUTO: 57.3 %
NONHDLC SERPL-MCNC: 150 MG/DL
PLATELET # BLD AUTO: 171 10E9/L (ref 150–450)
POTASSIUM SERPL-SCNC: 4.3 MMOL/L (ref 3.4–5.3)
PROT SERPL-MCNC: 7.4 G/DL (ref 6.8–8.8)
RBC # BLD AUTO: 3.69 10E12/L (ref 4.4–5.9)
SODIUM SERPL-SCNC: 134 MMOL/L (ref 133–144)
T4 FREE SERPL-MCNC: 0.82 NG/DL (ref 0.76–1.46)
TRIGL SERPL-MCNC: 93 MG/DL
TSH SERPL DL<=0.005 MIU/L-ACNC: 2.9 MU/L (ref 0.4–4)
WBC # BLD AUTO: 4.3 10E9/L (ref 4–11)

## 2021-05-05 PROCEDURE — 82306 VITAMIN D 25 HYDROXY: CPT | Performed by: INTERNAL MEDICINE

## 2021-05-05 PROCEDURE — 80053 COMPREHEN METABOLIC PANEL: CPT | Performed by: INTERNAL MEDICINE

## 2021-05-05 PROCEDURE — 85025 COMPLETE CBC W/AUTO DIFF WBC: CPT | Performed by: INTERNAL MEDICINE

## 2021-05-05 PROCEDURE — 99397 PER PM REEVAL EST PAT 65+ YR: CPT | Performed by: INTERNAL MEDICINE

## 2021-05-05 PROCEDURE — 83921 ORGANIC ACID SINGLE QUANT: CPT | Performed by: INTERNAL MEDICINE

## 2021-05-05 PROCEDURE — 84439 ASSAY OF FREE THYROXINE: CPT | Performed by: INTERNAL MEDICINE

## 2021-05-05 PROCEDURE — 36415 COLL VENOUS BLD VENIPUNCTURE: CPT | Performed by: INTERNAL MEDICINE

## 2021-05-05 PROCEDURE — 84443 ASSAY THYROID STIM HORMONE: CPT | Performed by: INTERNAL MEDICINE

## 2021-05-05 PROCEDURE — 80061 LIPID PANEL: CPT | Performed by: INTERNAL MEDICINE

## 2021-05-05 RX ORDER — NAPROXEN SODIUM 220 MG
220-440 TABLET ORAL 2 TIMES DAILY PRN
COMMUNITY
End: 2021-12-17

## 2021-05-05 RX ORDER — TAMSULOSIN HYDROCHLORIDE 0.4 MG/1
CAPSULE ORAL
Qty: 180 CAPSULE | Refills: 3 | Status: SHIPPED | OUTPATIENT
Start: 2021-05-05 | End: 2022-01-17

## 2021-05-05 ASSESSMENT — MIFFLIN-ST. JEOR: SCORE: 1496.85

## 2021-05-05 ASSESSMENT — PAIN SCALES - GENERAL: PAINLEVEL: SEVERE PAIN (6)

## 2021-05-05 NOTE — PROGRESS NOTES
"  SUBJECTIVE:   Preston Cai is a 85 year old male who presents for Preventive Visit.    Patient has been advised of split billing requirements and indicates understanding: Yes  Are you in the first 12 months of your Medicare Part B coverage?  No    Physical Health:    In general, how would you rate your overall physical health? good    Outside of work, how many days during the week do you exercise? 4-5 days/week    Outside of work, approximately how many minutes a day do you exercise?15-30 minutes    If you drink alcohol do you typically have >3 drinks per day or >7 drinks per week? No    Do you usually eat at least 4 servings of fruit and vegetables a day, include whole grains & fiber and avoid regularly eating high fat or \"junk\" foods? NO 3    Do you have any problems taking medications regularly?  No    Do you have any side effects from medications? none    Needs assistance for the following daily activities: no assistance needed    Which of the following safety concerns are present in your home?  none identified     Hearing impairment: No    In the past 6 months, have you been bothered by leaking of urine? Yes    Less active last year due to pandemic. Noted weight loss due to sedentary lifestyle (\"loss of muscle tone\").    Back pain is better with lumbar steroid injections last December 2020. Takes Diclofenac infrequently.    Feels more tired. Sleeps within 20-30 minutes while seated (up all night due to wife's urinary urgency).    Voice is always hoarse, but it's worse, especially when he vomited due to recent vomiting from \"food poisoning\". Still has globus sensation and nasal congestion.    Takes only one tab of Primidone for tremors.    Paresthesia of both feet are worse, affecting the medial aspect of both ankles and distal aspect of both legs.      Mental Health:    In general, how would you rate your overall mental or emotional health? good  PHQ-2 Score:      Do you feel safe in your environment? " YES    Have you ever done Advance Care Planning? (For example, a Health Directive, POLST, or a discussion with a medical provider or your loved ones about your wishes): Yes, advance care planning is on file.    Additional concerns to address?  No    Fall risk:  Fallen 2 or more times in the past year?: No  Any fall with injury in the past year?: No    Cognitive Screenin) Repeat 3 items (Leader, Season, Table)  YES  2) Clock draw: NORMAL  3) 3 item recall: Recalls 3 objects  Results: NORMAL clock, 3 items recalled: COGNITIVE IMPAIRMENT LESS LIKELY    Mini-CogTM Copyright S Tony. Licensed by the author for use in [facility]; reprinted with permission (willian@[email or web address]). All rights reserved.      Do you have sleep apnea, excessive snoring or daytime drowsiness?: yes    Reviewed and updated as needed this visit by clinical staff  Tobacco  Allergies  Meds  Problems  Med Hx  Surg Hx  Fam Hx  Soc Hx          Reviewed and updated as needed this visit by Provider                Social History     Tobacco Use     Smoking status: Former Smoker     Smokeless tobacco: Never Used     Tobacco comment: 1968   Substance Use Topics     Alcohol use: Yes     Alcohol/week: 0.0 standard drinks     Comment: A beer once in awhile                           Current providers sharing in care for this patient include:   Patient Care Team:  Tyrel Manning MD as PCP - General (Internal Medicine)  Tyrel Manning MD as Assigned PCP  Bjorn Heart MD as Assigned Neuroscience Provider  Mau Sage MD as Assigned Musculoskeletal Provider  Andry Oseguera MD as Assigned Surgical Provider    The following health maintenance items are reviewed in Epic and correct as of today:  Health Maintenance   Topic Date Due     ANNUAL REVIEW OF  ORDERS  Never done     FALL RISK ASSESSMENT  2021     EYE EXAM  2021     DTAP/TDAP/TD IMMUNIZATION (2 - Td) 2022     MEDICARE ANNUAL WELLNESS  VISIT  05/05/2022     ADVANCE CARE PLANNING  03/23/2025     PHQ-2  Completed     INFLUENZA VACCINE  Completed     Pneumococcal Vaccine: 65+ Years  Completed     ZOSTER IMMUNIZATION  Completed     COVID-19 Vaccine  Completed     Pneumococcal Vaccine: Pediatrics (0 to 5 Years) and At-Risk Patients (6 to 64 Years)  Aged Out     IPV IMMUNIZATION  Aged Out     MENINGITIS IMMUNIZATION  Aged Out     HEPATITIS B IMMUNIZATION  Aged Out     Labs reviewed in EPIC  BP Readings from Last 3 Encounters:   05/05/21 126/68   12/01/20 99/65   10/27/20 108/70    Wt Readings from Last 3 Encounters:   05/05/21 75.3 kg (166 lb)   03/13/20 79.4 kg (175 lb)   03/11/20 79.7 kg (175 lb 9.6 oz)                  Patient Active Problem List   Diagnosis     Seborrheic dermatitis     Allergic rhinitis due to other allergen     Cervical radiculopathy at C6     Personal history of malignant neoplasm of larynx     Essential tremor     CARDIOVASCULAR SCREENING; LDL GOAL LESS THAN 160     Benign prostatic hyperplasia     Hoarse voice quality     Advanced directives, counseling/discussion     Pseudophakia     Gastroesophageal reflux disease, esophagitis presence not specified     Macular degeneration     Supraspinatus sprain, right, initial encounter     CARDIOVASCULAR SCREENING; LDL GOAL LESS THAN 100     Vitamin D deficiency     Chronic midline low back pain without sciatica     Past Surgical History:   Procedure Laterality Date     APPENDECTOMY  8 years old     Biopsy of the throat - cancerous mass - got radiation for in larynx  1982     CATARACT IOL, RT/LT       COLONOSCOPY  2/25/2004    Normal     COLONOSCOPY N/A 4/28/2015    Procedure: COLONOSCOPY;  Surgeon: Marvin Adams MD;  Location: [facility] OR     COLONOSCOPY WITH CO2 INSUFFLATION N/A 4/28/2015    Procedure: COLONOSCOPY WITH CO2 INSUFFLATION;  Surgeon: Marvin Adams MD;  Location: [facility] OR     CYSTOSCOPY  1997    for hematuria - negative     EYE SURGERY Right     Rt eye.macular  repair     EYE SURGERY  2003    cataract     NASAL ENDOSCOPY,DX  4/2007    GERD     Pars plana vitrectomy and epiretinal membrane dissection, right eye  11/8/2002     Phacoemulsification with intraocular lens implantation right eye.  3/11/2003     PHACOEMULSIFICATION WITH STANDARD INTRAOCULAR LENS IMPLANT Left 9/22/2016    Procedure: PHACOEMULSIFICATION WITH STANDARD INTRAOCULAR LENS IMPLANT;  Surgeon: Nghia Lara MD;  Location: [facility] OR     Thyroglossal Duct Cyst Removal - 2ndary to radiation.  1982     VASECTOMY  1971     ZZ GASTROSCOPY,FL  9/2007    hiatal hernia and errosions       Social History     Tobacco Use     Smoking status: Former Smoker     Smokeless tobacco: Never Used     Tobacco comment: 1969   Substance Use Topics     Alcohol use: Yes     Alcohol/week: 0.0 standard drinks     Comment: A beer once in awhile     Family History   Problem Relation Age of Onset     Cerebrovascular Disease Mother         at 84 yo - possibly TIA - befoer     Macular Degeneration Mother      Gastrointestinal Disease Father      Cancer - colorectal Father      Hypertension Father      Cancer Father      Cancer Other      C.A.D. No family hx of      Diabetes No family hx of      Prostate Cancer No family hx of      Glaucoma No family hx of      Thyroid Disease No family hx of          Allergies   Allergen Reactions     Seasonal Allergies      Hay fever      Recent Labs   Lab Test 05/05/21  1209 03/13/20  1016 03/04/19  0750 03/04/19  0750 02/27/13  1319 02/27/13  1319   A1C  --   --   --   --   --  5.3   * 104*  --  109*   < >  --    HDL 77 75  --  71   < >  --    TRIG 93 100  --  85   < >  --    ALT 26 20  --  21   < >  --    CR 1.04 0.97  --  1.22   < >  --    GFRESTIMATED 65 72   < > 55*   < >  --    GFRESTBLACK 76 83   < > 64   < >  --    POTASSIUM 4.3 4.4  --  4.0   < >  --    TSH 2.90 3.20   < >  --    < >  --     < > = values in this interval not displayed.          ROS:  CONSTITUTIONAL: NEGATIVE for  "fever, chills, change in weight  INTEGUMENTARY/SKIN: NEGATIVE for worrisome rashes, moles or lesions  EYES: NEGATIVE for vision changes or irritation  ENT/MOUTH: NEGATIVE for ear, mouth and throat problems  RESP: NEGATIVE for significant cough or SOB  CV: NEGATIVE for chest pain, palpitations or peripheral edema  GI: NEGATIVE for nausea, abdominal pain, heartburn, or change in bowel habits  : NEGATIVE for frequency, dysuria, or hematuria  MUSCULOSKELETAL: NEGATIVE for significant arthralgias or myalgia  NEURO: NEGATIVE for weakness, dizziness or paresthesias  ENDOCRINE: NEGATIVE for temperature intolerance, skin/hair changes  HEME: NEGATIVE for bleeding problems  PSYCHIATRIC: NEGATIVE for changes in mood or affect    OBJECTIVE:   /68 (BP Location: Left arm, Patient Position: Chair, Cuff Size: Adult Regular)   Pulse 73   Ht 1.854 m (6' 1\")   Wt 75.3 kg (166 lb)   SpO2 98%   BMI 21.90 kg/m   Estimated body mass index is 21.9 kg/m  as calculated from the following:    Height as of this encounter: 1.854 m (6' 1\").    Weight as of this encounter: 75.3 kg (166 lb).  EXAM:   GENERAL: healthy, alert and no distress  EYES: Eyes grossly normal to inspection, PERRL and conjunctivae and sclerae normal  HENT: ear canals and TM's normal, nose and mouth without ulcers or lesions  NECK: no adenopathy, no asymmetry, masses, or scars and thyroid normal to palpation  RESP: lungs clear to auscultation - no rales, rhonchi or wheezes  CV: regular rate and rhythm, normal S1 S2, no S3 or S4, no murmur, click or rub, no peripheral edema and peripheral pulses strong  ABDOMEN: soft, nontender, no hepatosplenomegaly, no masses and bowel sounds normal  MS: no gross musculoskeletal defects noted, no edema  SKIN: no suspicious lesions or rashes  NEURO: Normal strength and tone, mentation intact and speech normal  PSYCH: mentation appears normal, affect normal/bright    Diagnostic Test Results:  XR ESOPHAGRAM W UPPER GI, 2/27/2017 " 12:29 PM     Comparison: None.     History: reflux, indigestion, history of gastric ulcers     Fluoroscopy time: 1 min 15 sec.     Findings:   The esophagus, stomach, and duodenum are unremarkable and free of  filling defects. The fold pattern of the esophagus is normal with no  radiographic evidence of esophagitis. Esophageal motility is slightly  slowed. No tertiary waves. No spontaneous reflux was seen.     Small hiatal hernia.      The rugal pattern within the stomach is prominent with mildly  thickened folds, with no definite ulcer identified. On image 6 of the  gastric body, there are two rounded prominent areas within the rugal  folds, appearance favoring prominent folds and less likely ulceration  or polyps.      No mucosal abnormalities or ulcers were identified in the duodenum.  The visualized jejunum is normal.                                                                       Impression:   1. Slightly slowed esophageal motility and small hiatal hernia, with  no definite reflux.   2. Prominent mildly thickened gastric folds/rugae, which can be seen  in the setting of gastritis.   3. There are 2 rounded prominent areas within the rugal folds of the  gastric body, the appearance favors thickened prominent folds, less  likely ulceration or polyps.      KRISTA ROY MD    [facility] LUMBAR SPINE W/O CONTRAST 9/16/2020 3:27 PM     Provided History: Abn x-ray, L/S-spine, bone destruction; DDD  (degenerative disc disease), lumbar     ICD-10: DDD (degenerative disc disease), lumbar     Comparison: Lumbar spine radiographs 7/13/2020 CT abdomen and pelvis  7/3/2017     Technique: Sagittal T1-weighted, sagittal STIR, 3D volumetric axial  and sagittal reconstructed T2-weighted images of the lumbar spine were  obtained without intravenous contrast.      Findings:   There are 5 lumbar-type vertebrae assumed for the purposes of this  dictation.  The vertebral body heights are maintained without evidence  of fracture.  There is minimal anterolisthesis of L4 on L5. No evidence  of marrow infiltrative process. Mild multilevel disc desiccation  narrowing is identified.     The conus medullaris is normal and terminates at L1. No abnormal cord  signal. There is redundant appearance to the cauda equina above the  L4-5 level.     On a level by level basis:     T12-L1: Minimal disc bulge without significant spinal canal or neural  foraminal stenosis.     L1-2: Posterior epidural lipomatosis contributes to mild thecal sac  narrowing. There is bilateral facet arthropathy. No significant neural  foraminal stenosis.     L2-3: Mild disc bulge coupled with posterior epidural lipomatosis and  ligament flavum hypertrophy results in mild spinal canal stenosis.  Mild left and mild-to-moderate right neural foraminal stenosis  secondary to disc bulge and facet arthropathy.     L3-4: Moderate disc bulge coupled with facet arthropathy and the  ligamentum flavum thickening results in mild to moderate spinal canal  stenosis. Moderate bilateral neural foraminal stenosis is identified  secondary to disc bulge and facet arthropathy.     L4-5: Disc bulge coupled with endplate arthropathy and ligamentum  flavum hypertrophy results in severe spinal canal stenosis.  Mild-to-moderate right and moderate left neural foraminal stenosis  secondary to disc bulge and facet arthropathy.     L5-S1: Disc bulge with superimposed left foraminal protrusion  identified. There is mild spinal canal stenosis. Mild to moderate left  and mild right neural foraminal stenosis secondary to disc bulge and  facet arthropathy.     Paraspinous tissues are within normal limits.                                                                      Impression:      1. Severe spinal canal stenosis at the L4-5 level secondary to a  combination of disc bulge, ligamentum flavum thickening, endplate and  facet arthropathy.  2. Remaining levels of moderate spondylosis throughout the  "lumbar  spine.     I have personally reviewed the examination and initial interpretation  and I agree with the findings.     ABRAHAM REDDY MD    ASSESSMENT / PLAN:   1. Routine general medical examination at a health care facility    - REVIEW OF HEALTH MAINTENANCE PROTOCOL ORDERS  - Methylmalonic Acid  - TSH  - T4, free  - CBC with platelets and differential  - Comprehensive metabolic panel (BMP + Alb, Alk Phos, ALT, AST, Total. Bili, TP)    2. Vitamin D insufficiency    - Vitamin D Deficiency    3. CARDIOVASCULAR SCREENING; LDL GOAL LESS THAN 100    - Lipid panel reflex to direct LDL Non-fasting    4. Benign prostatic hyperplasia with nocturia    - tamsulosin (FLOMAX) 0.4 MG capsule; TAKE TWO CAPSULES BY MOUTH ONCE DAILY  Dispense: 180 capsule; Refill: 3    Patient has been advised of split billing requirements and indicates understanding: Yes    COUNSELING:  Special attention given to:       Regular exercise       Healthy diet/nutrition       The ASCVD Risk score (Stacey DC Jr., et al., 2013) failed to calculate for the following reasons:    The 2013 ASCVD risk score is only valid for ages 40 to 79    Estimated body mass index is 21.9 kg/m  as calculated from the following:    Height as of this encounter: 1.854 m (6' 1\").    Weight as of this encounter: 75.3 kg (166 lb).        He reports that he has quit smoking. He has never used smokeless tobacco.    Appropriate preventive services were discussed with this patient, including applicable screening as appropriate for cardiovascular disease, diabetes, osteopenia/osteoporosis, and glaucoma.  As appropriate for age/gender, discussed screening for colorectal cancer, prostate cancer, breast cancer, and cervical cancer. Checklist reviewing preventive services available has been given to the patient.    Reviewed patients plan of care and provided an AVS. The Basic Care Plan (routine screening as documented in Health Maintenance) for Preston meets the Care Plan " requirement. This Care Plan has been established and reviewed with the Patient.    Counseling Resources:  ATP IV Guidelines  Pooled Cohorts Equation Calculator  Breast Cancer Risk Calculator  BRCA-Related Cancer Risk Assessment: FHS-7 Tool  FRAX Risk Assessment  ICSI Preventive Guidelines  Dietary Guidelines for Americans, 2010  USDA's MyPlate  ASA Prophylaxis  Lung CA Screening    Tyrel Manning MD  [facility]

## 2021-05-06 LAB — DEPRECATED CALCIDIOL+CALCIFEROL SERPL-MC: 34 UG/L (ref 20–75)

## 2021-05-10 DIAGNOSIS — G25.0 ESSENTIAL TREMOR: ICD-10-CM

## 2021-05-10 NOTE — TELEPHONE ENCOUNTER
Rx Authorization:    Requested Medication/ Dose: Priidone 250MG tabs    Date last refill ordered: 3/2/20    Quantity ordered: 90 tabs    # refills: 3    Date of last clinic visit with ordering provider: 3/11/20    Date of next clinic visit with ordering provider: 7/12/21    All pertinent protocol data (lab date/result):     Include pertinent information from patients message:

## 2021-05-12 RX ORDER — PRIMIDONE 250 MG/1
TABLET ORAL
Qty: 90 TABLET | Refills: 3 | Status: SHIPPED | OUTPATIENT
Start: 2021-05-12 | End: 2021-08-11

## 2021-05-13 LAB — METHYLMALONATE SERPL-SCNC: 0.18 UMOL/L (ref 0–0.4)

## 2021-06-23 ENCOUNTER — OFFICE VISIT (OUTPATIENT)
Dept: NEUROLOGY | Facility: CLINIC | Age: 86
End: 2021-06-23
Payer: COMMERCIAL

## 2021-06-23 VITALS
OXYGEN SATURATION: 98 % | DIASTOLIC BLOOD PRESSURE: 57 MMHG | BODY MASS INDEX: 21.47 KG/M2 | HEIGHT: 73 IN | HEART RATE: 70 BPM | SYSTOLIC BLOOD PRESSURE: 97 MMHG | WEIGHT: 162 LBS

## 2021-06-23 DIAGNOSIS — M48.062 SPINAL STENOSIS OF LUMBAR REGION WITH NEUROGENIC CLAUDICATION: Primary | ICD-10-CM

## 2021-06-23 DIAGNOSIS — G60.9 HEREDITARY AND IDIOPATHIC PERIPHERAL NEUROPATHY: ICD-10-CM

## 2021-06-23 PROCEDURE — 99214 OFFICE O/P EST MOD 30 MIN: CPT | Performed by: PSYCHIATRY & NEUROLOGY

## 2021-06-23 RX ORDER — GABAPENTIN 100 MG/1
300 CAPSULE ORAL EVERY EVENING
Qty: 270 CAPSULE | Refills: 3 | Status: SHIPPED | OUTPATIENT
Start: 2021-06-23 | End: 2022-01-17

## 2021-06-23 ASSESSMENT — PAIN SCALES - GENERAL: PAINLEVEL: NO PAIN (0)

## 2021-06-23 ASSESSMENT — MIFFLIN-ST. JEOR: SCORE: 1478.71

## 2021-06-23 NOTE — NURSING NOTE
"Preston Cai's goals for this visit include: return  He requests these members of his care team be copied on today's visit information:     PCP: Tyrel Manning    Referring Provider:  No referring provider defined for this encounter.    BP 97/57   Pulse 70   Ht 1.854 m (6' 1\")   Wt 73.5 kg (162 lb)   SpO2 98%   BMI 21.37 kg/m      Do you need any medication refills at today's visit? y  "

## 2021-06-23 NOTE — LETTER
6/23/2021         RE: Preston Cai  [address]  [place] [address]        Dear Colleague,    Thank you for referring your patient, Preston Cai, to the [facility] [place]. Please see a copy of my visit note below.    Visit Date: 06/23/2021    Preston Cai returns for followup of idiopathic peripheral neuropathy.  I last saw him on 03/11/2020.    His evaluation for neuropathy is detailed in my 05/29/2019 note.  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  Laboratory work was unrevealing.    He also sees Dr. García Munroe for tremor.    In reviewing his record prior to his visit, I do note that he had was seeing Dr. Sage for his low back.  He did have a lumbar MRI scan done on 09/16/2020 that reveals rather severe spinal stenosis at the L4-L5 level.  He actually does describe some ambulatory leg pain, mainly in the morning.  He did receive an epidural injection that produced some relief, but this was not maintained.  Now, he is just using Aleve when the pain in the back of his legs comes on, and this seems to work.    Regarding his neuropathy, he is not having a lot of pain except in the evenings, where his feet and ankles will feel hot.  He will use cool water soaks to help with this.  He also has found gabapentin helpful, and he takes 300 mg in the evening.    CURRENT MEDICATIONS:    1.  Gabapentin 300 mg every evening.  2.  Aleve p.r.n.  3.  Protonix.  4.  Mysoline 250 mg.  5.  Tamsulosin.  6.  Kenalog cream.  7.  Vitamin D.    PHYSICAL EXAMINATION:    VITAL SIGNS:  Reveals his heart rate is 70.  Blood pressure 97/57.  NEUROLOGIC:  Motor examination reveals normal strength.  On sensory testing, he has absent vibratory sense to the ankles.  There is mild to moderate decrease in position sense in the toes.  He has decreased pinprick to the mid foot.  The patient has a very minimal postural  tremor of the left hand.  His gait is really unremarkable for age.  He sways slightly on in the Romberg stance with his eyes closed, but does not fall.  Reflexes are 1-2+ in the upper extremities and absent in the lower extremities.  Plantar responses are flexor.    IMPRESSION:    1.  Idiopathic peripheral neuropathy.  2.  Lumbar spinal stenosis with occasional ambulatory leg pain.    PLAN:  I did compare today's findings to his last visit with me in 2020.  There has been slight progression in the findings of neuropathy in that he now has absent vibratory sense to the ankles.  Otherwise, he appears stable.    He seems relatively comfortable with his current management.  I did refill his gabapentin prescription, these taken at a dose of 300 mg every evening.  Neurologic followup will be in 1 year.    TIME SPENT:  Total visit time today was 30 minutes.  This included review of his medical record prior to the visit this afternoon, reviewing history with the patient, examination, and documentation.    Bjorn Heart MD        D: 2021   T: 2021   MT: [facility]    Name:     GINA SHOOK  MRN:      [id number]        Account:    [id number]   :      1935           Visit Date: 2021     Document: [id number]        Again, thank you for allowing me to participate in the care of your patient.        Sincerely,        Bjorn Heart MD

## 2021-06-24 NOTE — PROGRESS NOTES
Visit Date: 06/23/2021    Preston Cai returns for followup of idiopathic peripheral neuropathy.  I last saw him on 03/11/2020.    His evaluation for neuropathy is detailed in my 05/29/2019 note.  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  Laboratory work was unrevealing.    He also sees Dr. García Munroe for tremor.    In reviewing his record prior to his visit, I do note that he had was seeing Dr. Sage for his low back.  He did have a lumbar MRI scan done on 09/16/2020 that reveals rather severe spinal stenosis at the L4-L5 level.  He actually does describe some ambulatory leg pain, mainly in the morning.  He did receive an epidural injection that produced some relief, but this was not maintained.  Now, he is just using Aleve when the pain in the back of his legs comes on, and this seems to work.    Regarding his neuropathy, he is not having a lot of pain except in the evenings, where his feet and ankles will feel hot.  He will use cool water soaks to help with this.  He also has found gabapentin helpful, and he takes 300 mg in the evening.    CURRENT MEDICATIONS:    1.  Gabapentin 300 mg every evening.  2.  Aleve p.r.n.  3.  Protonix.  4.  Mysoline 250 mg.  5.  Tamsulosin.  6.  Kenalog cream.  7.  Vitamin D.    PHYSICAL EXAMINATION:    VITAL SIGNS:  Reveals his heart rate is 70.  Blood pressure 97/57.  NEUROLOGIC:  Motor examination reveals normal strength.  On sensory testing, he has absent vibratory sense to the ankles.  There is mild to moderate decrease in position sense in the toes.  He has decreased pinprick to the mid foot.  The patient has a very minimal postural tremor of the left hand.  His gait is really unremarkable for age.  He sways slightly on in the Romberg stance with his eyes closed, but does not fall.  Reflexes are 1-2+ in the upper extremities and absent in the lower extremities.  Plantar responses are flexor.    IMPRESSION:     1.  Idiopathic peripheral neuropathy.  2.  Lumbar spinal stenosis with occasional ambulatory leg pain.    PLAN:  I did compare today's findings to his last visit with me in 2020.  There has been slight progression in the findings of neuropathy in that he now has absent vibratory sense to the ankles.  Otherwise, he appears stable.    He seems relatively comfortable with his current management.  I did refill his gabapentin prescription, these taken at a dose of 300 mg every evening.  Neurologic followup will be in 1 year.    TIME SPENT:  Total visit time today was 30 minutes.  This included review of his medical record prior to the visit this afternoon, reviewing history with the patient, examination, and documentation.    Bjorn Heart MD        D: 2021   T: 2021   MT: [facility]    Name:     GINA SHOOK  MRN:      [id number]        Account:    [id number]   :      1935           Visit Date: 2021     Document: [id number]

## 2021-07-12 ENCOUNTER — OFFICE VISIT (OUTPATIENT)
Dept: DERMATOLOGY | Facility: CLINIC | Age: 86
End: 2021-07-12
Payer: COMMERCIAL

## 2021-07-12 DIAGNOSIS — L82.0 INFLAMED SEBORRHEIC KERATOSIS: ICD-10-CM

## 2021-07-12 DIAGNOSIS — D48.5 NEOPLASM OF UNCERTAIN BEHAVIOR OF SKIN: ICD-10-CM

## 2021-07-12 DIAGNOSIS — D18.01 CHERRY ANGIOMA: ICD-10-CM

## 2021-07-12 DIAGNOSIS — L82.1 SEBORRHEIC KERATOSIS: ICD-10-CM

## 2021-07-12 DIAGNOSIS — L57.8 SUN-DAMAGED SKIN: ICD-10-CM

## 2021-07-12 DIAGNOSIS — Z85.828 HISTORY OF NONMELANOMA SKIN CANCER: Primary | ICD-10-CM

## 2021-07-12 DIAGNOSIS — L11.1 GROVER'S DISEASE: ICD-10-CM

## 2021-07-12 DIAGNOSIS — D22.9 MULTIPLE BENIGN NEVI: ICD-10-CM

## 2021-07-12 DIAGNOSIS — L81.4 SOLAR LENTIGO: ICD-10-CM

## 2021-07-12 PROCEDURE — 88305 TISSUE EXAM BY PATHOLOGIST: CPT | Performed by: DERMATOLOGY

## 2021-07-12 PROCEDURE — 11102 TANGNTL BX SKIN SINGLE LES: CPT | Mod: XS | Performed by: DERMATOLOGY

## 2021-07-12 PROCEDURE — 17110 DESTRUCTION B9 LES UP TO 14: CPT | Performed by: DERMATOLOGY

## 2021-07-12 PROCEDURE — 99214 OFFICE O/P EST MOD 30 MIN: CPT | Mod: 25 | Performed by: DERMATOLOGY

## 2021-07-12 NOTE — NURSING NOTE
Preston Cai's goals for this visit include:   Chief Complaint   Patient presents with     Skin Check     hx of NMSC/ spot on scalp, R ear, R sideburn, chest     Derm Problem     itching of back, possibly around moles?, R foot possible warts     He requests these members of his care team be copied on today's visit information:     PCP: Tyrel Manning    Referring Provider:  No referring provider defined for this encounter.    There were no vitals taken for this visit.    Do you need any medication refills at today's visit? No  Nicci Flores, SLICK on 7/12/2021 at 9:35 AM

## 2021-07-12 NOTE — PATIENT INSTRUCTIONS
Wound Care After a Biopsy    What is a skin biopsy?  A skin biopsy allows the doctor to examine a very small piece of tissue under the microscope to determine the diagnosis and the best treatment for the skin condition. A local anesthetic (numbing medicine)  is injected with a very small needle into the skin area to be tested. A small piece of skin is taken from the area. Sometimes a suture (stitch) is used.     What are the risks of a skin biopsy?  I will experience scar, bleeding, swelling, pain, crusting and redness. I may experience incomplete removal or recurrence. Risks of this procedure are excessive bleeding, bruising, infection, nerve damage, numbness, thick (hypertrophic or keloidal) scar and non-diagnostic biopsy.    How should I care for my wound for the first 24 hours?    Keep the wound dry and covered for 24 hours    If it bleeds, hold direct pressure on the area for 15 minutes. If bleeding does not stop then go to the emergency room    Avoid strenuous exercise the first 1-2 days or as your doctor instructs you    How should I care for the wound after 24 hours?    After 24 hours, remove the bandage    You may bathe or shower as normal    If you had a scalp biopsy, you can shampoo as usual and can use shower water to clean the biopsy site daily    Clean the wound twice a day with gentle soap and water    Do not scrub, be gentle    Apply white petroleum/Vaseline after cleaning the wound with a cotton swab or a clean finger, and keep the site covered with a Bandaid /bandage. Bandages are not necessary with a scalp biopsy    If you are unable to cover the site with a Bandaid /bandage, re-apply ointment 2-3 times a day to keep the site moist. Moisture will help with healing    Avoid strenuous activity for first 1-2 days    Avoid lakes, rivers, pools, and oceans until the stitches are removed or the site is healed    How do I clean my wound?    Wash hands thoroughly with soap or use hand  before all  wound care    Clean the wound with gentle soap and water    Apply white petroleum/Vaseline  to wound after it is clean    Replace the Bandaid /bandage to keep the wound covered for the first few days or as instructed by your doctor    If you had a scalp biopsy, warm shower water to the area on a daily basis should suffice    What should I use to clean my wound?     Cotton-tipped applicators (Qtips )    White petroleum jelly (Vaseline ). Use a clean new container and use Q-tips to apply.    Bandaids   as needed    Gentle soap     How should I care for my wound long term?    Do not get your wound dirty    Keep up with wound care for one week or until the area is healed.    A small scab will form and fall off by itself when the area is completely healed. The area will be red and will become pink in color as it heals. Sun protection is very important for how your scar will turn out. Sunscreen with an SPF 30 or greater is recommended once the area is healed.    You should have some soreness but it should be mild and slowly go away over several days. Talk to your doctor about using tylenol for pain,    When should I call my doctor?  If you have increased:     Pain or swelling    Pus or drainage (clear or slightly yellow drainage is ok)    Temperature over 100F    Spreading redness or warmth around wound    When will I hear about my results?  The biopsy results can take 2-3 weeks to come back. The clinic will call you with the results, send you a [email or web address] message, or have you schedule a follow-up clinic or phone time to discuss the results. Contact our clinics if you do not hear from us in 3 weeks.     Who should I call with questions?    [facility]: [phone]     [facility]: [phone]    For urgent needs outside of business hours call the [facility] at [phone] and ask for the dermatology resident on call    Cryotherapy    What is it?    Use  of a very cold liquid, such as liquid nitrogen, to freeze and destroy abnormal skin cells that need to be removed    What should I expect?    Tenderness and redness    A small blister that might grow and fill with dark purple blood. There may be crusting.    More than one treatment may be needed if the lesions do not go away.    How do I care for the treated area?    Gently wash the area with your hands when bathing.    Use a thin layer of Vaseline to help with healing. You may use a Band-Aid.     The area should heal within 7-10 days and may leave behind a pink or lighter color.     Do not use an antibiotic or Neosporin ointment.     You may take acetaminophen (Tylenol) for pain.     Call your Doctor if you have:    Severe pain    Signs of infection (warmth, redness, cloudy yellow drainage, and or a bad smell)    Questions or concerns    Who should I call with questions?       [facility]: [phone]       [facility]: [phone]       For urgent needs outside of business hours call the [facility] at [phone]        and ask for the dermatology resident on call

## 2021-07-12 NOTE — LETTER
7/12/2021         RE: Preston Cai  [address]  [place] [place] [facility] [address]        Dear Colleague,    Thank you for referring your patient, Preston Cai, to the [facility]. Please see a copy of my visit note below.    [facility] Dermatology Note  Encounter Date: Jul 12, 2021  Office Visit     Dermatology Problem List:  0. NUB - right helix, bx: 7/12/21  1. Actinic keratosis  - s/p cryotherapy  - pigmented AK, left jaw, s/p biopsy 11/13/18  - HAK, right hand, s/p excision 5/2009  3. Inflamed seborrheic keratosis: treated with shave removals and cryo  4. 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     ____________________________________________    Assessment & Plan:    # History of NMSC. No evidence of recurrence.   - Recommend sunscreens SPF #30 or greater, protective clothing and avoidance of tanning beds.   - Recommended yearly skin exams.    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   - Continue triamcinolone cream prn.    # Neoplasm of uncertain behavior on the right helix. The differential diagnosis includes HAK vs SCC.   - See procedure note.    # Seborrheic keratosis, inflamed. - face and back.  - See cryo procedure note.    # Benign lesions: Multiple benign nevi, solar lentigos, seborrheic keratoses, cherry angiomas. Explained to patient benign nature of lesion. No treatment is necessary at this time unless the lesion changes or becomes symptomatic.     - ABCDs of melanoma were discussed and self skin checks were advised.  - Sun precaution was advised including the use of sun screens of SPF 30 or higher, sun protective clothing, and avoidance of tanning beds.    Procedures Performed:   - Shave biopsy procedure note, location: right ear. After discussion of benefits and risks including but not limited to bleeding, infection,  scar, incomplete removal, recurrence, and non-diagnostic biopsy, written consent and photographs were obtained. The area was cleaned with isopropyl alcohol. 0.5mL of 1% lidocaine with epinephrine was injected to obtain adequate anesthesia of lesion. Shave biopsy at site performed. Hemostasis was achieved with aluminium chloride. Petrolatum ointment and a sterile dressing were applied. The patient tolerated the procedure and no complications were noted. The patient was provided with verbal and written post care instructions.     - Cryotherapy procedure note, locations: face and back. After verbal consent and discussion of risks and benefits including, but not limited to, dyspigmentation/scar, blister, and pain, 4 iSKs were treated with 1-2 mm freeze border for 1-2 cycles with liquid nitrogen. Post cryotherapy instructions were provided.    Follow-up: pending path results, 1 year in-person for skin check, or earlier for new or changing lesions    Staff and Scribe:     Scribe Disclosure:   I, Justine Temple, am serving as a scribe to document services personally performed by this physician, Dr. Andry Oseguera, based on data collection and the provider's statements to me.     Provider Disclosure:   The documentation recorded by the scribe accurately reflects the services I personally performed and the decisions made by me.    Andry Oseguera MD    Department of Dermatology  [facility]: Phone: [phone], Fax:[phone]  [facility]: Phone: [phone] Fax: [phone]    ____________________________________________    CC: Skin Check (hx of NMSC/ spot on scalp, R ear, R sideburn, chest) and Derm Problem (itching of back, possibly around moles?, R foot possible warts)    HPI:  Mr. Preston Cai is a(n) 85 year old male who presents today as a return patient for skin check.    Last seen  1/11/21. At that time, Grovers was resolved. Cryo was also performed on 4 iSKs on the left jawline, back, and left flank.    Today patient notes the following concerns:    1. A crusty lesion on the scalp.  2. Lesion that rubs and is painful.  3. Brown spots on the right jawline.  4. Lesion on right foot that has been present for a while.  5. Itchy back.    The patient otherwise denies any new or concerning lesions. No bleeding, painful, pruritic, or changing lesions. Health otherwise stable. No other skin concerns.    Labs Reviewed:  N/A    Physical Exam:  Vitals: There were no vitals taken for this visit.  SKIN: Total skin excluding the undergarment areas was performed. The exam included the head/face, neck, both arms, chest, back, abdomen, both legs, digits and/or nails.   - Scattered brown macules on sun exposed areas.  - There are dome shaped bright red papules on the trunk.   - Multiple regular brown pigmented macules and papules are identified on the trunk and extremities.   - There are waxy stuck on tan to brown papules on the trunk, extremities, face, scalp, and right foot.  - Right helix: 3 mm tender pink scaly papule  - There are tan to brown waxy stuck on papules with surrounding erythema on the face x2, and back x2.  - Well healed scars at prior skin cancer surgical sites without evidence of recurrence.   - No other lesions of concern on areas examined.     Medications:  Current Outpatient Medications   Medication     gabapentin (NEURONTIN) 100 MG capsule     naproxen sodium (ALEVE) 220 MG tablet     pantoprazole (PROTONIX) 40 MG EC tablet     primidone (MYSOLINE) 250 MG tablet     tamsulosin (FLOMAX) 0.4 MG capsule     triamcinolone (KENALOG) 0.1 % external cream     VITAMIN D, CHOLECALCIFEROL, PO     No current facility-administered medications for this visit.      Past Medical History:   Patient Active Problem List   Diagnosis     Seborrheic dermatitis     Allergic rhinitis due to other allergen      Cervical radiculopathy at C6     Personal history of malignant neoplasm of larynx     Essential tremor     CARDIOVASCULAR SCREENING; LDL GOAL LESS THAN 160     Benign prostatic hyperplasia     Hoarse voice quality     Advanced directives, counseling/discussion     Pseudophakia     Gastroesophageal reflux disease, esophagitis presence not specified     Macular degeneration     Supraspinatus sprain, right, initial encounter     CARDIOVASCULAR SCREENING; LDL GOAL LESS THAN 100     Vitamin D deficiency     Chronic midline low back pain without sciatica     Past Medical History:   Diagnosis Date     Acid reflux disease      Allergic rhinitis, cause unspecified      H/O magnetic resonance imaging of brain and brain stem 10/10/2016    MRI BRAIN WITH AND WITHOUT CONTRAST August 17, 2009 8:07:00 PM   HISTORY: Severe dizzy spells with imbalance and vomiting.   TECHNIQUE: Routine pulse sequences without and with contrast were performed including thin section images through the IACs. 20 mL Magnevist given.   FINDINGS: Diffusion-weighted images are normal. 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 on close of this encounter.      Again, thank you for allowing me to participate in the care of your patient.        Sincerely,        Andry Oseguera MD

## 2021-07-12 NOTE — PROGRESS NOTES
[facility] Dermatology Note  Encounter Date: Jul 12, 2021  Office Visit     Dermatology Problem List:  0. NUB - right helix, bx: 7/12/21  1. Actinic keratosis  - s/p cryotherapy  - pigmented AK, left jaw, s/p biopsy 11/13/18  - HAK, right hand, s/p excision 5/2009  3. Inflamed seborrheic keratosis: treated with shave removals and cryo  4. 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     ____________________________________________    Assessment & Plan:    # History of NMSC. No evidence of recurrence.   - Recommend sunscreens SPF #30 or greater, protective clothing and avoidance of tanning beds.   - Recommended yearly skin exams.    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   - Continue triamcinolone cream prn.    # Neoplasm of uncertain behavior on the right helix. The differential diagnosis includes HAK vs SCC.   - See procedure note.    # Seborrheic keratosis, inflamed. - face and back.  - See cryo procedure note.    # Benign lesions: Multiple benign nevi, solar lentigos, seborrheic keratoses, cherry angiomas. Explained to patient benign nature of lesion. No treatment is necessary at this time unless the lesion changes or becomes symptomatic.     - ABCDs of melanoma were discussed and self skin checks were advised.  - Sun precaution was advised including the use of sun screens of SPF 30 or higher, sun protective clothing, and avoidance of tanning beds.    Procedures Performed:   - Shave biopsy procedure note, location: right ear. After discussion of benefits and risks including but not limited to bleeding, infection, scar, incomplete removal, recurrence, and non-diagnostic biopsy, written consent and photographs were obtained. The area was cleaned with isopropyl alcohol. 0.5mL of 1% lidocaine with epinephrine was injected to obtain adequate anesthesia of lesion. Shave biopsy at  site performed. Hemostasis was achieved with aluminium chloride. Petrolatum ointment and a sterile dressing were applied. The patient tolerated the procedure and no complications were noted. The patient was provided with verbal and written post care instructions.     - Cryotherapy procedure note, locations: face and back. After verbal consent and discussion of risks and benefits including, but not limited to, dyspigmentation/scar, blister, and pain, 4 iSKs were treated with 1-2 mm freeze border for 1-2 cycles with liquid nitrogen. Post cryotherapy instructions were provided.    Follow-up: pending path results, 1 year in-person for skin check, or earlier for new or changing lesions    Staff and Scribe:     Scribe Disclosure:   I, Justine Temple, am serving as a scribe to document services personally performed by this physician, Dr. Andry Oseguera, based on data collection and the provider's statements to me.     Provider Disclosure:   The documentation recorded by the scribe accurately reflects the services I personally performed and the decisions made by me.    Andry Oseguera MD    Department of Dermatology  [facility]: Phone: [phone], Fax:[phone]  [facility]: Phone: [phone] Fax: [phone]    ____________________________________________    CC: Skin Check (hx of NMSC/ spot on scalp, R ear, R sideburn, chest) and Derm Problem (itching of back, possibly around moles?, R foot possible warts)    HPI:  Mr. Preston Cai is a(n) 85 year old male who presents today as a return patient for skin check.    Last seen 1/11/21. At that time, Grovers was resolved. Cryo was also performed on 4 iSKs on the left jawline, back, and left flank.    Today patient notes the following concerns:    1. A crusty lesion on the scalp.  2. Lesion that rubs and is painful.  3. Brown spots on the right  jawline.  4. Lesion on right foot that has been present for a while.  5. Itchy back.    The patient otherwise denies any new or concerning lesions. No bleeding, painful, pruritic, or changing lesions. Health otherwise stable. No other skin concerns.    Labs Reviewed:  N/A    Physical Exam:  Vitals: There were no vitals taken for this visit.  SKIN: Total skin excluding the undergarment areas was performed. The exam included the head/face, neck, both arms, chest, back, abdomen, both legs, digits and/or nails.   - Scattered brown macules on sun exposed areas.  - There are dome shaped bright red papules on the trunk.   - Multiple regular brown pigmented macules and papules are identified on the trunk and extremities.   - There are waxy stuck on tan to brown papules on the trunk, extremities, face, scalp, and right foot.  - Right helix: 3 mm tender pink scaly papule  - There are tan to brown waxy stuck on papules with surrounding erythema on the face x2, and back x2.  - Well healed scars at prior skin cancer surgical sites without evidence of recurrence.   - No other lesions of concern on areas examined.     Medications:  Current Outpatient Medications   Medication     gabapentin (NEURONTIN) 100 MG capsule     naproxen sodium (ALEVE) 220 MG tablet     pantoprazole (PROTONIX) 40 MG EC tablet     primidone (MYSOLINE) 250 MG tablet     tamsulosin (FLOMAX) 0.4 MG capsule     triamcinolone (KENALOG) 0.1 % external cream     VITAMIN D, CHOLECALCIFEROL, PO     No current facility-administered medications for this visit.      Past Medical History:   Patient Active Problem List   Diagnosis     Seborrheic dermatitis     Allergic rhinitis due to other allergen     Cervical radiculopathy at C6     Personal history of malignant neoplasm of larynx     Essential tremor     CARDIOVASCULAR SCREENING; LDL GOAL LESS THAN 160     Benign prostatic hyperplasia     Hoarse voice quality     Advanced directives, counseling/discussion      Pseudophakia     Gastroesophageal reflux disease, esophagitis presence not specified     Macular degeneration     Supraspinatus sprain, right, initial encounter     CARDIOVASCULAR SCREENING; LDL GOAL LESS THAN 100     Vitamin D deficiency     Chronic midline low back pain without sciatica     Past Medical History:   Diagnosis Date     Acid reflux disease      Allergic rhinitis, cause unspecified      H/O magnetic resonance imaging of brain and brain stem 10/10/2016    MRI BRAIN WITH AND WITHOUT CONTRAST August 17, 2009 8:07:00 PM   HISTORY: Severe dizzy spells with imbalance and vomiting.   TECHNIQUE: Routine pulse sequences without and with contrast were performed including thin section images through the IACs. 20 mL Magnevist given.   FINDINGS: Diffusion-weighted images are normal. 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 on close of this encounter.

## 2021-07-12 NOTE — NURSING NOTE
The following medication was given:     MEDICATION:  Lidocaine with epinephrine 1% [id number]  ROUTE: SQ  SITE: see procedure note  DOSE: 0.5cc  LOT #: -[id number]  : Pratibha  EXPIRATION DATE: 2/1/22  NDC#: 7185-7688-88  Was there drug waste? 1.5cc  Multi-dose vial: Yes          Monse Chiang on 7/12/2021 at 10:05 AM

## 2021-07-14 ENCOUNTER — TELEPHONE (OUTPATIENT)
Dept: DERMATOLOGY | Facility: CLINIC | Age: 86
End: 2021-07-14

## 2021-07-14 LAB
PATH REPORT.COMMENTS IMP SPEC: NORMAL
PATH REPORT.COMMENTS IMP SPEC: NORMAL
PATH REPORT.FINAL DX SPEC: NORMAL
PATH REPORT.GROSS SPEC: NORMAL
PATH REPORT.MICROSCOPIC SPEC OTHER STN: NORMAL
PATH REPORT.RELEVANT HX SPEC: NORMAL

## 2021-07-14 NOTE — RESULT ENCOUNTER NOTE
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 He will need MMS. Can you schedule with estelle and otherwise 6 months follow-up with me? Thanks!    Andry Oseguera MD  Pronouns: he/him/his    Department of Dermatology  [facility]: Phone: [phone], Fax:[phone]  [facility]: Phone: [phone] Fax: [phone]

## 2021-07-14 NOTE — TELEPHONE ENCOUNTER
[facility] [place] Pre-Surgery Screening Note     Pre-screening Information:  Hx of Skin Cancer: Yes  Hx of Mohs Surgery: No  Transplant: No  Immunocompromised: No  Current Anticoagulant(s): None  Bleeding Disorder(s): No  Stent: No  Pacemaker: No  Defibrillator: No  Brain/Nerve Stimulator: No  Endocarditis/Rheumatic Fever Hx: No  Vascular graft: No  Prophylactic Antibiotic Needed: No  Congenital heart defect: No  Prosthetic Heart Valve: No  Lesion on Leg/Groin: No  Prosthetic Joint : No  Diabetic: No  HIV/AIDS: No  Hepatitis: No  Pregnant: No  Prior Problem with Local Anesthesia: No  Patient wears CPAP mask: No  Current Tobacco Use: No  Current Alcohol Use: No   needed?: No  Do you have mobility issues?: No  Do you use any assistive devices/DME?: No  Do you have any issues with lying for long periods of time?: No      Medications/Allergies  Medications and allergies review with patient: Yes     Current Outpatient Medications   Medication Sig Dispense Refill     gabapentin (NEURONTIN) 100 MG capsule Take 3 capsules (300 mg) by mouth every evening 270 capsule 3     naproxen sodium (ALEVE) 220 MG tablet Take 220-440 mg by mouth 2 times daily as needed (back pain) Diclofenac not refilled by patient.       pantoprazole (PROTONIX) 40 MG EC tablet Take 1 tablet (40 mg) by mouth daily 90 tablet 3     primidone (MYSOLINE) 250 MG tablet TAKE ONE TABLET BY MOUTH AT BEDTIME 90 tablet 3     tamsulosin (FLOMAX) 0.4 MG capsule TAKE TWO CAPSULES BY MOUTH ONCE DAILY (Patient taking differently: 0.4 mg TAKE TWO CAPSULES BY MOUTH ONCE DAILY) 180 capsule 3     triamcinolone (KENALOG) 0.1 % external cream Apply twice daily for rash on shoulders until resolved 454 g 3     VITAMIN D, CHOLECALCIFEROL, PO Take 1,000 Units by mouth daily       Allergies   Allergen Reactions     Seasonal Allergies      Hay fever        Pertinent Labs:  Last Creatine:   Creatinine   Date Value Ref Range Status    05/05/2021 1.04 0.66 - 1.25 mg/dL Final     Last INR:   INR   Date Value Ref Range Status   09/06/2016 1.00 0.86 - 1.14 Final       Other Questions:    Reminded patient to take any order prophylactic antibiotics 1 hour prior to appointment: no    If on a prescribed anticoagulant, advised patient to continue or check with prescribing provider:     If on an OTC anticoagulant/supplement, advised patient to hold these medications 10-14 days prior to surgery and resume 48 hrs after surgery:      Please be aware that this can be an all day procedure. Please bring your daily medications and food/cash. Encourage patient to eat prior to procedure(s). After care instructions were reviewed with patient.    If any positives, send to RN to initiate antibiotic prophylaxis protocol and/or anticoagulation management protocol    Reviewed by:    Katia Jane LPN

## 2021-07-14 NOTE — TELEPHONE ENCOUNTER
"Andry Oseguera MD  P [facility] Dermatology Adult [place]  Can we call patient and let him know the spot on the R ear was a SCC. He will need MMS. Can you schedule with estelle and otherwise 6 months follow-up with me? Thanks!     Andry Oseguera MD   Pronouns: he/him/his      Department of Dermatology   [facility]: Phone: [phone], Fax:[phone]   [facility]: Phone: [phone] Fax: [phone]           Associated Results    Dermatological Path Order and Indications: [id number]  Order: [id number]  Status:  Final result   Visible to patient:  Yes (MyChart) Dx:  History of nonmelanoma skin cancer; G...   2 Result Notes  Component  Resulting Agency   Case Report   Surgical Pathology Report                         Case: [id number]                                   Authorizing Provider:  Andry Oseguera MD   Collected:           07/12/2021 09:50 AM           Ordering Location:     [facility]   Received:            07/12/2021 04:07 PM                                  [place]                                                                   Pathologist:           Surya Patel MD                                                        Specimen:    Skin, Right helix, shave biopsy                                                            Final Diagnosis   A. 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   Gross Description  [facility]   A. Skin, Right helix, shave biopsy:  The specimen is received in formalin with proper patient identification, labeled \"right helix\".  The specimen consists of a 0.5 x 0.4 cm irregular tan skin shave.  The skin " surface exhibits diffuse brown-tan discoloration.  The resection margin is inked, the specimen is bisected and submitted in its entirety in cassette A1.               Microscopic Description  [facility]   The specimen exhibits epidermal hyperplasia with full-thickness keratinocyte atypia and suprabasal mitoses. The lesion extends to the lateral margin.   Performing Labs  [facility]   The technical component of this testing was completed at [facility]         Specimen Collected: 07/12/21  9:50 AM Last Resulted: 07/14/21 12:33 PM        Order Details      View Encounter      Lab and Collection Details      Routing      Result History           Scans on Order [id number]    Document on 7/14/2021 12:33 PM by Surya Patel MD       Result Care Coordination      Result Notes     Katia Jane LPN   7/14/2021  2:34 PM CDT Back to Top      Called and informed patient of results and MOHS procedure. Patient verbalized understanding and had no further questions or concerns. Patient scheduled with Dr. Albrecht on 7/22/2021.     CHARU Orantes Matthew David, MD   7/14/2021  1:03 PM CDT       Can we call patient and let him know the spot on the R ear was a SCC. He will need MMS. Can you schedule with estelle and otherwise 6 months follow-up with me? Thanks!     Andry Oseguera MD  Pronouns: he/him/his    Department of Dermatology  [facility]: Phone: [phone], Fax:[phone]  [facility]: Phone: [phone] Fax: [phone]

## 2021-07-22 ENCOUNTER — OFFICE VISIT (OUTPATIENT)
Dept: DERMATOLOGY | Facility: CLINIC | Age: 86
End: 2021-07-22
Payer: COMMERCIAL

## 2021-07-22 VITALS — DIASTOLIC BLOOD PRESSURE: 62 MMHG | SYSTOLIC BLOOD PRESSURE: 103 MMHG

## 2021-07-22 DIAGNOSIS — D04.21 SQUAMOUS CELL CARCINOMA IN SITU (SCCIS) OF SKIN OF HELIX OF RIGHT EAR: Primary | ICD-10-CM

## 2021-07-22 PROCEDURE — 14060 TIS TRNFR E/N/E/L 10 SQ CM/<: CPT | Mod: GC | Performed by: DERMATOLOGY

## 2021-07-22 PROCEDURE — 17311 MOHS 1 STAGE H/N/HF/G: CPT | Mod: GC | Performed by: DERMATOLOGY

## 2021-07-22 ASSESSMENT — PAIN SCALES - GENERAL: PAINLEVEL: NO PAIN (0)

## 2021-07-22 NOTE — LETTER
7/22/2021         RE: Preston Cai  [address]  [place] [address]        Dear Colleague,    Thank you for referring your patient, Preston Cai, to the [facility]. Please see a copy of my visit note below.    [facility] Mohs Surgery Procedure Note    Case #: 1  Date of Service:  Jul 22, 2021  Surgery: Mohs micrographic surgery  Staff surgeon: Tonio Albrecht DO  Fellow surgeon: Santiago Gage MD  Resident surgeon: None  Nurse: Stephani Sexton LPN    Tumor Type: SCCis  Location: Right ear helix  Derm-Path Accession #: [id number]    Mohs Accession #: [id number]  Pre-Op Size: 1.3 x 1.2 cm  Post-Op Size: 1.9 x 0.9 cm  Level of Defect: Fat  Repair Type: Helical rim advancement  Repair Size: 3.9 x 1.7 cm  Suture Material: 4-0 Monocryl; 5-0 fast absorbing gut    Procedure:    Stage I  We discussed the principles of treatment and most likely complications including scarring, bleeding, infection, swelling, pain, crusting, nerve damage, large wound,  incomplete excision, wound dehiscence,  nerve damage, recurrence, and a second procedure may be recommended to obtain the best cosmetic or functional result.    Informed consent was obtained and the patient underwent the procedure as follows:  The patient was placed supine on the operating table.  The cancer was identified, outlined with a marker, and verified by the patient.  The entire surgical field was prepped with chlorhexidine.  The surgical site was anesthetized using lidocaine with epinephrine.    The area of clinically apparent tumor was debulked. The layer of tissue was then surgically excised using a #15 blade and was then transferred onto a specimen sheet maintaining the orientation of the specimen. Hemostasis was obtained using electrocoagulation. The wound site was then covered with a dressing while the tissue samples were processed for examination.    The excised tissue was transported to the Mohs  histology laboratory maintaining the tissue orientation.  The tissue specimen was relaxed so that the entire surgical margin was in a a single horizontal plane for sectioning and inked for precise mapping.  A precise reference map was drawn to reflect the sectioning of the specimen, colored inking of the margins, and orientation on the patient.  The tissue was processed using horizontal sectioning of the base and continuous peripheral margins.  The histopathologic sections were reviewed in conjunction with the reference map.    Total blocks: 1  Total slides: 2    There were no cancer cells visualized on examination, therefore Mohs surgery was complete.    Helical Advancement Flap    PROCEDURE:  A helical advancement flap was designed. Local anesthesia was obtained with 1% lidocaine with 1:100,000 epinephrine. The tissue surrounding the operative site was undermined extensively with blunt scissor dissection. A single incision along the helical sulcus was made to the underlying adipose tissue. The flap was then undermined along the cartilage in the subcutaneous fat and elevated. Hemostasis was obtained using bipolar electrodesiccation . The flap was then advanced into the primary defect. Raised tissue cones were removed as necessary by standard technique. The superficial fascia and subcutaneous layers were closed with buried vertical mattress sutures. The epidermis was then approximated with  simple running  5-0 fast absorbing gut sutures. Careful attention was given to eversion and even approximation of the wound edges. A pressure dressing was applied.    The attending surgeon was present for entire minor procedure and examination.  procedure and always immediately available.    Dr. Santiago Gage (Mohs micrographic surgery fellow) performed the Mohs micrographic surgery and reconstruction under the direct supervision of Tonio Albrecht DO, who was present for the entire micrographic surgery and key portions of the  reconstruction, and always immediately available.    Staff Physician Comments:   I saw and evaluated the patient with the Mohs Surgery Fellow (Dr. Santiago Gage) and I agree with the assessment and plan and the above description of the procedure. I was present for the key portions of the procedure and entire exam.    Tonio Albrecht DO    Department of Dermatology  [facility]: Phone: [phone], Fax:[phone]  [facility]: Phone: [phone], Fax: [phone]    Care and Laboratory Testing Performed at:  [facility]   Dermatology Clinic  [address] [place] [address]        Again, thank you for allowing me to participate in the care of your patient.        Sincerely,        Tonio Albrecht MD

## 2021-07-22 NOTE — NURSING NOTE
Preston Cai's goals for this visit include:   Chief Complaint   Patient presents with     Procedure     MOHS right helix SCCIS       He requests these members of his care team be copied on today's visit information:     PCP: Tyrel Manning    Referring Provider:  No referring provider defined for this encounter.    There were no vitals taken for this visit.    Do you need any medication refills at today's visit? Heather Jane LPN

## 2021-07-22 NOTE — PATIENT INSTRUCTIONS
Mohs Wound Care Instructions  I will experience scar, altered skin color, bleeding, swelling, pain, crusting and redness. I may experience altered sensation. Risks are excessive bleeding, infection, muscle weakness, thick (hypertrophic or keloidal) scar, and recurrence. A second procedure may be recommended to obtain the best cosmetic or functional result.  Possible complications of any surgical procedure are bleeding, infection, scarring, alteration in skin color and sensation, muscle weakness in the area, wound dehiscence or seperation, or recurrence of the lesion or disease. On occasion, after healing, a secondary procedure or revision may be recommended in order to obtain the best cosmetic or functional result.   After your surgery, a pressure bandage will be placed over the area that has sutures. This will help prevent bleeding. Please follow these instructions as they will help you to prevent complications as your wound heals.  For the First 48 hours After Surgery:  1. Leave the pressure bandage on and keep it dry. If it should come loose, you may retape it, but do not take it off.  2. Relax and take it easy. Do not do any vigorous exercise, heavy lifting, or bending forward. This could cause the wound to bleed.  3. Post-operative pain is usually mild. You may take plain or extra strength Tylenol every 4 hours as needed (do not take more than 4,000mg in one day). Do not take any medicine that contains aspirin, ibuprofen or motrin unless you have been recommended these by a doctor.  Avoid alcohol and vitamin E as these may increase your tendency to bleed.  4. You may put an ice pack around the bandaged area for 20 minutes every 2-3 hours. This may help reduce swelling, bruising, and pain. Make sure the ice pack is waterproof so that the pressure bandage does not get wet.   5. You may see a small amount of drainage or blood on your pressure bandage. This is normal. However, if drainage or bleeding continues or  saturates the bandage, you will need to apply firm pressure over the bandage with a washcloth for 15 minutes. If bleeding continues after applying pressure for 15 minutes then go to the nearest emergency room.  48 Hours After Surgery  Carefully remove the bandage and start daily wound care and dressing changes. You may also now shower and get the wound wet.  Daily Wound Care:  1. Wash wound with a mild soap and water.  Use caution when washing the wound, be gentle and do not let the forceful shower stream hit the wound directly.  2. Pat dry.  3. Apply Vaseline (from a new container or tube) over the suture line with a Q-tip. It is very important to keep the wound continuously moist, as wounds heal best in a moist environment.  4. Keep the site covered until sutures are dissolved, you can cover it with a Telfa (non-stick) dressing and tape or a band-aid.    5. If you are unable to keep wound covered, you must apply Vaseline every 2-3 hours (while awake) to ensure it is being kept moist for optimal healing. A dressing overnight is recommended to keep the area moist.  Call Us If:  1. You have pain that is not controlled with Tylenol.  2. You have signs or symptoms of an infection, such as: fever over 100 degrees F, redness, warmth, or foul-smelling or yellow/creamy drainage from the wound.  Who should I call with questions?    [facility]: [phone]     [facility]: [phone]    For urgent needs outside of business hours call the [facility] at [phone] and ask to speak with the dermatology resident on call

## 2021-07-22 NOTE — PROGRESS NOTES
[facility] Mohs Surgery Procedure Note    Case #: 1  Date of Service:  Jul 22, 2021  Surgery: Mohs micrographic surgery  Staff surgeon: Tonio Albrecht DO  Fellow surgeon: Santiago Gage MD  Resident surgeon: None  Nurse: Stephani Sexton LPN    Tumor Type: SCCis  Location: Right ear helix  Derm-Path Accession #: [id number]    Mohs Accession #: [id number]  Pre-Op Size: 1.3 x 1.2 cm  Post-Op Size: 1.9 x 0.9 cm  Level of Defect: Fat  Repair Type: Helical rim advancement  Repair Size: 3.9 x 1.7 cm  Suture Material: 4-0 Monocryl; 5-0 fast absorbing gut    Procedure:    Stage I  We discussed the principles of treatment and most likely complications including scarring, bleeding, infection, swelling, pain, crusting, nerve damage, large wound,  incomplete excision, wound dehiscence,  nerve damage, recurrence, and a second procedure may be recommended to obtain the best cosmetic or functional result.    Informed consent was obtained and the patient underwent the procedure as follows:  The patient was placed supine on the operating table.  The cancer was identified, outlined with a marker, and verified by the patient.  The entire surgical field was prepped with chlorhexidine.  The surgical site was anesthetized using lidocaine with epinephrine.    The area of clinically apparent tumor was debulked. The layer of tissue was then surgically excised using a #15 blade and was then transferred onto a specimen sheet maintaining the orientation of the specimen. Hemostasis was obtained using electrocoagulation. The wound site was then covered with a dressing while the tissue samples were processed for examination.    The excised tissue was transported to the Mohs histology laboratory maintaining the tissue orientation.  The tissue specimen was relaxed so that the entire surgical margin was in a a single horizontal plane for sectioning and inked for precise mapping.  A precise reference map was drawn to reflect the sectioning  of the specimen, colored inking of the margins, and orientation on the patient.  The tissue was processed using horizontal sectioning of the base and continuous peripheral margins.  The histopathologic sections were reviewed in conjunction with the reference map.    Total blocks: 1  Total slides: 2    There were no cancer cells visualized on examination, therefore Mohs surgery was complete.    Helical Advancement Flap    PROCEDURE:  A helical advancement flap was designed. Local anesthesia was obtained with 1% lidocaine with 1:100,000 epinephrine. The tissue surrounding the operative site was undermined extensively with blunt scissor dissection. A single incision along the helical sulcus was made to the underlying adipose tissue. The flap was then undermined along the cartilage in the subcutaneous fat and elevated. Hemostasis was obtained using bipolar electrodesiccation . The flap was then advanced into the primary defect. Raised tissue cones were removed as necessary by standard technique. The superficial fascia and subcutaneous layers were closed with buried vertical mattress sutures. The epidermis was then approximated with  simple running  5-0 fast absorbing gut sutures. Careful attention was given to eversion and even approximation of the wound edges. A pressure dressing was applied.    The attending surgeon was present for entire minor procedure and examination.  procedure and always immediately available.    Dr. Santiago Gage (Mohs micrographic surgery fellow) performed the Mohs micrographic surgery and reconstruction under the direct supervision of Tonio Albrecht DO, who was present for the entire micrographic surgery and key portions of the reconstruction, and always immediately available.    Staff Physician Comments:   I saw and evaluated the patient with the Mohs Surgery Fellow (Dr. Santiago Gage) and I agree with the assessment and plan and the above description of the procedure. I was present for the key  portions of the procedure and entire exam.    Tonio Albrecht DO    Department of Dermatology  [facility]: Phone: [phone], Fax:[phone]  [facility]: Phone: [phone], Fax: [phone]    Care and Laboratory Testing Performed at:  [facility]   Dermatology Clinic  [address] [place] [address]

## 2021-07-22 NOTE — NURSING NOTE
The following medication was given:     MEDICATION:  Lidocaine with epinephrine 1% [id number]  ROUTE: SQ  SITE: see procedure note  DOSE: 7cc  LOT #: -EV  : Hospira  EXPIRATION DATE: 02/22  NDC#: 8494-1764-11  Was there drug waste? 2cc  Multi-dose vial: Yes    Stephani Sexton LPN  July 22, 2021    Vaseline and pressure dressing applied to Mohs site on right helix.  Wound care instructions reviewed with patient and AVS provided.  Patient verbalized understanding. No further questions or concerns at this time.    Stephani Sexton LPN

## 2021-08-04 ENCOUNTER — MYC MEDICAL ADVICE (OUTPATIENT)
Dept: FAMILY MEDICINE | Facility: CLINIC | Age: 86
End: 2021-08-04

## 2021-08-05 ENCOUNTER — OFFICE VISIT (OUTPATIENT)
Dept: DERMATOLOGY | Facility: CLINIC | Age: 86
End: 2021-08-05
Payer: COMMERCIAL

## 2021-08-05 DIAGNOSIS — Z51.89 VISIT FOR WOUND CHECK: ICD-10-CM

## 2021-08-05 DIAGNOSIS — D04.21 SQUAMOUS CELL CARCINOMA IN SITU (SCCIS) OF SKIN OF HELIX OF RIGHT EAR: Primary | ICD-10-CM

## 2021-08-05 PROCEDURE — 99024 POSTOP FOLLOW-UP VISIT: CPT | Mod: GC | Performed by: DERMATOLOGY

## 2021-08-05 NOTE — NURSING NOTE
Preston Cai's goals for this visit include:   Chief Complaint   Patient presents with     Wound Check     right ear sp mOHS 7/22/2021       He requests these members of his care team be copied on today's visit information:     PCP: Tyrel Manning    Referring Provider:  No referring provider defined for this encounter.    There were no vitals taken for this visit.    Do you need any medication refills at today's visit? Heather Jane LPN

## 2021-08-05 NOTE — PROGRESS NOTES
Dermatologic Surgery Clinic Note    Aug 5, 2021    Dermatology Problem List:    1. Actinic keratosis  - s/p cryotherapy  - pigmented AK, left jaw, s/p biopsy 11/13/18  - HAK, right hand, s/p excision 5/2009  3. Inflamed seborrheic keratosis: treated with shave removals and cryo  4. 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     Subjective: The patient is a 85 year old man who presents today for a wound check after recent dermatologic surgery. No concerns for infection today. The patient continues with daily wound cares as recommended.    No other associated symptoms, modifying factors, or prior treatments, except when noted above. The patient denies any constitutional symptoms, lymphadenopathy, unintentional weight loss or decreased appetite. No other skin concerns today.    Objective:   Gen: This is a well appearing individual in no acute distress. The patient is alert and oriented x 3.  An exam of the head and neck, particularly the right ear, was performed today and visualized the following:    - The surgical site noted above is clean, dry, and intact. There is no surrounding erythema, purulence, or significant tenderness to palpation. No clinical evidence of infection noted today.                Assessment and Plan:     1. SCCis, right ear helix, s/p MMS on 7/22/21:  - The patient's surgery site(s) is/are healing very well. No evidence of infection on examination today.  - The patient was told to continue with wound cares until the area(s) is/are no longer crusted.   - The patient should follow up with dermatologic surgery PRN, as well as continue with regular skin exams in general dermatology clinic.    The patient was discussed with and evaluated by attending physician, Tonio Albrecht DO.    Santiago Gage MD  Micrographic Surgery and Dermatologic Oncology  (MSDO) Fellow      Staff Physician Comments:   I saw and evaluated the patient with the Mohs Surgery Fellow (Dr. Jacob Gage) and I agree with the assessment and plan. I was present for the entire exam.    Tonio Albrecht DO    Department of Dermatology  [facility]: Phone: [phone], Fax:[phone]  [facility]: Phone: [phone], Fax: [phone]

## 2021-08-05 NOTE — LETTER
8/5/2021         RE: Preston Cai  [address]  [place] [address]        Dear Colleague,    Thank you for referring your patient, Preston Cai, to the [facility]. Please see a copy of my visit note below.    Dermatologic Surgery Clinic Note    Aug 5, 2021    Dermatology Problem List:    1. Actinic keratosis  - s/p cryotherapy  - pigmented AK, left jaw, s/p biopsy 11/13/18  - HAK, right hand, s/p excision 5/2009  3. Inflamed seborrheic keratosis: treated with shave removals and cryo  4. 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     Subjective: The patient is a 85 year old man who presents today for a wound check after recent dermatologic surgery. No concerns for infection today. The patient continues with daily wound cares as recommended.    No other associated symptoms, modifying factors, or prior treatments, except when noted above. The patient denies any constitutional symptoms, lymphadenopathy, unintentional weight loss or decreased appetite. No other skin concerns today.    Objective:   Gen: This is a well appearing individual in no acute distress. The patient is alert and oriented x 3.  An exam of the head and neck, particularly the right ear, was performed today and visualized the following:    - The surgical site noted above is clean, dry, and intact. There is no surrounding erythema, purulence, or significant tenderness to palpation. No clinical evidence of infection noted today.                Assessment and Plan:     1. SCCis, right ear helix, s/p MMS on 7/22/21:  - The patient's surgery site(s) is/are healing very well. No evidence of infection on examination today.  - The patient was told to continue with wound cares until the area(s) is/are no longer crusted.   - The patient should follow  up with dermatologic surgery PRN, as well as continue with regular skin exams in general dermatology clinic.    The patient was discussed with and evaluated by attending physician, Tonio Albrecht DO.    Santiago Gage MD  Micrographic Surgery and Dermatologic Oncology (MSDO) Fellow      Staff Physician Comments:   I saw and evaluated the patient with the Mohs Surgery Fellow (Dr. Jacob Gage) and I agree with the assessment and plan. I was present for the entire exam.    Tonio Albrecht DO    Department of Dermatology  [facility]: Phone: [phone], Fax:[phone]  [facility]: Phone: [phone], Fax: [phone]        Again, thank you for allowing me to participate in the care of your patient.        Sincerely,        Tonio Albrecht MD

## 2021-08-11 ENCOUNTER — OFFICE VISIT (OUTPATIENT)
Dept: FAMILY MEDICINE | Facility: CLINIC | Age: 86
End: 2021-08-11
Payer: COMMERCIAL

## 2021-08-11 VITALS
OXYGEN SATURATION: 97 % | DIASTOLIC BLOOD PRESSURE: 65 MMHG | SYSTOLIC BLOOD PRESSURE: 104 MMHG | HEIGHT: 73 IN | BODY MASS INDEX: 21.2 KG/M2 | TEMPERATURE: 97.4 F | HEART RATE: 80 BPM | WEIGHT: 160 LBS

## 2021-08-11 DIAGNOSIS — G25.0 ESSENTIAL TREMOR: ICD-10-CM

## 2021-08-11 DIAGNOSIS — N40.1 BPH ASSOCIATED WITH NOCTURIA: ICD-10-CM

## 2021-08-11 DIAGNOSIS — Z12.11 SCREEN FOR COLON CANCER: ICD-10-CM

## 2021-08-11 DIAGNOSIS — R63.4 WEIGHT LOSS: Primary | ICD-10-CM

## 2021-08-11 DIAGNOSIS — F51.9: ICD-10-CM

## 2021-08-11 DIAGNOSIS — R35.1 BPH ASSOCIATED WITH NOCTURIA: ICD-10-CM

## 2021-08-11 DIAGNOSIS — K29.60 OTHER GASTRITIS WITHOUT HEMORRHAGE, UNSPECIFIED CHRONICITY: ICD-10-CM

## 2021-08-11 PROCEDURE — 99214 OFFICE O/P EST MOD 30 MIN: CPT | Performed by: INTERNAL MEDICINE

## 2021-08-11 RX ORDER — PRIMIDONE 250 MG/1
TABLET ORAL
Qty: 135 TABLET | Refills: 1 | Status: SHIPPED | OUTPATIENT
Start: 2021-08-11 | End: 2021-12-27

## 2021-08-11 ASSESSMENT — PAIN SCALES - GENERAL: PAINLEVEL: NO PAIN (0)

## 2021-08-11 ASSESSMENT — MIFFLIN-ST. JEOR: SCORE: 1469.64

## 2021-08-11 NOTE — PATIENT INSTRUCTIONS
At [facility], we strive to deliver an exceptional experience to you, every time we see you. If you receive a survey, please complete it as we do value your feedback.  If you have MyChart, you can expect to receive results automatically within 24 hours of their completion.  Your provider will send a note interpreting your results as well.   If you do not have MyChart, you should receive your results in about a week by mail.    Your care team:                            Family Medicine Internal Medicine   MD Tyrel Mckeon MD Shantel Branch-Fleming, MD Srinivasa Vaka, MD Katya Belousova, PAJUAQUIN Corcoran, APRN CNP    Alen Del Rosario, MD Pediatrics   David Sykes, PAJUAQUIN Fox, CNP MD Nancy Garcia APRN CNP   MD Yulissa Cali MD Deborah Mielke, MD Stephanie Soriano, APRN [facility]      Clinic hours: Monday - Thursday 7 am-6 pm; Fridays 7 am-5 pm.   Urgent care: Monday - Friday 10 am- 8 pm; Saturday and Sunday 9 am-5 pm.    Clinic: [phone]       [facility]: Monday - Thursday 8 am - 7 pm; Friday 8 am - 6 pm  [facility]: [phone]     Use [email or web address] for 24/7 diagnosis and treatment of dozens of conditions.

## 2021-08-11 NOTE — PROGRESS NOTES
HISTORY OF PRESENT ILLNESS    This is a 85 year old who presents for the following health issues:    Chief complaint: weight loss    We moved to a senior living (almost 3 years). We get so many free meals.  Tries to keep active. Eating fine. But I'm not getting enough sleep. We have a lot of interrupted sleep (as long as 3 hours at a time). Tried to keep naps during daytime (30 minutes). Keeps on losing weight.  Over the last year, I get more gas. I get regular bowel movements.  When I push on my stomach, it'll hurt. But moving my bowels it'll clear up.  I can't seem to gain weight. Currently weighs 160 lbs. Last 3/13/2020, I weight 175 lbs.  Still takes Pantoprazole every morning, no more bitter stuff coming up.  EGD last 2007 shows hiatal hernia and single erosion in gastroesophageal junction.  Wakes up three times a night due to nocturia, yet patient takes Tamsulosin every other night.     REVIEW OF SYSTEMS:  (+) stomach pain, rare  (-) melena, hematochezia, hematemesis  (+) essential tremors      DIAGNOSTICS  Upper GI Endoscopy 07/17/2007 10:35 AM Rad [facility]   _______________________________________________________________________________   Patient Name: Preston Cai           Gender: M                                   Procedure Date: 7/17/2007 10:35 AM    MRN: [id number]                             YOB: 1935             Age: 71                                     Admit Type: Outpatient                Account #: [id number]                       Attending MD: Osmar Cruz           _______________________________________________________________________________       Procedure:      Upper GI endoscopy   Indications:    Hoarseness worse over past 4 months, 4 year hx heartburn,                   occasional reflux, Heartburn, Suspected esophageal reflux                   liquid into mouth   Providers:      Osmar Cruz MD, Jen Hernandez, RN   Referring MD:   Osmar Cruz MD, [place]  MD, Merritt Sandhu MD   Medicines:      Fentanyl  mcgs, Hurricaine (benzocaine) Spray 3 doses,                   Versed IV 4 mgs   Complications:  No immediate complications   _______________________________________________________________________________   Procedure:      - A History and Physical has been performed, and patient                   medication allergies have been reviewed. The patient The                   risks and benefits of the procedure and the sedation options                   and risks were discussed with the patient. All questions were                   answered and informed consent was obtained. Patient                   identification and proposed procedure were verified prior to                   the procedure by the physician and the nurse in the endoscopy                   suite. Mental Status Examination: normal. Airway Examination:                   normal oropharyngeal airway and neck mobility and Mallampati                   Class II (the uvula but not tonsillar pillars visualized).                   Respiratory Examination: clear to auscultation. CV                   Examination: normal. ASA Grade Assessment: P2 A patient with                   mild systemic disease. After reviewing the risks and                   benefits, the patient was deemed in satisfactory condition to                   undergo the procedure. The anesthesia plan was to use                   moderate sedation / analgesia (conscious sedation).                   Immediately prior to administration of medications, the                   patient was re-assessed for adequacy to receive sedatives.                   The heart rate, respiratory rate, oxygen saturations, blood                   pressure, adequacy of pulmonary ventilation, and response to                   care were monitored throughout the procedure. The physical                   status of the patient was re-assessed after the procedure.                    After obtaining informed consent, the endoscope was passed                   under direct vision. Throughout the procedure, the patient's                   blood pressure, pulse, and oxygen saturations were monitored                   continuously. The Endoscope was introduced through the mouth,                   and advanced to the third part of duodenum. The upper GI                   endoscopy was accomplished without difficulty. The patient                   tolerated the procedure well.                                                                                    Findings:        A small hiatus hernia was found. The proximal extent of the gastric        folds (end of tubular esophagus) was thirty-nine cm from the incisors.        The hiatal narrowing was forty-two cm from the incisors. The Z-line was        39 cm from the incisors. A single small erosion was found in the        gastroesophageal junction. Biopsies were taken with a cold forceps for        histology. The entire examined stomach was normal. Patchy moderately        erythematous mucosa without active bleeding and with no stigmata of        bleeding was found in the duodenal bulb. Biopsies were taken with a cold        forceps for Helicobacter Pylori testing using ALYSIA Test. The cardia and        gastric fundus were normal on retroflexion. Vocal cords appear normal.                                                                                    Impression:     - Hiatus hernia.                   - A single erosion in the gastroesophageal junction.                   - Normal stomach.                   - Erythematous duodenopathy.                   - Normal appearing vocal cords.   Recommendation: - Discharge patient to home (ambulatory).                   - Perform routine esophageal manometry.                   - Perform ambulatory pH monitoring.                   - Follow an antireflux regimen for the rest of the patient's                    life.                   - Discontinue alcohol consumption for 6 weeks.                   - Discontinue aspirin for 6 weeks.                   - Use Prilosec (omeprazole) at 20 mg by mouth BID for the                   rest of the patient's life.                                                                                      Signed electronically by Osmar Cruz M.D.   ___________________   Osmar Cruz MD   Signed Date: 7/17/2007 11:39 AM   Number of Addenda: 0   Note generated on 7/17/2007 10:37 AM     COLONOSCOPY 04/28/2015 11:38 AM Rad [facility]   [facility]   Endoscopy Department-[place]   _______________________________________________________________________________   Patient Name: Preston Powellekaterina           Procedure Date: 4/28/2015 11:38 AM   MRN: [id number]                       YOB: 1935   Admit Type: Outpatient                Age: 79   Gender: Male                          Note Status: Finalized   Attending MD: Marvin Adams MD       _______________________________________________________________________________       Procedure:                Colonoscopy   Indications:              Screening patient at increased risk: Family history                             1st-degree relative with colorectal cancer >= 60                             years old   Providers:                Marvin Adams MD, Rosemarie Ribera   Referring MD:             Andry Arshad MD   Medicines:                Midazolam 4 mg IV, Fentanyl 100 micrograms IV,                             Glucagon 1 mg IV   Complications:            No immediate complications.   _______________________________________________________________________________   Procedure:                Pre-Anesthesia Assessment:                             - Prior to the procedure, a History and Physical                             was performed, and patient medications and                              allergies were reviewed. The patient is competent.                             The risks and benefits of the procedure and the                             sedation options and risks were discussed with the                             patient. All questions were answered and informed                             consent was obtained. Patient identification and                             proposed procedure were verified by the physician                             in the endoscopy suite. Mental Status Examination:                             alert and oriented. Airway Examination: normal                             oropharyngeal airway and neck mobility. Respiratory                             Examination: clear to auscultation. CV Examination:                             RRR, no murmurs, no S3 or S4. Prophylactic                             Antibiotics: The patient does not require                             prophylactic antibiotics. Prior Anticoagulants: The                             patient has taken aspirin, last dose was 7 days                             prior to procedure. ASA Grade Assessment: II - A                             patient with mild systemic disease. After reviewing                             the risks and benefits, the patient was deemed in                             satisfactory condition to undergo the procedure.                             The anesthesia plan was to use moderate sedation /                             analgesia (conscious sedation). Immediately prior                             to administration of medications, the patient was                             re-assessed for adequacy to receive sedatives. The                             heart rate, respiratory rate, oxygen saturations,                             blood pressure, adequacy of pulmonary ventilation,                             and response to care were monitored throughout the                              procedure. The physical status of the patient was                             re-assessed after the procedure.                             After obtaining informed consent, the colonoscope                             was passed under direct vision. Throughout the                             procedure, the patient's blood pressure, pulse, and                             oxygen saturations were monitored continuously. The                             Colonoscope was introduced through the anus and                             advanced to the cecum, identified by appendiceal                             orifice and ileocecal valve. The colonoscopy was                             performed with moderate difficulty due to a                             tortuous colon. Successful completion of the                             procedure was aided by increasing the dose of                             sedation medication. The quality of the bowel                             preparation was good.                                                                                     Findings:        The perianal and digital rectal examinations were normal. Pertinent        negatives include normal sphincter tone, no palpable rectal lesions and        no anal lesion or abnormality was detected.        The digital rectal exam was abnormal. Findings include enlarged prostate.        There was moderate spasm in the entire colon.        The splenic flexure and hepatic flexure were moderately tortuous.        A few small-mouthed diverticula were found in the sigmoid colon and in        the descending colon.        A sessile polyp was found in the cecum. The polyp was 2 mm in size. The        polyp was removed with a cold snare. Resection and retrieval were        complete.        A sessile polyp was found in the mid ascending colon. The polyp was 1 mm        in size. The polyp was removed with a cold snare. Resection and        retrieval  "were complete.        A sessile polyp was found in the mid descending colon. The polyp was 2        mm in size. The polyp was removed with a cold snare. Resection and        retrieval were complete.        A sessile polyp was found in the rectum. The polyp was 3 mm in size. The        polyp was removed with a cold snare. Resection and retrieval were        complete.        NB - lots of continuous irritability of colon throughout the entire        colon, meaning there certainly could be other small polyps not seen in        light of multiple polyps removed today. Colon was also a little        redundant. With maneuvers scope hardly reached cecum. Was able to just        intubate the mouth of the TI and what I saw grossly was normal but this        is not a \"fair\" exam of the TI.                                                                                     Impression:               - Enlarged prostate found on digital rectal exam.                             - Moderate colonic spasm.                             - Tortuous colon. [All Maneuvers].                             - Diverticulosis in the sigmoid colon and in the                             descending colon.                             - One 2 mm polyp in the cecum. Resected and                             retrieved.                             - One 1 mm polyp in the mid ascending colon.                             Resected and retrieved.                             - One 2 mm polyp in the mid descending colon.                             Resected and retrieved.                             - One 3 mm polyp in the rectum. Resected and                             retrieved.   Recommendation:           - Discharge patient to home (ambulatory).                             - Return to previous diet today.                             - Discontinue aspirin and NSAIDs for 2 weeks.                             - Continue present medications.                       "       - No aspirin, ibuprofen, naproxen, or other                             non-steroidal anti-inflammatory drugs for 2 weeks                             after polyp removal.                             - Await pathology results.                             - Repeat colonoscopy in 3 years for surveillance.                             - Return to referring physician PRN.                             - Preston, I will send you a letter in 2-3 weeks to                             let you know what the pathology report shows on the                             4 polyps we removed today.                                                                                       ____________________   Marvin Adams MD      WBC 4.0 - 11.0 10e9/L 4.3      RBC Count 4.4 - 5.9 10e12/L 3.69Low      Hemoglobin 13.3 - 17.7 g/dL 12.1Low      Hematocrit 40.0 - 53.0 % 36.5Low      MCV 78 - 100 fl 99     MCH 26.5 - 33.0 pg 32.8     MCHC 31.5 - 36.5 g/dL 33.2     RDW 10.0 - 15.0 % 12.3     Platelet Count 150 - 450 10e9/L 171     % Neutrophils % 57.3     % Lymphocytes % 32.5     % Monocytes % 8.8     % Eosinophils % 1.2     % Basophils % 0.2     Absolute Neutrophil 1.6 - 8.3 10e9/L 2.5     Absolute Lymphocytes 0.8 - 5.3 10e9/L 1.4     Absolute Monocytes 0.0 - 1.3 10e9/L 0.4     Absolute Eosinophils 0.0 - 0.7 10e9/L 0.1     Absolute Basophils 0.0 - 0.2 10e9/L 0.0     Diff Method  Automated Method    Resulting Agency  [facility]         Specimen Collected: 05/05/21 12:09 PM   Last Resulted: 05/05/21 12:52 PM        Sodium 133 - 144 mmol/L 134      Potassium 3.4 - 5.3 mmol/L 4.3     Chloride 94 - 109 mmol/L 101     Carbon Dioxide 20 - 32 mmol/L 30     Anion Gap 3 - 14 mmol/L 3     Glucose 70 - 99 mg/dL 94    Comment: Fasting specimen    Urea Nitrogen 7 - 30 mg/dL 16     Creatinine 0.66 - 1.25 mg/dL 1.04     GFR Estimate >60 mL/min/ 65    Comment: Non  GFR Calc   Starting 12/18/2018, serum creatinine based estimated GFR (eGFR) will be    calculated using the Chronic Kidney Disease Epidemiology Collaboration   (CKD-EPI) equation.     GFR Estimate If Black >60 mL/min/ 76    Comment:  GFR Calc   Starting 12/18/2018, serum creatinine based estimated GFR (eGFR) will be   calculated using the Chronic Kidney Disease Epidemiology Collaboration   (CKD-EPI) equation.     Calcium 8.5 - 10.1 mg/dL 9.0     Bilirubin Total 0.2 - 1.3 mg/dL 0.6     Albumin 3.4 - 5.0 g/dL 4.2     Protein Total 6.8 - 8.8 g/dL 7.4     Alkaline Phosphatase 40 - 150 U/L 70     ALT 0 - 70 U/L 26     AST 0 - 45 U/L 21    Resulting Agency  [facility]         Specimen Collected: 05/05/21 12:09 PM   Last Resulted: 05/05/21  7:12 PM           Methylmalonic Acid 0.00 - 0.40 umol/L 0.18     Comment: This test was developed and its performance characteristics determined by the   [facility].    It has not been cleared or approved by the FDA. The laboratory is regulated   under CLIA as qualified to perform high-complexity testing. This test is used   for clinical purposes. It should not be regarded as investigational or for   research.    Resulting Agency  [facility]         Specimen Collected: 05/05/21 12:10 PM        TSH 0.40 - 4.00 mU/L 2.90     Resulting Agency  [facility]         Specimen Collected: 05/05/21 12:09 PM   Last Resulted: 05/05/21  7:20 PM        Vitamin D Deficiency screening 20 - 75 ug/L 34     Comment: Season, race, dietary intake, and treatment affect the concentration of   25-hydroxy-Vitamin D. Values may decrease during winter months and increase   during summer months. Values 20-29 ug/L may indicate Vitamin D insufficiency   and values <20 ug/L may indicate Vitamin D deficiency.   Vitamin D determination is routinely performed by an immunoassay specific for   25 hydroxyvitamin D3.  If an individual is on vitamin D2 (ergocalciferol)   supplementation, please specify 25 OH  vitamin D2 and D3 level determination by    LCMSMS test [id number].    Resulting Agency  [facility]         Specimen Collected: 05/05/21 12:09 PM          ASSESSMENT  1. (R63.4) Weight loss  (primary encounter diagnosis)    2. 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 (K29.60) Other gastritis without hemorrhage, unspecified chronicity    4. (Z12.11) Screen for colon cancer    5. 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 (G25.0) Essential tremor    PLAN:  1. Obtain stool specimens for H. Pylori and FIT test.  2. Increase Primidone to 500 mg at night (preferably two 250 mg tabs per dose).  3. Take Tamsulosin 0.4 mg at bedtime consistently.  4. Consider colonoscopy if FIT test is abnormal.  5. Recommend Mirtazapine if weight loss continues and sleep cycles do not improve. May reduce Primidone back to 250 mg at bedtime if we start Mirtazapine.    Disposition: Follow-up in 4 weeks.    Tyrel Manning MD  Internal Medicine

## 2021-08-13 PROCEDURE — 87338 HPYLORI STOOL AG IA: CPT | Performed by: INTERNAL MEDICINE

## 2021-08-13 PROCEDURE — 82274 ASSAY TEST FOR BLOOD FECAL: CPT | Performed by: INTERNAL MEDICINE

## 2021-08-14 LAB — HEMOCCULT STL QL IA: NEGATIVE

## 2021-08-16 ENCOUNTER — OFFICE VISIT (OUTPATIENT)
Dept: OPHTHALMOLOGY | Facility: CLINIC | Age: 86
End: 2021-08-16
Payer: COMMERCIAL

## 2021-08-16 DIAGNOSIS — Z96.1 PSEUDOPHAKIA OF BOTH EYES: ICD-10-CM

## 2021-08-16 DIAGNOSIS — H43.813 PVD (POSTERIOR VITREOUS DETACHMENT), BILATERAL: ICD-10-CM

## 2021-08-16 DIAGNOSIS — H02.889 MEIBOMIAN GLAND DYSFUNCTION (MGD): ICD-10-CM

## 2021-08-16 DIAGNOSIS — H02.831 DERMATOCHALASIS OF BOTH UPPER EYELIDS: ICD-10-CM

## 2021-08-16 DIAGNOSIS — H40.10X0 OPEN-ANGLE GLAUCOMA OF RIGHT EYE, UNSPECIFIED GLAUCOMA STAGE, UNSPECIFIED OPEN-ANGLE GLAUCOMA TYPE: Primary | ICD-10-CM

## 2021-08-16 DIAGNOSIS — H35.3134 ADVANCED ATROPHIC NONEXUDATIVE AGE-RELATED MACULAR DEGENERATION OF BOTH EYES WITH SUBFOVEAL INVOLVEMENT: ICD-10-CM

## 2021-08-16 DIAGNOSIS — H02.834 DERMATOCHALASIS OF BOTH UPPER EYELIDS: ICD-10-CM

## 2021-08-16 LAB — H PYLORI AG STL QL IA: NEGATIVE

## 2021-08-16 PROCEDURE — 92134 CPTRZ OPH DX IMG PST SGM RTA: CPT | Performed by: OPHTHALMOLOGY

## 2021-08-16 PROCEDURE — 92014 COMPRE OPH EXAM EST PT 1/>: CPT | Performed by: OPHTHALMOLOGY

## 2021-08-16 PROCEDURE — 99207 OCT OPTIC NERVE RNFL OPTOVUE OU (BOTH EYES): CPT | Performed by: OPHTHALMOLOGY

## 2021-08-16 PROCEDURE — 76514 ECHO EXAM OF EYE THICKNESS: CPT | Performed by: OPHTHALMOLOGY

## 2021-08-16 PROCEDURE — 92015 DETERMINE REFRACTIVE STATE: CPT

## 2021-08-16 RX ORDER — LATANOPROST 50 UG/ML
1 SOLUTION/ DROPS OPHTHALMIC AT BEDTIME
Qty: 7.5 ML | Refills: 1 | Status: SHIPPED | OUTPATIENT
Start: 2021-08-16 | End: 2023-07-05

## 2021-08-16 ASSESSMENT — PACHYMETRY
OD_CT(UM): 564
OS_CT(UM): 559

## 2021-08-16 ASSESSMENT — REFRACTION_MANIFEST
OS_ADD: +3.00
OD_SPHERE: -0.50
OD_ADD: +3.00
OS_SPHERE: -0.25
OS_CYLINDER: +1.50
OD_CYLINDER: +1.00
OD_AXIS: 155
OS_AXIS: 175

## 2021-08-16 ASSESSMENT — EXTERNAL EXAM - LEFT EYE: OS_EXAM: DERMATOCHALASIS

## 2021-08-16 ASSESSMENT — CUP TO DISC RATIO
OS_RATIO: 0.3
OD_RATIO: 0.65

## 2021-08-16 ASSESSMENT — TONOMETRY
OS_IOP_MMHG: 13
IOP_METHOD: APPLANATION
OD_IOP_MMHG: 20

## 2021-08-16 ASSESSMENT — REFRACTION_WEARINGRX
OD_ADD: +3.00
OS_SPHERE: -0.25
OS_AXIS: 179
OD_SPHERE: -0.75
OD_AXIS: 159
SPECS_TYPE: TRIFOCAL
OS_ADD: +3.00
OS_CYLINDER: +1.00
OD_CYLINDER: +1.00

## 2021-08-16 ASSESSMENT — EXTERNAL EXAM - RIGHT EYE: OD_EXAM: DERMATOCHALASIS

## 2021-08-16 ASSESSMENT — VISUAL ACUITY
METHOD: SNELLEN - LINEAR
OD_CC+: -2
OS_CC+: -1
OD_CC: 20/30
CORRECTION_TYPE: GLASSES
OS_CC: 20/25

## 2021-08-16 ASSESSMENT — CONF VISUAL FIELD
OD_NORMAL: 1
METHOD: COUNTING FINGERS
OS_NORMAL: 1

## 2021-08-16 NOTE — PROGRESS NOTES
"HPI:  Preston Cai is a 85 year old male presenting for complete eye exam.    Last visit with Dr. Lara 11/2020 with note \"IOP too high right eye\" without any treatment or follow up noted.  Previously saw Dr. James 9/2020 with note \"had been seeing Dr. Burns at [facility] [place] for macular degeneration he was dismissed from his practice in 2019\"; was recommended AREDS2 and blepharitis treatment and follow up with Dr. Lara for glaucoma and macular degeneration testing.  Per 2019 Dr. James note last saw Dr. Burns in 2018 and was dismissed from practice at that point.    Has been noticing gradual decline in distance vision over the past year. Not quite as sharp as previously, in both eyes. Feels like he is also straining more with the left eye because it is compensating for the right eye. Using Systane prn and warm compresses in the morning for eye dryness/burning.  This helps a lot.    Per patient, he used to follow with Dr. Burns at [facility] [place] in [place]. Had many photos taken but was told that there wasn't anything that could be done because of dry AMD.  Used to have Amsler grid but has not used for a long time, no longer has this. Plan was for routine eye exams.  He has been off AREDS vitamins for years.  He has been told to take these but feels like he already takes a lot of pills.    Past Ocular History:  Pseudophakia each eye (2016 Dr. Lara left eye; right eye with Dr. Turpin in [place] in early 2000s)  YAG right eye 2016 (Jane)  2002 right eye retinal repair for macular tear with doctor at [facility] in [place] -- had surgery in OR and had to be face down for several days, thinks issue was in center of vision. Had noticed a year prior that with focusing would get blurred central vision right eye.  Macular degeneration -- previously followed with Dr. Burns at [facility] [place]    PMH:  Tremor - on primadone  Gabapentin - foot/ankle neuropathy  GERD  Insomnia    SH:  Former smoker, quit 1969. Worked as "  and building maintenance.    FH:  Mother - glaucoma    ASSESSMENT and PLAN:  1. Open-angle glaucoma of right eye, unspecified glaucoma stage, unspecified open-angle glaucoma type  - +Fhx glaucoma (mother)  - IOP has been consistently higher right eye than left (high teens/low 20s right, low teens left) for years  - pachy 564/559  - on exam with larger cup right eye and thinner rims especially superior  - IOP today 20/13  - single HVF from 11/2020 without interpretation in chart. Personally reviewed: IN step right eye, full left eye  - OCT RNFL Review from 2020: right eye with overall normal thickness (80) and no thinning on quadrant analysis but with borderline superior hemifield thinning and superior arcuate of GCC thinning; left eye avg thickness 89 without thinning and normal GCC    - OCT RNFL 8/16/21  Right Eye: avg thickness 78, superior hemifield with borderline thinning, superior quadrant with borderline thinning; stable superior arcuate thinning of GCC, similar compared to 2020  Left Eye: avg thickness 87, no thinning, normal GCC, stable compared to 2020    --> discussed with patient  --> start latanoprost at bedtime right eye; we discussed possible side effects including eye irritation, eye redness, lash growth, periocular skin and iris darkening and he understands    --> he will follow up with glaucoma provider for IOP check in 1 month and to establish care    2. Advanced atrophic nonexudative age-related macular degeneration of both eyes with subfoveal involvement  - previously recommended AREDS2 but has not taken for years; not interested in more pills  - previously followed with Dr. Burns at [facility]  - history of right eye retina tear s/p repair 2002; now with increased pigment clumping right eye compared to left  - OCT Macula Review:  2016: many drusen each eye, no heme/fluid  2020: increased drusen each eye, irregular retinal contour and overlying inner retinal thinning especially  right eye, no heme/fluid; hyperreflective foveal lesion right eye  - OCT Macula 8/16/21  Right Eye: many drusen including large drusen and confluent, hyperreflective lesion centrally slightly larger than 2020, no fluid  Left Eye: many drusen including large drusen and confluent, no fluid    --> discussed UV protection, dark leafy greens  --> former smoker, quit 1969  --> discussed AREDS2 indicated in intermediate stage dry AMD; discussed option of AREDS2 and he is not interested  --> given Amsler grid and reviewed how to use  --> recommended re-establishing care with Dr. Burns    3. Pseudophakia of both eyes  - lenses in good position  - s/p YAG right eye  - inferior mild PCO left eye not visually significant  - updated glasses Rx given    4. Dermatochalasis of both upper eyelids  - he was previously recommended blepharoplasty with Dr. Lara  - discussed symptoms of dermatochalasis and he is not bothered by this  - monitor    5. PVD (posterior vitreous detachment), bilateral  - retinas attached  - Reviewed signs/symptoms of retinal tear/detachment including shower of new floaters, flashes, curtain/veil, decreased vision, etc and patient understands to call/come in immediately for these    6. Meibomian gland dysfunction (MGD)  - discussed likely cause of AM eye redness and irritation  - continue ATs prn  - increase WCs    Follow up in 1 month with glaucoma or sooner PRN        -----------------------------------------------------------------------------------    Attestation:  Complete documentation of historical and exam elements from today's encounter can be found in the full encounter summary report (not reduplicated in this progress note). I personally obtained the chief complaint(s) and history of present illness.  I confirmed and edited as necessary the review of systems, past medical/surgical history, family history, social history, and examination findings as documented by others; and I examined the  patient myself. I personally reviewed the relevant tests, images, and reports as documented above.     I formulated and edited as necessary the assessment and plan and discussed the findings and management plan with the patient and family.      Vera Marsh MD

## 2021-08-16 NOTE — PATIENT INSTRUCTIONS
Today, we talked about:    - Glaucoma: there is concern for glaucoma in your right eye. We will start latanoprost eye drop (teal cap) at bedtime in your right eye. Follow up with one of the glaucoma doctors within the next month to establish glaucoma care.    - Macular degeneration: you have macular degeneration in both eyes. Please use your Amsler grids (graph paper) to monitor this. If any changes, call right away. We discussed wearing sunglasses and eating dark leafy green vegetables. The eye vitamins are recommended for people with intermediate category macular degeneration. You have more severe macular degeneration. Please call Dr. Burns's office to re-establish retina care.    - Floaters: these are normal and there are no signs of retinal tears. If you have a shower of new floaters, flashes, or a curtain/veil over your vision, call right away.    - Blepharitis/dry eyes: you can continue your Systane drops as needed and increase your warm compresses to help with this eye irritation.    - Blepharoplasty: you have increased skin laxity of the eyelids. If this becomes bothersome we can consider blepharoplasty. It is fine to monitor this now.    - Glasses: updated prescription

## 2021-09-19 ENCOUNTER — HEALTH MAINTENANCE LETTER (OUTPATIENT)
Age: 86
End: 2021-09-19

## 2021-09-22 DIAGNOSIS — K21.9 GASTROESOPHAGEAL REFLUX DISEASE WITHOUT ESOPHAGITIS: ICD-10-CM

## 2021-09-22 RX ORDER — PANTOPRAZOLE SODIUM 40 MG/1
40 TABLET, DELAYED RELEASE ORAL DAILY
Qty: 90 TABLET | Refills: 1 | Status: SHIPPED | OUTPATIENT
Start: 2021-09-22 | End: 2022-05-17

## 2021-09-27 ENCOUNTER — OFFICE VISIT (OUTPATIENT)
Dept: OPHTHALMOLOGY | Facility: CLINIC | Age: 86
End: 2021-09-27
Payer: COMMERCIAL

## 2021-09-27 DIAGNOSIS — H02.834 DERMATOCHALASIS OF BOTH UPPER EYELIDS: ICD-10-CM

## 2021-09-27 DIAGNOSIS — H02.831 DERMATOCHALASIS OF BOTH UPPER EYELIDS: ICD-10-CM

## 2021-09-27 DIAGNOSIS — Z96.1 PSEUDOPHAKIA OF BOTH EYES: ICD-10-CM

## 2021-09-27 DIAGNOSIS — H02.889 MEIBOMIAN GLAND DYSFUNCTION (MGD): ICD-10-CM

## 2021-09-27 DIAGNOSIS — H40.10X0 OPEN-ANGLE GLAUCOMA OF RIGHT EYE, UNSPECIFIED GLAUCOMA STAGE, UNSPECIFIED OPEN-ANGLE GLAUCOMA TYPE: Primary | ICD-10-CM

## 2021-09-27 DIAGNOSIS — H35.3134 ADVANCED ATROPHIC NONEXUDATIVE AGE-RELATED MACULAR DEGENERATION OF BOTH EYES WITH SUBFOVEAL INVOLVEMENT: ICD-10-CM

## 2021-09-27 PROCEDURE — 99214 OFFICE O/P EST MOD 30 MIN: CPT | Performed by: OPHTHALMOLOGY

## 2021-09-27 ASSESSMENT — REFRACTION_WEARINGRX
OS_ADD: +3.00
OS_SPHERE: -0.25
SPECS_TYPE: TRIFOCAL
OD_CYLINDER: +1.00
OD_SPHERE: -0.50
OS_AXIS: 175
OS_CYLINDER: +1.50
OD_AXIS: 155
OD_ADD: +3.00

## 2021-09-27 ASSESSMENT — TONOMETRY
OD_IOP_MMHG: 19
OS_IOP_MMHG: 12
IOP_METHOD: APPLANATION
IOP_METHOD: APPLANATION
IOP_METHOD: TONOPEN
OS_IOP_MMHG: 13
OD_IOP_MMHG: 18
OD_IOP_MMHG: 18
OS_IOP_MMHG: 14

## 2021-09-27 ASSESSMENT — CONF VISUAL FIELD
OD_NORMAL: 1
METHOD: COUNTING FINGERS
OS_NORMAL: 1

## 2021-09-27 ASSESSMENT — VISUAL ACUITY
CORRECTION_TYPE: GLASSES
OD_CC: 20/30
OS_CC: 20/20
METHOD: SNELLEN - LINEAR
OS_CC+: -1
OD_CC+: -3

## 2021-09-27 ASSESSMENT — EXTERNAL EXAM - RIGHT EYE: OD_EXAM: DERMATOCHALASIS

## 2021-09-27 ASSESSMENT — EXTERNAL EXAM - LEFT EYE: OS_EXAM: DERMATOCHALASIS

## 2021-09-27 NOTE — PROGRESS NOTES
HPI:  Preston Cai is a 85 year old male presenting for IOP check after starting latanoprost.    Last visit 8/16/21, found to have IOP 20 right eye, consistently elevated IOP right eye. Started latanoprost at bedtime right eye and recommended establishing glaucoma care.  Has appointment to establish glaucoma care 11/3/21 in [place] with Dr. Riedel.   Here for IOP check. No changes in vision.  Tolerating latanoprost well, has been using nightly for the past few weeks.    Latanoprost at bedtime right eye     Past Ocular History:  - Pseudophakia each eye (2016 Dr. Lara left eye; right eye with Dr. Turpin in [place] in early 2000s)  - YAG right eye 2016 (Jane)  - 2002 right eye retinal repair for macular tear with doctor at [facility] in [place] -- had surgery in OR and had to be face down for several days, thinks issue was in center of vision. Had noticed a year prior that with focusing would get blurred central vision right eye.  - Macular degeneration -- previously followed with Dr. Burns at [facility]  - Glaucoma right eye    PMH:  Tremor - on primadone  Gabapentin - foot/ankle neuropathy  GERD  Insomnia    SH:  Former smoker, quit 1969. Worked as  and building maintenance.    FH:  Mother - glaucoma    ASSESSMENT and PLAN:  1. Open-angle glaucoma of right eye, unspecified glaucoma stage, unspecified open-angle glaucoma type  - +Fhx glaucoma (mother)  - IOP has been consistently higher right eye than left (high teens/low 20s right, low teens left) for years  - pachy 564/559  - on exam with larger cup right eye and thinner rims especially superior  - IOP today 18/13  - single HVF from 11/2020 without interpretation in chart. Personally reviewed: IN step right eye, full left eye  - OCT RNFL Review from 2020: right eye with overall normal thickness (80) and no thinning on quadrant analysis but with borderline superior hemifield thinning and superior arcuate of GCC thinning; left eye  avg thickness 89 without thinning and normal GCC    - OCT RNFL 8/16/21  Right Eye: avg thickness 78, superior hemifield with borderline thinning, superior quadrant with borderline thinning; stable superior arcuate thinning of GCC, similar compared to 2020  Left Eye: avg thickness 87, no thinning, normal GCC, stable compared to 2020    --> discussed again with patient  --> given IOP decreased slightly after only a few weeks on latanoprost and tolerating well, would recommend continuing for now and keeping follow up as scheduled with Dr. Riedel to establish glaucoma care; discussed if IOP not decreasing more with continuing use of latanoprost may need to switch agents or add second agent    --> he will follow up with glaucoma provider for IOP check in 1 month and to establish care    2. Advanced atrophic nonexudative age-related macular degeneration of both eyes with subfoveal involvement  - previously recommended AREDS2 but has not taken for years; not interested in more pills  - previously followed with Dr. Burns at [facility]  - history of right eye retina tear s/p repair 2002; now with increased pigment clumping right eye compared to left  - OCT Macula Review:  2016: many drusen each eye, no heme/fluid  2020: increased drusen each eye, irregular retinal contour and overlying inner retinal thinning especially right eye, no heme/fluid; hyperreflective foveal lesion right eye  - OCT Macula 8/16/21  Right Eye: many drusen including large drusen and confluent, hyperreflective lesion centrally slightly larger than 2020, no fluid  Left Eye: many drusen including large drusen and confluent, no fluid  - former smoker, quit 1969    --> recommended UV protection, dark leafy greens  --> not interested in AREDS2  --> continue Amsler grid  --> recommended re-establishing care with Dr. Burns    3. Pseudophakia of both eyes  - lenses in good position  - s/p YAG right eye  - inferior mild PCO left eye not visually significant  -  updated glasses Rx given 8/2021    4. Dermatochalasis of both upper eyelids  - asymptomatic  - monitor    5. Meibomian gland dysfunction (MGD)  - continue ATs prn  - increase WCs    Follow up in 1 month with glaucoma or sooner PRN        -----------------------------------------------------------------------------------    Attestation:  Complete documentation of historical and exam elements from today's encounter can be found in the full encounter summary report (not reduplicated in this progress note). I personally obtained the chief complaint(s) and history of present illness.  I confirmed and edited as necessary the review of systems, past medical/surgical history, family history, social history, and examination findings as documented by others; and I examined the patient myself. I personally reviewed the relevant tests, images, and reports as documented above.     I formulated and edited as necessary the assessment and plan and discussed the findings and management plan with the patient and family.      Vera Marsh MD

## 2021-09-27 NOTE — NURSING NOTE
Chief Complaints and History of Present Illnesses   Patient presents with     Pressure Check       Chief Complaint(s) and History of Present Illness(es)     Pressure Check               Comments     Patient here for IOP Check. Patient using Latanoprost, right eye only, one drop each evening. Last does was 9:45pm last night. Also uses Systane drops during the day as needed. Left eye still feels like it is straining, same as last visit, no changes.                 Maxwell Little, Ophthalmic Assistant

## 2021-10-01 ENCOUNTER — OFFICE VISIT (OUTPATIENT)
Dept: OTOLARYNGOLOGY | Facility: CLINIC | Age: 86
End: 2021-10-01
Payer: COMMERCIAL

## 2021-10-01 VITALS
HEART RATE: 79 BPM | DIASTOLIC BLOOD PRESSURE: 64 MMHG | SYSTOLIC BLOOD PRESSURE: 118 MMHG | RESPIRATION RATE: 20 BRPM | OXYGEN SATURATION: 98 %

## 2021-10-01 DIAGNOSIS — K21.9 LPRD (LARYNGOPHARYNGEAL REFLUX DISEASE): Primary | ICD-10-CM

## 2021-10-01 PROCEDURE — 99214 OFFICE O/P EST MOD 30 MIN: CPT | Mod: 25 | Performed by: OTOLARYNGOLOGY

## 2021-10-01 PROCEDURE — 31575 DIAGNOSTIC LARYNGOSCOPY: CPT | Performed by: OTOLARYNGOLOGY

## 2021-10-01 RX ORDER — PANTOPRAZOLE SODIUM 20 MG/1
20 TABLET, DELAYED RELEASE ORAL DAILY
Qty: 90 TABLET | Refills: 0 | Status: SHIPPED | OUTPATIENT
Start: 2021-10-01 | End: 2022-01-17

## 2021-10-01 ASSESSMENT — PAIN SCALES - GENERAL: PAINLEVEL: NO PAIN (0)

## 2021-10-01 NOTE — NURSING NOTE
Preston Cai's goals for this visit include:   Chief Complaint   Patient presents with     Follow Up     pt thinks his voice has become worse and more hoarse. Pt also reports he has lost more weight.      He requests these members of his care team be copied on today's visit information:     PCP: Tyrel Manning    Referring Provider:  No referring provider defined for this encounter.    /64 (BP Location: Right arm, Patient Position: Sitting, Cuff Size: Adult Regular)   Pulse 79   Resp 20   SpO2 98%     Do you need any medication refills at today's visit?  No    [place] LPN on 10/1/2021 at 9:59 AM

## 2021-10-01 NOTE — LETTER
10/1/2021         RE: Preston Cai  [address]  [place] [address]        Dear Colleague,    Thank you for referring your patient, Preston Cai, to the [facility]. Please see a copy of my visit note below.    HPI    This 85 year old patient who is here for the f/u. He continues to have soft voice. Denies any n/v, choking, coughing, difficulty swallowing, vision issues, weakness or numbness. His BP is always low. He did have similar episode of vertigo more than a decade ago. 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 Describes hoarseness for the past several years and he tried PPIs with some benefit. He has no hx of difficulty swallowing or weight changes.     Review of Systems   Constitutional: Negative.    HENT: Positive for hearing loss and tinnitus. Negative for congestion, ear discharge, ear pain, nosebleeds, sinus pain and sore throat.    Eyes: Negative for blurred vision and double vision.   Respiratory: Negative for cough and hemoptysis.    Gastrointestinal: Negative for heartburn, nausea and vomiting.   Skin: Negative.    Neurological: Positive for dizziness. Negative for tingling, tremors and headaches.   Endo/Heme/Allergies: Negative for environmental allergies. Does not bruise/bleed easily.     Physical Exam   Constitutional: He appears well-developed and well-nourished.   HENT:   Head: Normocephalic and atraumatic.   Right Ear: Tympanic membrane, external ear and ear canal normal. No drainage, swelling or tenderness. No middle ear effusion. Decreased hearing is noted.   Left Ear: Tympanic membrane, external ear and ear canal normal. No drainage, swelling or tenderness.  No middle ear effusion. Decreased hearing is noted.   Nose: Mucosal edema, rhinorrhea and septal deviation present.   Mouth/Throat: Uvula is midline, oropharynx is clear and moist and mucous membranes are normal.   Eyes: Pupils are equal, round, and  reactive to light. EOM are normal.   Neck: Normal range of motion. Neck supple.       Diagnostic nasal endoscopy:     He was seen in the room and identified. Pros and cons of the procedure were explained to the patient. The procedure and its alternatives were explained to the patient in lay terms. His questions were answered. His symptoms required a diagnostic endoscopic evaluation under local anesthesia. After obtaining an informed consent from the patient, 1% Lidocaine with 1: 100.000 epinephrine spray was applied to each nostril. Then a flexible scopic exam was performed. Ostiomeatal complexes are free of disease. No pus or polyp seen. Nasopharynx is normal. Rosenmüller fossa and torus tubarius are normal. Epiglottis, hypopharynx, false vocal folds are normal. No pooling in pyriform fossae. Vocal cords are mobile and close with a fusiform gap. Interarytenoid and post cricoid edema were seen otherwise no mass. His voice is breathy. He tolerated the procedure well and left the room with no complications.      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 I will continue with his reflux medication, Pantoprazole 40 mg in the morning before breakfast and 20 mg before dinner, Guaifenesin 600 mg. A laryngology and a speech pathology referral have been offered. I will see him in the f/u in 3 months. His questions were answered.        Again, thank you for allowing me to participate in the care of your patient.        Sincerely,        Tosha Ron MD

## 2021-10-01 NOTE — PROGRESS NOTES
HPI    This 85 year old patient who is here for the f/u. He continues to have soft voice. Denies any n/v, choking, coughing, difficulty swallowing, vision issues, weakness or numbness. His BP is always low. He did have similar episode of vertigo more than a decade ago. 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 Describes hoarseness for the past several years and he tried PPIs with some benefit. He has no hx of difficulty swallowing or weight changes.     Review of Systems   Constitutional: Negative.    HENT: Positive for hearing loss and tinnitus. Negative for congestion, ear discharge, ear pain, nosebleeds, sinus pain and sore throat.    Eyes: Negative for blurred vision and double vision.   Respiratory: Negative for cough and hemoptysis.    Gastrointestinal: Negative for heartburn, nausea and vomiting.   Skin: Negative.    Neurological: Positive for dizziness. Negative for tingling, tremors and headaches.   Endo/Heme/Allergies: Negative for environmental allergies. Does not bruise/bleed easily.     Physical Exam   Constitutional: He appears well-developed and well-nourished.   HENT:   Head: Normocephalic and atraumatic.   Right Ear: Tympanic membrane, external ear and ear canal normal. No drainage, swelling or tenderness. No middle ear effusion. Decreased hearing is noted.   Left Ear: Tympanic membrane, external ear and ear canal normal. No drainage, swelling or tenderness.  No middle ear effusion. Decreased hearing is noted.   Nose: Mucosal edema, rhinorrhea and septal deviation present.   Mouth/Throat: Uvula is midline, oropharynx is clear and moist and mucous membranes are normal.   Eyes: Pupils are equal, round, and reactive to light. EOM are normal.   Neck: Normal range of motion. Neck supple.       Diagnostic nasal endoscopy:     He was seen in the room and identified. Pros and cons of the procedure were explained to the patient. The procedure and its alternatives were  explained to the patient in lay terms. His questions were answered. His symptoms required a diagnostic endoscopic evaluation under local anesthesia. After obtaining an informed consent from the patient, 1% Lidocaine with 1: 100.000 epinephrine spray was applied to each nostril. Then a flexible scopic exam was performed. Ostiomeatal complexes are free of disease. No pus or polyp seen. Nasopharynx is normal. Rosenmüller fossa and torus tubarius are normal. Epiglottis, hypopharynx, false vocal folds are normal. No pooling in pyriform fossae. Vocal cords are mobile and close with a fusiform gap. Interarytenoid and post cricoid edema were seen otherwise no mass. His voice is breathy. He tolerated the procedure well and left the room with no complications.      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 I will continue with his reflux medication, Pantoprazole 40 mg in the morning before breakfast and 20 mg before dinner, Guaifenesin 600 mg. A laryngology and a speech pathology referral have been offered. I will see him in the f/u in 3 months. His questions were answered.

## 2021-11-03 ENCOUNTER — TRANSFERRED RECORDS (OUTPATIENT)
Dept: HEALTH INFORMATION MANAGEMENT | Facility: CLINIC | Age: 86
End: 2021-11-03
Payer: COMMERCIAL

## 2021-12-09 ENCOUNTER — TELEPHONE (OUTPATIENT)
Dept: FAMILY MEDICINE | Facility: CLINIC | Age: 86
End: 2021-12-09
Payer: COMMERCIAL

## 2021-12-09 NOTE — TELEPHONE ENCOUNTER
Patient is calling to reach out to provider for advisement.    Patient is calling with concerns about increased weight loss and low energy. Patient stated that he has lost 20 lbs in the last year and a half. Patient's last office visit on 8/11/21 he weighted 160 lbs. And now patient stated that he weights 154 lbs. Patient stated that he eats fine and has a good appetite but continues to lose weight for unknown reason.     Patient also has concerns with low energy. Patient stated that he is taking pantoprazole for his acid reflux and he stated that he goggled that pantoprazole can cause vitamin B deficiency. Patient is wondering if he should have his vitamin B checked.     Patient denies lightheadedness, dizziness, chest pain or shortness of breath. Denies blood in stool.    Patient is reaching out to provider on advisement on if he needs to be seen in urgent care or made an earlier appointment with provider to be seen sooner. Patient has an appointment with PCP on 1/4/22.    Routing to provider to review and advise.    Roxy Shah RN, BSN  [facility]     Yes

## 2021-12-10 NOTE — TELEPHONE ENCOUNTER
Notify patient that he is scheduled for an earlier appointment with this provider on December 17, 2021 at 7:40 AM (face-to-face).

## 2021-12-13 NOTE — TELEPHONE ENCOUNTER
Called and spoke to the patient and confirmed his appt and arrival time.  Coni Gomez [facility]  2nd Floor  Primary Care

## 2021-12-14 NOTE — PATIENT INSTRUCTIONS
At [facility], we strive to deliver an exceptional experience to you, every time we see you. If you receive a survey, please complete it as we do value your feedback.  If you have MyChart, you can expect to receive results automatically within 24 hours of their completion.  Your provider will send a note interpreting your results as well.   If you do not have MyChart, you should receive your results in about a week by mail.    Your care team:                            Family Medicine Internal Medicine   MD Tyrel Mckeon MD Shantel Branch-Fleming, MD Srinivasa Vaka, MD Katya Belousova, PAJUAQUIN Corcoran, APRN CNP    Alen Del Rosario, MD Pediatrics   David Sykes, PAJUAQUIN Fox, CNP MD Nancy Garcia APRN CNP   MD Yulissa Cali MD Deborah Mielke, MD Stephanie Soriano, APRN [facility]      Clinic hours: Monday - Thursday 7 am-6 pm; Fridays 7 am-5 pm.   Urgent care: Monday - Friday 10 am- 8 pm; Saturday and Sunday 9 am-5 pm.    Clinic: [phone]       [facility]: Monday - Thursday 8 am - 7 pm; Friday 8 am - 6 pm  [facility]: [phone]     Use [email or web address] for 24/7 diagnosis and treatment of dozens of conditions.

## 2021-12-14 NOTE — PROGRESS NOTES
[facility] Progress Note           Assessment and Plan:     Early satiety  - CT Abdomen Pelvis w Contrast; Future  - Helicobacter pylori Antigen Stool; Future  - CBC with platelets and differential  - Comprehensive metabolic panel  - Lipase; Future  - Cytology non gyn; Future  - Adult Gastro Ref - Procedure Only; Future  - Adult Gastro Ref - Procedure Only; Future  - Helicobacter pylori Antigen Stool  - Lipase  - Cytology non gyn  - Adult Gastro Ref - Procedure Only; Future    Weight loss  - CT Abdomen Pelvis w Contrast; Future  - Helicobacter pylori Antigen Stool; Future  - CBC with platelets and differential  - Comprehensive metabolic panel  - Lipase; Future  - Cytology non gyn; Future  - Adult Gastro Ref - Procedure Only; Future  - Adult Gastro Ref - Procedure Only; Future  - Helicobacter pylori Antigen Stool  - Lipase  - Cytology non gyn  - Adult Gastro Ref - Procedure Only; Future    Abdominal pain, left lower quadrant  - Adult Gastro Ref - Procedure Only; Future    Screen for colon cancer  - Adult Gastro Ref - Procedure Only; Future  - Adult Gastro Ref - Procedure Only; Future    Other iron deficiency anemia  - Adult Gastro Ref - Procedure Only; Future  - Adult Gastro Ref - Procedure Only; Future  - Adult Gastro Ref - Procedure Only; Future    Screening for bladder cancer  - Cytology non gyn         Interval History:   Reason for visit: weight loss  Onset: chronic  Description: progressive weight loss associated with early satiety and reduced oral intake.  Course: worse  Associated symptoms: abdominal pain but denies any diarrhea, melena nor hematochezia.  History: Yes. Similar symptoms last 7/3/2017.  Evaluation: CT of abdomen last July 2017 shows thickened gastric wall.  Precipitating factor: pernicious anemia.  Alleviating factor: none  Complications: no upper GI bleeding.  Therapies tried and outcome: Pantoprazole.            Significant Problems:   Patient Active Problem  List   Diagnosis     Seborrheic dermatitis     Allergic rhinitis due to other allergen     Cervical radiculopathy at C6     Personal history of malignant neoplasm of larynx     Essential tremor     CARDIOVASCULAR SCREENING; LDL GOAL LESS THAN 160     Benign prostatic hyperplasia     Hoarse voice quality     Advanced directives, counseling/discussion     Pseudophakia     Gastroesophageal reflux disease, esophagitis presence not specified     Macular degeneration     Supraspinatus sprain, right, initial encounter     CARDIOVASCULAR SCREENING; LDL GOAL LESS THAN 100     Vitamin D deficiency     Chronic midline low back pain without sciatica     Sleep disorder, nonorganic     Other gastritis without hemorrhage, unspecified chronicity     BPH associated with nocturia     Weight loss              Review of Systems:   CONSTITUTIONAL:NEGATIVE  for malaise, myalgias and sweats  INTEGUMENTARY/SKIN: NEGATIVE for worrisome rashes, moles or lesions  EYES: NEGATIVE for vision changes or irritation  ENT/MOUTH: NEGATIVE for ear, mouth and throat problems  RESP: NEGATIVE for significant cough or SOB  CV: NEGATIVE for chest pain, palpitations or peripheral edema  GI: NEGATIVE for constipation and diarrhea  : NEGATIVE for frequency, dysuria, or hematuria  MUSCULOSKELETAL: NEGATIVE for significant arthralgias or myalgia  NEURO: NEGATIVE for weakness, dizziness or paresthesias  ENDOCRINE: NEGATIVE for temperature intolerance, skin/hair changes  HEME/ALLERGY/IMMUNE: POSITIVE  for chronic anemia.  PSYCHIATRIC: NEGATIVE for changes in mood or affect            Medications:     Current Outpatient Medications   Medication Sig     gabapentin (NEURONTIN) 100 MG capsule Take 3 capsules (300 mg) by mouth every evening     latanoprost (XALATAN) 0.005 % ophthalmic solution Place 1 drop into the right eye At Bedtime     pantoprazole (PROTONIX) 20 MG EC tablet Take 1 tablet (20 mg) by mouth daily     pantoprazole (PROTONIX) 40 MG EC tablet Take 1  tablet (40 mg) by mouth daily     primidone (MYSOLINE) 250 MG tablet TAKE ONE TABLET BY MOUTH AT BEDTIME     tamsulosin (FLOMAX) 0.4 MG capsule TAKE TWO CAPSULES BY MOUTH ONCE DAILY (Patient taking differently: 0.4 mg every evening )     triamcinolone (KENALOG) 0.1 % external cream Apply twice daily for rash on shoulders until resolved (Patient not taking: Reported on 10/1/2021)     VITAMIN D, CHOLECALCIFEROL, PO Take 1,000 Units by mouth daily     No current facility-administered medications for this visit.             Physical Exam:   BP 99/62 (BP Location: Left arm, Patient Position: Sitting, Cuff Size: Adult Regular)   Pulse 76   Temp 97.8  F (36.6  C) (Tympanic)   Wt 69.1 kg (152 lb 6.4 oz)   SpO2 96%   BMI 20.11 kg/m    Constitutional: Awake, alert, cooperative, no apparent distress, and appears stated age.  Eyes: extra-ocular muscles intact and sclera clear  ENT: normocepalic, without obvious abnormality  Neck: no lymphadenopathy  Lungs: no increased work of breathing, good air exchange and clear to auscultation  Cardiovascular: regular rate and rhythm and normal S1 and S2  Chest / Breast: Breasts symmetrical, skin without lesion(s), no nipple retraction or dimpling, no nipple discharge, no masses palpated, no axillary or supraclavicular adenopathy.  Musculoskeletal: no lower extremity pitting edema present  Neurologic: Mental Status Exam:  Level of Alertness:   awake  Orientation:   person, place, time  Memory:   normal  Fund of Knowledge:  normal  Attention/Concentration:  normal  Language:  normal  Motor Exam:  Motor exam is symmetrical 5 out of 5 all extremities bilaterally  Sensory:  Sensory intact  Neuropsychiatric: Normal affect, mood, orientation, memory and insight.  Skin: No rashes, erythema, pallor, petechia or purpura.          Data:   Pending.     Disposition:  Follow-up in 4 weeks.    Tyrel Manning MD  Internal Medicine  [facility] Team

## 2021-12-17 ENCOUNTER — OFFICE VISIT (OUTPATIENT)
Dept: FAMILY MEDICINE | Facility: CLINIC | Age: 86
End: 2021-12-17
Payer: COMMERCIAL

## 2021-12-17 VITALS
WEIGHT: 152.4 LBS | BODY MASS INDEX: 20.11 KG/M2 | DIASTOLIC BLOOD PRESSURE: 62 MMHG | TEMPERATURE: 97.8 F | OXYGEN SATURATION: 96 % | SYSTOLIC BLOOD PRESSURE: 99 MMHG | HEART RATE: 76 BPM

## 2021-12-17 DIAGNOSIS — Z12.6 SCREENING FOR BLADDER CANCER: ICD-10-CM

## 2021-12-17 DIAGNOSIS — R63.4 WEIGHT LOSS: ICD-10-CM

## 2021-12-17 DIAGNOSIS — D50.8 OTHER IRON DEFICIENCY ANEMIA: ICD-10-CM

## 2021-12-17 DIAGNOSIS — R10.32 ABDOMINAL PAIN, LEFT LOWER QUADRANT: ICD-10-CM

## 2021-12-17 DIAGNOSIS — Z12.11 SCREEN FOR COLON CANCER: ICD-10-CM

## 2021-12-17 DIAGNOSIS — R68.81 EARLY SATIETY: Primary | ICD-10-CM

## 2021-12-17 LAB
ALBUMIN SERPL-MCNC: 4.2 G/DL (ref 3.4–5)
ALP SERPL-CCNC: 64 U/L (ref 40–150)
ALT SERPL W P-5'-P-CCNC: 21 U/L (ref 0–70)
ANION GAP SERPL CALCULATED.3IONS-SCNC: 4 MMOL/L (ref 3–14)
AST SERPL W P-5'-P-CCNC: 20 U/L (ref 0–45)
BASOPHILS # BLD AUTO: 0 10E3/UL (ref 0–0.2)
BASOPHILS NFR BLD AUTO: 1 %
BILIRUB SERPL-MCNC: 0.3 MG/DL (ref 0.2–1.3)
BUN SERPL-MCNC: 18 MG/DL (ref 7–30)
CALCIUM SERPL-MCNC: 9.6 MG/DL (ref 8.5–10.1)
CHLORIDE BLD-SCNC: 101 MMOL/L (ref 94–109)
CO2 SERPL-SCNC: 31 MMOL/L (ref 20–32)
CREAT SERPL-MCNC: 0.98 MG/DL (ref 0.66–1.25)
EOSINOPHIL # BLD AUTO: 0 10E3/UL (ref 0–0.7)
EOSINOPHIL NFR BLD AUTO: 1 %
ERYTHROCYTE [DISTWIDTH] IN BLOOD BY AUTOMATED COUNT: 12.7 % (ref 10–15)
GFR SERPL CREATININE-BSD FRML MDRD: 71 ML/MIN/1.73M2
GLUCOSE BLD-MCNC: 106 MG/DL (ref 70–99)
HCT VFR BLD AUTO: 37.8 % (ref 40–53)
HGB BLD-MCNC: 12.6 G/DL (ref 13.3–17.7)
IMM GRANULOCYTES # BLD: 0 10E3/UL
IMM GRANULOCYTES NFR BLD: 0 %
LIPASE SERPL-CCNC: 77 U/L (ref 73–393)
LYMPHOCYTES # BLD AUTO: 1.2 10E3/UL (ref 0.8–5.3)
LYMPHOCYTES NFR BLD AUTO: 28 %
MCH RBC QN AUTO: 32.7 PG (ref 26.5–33)
MCHC RBC AUTO-ENTMCNC: 33.3 G/DL (ref 31.5–36.5)
MCV RBC AUTO: 98 FL (ref 78–100)
MONOCYTES # BLD AUTO: 0.4 10E3/UL (ref 0–1.3)
MONOCYTES NFR BLD AUTO: 10 %
NEUTROPHILS # BLD AUTO: 2.7 10E3/UL (ref 1.6–8.3)
NEUTROPHILS NFR BLD AUTO: 61 %
PLATELET # BLD AUTO: 168 10E3/UL (ref 150–450)
POTASSIUM BLD-SCNC: 4.5 MMOL/L (ref 3.4–5.3)
PROT SERPL-MCNC: 7.4 G/DL (ref 6.8–8.8)
RBC # BLD AUTO: 3.85 10E6/UL (ref 4.4–5.9)
SODIUM SERPL-SCNC: 136 MMOL/L (ref 133–144)
WBC # BLD AUTO: 4.4 10E3/UL (ref 4–11)

## 2021-12-17 PROCEDURE — 88112 CYTOPATH CELL ENHANCE TECH: CPT | Performed by: PATHOLOGY

## 2021-12-17 PROCEDURE — 85025 COMPLETE CBC W/AUTO DIFF WBC: CPT | Performed by: INTERNAL MEDICINE

## 2021-12-17 PROCEDURE — 36415 COLL VENOUS BLD VENIPUNCTURE: CPT | Performed by: INTERNAL MEDICINE

## 2021-12-17 PROCEDURE — 80053 COMPREHEN METABOLIC PANEL: CPT | Performed by: INTERNAL MEDICINE

## 2021-12-17 PROCEDURE — 83690 ASSAY OF LIPASE: CPT | Performed by: INTERNAL MEDICINE

## 2021-12-17 PROCEDURE — 99214 OFFICE O/P EST MOD 30 MIN: CPT | Performed by: INTERNAL MEDICINE

## 2021-12-18 PROCEDURE — 87338 HPYLORI STOOL AG IA: CPT | Performed by: INTERNAL MEDICINE

## 2021-12-20 ENCOUNTER — ANCILLARY PROCEDURE (OUTPATIENT)
Dept: CT IMAGING | Facility: CLINIC | Age: 86
End: 2021-12-20
Attending: INTERNAL MEDICINE
Payer: COMMERCIAL

## 2021-12-20 ENCOUNTER — TELEPHONE (OUTPATIENT)
Dept: GASTROENTEROLOGY | Facility: CLINIC | Age: 86
End: 2021-12-20

## 2021-12-20 DIAGNOSIS — R63.4 WEIGHT LOSS: ICD-10-CM

## 2021-12-20 DIAGNOSIS — R68.81 EARLY SATIETY: ICD-10-CM

## 2021-12-20 DIAGNOSIS — Z11.59 ENCOUNTER FOR SCREENING FOR OTHER VIRAL DISEASES: ICD-10-CM

## 2021-12-20 LAB — H PYLORI AG STL QL IA: NEGATIVE

## 2021-12-20 PROCEDURE — 74177 CT ABD & PELVIS W/CONTRAST: CPT | Mod: GC | Performed by: RADIOLOGY

## 2021-12-20 RX ORDER — IOPAMIDOL 755 MG/ML
93 INJECTION, SOLUTION INTRAVASCULAR ONCE
Status: COMPLETED | OUTPATIENT
Start: 2021-12-20 | End: 2021-12-20

## 2021-12-20 RX ADMIN — IOPAMIDOL 93 ML: 755 INJECTION, SOLUTION INTRAVASCULAR at 14:58

## 2021-12-20 NOTE — TELEPHONE ENCOUNTER
Screening Questions  1. Are you active on mychart? Yes     2. What insurance is in the chart? [employer]     2.  Ordering/Referring Provider: Tyrel Mannign MD    3. BMI 20.1, If greater than 40 review exclusion criteria    4.  Respiratory Screening (If yes to any of the following Hospital setting only):     Do you use daily home oxygen? N  Do you have mod to severe Obstructive Sleep Apnea? N   Do you have Pulmonary Hypertension? N   Do you have UNCONTROLLED asthma? N    5. Have you had a heart or lung transplant (If yes, please review exclusion criteria) ? N    6. Are you currently on dialysis or have chronic kidney disease? N    7. Have you had a stroke or Transient ischemic attack (TIA) within 6 months? N    8. In the past 6 months, have you had any heart related issues including cardiomyopathy or heart attack? N                 If yes, did it require cardiac stenting or other implantable device?N      9. Do you have any implantable devices in your body (pacemaker, defib, LVAD)? N    10. Do you take nitroglycerin? If yes, how often? N    11. Are you currently taking any blood thinners?N    12. Are you a diabetic? N    13. (Females) Are you currently pregnant?   If yes, how many weeks?      15. Are you taking any prescription pain medications on a routine schedule? N If yes, MAC sedation.    16. Do you have any chemical dependencies such as alcohol, street drugs, or methadone? NIf yes, MAC sedation.    17. Do you have any history of post-traumatic stress syndrome, severe anxiety or history of psychosis? N    18. Do you transfer independently? Y    19.  Do you have any issues with constipation? N    20. Preferred Pharmacy for Pre Prescription [place]/ On chart     Scheduling Details    Which Colonoscopy Prep was Sent?:    Procedure Scheduled: Upper/Lower Endo   Surgeon: Lakeshia   Date of Procedure: 01/10/2022  Location: [facility] OR   Caller (Please ask for phone number if not scheduled by patient): Preston GOLDSTEIN  Keanu        Sedation Type: CS   Conscious Sedation- Needs  for 6 hours after the procedure  MAC/General-Needs  for 24 hours after procedure    Pre-op Required at [facility] [place], [place] and OR for MAC sedation: N  (if yes advise patient they will need a pre-op prior to procedure)      Is patient on blood thinners? -N (If yes- inform patient to follow up with PCP or provider for follow up instructions)     Informed patient they will need an adult  Y  Cannot take any type of public or medical transportation alone    Pre-Procedure Covid test to be completed at [facility] or Externally: Y    Confirmed Nurse will call to complete assessment Y    Additional comments: N

## 2021-12-21 LAB
PATH REPORT.COMMENTS IMP SPEC: NORMAL
PATH REPORT.FINAL DX SPEC: NORMAL
PATH REPORT.GROSS SPEC: NORMAL
PATH REPORT.RELEVANT HX SPEC: NORMAL

## 2021-12-24 DIAGNOSIS — G25.0 ESSENTIAL TREMOR: ICD-10-CM

## 2021-12-27 RX ORDER — PRIMIDONE 250 MG/1
TABLET ORAL
Qty: 30 TABLET | Refills: 0 | Status: SHIPPED | OUTPATIENT
Start: 2021-12-27 | End: 2022-01-17

## 2021-12-27 NOTE — TELEPHONE ENCOUNTER
Encounter routed to Dr Munroe to review and sign and a [employer] message was sent to the pt requesting that he call and set up a follow up appt.    Nikkie Navarro RN   Neurology Care Coordinator

## 2021-12-27 NOTE — TELEPHONE ENCOUNTER
Rx Authorization:  Requested Medication/ Dose: Primidone 250MG Tabs  Date last refill ordered: 8/11/21  Quantity ordered: 135 tabs  # refills: 1  Date of last clinic visit with ordering provider: 6/23/21  Date of next clinic visit with ordering provider: F/U 1 year  All pertinent protocol data (lab date/result):   Include pertinent information from patients message:

## 2022-01-04 ENCOUNTER — OFFICE VISIT (OUTPATIENT)
Dept: FAMILY MEDICINE | Facility: CLINIC | Age: 87
End: 2022-01-04
Payer: COMMERCIAL

## 2022-01-04 VITALS
RESPIRATION RATE: 16 BRPM | HEART RATE: 76 BPM | BODY MASS INDEX: 20.27 KG/M2 | DIASTOLIC BLOOD PRESSURE: 77 MMHG | OXYGEN SATURATION: 97 % | WEIGHT: 153.6 LBS | TEMPERATURE: 97.9 F | SYSTOLIC BLOOD PRESSURE: 137 MMHG

## 2022-01-04 DIAGNOSIS — D51.0 PERNICIOUS ANEMIA: ICD-10-CM

## 2022-01-04 DIAGNOSIS — R63.4 WEIGHT LOSS: Primary | ICD-10-CM

## 2022-01-04 DIAGNOSIS — Z12.5 SCREENING FOR PROSTATE CANCER: ICD-10-CM

## 2022-01-04 DIAGNOSIS — K29.50 OTHER CHRONIC GASTRITIS WITHOUT HEMORRHAGE: ICD-10-CM

## 2022-01-04 LAB
PSA SERPL-MCNC: 3.19 UG/L (ref 0–4)
T4 FREE SERPL-MCNC: 0.78 NG/DL (ref 0.76–1.46)
TSH SERPL DL<=0.005 MIU/L-ACNC: 3.12 MU/L (ref 0.4–4)

## 2022-01-04 PROCEDURE — 99214 OFFICE O/P EST MOD 30 MIN: CPT | Performed by: INTERNAL MEDICINE

## 2022-01-04 PROCEDURE — 83921 ORGANIC ACID SINGLE QUANT: CPT | Performed by: INTERNAL MEDICINE

## 2022-01-04 PROCEDURE — 83516 IMMUNOASSAY NONANTIBODY: CPT | Mod: 90 | Performed by: INTERNAL MEDICINE

## 2022-01-04 PROCEDURE — 99000 SPECIMEN HANDLING OFFICE-LAB: CPT | Performed by: INTERNAL MEDICINE

## 2022-01-04 PROCEDURE — 36415 COLL VENOUS BLD VENIPUNCTURE: CPT | Performed by: INTERNAL MEDICINE

## 2022-01-04 PROCEDURE — 84439 ASSAY OF FREE THYROXINE: CPT | Performed by: INTERNAL MEDICINE

## 2022-01-04 PROCEDURE — G0103 PSA SCREENING: HCPCS | Performed by: INTERNAL MEDICINE

## 2022-01-04 PROCEDURE — 84443 ASSAY THYROID STIM HORMONE: CPT | Performed by: INTERNAL MEDICINE

## 2022-01-04 PROCEDURE — 86340 INTRINSIC FACTOR ANTIBODY: CPT | Mod: 90 | Performed by: INTERNAL MEDICINE

## 2022-01-04 RX ORDER — DEXLANSOPRAZOLE 60 MG/1
60 CAPSULE, DELAYED RELEASE ORAL DAILY
Qty: 30 CAPSULE | Refills: 5 | Status: SHIPPED | OUTPATIENT
Start: 2022-01-04 | End: 2022-01-17

## 2022-01-04 ASSESSMENT — PAIN SCALES - GENERAL: PAINLEVEL: NO PAIN (0)

## 2022-01-04 NOTE — PATIENT INSTRUCTIONS
At [facility], we strive to deliver an exceptional experience to you, every time we see you. If you receive a survey, please complete it as we do value your feedback.  If you have MyChart, you can expect to receive results automatically within 24 hours of their completion.  Your provider will send a note interpreting your results as well.   If you do not have MyChart, you should receive your results in about a week by mail.    Your care team:                            Family Medicine Internal Medicine   MD Tyrel Mckeon MD Shantel Branch-Fleming, MD Srinivasa Vaka, MD Katya Belousova, PAJUAQUIN Corcoran, APRN CNP    Alen Del Rosario, MD Pediatrics   David Sykes, PAJUAQUIN Fox, CNP MD Nancy Garcia APRN CNP   MD Yulissa Cali MD Deborah Mielke, MD Stephanie Soriano, APRN [facility]      Clinic hours: Monday - Thursday 7 am-6 pm; Fridays 7 am-5 pm.   Urgent care: Monday - Friday 10 am- 8 pm; Saturday and Sunday 9 am-5 pm.    Clinic: [phone]       [facility]: Monday - Thursday 8 am - 7 pm; Friday 8 am - 6 pm  [facility]: [phone]     Use [email or web address] for 24/7 diagnosis and treatment of dozens of conditions.

## 2022-01-04 NOTE — PROGRESS NOTES
[facility] Internal Medicine Progress Note           Assessment and Plan:     Weight loss  - TSH  - T4, free    Pernicious anemia  - Intrinsic Factor Blocking Antibody  - Parietal Cell Antibody IgG  - Methylmalonic Acid    Other chronic gastritis without hemorrhage  - dexlansoprazole (DEXILANT) 60 MG CPDR CR capsule; Take 1 capsule (60 mg) by mouth daily    Screening for prostate cancer  - PSA, screen           Interval History:     Preston is a 86 year old male who presents for the following health issues     Patient is complaining of having increased fatigue. Started about a couple of months ago. Patient is complaining of stomach issues. Really concerned about the weight loss and lack of energy about a month ago. Loss 20-25 lbs over the past year per patient. Still drinks one cup of coffee daily.              Significant Problems:   Patient Active Problem List   Diagnosis     Seborrheic dermatitis     Allergic rhinitis due to other allergen     Cervical radiculopathy at C6     Personal history of malignant neoplasm of larynx     Essential tremor     CARDIOVASCULAR SCREENING; LDL GOAL LESS THAN 160     Benign prostatic hyperplasia     Hoarse voice quality     Advanced directives, counseling/discussion     Pseudophakia     Gastroesophageal reflux disease, esophagitis presence not specified     Macular degeneration     Supraspinatus sprain, right, initial encounter     CARDIOVASCULAR SCREENING; LDL GOAL LESS THAN 100     Vitamin D deficiency     Chronic midline low back pain without sciatica     Sleep disorder, nonorganic     Other gastritis without hemorrhage, unspecified chronicity     BPH associated with nocturia     Weight loss              Review of Systems:   CONSTITUTIONAL:POSITIVE  for fatigue and weight loss and NEGATIVE  for arthralgias and malaise  INTEGUMENTARY/SKIN: NEGATIVE for worrisome rashes, moles or lesions  EYES: NEGATIVE for vision changes or irritation  ENT/MOUTH: NEGATIVE for ear,  mouth and throat problems  RESP: NEGATIVE for significant cough or SOB  CV: NEGATIVE for chest pain, palpitations or peripheral edema  GI: POSITIVE for epigastric pain and NEGATIVE for hematemesis, hematochezia, jaundice and melena  : NEGATIVE for frequency, dysuria, or hematuria  MUSCULOSKELETAL: NEGATIVE for significant arthralgias or myalgia  NEURO: NEGATIVE for weakness, dizziness or paresthesias  ENDOCRINE: NEGATIVE for temperature intolerance, skin/hair changes  HEME: NEGATIVE for bleeding problems  PSYCHIATRIC: NEGATIVE for changes in mood or affect            Medications:     Current Outpatient Medications   Medication Sig     gabapentin (NEURONTIN) 100 MG capsule Take 3 capsules (300 mg) by mouth every evening     latanoprost (XALATAN) 0.005 % ophthalmic solution Place 1 drop into the right eye At Bedtime     pantoprazole (PROTONIX) 20 MG EC tablet Take 1 tablet (20 mg) by mouth daily     pantoprazole (PROTONIX) 40 MG EC tablet Take 1 tablet (40 mg) by mouth daily     primidone (MYSOLINE) 250 MG tablet TAKE ONE TABLET BY MOUTH AT BEDTIME     tamsulosin (FLOMAX) 0.4 MG capsule TAKE TWO CAPSULES BY MOUTH ONCE DAILY (Patient taking differently: 0.4 mg every evening )     triamcinolone (KENALOG) 0.1 % external cream Apply twice daily for rash on shoulders until resolved (Patient not taking: Reported on 10/1/2021)     VITAMIN D, CHOLECALCIFEROL, PO Take 1,000 Units by mouth daily     No current facility-administered medications for this visit.             Physical Exam:   /77 (BP Location: Left arm, Patient Position: Sitting, Cuff Size: Adult Regular)   Pulse 76   Temp 97.9  F (36.6  C) (Oral)   Resp 16   Wt 69.7 kg (153 lb 9.6 oz)   SpO2 97%   BMI 20.27 kg/m    Constitutional: fatigued, alert, cooperative, no apparent distress and appears stated age  Eyes: extra-ocular muscles intact and sclera clear  ENT: normocepalic, without obvious abnormality  Neck: thyroid not enlarged, symmetric, no  tenderness  Lungs: no increased work of breathing, good air exchange, no retractions and clear to auscultation  Cardiovascular: regular rate and rhythm and normal S1 and S2  Abdomen: normal bowel sounds, non-distended and non-tender  Neurologic: Motor Exam:  Motor exam is symmetrical 5 out of 5 all extremities bilaterally  Sensory:  Sensory intact  Neuropsychiatric: Normal affect, mood, orientation, memory and insight.  Skin: No rashes, erythema, pallor, petechia or purpura.          Data:   Epic reviewed.     Disposition:  Follow-up in 4 weeks.    Tyrel Manning MD  Internal Medicine  [facility] Team

## 2022-01-05 NOTE — PROGRESS NOTES
Diagnosis/Summary/Recommendations:    PATIENT: Preston Cai  86 year old male     : 1935    BEATRIZ: 2022    MRN: [id number]    Address   [address]    [facility] [address] Phone   [phone] (Home) *Preferred*   [phone] (Mobile) E-mail Address   [email or web address]     Edyta cai        Discussed that his blood pressure and heart rate are pretty low that would not recommend a beta blocker except a 10mg dose as needed if he wanted anything     Would not push up the primidone further     Talked about surgical approaches like deep brain stimulation, focused beam ultrasound, gamma knife    Assessment:    (R25.1) Tremor  (primary encounter diagnosis)  Seen  and  by me   Gabapentin neurontin 100mg  Primidone mysoline 250mg at bedtime    Review of diagnosis    Essential tremor    Tremor when holding a book  Tremor not present at rest  Has shakiness when writing  Has some problems when eating      Review of surgical or medication options   reviewed    Gait/Balance/Falls   No falls but unsteady     Exercise/Therapy performed/offered   States his stride is pretty good but has neuropathy so not so straight    Cognitive/Driving   No changes in his driving  Some memory issues    Mood   Some stress -   Lots of stress since they moved  They are living [facility] assisted living [facility]   They were locked down  Hope to move to [facility] in [place] but on waiting list  Denies depression    Hallucinations/delusions   Denies     Sleep   Sleep disorder  Goes to bed around 9pm and wakes up at 7am  Wakes up 2-3 times per night to urinate  Some times has problems falling back asleep  May have sleep apnea  Mick  Has not had a sleep study  Tired when he wakes up  Naps during the day  30-60 minutes per day       Bladder  BPH  Tamsulosin flomax 0.4mg   PSA 3.19 on 2022    GI/Constipation/GERD  GERD  Dexlansoprazole dexilant 60mg CR capsule - not taking  Pantoprazole  (protonix) 40mg EC tablet  helicobacter negative  Lipase normal  Has a good appetite    153 lbs today     Colonoscopy and endoscopy done last week results pending    Final Diagnosis   A. DUODENUM, BIOPSY:   -Duodenal mucosa with no significant histopathologic abnormality  -No evidence of celiac disease     B. TRANSVERSE COLON, POLYPS X 4, BIOPSY:   -Tubular adenoma(s); negative for high-grade dysplasia     C. RECTUM, POLYP, BIOPSY:   - Sessile serrated adenoma; negative for cytologic dysplasia        Comprehensive metabolic was fine 12/17/2021 - no problems with liver     2 years ago had vomiting    He has acid reflux    Has a hernia    Dr Tosha Toledo - ENT    12/20/2021  IMPRESSION:  1. Diffuse thickening of the gastric wall raises the question of gastritis, however, this finding is relatively unchanged in appearance when compared to prior exam on 7/30/2017.  2. Prostatomegaly with diffuse bladder wall thickening, likely sequela of chronic bladder outlet obstruction.  3. Colonic diverticulosis without evidence of acute diverticulitis.    ENDO  Vitamin D deficiency  Vitamin D, Cholecalciferol 1000 units   Thyroid studies were normal 1/4/2022    Cardio/heart   Denies light headedness or fainting  bp was 95/59    Vision  Macular degeneration  Pseudophaia  Latanoprost xalatan 0.005% solution  C Minkus monitoring his eyes    Heme  MMA normal  Parietal cell antibody 26.7 high   Intrinsic factor negative  Hemoglobin 12.6 on 12/17/2021    Other:  Allergic rhinitis    Chronic radiculopathy C6  States he has a neuropathy  Gabapentin neurontin 100mg x 2 at bedtime    Hoarse voice      Medications      7am 8am 6pm   7pm   Dexlansoprazole dexilant 60mg CR capsule Not taking      Gabapentin neurontin 100mg      2   Latanoprost xalatan 0.005% solution       Pantoprazole (protonix) 40mg EC tablet 1       Primidone mysoline 250mg       1   Tamsulosin flomax 0.4mg     1    Vitamin D, Cholecalciferol 1000 units 25 mcg   1                   Plan:    No clear why he has no energy and losing weight    He will visit with Dr. Manning and review his lab work and studies    Consider a slow wean on primidone (Mysoline) if no other explanation is there.   Pharmacy consultation with Fior Matos if he goes down th is patch  Consider 50mg tabs x 5 and then every few weeks reduce by one tablet - will have more tremor but would have to see if feels better, etc.     For now will review the 250mg of primidone at his present dose.     He granted proxy access to his wife       Coding statement:   Medical Decision Making:  #  Chronic progressive medical conditions addressed  - see above --   Review and/or interpretation of unique test or documentation from a provider outside of neurology yes   Independent historian provided additional details  yes  Prescription drug management and review of potential side effects and/or monitoring for side effects  -- see above ---  Health impacted by social determinants of health  no    I have reviewed the note as documented above.  This accurately captures the substance of my conversation with the patient and total time spent preparing for visit, executing visit and completing visit on the day of the visit:  30 minutes.  The portion of this total time included face to face time 8am - 829am     García Munroe MD     ______________________________________    Last visit date and details:             ______________________________________      Patient was asked about 14 Review of systems including changes in vision (dry eyes, double vision), hearing, heart, lungs, musculoskeletal, depression, anxiety, snoring, RBD, insomnia, urinary frequency, urinary urgency, constipation, swallowing problems, hematological, ID, allergies, skin problems: seborrhea, endocrinological: thyroid, diabetes, cholesterol; balance, weight changes, and other neurological problems and these were not significant at this time except for   Patient Active  Problem List   Diagnosis     Seborrheic dermatitis     Allergic rhinitis due to other allergen     Cervical radiculopathy at C6     Personal history of malignant neoplasm of larynx     Essential tremor     CARDIOVASCULAR SCREENING; LDL GOAL LESS THAN 160     Benign prostatic hyperplasia     Hoarse voice quality     Advanced directives, counseling/discussion     Pseudophakia     Gastroesophageal reflux disease, esophagitis presence not specified     Macular degeneration     Supraspinatus sprain, right, initial encounter     CARDIOVASCULAR SCREENING; LDL GOAL LESS THAN 100     Vitamin D deficiency     Chronic midline low back pain without sciatica     Sleep disorder, nonorganic     Other gastritis without hemorrhage, unspecified chronicity     BPH associated with nocturia     Weight loss          Allergies   Allergen Reactions     Seasonal Allergies      Hay fever      Past Surgical History:   Procedure Laterality Date     APPENDECTOMY  8 years old     Biopsy of the throat - cancerous mass - got radiation for in larynx  1982     CATARACT IOL, RT/LT       COLONOSCOPY  2/25/2004    Normal     COLONOSCOPY N/A 4/28/2015    Procedure: COLONOSCOPY;  Surgeon: Marvin Adams MD;  Location: [facility] OR     COLONOSCOPY WITH CO2 INSUFFLATION N/A 4/28/2015    Procedure: COLONOSCOPY WITH CO2 INSUFFLATION;  Surgeon: Marvin Adams MD;  Location: [facility] OR     CYSTOSCOPY  1997    for hematuria - negative     EYE SURGERY Right     Rt eye.macular repair     EYE SURGERY  2003    cataract     NASAL ENDOSCOPY,DX  4/2007    GERD     Pars plana vitrectomy and epiretinal membrane dissection, right eye  11/8/2002     Phacoemulsification with intraocular lens implantation right eye.  3/11/2003     PHACOEMULSIFICATION WITH STANDARD INTRAOCULAR LENS IMPLANT Left 9/22/2016    Procedure: PHACOEMULSIFICATION WITH STANDARD INTRAOCULAR LENS IMPLANT;  Surgeon: Nghia Lara MD;  Location: [facility] OR     Thyroglossal Duct Cyst Removal  - 2ndary to radiation.  1982     VASECTOMY  1971     ZZ GASTROSCOPY,FL  9/2007    hiatal hernia and errosions     Past Medical History:   Diagnosis Date     Acid reflux disease      Allergic rhinitis, cause unspecified      H/O magnetic resonance imaging of brain and brain stem 10/10/2016    MRI BRAIN WITH AND WITHOUT CONTRAST August 17, 2009 8:07:00 PM   HISTORY: Severe dizzy spells with imbalance and vomiting.   TECHNIQUE: Routine pulse sequences without and with contrast were performed including thin section images through the IACs. 20 mL Magnevist given.   FINDINGS: Diffusion-weighted images are normal. The brain parenchyma, brainstem, ventricular system, and subarachnoid spaces a     Hematuria     Hematuria  - in 1990's      History of anemia 2002    Anemia-Iron Deficit     History of gastric ulcer      Macular degeneration      Malignant neoplasm of laryngeal cartilages (H)     Laryngeal CA (Radiation 1982)     Nonsenile cataract      PONV (postoperative nausea and vomiting)      Tremor 10/3/2016     Social History     Socioeconomic History     Marital status:      Spouse name: Edyta Cai     Number of children: Not on file     Years of education: Not on file     Highest education level: Not on file   Occupational History     Employer: RETIRED   Tobacco Use     Smoking status: Former Smoker     Smokeless tobacco: Never Used     Tobacco comment: 1969   Substance and Sexual Activity     Alcohol use: Yes     Alcohol/week: 0.0 standard drinks     Comment: A beer once in awhile     Drug use: No     Sexual activity: Yes     Partners: Female     Birth control/protection: None   Other Topics Concern     Parent/sibling w/ CABG, MI or angioplasty before 65F 55M? No   Social History Narrative    seen by Jose M Diaz. lives in [facility].  to Edyta Cai.    2 sons and 2 daughters    Cancer father    Stroke mother        Daughter in [facility]    Son in [place]    Daughter in [place]    Son  in [place]         Social Determinants of Health     Financial Resource Strain: Not on file   Food Insecurity: Not on file   Transportation Needs: Not on file   Physical Activity: Not on file   Stress: Not on file   Social Connections: Not on file   Intimate Partner Violence: Not on file   Housing Stability: Not on file       Drug and lactation database from the [place] National Library of Medicine:  http://toxnet.nlm.nih.gov/cgi-bin/sis/htmlgen?LACT      B/P: Data Unavailable, T: Data Unavailable, P: Data Unavailable, R: Data Unavailable 0 lbs 0 oz  There were no vitals taken for this visit., There is no height or weight on file to calculate BMI.  Medications and Vitals not listed above were documented in the cart and reviewed by me.     Current Outpatient Medications   Medication Sig Dispense Refill     dexlansoprazole (DEXILANT) 60 MG CPDR CR capsule Take 1 capsule (60 mg) by mouth daily 30 capsule 5     gabapentin (NEURONTIN) 100 MG capsule Take 3 capsules (300 mg) by mouth every evening 270 capsule 3     latanoprost (XALATAN) 0.005 % ophthalmic solution Place 1 drop into the right eye At Bedtime 7.5 mL 1     pantoprazole (PROTONIX) 20 MG EC tablet Take 1 tablet (20 mg) by mouth daily 90 tablet 0     pantoprazole (PROTONIX) 40 MG EC tablet Take 1 tablet (40 mg) by mouth daily 90 tablet 1     primidone (MYSOLINE) 250 MG tablet TAKE ONE TABLET BY MOUTH AT BEDTIME 30 tablet 0     tamsulosin (FLOMAX) 0.4 MG capsule TAKE TWO CAPSULES BY MOUTH ONCE DAILY (Patient taking differently: 0.4 mg every evening ) 180 capsule 3     triamcinolone (KENALOG) 0.1 % external cream Apply twice daily for rash on shoulders until resolved (Patient not taking: Reported on 10/1/2021) 454 g 3     VITAMIN D, CHOLECALCIFEROL, PO Take 1,000 Units by mouth daily           Medications                                                                                                                                                  García  Ludwig WELCH

## 2022-01-06 ENCOUNTER — LAB (OUTPATIENT)
Dept: URGENT CARE | Facility: URGENT CARE | Age: 87
End: 2022-01-06
Payer: COMMERCIAL

## 2022-01-06 DIAGNOSIS — Z11.59 ENCOUNTER FOR SCREENING FOR OTHER VIRAL DISEASES: ICD-10-CM

## 2022-01-06 LAB
METHYLMALONATE SERPL-SCNC: 0.33 UMOL/L (ref 0–0.4)
SARS-COV-2 RNA RESP QL NAA+PROBE: NEGATIVE

## 2022-01-06 PROCEDURE — U0003 INFECTIOUS AGENT DETECTION BY NUCLEIC ACID (DNA OR RNA); SEVERE ACUTE RESPIRATORY SYNDROME CORONAVIRUS 2 (SARS-COV-2) (CORONAVIRUS DISEASE [COVID-19]), AMPLIFIED PROBE TECHNIQUE, MAKING USE OF HIGH THROUGHPUT TECHNOLOGIES AS DESCRIBED BY CMS-2020-01-R: HCPCS

## 2022-01-06 PROCEDURE — U0005 INFEC AGEN DETEC AMPLI PROBE: HCPCS

## 2022-01-07 ENCOUNTER — TELEPHONE (OUTPATIENT)
Dept: FAMILY MEDICINE | Facility: CLINIC | Age: 87
End: 2022-01-07
Payer: COMMERCIAL

## 2022-01-07 LAB — IF BLOCK AB SER QL RIA: NEGATIVE

## 2022-01-07 NOTE — TELEPHONE ENCOUNTER
dexilant - not covered by ins.  Would you like to try something else or do a pa?    Rico Aranda Pharm. D.  [facility] Pharmacy Manager  [phone]  /1/7/2022

## 2022-01-10 ENCOUNTER — HOSPITAL ENCOUNTER (OUTPATIENT)
Facility: AMBULATORY SURGERY CENTER | Age: 87
Discharge: HOME | End: 2022-01-10
Attending: SURGERY | Admitting: SURGERY
Payer: COMMERCIAL

## 2022-01-10 VITALS
SYSTOLIC BLOOD PRESSURE: 100 MMHG | HEART RATE: 66 BPM | TEMPERATURE: 97.5 F | OXYGEN SATURATION: 95 % | RESPIRATION RATE: 16 BRPM | DIASTOLIC BLOOD PRESSURE: 60 MMHG

## 2022-01-10 DIAGNOSIS — K21.9 GASTROESOPHAGEAL REFLUX DISEASE WITHOUT ESOPHAGITIS: Primary | ICD-10-CM

## 2022-01-10 DIAGNOSIS — R63.4 WEIGHT LOSS: ICD-10-CM

## 2022-01-10 LAB
COLONOSCOPY: NORMAL
PCA IGG SER-ACNC: 26.7 UNITS
UPPER GI ENDOSCOPY: NORMAL

## 2022-01-10 PROCEDURE — 88305 TISSUE EXAM BY PATHOLOGIST: CPT | Performed by: PATHOLOGY

## 2022-01-10 PROCEDURE — 45385 COLONOSCOPY W/LESION REMOVAL: CPT

## 2022-01-10 PROCEDURE — G8907 PT DOC NO EVENTS ON DISCHARG: HCPCS

## 2022-01-10 PROCEDURE — G8918 PT W/O PREOP ORDER IV AB PRO: HCPCS

## 2022-01-10 PROCEDURE — 43239 EGD BIOPSY SINGLE/MULTIPLE: CPT

## 2022-01-10 PROCEDURE — 45380 COLONOSCOPY AND BIOPSY: CPT | Mod: XS

## 2022-01-10 RX ORDER — NALOXONE HYDROCHLORIDE 0.4 MG/ML
0.4 INJECTION, SOLUTION INTRAMUSCULAR; INTRAVENOUS; SUBCUTANEOUS
Status: DISCONTINUED | OUTPATIENT
Start: 2022-01-10 | End: 2022-01-11 | Stop reason: HOSPADM

## 2022-01-10 RX ORDER — ONDANSETRON 2 MG/ML
4 INJECTION INTRAMUSCULAR; INTRAVENOUS
Status: DISCONTINUED | OUTPATIENT
Start: 2022-01-10 | End: 2022-01-11 | Stop reason: HOSPADM

## 2022-01-10 RX ORDER — ONDANSETRON 4 MG/1
4 TABLET, ORALLY DISINTEGRATING ORAL EVERY 6 HOURS PRN
Status: DISCONTINUED | OUTPATIENT
Start: 2022-01-10 | End: 2022-01-11 | Stop reason: HOSPADM

## 2022-01-10 RX ORDER — LIDOCAINE 40 MG/G
CREAM TOPICAL
Status: DISCONTINUED | OUTPATIENT
Start: 2022-01-10 | End: 2022-01-11 | Stop reason: HOSPADM

## 2022-01-10 RX ORDER — FENTANYL CITRATE 50 UG/ML
INJECTION, SOLUTION INTRAMUSCULAR; INTRAVENOUS PRN
Status: DISCONTINUED | OUTPATIENT
Start: 2022-01-10 | End: 2022-01-10 | Stop reason: HOSPADM

## 2022-01-10 RX ORDER — PROCHLORPERAZINE MALEATE 5 MG
5 TABLET ORAL EVERY 6 HOURS PRN
Status: DISCONTINUED | OUTPATIENT
Start: 2022-01-10 | End: 2022-01-11 | Stop reason: HOSPADM

## 2022-01-10 RX ORDER — FLUMAZENIL 0.1 MG/ML
0.2 INJECTION, SOLUTION INTRAVENOUS
Status: ACTIVE | OUTPATIENT
Start: 2022-01-10 | End: 2022-01-10

## 2022-01-10 RX ORDER — ONDANSETRON 2 MG/ML
4 INJECTION INTRAMUSCULAR; INTRAVENOUS EVERY 6 HOURS PRN
Status: DISCONTINUED | OUTPATIENT
Start: 2022-01-10 | End: 2022-01-11 | Stop reason: HOSPADM

## 2022-01-10 RX ORDER — SODIUM CHLORIDE, SODIUM LACTATE, POTASSIUM CHLORIDE, CALCIUM CHLORIDE 600; 310; 30; 20 MG/100ML; MG/100ML; MG/100ML; MG/100ML
INJECTION, SOLUTION INTRAVENOUS CONTINUOUS PRN
Status: COMPLETED | OUTPATIENT
Start: 2022-01-10 | End: 2022-01-10

## 2022-01-10 RX ORDER — NALOXONE HYDROCHLORIDE 0.4 MG/ML
0.2 INJECTION, SOLUTION INTRAMUSCULAR; INTRAVENOUS; SUBCUTANEOUS
Status: DISCONTINUED | OUTPATIENT
Start: 2022-01-10 | End: 2022-01-11 | Stop reason: HOSPADM

## 2022-01-10 NOTE — TELEPHONE ENCOUNTER
Central Prior Authorization Team   Phone: [phone]      PA Initiation    Medication: dexilant  Insurance Company: [employer] - Phone [phone] Fax [phone]  Pharmacy Filling the Rx: [place], [place] - [address] [place] [address] N, [address]  Filling Pharmacy Phone: [phone]  Filling Pharmacy Fax:    Start Date: 1/10/2022

## 2022-01-10 NOTE — TELEPHONE ENCOUNTER
Prior Authorization Approval    Authorization Effective Date: 12/11/2021  Authorization Expiration Date: 1/10/2023  Medication: dexilant-APPROVED  Approved Dose/Quantity:   Reference #:     Insurance Company: [employer] - Phone [phone] Fax [phone]  Expected CoPay:       CoPay Card Available:      Foundation Assistance Needed:    Which Pharmacy is filling the prescription (Not needed for infusion/clinic administered): [facility] - [place], [place] - [address], [address]  Pharmacy Notified: Yes  Patient Notified: No    Pharmacy will notify patient when medication is ready.

## 2022-01-10 NOTE — H&P
"[facility] Anesthesia Pre-op History and Physical    Preston Cai MRN# [id number]   Age: 86 year old YOB: 1935      Date of Surgery: 01/10/22   [facility]      Date of Exam 1/10/2022 Facility (Same day)       Home clinic:   Primary care provider: Tyrel Manning         Chief Complaint and/or Reason for Procedure:   No chief complaint on file.           Active problem list:     Patient Active Problem List    Diagnosis Date Noted     Sleep disorder, nonorganic 08/11/2021     Priority: Medium     Other gastritis without hemorrhage, unspecified chronicity 08/11/2021     Priority: Medium     BPH associated with nocturia 08/11/2021     Priority: Medium     Weight loss 08/11/2021     Priority: Medium     Chronic midline low back pain without sciatica 07/17/2020     Priority: Medium     Supraspinatus sprain, right, initial encounter 03/23/2020     Priority: Medium     CARDIOVASCULAR SCREENING; LDL GOAL LESS THAN 100 03/23/2020     Priority: Medium     Vitamin D deficiency 03/23/2020     Priority: Medium     Macular degeneration 10/18/2016     Priority: Medium     Gastroesophageal reflux disease, esophagitis presence not specified 06/17/2015     Priority: Medium     IMO Regulatory Load OCT 2020       Pseudophakia 07/02/2014     Priority: Medium     Advanced directives, counseling/discussion 02/17/2014     Priority: Medium     Advance Care Planning:   ACP Review and Resources Provided:  Reviewed chart for advance care plan.  Preston Cai has no plan or code status on file. Discussed available resources and provided with information on 02/17/2014. Confirmed code status reflects current choices pending further ACP discussions.  Confirmed/documented designated decision maker(s). See permanent comments section of demographics in clinical tab.   Added by Lindsey Gongora on 3/20/2014  Patient does not have an Advance/Health Care Directive (HCD), given \"What is " "Advance Care Planning?\" flyer and requests blank HCD form.  Angelina Kendrickers  February 17, 2014       Hoarse voice quality 03/07/2012     Priority: Medium     Benign prostatic hyperplasia 03/24/2011     Priority: Medium     CARDIOVASCULAR SCREENING; LDL GOAL LESS THAN 160 10/31/2010     Priority: Medium     Essential tremor 04/28/2010     Priority: Medium     Followed by Dr Diaz ( [facility] ) : takes primodone        Personal history of malignant neoplasm of larynx 10/05/2009     Priority: Medium     1982         Cervical radiculopathy at C6 01/02/2009     Priority: Medium     C5-C6  mild central and mild/moderate bilateral neural foraminal stenosis. with Left radicular pain. trial epidural, PNB           Seborrheic dermatitis 04/10/2007     Priority: Medium     scalp and ear canals - triamcilnolone to knock down, then coal tar to maintain.  Problem list name updated by automated process. Provider to review       Allergic rhinitis due to other allergen 04/10/2007     Priority: Medium     continue flonase - good control with.  Didn't use over winter.                Medications (include herbals and vitamins):   Any Plavix use in the last 7 days? No     Current Outpatient Medications   Medication Sig     dexlansoprazole (DEXILANT) 60 MG CPDR CR capsule Take 1 capsule (60 mg) by mouth daily     gabapentin (NEURONTIN) 100 MG capsule Take 3 capsules (300 mg) by mouth every evening     latanoprost (XALATAN) 0.005 % ophthalmic solution Place 1 drop into the right eye At Bedtime     pantoprazole (PROTONIX) 40 MG EC tablet Take 1 tablet (40 mg) by mouth daily     primidone (MYSOLINE) 250 MG tablet TAKE ONE TABLET BY MOUTH AT BEDTIME     tamsulosin (FLOMAX) 0.4 MG capsule TAKE TWO CAPSULES BY MOUTH ONCE DAILY (Patient taking differently: 0.4 mg every evening )     VITAMIN D, CHOLECALCIFEROL, PO Take 1,000 Units by mouth daily     pantoprazole (PROTONIX) 20 MG EC tablet Take 1 tablet (20 mg) by mouth daily     Current " Facility-Administered Medications   Medication     lidocaine (LMX4) kit     lidocaine 1 % 0.1-1 mL     ondansetron (ZOFRAN) injection 4 mg     sodium chloride (PF) 0.9% PF flush 3 mL     sodium chloride (PF) 0.9% PF flush 3 mL             Allergies:      Allergies   Allergen Reactions     Seasonal Allergies      Hay fever      Allergy to Latex? No  Allergy to tape?   No  Intolerances:             Physical Exam:   All vitals have been reviewed  Patient Vitals for the past 8 hrs:   BP Temp Temp src Resp SpO2   01/10/22 0730 110/68 97.5  F (36.4  C) Temporal 18 97 %     No intake/output data recorded.  Airway assessment:   Patient is able to open mouth wide  Patient is able to stick out tongue  Mallampatti classification: Class II (visualization of the soft palate, fauces, and uvula)}      ENT:   Normocephalic, without obvious abnormality, atraumatic, sinuses nontender on palpation, external ears without lesions, oral pharynx with moist mucous membranes, tonsils without erythema or exudates, gums normal and good dentition.     Neck:   Supple, symmetrical, trachea midline, no adenopathy, thyroid symmetric, not enlarged and no tenderness, skin normal     Lungs:   No increased work of breathing, good air exchange, clear to auscultation bilaterally, no crackles or wheezing     Cardiovascular:   Normal apical impulse, regular rate and rhythm, normal S1 and S2, no S3 or S4, and no murmur noted             Lab / Radiology Results:     Lab Results   Component Value Date    WBC 4.4 12/17/2021    WBC 4.3 05/05/2021    RBC 3.85 12/17/2021    RBC 3.69 05/05/2021    HGB 12.6 12/17/2021    HGB 12.1 05/05/2021    HCT 37.8 12/17/2021    HCT 36.5 05/05/2021    MCV 98 12/17/2021    MCV 99 05/05/2021    RDW 12.7 12/17/2021    RDW 12.3 05/05/2021     12/17/2021     05/05/2021             Anesthetic risk and/or ASA classification:     2  Marsha Asher MD

## 2022-01-12 ENCOUNTER — TELEPHONE (OUTPATIENT)
Dept: SURGERY | Facility: CLINIC | Age: 87
End: 2022-01-12
Payer: COMMERCIAL

## 2022-01-12 LAB
PATH REPORT.COMMENTS IMP SPEC: NORMAL
PATH REPORT.COMMENTS IMP SPEC: NORMAL
PATH REPORT.FINAL DX SPEC: NORMAL
PATH REPORT.GROSS SPEC: NORMAL
PATH REPORT.MICROSCOPIC SPEC OTHER STN: NORMAL
PATH REPORT.RELEVANT HX SPEC: NORMAL
PHOTO IMAGE: NORMAL

## 2022-01-12 NOTE — TELEPHONE ENCOUNTER
"Pt called and reports that the pain he is having is under his rib cage and it is not severe. S/P Colonoscopy on 1/10/22Pt states \" I looked it up and it says its  Normal\". Pt states the pain started on Monday night and then it went away and came back today. Pain is described as a dull ache.No other symptoms to report, no nausea, vomiting or fevers.  Pt asked if he could take anything for pain. RN advised okay to take Tylenol but avoid blood thinners like Ibuprofen at the present time to avoid increased bleeding. Pt voiced understanding. RN advised pt to call back if his symptoms do not improve or worsen or to go to an ER if symptoms become severe. Pt states understanding and thanked RN for the call..Nikkie Fang RN, RN  [facility],     This patient left a message regarding pain after his EGD and colonoscopy which was completed by Dr. Asher on 1/10/22. Are you able to discuss with Dr. Asher who this patients (and future patients) should follow up with?     Thank you,     Nikkie Farley RN, BSN             Previous Messages       ----- Message -----   From: Yodit Melchor RN   Sent: 1/12/2022   3:31 PM CST   To: Nikkie Farley RN       ----- Message -----   From: Nikkie Farley RN   Sent: 1/12/2022   3:18 PM CST   To: [facility],     There was a message on the clinic voicemail for this patient. He was requesting a return call to discuss some pain he was having following his recent endoscopy and colonoscopy. He can be reached at [phone].     Thank you,     Nikkie Farley RN, BSN          "

## 2022-01-17 ENCOUNTER — OFFICE VISIT (OUTPATIENT)
Dept: NEUROLOGY | Facility: CLINIC | Age: 87
End: 2022-01-17
Payer: COMMERCIAL

## 2022-01-17 VITALS
BODY MASS INDEX: 20.25 KG/M2 | DIASTOLIC BLOOD PRESSURE: 59 MMHG | HEART RATE: 78 BPM | WEIGHT: 153.5 LBS | SYSTOLIC BLOOD PRESSURE: 95 MMHG

## 2022-01-17 DIAGNOSIS — G25.0 ESSENTIAL TREMOR: ICD-10-CM

## 2022-01-17 DIAGNOSIS — G60.9 HEREDITARY AND IDIOPATHIC PERIPHERAL NEUROPATHY: ICD-10-CM

## 2022-01-17 DIAGNOSIS — R25.1 TREMOR: Primary | ICD-10-CM

## 2022-01-17 DIAGNOSIS — N40.1 BENIGN PROSTATIC HYPERPLASIA WITH NOCTURIA: ICD-10-CM

## 2022-01-17 DIAGNOSIS — R35.1 BENIGN PROSTATIC HYPERPLASIA WITH NOCTURIA: ICD-10-CM

## 2022-01-17 PROCEDURE — 99214 OFFICE O/P EST MOD 30 MIN: CPT | Performed by: PSYCHIATRY & NEUROLOGY

## 2022-01-17 RX ORDER — PRIMIDONE 250 MG/1
TABLET ORAL
Qty: 90 TABLET | Refills: 3 | Status: SHIPPED | OUTPATIENT
Start: 2022-01-17 | End: 2023-01-23

## 2022-01-17 RX ORDER — TAMSULOSIN HYDROCHLORIDE 0.4 MG/1
0.4 CAPSULE ORAL EVERY EVENING
COMMUNITY
Start: 2022-01-17 | End: 2022-05-17

## 2022-01-17 RX ORDER — GABAPENTIN 100 MG/1
CAPSULE ORAL
Qty: 270 CAPSULE | Refills: 3 | COMMUNITY
Start: 2022-01-17 | End: 2022-09-27

## 2022-01-17 RX ORDER — FAMOTIDINE 20 MG
1000 TABLET ORAL DAILY
COMMUNITY
Start: 2022-01-17

## 2022-01-17 NOTE — PATIENT INSTRUCTIONS
Assessment:    (R25.1) Tremor  (primary encounter diagnosis)  Seen 2016 and 2020 by me   Gabapentin neurontin 100mg  Primidone mysoline 250mg at bedtime    Review of diagnosis    Essential tremor    Tremor when holding a book  Tremor not present at rest  Has shakiness when writing  Has some problems when eating      Review of surgical or medication options   reviewed    Gait/Balance/Falls   No falls but unsteady     Exercise/Therapy performed/offered   States his stride is pretty good but has neuropathy so not so straight    Cognitive/Driving   No changes in his driving  Some memory issues    Mood   Some stress -   Lots of stress since they moved  They are living tradition assisted living [facility]   They were locked down  Hope to move to [facility] in [place] but on waiting list  Denies depression    Hallucinations/delusions   Denies     Sleep   Sleep disorder  Goes to bed around 9pm and wakes up at 7am  Wakes up 2-3 times per night to urinate  Some times has problems falling back asleep  May have sleep apnea  Snores  Has not had a sleep study  Tired when he wakes up  Naps during the day  30-60 minutes per day       Bladder  BPH  Tamsulosin flomax 0.4mg   PSA 3.19 on 1/4/2022    GI/Constipation/GERD  GERD  Dexlansoprazole dexilant 60mg CR capsule - not taking  Pantoprazole (protonix) 40mg EC tablet  helicobacter negative  Lipase normal  Has a good appetite    153 lbs today     Colonoscopy and endoscopy done last week results pending    Final Diagnosis   A. DUODENUM, BIOPSY:   -Duodenal mucosa with no significant histopathologic abnormality  -No evidence of celiac disease     B. TRANSVERSE COLON, POLYPS X 4, BIOPSY:   -Tubular adenoma(s); negative for high-grade dysplasia     C. RECTUM, POLYP, BIOPSY:   - Sessile serrated adenoma; negative for cytologic dysplasia        Comprehensive metabolic was fine 12/17/2021 - no problems with liver     2 years ago had vomiting    He has acid reflux    Has a  hernia    Dr Tosha Toledo - ENT    12/20/2021  IMPRESSION:  1. Diffuse thickening of the gastric wall raises the question of gastritis, however, this finding is relatively unchanged in appearance when compared to prior exam on 7/30/2017.  2. Prostatomegaly with diffuse bladder wall thickening, likely sequela of chronic bladder outlet obstruction.  3. Colonic diverticulosis without evidence of acute diverticulitis.    ENDO  Vitamin D deficiency  Vitamin D, Cholecalciferol 1000 units   Thyroid studies were normal 1/4/2022    Cardio/heart   Denies light headedness or fainting  bp was 95/59    Vision  Macular degeneration  Pseudophaia  Latanoprost xalatan 0.005% solution  C Minkus monitoring his eyes    Heme  MMA normal  Parietal cell antibody 26.7 high   Intrinsic factor negative  Hemoglobin 12.6 on 12/17/2021    Other:  Allergic rhinitis    Chronic radiculopathy C6  States he has a neuropathy  Gabapentin neurontin 100mg x 2 at bedtime    Hoarse voice      Medications      7am 8am 6pm   7pm   Dexlansoprazole dexilant 60mg CR capsule Not taking      Gabapentin neurontin 100mg      2   Latanoprost xalatan 0.005% solution       Pantoprazole (protonix) 40mg EC tablet 1       Primidone mysoline 250mg       1   Tamsulosin flomax 0.4mg     1    Vitamin D, Cholecalciferol 1000 units 25 mcg   1                  Plan:    No clear why he has no energy and losing weight    He will visit with Dr. Manning and review his lab work and studies    Consider a slow wean on primidone (Mysoline) if no other explanation is there.   Pharmacy consultation with Fior Matos if he goes down th is patch  Consider 50mg tabs x 5 and then every few weeks reduce by one tablet - will have more tremor but would have to see if feels better, etc.     For now will review the 250mg of primidone at his present dose.     He granted proxy access to his wife

## 2022-01-17 NOTE — LETTER
2022         RE: Preston Cai  [address]  [place] [address]        Dear Colleague,    Thank you for referring your patient, Preston Cai, to the [facility] [place]. Please see a copy of my visit note below.        Diagnosis/Summary/Recommendations:    PATIENT: Preston Cai  86 year old male     : 1935    BEATRIZ: 2022    MRN: [id number]    Address   [address]    [place] [address] Phone   [phone] (Home) *Preferred*   [phone] (Mobile) E-mail Address   [email or web address]     Edyta cai        Discussed that his blood pressure and heart rate are pretty low that would not recommend a beta blocker except a 10mg dose as needed if he wanted anything     Would not push up the primidone further     Talked about surgical approaches like deep brain stimulation, focused beam ultrasound, gamma knife    Assessment:    (R25.1) Tremor  (primary encounter diagnosis)  Seen  and  by me   Gabapentin neurontin 100mg  Primidone mysoline 250mg at bedtime    Review of diagnosis    Essential tremor    Tremor when holding a book  Tremor not present at rest  Has shakiness when writing  Has some problems when eating      Review of surgical or medication options   reviewed    Gait/Balance/Falls   No falls but unsteady     Exercise/Therapy performed/offered   States his stride is pretty good but has neuropathy so not so straight    Cognitive/Driving   No changes in his driving  Some memory issues    Mood   Some stress -   Lots of stress since they moved  They are living tradition assisted living [facility]   They were locked down  Hope to move to [facility] in [place] but on waiting list  Denies depression    Hallucinations/delusions   Denies     Sleep   Sleep disorder  Goes to bed around 9pm and wakes up at 7am  Wakes up 2-3 times per night to urinate  Some times has problems falling back asleep  May have sleep  apnea  Mick  Has not had a sleep study  Tired when he wakes up  Naps during the day  30-60 minutes per day       Bladder  BPH  Tamsulosin flomax 0.4mg   PSA 3.19 on 1/4/2022    GI/Constipation/GERD  GERD  Dexlansoprazole dexilant 60mg CR capsule - not taking  Pantoprazole (protonix) 40mg EC tablet  helicobacter negative  Lipase normal  Has a good appetite    153 lbs today     Colonoscopy and endoscopy done last week results pending    Final Diagnosis   A. DUODENUM, BIOPSY:   -Duodenal mucosa with no significant histopathologic abnormality  -No evidence of celiac disease     B. TRANSVERSE COLON, POLYPS X 4, BIOPSY:   -Tubular adenoma(s); negative for high-grade dysplasia     C. RECTUM, POLYP, BIOPSY:   - Sessile serrated adenoma; negative for cytologic dysplasia        Comprehensive metabolic was fine 12/17/2021 - no problems with liver     2 years ago had vomiting    He has acid reflux    Has a hernia    Dr Tosha Toledo - ENT    12/20/2021  IMPRESSION:  1. Diffuse thickening of the gastric wall raises the question of gastritis, however, this finding is relatively unchanged in appearance when compared to prior exam on 7/30/2017.  2. Prostatomegaly with diffuse bladder wall thickening, likely sequela of chronic bladder outlet obstruction.  3. Colonic diverticulosis without evidence of acute diverticulitis.    ENDO  Vitamin D deficiency  Vitamin D, Cholecalciferol 1000 units   Thyroid studies were normal 1/4/2022    Cardio/heart   Denies light headedness or fainting  bp was 95/59    Vision  Macular degeneration  Pseudophaia  Latanoprost xalatan 0.005% solution  C Minkus monitoring his eyes    Heme  MMA normal  Parietal cell antibody 26.7 high   Intrinsic factor negative  Hemoglobin 12.6 on 12/17/2021    Other:  Allergic rhinitis    Chronic radiculopathy C6  States he has a neuropathy  Gabapentin neurontin 100mg x 2 at bedtime    Hoarse voice      Medications      7am 8am 6pm   7pm   Dexlansoprazole dexilant  60mg CR capsule Not taking      Gabapentin neurontin 100mg      2   Latanoprost xalatan 0.005% solution       Pantoprazole (protonix) 40mg EC tablet 1       Primidone mysoline 250mg       1   Tamsulosin flomax 0.4mg     1    Vitamin D, Cholecalciferol 1000 units 25 mcg   1                  Plan:    No clear why he has no energy and losing weight    He will visit with Dr. Manning and review his lab work and studies    Consider a slow wean on primidone (Mysoline) if no other explanation is there.   Pharmacy consultation with Fior Matos if he goes down th is patch  Consider 50mg tabs x 5 and then every few weeks reduce by one tablet - will have more tremor but would have to see if feels better, etc.     For now will review the 250mg of primidone at his present dose.     He granted proxy access to his wife       Coding statement:   Medical Decision Making:  #  Chronic progressive medical conditions addressed  - see above --   Review and/or interpretation of unique test or documentation from a provider outside of neurology yes   Independent historian provided additional details  yes  Prescription drug management and review of potential side effects and/or monitoring for side effects  -- see above ---  Health impacted by social determinants of health  no    I have reviewed the note as documented above.  This accurately captures the substance of my conversation with the patient and total time spent preparing for visit, executing visit and completing visit on the day of the visit:  30 minutes.  The portion of this total time included face to face time 8am - 829am     García Munroe MD     ______________________________________    Last visit date and details:             ______________________________________      Patient was asked about 14 Review of systems including changes in vision (dry eyes, double vision), hearing, heart, lungs, musculoskeletal, depression, anxiety, snoring, RBD, insomnia, urinary frequency, urinary  urgency, constipation, swallowing problems, hematological, ID, allergies, skin problems: seborrhea, endocrinological: thyroid, diabetes, cholesterol; balance, weight changes, and other neurological problems and these were not significant at this time except for   Patient Active Problem List   Diagnosis     Seborrheic dermatitis     Allergic rhinitis due to other allergen     Cervical radiculopathy at C6     Personal history of malignant neoplasm of larynx     Essential tremor     CARDIOVASCULAR SCREENING; LDL GOAL LESS THAN 160     Benign prostatic hyperplasia     Hoarse voice quality     Advanced directives, counseling/discussion     Pseudophakia     Gastroesophageal reflux disease, esophagitis presence not specified     Macular degeneration     Supraspinatus sprain, right, initial encounter     CARDIOVASCULAR SCREENING; LDL GOAL LESS THAN 100     Vitamin D deficiency     Chronic midline low back pain without sciatica     Sleep disorder, nonorganic     Other gastritis without hemorrhage, unspecified chronicity     BPH associated with nocturia     Weight loss          Allergies   Allergen Reactions     Seasonal Allergies      Hay fever      Past Surgical History:   Procedure Laterality Date     APPENDECTOMY  8 years old     Biopsy of the throat - cancerous mass - got radiation for in larynx  1982     CATARACT IOL, RT/LT       COLONOSCOPY  2/25/2004    Normal     COLONOSCOPY N/A 4/28/2015    Procedure: COLONOSCOPY;  Surgeon: Marvin Adams MD;  Location: [facility] OR     COLONOSCOPY WITH CO2 INSUFFLATION N/A 4/28/2015    Procedure: COLONOSCOPY WITH CO2 INSUFFLATION;  Surgeon: Marvin Adams MD;  Location: [facility] OR     CYSTOSCOPY  1997    for hematuria - negative     EYE SURGERY Right     Rt eye.macular repair     EYE SURGERY  2003    cataract     NASAL ENDOSCOPY,DX  4/2007    GERD     Pars plana vitrectomy and epiretinal membrane dissection, right eye  11/8/2002     Phacoemulsification with intraocular lens  implantation right eye.  3/11/2003     PHACOEMULSIFICATION WITH STANDARD INTRAOCULAR LENS IMPLANT Left 9/22/2016    Procedure: PHACOEMULSIFICATION WITH STANDARD INTRAOCULAR LENS IMPLANT;  Surgeon: Nghia Lara MD;  Location: [facility] OR     Thyroglossal Duct Cyst Removal - 2ndary to radiation.  1982     VASECTOMY  1971     ZZ GASTROSCOPY,FL  9/2007    hiatal hernia and errosions     Past Medical History:   Diagnosis Date     Acid reflux disease      Allergic rhinitis, cause unspecified      H/O magnetic resonance imaging of brain and brain stem 10/10/2016    MRI BRAIN WITH AND WITHOUT CONTRAST August 17, 2009 8:07:00 PM   HISTORY: Severe dizzy spells with imbalance and vomiting.   TECHNIQUE: Routine pulse sequences without and with contrast were performed including thin section images through the IACs. 20 mL Magnevist given.   FINDINGS: Diffusion-weighted images are normal. The brain parenchyma, brainstem, ventricular system, and subarachnoid spaces a     Hematuria     Hematuria  - in 1990's      History of anemia 2002    Anemia-Iron Deficit     History of gastric ulcer      Macular degeneration      Malignant neoplasm of laryngeal cartilages (H)     Laryngeal CA (Radiation 1982)     Nonsenile cataract      PONV (postoperative nausea and vomiting)      Tremor 10/3/2016     Social History     Socioeconomic History     Marital status:      Spouse name: Edyta Cai     Number of children: Not on file     Years of education: Not on file     Highest education level: Not on file   Occupational History     Employer: RETIRED   Tobacco Use     Smoking status: Former Smoker     Smokeless tobacco: Never Used     Tobacco comment: 1969   Substance and Sexual Activity     Alcohol use: Yes     Alcohol/week: 0.0 standard drinks     Comment: A beer once in awhile     Drug use: No     Sexual activity: Yes     Partners: Female     Birth control/protection: None   Other Topics Concern     Parent/sibling w/ CABG, MI or  angioplasty before 65F 55M? No   Social History Narrative    seen by Jose M Diaz. lives in [facility].  to Edyta Cai.    2 sons and 2 daughters    Cancer father    Stroke mother        Daughter in [place]    Son in [place]    Daughter in [place]    Son in [place]         Social Determinants of Health     Financial Resource Strain: Not on file   Food Insecurity: Not on file   Transportation Needs: Not on file   Physical Activity: Not on file   Stress: Not on file   Social Connections: Not on file   Intimate Partner Violence: Not on file   Housing Stability: Not on file       Drug and lactation database from the [place] National Library of Medicine:  http://toxnet.nlm.nih.gov/cgi-bin/sis/htmlgen?LACT      B/P: Data Unavailable, T: Data Unavailable, P: Data Unavailable, R: Data Unavailable 0 lbs 0 oz  There were no vitals taken for this visit., There is no height or weight on file to calculate BMI.  Medications and Vitals not listed above were documented in the cart and reviewed by me.     Current Outpatient Medications   Medication Sig Dispense Refill     dexlansoprazole (DEXILANT) 60 MG CPDR CR capsule Take 1 capsule (60 mg) by mouth daily 30 capsule 5     gabapentin (NEURONTIN) 100 MG capsule Take 3 capsules (300 mg) by mouth every evening 270 capsule 3     latanoprost (XALATAN) 0.005 % ophthalmic solution Place 1 drop into the right eye At Bedtime 7.5 mL 1     pantoprazole (PROTONIX) 20 MG EC tablet Take 1 tablet (20 mg) by mouth daily 90 tablet 0     pantoprazole (PROTONIX) 40 MG EC tablet Take 1 tablet (40 mg) by mouth daily 90 tablet 1     primidone (MYSOLINE) 250 MG tablet TAKE ONE TABLET BY MOUTH AT BEDTIME 30 tablet 0     tamsulosin (FLOMAX) 0.4 MG capsule TAKE TWO CAPSULES BY MOUTH ONCE DAILY (Patient taking differently: 0.4 mg every evening ) 180 capsule 3     triamcinolone (KENALOG) 0.1 % external cream Apply twice daily for rash on shoulders until resolved (Patient not  taking: Reported on 10/1/2021) 454 g 3     VITAMIN D, CHOLECALCIFEROL, PO Take 1,000 Units by mouth daily           Medications                                                                                                                                                  García Munroe MD        Again, thank you for allowing me to participate in the care of your patient.        Sincerely,        García Munroe MD

## 2022-01-19 ENCOUNTER — OFFICE VISIT (OUTPATIENT)
Dept: OTOLARYNGOLOGY | Facility: CLINIC | Age: 87
End: 2022-01-19
Payer: COMMERCIAL

## 2022-01-19 VITALS — OXYGEN SATURATION: 96 % | HEART RATE: 83 BPM | SYSTOLIC BLOOD PRESSURE: 116 MMHG | DIASTOLIC BLOOD PRESSURE: 64 MMHG

## 2022-01-19 DIAGNOSIS — J30.89 CHRONIC NON-SEASONAL ALLERGIC RHINITIS: Primary | ICD-10-CM

## 2022-01-19 PROCEDURE — 99214 OFFICE O/P EST MOD 30 MIN: CPT | Performed by: OTOLARYNGOLOGY

## 2022-01-19 NOTE — LETTER
1/19/2022         RE: Preston Cai  [address]  [place] [address]        Dear Colleague,    Thank you for referring your patient, Preston Cai, to the [facility]. Please see a copy of my visit note below.    Chief Complaint   Patient presents with     Consult     Recheck LPRD having reflux at night when getting up. Not coughing up anything during the day. Hoarsness worse last few months.    HPI    This 86 year old patient who is here for the f/u. He continues to have soft voice. Denies any n/v, choking, coughing, difficulty swallowing, vision issues, weakness or numbness. His BP is always low. He did have similar episode of vertigo more than a decade ago. 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 Describes hoarseness for the past several years and he tried PPIs with some benefit. He has no hx of difficulty swallowing or weight changes.     Review of Systems   Constitutional: Negative.    HENT: Positive for hearing loss and tinnitus. Negative for congestion, ear discharge, ear pain, nosebleeds, sinus pain and sore throat.    Eyes: Negative for blurred vision and double vision.   Respiratory: Negative for cough and hemoptysis.    Gastrointestinal: Negative for heartburn, nausea and vomiting.   Skin: Negative.    Neurological: Positive for dizziness. Negative for tingling, tremors and headaches.   Endo/Heme/Allergies: Negative for environmental allergies. Does not bruise/bleed easily.     Physical Exam   Constitutional: He appears well-developed and well-nourished.   HENT:   Head: Normocephalic and atraumatic.   Right Ear: Tympanic membrane, external ear and ear canal normal. No drainage, swelling or tenderness. No middle ear effusion. Decreased hearing is noted.   Left Ear: Tympanic membrane, external ear and ear canal normal. No drainage, swelling or tenderness.  No middle ear effusion. Decreased hearing is noted.   Nose:  Mucosal edema, rhinorrhea and septal deviation present.   Mouth/Throat: Uvula is midline, oropharynx is clear and moist and mucous membranes are normal.   Eyes: Pupils are equal, round, and reactive to light. EOM are normal.   Neck: Normal range of motion. Neck supple.     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 I will continue with his reflux medication, Pantoprazole 40 mg in the morning before breakfast, Guaifenesin 600 mg. A laryngology and a speech pathology referral have been offered. I will see him in the f/u in 3 months. His questions were answered.          Again, thank you for allowing me to participate in the care of your patient.        Sincerely,        Tosha Ron MD

## 2022-01-19 NOTE — NURSING NOTE
Preston Cai's goals for this visit include:   Chief Complaint   Patient presents with     Consult     Recheck LPRD having reflux at night when getting up. Not coughing up anything during the day. Hoarsness worse last few months.        He requests these members of his care team be copied on today's visit information: yes    PCP: Tyrel Manning    Referring Provider:  No referring provider defined for this encounter.

## 2022-01-19 NOTE — PROGRESS NOTES
Chief Complaint   Patient presents with     Consult     Recheck LPRD having reflux at night when getting up. Not coughing up anything during the day. Hoarsness worse last few months.    HPI    This 86 year old patient who is here for the f/u. He continues to have soft voice. Denies any n/v, choking, coughing, difficulty swallowing, vision issues, weakness or numbness. His BP is always low. He did have similar episode of vertigo more than a decade ago. 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 Describes hoarseness for the past several years and he tried PPIs with some benefit. He has no hx of difficulty swallowing or weight changes.     Review of Systems   Constitutional: Negative.    HENT: Positive for hearing loss and tinnitus. Negative for congestion, ear discharge, ear pain, nosebleeds, sinus pain and sore throat.    Eyes: Negative for blurred vision and double vision.   Respiratory: Negative for cough and hemoptysis.    Gastrointestinal: Negative for heartburn, nausea and vomiting.   Skin: Negative.    Neurological: Positive for dizziness. Negative for tingling, tremors and headaches.   Endo/Heme/Allergies: Negative for environmental allergies. Does not bruise/bleed easily.     Physical Exam   Constitutional: He appears well-developed and well-nourished.   HENT:   Head: Normocephalic and atraumatic.   Right Ear: Tympanic membrane, external ear and ear canal normal. No drainage, swelling or tenderness. No middle ear effusion. Decreased hearing is noted.   Left Ear: Tympanic membrane, external ear and ear canal normal. No drainage, swelling or tenderness.  No middle ear effusion. Decreased hearing is noted.   Nose: Mucosal edema, rhinorrhea and septal deviation present.   Mouth/Throat: Uvula is midline, oropharynx is clear and moist and mucous membranes are normal.   Eyes: Pupils are equal, round, and reactive to light. EOM are normal.   Neck: Normal range of motion. Neck supple.      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 I will continue with his reflux medication, Pantoprazole 40 mg in the morning before breakfast, Guaifenesin 600 mg. A laryngology and a speech pathology referral have been offered. I will see him in the f/u in 3 months. His questions were answered.

## 2022-01-21 RX ORDER — MIRTAZAPINE 7.5 MG/1
7.5 TABLET, FILM COATED ORAL AT BEDTIME
Qty: 30 TABLET | Refills: 2 | Status: SHIPPED | OUTPATIENT
Start: 2022-01-21 | End: 2022-06-08

## 2022-01-25 ENCOUNTER — TELEPHONE (OUTPATIENT)
Dept: GASTROENTEROLOGY | Facility: CLINIC | Age: 87
End: 2022-01-25
Payer: COMMERCIAL

## 2022-01-25 NOTE — TELEPHONE ENCOUNTER
Returned patient's call. Reports feeling better and having bowel movements now. He was feeling constipated and went 4-5 days without a BM. Now feeling like things are more normal. Patient reports speaking to a nurse this AM as well.    Nicol Duran RN, BSN  Gastroenterology Nurse Care Coordinator  Phone: [phone]  Fax: [phone]

## 2022-01-25 NOTE — TELEPHONE ENCOUNTER
----- Message from Nikkie Farley RN sent at 1/25/2022  1:42 PM CST -----  Hello,    Received message on clinic voicemail from patient who reports that he had a colonoscopy on 1/10/22 and has some questions about his bowel movements since then. Patient would like a return call to [phone].    Thank you,    Nikkie Farley RN, BSN

## 2022-02-07 ENCOUNTER — OFFICE VISIT (OUTPATIENT)
Dept: DERMATOLOGY | Facility: CLINIC | Age: 87
End: 2022-02-07
Payer: COMMERCIAL

## 2022-02-07 DIAGNOSIS — L57.0 ACTINIC KERATOSIS: Primary | ICD-10-CM

## 2022-02-07 DIAGNOSIS — L82.0 SEBORRHEIC KERATOSIS, INFLAMED: ICD-10-CM

## 2022-02-07 DIAGNOSIS — D22.9 MULTIPLE BENIGN NEVI: ICD-10-CM

## 2022-02-07 DIAGNOSIS — L81.4 SOLAR LENTIGO: ICD-10-CM

## 2022-02-07 DIAGNOSIS — L82.1 SEBORRHEIC KERATOSIS: ICD-10-CM

## 2022-02-07 DIAGNOSIS — L57.8 SUN-DAMAGED SKIN: ICD-10-CM

## 2022-02-07 DIAGNOSIS — L11.1 GROVER'S DISEASE: ICD-10-CM

## 2022-02-07 DIAGNOSIS — Z85.828 HISTORY OF NONMELANOMA SKIN CANCER: ICD-10-CM

## 2022-02-07 DIAGNOSIS — D18.01 CHERRY ANGIOMA: ICD-10-CM

## 2022-02-07 PROCEDURE — 17000 DESTRUCT PREMALG LESION: CPT | Mod: XS | Performed by: DERMATOLOGY

## 2022-02-07 PROCEDURE — 99213 OFFICE O/P EST LOW 20 MIN: CPT | Mod: 25 | Performed by: DERMATOLOGY

## 2022-02-07 PROCEDURE — 17003 DESTRUCT PREMALG LES 2-14: CPT | Mod: XS | Performed by: DERMATOLOGY

## 2022-02-07 PROCEDURE — 17110 DESTRUCTION B9 LES UP TO 14: CPT | Performed by: DERMATOLOGY

## 2022-02-07 NOTE — PATIENT INSTRUCTIONS
Cryotherapy    What is it?    Use of a very cold liquid, such as liquid nitrogen, to freeze and destroy abnormal skin cells that need to be removed    What should I expect?    Tenderness and redness    A small blister that might grow and fill with dark purple blood. There may be crusting.    More than one treatment may be needed if the lesions do not go away.    How do I care for the treated area?    Gently wash the area with your hands when bathing.    Use a thin layer of Vaseline to help with healing. You may use a Band-Aid.     The area should heal within 7-10 days and may leave behind a pink or lighter color.     Do not use an antibiotic or Neosporin ointment.     You may take acetaminophen (Tylenol) for pain.     Call your doctor if you have:    Severe pain    Signs of infection (warmth, redness, cloudy yellow drainage, and or a bad smell)    Questions or concerns    Who should I call with questions?       [facility]: [phone]       [facility]: [phone]       For urgent needs outside of business hours call the [facility] at [phone] and ask for the dermatology resident on call

## 2022-02-07 NOTE — PROGRESS NOTES
[facility] Dermatology Note  Encounter Date: Feb 7, 2022  Office Visit     Dermatology Problem List:  Last skin check 7/2/22  1. 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 AKs. Liq N2.  - pigmented AK, left jaw, s/p by 11/13/18  - HAK, right hand, s/p excision 5/2009  3. Inflamed SKs  - s/p shave removals and Liq N2.  4. 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   ____________________________________________    Assessment & Plan:    # History of NMSC. No evidence of recurrence.   - Recommend sunscreens SPF #30 or greater, protective clothing and avoidance of tanning beds.   - Recommended yearly skin exams.    # Benign lesions: Multiple benign nevi, solar lentigos, seborrheic keratoses, cherry angiomas. Explained to patient benign nature of lesion. No treatment is necessary at this time unless the lesion changes or becomes symptomatic.   - ABCDs of melanoma were discussed and self skin checks were advised.  - Sun precaution was advised including the use of sun screens of SPF 30 or higher, sun protective clothing, and avoidance of tanning beds.    # Grovers disease. Chronic, stable.  - Continue triamcinolone 0.1% cream PRN. Patient does not need refill today.    # AK x 2. Discussed precancerous nature of these lesions. Recommended treatment to prevent progression to a squamous cell carcinoma. Advised patient to call for follow up if not resolved in 1 month.   - See cryotherapy procedure notes below.    # Inflamed/Irritated seborrheic keratosis. Based on the location and chronic irritation to this lesion, treatment with cryotherapy is warranted.  - See cryotherapy procedure notes below.    Procedures Performed:   - Cryotherapy procedure note, locations: right temple and nasal bridge. After verbal consent and discussion of risks and benefits including, but not limited to,  dyspigmentation/scar, blister, and pain, 2 AKs were treated with 1-2 mm freeze border for 1-2 cycles with liquid nitrogen. Post cryotherapy instructions were provided.    - Cryotherapy procedure note, locations: right upper arm, right clavicle, and bilateral back. After verbal consent and discussion of risks and benefits including, but not limited to, dyspigmentation/scar, blister, and pain, 9 iSKs were treated with 1-2 mm freeze border for 1-2 cycles with liquid nitrogen. Post cryotherapy instructions were provided.    Follow-up: 1 year in-person for skin check, or earlier for new or changing lesions.    Staff and Scribe:     Scribe Disclosure:   I, Justine Temple, am serving as a scribe to document services personally performed by this physician, Dr. Andry Oseguera, based on data collection and the provider's statements to me.    Provider Disclosure:   The documentation recorded by the scribe accurately reflects the services I personally performed and the decisions made by me.    Andry Oseguera MD    Department of Dermatology  [facility]: Phone: [phone], Fax:[phone]  [facility]: Phone: [phone] Fax: [phone]  ____________________________________________    CC: Skin Check (Full body skin check. Itchy spots on upper back, inner right arm. Personal history of NMSC. No known family history of skin cancer.)    HPI:  Mr. Preston Cai is a(n) 86 year old male who presents today as a return patient for skin check.    Last skin check was 7/12/21. A biopsy determined an SCCIS on the right helix. Cryotherapy was performed on 4 inflamed SKs (face and back). Plan was to continue triamcinolone cream as needed for Grovers.    Seen on 7/22/21 by Dr. Albrecht. Mohs was performed on the SCCIS site.    Seen again on 8/5/21 by Dr. Albrecht. The surgical site was healing well.    Today, Preston  notes the followin. Itchy spots on the back.  2. Itchy spot on the right inner arm. Applies triamcinolone.  3. Recalls other health issues. Currently losing weight. Is up to date on colonoscopy, endoscopy, and CT scan. Is currently having medications changed in hopes of an increase in appetitive.  4. Does not go outdoors often. Does wear sunscreen when fishes.  5. Worked outside last summer. Tries to stay in the shade.  6. Crusty spot on the right temple. Uses the triamcinolone.  7. Possible skin tag. Not bothersome.  8. Wondering about a lesion on the foot.    The patient otherwise denies any new or concerning lesions. No bleeding, painful, pruritic, or changing lesions. Health otherwise stable. No other skin concerns.    Labs Reviewed:  N/A    Physical Exam:  Vitals: There were no vitals taken for this visit.  SKIN: Full skin, which includes the head/face, both arms, chest, back, abdomen, both legs, genitalia and/or groin, buttocks, digits and/or nails, was examined.  - Brown macules on sun exposed areas  - Brown waxy, stuck-on appearing papules on face, trunk, and extremities.   - Brown macules and papules on trunk and extremities without concerning dermoscopy features  - Red vascular papules on face, trunk and extremities.   - Well healed scars at prior skin cancer surgical sites without evidence of recurrence.   - Pink gritty papules on the right temple x 1 and nasal bridge x 1.  - There are tan to brown waxy stuck on papules with surrounding erythema on the right upper arm, right clavicle, and bilateral back x 9.  - No other lesions of concern on areas examined.     Medications:  Current Outpatient Medications   Medication     budesonide (RINOCORT AQUA) 32 MCG/ACT nasal spray     gabapentin (NEURONTIN) 100 MG capsule     latanoprost (XALATAN) 0.005 % ophthalmic solution     mirtazapine (REMERON) 7.5 MG tablet     pantoprazole (PROTONIX) 40 MG EC tablet     primidone (MYSOLINE) 250 MG tablet      tamsulosin (FLOMAX) 0.4 MG capsule     Vitamin D, Cholecalciferol, 25 MCG (1000 UT) CAPS     No current facility-administered medications for this visit.      Past Medical History:   Patient Active Problem List   Diagnosis     Seborrheic dermatitis     Allergic rhinitis due to other allergen     Cervical radiculopathy at C6     Personal history of malignant neoplasm of larynx     Essential tremor     CARDIOVASCULAR SCREENING; LDL GOAL LESS THAN 160     Benign prostatic hyperplasia     Hoarse voice quality     Advanced directives, counseling/discussion     Pseudophakia     Gastroesophageal reflux disease, esophagitis presence not specified     Macular degeneration     Supraspinatus sprain, right, initial encounter     CARDIOVASCULAR SCREENING; LDL GOAL LESS THAN 100     Vitamin D deficiency     Chronic midline low back pain without sciatica     Sleep disorder, nonorganic     Other gastritis without hemorrhage, unspecified chronicity     BPH associated with nocturia     Weight loss     Past Medical History:   Diagnosis Date     Acid reflux disease      Allergic rhinitis, cause unspecified      H/O magnetic resonance imaging of brain and brain stem 10/10/2016    MRI BRAIN WITH AND WITHOUT CONTRAST August 17, 2009 8:07:00 PM   HISTORY: Severe dizzy spells with imbalance and vomiting.   TECHNIQUE: Routine pulse sequences without and with contrast were performed including thin section images through the IACs. 20 mL Magnevist given.   FINDINGS: Diffusion-weighted images are normal. The brain parenchyma, brainstem, ventricular system, and subarachnoid spaces a     Hematuria     Hematuria  - in 1990's      History of anemia 2002    Anemia-Iron Deficit     History of gastric ulcer      Macular degeneration      Malignant neoplasm of laryngeal cartilages (H)     Laryngeal CA (Radiation 1982)     Nonsenile cataract      PONV (postoperative nausea and vomiting)      Tremor 10/3/2016

## 2022-02-07 NOTE — LETTER
2/7/2022         RE: Preston Cai  [address]  [facility] [address]        Dear Colleague,    Thank you for referring your patient, Preston Cai, to the [facility]. Please see a copy of my visit note below.    [facility] Dermatology Note  Encounter Date: Feb 7, 2022  Office Visit     Dermatology Problem List:  Last skin check 7/2/22  1. 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 AKs. Liq N2.  - pigmented AK, left jaw, s/p by 11/13/18  - HAK, right hand, s/p excision 5/2009  3. Inflamed SKs  - s/p shave removals and Liq N2.  4. 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   ____________________________________________    Assessment & Plan:    # History of NMSC. No evidence of recurrence.   - Recommend sunscreens SPF #30 or greater, protective clothing and avoidance of tanning beds.   - Recommended yearly skin exams.    # Benign lesions: Multiple benign nevi, solar lentigos, seborrheic keratoses, cherry angiomas. Explained to patient benign nature of lesion. No treatment is necessary at this time unless the lesion changes or becomes symptomatic.   - ABCDs of melanoma were discussed and self skin checks were advised.  - Sun precaution was advised including the use of sun screens of SPF 30 or higher, sun protective clothing, and avoidance of tanning beds.    # Grovers disease. Chronic, stable.  - Continue triamcinolone 0.1% cream PRN. Patient does not need refill today.    # AK x 2. Discussed precancerous nature of these lesions. Recommended treatment to prevent progression to a squamous cell carcinoma. Advised patient to call for follow up if not resolved in 1 month.   - See cryotherapy procedure notes below.    # Inflamed/Irritated seborrheic keratosis. Based on the location and chronic irritation to this lesion, treatment with  cryotherapy is warranted.  - See cryotherapy procedure notes below.    Procedures Performed:   - Cryotherapy procedure note, locations: right temple and nasal bridge. After verbal consent and discussion of risks and benefits including, but not limited to, dyspigmentation/scar, blister, and pain, 2 AKs were treated with 1-2 mm freeze border for 1-2 cycles with liquid nitrogen. Post cryotherapy instructions were provided.    - Cryotherapy procedure note, locations: right upper arm, right clavicle, and bilateral back. After verbal consent and discussion of risks and benefits including, but not limited to, dyspigmentation/scar, blister, and pain, 9 iSKs were treated with 1-2 mm freeze border for 1-2 cycles with liquid nitrogen. Post cryotherapy instructions were provided.    Follow-up: 1 year in-person for skin check, or earlier for new or changing lesions.    Staff and Scribe:     Scribe Disclosure:   I, Justine Temple, am serving as a scribe to document services personally performed by this physician, Dr. Andry Oseguera, based on data collection and the provider's statements to me.    Provider Disclosure:   The documentation recorded by the scribe accurately reflects the services I personally performed and the decisions made by me.    Andry Oseguera MD    Department of Dermatology  [facility]: Phone: [phone], Fax:[phone]  [facility]: Phone: [phone] Fax: [phone]  ____________________________________________    CC: Skin Check (Full body skin check. Itchy spots on upper back, inner right arm. Personal history of NMSC. No known family history of skin cancer.)    HPI:  Mr. Preston Cai is a(n) 86 year old male who presents today as a return patient for skin check.    Last skin check was 7/12/21. A biopsy determined an SCCIS on the right helix. Cryotherapy was performed on 4  inflamed SKs (face and back). Plan was to continue triamcinolone cream as needed for Grovers.    Seen on 21 by Dr. Albrecht. Mohs was performed on the SCCIS site.    Seen again on 21 by Dr. Albrecht. The surgical site was healing well.    Today, Preston notes the followin. Itchy spots on the back.  2. Itchy spot on the right inner arm. Applies triamcinolone.  3. Recalls other health issues. Currently losing weight. Is up to date on colonoscopy, endoscopy, and CT scan. Is currently having medications changed in hopes of an increase in appetitive.  4. Does not go outdoors often. Does wear sunscreen when fishes.  5. Worked outside last summer. Tries to stay in the shade.  6. Crusty spot on the right temple. Uses the triamcinolone.  7. Possible skin tag. Not bothersome.  8. Wondering about a lesion on the foot.    The patient otherwise denies any new or concerning lesions. No bleeding, painful, pruritic, or changing lesions. Health otherwise stable. No other skin concerns.    Labs Reviewed:  N/A    Physical Exam:  Vitals: There were no vitals taken for this visit.  SKIN: Full skin, which includes the head/face, both arms, chest, back, abdomen, both legs, genitalia and/or groin, buttocks, digits and/or nails, was examined.  - Brown macules on sun exposed areas  - Brown waxy, stuck-on appearing papules on face, trunk, and extremities.   - Brown macules and papules on trunk and extremities without concerning dermoscopy features  - Red vascular papules on face, trunk and extremities.   - Well healed scars at prior skin cancer surgical sites without evidence of recurrence.   - Pink gritty papules on the right temple x 1 and nasal bridge x 1.  - There are tan to brown waxy stuck on papules with surrounding erythema on the right upper arm, right clavicle, and bilateral back x 9.  - No other lesions of concern on areas examined.     Medications:  Current Outpatient Medications   Medication     budesonide (RINOCORT  AQUA) 32 MCG/ACT nasal spray     gabapentin (NEURONTIN) 100 MG capsule     latanoprost (XALATAN) 0.005 % ophthalmic solution     mirtazapine (REMERON) 7.5 MG tablet     pantoprazole (PROTONIX) 40 MG EC tablet     primidone (MYSOLINE) 250 MG tablet     tamsulosin (FLOMAX) 0.4 MG capsule     Vitamin D, Cholecalciferol, 25 MCG (1000 UT) CAPS     No current facility-administered medications for this visit.      Past Medical History:   Patient Active Problem List   Diagnosis     Seborrheic dermatitis     Allergic rhinitis due to other allergen     Cervical radiculopathy at C6     Personal history of malignant neoplasm of larynx     Essential tremor     CARDIOVASCULAR SCREENING; LDL GOAL LESS THAN 160     Benign prostatic hyperplasia     Hoarse voice quality     Advanced directives, counseling/discussion     Pseudophakia     Gastroesophageal reflux disease, esophagitis presence not specified     Macular degeneration     Supraspinatus sprain, right, initial encounter     CARDIOVASCULAR SCREENING; LDL GOAL LESS THAN 100     Vitamin D deficiency     Chronic midline low back pain without sciatica     Sleep disorder, nonorganic     Other gastritis without hemorrhage, unspecified chronicity     BPH associated with nocturia     Weight loss     Past Medical History:   Diagnosis Date     Acid reflux disease      Allergic rhinitis, cause unspecified      H/O magnetic resonance imaging of brain and brain stem 10/10/2016    MRI BRAIN WITH AND WITHOUT CONTRAST August 17, 2009 8:07:00 PM   HISTORY: Severe dizzy spells with imbalance and vomiting.   TECHNIQUE: Routine pulse sequences without and with contrast were performed including thin section images through the IACs. 20 mL Magnevist given.   FINDINGS: Diffusion-weighted images are normal. The brain parenchyma, brainstem, ventricular system, and subarachnoid spaces a     Hematuria     Hematuria  - in 1990's      History of anemia 2002    Anemia-Iron Deficit     History of gastric  ulcer      Macular degeneration      Malignant neoplasm of laryngeal cartilages (H)     Laryngeal CA (Radiation 1982)     Nonsenile cataract      PONV (postoperative nausea and vomiting)      Tremor 10/3/2016           Again, thank you for allowing me to participate in the care of your patient.        Sincerely,        Andry Oseguera MD

## 2022-02-07 NOTE — NURSING NOTE
Preston Cai's goals for this visit include:   Chief Complaint   Patient presents with     Skin Check     Full body skin check. Itchy spots on upper back, inner right arm. Personal history of NMSC. No known family history of skin cancer.       He requests these members of his care team be copied on today's visit information:     PCP: Tyrel Manning    Referring Provider:  No referring provider defined for this encounter.    There were no vitals taken for this visit.    Do you need any medication refills at today's visit? Heather Jeong CMA

## 2022-02-14 ENCOUNTER — MYC MEDICAL ADVICE (OUTPATIENT)
Dept: FAMILY MEDICINE | Facility: CLINIC | Age: 87
End: 2022-02-14

## 2022-02-14 NOTE — TELEPHONE ENCOUNTER
Pt is updating provider on medication.      Routing to provider to review and advise.      Tamar Barrett RN  [facility]

## 2022-02-16 ENCOUNTER — TRANSFERRED RECORDS (OUTPATIENT)
Dept: HEALTH INFORMATION MANAGEMENT | Facility: CLINIC | Age: 87
End: 2022-02-16
Payer: COMMERCIAL

## 2022-02-21 ENCOUNTER — MYC MEDICAL ADVICE (OUTPATIENT)
Dept: FAMILY MEDICINE | Facility: CLINIC | Age: 87
End: 2022-02-21

## 2022-02-21 NOTE — TELEPHONE ENCOUNTER
See [facility] message below.   Routing to provider to review and advise.     Angélica Gan RN  [facility]

## 2022-05-13 DIAGNOSIS — R35.1 BENIGN PROSTATIC HYPERPLASIA WITH NOCTURIA: ICD-10-CM

## 2022-05-13 DIAGNOSIS — N40.1 BENIGN PROSTATIC HYPERPLASIA WITH NOCTURIA: ICD-10-CM

## 2022-05-16 DIAGNOSIS — K21.9 GASTROESOPHAGEAL REFLUX DISEASE WITHOUT ESOPHAGITIS: ICD-10-CM

## 2022-05-16 NOTE — TELEPHONE ENCOUNTER
Pantoprazole    Last Written Prescription Date:  9-22-21  Last Fill Quantity: 90,  # refills: 1   Last office visit: 1/19/2022 with prescribing provider:  Tosha Ron     Future Office Visit:   Next 5 appointments (look out 90 days)    Jun 01, 2022  9:00 AM  (Arrive by 8:40 AM)  Welcome to Medicare Visit with Tyrel Manning MD  [facility] ([facility] ) [address] [facility] [id number] [phone]   Jun 24, 2022  1:30 PM  Return Visit with Tosha Ron MD  [facility] ([facility]) [address] [place] [phone]

## 2022-05-17 RX ORDER — TAMSULOSIN HYDROCHLORIDE 0.4 MG/1
CAPSULE ORAL
Qty: 180 CAPSULE | Refills: 2 | Status: SHIPPED | OUTPATIENT
Start: 2022-05-17 | End: 2023-03-06

## 2022-05-17 RX ORDER — PANTOPRAZOLE SODIUM 40 MG/1
40 TABLET, DELAYED RELEASE ORAL DAILY
Qty: 90 TABLET | Refills: 1 | Status: SHIPPED | OUTPATIENT
Start: 2022-05-17 | End: 2022-12-09

## 2022-05-17 NOTE — TELEPHONE ENCOUNTER
Prescription approved per [facility] Refill Protocol.    Delroy Boston RN BSN  [facility]

## 2022-06-08 ENCOUNTER — OFFICE VISIT (OUTPATIENT)
Dept: FAMILY MEDICINE | Facility: CLINIC | Age: 87
End: 2022-06-08
Payer: COMMERCIAL

## 2022-06-08 VITALS
HEART RATE: 76 BPM | WEIGHT: 157.8 LBS | OXYGEN SATURATION: 97 % | TEMPERATURE: 98.2 F | DIASTOLIC BLOOD PRESSURE: 70 MMHG | RESPIRATION RATE: 18 BRPM | HEIGHT: 73 IN | BODY MASS INDEX: 20.91 KG/M2 | SYSTOLIC BLOOD PRESSURE: 94 MMHG

## 2022-06-08 DIAGNOSIS — M19.011 ARTHRITIS OF RIGHT SHOULDER REGION: ICD-10-CM

## 2022-06-08 DIAGNOSIS — Z00.00 ENCOUNTER FOR MEDICARE ANNUAL WELLNESS EXAM: Primary | ICD-10-CM

## 2022-06-08 DIAGNOSIS — Z13.6 CARDIOVASCULAR SCREENING; LDL GOAL LESS THAN 100: ICD-10-CM

## 2022-06-08 DIAGNOSIS — R35.1 BENIGN PROSTATIC HYPERPLASIA WITH NOCTURIA: ICD-10-CM

## 2022-06-08 DIAGNOSIS — K21.9 HIATAL HERNIA WITH GERD: ICD-10-CM

## 2022-06-08 DIAGNOSIS — N40.1 BENIGN PROSTATIC HYPERPLASIA WITH NOCTURIA: ICD-10-CM

## 2022-06-08 DIAGNOSIS — R63.4 WEIGHT LOSS: ICD-10-CM

## 2022-06-08 DIAGNOSIS — K44.9 HIATAL HERNIA WITH GERD: ICD-10-CM

## 2022-06-08 DIAGNOSIS — D51.0 PERNICIOUS ANEMIA: ICD-10-CM

## 2022-06-08 LAB
ALBUMIN SERPL-MCNC: 4 G/DL (ref 3.4–5)
ALP SERPL-CCNC: 80 U/L (ref 40–150)
ALT SERPL W P-5'-P-CCNC: 25 U/L (ref 0–70)
ANION GAP SERPL CALCULATED.3IONS-SCNC: 4 MMOL/L (ref 3–14)
AST SERPL W P-5'-P-CCNC: 21 U/L (ref 0–45)
BASOPHILS # BLD AUTO: 0 10E3/UL (ref 0–0.2)
BASOPHILS NFR BLD AUTO: 0 %
BILIRUB SERPL-MCNC: 0.4 MG/DL (ref 0.2–1.3)
BUN SERPL-MCNC: 17 MG/DL (ref 7–30)
CALCIUM SERPL-MCNC: 9.6 MG/DL (ref 8.5–10.1)
CHLORIDE BLD-SCNC: 102 MMOL/L (ref 94–109)
CHOLEST SERPL-MCNC: 182 MG/DL
CO2 SERPL-SCNC: 32 MMOL/L (ref 20–32)
CREAT SERPL-MCNC: 0.95 MG/DL (ref 0.66–1.25)
EOSINOPHIL # BLD AUTO: 0.1 10E3/UL (ref 0–0.7)
EOSINOPHIL NFR BLD AUTO: 1 %
ERYTHROCYTE [DISTWIDTH] IN BLOOD BY AUTOMATED COUNT: 12.8 % (ref 10–15)
FASTING STATUS PATIENT QL REPORTED: YES
GFR SERPL CREATININE-BSD FRML MDRD: 78 ML/MIN/1.73M2
GLUCOSE BLD-MCNC: 100 MG/DL (ref 70–99)
HCT VFR BLD AUTO: 36 % (ref 40–53)
HDLC SERPL-MCNC: 87 MG/DL
HGB BLD-MCNC: 11.8 G/DL (ref 13.3–17.7)
IMM GRANULOCYTES # BLD: 0 10E3/UL
IMM GRANULOCYTES NFR BLD: 0 %
LDLC SERPL CALC-MCNC: 81 MG/DL
LYMPHOCYTES # BLD AUTO: 1.2 10E3/UL (ref 0.8–5.3)
LYMPHOCYTES NFR BLD AUTO: 28 %
MCH RBC QN AUTO: 32.4 PG (ref 26.5–33)
MCHC RBC AUTO-ENTMCNC: 32.8 G/DL (ref 31.5–36.5)
MCV RBC AUTO: 99 FL (ref 78–100)
MONOCYTES # BLD AUTO: 0.5 10E3/UL (ref 0–1.3)
MONOCYTES NFR BLD AUTO: 10 %
NEUTROPHILS # BLD AUTO: 2.7 10E3/UL (ref 1.6–8.3)
NEUTROPHILS NFR BLD AUTO: 61 %
NONHDLC SERPL-MCNC: 95 MG/DL
PLATELET # BLD AUTO: 178 10E3/UL (ref 150–450)
POTASSIUM BLD-SCNC: 4.3 MMOL/L (ref 3.4–5.3)
PROT SERPL-MCNC: 7.2 G/DL (ref 6.8–8.8)
RBC # BLD AUTO: 3.64 10E6/UL (ref 4.4–5.9)
SODIUM SERPL-SCNC: 138 MMOL/L (ref 133–144)
TRIGL SERPL-MCNC: 71 MG/DL
WBC # BLD AUTO: 4.5 10E3/UL (ref 4–11)

## 2022-06-08 PROCEDURE — 85025 COMPLETE CBC W/AUTO DIFF WBC: CPT | Performed by: INTERNAL MEDICINE

## 2022-06-08 PROCEDURE — 99214 OFFICE O/P EST MOD 30 MIN: CPT | Mod: 25 | Performed by: INTERNAL MEDICINE

## 2022-06-08 PROCEDURE — 36415 COLL VENOUS BLD VENIPUNCTURE: CPT | Performed by: INTERNAL MEDICINE

## 2022-06-08 PROCEDURE — 83921 ORGANIC ACID SINGLE QUANT: CPT | Performed by: INTERNAL MEDICINE

## 2022-06-08 PROCEDURE — 99397 PER PM REEVAL EST PAT 65+ YR: CPT | Performed by: INTERNAL MEDICINE

## 2022-06-08 PROCEDURE — 80053 COMPREHEN METABOLIC PANEL: CPT | Performed by: INTERNAL MEDICINE

## 2022-06-08 PROCEDURE — 80061 LIPID PANEL: CPT | Performed by: INTERNAL MEDICINE

## 2022-06-08 RX ORDER — MIRTAZAPINE 7.5 MG/1
7.5 TABLET, FILM COATED ORAL AT BEDTIME
Qty: 90 TABLET | Refills: 3 | Status: SHIPPED | OUTPATIENT
Start: 2022-06-08 | End: 2022-07-26

## 2022-06-08 RX ORDER — FAMOTIDINE 20 MG/1
20 TABLET, FILM COATED ORAL
Qty: 90 TABLET | Refills: 3 | Status: SHIPPED | OUTPATIENT
Start: 2022-06-08 | End: 2022-08-10

## 2022-06-08 ASSESSMENT — ENCOUNTER SYMPTOMS
EYE PAIN: 0
SORE THROAT: 1
DIZZINESS: 0
DYSURIA: 0
WEAKNESS: 0
FEVER: 0
SHORTNESS OF BREATH: 0
MYALGIAS: 0
JOINT SWELLING: 1
CHILLS: 0
PARESTHESIAS: 0
FREQUENCY: 1
PALPITATIONS: 0
ABDOMINAL PAIN: 0
DYSURIA: 1
ARTHRALGIAS: 1
HEMATOCHEZIA: 0
CONSTIPATION: 0
HEARTBURN: 0
NAUSEA: 0
NERVOUS/ANXIOUS: 0
COUGH: 1
HEADACHES: 0
DIARRHEA: 0
HEMATURIA: 0
COUGH: 0

## 2022-06-08 ASSESSMENT — PAIN SCALES - GENERAL: PAINLEVEL: SEVERE PAIN (6)

## 2022-06-08 ASSESSMENT — ACTIVITIES OF DAILY LIVING (ADL): CURRENT_FUNCTION: NO ASSISTANCE NEEDED

## 2022-06-08 NOTE — PROGRESS NOTES
"SUBJECTIVE:   Preston Cai is a 86 year old male who presents for Preventive Visit.    Patient has been advised of split billing requirements and indicates understanding: Yes  Are you in the first 12 months of your Medicare coverage?  No    Healthy Habits:     In general, how would you rate your overall health?  Good    Frequency of exercise:  None    Do you usually eat at least 4 servings of fruit and vegetables a day, include whole grains    & fiber and avoid regularly eating high fat or \"junk\" foods?  Yes    Taking medications regularly:  Yes    Barriers to taking medications:  None    Ability to successfully perform activities of daily living:  No assistance needed    Home Safety:  No safety concerns identified    Hearing Impairment:  No hearing concerns    In the past 6 months, have you been bothered by leaking of urine? Yes    In general, how would you rate your overall mental or emotional health?  Good      PHQ-2 Total Score: 0    Additional concerns today:  Yes  Imm/Inj  Associated symptoms include arthralgias, joint swelling and a sore throat. Pertinent negatives include no abdominal pain, chest pain, chills, congestion, coughing, fever, headaches, myalgias, nausea, rash or weakness.     Do you feel safe in your environment? Yes    Have you ever done Advance Care Planning? (For example, a Health Directive, POLST, or a discussion with a medical provider or your loved ones about your wishes): No, advance care planning information given to patient to review.  Patient declined advance care planning discussion at this time.       Fall risk  Fallen 2 or more times in the past year?: No  Any fall with injury in the past year?: No  click delete button to remove this line now  Cognitive Screening   1) Repeat 3 items (Leader, Season, Table)    2) Clock draw: NORMAL  3) 3 item recall: Recalls 3 objects  Results: NORMAL clock, 1-2 items recalled: COGNITIVE IMPAIRMENT LESS LIKELY    Chronic conditions    1) BPH " "    -self-adjusted Tamsulosin to two capsules per day due to \"increased pressure in the bladder\".    2) Fatigue    -secondary to low normal blood pressure and pernicious anemia  -unable to gain weight despite good appetite (started on Remeron).    3) Chronic neuropathy    -worse on the left foot, affecting L4 dermatomal level  -both feet feels hot when lying down    4) Low back pain    -MRI of lumbar spine last 9/16/2020 shows severe spinal stenosis at L4-L5.    5) Pernicious anemia    -positive parietal cell antibodies    6) GERD    -chronic hoarseness sometimes better when coughing, yet overall condition not improving.    7) Right shoulder arthritis    -worse when lying at a certain position at night, lots of \"crunching\" but no limited range of motion      Acute condition    1) Fall    -happened 1 1/2 weeks ago after falling on left hip    2) Swelling of both feet    -contemplating on using stockings.            Mini-CogTM Copyright NEENA Jimenez. Licensed by the author for use in [facility]; reprinted with permission (willian@[facility]). All rights reserved.      Do you have sleep apnea, excessive snoring or daytime drowsiness?: no    Reviewed and updated as needed this visit by clinical staff    Allergies                 Reviewed and updated as needed this visit by Provider                   Social History     Tobacco Use     Smoking status: Former Smoker     Smokeless tobacco: Never Used     Tobacco comment: 1968   Substance Use Topics     Alcohol use: Yes     Alcohol/week: 0.0 standard drinks     Comment: A beer once in awhile     If you drink alcohol do you typically have >3 drinks per day or >7 drinks per week? No    Alcohol Use 6/8/2022   Prescreen: >3 drinks/day or >7 drinks/week? No   Prescreen: >3 drinks/day or >7 drinks/week? -   No flowsheet data found.        Current providers sharing in care for this patient include:   Patient Care Team:  Tyrel Manning MD as PCP - General (Internal " Medicine)  Tyrel Manning MD as Assigned PCP  Mau Sage MD as Assigned Musculoskeletal Provider  García Munroe MD as Assigned Neuroscience Provider  Andry Oseguera MD as Assigned Surgical Provider    The following health maintenance items are reviewed in Epic and correct as of today:  Health Maintenance Due   Topic Date Due     COVID-19 Vaccine (4 - Booster for Moderna series) 03/02/2022     DTAP/TDAP/TD IMMUNIZATION (2 - Td or Tdap) 03/07/2022     ANNUAL REVIEW OF HM ORDERS  05/05/2022     FALL RISK ASSESSMENT  05/05/2022     Labs reviewed in EPIC  BP Readings from Last 3 Encounters:   06/08/22 94/70   01/19/22 116/64   01/17/22 95/59    Wt Readings from Last 3 Encounters:   06/08/22 71.6 kg (157 lb 12.8 oz)   01/17/22 69.6 kg (153 lb 8 oz)   01/04/22 69.7 kg (153 lb 9.6 oz)                  Patient Active Problem List   Diagnosis     Seborrheic dermatitis     Allergic rhinitis due to other allergen     Cervical radiculopathy at C6     Personal history of malignant neoplasm of larynx     Essential tremor     CARDIOVASCULAR SCREENING; LDL GOAL LESS THAN 160     Benign prostatic hyperplasia     Hoarse voice quality     Advanced directives, counseling/discussion     Pseudophakia     Hiatal hernia with GERD     Macular degeneration     Supraspinatus sprain, right, initial encounter     CARDIOVASCULAR SCREENING; LDL GOAL LESS THAN 100     Vitamin D deficiency     Chronic midline low back pain without sciatica     Sleep disorder, nonorganic     Other gastritis without hemorrhage, unspecified chronicity     BPH associated with nocturia     Weight loss     Pernicious anemia     Arthritis of right shoulder region     Past Surgical History:   Procedure Laterality Date     APPENDECTOMY  8 years old     Biopsy of the throat - cancerous mass - got radiation for in larynx  1982     CATARACT IOL, RT/LT       COLONOSCOPY  2/25/2004    Normal     COLONOSCOPY N/A 4/28/2015    Procedure: COLONOSCOPY;   Surgeon: Marvin Adams MD;  Location: [facility]     COLONOSCOPY N/A 1/10/2022    Procedure: COLONOSCOPY, WITH POLYPECTOMY AND BIOPSY;  Surgeon: Marsha Asher MD;  Location: [facility]     COLONOSCOPY N/A 1/10/2022    Procedure: COLONOSCOPY, FLEXIBLE, WITH LESION REMOVAL USING SNARE;  Surgeon: Marsha Asher MD;  Location: [facility]     COLONOSCOPY WITH CO2 INSUFFLATION N/A 4/28/2015    Procedure: COLONOSCOPY WITH CO2 INSUFFLATION;  Surgeon: Marvin Adams MD;  Location: [facility]     COLONOSCOPY WITH CO2 INSUFFLATION N/A 1/10/2022    Procedure: COLONOSCOPY, WITH CO2 INSUFFLATION;  Surgeon: Marsha Asher MD;  Location: [facility]     COMBINED ESOPHAGOSCOPY, GASTROSCOPY, DUODENOSCOPY (EGD) WITH CO2 INSUFFLATION N/A 1/10/2022    Procedure: ESOPHAGOGASTRODUODENOSCOPY, WITH CO2 INSUFFLATION;  Surgeon: Marsha Asher MD;  Location: [facility]     CYSTOSCOPY  1997    for hematuria - negative     ESOPHAGOSCOPY, GASTROSCOPY, DUODENOSCOPY (EGD), COMBINED N/A 1/10/2022    Procedure: ESOPHAGOGASTRODUODENOSCOPY, WITH BIOPSY;  Surgeon: Marsha Asher MD;  Location: [facility]     EYE SURGERY Right     Rt eye.macular repair     EYE SURGERY  2003    cataract     NASAL ENDOSCOPY,DX  4/2007    GERD     Pars plana vitrectomy and epiretinal membrane dissection, right eye  11/8/2002     Phacoemulsification with intraocular lens implantation right eye.  3/11/2003     PHACOEMULSIFICATION WITH STANDARD INTRAOCULAR LENS IMPLANT Left 9/22/2016    Procedure: PHACOEMULSIFICATION WITH STANDARD INTRAOCULAR LENS IMPLANT;  Surgeon: Nghia Lara MD;  Location: [facility]     Thyroglossal Duct Cyst Removal - 2ndary to radiation.  1982     VASECTOMY  1971     ZZ GASTROSCOPY,FL  9/2007    hiatal hernia and errosions       Social History     Tobacco Use     Smoking status: Former Smoker     Smokeless tobacco: Never Used     Tobacco comment: 1969   Substance Use Topics     Alcohol use: Yes     Alcohol/week: 0.0 standard drinks      "Comment: A beer once in awhile     Family History   Problem Relation Age of Onset     Cerebrovascular Disease Mother         at 86 yo - possibly TIA - befoer     Macular Degeneration Mother      Gastrointestinal Disease Father      Cancer - colorectal Father      Hypertension Father      Cancer Father      Cancer Other      C.A.D. No family hx of      Diabetes No family hx of      Prostate Cancer No family hx of      Glaucoma No family hx of      Thyroid Disease No family hx of          Allergies   Allergen Reactions     Seasonal Allergies      Hay fever      Recent Labs   Lab Test 06/08/22  0904 01/04/22  1725 12/17/21  0930 05/05/21  1209 03/13/20  1016   LDL 81  --   --  131* 104*   HDL 87  --   --  77 75   TRIG 71  --   --  93 100   ALT 25  --  21 26 20   CR 0.95  --  0.98 1.04 0.97   GFRESTIMATED 78  --  71 65 72   GFRESTBLACK  --   --   --  76 83   POTASSIUM 4.3  --  4.5 4.3 4.4   TSH  --  3.12  --  2.90 3.20        Review of Systems   Constitutional: Negative for chills and fever.   HENT: Positive for sore throat. Negative for congestion, ear pain and hearing loss.    Eyes: Negative for pain and visual disturbance.   Respiratory: Negative for cough and shortness of breath.    Cardiovascular: Negative for chest pain and palpitations.   Gastrointestinal: Negative for abdominal pain, constipation, diarrhea, heartburn, hematochezia and nausea.   Genitourinary: Positive for frequency, impotence and urgency. Negative for dysuria, genital sores, hematuria and penile discharge.   Musculoskeletal: Positive for arthralgias and joint swelling. Negative for myalgias.   Skin: Negative for rash.   Neurological: Negative for dizziness, weakness, headaches and paresthesias.   Psychiatric/Behavioral: Negative for mood changes. The patient is not nervous/anxious.        OBJECTIVE:   BP 94/70   Pulse 76   Temp 98.2  F (36.8  C) (Tympanic)   Resp 18   Ht 1.854 m (6' 1\")   Wt 71.6 kg (157 lb 12.8 oz)   SpO2 97%   BMI 20.82 " "kg/m     Estimated body mass index is 20.25 kg/m  as calculated from the following:    Height as of 8/11/21: 1.854 m (6' 1\").    Weight as of 1/17/22: 69.6 kg (153 lb 8 oz).  Physical Exam  GENERAL: healthy, alert and no distress  EYES: Eyes grossly normal to inspection  HENT: normal cephalic/atraumatic  RESP: lungs clear to auscultation - no rales, rhonchi or wheezes  CV: regular rate and rhythm, normal S1 S2, no S3 or S4, no murmur, click or rub, no peripheral edema and peripheral pulses strong  ABDOMEN: soft, nontender, no hepatosplenomegaly, no masses and bowel sounds normal  MS: no gross musculoskeletal defects noted, no edema  NEURO: Normal strength and tone, mentation intact and speech normal  PSYCH: mentation appears normal, affect normal/bright        Diagnostic Test Results:  [facility] LUMBAR SPINE W/O CONTRAST 9/16/2020 3:27 PM     Provided History: Abn x-ray, L/S-spine, bone destruction; DDD  (degenerative disc disease), lumbar     ICD-10: DDD (degenerative disc disease), lumbar     Comparison: Lumbar spine radiographs 7/13/2020 CT abdomen and pelvis  7/3/2017     Technique: Sagittal T1-weighted, sagittal STIR, 3D volumetric axial  and sagittal reconstructed T2-weighted images of the lumbar spine were  obtained without intravenous contrast.      Findings:   There are 5 lumbar-type vertebrae assumed for the purposes of this  dictation.  The vertebral body heights are maintained without evidence  of fracture. There is minimal anterolisthesis of L4 on L5. No evidence  of marrow infiltrative process. Mild multilevel disc desiccation  narrowing is identified.     The conus medullaris is normal and terminates at L1. No abnormal cord  signal. There is redundant appearance to the cauda equina above the  L4-5 level.     On a level by level basis:     T12-L1: Minimal disc bulge without significant spinal canal or neural  foraminal stenosis.     L1-2: Posterior epidural lipomatosis contributes to mild thecal " sac  narrowing. There is bilateral facet arthropathy. No significant neural  foraminal stenosis.     L2-3: Mild disc bulge coupled with posterior epidural lipomatosis and  ligament flavum hypertrophy results in mild spinal canal stenosis.  Mild left and mild-to-moderate right neural foraminal stenosis  secondary to disc bulge and facet arthropathy.     L3-4: Moderate disc bulge coupled with facet arthropathy and the  ligamentum flavum thickening results in mild to moderate spinal canal  stenosis. Moderate bilateral neural foraminal stenosis is identified  secondary to disc bulge and facet arthropathy.     L4-5: Disc bulge coupled with endplate arthropathy and ligamentum  flavum hypertrophy results in severe spinal canal stenosis.  Mild-to-moderate right and moderate left neural foraminal stenosis  secondary to disc bulge and facet arthropathy.     L5-S1: Disc bulge with superimposed left foraminal protrusion  identified. There is mild spinal canal stenosis. Mild to moderate left  and mild right neural foraminal stenosis secondary to disc bulge and  facet arthropathy.     Paraspinous tissues are within normal limits.                                                                      Impression:      1. Severe spinal canal stenosis at the L4-5 level secondary to a  combination of disc bulge, ligamentum flavum thickening, endplate and  facet arthropathy.  2. Remaining levels of moderate spondylosis throughout the lumbar  spine.     I have personally reviewed the examination and initial interpretation  and I agree with the findings.     ABRAHAM REDDY MD  1/6/2022  1:49 PM     Component Value Flag Ref Range Units Status   Methylmalonic Acid 0.33   0.00 - 0.40 umol/L Final   Comment:   INTERPRETIVE INFORMATION:     Slight elevation 0.41-0.99 umol/L is consistent with mild vitamin B12 deficiency, renal insufficiency, or intravascular volume contraction.     Moderate elevation 1.00-9.99 umol/L is consistent with mild  "vitamin B12 deficiency.     Massive elevation 10.00 umol/L or greater is consistent with significant vitamin B12 deficiency or with inborn errors of metabolism.             ASSESSMENT / PLAN:     Encounter for Medicare annual wellness exam  - REVIEW OF HEALTH MAINTENANCE PROTOCOL ORDERS  - Comprehensive metabolic panel  - CBC with platelets and differential    Weight loss  - mirtazapine (REMERON) 7.5 MG tablet; Take 1 tablet (7.5 mg) by mouth At Bedtime    Hiatal hernia with GERD  - famotidine (PEPCID) 20 MG tablet; Take 1 tablet (20 mg) by mouth daily (with lunch)    Pernicious anemia  - Methylmalonic Acid    Arthritis of right shoulder region  - Orthopedic  Referral; Future  - XR Shoulder Right G/E 3 Views    CARDIOVASCULAR SCREENING; LDL GOAL LESS THAN 100  - Lipid panel reflex to direct LDL Fasting      Patient has been advised of split billing requirements and indicates understanding: Yes    COUNSELING:  Special attention given to:       Regular exercise       Healthy diet/nutrition       The ASCVD Risk score ([place] SALOMON Jr., et al., 2013) failed to calculate for the following reasons:    The 2013 ASCVD risk score is only valid for ages 40 to 79    Estimated body mass index is 20.25 kg/m  as calculated from the following:    Height as of 8/11/21: 1.854 m (6' 1\").    Weight as of 1/17/22: 69.6 kg (153 lb 8 oz).        He reports that he has quit smoking. He has never used smokeless tobacco.      Appropriate preventive services were discussed with this patient, including applicable screening as appropriate for cardiovascular disease, diabetes, osteopenia/osteoporosis, and glaucoma.  As appropriate for age/gender, discussed screening for colorectal cancer, prostate cancer, breast cancer, and cervical cancer. Checklist reviewing preventive services available has been given to the patient.    Reviewed patients plan of care and provided an AVS. The Basic Care Plan (routine screening as documented in Health " Maintenance) for Preston meets the Care Plan requirement. This Care Plan has been established and reviewed with the Patient.    Counseling Resources:  ATP IV Guidelines  Pooled Cohorts Equation Calculator  Breast Cancer Risk Calculator  Breast Cancer: Medication to Reduce Risk  FRAX Risk Assessment  ICSI Preventive Guidelines  Dietary Guidelines for Americans, 2010  USDA's MyPlate  ASA Prophylaxis  Lung CA Screening    Tyrel Manning MD  [facility]    Identified Health Risks:

## 2022-06-08 NOTE — PATIENT INSTRUCTIONS
At [facility], we strive to deliver an exceptional experience to you, every time we see you. If you receive a survey, please complete it as we do value your feedback.  If you have MyChart, you can expect to receive results automatically within 24 hours of their completion.  Your provider will send a note interpreting your results as well.   If you do not have MyChart, you should receive your results in about a week by mail.    Your care team:                            Family Medicine Internal Medicine   MD Tyrel Mckeon MD Shantel Branch-Fleming, MD Srinivasa Vaka, MD Katya Belousova, JARROD WalkerHillVALENTÍN Dubon CNP, MD Pediatrics   David Sykes, MD Rochelle Mittal MD Amelia Massimini APRN CNP   Stephanie Soriano APRN KRYSTAL Bustillos MD             Clinic hours: Monday - Thursday 7 am-6 pm; Fridays 7 am-5 pm.   Urgent care: Monday - Friday 10 am- 8 pm; Saturday and Sunday 9 am-5 pm.    Clinic: [phone]       [facility]: Monday - Thursday 8 am - 7 pm; Friday 8 am - 6 pm  [facility]: [phone]   Patient Education   Personalized Prevention Plan  You are due for the preventive services outlined below.  Your care team is available to assist you in scheduling these services.  If you have already completed any of these items, please share that information with your care team to update in your medical record.  Health Maintenance Due   Topic Date Due     COVID-19 Vaccine (4 - Booster for Moderna series) 03/02/2022     Diptheria Tetanus Pertussis (DTAP/TDAP/TD) Vaccine (2 - Td or Tdap) 03/07/2022     ANNUAL REVIEW OF HM ORDERS  05/05/2022     FALL RISK ASSESSMENT  05/05/2022

## 2022-06-14 LAB — METHYLMALONATE SERPL-SCNC: 0.12 UMOL/L (ref 0–0.4)

## 2022-06-15 ENCOUNTER — TRANSFERRED RECORDS (OUTPATIENT)
Dept: HEALTH INFORMATION MANAGEMENT | Facility: CLINIC | Age: 87
End: 2022-06-15

## 2022-06-16 ENCOUNTER — TELEPHONE (OUTPATIENT)
Dept: FAMILY MEDICINE | Facility: CLINIC | Age: 87
End: 2022-06-16
Payer: COMMERCIAL

## 2022-06-16 NOTE — TELEPHONE ENCOUNTER
Patient Quality Outreach    Patient is due for the following:   Physical     NEXT STEPS:   No follow up needed at this time.    Type of outreach:    Chart review performed, no outreach needed.      Questions for provider review:    None     Juhi Domínguez

## 2022-07-06 ENCOUNTER — ANCILLARY PROCEDURE (OUTPATIENT)
Dept: GENERAL RADIOLOGY | Facility: CLINIC | Age: 87
End: 2022-07-06
Attending: INTERNAL MEDICINE
Payer: COMMERCIAL

## 2022-07-06 PROCEDURE — 73030 X-RAY EXAM OF SHOULDER: CPT | Mod: RT | Performed by: RADIOLOGY

## 2022-07-15 ENCOUNTER — MYC MEDICAL ADVICE (OUTPATIENT)
Dept: FAMILY MEDICINE | Facility: CLINIC | Age: 87
End: 2022-07-15

## 2022-07-20 NOTE — TELEPHONE ENCOUNTER
DIAGNOSIS: Arthritis of right shoulder region   APPOINTMENT DATE: 08/01/2022   NOTES STATUS DETAILS   OFFICE NOTE from referring provider Internal 06/08/2022 Dr Manning [facility]    OFFICE NOTE from other specialist N/A    DISCHARGE SUMMARY from hospital N/A    DISCHARGE REPORT from the ER N/A    OPERATIVE REPORT N/A    MEDICATION LIST N/A    EMG (for Spine) N/A    IMPLANT RECORD/STICKER N/A    LABS     CBC/DIFF N/A    CULTURES N/A    INJECTIONS DONE IN RADIOLOGY N/A    MRI N/A    CT SCAN N/A    XRAYS (IMAGES & REPORTS) Internal 07/06/2022 RT shoulder   TUMOR     PATHOLOGY  Slides & report N/A

## 2022-07-25 NOTE — NURSING NOTE
Preston Cai's goals for this visit include:   Chief Complaint   Patient presents with     Derm Problem     spots on back scalp and face        He requests these members of his care team be copied on today's visit information: yes    PCP: Tyrel Manning    Referring Provider:  Arabella Sainz MD  [address] [place] [address] [address]  [place] [address]    There were no vitals taken for this visit.    Do you need any medication refills at today's visit? No     Amorrae SLICK Watkins        
The following medication was given:     MEDICATION:  Lidocaine with epinephrine 1% [id number]  ROUTE: SQ  SITE: see procedure note  DOSE: 2cc  LOT #: [id number]  : Pratibha  EXPIRATION DATE: 1Dec2019  NDC#: 8759-5969-36   Was there drug waste? No  Multi-dose vial: Yes    Rivka Macias LPN  March 19, 2019  
Detail Level: Detailed

## 2022-07-26 ENCOUNTER — VIRTUAL VISIT (OUTPATIENT)
Dept: FAMILY MEDICINE | Facility: CLINIC | Age: 87
End: 2022-07-26
Payer: COMMERCIAL

## 2022-07-26 DIAGNOSIS — Z23 HIGH PRIORITY FOR COVID-19 VACCINATION: Primary | ICD-10-CM

## 2022-07-26 DIAGNOSIS — R63.4 WEIGHT LOSS: ICD-10-CM

## 2022-07-26 PROCEDURE — 99442 PR PHYSICIAN TELEPHONE EVALUATION 11-20 MIN: CPT | Mod: 95 | Performed by: INTERNAL MEDICINE

## 2022-07-26 RX ORDER — MIRTAZAPINE 15 MG/1
15 TABLET, FILM COATED ORAL AT BEDTIME
Qty: 90 TABLET | Refills: 1 | Status: SHIPPED | OUTPATIENT
Start: 2022-07-26 | End: 2023-03-06

## 2022-07-26 NOTE — PROGRESS NOTES
This is a 86 year old who is being evaluated via a billable telephone visit.      What phone number would you like to be contacted at? [phone]  How would you like to obtain your AVS? Curtis      SUBJECTIVE    Reason for visit:  Weight loss,lack of energy    He eats 2-3 servings of fruits and vegetables daily.He consumes 1 sweetened beverage(s) daily.He exercises with enough effort to increase his heart rate 9 or less minutes per day.  He exercises with enough effort to increase his heart rate 3 or less days per week.   He is taking medications regularly.    I haven't put on any weight.Still gets pretty tired. We moved recently. In the past 3 years. I have a lot of gas. Not as bad as a year ago. I have good appetite. I was eating a lot of grapes before. EGD last 1/10/2022 shows small hiatal hernia. I tried to take a nap. I wake twice at night. I think Mirtazapine. Focused on recent move. Weight fluctuates from 155 lbs.    Urine is slow, I feel the pressure.      DIAGNOSTICS     Methylmalonic Acid 0.00 - 0.40 umol/L 0.12  0.33 CM  0.18 CM    Comment: INTERPRETIVE INFORMATION:     Slight elevation 0.41-0.99 umol/L is consistent with mild vitamin B12 deficiency, renal insufficiency, or intravascular volume contraction.     Moderate elevation 1.00-9.99 umol/L is consistent with mild vitamin B12 deficiency.     Massive elevation 10.00 umol/L or greater is consistent with significant vitamin B12 deficiency or with inborn errors of metabolism.   Resulting Agency  [facility]             Narrative  Performed by: [facility]  This test was developed and its performance characteristics determined by the [facility],  Special Chemistry Laboratory. It has not been cleared or approved by the FDA. The laboratory is regulated under CLIA as qualified to perform high-complexity testing. This test is used for clinical purposes. It should not be regarded as  investigational or for research.      Specimen Collected: 06/08/22  9:04 AM Last Resulted: 06/14/22  6:22 AM           Component Value Flag Ref Range Units Status   Sodium 138   133 - 144 mmol/L Final   Potassium 4.3   3.4 - 5.3 mmol/L Final   Chloride 102   94 - 109 mmol/L Final   Carbon Dioxide (CO2) 32   20 - 32 mmol/L Final   Anion Gap 4   3 - 14 mmol/L Final   Urea Nitrogen 17   7 - 30 mg/dL Final   Creatinine 0.95   0.66 - 1.25 mg/dL Final   Calcium 9.6   8.5 - 10.1 mg/dL Final   Glucose 100   High   70 - 99 mg/dL Final   Alkaline Phosphatase 80   40 - 150 U/L Final   AST 21   0 - 45 U/L Final   ALT 25   0 - 70 U/L Final   Protein Total 7.2   6.8 - 8.8 g/dL Final   Albumin 4.0   3.4 - 5.0 g/dL Final   Bilirubin Total 0.4   0.2 - 1.3 mg/dL Final   GFR Estimate 78   >60 mL/min/1.73m2 Final   Comment:   Effective December 21, 2021 eGFRcr in adults is calculated using the 2021 CKD-EPI creatinine equation which includes age and gender (Lynette rodriguez al., NEJ, DOI: 10.1056/EJVEfw1349996)     WBC Count 4.5   4.0 - 11.0 10e3/uL Final   RBC Count 3.64   Low   4.40 - 5.90 10e6/uL Final   Hemoglobin 11.8   Low   13.3 - 17.7 g/dL Final   Hematocrit 36.0   Low   40.0 - 53.0 % Final   MCV 99   78 - 100 fL Final   MCH 32.4   26.5 - 33.0 pg Final   MCHC 32.8   31.5 - 36.5 g/dL Final   RDW 12.8   10.0 - 15.0 % Final   Platelet Count 178   150 - 450 10e3/uL Final   % Neutrophils 61    % Final   % Lymphocytes 28    % Final   % Monocytes 10    % Final   % Eosinophils 1    % Final   % Basophils 0    % Final   % Immature Granulocytes 0    % Final   Absolute Neutrophils 2.7   1.6 - 8.3 10e3/uL Final   Absolute Lymphocytes 1.2   0.8 - 5.3 10e3/uL Final   Absolute Monocytes 0.5   0.0 - 1.3 10e3/uL Final   Absolute Eosinophils 0.1   0.0 - 0.7 10e3/uL Final   Absolute Basophils 0.0   0.0 - 0.2 10e3/uL Final   Absolute Immature Granulocytes 0.0   <=0.4 10e3/uL Final     Component Value Flag Ref Range Units Status   Cholesterol 182   <200  mg/dL Final   Triglycerides 71   <150 mg/dL Final   Direct Measure HDL 87   >=40 mg/dL Final   LDL Cholesterol Calculated 81   <=100 mg/dL Final   Non HDL Cholesterol 95   <130 mg/dL Final   Patient Fasting > 8hrs? Yes     Final       ASSESSMENT/PLAN  Weight loss  Associated with chronic fatigue and dizziness. Suspect either Parkinsonism versus MSA.. Denies any depression nor anxiety. Multiple diagnostics only shows mild anemia, adequate Vitamin B12 levels, normal thyroid function tests, unremarkable EGD (hiatal hernia without gastritis and negative H. Pylori). Previous Vitamin D levels are insufficient. No cardiovascular symptoms but he has untreated hyperlipidemia. Increase Mirtazapine to 15 mg/day. Patient states that Mirtazapine is beneficial in maintaining his weight and improving his sleep. I also recommend dietary changes, with slightly more carbohydrates to improve his weight. He declines any cardiovascular tests.  - mirtazapine (REMERON) 15 MG tablet; Take 1 tablet (15 mg) by mouth At Bedtime    High priority for COVID-19 vaccination  Overdue for a 2nd booster dose.  - COVID-19,PF,MODERNA (18+ YRS BOOSTER .25ML); Standing      Phone call duration: 12 minutes  Start: 8:20 AM  End: 8:32 AM    Disposition:  Follow-up in 4 weeks.    Tyrel Manning MD  Internal Medicine    .  ..

## 2022-07-26 NOTE — PATIENT INSTRUCTIONS
At [facility], we strive to deliver an exceptional experience to you, every time we see you. If you receive a survey, please complete it as we do value your feedback.  If you have MyChart, you can expect to receive results automatically within 24 hours of their completion.  Your provider will send a note interpreting your results as well.   If you do not have MyChart, you should receive your results in about a week by mail.    Your care team:                            Family Medicine Internal Medicine   MD Tyrel Mckeon MD Shantel Branch-Fleming, MD Srinivasa Vaka, MD Katya Belousova, VALENTÍN Gu CNP, MD ([facility]   David Sykes, MD Rochelle Mittal MD Amelia Massimini APRN CNP Kim Thein, APRN CNP Bethany Templen, MD             Clinic hours: Monday - Thursday 7 am-6 pm; Fridays 7 am-5 pm.   Urgent care: Monday - Friday 10 am- 8 pm; Saturday and Sunday 9 am-5 pm.    Clinic: [phone]       [facility]: Monday - Thursday 8 am - 7 pm; Friday 8 am - 6 pm  [facility]: [phone]

## 2022-07-27 ENCOUNTER — OFFICE VISIT (OUTPATIENT)
Dept: AUDIOLOGY | Facility: CLINIC | Age: 87
End: 2022-07-27

## 2022-07-27 ENCOUNTER — OFFICE VISIT (OUTPATIENT)
Dept: OTOLARYNGOLOGY | Facility: CLINIC | Age: 87
End: 2022-07-27
Payer: COMMERCIAL

## 2022-07-27 VITALS — HEART RATE: 73 BPM | DIASTOLIC BLOOD PRESSURE: 62 MMHG | SYSTOLIC BLOOD PRESSURE: 99 MMHG

## 2022-07-27 DIAGNOSIS — H90.3 SNHL (SENSORY-NEURAL HEARING LOSS), ASYMMETRICAL: Primary | ICD-10-CM

## 2022-07-27 DIAGNOSIS — H81.01 MENIERE'S DISEASE, RIGHT: ICD-10-CM

## 2022-07-27 DIAGNOSIS — H81.01 MENIERE'S DISEASE, RIGHT: Primary | ICD-10-CM

## 2022-07-27 PROCEDURE — 99214 OFFICE O/P EST MOD 30 MIN: CPT | Performed by: OTOLARYNGOLOGY

## 2022-07-27 PROCEDURE — 92567 TYMPANOMETRY: CPT | Performed by: AUDIOLOGIST

## 2022-07-27 PROCEDURE — 92557 COMPREHENSIVE HEARING TEST: CPT | Performed by: AUDIOLOGIST

## 2022-07-27 RX ORDER — METHYLPREDNISOLONE 4 MG
TABLET, DOSE PACK ORAL
Qty: 21 TABLET | Refills: 0 | Status: SHIPPED | OUTPATIENT
Start: 2022-07-27 | End: 2022-08-10

## 2022-07-27 NOTE — NURSING NOTE
Preston Cai's chief complaint for this visit includes:  Chief Complaint   Patient presents with     Follow Up     New issue, Right ear plugged for 1 month and balance off. Dizziness for about 6 hours 1 week ago.      PCP: Tyrel Manning    Referring Provider:  Referred Self, MD  No address on file    BP 99/62   Pulse 73   Data Unavailable        Allergies   Allergen Reactions     Seasonal Allergies      Hay fever          Do you need any medication refills at today's visit?

## 2022-07-27 NOTE — PROGRESS NOTES
HPI    This 86 year old patient who is here for the f/u.He experienced an episode of vertigo a week ago, lasted 6 hours with nausea, vomiting, and aural fullness. States that it was like a spinning and he continues to have right aural pressure. Denies any trauma, otalgia, otorrhea. Describes recent stress and increased salt intake. His BP is always low. His hearing is stable.  Denies any n/v, choking, coughing, difficulty swallowing, vision issues, weakness or numbness. His BP is always low. He did have similar episode of vertigo more than a decade ago. 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 Describes hoarseness for the past several years and he tried PPIs with some benefit. He has no hx of difficulty swallowing or weight changes.     Preston's hearing test showed that 2 kHz sharply sloping to a moderate high freq. 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 Mild low freq. SNHL rising to WNL sloping back to  moderate high freq. 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 Excellent word rec. Tymps WNL. Present 1 kHz ipsi/contra reflexes bilaterally.    ENT Problem List  Allergic rhinitis due to other allergen    Cervical radiculopathy at C6    Personal history of malignant neoplasm of larynx    Pseudophakia    Hiatal hernia with GERD    Macular degeneration    Sleep disorder, nonorganic    Seborrheic dermatitis    Essential tremor    CARDIOVASCULAR SCREENING; LDL GOAL LESS THAN 160    Benign prostatic hyperplasia    Hoarse voice quality    Advanced directives, counseling/discussion    Supraspinatus sprain, right, initial encounter    CARDIOVASCULAR SCREENING; LDL GOAL LESS THAN 100    Vitamin D deficiency    Chronic midline low back pain without sciatica    Other gastritis without hemorrhage, unspecified chronicity    BPH associated with nocturia    Weight loss    Pernicious anemia    Arthritis of right shoulder region      Medications         budesonide (RINOCORT AQUA) 32 MCG/ACT nasal spray      famotidine (PEPCID) 20 MG  tablet      gabapentin (NEURONTIN) 100 MG capsule      latanoprost (XALATAN) 0.005 % ophthalmic solution      mirtazapine (REMERON) 15 MG tablet      pantoprazole (PROTONIX) 40 MG EC tablet      primidone (MYSOLINE) 250 MG tablet      tamsulosin (FLOMAX) 0.4 MG capsule      Vitamin D, Cholecalciferol, 25 MCG (1000 UT) CAPS         MR BRAIN W/O & W CONTRAST 7/22/2019 11:51 AM     History: Vertigo     ICD-10: Meniere's disease, right     Comparison: MRI 9/17/2009     Technique: Axial diffusion-weighted with ADC map, T2-weighted,  turboFLAIR and T1-weighted images of the brain and axial T1-weighted  and coronal T2-weighted with fat saturation images centered on the  internal auditory canals were obtained without intravenous contrast.  Following intravenous administration of gadolinium, axial turboFLAIR  images of the brain and axial T1-weighted with fat saturation and  coronal T1-weighted images centered on the internal auditory canals  were obtained.     Contrast: 7.5ml Gadavist     Findings: No abnormal signal or enhancement along the course of the  seventh and eighth cranial nerves on either side. Vestibular and  auditory structures exhibit normal signal on T2-weighted images and no  abnormal enhancement.     Images of the whole brain demonstrate no mass lesion, no mass effect,  or midline shift. No intracranial hemorrhage on  susceptibility-weighted images. Mild leukokraurosis. There is no  restricted diffusion. Ventricles are proportionate to the cerebral  sulci. No abnormal enhancement.     Pseudophakia. The visualized paranasal sinuses and mastoid air cells  are clear.                                                                       Impression:    1. Normal MRI of the IACs. No evidence of mass.  2. Mild chronic small vessel ischemic disease MR, progressed since  8/17/2009.    Review of Systems   Constitutional: Negative.    HENT: Positive for hearing loss and tinnitus. Negative for congestion, ear  discharge, ear pain, nosebleeds, sinus pain and sore throat.    Eyes: Negative for blurred vision and double vision.   Respiratory: Negative for cough and hemoptysis.    Gastrointestinal: Negative for heartburn, nausea and vomiting.   Skin: Negative.    Neurological: Positive for dizziness. Negative for tingling, tremors and headaches.   Endo/Heme/Allergies: Negative for environmental allergies. Does not bruise/bleed easily.     Physical Exam   Constitutional: He appears well-developed and well-nourished.   HENT:   Head: Normocephalic and atraumatic.   Right Ear: Tympanic membrane, external ear and ear canal normal. No drainage, swelling or tenderness. No middle ear effusion. Decreased hearing is noted.   Left Ear: Tympanic membrane, external ear and ear canal normal. No drainage, swelling or tenderness.  No middle ear effusion. Decreased hearing is noted.   Nose: Mucosal edema, rhinorrhea and septal deviation present.   Mouth/Throat: Uvula is midline, oropharynx is clear and moist and mucous membranes are normal.   Eyes: Pupils are equal, round, and reactive to light. EOM are normal.   Neck: Normal range of motion. Neck supple.     A/P  This pleasant patient  Had an episode of vertigo due to right Meniere's disease. Options including caffeine, salt restriction, good hydration, anxiety control, and diuretic medication and a short term tapered steroid were reviewed. Regarding the diuretic, he might have even lower BP that concerns me and the patient. I will complete the steroid treatment and stick with the diet at this time. I will see him in the f/u in 3 months. His questions were answered.

## 2022-07-27 NOTE — PROGRESS NOTES
AUDIOLOGY REPORT    SUMMARY: Audiology visit completed. See audiogram for results.    RECOMMENDATIONS: Follow-up with ENT.    Aisha Darling  Doctor of Audiology  [place] License # [id number]

## 2022-07-28 DIAGNOSIS — M25.511 RIGHT SHOULDER PAIN: Primary | ICD-10-CM

## 2022-08-01 ENCOUNTER — OFFICE VISIT (OUTPATIENT)
Dept: ORTHOPEDICS | Facility: CLINIC | Age: 87
End: 2022-08-01
Attending: INTERNAL MEDICINE
Payer: COMMERCIAL

## 2022-08-01 ENCOUNTER — PRE VISIT (OUTPATIENT)
Dept: ORTHOPEDICS | Facility: CLINIC | Age: 87
End: 2022-08-01

## 2022-08-01 ENCOUNTER — ANCILLARY PROCEDURE (OUTPATIENT)
Dept: GENERAL RADIOLOGY | Facility: CLINIC | Age: 87
End: 2022-08-01
Attending: ORTHOPAEDIC SURGERY
Payer: COMMERCIAL

## 2022-08-01 VITALS — WEIGHT: 156 LBS | BODY MASS INDEX: 20.67 KG/M2 | HEIGHT: 73 IN

## 2022-08-01 DIAGNOSIS — M67.919 DISORDER OF BURSAE AND TENDONS IN SHOULDER REGION: Primary | ICD-10-CM

## 2022-08-01 DIAGNOSIS — M25.511 RIGHT SHOULDER PAIN: ICD-10-CM

## 2022-08-01 DIAGNOSIS — M71.9 DISORDER OF BURSAE AND TENDONS IN SHOULDER REGION: Primary | ICD-10-CM

## 2022-08-01 PROCEDURE — 99203 OFFICE O/P NEW LOW 30 MIN: CPT | Performed by: ORTHOPAEDIC SURGERY

## 2022-08-01 PROCEDURE — 73020 X-RAY EXAM OF SHOULDER: CPT | Mod: RT | Performed by: RADIOLOGY

## 2022-08-01 NOTE — NURSING NOTE
"Reason For Visit:   Chief Complaint   Patient presents with     Consult     Right shoulder pain. Mainly feels it when sleeping on left side.        PCP: Tyrel Manning  Ref: Self-referral     ?  No  Occupation Retired.  Currently working? No.  Work status?  Retired.  Date of injury: Gradual onset  Type of injury: Gradual onset.  Date of surgery: No previous shoulder surgeries; no recent cortisone injection (injection on left shoulder 2019)  Smoker: No  Request smoking cessation information: No    Right hand dominant    SANE score  Affected shoulder: Right  Right shoulder SANE: 75  Left shoulder SANE: 50    Ht 1.854 m (6' 1\")   Wt 70.8 kg (156 lb)   BMI 20.58 kg/m      [place] [place] ATC  "

## 2022-08-01 NOTE — LETTER
8/1/2022         RE: Preston Cai  [address]  [facility] [facility] [address]        Dear Colleague,    Thank you for referring your patient, Preston Cai, to the [facility]. Please see a copy of my visit note below.    CHIEF CONCERN:  Right shoulder pain    HISTORY OF PRESENT ILLNESS:  Mr. Cai is an 86 year old RHD male seen today for right shoulder pain.  He is here today for his right shoulder but notes that actually he has more symptoms on his left than his right.  He was seen by Dr. Arnold in 2019 for his left shoulder and was found to have glenohumeral arthrosis.  He had an anterior shoulder injection by Eliud Raymundo at that time and patient had perhaps a few months of relief.  His right shoulder has been bothering him for the last year.  He notes that it is most problematic when he is sleeping at night and laying on his left side.  He notes some crepitus around the right shoulder which is not in itself painful but he is concerned about it.    Past Medical History:   Diagnosis Date     Acid reflux disease      Allergic rhinitis, cause unspecified      H/O magnetic resonance imaging of brain and brain stem 10/10/2016    MRI BRAIN WITH AND WITHOUT CONTRAST August 17, 2009 8:07:00 PM   HISTORY: Severe dizzy spells with imbalance and vomiting.   TECHNIQUE: Routine pulse sequences without and with contrast were performed including thin section images through the IACs. 20 mL Magnevist given.   FINDINGS: Diffusion-weighted images are normal. The brain parenchyma, brainstem, ventricular system, and subarachnoid spaces a     Hematuria     Hematuria  - in 1990's      History of anemia 2002    Anemia-Iron Deficit     History of gastric ulcer      Macular degeneration      Malignant neoplasm of laryngeal cartilages (H)     Laryngeal CA (Radiation 1982)     Nonsenile cataract      PONV (postoperative nausea and vomiting)      Tremor 10/3/2016       Past Surgical History:   Procedure  Laterality Date     APPENDECTOMY  8 years old     Biopsy of the throat - cancerous mass - got radiation for in larynx  1982     CATARACT IOL, RT/LT       COLONOSCOPY  2/25/2004    Normal     COLONOSCOPY N/A 4/28/2015    Procedure: COLONOSCOPY;  Surgeon: Marvin Adams MD;  Location: [facility]     COLONOSCOPY N/A 1/10/2022    Procedure: COLONOSCOPY, WITH POLYPECTOMY AND BIOPSY;  Surgeon: Marsha Asher MD;  Location: [facility]     COLONOSCOPY N/A 1/10/2022    Procedure: COLONOSCOPY, FLEXIBLE, WITH LESION REMOVAL USING SNARE;  Surgeon: Marsha Asher MD;  Location: [facility]     COLONOSCOPY WITH CO2 INSUFFLATION N/A 4/28/2015    Procedure: COLONOSCOPY WITH CO2 INSUFFLATION;  Surgeon: Marvin Adams MD;  Location: [facility]     COLONOSCOPY WITH CO2 INSUFFLATION N/A 1/10/2022    Procedure: COLONOSCOPY, WITH CO2 INSUFFLATION;  Surgeon: Marsha Asher MD;  Location: [facility]     COMBINED ESOPHAGOSCOPY, GASTROSCOPY, DUODENOSCOPY (EGD) WITH CO2 INSUFFLATION N/A 1/10/2022    Procedure: ESOPHAGOGASTRODUODENOSCOPY, WITH CO2 INSUFFLATION;  Surgeon: Marsha Asher MD;  Location: [facility]     CYSTOSCOPY  1997    for hematuria - negative     ESOPHAGOSCOPY, GASTROSCOPY, DUODENOSCOPY (EGD), COMBINED N/A 1/10/2022    Procedure: ESOPHAGOGASTRODUODENOSCOPY, WITH BIOPSY;  Surgeon: Marsha Asher MD;  Location: [facility]     EYE SURGERY Right     Rt eye.macular repair     EYE SURGERY  2003    cataract     NASAL ENDOSCOPY,DX  4/2007    GERD     Pars plana vitrectomy and epiretinal membrane dissection, right eye  11/8/2002     Phacoemulsification with intraocular lens implantation right eye.  3/11/2003     PHACOEMULSIFICATION WITH STANDARD INTRAOCULAR LENS IMPLANT Left 9/22/2016    Procedure: PHACOEMULSIFICATION WITH STANDARD INTRAOCULAR LENS IMPLANT;  Surgeon: Nghia Lara MD;  Location: [facility]     Thyroglossal Duct Cyst Removal - 2ndary to radiation.  1982     VASECTOMY  1971     ZZ GASTROSCOPY,FL  9/2007     hiatal hernia and errosions       Current Outpatient Medications   Medication Sig Dispense Refill     budesonide (RINOCORT AQUA) 32 MCG/ACT nasal spray Spray 2 sprays into both nostrils daily 8.43 mL 1     famotidine (PEPCID) 20 MG tablet Take 1 tablet (20 mg) by mouth daily (with lunch) 90 tablet 3     gabapentin (NEURONTIN) 100 MG capsule Takes 2-3 x 100mg capsule by mouth nightly 270 capsule 3     latanoprost (XALATAN) 0.005 % ophthalmic solution Place 1 drop into the right eye At Bedtime 7.5 mL 1     methylPREDNISolone (MEDROL DOSEPAK) 4 MG tablet therapy pack Follow Package Directions 21 tablet 0     mirtazapine (REMERON) 15 MG tablet Take 1 tablet (15 mg) by mouth At Bedtime 90 tablet 1     pantoprazole (PROTONIX) 40 MG EC tablet Take 1 tablet (40 mg) by mouth daily 90 tablet 1     primidone (MYSOLINE) 250 MG tablet TAKE ONE TABLET BY MOUTH AT BEDTIME 90 tablet 3     tamsulosin (FLOMAX) 0.4 MG capsule TAKE TWO CAPSULES BY MOUTH ONCE DAILY 180 capsule 2     Vitamin D, Cholecalciferol, 25 MCG (1000 UT) CAPS Take 1,000 Units by mouth daily            Allergies   Allergen Reactions     Seasonal Allergies      Hay fever        SOCIAL HISTORY:    Social History     Socioeconomic History     Marital status:      Spouse name: Edyta Cai     Number of children: Not on file     Years of education: Not on file     Highest education level: Not on file   Occupational History     Employer: RETIRED   Tobacco Use     Smoking status: Former Smoker     Smokeless tobacco: Never Used     Tobacco comment: 1969   Substance and Sexual Activity     Alcohol use: Yes     Alcohol/week: 0.0 standard drinks     Comment: A beer once in awhile     Drug use: No     Sexual activity: Yes     Partners: Female     Birth control/protection: None   Other Topics Concern     Parent/sibling w/ CABG, MI or angioplasty before 65F 55M? No   Social History Narrative    seen by Jose M Diaz. lives in [facility].  to Edyta Cai.    2  sons and 2 daughters    Cancer father    Stroke mother        Daughter in [facility]    Son in [place]    Daughter in [place]    Son in [place]         Social Determinants of Health     Financial Resource Strain: Not on file   Food Insecurity: Not on file   Transportation Needs: Not on file   Physical Activity: Not on file   Stress: Not on file   Social Connections: Not on file   Intimate Partner Violence: Not on file   Housing Stability: Not on file       FAMILY HISTORY: Reviewed in EMR      REVIEW OF SYSTEMS: Positive for that noted in past medical history and history of present illness and otherwise reviewed in EMR     PHYSICAL EXAM:    Adult male in no acute distress. Articulates and communicates with normal affect.  Respirations even and unlabored  Focused upper extremity exam: Skin intact. No erythema. Sensation intact all dermatomes into the hand to light touch. EPL, FPL, and Intrinsics intact. Right shoulder active motion is FE to 165, ER at side to 75, and IR to T10. Left shoulder active motion is FE to 95, ER to 55, and IR to L5.  Mild discomfort with Neer and Gilbert, more on the left than the right.  No pain on palpation over the AC joint.  Mild pain on palpation over the long head of the biceps.  Forward elevation and external rotation strength are 4+ out of 5 bilaterally.  He does have some scapulothoracic crepitus on the right which he can demonstrate but this is not painful    IMAGING:  Radiographs of the right shoulder from July this year demonstrate a very small inferior humeral osteophyte and subtle glenohumeral joint space narrowing.  No superior humeral migration.  Right shoulder axillary view today demonstrates no significant joint space narrowing.    Left shoulder radiographs from 2019 demonstrate near complete loss of the glenohumeral joint space with subchondral sclerosis of the humeral head and a humeral osteophyte.  Changes consistent with moderately advanced glenohumeral  arthrosis    ASSESSMENT:    1.  Left moderately advanced glenohumeral arthrosis  2.  Mild right glenohumeral arthrosis  3.  Right scapulothoracic crepitus and bursitis, asymptomatic or nonpainful    PLAN:  I reviewed the imaging and exam findings with the patient.  We discussed that his goals for today would be to have a better understanding of the crepitus around his right shoulder and potentially explore nonsurgical options for his nighttime pain.  We discussed that he could pursue a bilateral ultrasound-guided glenohumeral cortisone injections for his arthritis.  He would like to proceed with that and we will assist him in scheduling with one of her primary care sports medicine physicians.  He is comforted to know about the scapulothoracic crepitus diagnosis and does not feel he needs to pursue any further intervention for that.  He will follow-up with me on an as-needed basis.    Christie Jackson MD        Again, thank you for allowing me to participate in the care of your patient.        Sincerely,        Christie Jackson MD

## 2022-08-01 NOTE — PROGRESS NOTES
CHIEF CONCERN:  Right shoulder pain    HISTORY OF PRESENT ILLNESS:  Mr. Cai is an 86 year old RHD male seen today for right shoulder pain.  He is here today for his right shoulder but notes that actually he has more symptoms on his left than his right.  He was seen by Dr. Arnold in 2019 for his left shoulder and was found to have glenohumeral arthrosis.  He had an anterior shoulder injection by Eliud Raymundo at that time and patient had perhaps a few months of relief.  His right shoulder has been bothering him for the last year.  He notes that it is most problematic when he is sleeping at night and laying on his left side.  He notes some crepitus around the right shoulder which is not in itself painful but he is concerned about it.    Past Medical History:   Diagnosis Date     Acid reflux disease      Allergic rhinitis, cause unspecified      H/O magnetic resonance imaging of brain and brain stem 10/10/2016    MRI BRAIN WITH AND WITHOUT CONTRAST August 17, 2009 8:07:00 PM   HISTORY: Severe dizzy spells with imbalance and vomiting.   TECHNIQUE: Routine pulse sequences without and with contrast were performed including thin section images through the IACs. 20 mL Magnevist given.   FINDINGS: Diffusion-weighted images are normal. The brain parenchyma, brainstem, ventricular system, and subarachnoid spaces a     Hematuria     Hematuria  - in 1990's      History of anemia 2002    Anemia-Iron Deficit     History of gastric ulcer      Macular degeneration      Malignant neoplasm of laryngeal cartilages (H)     Laryngeal CA (Radiation 1982)     Nonsenile cataract      PONV (postoperative nausea and vomiting)      Tremor 10/3/2016       Past Surgical History:   Procedure Laterality Date     APPENDECTOMY  8 years old     Biopsy of the throat - cancerous mass - got radiation for in larynx  1982     CATARACT IOL, RT/LT       COLONOSCOPY  2/25/2004    Normal     COLONOSCOPY N/A 4/28/2015    Procedure: COLONOSCOPY;  Surgeon:  Marvin Adams MD;  Location: [facility]     COLONOSCOPY N/A 1/10/2022    Procedure: COLONOSCOPY, WITH POLYPECTOMY AND BIOPSY;  Surgeon: Marsha Asher MD;  Location: [facility]     COLONOSCOPY N/A 1/10/2022    Procedure: COLONOSCOPY, FLEXIBLE, WITH LESION REMOVAL USING SNARE;  Surgeon: Marsha Asher MD;  Location: [facility]     COLONOSCOPY WITH CO2 INSUFFLATION N/A 4/28/2015    Procedure: COLONOSCOPY WITH CO2 INSUFFLATION;  Surgeon: Marvin Adams MD;  Location: [facility]     COLONOSCOPY WITH CO2 INSUFFLATION N/A 1/10/2022    Procedure: COLONOSCOPY, WITH CO2 INSUFFLATION;  Surgeon: Marsha Asher MD;  Location: [facility]     COMBINED ESOPHAGOSCOPY, GASTROSCOPY, DUODENOSCOPY (EGD) WITH CO2 INSUFFLATION N/A 1/10/2022    Procedure: ESOPHAGOGASTRODUODENOSCOPY, WITH CO2 INSUFFLATION;  Surgeon: Marsha Asher MD;  Location: [facility]     CYSTOSCOPY  1997    for hematuria - negative     ESOPHAGOSCOPY, GASTROSCOPY, DUODENOSCOPY (EGD), COMBINED N/A 1/10/2022    Procedure: ESOPHAGOGASTRODUODENOSCOPY, WITH BIOPSY;  Surgeon: Marsha Asher MD;  Location: [facility]     EYE SURGERY Right     Rt eye.macular repair     EYE SURGERY  2003    cataract     NASAL ENDOSCOPY,DX  4/2007    GERD     Pars plana vitrectomy and epiretinal membrane dissection, right eye  11/8/2002     Phacoemulsification with intraocular lens implantation right eye.  3/11/2003     PHACOEMULSIFICATION WITH STANDARD INTRAOCULAR LENS IMPLANT Left 9/22/2016    Procedure: PHACOEMULSIFICATION WITH STANDARD INTRAOCULAR LENS IMPLANT;  Surgeon: Nghia Lara MD;  Location: [facility]     Thyroglossal Duct Cyst Removal - 2ndary to radiation.  1982     VASECTOMY  1971     ZZ GASTROSCOPY,FL  9/2007    hiatal hernia and errosions       Current Outpatient Medications   Medication Sig Dispense Refill     budesonide (RINOCORT AQUA) 32 MCG/ACT nasal spray Spray 2 sprays into both nostrils daily 8.43 mL 1     famotidine (PEPCID) 20 MG tablet Take 1 tablet  (20 mg) by mouth daily (with lunch) 90 tablet 3     gabapentin (NEURONTIN) 100 MG capsule Takes 2-3 x 100mg capsule by mouth nightly 270 capsule 3     latanoprost (XALATAN) 0.005 % ophthalmic solution Place 1 drop into the right eye At Bedtime 7.5 mL 1     methylPREDNISolone (MEDROL DOSEPAK) 4 MG tablet therapy pack Follow Package Directions 21 tablet 0     mirtazapine (REMERON) 15 MG tablet Take 1 tablet (15 mg) by mouth At Bedtime 90 tablet 1     pantoprazole (PROTONIX) 40 MG EC tablet Take 1 tablet (40 mg) by mouth daily 90 tablet 1     primidone (MYSOLINE) 250 MG tablet TAKE ONE TABLET BY MOUTH AT BEDTIME 90 tablet 3     tamsulosin (FLOMAX) 0.4 MG capsule TAKE TWO CAPSULES BY MOUTH ONCE DAILY 180 capsule 2     Vitamin D, Cholecalciferol, 25 MCG (1000 UT) CAPS Take 1,000 Units by mouth daily            Allergies   Allergen Reactions     Seasonal Allergies      Hay fever        SOCIAL HISTORY:    Social History     Socioeconomic History     Marital status:      Spouse name: Edyta Cai     Number of children: Not on file     Years of education: Not on file     Highest education level: Not on file   Occupational History     Employer: RETIRED   Tobacco Use     Smoking status: Former Smoker     Smokeless tobacco: Never Used     Tobacco comment: 1969   Substance and Sexual Activity     Alcohol use: Yes     Alcohol/week: 0.0 standard drinks     Comment: A beer once in awhile     Drug use: No     Sexual activity: Yes     Partners: Female     Birth control/protection: None   Other Topics Concern     Parent/sibling w/ CABG, MI or angioplasty before 65F 55M? No   Social History Narrative    seen by Jos eM Diaz. lives in [facility].  to Edyta Cai.    2 sons and 2 daughters    Cancer father    Stroke mother        Daughter in [facility]    Son in [place]    Daughter in [place]    Son in [place]         Social Determinants of Health     Financial Resource Strain: Not on file   Food Insecurity:  Not on file   Transportation Needs: Not on file   Physical Activity: Not on file   Stress: Not on file   Social Connections: Not on file   Intimate Partner Violence: Not on file   Housing Stability: Not on file       FAMILY HISTORY: Reviewed in EMR      REVIEW OF SYSTEMS: Positive for that noted in past medical history and history of present illness and otherwise reviewed in EMR     PHYSICAL EXAM:    Adult male in no acute distress. Articulates and communicates with normal affect.  Respirations even and unlabored  Focused upper extremity exam: Skin intact. No erythema. Sensation intact all dermatomes into the hand to light touch. EPL, FPL, and Intrinsics intact. Right shoulder active motion is FE to 165, ER at side to 75, and IR to T10. Left shoulder active motion is FE to 95, ER to 55, and IR to L5.  Mild discomfort with Neer and Gilbert, more on the left than the right.  No pain on palpation over the AC joint.  Mild pain on palpation over the long head of the biceps.  Forward elevation and external rotation strength are 4+ out of 5 bilaterally.  He does have some scapulothoracic crepitus on the right which he can demonstrate but this is not painful    IMAGING:  Radiographs of the right shoulder from July this year demonstrate a very small inferior humeral osteophyte and subtle glenohumeral joint space narrowing.  No superior humeral migration.  Right shoulder axillary view today demonstrates no significant joint space narrowing.    Left shoulder radiographs from 2019 demonstrate near complete loss of the glenohumeral joint space with subchondral sclerosis of the humeral head and a humeral osteophyte.  Changes consistent with moderately advanced glenohumeral arthrosis    ASSESSMENT:    1.  Left moderately advanced glenohumeral arthrosis  2.  Mild right glenohumeral arthrosis  3.  Right scapulothoracic crepitus and bursitis, asymptomatic or nonpainful    PLAN:  I reviewed the imaging and exam findings with the  patient.  We discussed that his goals for today would be to have a better understanding of the crepitus around his right shoulder and potentially explore nonsurgical options for his nighttime pain.  We discussed that he could pursue a bilateral ultrasound-guided glenohumeral cortisone injections for his arthritis.  He would like to proceed with that and we will assist him in scheduling with one of her primary care sports medicine physicians.  He is comforted to know about the scapulothoracic crepitus diagnosis and does not feel he needs to pursue any further intervention for that.  He will follow-up with me on an as-needed basis.    Christie Jackson MD

## 2022-08-10 ENCOUNTER — VIRTUAL VISIT (OUTPATIENT)
Dept: PHARMACY | Facility: CLINIC | Age: 87
End: 2022-08-10
Payer: COMMERCIAL

## 2022-08-10 DIAGNOSIS — J30.1 SEASONAL ALLERGIC RHINITIS DUE TO POLLEN: ICD-10-CM

## 2022-08-10 DIAGNOSIS — Z78.9 TAKES DIETARY SUPPLEMENTS: ICD-10-CM

## 2022-08-10 DIAGNOSIS — N40.1 BPH ASSOCIATED WITH NOCTURIA: ICD-10-CM

## 2022-08-10 DIAGNOSIS — R63.4 WEIGHT LOSS: ICD-10-CM

## 2022-08-10 DIAGNOSIS — G62.9 NEUROPATHY: ICD-10-CM

## 2022-08-10 DIAGNOSIS — K21.9 HIATAL HERNIA WITH GERD: ICD-10-CM

## 2022-08-10 DIAGNOSIS — R35.1 BPH ASSOCIATED WITH NOCTURIA: ICD-10-CM

## 2022-08-10 DIAGNOSIS — H40.001 GLAUCOMA SUSPECT, RIGHT: ICD-10-CM

## 2022-08-10 DIAGNOSIS — G25.0 ESSENTIAL TREMOR: Primary | ICD-10-CM

## 2022-08-10 DIAGNOSIS — K44.9 HIATAL HERNIA WITH GERD: ICD-10-CM

## 2022-08-10 PROCEDURE — 99607 MTMS BY PHARM ADDL 15 MIN: CPT | Performed by: PHARMACIST

## 2022-08-10 PROCEDURE — 99605 MTMS BY PHARM NP 15 MIN: CPT | Performed by: PHARMACIST

## 2022-08-10 RX ORDER — LANOLIN ALCOHOL/MO/W.PET/CERES
1000 CREAM (GRAM) TOPICAL DAILY
COMMUNITY
End: 2024-03-11

## 2022-08-10 NOTE — PATIENT INSTRUCTIONS
"Recommendations from today's MTM visit:                                                    MTM (medication therapy management) is a service provided by a clinical pharmacist designed to help you get the most of out of your medicines.      1. We discussed that we will keep your current dose of primidone at 250 mg nightly. You may try taking this at bedtime rather than at 7 pm, to try to prevent side effects in the evening and better effect during the day.     2. We discussed that you can continue taking gabapentin at 7 pm or try it at 8 pm. 200 or 300 mg of gabapentin would be reasonable to try as this is still a low dosage.    3. It is unclear if the tamsulosin (Flomax) has been helpful so we discussed that you could consider doing a trial off the medication for a few weeks to see if you notice any difference. This medication can cause dizziness/low blood pressure.     4. Continue the mirtazapine 15 mg at bedtime     5. We discussed taking vitamin D in the winter but you may not need it in the summer if you are getting outside every day.     Follow-up: 1 year or sooner if needed     It was great speaking with you today.  I value your experience and would be very thankful for your time in providing feedback in our clinic survey. In the next few days, you may receive an email or text message from [employer] with a link to a survey related to your  clinical pharmacist.\"     To schedule another MTM appointment, please call the clinic directly or you may call the MTM scheduling line at [phone] or toll-free at [phone].     My Clinical Pharmacist's contact information:                                                      Please feel free to contact me with any questions or concerns you have.      Fior Maots, Pharm.D.  Medication Therapy Management Pharmacist  [facility]     "

## 2022-08-10 NOTE — PROGRESS NOTES
Medication Therapy Management (MTM) Encounter    ASSESSMENT:                            Medication Adherence/Access: No issues identified    Tremor: we discussed the current dosage of primidone and decided to continue at 250 mg nightly. A lower dose may cause less side effects but could worsen tremor. I advised that he try taking the medication at bedtime to avoid drowsiness in the evening before he is ready to go to bed.     Neuropathy:  Would recommend continuing gabapentin at 7-8 pm and he could take 200-300 mg as prescribed     BPH: patient is unsure if tamsulosin is helpful and is concerned about it contributing to dizziness so we discussed possibly doing a trial off of the medication to determine how much it is helping     GERD:  Stable     Weight loss: may be improving slightly     Allergic Rhinitis: stable    Glaucoma: stable     Vitamins: patient was encouraged to take vitamin D in the winter but would be fine to hold in the summer since he does get outside daily     PLAN:                            1. We discussed that we will keep your current dose of primidone at 250 mg nightly. You may try taking this at bedtime rather than at 7 pm, to try to prevent side effects in the evening and better effect during the day.     2. We discussed that you can continue taking gabapentin at 7 pm or try it at 8 pm. 200 or 300 mg of gabapentin would be reasonable to try as this is still a low dosage.    3. It is unclear if the tamsulosin (Flomax) has been helpful so we discussed that you could consider doing a trial off the medication for a few weeks to see if you notice any difference. This medication can cause dizziness/low blood pressure.     4. Continue the mirtazapine 15 mg at bedtime     5. We discussed taking vitamin D in the winter but you may not need it in the summer if you are getting outside every day.     Follow-up: 1 year or sooner if needed     SUBJECTIVE/OBJECTIVE:                          Preston Cai is  a 86 year old male called for an initial visit. He was referred to me from Dr. Munroe.      Reason for visit: medication review.    Allergies/ADRs: Reviewed in chart  Past Medical History: Reviewed in chart  Tobacco: He reports that he has quit smoking. He has never used smokeless tobacco.  Alcohol: not currently using- none for about 1 month    Medication Adherence/Access: no issues reported    Tremor: Currently taking primidone 250 mg nightly at 7 pm. Patient reports his tremor is getting worse. Some days are better than others. He has had dizziness, daytime drowsiness, and some memory loss. he has tried cutting the primidone in half but notices more tremor. He would prefer to keep the dose at this level.     Neuropathy: Taking gabapentin 200 mg at 7 pm. States the medication is helpful. He is avoiding taking 300 mg to prevent any issues with memory. States his neuropathy affects his ankles and feet primarily when he is falling asleep.    BPH: Taking tamsulosin 0.4-0.8 mg daily at 6 pm- usually takes the 0.4 mg to avoid too much dizziness from the higher dose. He reportedly gets up about 1-2 times nightly to urinate. He is not sure the medication is helpful and wondering if he could stop it.      BP Readings from Last 3 Encounters:   07/27/22 99/62   06/08/22 94/70   01/19/22 116/64     GERD: Current medications include: pantoprazole 40 mg daily (morning) and not eating anything for 45 minutes after. This seems to help. His PCP also prescribed famotidine for throat soreness on the afternoon but patient isn't bothered much by this and would prefer to not take a second medication.      Weight loss: Taking mirtazapine 15 mg nightly (at about 9:30-10 pm which is bedtime). This medication was recently increased from 7.5 mg to 15 mg and he has noticed that it is helping with his appetite. He also reports the medication is for depression, though he is not sure he is depressed. He moved recently and this caused some  stress. He did not notice any side effects of mirtazapine other than a little improvement in appetite.     Wt Readings from Last 10 Encounters:   08/01/22 156 lb (70.8 kg)   06/08/22 157 lb 12.8 oz (71.6 kg)   01/17/22 153 lb 8 oz (69.6 kg)   01/04/22 153 lb 9.6 oz (69.7 kg)   12/17/21 152 lb 6.4 oz (69.1 kg)   08/11/21 160 lb (72.6 kg)   06/23/21 162 lb (73.5 kg)   05/05/21 166 lb (75.3 kg)   03/13/20 175 lb (79.4 kg)   03/11/20 175 lb 9.6 oz (79.7 kg)     Allergic Rhinitis: Current medications include budesonide nasally as needed- usually in the spring and summer. States his symptoms are tolerable currently. He follows with ENT.     Glaucoma: Using latanoprost in right eye nightly before bed. States he will have follow up with ophthalmology in Nov-Dec of this year     Vitamins: taking vitamin B12 1000 mcg daily. He had been taking Vitamin D for years but recently stopped it as he wasn't sure it was helping with his energy.  In the summer he gets outside to walk a little every day.     Today's Vitals: There were no vitals taken for this visit.  ----------------    I spent 27 minutes with this patient today. I offer these suggestions for consideration by Dr. Munroe. A copy of the visit note was provided to the patient's provider(s).    The patient was sent via [employer] a summary of these recommendations.     Fior Matos, Pharm.D.  Medication Therapy Management Pharmacist  [facility]    Telemedicine Visit Details  Type of service:  Telephone visit  Start Time: 9:01 AM  End Time: 9:28 AM  Originating Location (patient location): Home  Distant Location (provider location):  [facility]     Medication Therapy Recommendations  Essential tremor    Current Medication: primidone (MYSOLINE) 250 MG tablet   Rationale: Does not understand instructions - Adherence - Adherence   Recommendation: Change Administration Time   Status: Patient Agreed - Adherence/Education

## 2022-08-10 NOTE — LETTER
August 10, 2022  Preston GOLDSTEIN Trell  [address]   [place] [address]    Dear  Keanujae, JERMAINE [facility]        Thank you for talking with me on Aug 10, 2022 about your health and medications. As a follow-up to our conversation, I have included two documents:      1. Your Recommended To-Do List has steps you should take to get the best results from your medications.  2. Your Medication List will help you keep track of your medications and how to take them.    If you want to talk about these documents, please call Fior Matos RPH at phone: [phone], Monday-Friday 8-4:30pm.    I look forward to working with you and your doctors to make sure your medications work well for you.    Sincerely,    Fior Matos RPH  [facility] Pharmacist, [facility]

## 2022-08-10 NOTE — LETTER
"Recommended To-Do List      Prepared on: 8/10/2022     You can get the best results from your medications by completing the items on this \"To-Do List.\"      Bring your To-Do List when you go to your doctor. And, share it with your family or caregivers.    My To-Do List:  What we talked about: What I should do:   The importance of taking your medication as intended    Change when you are taking primidone (MYSOLINE) to bedtime (9:30-10 pm)          What we talked about: What I should do:                       "

## 2022-08-10 NOTE — LETTER
_  Medication List        Prepared on: 8/10/2022     Bring your Medication List when you go to the doctor, hospital, or   emergency room. And, share it with your family or caregivers.     Note any changes to how you take your medications.  Cross out medications when you no longer use them.    Medication How I take it Why I use it Prescriber   budesonide (RINOCORT AQUA) 32 MCG/ACT nasal spray Spray 2 sprays into both nostrils daily Allergies  Tohsa Ron MD   cyanocobalamin (VITAMIN B-12) 1000 MCG tablet Take 1,000 mcg by mouth daily General health  Self    gabapentin (NEURONTIN) 100 MG capsule Takes 2-3 capsules by mouth nightly Neuropathy  Tyrel Manning MD   latanoprost (XALATAN) 0.005 % ophthalmic solution Place 1 drop into the right eye At Bedtime Glaucoma  Vera Marsh MD   mirtazapine (REMERON) 15 MG tablet Take 1 tablet (15 mg) by mouth At Bedtime Weight Loss Tyrel Manning MD   pantoprazole (PROTONIX) 40 MG EC tablet Take 1 tablet (40 mg) by mouth daily Reflux Tosha Ron MD   primidone (MYSOLINE) 250 MG tablet TAKE ONE TABLET BY MOUTH AT BEDTIME Essential Tremor García Munroe MD   tamsulosin (FLOMAX) 0.4 MG capsule TAKE TWO CAPSULES BY MOUTH ONCE DAILY Prostate Tyrel Manning MD   Vitamin D, Cholecalciferol, 25 MCG (1000 UT) CAPS Take 1,000 Units by mouth daily (winter only) General health  Self          Add new medications, over-the-counter drugs, herbals, vitamins, or  minerals in the blank rows below.    Medication How I take it Why I use it Prescriber                          Allergies:      seasonal allergies        Side effects I have had:               Other Information:              My notes and questions:

## 2022-08-23 DIAGNOSIS — H81.01 MENIERE'S DISEASE, RIGHT: Primary | ICD-10-CM

## 2022-08-23 RX ORDER — TRIAMTERENE AND HYDROCHLOROTHIAZIDE 37.5; 25 MG/1; MG/1
1 CAPSULE ORAL DAILY
Qty: 40 CAPSULE | Refills: 3 | Status: SHIPPED | OUTPATIENT
Start: 2022-08-23 | End: 2023-03-06

## 2022-09-01 ENCOUNTER — OFFICE VISIT (OUTPATIENT)
Dept: OPTOMETRY | Facility: CLINIC | Age: 87
End: 2022-09-01
Payer: COMMERCIAL

## 2022-09-01 DIAGNOSIS — H01.02B SQUAMOUS BLEPHARITIS OF UPPER AND LOWER EYELIDS OF BOTH EYES: ICD-10-CM

## 2022-09-01 DIAGNOSIS — Z96.1 PSEUDOPHAKIA OF BOTH EYES: ICD-10-CM

## 2022-09-01 DIAGNOSIS — Z01.00 EXAMINATION OF EYES AND VISION: Primary | ICD-10-CM

## 2022-09-01 DIAGNOSIS — H52.4 PRESBYOPIA: ICD-10-CM

## 2022-09-01 DIAGNOSIS — H40.051 BORDERLINE GLAUCOMA WITH OCULAR HYPERTENSION, RIGHT: ICD-10-CM

## 2022-09-01 DIAGNOSIS — H01.02A SQUAMOUS BLEPHARITIS OF UPPER AND LOWER EYELIDS OF BOTH EYES: ICD-10-CM

## 2022-09-01 DIAGNOSIS — H52.223 REGULAR ASTIGMATISM OF BOTH EYES: ICD-10-CM

## 2022-09-01 DIAGNOSIS — H35.3132 INTERMEDIATE STAGE NONEXUDATIVE AGE-RELATED MACULAR DEGENERATION OF BOTH EYES: ICD-10-CM

## 2022-09-01 DIAGNOSIS — H10.13 ALLERGIC CONJUNCTIVITIS, BILATERAL: ICD-10-CM

## 2022-09-01 DIAGNOSIS — H52.03 HYPERMETROPIA OF BOTH EYES: ICD-10-CM

## 2022-09-01 PROCEDURE — 92015 DETERMINE REFRACTIVE STATE: CPT | Performed by: OPTOMETRIST

## 2022-09-01 PROCEDURE — 92014 COMPRE OPH EXAM EST PT 1/>: CPT | Performed by: OPTOMETRIST

## 2022-09-01 ASSESSMENT — TONOMETRY
OS_IOP_MMHG: 12
IOP_METHOD: APPLANATION
OD_IOP_MMHG: 20

## 2022-09-01 ASSESSMENT — KERATOMETRY
OS_AXISANGLE2_DEGREES: 089
OS_K2POWER_DIOPTERS: 44.25
OS_AXISANGLE_DEGREES: 179
OD_K1POWER_DIOPTERS: 43.00
OD_AXISANGLE2_DEGREES: 079
OD_AXISANGLE_DEGREES: 169
OD_K2POWER_DIOPTERS: 44.25
OS_K1POWER_DIOPTERS: 42.50

## 2022-09-01 ASSESSMENT — VISUAL ACUITY
OS_CC: 20/20
OS_SC+: -3
CORRECTION_TYPE: GLASSES
OD_CC: 20/30
OD_CC: 20/30
OS_CC: 20/25
OD_SC: 20/40
METHOD: SNELLEN - LINEAR
OD_SC+: -2
OS_CC+: -1
OS_SC: 20/25
OD_CC+: -2

## 2022-09-01 ASSESSMENT — EXTERNAL EXAM - LEFT EYE: OS_EXAM: NORMAL

## 2022-09-01 ASSESSMENT — REFRACTION_MANIFEST
OS_CYLINDER: +1.75
OS_SPHERE: -0.50
OS_ADD: +3.00
OD_SPHERE: -0.50
OS_AXIS: 180
OD_AXIS: 160
OD_CYLINDER: +1.25
OD_ADD: +3.00

## 2022-09-01 ASSESSMENT — EXTERNAL EXAM - RIGHT EYE: OD_EXAM: NORMAL

## 2022-09-01 ASSESSMENT — REFRACTION_WEARINGRX
OS_SPHERE: -0.50
OD_AXIS: 160
OS_ADD: +2.75
OD_ADD: +2.75
OD_CYLINDER: +1.00
OS_AXIS: 180
OS_CYLINDER: +1.00
OD_SPHERE: -0.75
SPECS_TYPE: TRIFOCAL

## 2022-09-01 ASSESSMENT — CUP TO DISC RATIO
OD_RATIO: 0.5
OS_RATIO: 0.3

## 2022-09-01 ASSESSMENT — CONF VISUAL FIELD: COMMENTS: PLEASE RETEST

## 2022-09-01 NOTE — LETTER
9/1/2022         RE: Preston Cai  [address]  [place] [address]        Dear Colleague,    Thank you for referring your patient, Preston Cai, to the [facility]. Please see a copy of my visit note below.    Chief Complaint   Patient presents with     Annual Eye Exam      Accompanied by self  Last Eye Exam: 8-  Dilated Previously: Yes    What are you currently using to see?  glasses       Distance Vision Acuity: Noticed gradual change in both eyes    Near Vision Acuity: Not satisfied     Eye Comfort: sore and itchy  Do you use eye drops? 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      Past Ocular History:  - Pseudophakia each eye (2016 Dr. Lara left eye; right eye with Dr. Hayes in [place] in early 2000s)  - YAG right eye 2016 (Jane)  - 2002 right eye retinal repair for macular tear with doctor at [facility] in [place] -- had surgery in OR and had to be face down for several days, thinks issue was in center of vision. Had noticed a year prior that with focusing would get blurred central vision right eye.  - Macular degeneration -- previously followed with Dr. Burns at [facility]  - Glaucoma right eye      Has been followed with Dr. Santoyo at [facility] for glaucoma- has an appointment in a few months.    Uses latanoprost nightly right eye.    Medical, surgical and family histories reviewed and updated 9/1/2022.       OBJECTIVE: See Ophthalmology exam    ASSESSMENT:    ICD-10-CM    1. Examination of eyes and vision  Z01.00    2. Presbyopia  H52.4    3. Hypermetropia of both eyes  H52.03    4. Regular astigmatism of both eyes  H52.223    5. Borderline glaucoma with ocular hypertension, right  H40.051    6. Allergic conjunctivitis, bilateral  H10.13    7. Squamous blepharitis of upper and lower eyelids of both eyes  H01.02A     H01.02B    8. Pseudophakia of both eyes   Z96.1    9. Intermediate stage nonexudative age-related macular degeneration of both eyes  H35.5645        PLAN:     Patient Instructions   Eyeglass prescription given.  Minimal change in prescription.  Discussed options with separate pair of glasses for reading and near vision.  Patient prefers trifocals.    Follow up with Dr. Santoyo at [facility] as recommended.    Continue latanoprost- 1 drop right eye at night.    Systane Complete 1 drop both eyes 4 x day.    Heat to the eyes daily for 10-15 minutes nightly with warm washcloth or reusable gel masks from the pharmacy or  bf[employer]ance [employer] heat masks can be purchased at [employer].    Ocusoft Hypochlor- spray solution onto cotton pad.  Close eyes and gently apply to eyelids and eyelashes using side to side motion.  Use morning and evening.    OTC Pataday to be used once or twice daily for itchy eyes.  Use as needed.     Recommend annual eye exams.    Carlos James, OD                      Again, thank you for allowing me to participate in the care of your patient.        Sincerely,        Carlos James, OD

## 2022-09-01 NOTE — PROGRESS NOTES
Chief Complaint   Patient presents with     Annual Eye Exam      Accompanied by self  Last Eye Exam: 8-  Dilated Previously: Yes    What are you currently using to see?  glasses       Distance Vision Acuity: Noticed gradual change in both eyes    Near Vision Acuity: Not satisfied     Eye Comfort: sore and itchy  Do you use eye drops? 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      Past Ocular History:  - Pseudophakia each eye (2016 Dr. Lara left eye; right eye with Dr. Turpin in [place] in early 2000s)  - YAG right eye 2016 ([place])  - 2002 right eye retinal repair for macular tear with doctor at [facility] in [place] -- had surgery in OR and had to be face down for several days, thinks issue was in center of vision. Had noticed a year prior that with focusing would get blurred central vision right eye.  - Macular degeneration -- previously followed with Dr. Burns at [facility]  - Glaucoma right eye      Has been followed with Dr. Santoyo at [facility] for glaucoma- has an appointment in a few months.    Uses latanoprost nightly right eye.    Medical, surgical and family histories reviewed and updated 9/1/2022.       OBJECTIVE: See Ophthalmology exam    ASSESSMENT:    ICD-10-CM    1. Examination of eyes and vision  Z01.00    2. Presbyopia  H52.4    3. Hypermetropia of both eyes  H52.03    4. Regular astigmatism of both eyes  H52.223    5. Borderline glaucoma with ocular hypertension, right  H40.051    6. Allergic conjunctivitis, bilateral  H10.13    7. Squamous blepharitis of upper and lower eyelids of both eyes  H01.02A     H01.02B    8. Pseudophakia of both eyes  Z96.1    9. Intermediate stage nonexudative age-related macular degeneration of both eyes  H35.3132        PLAN:     Patient Instructions   Eyeglass prescription given.  Minimal change in prescription.  Discussed options with separate pair of glasses for  reading and near vision.  Patient prefers trifocals.    Follow up with Dr. Santoyo at [facility] as recommended.    Continue latanoprost- 1 drop right eye at night.    Systane Complete 1 drop both eyes 4 x day.    Heat to the eyes daily for 10-15 minutes nightly with warm washcloth or reusable gel masks from the pharmacy or  [employer] heat masks can be purchased at [employer].    Ocusoft Hypochlor- spray solution onto cotton pad.  Close eyes and gently apply to eyelids and eyelashes using side to side motion.  Use morning and evening.    OTC Pataday to be used once or twice daily for itchy eyes.  Use as needed.     Recommend annual eye exams.    Carlos James, OD

## 2022-09-01 NOTE — PATIENT INSTRUCTIONS
Eyeglass prescription given.  Minimal change in prescription.  Discussed options with separate pair of glasses for reading and near vision.  Patient prefers trifocals.    Follow up with Dr. Santoyo at [facility] as recommended.    Continue latanoprost- 1 drop right eye at night.    Systane Complete 1 drop both eyes 4 x day.    Heat to the eyes daily for 10-15 minutes nightly with warm washcloth or reusable gel masks from the pharmacy or  [employer] heat masks can be purchased at [employer].    Ocusoft Hypochlor- spray solution onto cotton pad.  Close eyes and gently apply to eyelids and eyelashes using side to side motion.  Use morning and evening.    OTC Pataday to be used once or twice daily for itchy eyes.  Use as needed.     Recommend annual eye exams.    Carlos James, CANDI      There is a combination of three treatments which can greatly improve symptoms of dry eyes.     Artificial tears  Heat (eyes closed)  Eyelid and eyelash cleansing (eyes closed)     Use one drop of artificial tears both eyes 4 x daily.  Once in the morning, lunch, dinner and bedtime. Continue to use the drops regardless if your eyes are comfortable or not.  Artificial tears work best as a preventative and not as well after your eyes are starting to bother you.  It may take 4- 6 weeks of using the drops before you notice improvement.  If after that time you are still having problems schedule an appointment for an evaluation and discussion of different treatments such as Restasis or Xiidra.  Dry eyes are a chronic condition and you may have more symptoms at certain times of the year.    Excess tearing can be due to the right tears not working properly or a blockage in the tear drainage system.  You can try using artificial tears 1 drop both eyes 4 x day.  If the excess tearing is bothersome after 4-6 weeks of treatment then we can send you for further testing.  This would entail a referral to our oculoplastic specialist Dr. Billy Maldonado at the [facility]  [phone].    Recommended brands are:    Systane Complete  Systane Ultra  Systane Balance  Refresh Advanced Optive  Refresh Relieva  Blink    Recommended brands for contact lens wearers are:    Systane contacts  Refresh contacts  Blink contacts    If you are using drops more than 4 x day or have sensitivities to preservatives I recommend non preserved artificial tears.  These come in 1 use vials.  They can be used every 1-2 hours.  Do not reuse the vials.    Recommended brands are:    Refresh Optive Dawson-3  Systane- preservative free  Refresh-  preservative free  Blink- preservative free    Gels or ointment can be used at night.    Recommended brands are:    Systane Gel  Refresh Gel  Blink Gel  Genteal Gel    Systane night time (ointment)  Refresh Celluvisc  Refresh PM (ointment)      Visine, Clear Eyes or Murine (drops that get the red out) can irritate the eyes and cause a rebound effect where the eyes become more red and you end up using more drops.  Avoid drops containing tetrahydrozoline, naphazoline, phenylephrine, oxymetazoline.      OTC Lumify is a newer product that gives immediate redness relief without the rebound effect.  Use as needed to take the redness out.    Artificial tears may be used with other drops (such as allergy, glaucoma, antibiotics) around the same time.  Be sure to wait 5 minutes in between drops.    Heat to the eyelids can also improve your symptoms of dry eyes.  Deidra heat masks can be purchased at [employer] to be used nightly for 10-15 minutes.  Other options are gel masks that can be put in the microwave and purchased at most pharmacies.      Tea Tree Oil eyelid cleansers recommended are Ocusoft Oust foam cleanser to cleanse eyelids/lashes at night and in the am. Other options are Blephadex or Cliradex eyelid wipes.  KEEP EYES CLOSED when using these products.  These can be purchased on amazon.com   A good product for make up remover with tea tree oil is WeLoveEyes.  This can  be found at www.[employer] or [employer].    Other good eyelid cleansers have hypochlorous which removes excess bacteria and is safe around the eyes. Products are Avenova, Ocusoft Hypochlor or Heyedrate. Spray solution onto cotton pad, close eyes and gently apply to eyelids and eyelashes using side to side motion.  You can also KEEP EYES CLOSED spray and rub into eyelashes.  You do not need to rinse it off. Use morning and evening. These products can be found on [employer].  You can check with your local pharmacy and see if they can order if for you if they don't have it.    Other brands of eyelid cleansing wipes are:    Ocusoft wipes  Systane wipes    A great eye make up line is https://[employer]/.        Optometry Providers       Clinic Locations                                 Telephone Number   Dr. Shahla Chávez    [place]   [facility]  [place] [phone]     [facility] Hours:                [place] [facility] Hours:       [place] [facility] Hours:   [address]   [address]     [address] [place] [address]   [place] [address]    [place] [address]  Phone: [phone]                    Phone: [phone]     Phone: [phone]                      Monday 8:00-6:00                          Monday 8:00-6:00                          Monday 8:00-6:00              Tuesday 8:00-6:00                          Tuesday 8:00-6:00                          Tuesday 8:00-6:00              Wednesday 8:00-6:00                  Wednesday 8:00-6:00                   Wednesday 8:00-6:00      Thursday 8:00-6:00                        Thursday 8:00-6:00                         Thursday 8:00-6:00            Friday 8:00-5:00                              Friday 8:00-5:00                              Friday 8:00-5:00    [facility] Hours:   [address] [place]  [address]  [phone]    Monday 9:00-6:00  Tuesday 9:00-6:00  Wednesday 9:00-6:00  Thursday 9:00-6:00  Friday 9:00-5:00  Please log on to [email or web address] to order your contact lenses.  The link is found on the [facility] page.  As always, Thank you for trusting us with your health care needs!

## 2022-09-12 ENCOUNTER — OFFICE VISIT (OUTPATIENT)
Dept: ORTHOPEDICS | Facility: CLINIC | Age: 87
End: 2022-09-12
Payer: COMMERCIAL

## 2022-09-12 DIAGNOSIS — G89.29 CHRONIC RIGHT SHOULDER PAIN: Primary | ICD-10-CM

## 2022-09-12 DIAGNOSIS — G89.29 CHRONIC LEFT SHOULDER PAIN: ICD-10-CM

## 2022-09-12 DIAGNOSIS — M25.512 CHRONIC LEFT SHOULDER PAIN: ICD-10-CM

## 2022-09-12 DIAGNOSIS — M25.511 CHRONIC RIGHT SHOULDER PAIN: Primary | ICD-10-CM

## 2022-09-12 PROCEDURE — 20611 DRAIN/INJ JOINT/BURSA W/US: CPT | Mod: 50 | Performed by: FAMILY MEDICINE

## 2022-09-12 RX ORDER — METHYLPREDNISOLONE ACETATE 40 MG/ML
40 INJECTION, SUSPENSION INTRA-ARTICULAR; INTRALESIONAL; INTRAMUSCULAR; SOFT TISSUE
Status: SHIPPED | OUTPATIENT
Start: 2022-09-12

## 2022-09-12 RX ADMIN — METHYLPREDNISOLONE ACETATE 40 MG: 40 INJECTION, SUSPENSION INTRA-ARTICULAR; INTRALESIONAL; INTRAMUSCULAR; SOFT TISSUE at 10:02

## 2022-09-12 ASSESSMENT — PAIN SCALES - GENERAL: PAINLEVEL: SEVERE PAIN (6)

## 2022-09-12 NOTE — PROGRESS NOTES
Mr. Cai has chronic bilateral shoulder pain.  He is here for bilateral glenohumeral injections at the request of Dr. Jackson.        Large Joint Injection/Arthocentesis: bilateral glenohumeral    Date/Time: 9/12/2022 10:02 AM  Performed by: Mau Miller DO  Authorized by: Mau Miller DO     Indications:  Pain  Needle Size:  22 G  Guidance: ultrasound    Location:  Shoulder  Laterality:  Bilateral      Site:  Bilateral glenohumeral  Medications (Right):  40 mg methylPREDNISolone 40 MG/ML  Medications (Left):  40 mg methylPREDNISolone 40 MG/ML  Outcome:  Tolerated well, no immediate complications  Procedure discussed: discussed risks, benefits, and alternatives    Consent Given by:  Patient  Timeout: timeout called immediately prior to procedure    Prep: patient was prepped and draped in usual sterile fashion         Glenohumeral Injections - Ultrasound Guided    The patient was informed of the risks and the benefits of the procedure and a written consent was signed.    The patient's left shoulder was prepped with chlorhexidine in sterile fashion.   40 mg of methylprednisolone suspension was drawn up into a 5 mL syringe with 3 mL of 1% lidocaine w/o Epi.  Injection was performed using sterile technique.  Under ultrasound guidance a 3.5-inch 22-gauge needle was used to enter the glenohumeral joint.  Posterior approach was used with the patient in lateral recumbent position, arm in neutral position at the side.  Needle placement was visualized and documented with ultrasound.  Ultrasound visualization was necessary due to the small joint space entered.  Injection performed long axis to the probe.  Injection solution visualized within the joint space.  Images were permanently stored for the patient's record.  There were no complications. The patient tolerated the procedure well. There was negligible bleeding.     The patient's right shoulder was prepped with chlorhexidine in sterile fashion.   40 mg of  methylprednisolone suspension was drawn up into a 5 mL syringe with 3 mL of 1% lidocaine w/o Epi.  Injection was performed using sterile technique.  Under ultrasound guidance a 3.5-inch 22-gauge needle was used to enter the glenohumeral joint.  Posterior approach was used with the patient in lateral recumbent position, arm in neutral position at the side.  Needle placement was visualized and documented with ultrasound.  Ultrasound visualization was necessary due to the small joint space entered.  Injection performed long axis to the probe.  Injection solution visualized within the joint space.  Images were permanently stored for the patient's record.  There were no complications. The patient tolerated the procedure well. There was negligible bleeding.

## 2022-09-12 NOTE — LETTER
9/12/2022         RE: Preston Cai  [address]  [facility] [address]        Dear Colleague,    Thank you for referring your patient, Preston Cai, to the [facility] [place]. Please see a copy of my visit note below.    Mr. Cai has chronic bilateral shoulder pain.  He is here for bilateral glenohumeral injections at the request of Dr. Jackson.        Large Joint Injection/Arthocentesis: bilateral glenohumeral    Date/Time: 9/12/2022 10:02 AM  Performed by: Mau Miller DO  Authorized by: Mau Miller DO     Indications:  Pain  Needle Size:  22 G  Guidance: ultrasound    Location:  Shoulder  Laterality:  Bilateral      Site:  Bilateral glenohumeral  Medications (Right):  40 mg methylPREDNISolone 40 MG/ML  Medications (Left):  40 mg methylPREDNISolone 40 MG/ML  Outcome:  Tolerated well, no immediate complications  Procedure discussed: discussed risks, benefits, and alternatives    Consent Given by:  Patient  Timeout: timeout called immediately prior to procedure    Prep: patient was prepped and draped in usual sterile fashion         Glenohumeral Injections - Ultrasound Guided    The patient was informed of the risks and the benefits of the procedure and a written consent was signed.    The patient s left shoulder was prepped with chlorhexidine in sterile fashion.   40 mg of kenalog suspension was drawn up into a 5 mL syringe with 3 mL of 1% lidocaine w/o Epi.  Injection was performed using sterile technique.  Under ultrasound guidance a 3.5-inch 22-gauge needle was used to enter the glenohumeral joint.  Posterior approach was used with the patient in lateral recumbent position, arm in neutral position at the side.  Needle placement was visualized and documented with ultrasound.  Ultrasound visualization was necessary due to the small joint space entered.  Injection performed long axis to the probe.  Injection solution visualized within the joint  space.  Images were permanently stored for the patient's record.  There were no complications. The patient tolerated the procedure well. There was negligible bleeding.     The patient's right shoulder was prepped with chlorhexidine in sterile fashion.   40 mg of kenalog suspension was drawn up into a 5 mL syringe with 3 mL of 1% lidocaine w/o Epi.  Injection was performed using sterile technique.  Under ultrasound guidance a 3.5-inch 22-gauge needle was used to enter the glenohumeral joint.  Posterior approach was used with the patient in lateral recumbent position, arm in neutral position at the side.  Needle placement was visualized and documented with ultrasound.  Ultrasound visualization was necessary due to the small joint space entered.  Injection performed long axis to the probe.  Injection solution visualized within the joint space.  Images were permanently stored for the patient's record.  There were no complications. The patient tolerated the procedure well. There was negligible bleeding.                   Again, thank you for allowing me to participate in the care of your patient.        Sincerely,        Mau Miller DO

## 2022-09-12 NOTE — NURSING NOTE
[facility]   ORTHOPEDICS & [facility] MEDICINE  [address] [place] [address]  Dept: [phone]  ______________________________________________________________________________    Patient: Preston Cai   : 1935   MRN: [id number]   2022    INVASIVE PROCEDURE SAFETY CHECKLIST    Date: 2022    Procedure: bilateral shoulder injection  Patient Name: Preston Cai  MRN: [id number]  YOB: 1935    Action: Complete sections as appropriate. Any discrepancy results in a HARD COPY until resolved.     PRE PROCEDURE:  Patient ID verified with 2 identifiers (name and  or MRN): Yes  Procedure and site verified with patient/designee (when able): Yes  Accurate consent documentation in medical record: Yes  H&P (or appropriate assessment) documented in medical record: Yes  H&P must be up to 20 days prior to procedure and updates within 24 hours of procedure as applicable: NA  Relevant diagnostic and radiology test results appropriately labeled and displayed as applicable: NA  Procedure site(s) marked with provider initials: NA    TIMEOUT:  Time-Out performed immediately prior to starting procedure, including verbal and active participation of all team members addressing the following:Yes  * Correct patient identify  * Confirmed that the correct side and site are marked  * An accurate procedure consent form  * Agreement on the procedure to be done  * Correct patient position  * Relevant images and results are properly labeled and appropriately displayed  * The need to administer antibiotics or fluids for irrigation purposes during the procedure as applicable   * Safety precautions based on patient history or medication use    DURING PROCEDURE: Verification of correct person, site, and procedures any time the responsibility for care of the patient is transferred to another member of the care team.       Prior to injection, verified patient identity using patient's name  and date of birth.  Due to injection administration, patient instructed to remain in clinic for 15 minutes  afterwards, and to report any adverse reaction to me immediately.    Joint injection was performed.      Drug Amount Wasted:  None.  Vial/Syringe: Single dose vial  Expiration Date:  12/31/2023      Nghia Razo, EMT  September 12, 2022

## 2022-09-12 NOTE — NURSING NOTE
Chief Complaint   Patient presents with     Left Shoulder - Pain, New Patient     Right Shoulder - Pain, New Patient       There were no vitals filed for this visit.    There is no height or weight on file to calculate BMI.      GRADY Agrawal NREMT

## 2022-09-27 DIAGNOSIS — G60.9 HEREDITARY AND IDIOPATHIC PERIPHERAL NEUROPATHY: ICD-10-CM

## 2022-09-27 DIAGNOSIS — H40.10X0 OPEN-ANGLE GLAUCOMA OF RIGHT EYE, UNSPECIFIED GLAUCOMA STAGE, UNSPECIFIED OPEN-ANGLE GLAUCOMA TYPE: ICD-10-CM

## 2022-09-27 RX ORDER — GABAPENTIN 100 MG/1
CAPSULE ORAL
Qty: 270 CAPSULE | Refills: 3 | Status: SHIPPED | OUTPATIENT
Start: 2022-09-27 | End: 2023-03-06

## 2022-09-27 NOTE — TELEPHONE ENCOUNTER
Gabapentin was originally prescribed by his general neurologist (Dr. Heart) who has since retired. This refill request should probably be addressed by Dr. Manning (PCP).    Dr. Manning- please sign order if you agree or let me know if you'd like me to sign on your behalf.    Fior Matos, Pharm.D.  Medication Therapy Management Pharmacist  [facility]

## 2022-09-29 NOTE — TELEPHONE ENCOUNTER
Outpatient Medication Detail     Disp Refills Start End ELYSSA   gabapentin (NEURONTIN) 100 MG capsule 270 capsule 3 9/27/2022  No   Sig: Takes 2-3 x 100mg capsule by mouth nightly   Sent to pharmacy as: Gabapentin 100 MG Oral Capsule (NEURONTIN)   Class: E-Prescribe   Order: [id number]   E-Prescribing Status: Receipt confirmed by pharmacy (9/27/2022  7:45 PM CDT)

## 2022-10-25 ENCOUNTER — OFFICE VISIT (OUTPATIENT)
Dept: OTOLARYNGOLOGY | Facility: CLINIC | Age: 87
End: 2022-10-25
Payer: COMMERCIAL

## 2022-10-25 VITALS — SYSTOLIC BLOOD PRESSURE: 120 MMHG | HEART RATE: 65 BPM | DIASTOLIC BLOOD PRESSURE: 65 MMHG

## 2022-10-25 DIAGNOSIS — H81.01 MENIERE'S DISEASE, RIGHT: Primary | ICD-10-CM

## 2022-10-25 DIAGNOSIS — J30.89 CHRONIC NON-SEASONAL ALLERGIC RHINITIS: ICD-10-CM

## 2022-10-25 DIAGNOSIS — K21.9 GASTROESOPHAGEAL REFLUX DISEASE WITHOUT ESOPHAGITIS: ICD-10-CM

## 2022-10-25 PROCEDURE — 99214 OFFICE O/P EST MOD 30 MIN: CPT | Performed by: OTOLARYNGOLOGY

## 2022-10-25 RX ORDER — FLUTICASONE PROPIONATE 50 MCG
1 SPRAY, SUSPENSION (ML) NASAL DAILY
COMMUNITY
End: 2023-08-22

## 2022-10-25 NOTE — LETTER
10/25/2022         RE: Preston Cai  [address]  [facility] [address]        Dear Colleague,    Thank you for referring your patient, Preston Cai, to the [facility]. Please see a copy of my visit note below.    Chief Complaint   Patient presents with     Follow Up     Menier's, thinks he may have had 1 episode of dizziness.                     HPI    This 86 year old patient who is here for the f/u. I am glad to see that he is doing well with no vertigo since his last visit. He was concerned to drop his BP more with a water pill so he did not take his hydrochlorothiazide.  Denies any trauma, otalgia, otorrhea. Describes recent stress and increased salt intake. His BP is always low. His hearing is stable.  Denies any n/v, choking, coughing, difficulty swallowing, vision issues, weakness or numbness. His BP is always low.   He continues to have frequent throat clearing. 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 Describes hoarseness for the past several years and he tried PPIs with some benefit. He has no hx of difficulty swallowing or weight changes.     Preston's hearing test showed that 2 kHz sharply sloping to a moderate high freq. 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 Mild low freq. SNHL rising to WNL sloping back to  moderate high freq. 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 Excellent word rec. Tymps WNL. Present 1 kHz ipsi/contra reflexes bilaterally.    ENT Problem List  Allergic rhinitis due to other allergen    Cervical radiculopathy at C6    Personal history of malignant neoplasm of larynx    Pseudophakia    Hiatal hernia with GERD    Macular degeneration    Sleep disorder, nonorganic    Seborrheic dermatitis    Essential tremor    CARDIOVASCULAR SCREENING; LDL GOAL LESS THAN 160    Benign prostatic hyperplasia    Hoarse voice quality    Advanced directives, counseling/discussion    Supraspinatus sprain, right, initial encounter    CARDIOVASCULAR SCREENING; LDL GOAL LESS THAN  100    Vitamin D deficiency    Chronic midline low back pain without sciatica    Other gastritis without hemorrhage, unspecified chronicity    BPH associated with nocturia    Weight loss    Pernicious anemia    Arthritis of right shoulder region      Medications         budesonide (RINOCORT AQUA) 32 MCG/ACT nasal spray      famotidine (PEPCID) 20 MG tablet      gabapentin (NEURONTIN) 100 MG capsule      latanoprost (XALATAN) 0.005 % ophthalmic solution      mirtazapine (REMERON) 15 MG tablet      pantoprazole (PROTONIX) 40 MG EC tablet      primidone (MYSOLINE) 250 MG tablet      tamsulosin (FLOMAX) 0.4 MG capsule      Vitamin D, Cholecalciferol, 25 MCG (1000 UT) CAPS         MR BRAIN W/O & W CONTRAST 7/22/2019 11:51 AM     History: Vertigo     ICD-10: Meniere's disease, right     Comparison: MRI 9/17/2009     Technique: Axial diffusion-weighted with ADC map, T2-weighted,  turboFLAIR and T1-weighted images of the brain and axial T1-weighted  and coronal T2-weighted with fat saturation images centered on the  internal auditory canals were obtained without intravenous contrast.  Following intravenous administration of gadolinium, axial turboFLAIR  images of the brain and axial T1-weighted with fat saturation and  coronal T1-weighted images centered on the internal auditory canals  were obtained.     Contrast: 7.5ml Gadavist     Findings: No abnormal signal or enhancement along the course of the  seventh and eighth cranial nerves on either side. Vestibular and  auditory structures exhibit normal signal on T2-weighted images and no  abnormal enhancement.     Images of the whole brain demonstrate no mass lesion, no mass effect,  or midline shift. No intracranial hemorrhage on  susceptibility-weighted images. Mild leukokraurosis. There is no  restricted diffusion. Ventricles are proportionate to the cerebral  sulci. No abnormal enhancement.     Pseudophakia. The visualized paranasal sinuses and mastoid air cells  are  clear.                                                                       Impression:    1. Normal MRI of the IACs. No evidence of mass.  2. Mild chronic small vessel ischemic disease MR, progressed since  8/17/2009.    Review of Systems   Constitutional: Negative.    HENT: Positive for hearing loss and tinnitus. Negative for congestion, ear discharge, ear pain, nosebleeds, sinus pain and sore throat.    Eyes: Negative for blurred vision and double vision.   Respiratory: Negative for cough and hemoptysis.    Gastrointestinal: Negative for heartburn, nausea and vomiting.   Skin: Negative.    Neurological: Positive for dizziness. Negative for tingling, tremors and headaches.   Endo/Heme/Allergies: Negative for environmental allergies. Does not bruise/bleed easily.     Physical Exam   Constitutional: He appears well-developed and well-nourished.   HENT:   Head: Normocephalic and atraumatic.   Right Ear: Tympanic membrane, external ear and ear canal normal. No drainage, swelling or tenderness. No middle ear effusion. Decreased hearing is noted.   Left Ear: Tympanic membrane, external ear and ear canal normal. No drainage, swelling or tenderness.  No middle ear effusion. Decreased hearing is noted.   Nose: Mucosal edema, rhinorrhea and septal deviation present.   Mouth/Throat: Uvula is midline, oropharynx is clear and moist and mucous membranes are normal.   Eyes: Pupils are equal, round, and reactive to light. EOM are normal.   Neck: Normal range of motion. Neck supple.     A/P  This pleasant patient  Had an episode of vertigo due to right Meniere's disease. Options including caffeine, salt restriction, good hydration, anxiety control, and diuretic medication and a short term tapered steroid were reviewed. Regarding his reflux, diet is reviewed and he will switch to Pantoprazole 20 mg once a day.  I will see him in the f/u in 3 months. His questions were answered.            Again, thank you for allowing me to  participate in the care of your patient.        Sincerely,        Tosha Ron MD

## 2022-10-25 NOTE — NURSING NOTE
Preston Cai's chief complaint for this visit includes:  Chief Complaint   Patient presents with     Follow Up     Menier's, thinks he may have had 1 episode of dizziness.      PCP: Tyrel Manning    Referring Provider:  No referring provider defined for this encounter.    /65   Pulse 65   Data Unavailable        Allergies   Allergen Reactions     Seasonal Allergies      Hay fever          Do you need any medication refills at today's visit?

## 2022-10-25 NOTE — PROGRESS NOTES
Chief Complaint   Patient presents with     Follow Up     Menier's, thinks he may have had 1 episode of dizziness.                     HPI    This 86 year old patient who is here for the f/u. I am glad to see that he is doing well with no vertigo since his last visit. He was concerned to drop his BP more with a water pill so he did not take his hydrochlorothiazide.  Denies any trauma, otalgia, otorrhea. Describes recent stress and increased salt intake. His BP is always low. His hearing is stable.  Denies any n/v, choking, coughing, difficulty swallowing, vision issues, weakness or numbness. His BP is always low.   He continues to have frequent throat clearing. 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 Describes hoarseness for the past several years and he tried PPIs with some benefit. He has no hx of difficulty swallowing or weight changes.     Preston's hearing test showed that 2 kHz sharply sloping to a moderate high freq. 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 Mild low freq. SNHL rising to WNL sloping back to  moderate high freq. 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 Excellent word rec. Tymps WNL. Present 1 kHz ipsi/contra reflexes bilaterally.    ENT Problem List  Allergic rhinitis due to other allergen    Cervical radiculopathy at C6    Personal history of malignant neoplasm of larynx    Pseudophakia    Hiatal hernia with GERD    Macular degeneration    Sleep disorder, nonorganic    Seborrheic dermatitis    Essential tremor    CARDIOVASCULAR SCREENING; LDL GOAL LESS THAN 160    Benign prostatic hyperplasia    Hoarse voice quality    Advanced directives, counseling/discussion    Supraspinatus sprain, right, initial encounter    CARDIOVASCULAR SCREENING; LDL GOAL LESS THAN 100    Vitamin D deficiency    Chronic midline low back pain without sciatica    Other gastritis without hemorrhage, unspecified chronicity    BPH associated with nocturia    Weight loss    Pernicious anemia    Arthritis of right shoulder  region      Medications         budesonide (RINOCORT AQUA) 32 MCG/ACT nasal spray      famotidine (PEPCID) 20 MG tablet      gabapentin (NEURONTIN) 100 MG capsule      latanoprost (XALATAN) 0.005 % ophthalmic solution      mirtazapine (REMERON) 15 MG tablet      pantoprazole (PROTONIX) 40 MG EC tablet      primidone (MYSOLINE) 250 MG tablet      tamsulosin (FLOMAX) 0.4 MG capsule      Vitamin D, Cholecalciferol, 25 MCG (1000 UT) CAPS         MR BRAIN W/O & W CONTRAST 7/22/2019 11:51 AM     History: Vertigo     ICD-10: Meniere's disease, right     Comparison: MRI 9/17/2009     Technique: Axial diffusion-weighted with ADC map, T2-weighted,  turboFLAIR and T1-weighted images of the brain and axial T1-weighted  and coronal T2-weighted with fat saturation images centered on the  internal auditory canals were obtained without intravenous contrast.  Following intravenous administration of gadolinium, axial turboFLAIR  images of the brain and axial T1-weighted with fat saturation and  coronal T1-weighted images centered on the internal auditory canals  were obtained.     Contrast: 7.5ml Gadavist     Findings: No abnormal signal or enhancement along the course of the  seventh and eighth cranial nerves on either side. Vestibular and  auditory structures exhibit normal signal on T2-weighted images and no  abnormal enhancement.     Images of the whole brain demonstrate no mass lesion, no mass effect,  or midline shift. No intracranial hemorrhage on  susceptibility-weighted images. Mild leukokraurosis. There is no  restricted diffusion. Ventricles are proportionate to the cerebral  sulci. No abnormal enhancement.     Pseudophakia. The visualized paranasal sinuses and mastoid air cells  are clear.                                                                       Impression:    1. Normal MRI of the IACs. No evidence of mass.  2. Mild chronic small vessel ischemic disease MR, progressed since  8/17/2009.    Review of Systems    Constitutional: Negative.    HENT: Positive for hearing loss and tinnitus. Negative for congestion, ear discharge, ear pain, nosebleeds, sinus pain and sore throat.    Eyes: Negative for blurred vision and double vision.   Respiratory: Negative for cough and hemoptysis.    Gastrointestinal: Negative for heartburn, nausea and vomiting.   Skin: Negative.    Neurological: Positive for dizziness. Negative for tingling, tremors and headaches.   Endo/Heme/Allergies: Negative for environmental allergies. Does not bruise/bleed easily.     Physical Exam   Constitutional: He appears well-developed and well-nourished.   HENT:   Head: Normocephalic and atraumatic.   Right Ear: Tympanic membrane, external ear and ear canal normal. No drainage, swelling or tenderness. No middle ear effusion. Decreased hearing is noted.   Left Ear: Tympanic membrane, external ear and ear canal normal. No drainage, swelling or tenderness.  No middle ear effusion. Decreased hearing is noted.   Nose: Mucosal edema, rhinorrhea and septal deviation present.   Mouth/Throat: Uvula is midline, oropharynx is clear and moist and mucous membranes are normal.   Eyes: Pupils are equal, round, and reactive to light. EOM are normal.   Neck: Normal range of motion. Neck supple.     A/P  This pleasant patient  Had an episode of vertigo due to right Meniere's disease. Options including caffeine, salt restriction, good hydration, anxiety control, and diuretic medication and a short term tapered steroid were reviewed. Regarding his reflux, diet is reviewed and he will switch to Pantoprazole 20 mg once a day.  I will see him in the f/u in 3 months. His questions were answered.

## 2022-11-30 NOTE — PROGRESS NOTES
EMERGENCY DEPARTMENT HISTORY AND PHYSICAL EXAM      Date: 2022  Patient Name: Kenzie Tilley    History of Presenting Illness     Chief Complaint   Patient presents with    Hypertension    Fall       History Provided By: Patient and EMS    HPI: Kenzie Tilley, 81 y.o. female brought to ED by EMS who report were called at scene at [facility] where patient had fallen. Patient states that she was shopping at [facility] when he legs gave out. She states she was able to get up without assistance. She denies loss of consciousness. EMS report blood pressure was high at scene. Patient is on blood pressure medication which she takes twice a day and is not taking the evening dose. She reports she feels fine at the moment and wants to go home. She is with her neighbor at bedside. There are no other complaints, changes, or physical findings at this time. Past History     Past Medical History:  Past Medical History:   Diagnosis Date    Basal cell adenocarcinoma     follows with dermatology    Contact dermatitis and eczema due to cause     Hypertension 2015    Shoulder pain 2016       Past Surgical History:  Past Surgical History:   Procedure Laterality Date    HX CATARACT REMOVAL Bilateral 2018    HX TUBAL LIGATION         Family History:  Family History   Problem Relation Age of Onset    Lung Cancer Mother     Other Father         old age       Social History:  Social History     Tobacco Use    Smoking status: Former     Packs/day: 0.50     Years: 30.00     Pack years: 15.00     Types: Cigarettes     Quit date: 2010     Years since quittin.9    Smokeless tobacco: Never   Vaping Use    Vaping Use: Never used   Substance Use Topics    Alcohol use: Never    Drug use: Never       Allergies:  No Known Allergies    PCP: Dashawn Chang MD    No current facility-administered medications on file prior to encounter.      Current Outpatient Medications on File Prior to Encounter   Medication Sig HISTORY OF PRESENT ILLNESS  Mr. Cai is a pleasant 84 year old year old male who presents to clinic today with chronic neck and low back pain  Reports 'neuropathy' in feet for the past few years  Has had more pain in the mornings in his low back and hips  And some cramping in legs  He has had a cervical MRI in the past showing ddd, but got better from his neck pain with PT  Now he is having more neck pain that travels down his right arm and causes tingling    Preston explains that he wants to improve his symptoms so he can do more physical activity  Location: neck and low back  Quality:  achy pain    Severity: 7/10 at worst    Duration: years  Timing: occurs intermittently  Context: occurs while exercising and lifting, lying flat, walking and standing  Modifying factors:  resting and non-use makes it better, movement and use makes it worse  Associated signs & symptoms: right arm tingling, tinglingi n both feet, pain in hips  MEDICAL HISTORY  Patient Active Problem List   Diagnosis     Seborrheic dermatitis     Allergic rhinitis due to other allergen     Cervical radiculopathy at C6     Personal history of malignant neoplasm of larynx     Essential tremor     CARDIOVASCULAR SCREENING; LDL GOAL LESS THAN 160     Benign prostatic hyperplasia     Hoarse voice quality     Advanced directives, counseling/discussion     Pseudophakia     Gastroesophageal reflux disease, esophagitis presence not specified     Macular degeneration     Supraspinatus sprain, right, initial encounter     CARDIOVASCULAR SCREENING; LDL GOAL LESS THAN 100     Vitamin D deficiency     Chronic midline low back pain without sciatica       Current Outpatient Medications   Medication Sig Dispense Refill     gabapentin (NEURONTIN) 100 MG capsule 3 capsules each evening 270 capsule 2     pantoprazole (PROTONIX) 40 MG EC tablet Take 1 tablet (40 mg) by mouth daily 90 tablet 3     primidone (MYSOLINE) 250 MG tablet Take 1 tablet (250 mg) by mouth At  Bedtime 90 tablet 3     tamsulosin (FLOMAX) 0.4 MG capsule TAKE TWO CAPSULES BY MOUTH ONCE DAILY 180 capsule 3     VITAMIN D, CHOLECALCIFEROL, PO Take 1,000 Units by mouth daily         Allergies   Allergen Reactions     Seasonal Allergies      Hay fever        Family History   Problem Relation Age of Onset     Cerebrovascular Disease Mother         at 86 yo - possibly TIA - befoer     Macular Degeneration Mother      Gastrointestinal Disease Father      Cancer - colorectal Father      Hypertension Father      Cancer Father      Cancer Other      C.A.D. No family hx of      Diabetes No family hx of      Prostate Cancer No family hx of      Glaucoma No family hx of      Thyroid Disease No family hx of      Social History     Socioeconomic History     Marital status:      Spouse name: Edyta Cai     Number of children: Not on file     Years of education: Not on file     Highest education level: Not on file   Occupational History     Employer: RETIRED   Social Needs     Financial resource strain: Not on file     Food insecurity     Worry: Not on file     Inability: Not on file     Transportation needs     Medical: Not on file     Non-medical: Not on file   Tobacco Use     Smoking status: Former Smoker     Smokeless tobacco: Never Used     Tobacco comment: 1969   Substance and Sexual Activity     Alcohol use: Yes     Alcohol/week: 0.0 standard drinks     Comment: A beer once in awhile     Drug use: No     Sexual activity: Yes     Partners: Female     Birth control/protection: None   Lifestyle     Physical activity     Days per week: Not on file     Minutes per session: Not on file     Stress: Not on file   Relationships     Social connections     Talks on phone: Not on file     Gets together: Not on file     Attends Tenriism service: Not on file     Active member of club or organization: Not on file     Attends meetings of clubs or organizations: Not on file     Relationship status: Not on file     Intimate  Dispense Refill    enalapril (VASOTEC) 20 mg tablet Take 1 Tablet by mouth two (2) times a day. 180 Tablet 1    CALCIUM PO Take  by mouth. With vitamin D       [DISCONTINUED] hydroCHLOROthiazide (HYDRODIURIL) 25 mg tablet Take 1 Tablet by mouth daily. 90 Tablet 1       Review of Systems   Review of Systems   All other systems reviewed and are negative. Physical Exam   Physical Exam  Vitals and nursing note reviewed. Constitutional:       General: She is not in acute distress. Appearance: She is well-developed. She is not diaphoretic. Comments: Elderly female in no acute distress. HENT:      Head: Normocephalic and atraumatic. Jaw: No trismus. Right Ear: External ear normal. No swelling or tenderness. Tympanic membrane is not perforated, erythematous or bulging. Left Ear: External ear normal. No swelling or tenderness. Tympanic membrane is not perforated, erythematous or bulging. Nose: Nose normal. No mucosal edema or rhinorrhea. Right Sinus: No maxillary sinus tenderness or frontal sinus tenderness. Left Sinus: No maxillary sinus tenderness or frontal sinus tenderness. Mouth/Throat:      Mouth: No oral lesions. Dentition: No dental abscesses. Pharynx: Uvula midline. No oropharyngeal exudate, posterior oropharyngeal erythema or uvula swelling. Tonsils: No tonsillar abscesses. Eyes:      General: No scleral icterus. Right eye: No discharge. Left eye: No discharge. Conjunctiva/sclera: Conjunctivae normal.   Cardiovascular:      Rate and Rhythm: Normal rate and regular rhythm. Heart sounds: Normal heart sounds. No murmur heard. No friction rub. No gallop. Pulmonary:      Effort: Pulmonary effort is normal. No tachypnea, accessory muscle usage or respiratory distress. Breath sounds: Normal breath sounds. No decreased breath sounds, wheezing, rhonchi or rales. Abdominal:      Palpations: Abdomen is soft. Musculoskeletal:         General: No tenderness. Normal range of motion. Cervical back: Normal range of motion and neck supple. Comments: There is a small abrasion between index and third finger left hand. Lymphadenopathy:      Cervical: No cervical adenopathy. Skin:     General: Skin is warm and dry. Findings: No erythema or rash. Neurological:      General: No focal deficit present. Mental Status: She is alert and oriented to person, place, and time. Mental status is at baseline. Psychiatric:         Judgment: Judgment normal.       Lab and Diagnostic Study Results   Labs -     Recent Results (from the past 12 hour(s))   CBC WITH AUTOMATED DIFF    Collection Time: 11/29/22  4:10 PM   Result Value Ref Range    WBC 12.3 4.6 - 13.2 K/uL    RBC 4.41 4.20 - 5.30 M/uL    HGB 13.3 12.0 - 16.0 g/dL    HCT 39.0 35.0 - 45.0 %    MCV 88.4 78.0 - 100.0 FL    MCH 30.2 24.0 - 34.0 PG    MCHC 34.1 31.0 - 37.0 g/dL    RDW 14.7 (H) 11.6 - 14.5 %    PLATELET 877 204 - 048 K/uL    MPV 10.7 9.2 - 11.8 FL    NRBC 0.0 0.0  WBC    ABSOLUTE NRBC 0.00 0.00 - 0.01 K/uL    NEUTROPHILS 69 40 - 73 %    LYMPHOCYTES 17 (L) 21 - 52 %    MONOCYTES 10 3 - 10 %    EOSINOPHILS 3 0 - 5 %    BASOPHILS 1 0 - 2 %    IMMATURE GRANULOCYTES 0 0 - 0.5 %    ABS. NEUTROPHILS 8.6 (H) 1.8 - 8.0 K/UL    ABS. LYMPHOCYTES 2.1 0.9 - 3.6 K/UL    ABS. MONOCYTES 1.3 (H) 0.05 - 1.2 K/UL    ABS. EOSINOPHILS 0.3 0.0 - 0.4 K/UL    ABS. BASOPHILS 0.1 0.0 - 0.1 K/UL    ABS. IMM.  GRANS. 0.1 (H) 0.00 - 0.04 K/UL    DF AUTOMATED     METABOLIC PANEL, BASIC    Collection Time: 11/29/22  4:10 PM   Result Value Ref Range    Sodium 135 (L) 136 - 145 mmol/L    Potassium 4.0 3.5 - 5.5 mmol/L    Chloride 96 (L) 100 - 111 mmol/L    CO2 27 21 - 32 mmol/L    Anion gap 12 3.0 - 18.0 mmol/L    Glucose 95 74 - 99 mg/dL    BUN 14 7 - 18 mg/dL    Creatinine 0.90 0.60 - 1.30 mg/dL    BUN/Creatinine ratio 16 12 - 20      eGFR >60 >60 ml/min/1.73m2    Calcium partner violence     Fear of current or ex partner: Not on file     Emotionally abused: Not on file     Physically abused: Not on file     Forced sexual activity: Not on file   Other Topics Concern     Parent/sibling w/ CABG, MI or angioplasty before 65F 55M? No   Social History Narrative    seen by Jose M Diaz. lives in [facility].  to Edyta Cai.    2 sons and 2 daughters    Cancer father    Stroke mother        Daughter in [facility]    Son in [place]    Daughter in [place]    Son in [place]           Additional medical/Social/Surgical histories reviewed in [facility] and updated as appropriate.     REVIEW OF SYSTEMS (8/20/2020)  10 point ROS of systems including Constitutional, Eyes, Respiratory, Cardiovascular, Gastroenterology, Genitourinary, Integumentary, Musculoskeletal, Psychiatric, Allergic/Immunologic were all negative except for pertinent positives noted in my HPI.     PHYSICAL EXAM  Vitals:    08/20/20 0918   BP: 101/58   Pulse: 80     Vital Signs: /58   Pulse 80  Patient declined being weighed. There is no height or weight on file to calculate BMI.    General  - normal appearance, in no obvious distress  HEENT  - conjunctivae not injected, moist mucous membranes, normocephalic/atraumatic head, ears normal appearance, no lesions, mouth normal appearance, no scars, normal dentition and teeth present  CV  - normal peripheral perfusion  Pulm  - normal respiratory pattern, non-labored  Musculoskeletal - lumbar spine  - stance: normal gait without limp, no obvious leg length discrepancy, normal heel and toe walk  - inspection: normal bone and joint alignment, no obvious scoliosis  - palpation: no paravertebral or bony tenderness  - ROM: flexion exacerbates pain, normal extension, sidebending, rotation  - strength: lower extremities 5/5 in all planes  - special tests:  (+) straight leg raise  (+) slump test  Neuro  - patellar and Achilles DTRs 2+ bilaterally, some lower extremity  9.4 8.5 - 10.1 mg/dL   EKG, 12 LEAD, INITIAL    Collection Time: 11/29/22  5:14 PM   Result Value Ref Range    Ventricular Rate 87 BPM    Atrial Rate 88 BPM    P-R Interval 219 ms    QRS Duration 132 ms    Q-T Interval 392 ms    QTC Calculation (Bezet) 472 ms    Calculated P Axis 68 degrees    Calculated R Axis -3 degrees    Calculated T Axis 78 degrees    Diagnosis       Atrial flutter with predominant 3:1 AV block  Sinus pause  Left bundle branch block         Radiologic Studies -   @lastxrresult@  CT Results  (Last 48 hours)      None          CXR Results  (Last 48 hours)      None            Medical Decision Making and ED Course   Differential Diagnosis & Medical Decision Making Provider Note:       - I am the first provider for this patient. I reviewed the vital signs, available nursing notes, past medical history, past surgical history, family history and social history. The patients presenting problems have been discussed, and they are in agreement with the care plan formulated and outlined with them. I have encouraged them to ask questions as they arise throughout their visit. Vital Signs-Reviewed the patient's vital signs. Patient Vitals for the past 12 hrs:   Temp Pulse Resp BP SpO2   11/29/22 1838 -- -- (!) 85 (!) 195/101 97 %   11/29/22 1726 -- 87 -- -- --   11/29/22 1724 -- (!) 112 20 (!) 193/107 96 %   11/29/22 1720 -- (!) 104 20 (!) 207/111 98 %   11/29/22 1718 -- 79 19 (!) 173/103 98 %   11/29/22 1614 98.7 °F (37.1 °C) 93 18 (!) 198/97 98 %       ED Course:      Patient presents to ED with a fall with no significant injuries. He has a small abrasion left hand. She has no focal neuro findings. EKG with no new changes, has an old left bundle branch block. EMS report high blood pressure at scene, blood pressure still high at arrival, patient admitted she had not taken her evening dose. She was given 0.1mg clonidine.   Patient did not want extensive work-up, reports she was just seen by her sensory deficit throughout L5 distribution, grossly normal coordination, normal muscle tone  Skin  - no ecchymosis, erythema, warmth, or induration, no obvious rash  Psych  - interactive, appropriate, normal mood and affect  Neck: has some pain with flexion and extension, with positive spurlings on right  ASSESSMENT & PLAN  83 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 HEP and PT   Start voltaren  Cont. Gabapentin nightly      Mau Sage MD, CAQSM     PCP.  She did not want CT scan of head. She wanted to take her blood pressure medication soon after she gets home. Was discharged at baseline in stable condition with her neighbor. Procedures     Disposition   Disposition: Condition stable and improved  DC- Adult Discharges: All of the diagnostic tests were reviewed and questions answered. Diagnosis, care plan and treatment options were discussed. The patient understands the instructions and will follow up as directed. The patients results have been reviewed with them. They have been counseled regarding their diagnosis. The patient verbally convey understanding and agreement of the signs, symptoms, diagnosis, treatment and prognosis and additionally agrees to follow up as recommended with their PCP in 24 - 48 hours. They also agree with the care-plan and convey that all of their questions have been answered. I have also put together some discharge instructions for them that include: 1) educational information regarding their diagnosis, 2) how to care for their diagnosis at home, as well a 3) list of reasons why they would want to return to the ED prior to their follow-up appointment, should their condition change. DISCHARGE PLAN:  1. Current Discharge Medication List        CONTINUE these medications which have NOT CHANGED    Details   enalapril (VASOTEC) 20 mg tablet Take 1 Tablet by mouth two (2) times a day. Qty: 180 Tablet, Refills: 1      CALCIUM PO Take  by mouth. With vitamin D            2.   Follow-up Information       Follow up With Specialties Details Why Contact Info    Estela Maradiaga MD Family Medicine In 2 days  [facility] [address] [id number]  [phone]      [facility] EMERGENCY DEPT Emergency Medicine  If symptoms worsen [facility] [id number] [phone]          3. Return to ED if worse   4.    Current Discharge Medication List         Remove if admitted/transferred    Diagnosis/Clinical Impression     Clinical Impression:   1. Fall, initial encounter    2. Abrasion of finger of left hand, initial encounter    3. Hypertension, unspecified type        Attestations: Thomas Matthews MD, am the primary clinician of record. Please note that this dictation was completed with nWay, the [employer] voice recognition software. Quite often unanticipated grammatical, syntax, homophones, and other interpretive errors are inadvertently transcribed by the computer software. Please disregard these errors. Please excuse any errors that have escaped final proofreading. Thank you.

## 2022-12-09 DIAGNOSIS — K21.9 GASTROESOPHAGEAL REFLUX DISEASE WITHOUT ESOPHAGITIS: ICD-10-CM

## 2022-12-09 RX ORDER — PANTOPRAZOLE SODIUM 40 MG/1
40 TABLET, DELAYED RELEASE ORAL DAILY
Qty: 90 TABLET | Refills: 1 | Status: SHIPPED | OUTPATIENT
Start: 2022-12-09 | End: 2024-02-11

## 2022-12-19 ENCOUNTER — OFFICE VISIT (OUTPATIENT)
Dept: ORTHOPEDICS | Facility: CLINIC | Age: 87
End: 2022-12-19
Payer: COMMERCIAL

## 2022-12-19 DIAGNOSIS — M19.012 ARTHRITIS OF SHOULDER REGION, LEFT: Primary | ICD-10-CM

## 2022-12-19 PROCEDURE — 99214 OFFICE O/P EST MOD 30 MIN: CPT | Performed by: ORTHOPAEDIC SURGERY

## 2022-12-19 NOTE — LETTER
12/19/2022         RE: Preston Cai  [address]  [place] [place] [address]        Dear Colleague,    Thank you for referring your patient, Preston Cai, to the [facility]. Please see a copy of my visit note below.    CHIEF CONCERN:  Bilateral shoulder pain    HISTORY OF PRESENT ILLNESS: Mr. Cai is a 86-year-old right-hand-dominant man I am seeing back today for both of his shoulders.  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  He had injections back in 2019 and that worked very well in his left shoulder at the time.  Those were repeated bilaterally on 9/12/2022 and unfortunately did not give him significant relief or any lasting relief.  He has no neck arthrosis and so continues to have symptoms from that.    PHYSICAL EXAM:    Adult male in no acute distress. Articulates and communicates with normal affect.  Respirations even and unlabored  Focused upper extremity exam: Sensation intact all dermatomes into the hand to light touch. EPL, FPL, and Intrinsics intact. Right shoulder active motion is FE to 145, ER at side to 55, and IR to L5. Left shoulder active motion is FE to 80, ER to 35, and IR to buttock only. No pain on palpation over the AC joint.  Mild pain on palpation over the long head of the biceps.  Forward elevation and external rotation strength are 4 out of 5 bilaterally.    IMAGING:  Right shoulder radiographs from July and August this year demonstrate mild right glenohumeral arthrosis.    2019 radiographs of the left shoulder demonstrated at that time near complete loss of the joint space and moderately advanced glenohumeral arthrosis.    ASSESSMENT:  1.  Left moderately advanced glenohumeral arthrosis  2.  Mild right glenohumeral arthrosis  3.  Right scapulothoracic crepitus and bursitis, asymptomatic or nonpainful    PLAN:  I reviewed with the patient the shoulder x-rays and discussed  that based on his exam his arthrosis has likely progressed to some degree on both sides.  I outlined that if nonoperative treatment including over-the-counter creams, occasional over-the-counter medications, and his cortisone injections are not providing adequate relief we could discuss surgical options.  I outlined that for his left shoulder which is the most problematic at this time his only real operative option is a shoulder replacement.  We discussed the risks and benefits of this.  He is very much not interested in pursuing surgery.  He was open to trying Voltaren at this time.  I also discussed that he might consider a suprascapular nerve block if he is looking to truly exhaust his nonoperative treatment options.  He will try the Voltaren and be in touch with our office as needed.    Christie Jackson MD        Again, thank you for allowing me to participate in the care of your patient.        Sincerely,        Christie Jackson MD

## 2022-12-19 NOTE — PROGRESS NOTES
CHIEF CONCERN:  Bilateral shoulder pain    HISTORY OF PRESENT ILLNESS: Mr. Cai is a 86-year-old right-hand-dominant man I am seeing back today for both of his shoulders.  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  He had injections back in 2019 and that worked very well in his left shoulder at the time.  Those were repeated bilaterally on 9/12/2022 and unfortunately did not give him significant relief or any lasting relief.  He has no neck arthrosis and so continues to have symptoms from that.    PHYSICAL EXAM:    Adult male in no acute distress. Articulates and communicates with normal affect.  Respirations even and unlabored  Focused upper extremity exam: Sensation intact all dermatomes into the hand to light touch. EPL, FPL, and Intrinsics intact. Right shoulder active motion is FE to 145, ER at side to 55, and IR to L5. Left shoulder active motion is FE to 80, ER to 35, and IR to buttock only. No pain on palpation over the AC joint.  Mild pain on palpation over the long head of the biceps.  Forward elevation and external rotation strength are 4 out of 5 bilaterally.    IMAGING:  Right shoulder radiographs from July and August this year demonstrate mild right glenohumeral arthrosis.    2019 radiographs of the left shoulder demonstrated at that time near complete loss of the joint space and moderately advanced glenohumeral arthrosis.    ASSESSMENT:  1.  Left moderately advanced glenohumeral arthrosis  2.  Mild right glenohumeral arthrosis  3.  Right scapulothoracic crepitus and bursitis, asymptomatic or nonpainful    PLAN:  I reviewed with the patient the shoulder x-rays and discussed that based on his exam his arthrosis has likely progressed to some degree on both sides.  I outlined that if nonoperative treatment including over-the-counter creams, occasional over-the-counter medications, and his cortisone injections are not providing adequate  relief we could discuss surgical options.  I outlined that for his left shoulder which is the most problematic at this time his only real operative option is a shoulder replacement.  We discussed the risks and benefits of this.  He is very much not interested in pursuing surgery.  He was open to trying Voltaren at this time.  I also discussed that he might consider a suprascapular nerve block if he is looking to truly exhaust his nonoperative treatment options.  He will try the Voltaren and be in touch with our office as needed.    Christie Jackson MD

## 2022-12-19 NOTE — NURSING NOTE
Reason For Visit:   Chief Complaint   Patient presents with     RECHECK     Bilateral Shoulder pain, L shoulder hurts with movements, R shoulder hurts most at night/sleeping. Bilateral injections done 9/2022 but did not give relief. Also complains of neck pain.        PCP: Tyrel Manning    ?  No  Occupation Retired.  Currently working? No.  Work status?  Retired.  Date of injury: Gradual onset  Type of injury: Gradual onset.  Date of surgery: Bilateral cortisone injections 9/12/2022 (injection on left shoulder 2019)  Smoker: No  Request smoking cessation information: No     Right hand dominant     SANE score  Affected shoulder: Right  Right shoulder SANE: 85+  Left shoulder SANE: 40     There were no vitals taken for this visit.    Rajinder Milan, ATC

## 2023-01-25 ENCOUNTER — OFFICE VISIT (OUTPATIENT)
Dept: OTOLARYNGOLOGY | Facility: CLINIC | Age: 88
End: 2023-01-25
Payer: COMMERCIAL

## 2023-01-25 VITALS — DIASTOLIC BLOOD PRESSURE: 64 MMHG | HEART RATE: 74 BPM | SYSTOLIC BLOOD PRESSURE: 116 MMHG

## 2023-01-25 DIAGNOSIS — R13.14 PHARYNGOESOPHAGEAL DYSPHAGIA: ICD-10-CM

## 2023-01-25 DIAGNOSIS — J30.89 CHRONIC NON-SEASONAL ALLERGIC RHINITIS: ICD-10-CM

## 2023-01-25 DIAGNOSIS — R49.0 DYSPHONIA: ICD-10-CM

## 2023-01-25 DIAGNOSIS — K21.9 GASTROESOPHAGEAL REFLUX DISEASE WITHOUT ESOPHAGITIS: Primary | ICD-10-CM

## 2023-01-25 PROCEDURE — 99214 OFFICE O/P EST MOD 30 MIN: CPT | Mod: 25 | Performed by: OTOLARYNGOLOGY

## 2023-01-25 PROCEDURE — 31575 DIAGNOSTIC LARYNGOSCOPY: CPT | Performed by: OTOLARYNGOLOGY

## 2023-01-25 NOTE — NURSING NOTE
Preston Cai's chief complaint for this visit includes:  Chief Complaint   Patient presents with     Follow Up     Meniere's. Has had 1 dizzy spell since last visit. Left ear plugged a lot. Using Flonase daily.      PCP: Tyrel Manning    Referring Provider:  No referring provider defined for this encounter.    /64   Pulse 74   Data Unavailable        Allergies   Allergen Reactions     Seasonal Allergies      Hay fever          Do you need any medication refills at today's visit?

## 2023-01-25 NOTE — PROGRESS NOTES
Chief Complaint   Patient presents with     Follow Up     Menier's, thinks he may have had 1 episode of dizziness.                     HPI    This 87 year old patient is here for the f/u. I am glad to see that he is doing well with no vertigo since his last visit. He had a very brief episode of vertigo last week.  Denies any trauma, otalgia, otorrhea. Describes recent stress and increased salt intake. His BP is always low. His hearing is stable. Denies any n/v, difficulty swallowing, vision issues, weakness or numbness. His BP is always low.   He continues to have frequent throat clearing. Started to feel some choking sensation with grain, and cereal. Denies any odynophagia, or dysphagia. He cough from time to time, dry with no phlegm or production. 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 Describes hoarseness for the past several years and he tried PPIs with some benefit. He has no hx of difficulty swallowing or weight changes.     Preston's hearing test showed that 2 kHz sharply sloping to a moderate high freq. 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 Mild low freq. SNHL rising to WNL sloping back to  moderate high freq. 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 Excellent word rec. Tymps WNL. Present 1 kHz ipsi/contra reflexes bilaterally.    ENT Problem List  Allergic rhinitis due to other allergen    Cervical radiculopathy at C6    Personal history of malignant neoplasm of larynx    Pseudophakia    Hiatal hernia with GERD    Macular degeneration    Sleep disorder, nonorganic    Seborrheic dermatitis    Essential tremor    CARDIOVASCULAR SCREENING; LDL GOAL LESS THAN 160    Benign prostatic hyperplasia    Hoarse voice quality    Advanced directives, counseling/discussion    Supraspinatus sprain, right, initial encounter    CARDIOVASCULAR SCREENING; LDL GOAL LESS THAN 100    Vitamin D deficiency    Chronic midline low back pain without sciatica    Other gastritis without hemorrhage, unspecified chronicity    BPH associated with  nocturia    Weight loss    Pernicious anemia    Arthritis of right shoulder region      Medications         budesonide (RINOCORT AQUA) 32 MCG/ACT nasal spray      famotidine (PEPCID) 20 MG tablet      gabapentin (NEURONTIN) 100 MG capsule      latanoprost (XALATAN) 0.005 % ophthalmic solution      mirtazapine (REMERON) 15 MG tablet      pantoprazole (PROTONIX) 40 MG EC tablet      primidone (MYSOLINE) 250 MG tablet      tamsulosin (FLOMAX) 0.4 MG capsule      Vitamin D, Cholecalciferol, 25 MCG (1000 UT) CAPS         MR BRAIN W/O & W CONTRAST 7/22/2019 11:51 AM     History: Vertigo     ICD-10: Meniere's disease, right     Comparison: MRI 9/17/2009     Technique: Axial diffusion-weighted with ADC map, T2-weighted,  turboFLAIR and T1-weighted images of the brain and axial T1-weighted  and coronal T2-weighted with fat saturation images centered on the  internal auditory canals were obtained without intravenous contrast.  Following intravenous administration of gadolinium, axial turboFLAIR  images of the brain and axial T1-weighted with fat saturation and  coronal T1-weighted images centered on the internal auditory canals  were obtained.     Contrast: 7.5ml Gadavist     Findings: No abnormal signal or enhancement along the course of the  seventh and eighth cranial nerves on either side. Vestibular and  auditory structures exhibit normal signal on T2-weighted images and no  abnormal enhancement.     Images of the whole brain demonstrate no mass lesion, no mass effect,  or midline shift. No intracranial hemorrhage on  susceptibility-weighted images. Mild leukokraurosis. There is no  restricted diffusion. Ventricles are proportionate to the cerebral  sulci. No abnormal enhancement.     Pseudophakia. The visualized paranasal sinuses and mastoid air cells  are clear.                                                                       Impression:    1. Normal MRI of the IACs. No evidence of mass.  2. Mild chronic small  vessel ischemic disease MR, progressed since  8/17/2009.    Review of Systems   Constitutional: Negative.    HENT: Positive for hearing loss and tinnitus. Negative for congestion, ear discharge, ear pain, nosebleeds, sinus pain and sore throat.    Eyes: Negative for blurred vision and double vision.   Respiratory: Negative for cough and hemoptysis.    Gastrointestinal: Negative for heartburn, nausea and vomiting.   Skin: Negative.    Neurological: Positive for dizziness. Negative for tingling, tremors and headaches.   Endo/Heme/Allergies: Negative for environmental allergies. Does not bruise/bleed easily.     Physical Exam   Constitutional: He appears well-developed and well-nourished.   HENT:   Head: Normocephalic and atraumatic.   Right Ear: Tympanic membrane, external ear and ear canal normal. No drainage, swelling or tenderness. No middle ear effusion. Decreased hearing is noted.   Left Ear: Tympanic membrane, external ear and ear canal normal. No drainage, swelling or tenderness.  No middle ear effusion. Decreased hearing is noted.   Nose: Mucosal edema, rhinorrhea and septal deviation present.   Mouth/Throat: Uvula is midline, oropharynx is clear and moist and mucous membranes are normal.   Eyes: Pupils are equal, round, and reactive to light. EOM are normal.   Neck: Normal range of motion. Neck supple.     Diagnostic nasal endoscopy:     He was seen in the room and identified. Pros and cons of the procedure were explained to the patient. The procedure and its alternatives were explained to the patient in lay terms. His questions were answered. His symptoms required a diagnostic endoscopic evaluation under local anesthesia. After obtaining an informed consent from the patient, 1% Lidocaine with 1: 100.000 epinephrine spray was applied to each nostril. Then a flexible scopic exam was performed. Ostiomeatal complexes are free of disease. No pus or polyp seen. Nasopharynx is normal. Rosenmüller fossa and torus  tubarius are normal. Epiglottis, hypopharynx, false vocal folds are normal. No pooling in pyriform fossae. Vocal cords are mobile and close with a fusiform gap. Interarytenoid and post cricoid edema were seen otherwise no mass. His voice is breathy. He tolerated the procedure well and left the room with no complications.      A/P  This pleasant patient  Had an episode of vertigo due to right Meniere's disease. Options including caffeine, salt restriction, good hydration, anxiety control, and diuretic medication and a short term tapered steroid were reviewed. Regarding his reflux, diet is reviewed and he will switch to Pantoprazole 20 mg once a day.  I will see him in the f/u in 3 months. His questions were answered.

## 2023-01-25 NOTE — LETTER
1/25/2023         RE: Preston Cai  [address]  [facility] [address]        Dear Colleague,    Thank you for referring your patient, Preston Cai, to the [facility]. Please see a copy of my visit note below.    Chief Complaint   Patient presents with     Follow Up     Menier's, thinks he may have had 1 episode of dizziness.                     HPI    This 87 year old patient is here for the f/u. I am glad to see that he is doing well with no vertigo since his last visit. He had a very brief episode of vertigo last week.  Denies any trauma, otalgia, otorrhea. Describes recent stress and increased salt intake. His BP is always low. His hearing is stable. Denies any n/v, difficulty swallowing, vision issues, weakness or numbness. His BP is always low.   He continues to have frequent throat clearing. Started to feel some choking sensation with grain, and cereal. Denies any odynophagia, or dysphagia. He cough from time to time, dry with no phlegm or production. 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 Describes hoarseness for the past several years and he tried PPIs with some benefit. He has no hx of difficulty swallowing or weight changes.     Preston's hearing test showed that 2 kHz sharply sloping to a moderate high freq. 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 Mild low freq. SNHL rising to WNL sloping back to  moderate high freq. 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 Excellent word rec. Tymps WNL. Present 1 kHz ipsi/contra reflexes bilaterally.    ENT Problem List  Allergic rhinitis due to other allergen    Cervical radiculopathy at C6    Personal history of malignant neoplasm of larynx    Pseudophakia    Hiatal hernia with GERD    Macular degeneration    Sleep disorder, nonorganic    Seborrheic dermatitis    Essential tremor    CARDIOVASCULAR SCREENING; LDL GOAL LESS THAN 160    Benign prostatic hyperplasia    Hoarse voice quality    Advanced directives,  counseling/discussion    Supraspinatus sprain, right, initial encounter    CARDIOVASCULAR SCREENING; LDL GOAL LESS THAN 100    Vitamin D deficiency    Chronic midline low back pain without sciatica    Other gastritis without hemorrhage, unspecified chronicity    BPH associated with nocturia    Weight loss    Pernicious anemia    Arthritis of right shoulder region      Medications         budesonide (RINOCORT AQUA) 32 MCG/ACT nasal spray      famotidine (PEPCID) 20 MG tablet      gabapentin (NEURONTIN) 100 MG capsule      latanoprost (XALATAN) 0.005 % ophthalmic solution      mirtazapine (REMERON) 15 MG tablet      pantoprazole (PROTONIX) 40 MG EC tablet      primidone (MYSOLINE) 250 MG tablet      tamsulosin (FLOMAX) 0.4 MG capsule      Vitamin D, Cholecalciferol, 25 MCG (1000 UT) CAPS         MR BRAIN W/O & W CONTRAST 7/22/2019 11:51 AM     History: Vertigo     ICD-10: Meniere's disease, right     Comparison: MRI 9/17/2009     Technique: Axial diffusion-weighted with ADC map, T2-weighted,  turboFLAIR and T1-weighted images of the brain and axial T1-weighted  and coronal T2-weighted with fat saturation images centered on the  internal auditory canals were obtained without intravenous contrast.  Following intravenous administration of gadolinium, axial turboFLAIR  images of the brain and axial T1-weighted with fat saturation and  coronal T1-weighted images centered on the internal auditory canals  were obtained.     Contrast: 7.5ml Gadavist     Findings: No abnormal signal or enhancement along the course of the  seventh and eighth cranial nerves on either side. Vestibular and  auditory structures exhibit normal signal on T2-weighted images and no  abnormal enhancement.     Images of the whole brain demonstrate no mass lesion, no mass effect,  or midline shift. No intracranial hemorrhage on  susceptibility-weighted images. Mild leukokraurosis. There is no  restricted diffusion. Ventricles are proportionate to the  cerebral  sulci. No abnormal enhancement.     Pseudophakia. The visualized paranasal sinuses and mastoid air cells  are clear.                                                                       Impression:    1. Normal MRI of the IACs. No evidence of mass.  2. Mild chronic small vessel ischemic disease MR, progressed since  8/17/2009.    Review of Systems   Constitutional: Negative.    HENT: Positive for hearing loss and tinnitus. Negative for congestion, ear discharge, ear pain, nosebleeds, sinus pain and sore throat.    Eyes: Negative for blurred vision and double vision.   Respiratory: Negative for cough and hemoptysis.    Gastrointestinal: Negative for heartburn, nausea and vomiting.   Skin: Negative.    Neurological: Positive for dizziness. Negative for tingling, tremors and headaches.   Endo/Heme/Allergies: Negative for environmental allergies. Does not bruise/bleed easily.     Physical Exam   Constitutional: He appears well-developed and well-nourished.   HENT:   Head: Normocephalic and atraumatic.   Right Ear: Tympanic membrane, external ear and ear canal normal. No drainage, swelling or tenderness. No middle ear effusion. Decreased hearing is noted.   Left Ear: Tympanic membrane, external ear and ear canal normal. No drainage, swelling or tenderness.  No middle ear effusion. Decreased hearing is noted.   Nose: Mucosal edema, rhinorrhea and septal deviation present.   Mouth/Throat: Uvula is midline, oropharynx is clear and moist and mucous membranes are normal.   Eyes: Pupils are equal, round, and reactive to light. EOM are normal.   Neck: Normal range of motion. Neck supple.     Diagnostic nasal endoscopy:     He was seen in the room and identified. Pros and cons of the procedure were explained to the patient. The procedure and its alternatives were explained to the patient in lay terms. His questions were answered. His symptoms required a diagnostic endoscopic evaluation under local anesthesia. After  obtaining an informed consent from the patient, 1% Lidocaine with 1: 100.000 epinephrine spray was applied to each nostril. Then a flexible scopic exam was performed. Ostiomeatal complexes are free of disease. No pus or polyp seen. Nasopharynx is normal. Rosenmüller fossa and torus tubarius are normal. Epiglottis, hypopharynx, false vocal folds are normal. No pooling in pyriform fossae. Vocal cords are mobile and close with a fusiform gap. Interarytenoid and post cricoid edema were seen otherwise no mass. His voice is breathy. He tolerated the procedure well and left the room with no complications.      A/P  This pleasant patient  Had an episode of vertigo due to right Meniere's disease. Options including caffeine, salt restriction, good hydration, anxiety control, and diuretic medication and a short term tapered steroid were reviewed. Regarding his reflux, diet is reviewed and he will switch to Pantoprazole 20 mg once a day.  I will see him in the f/u in 3 months. His questions were answered.            Again, thank you for allowing me to participate in the care of your patient.        Sincerely,        Tosha Ron MD

## 2023-02-06 ENCOUNTER — OFFICE VISIT (OUTPATIENT)
Dept: DERMATOLOGY | Facility: CLINIC | Age: 88
End: 2023-02-06
Payer: COMMERCIAL

## 2023-02-06 DIAGNOSIS — L57.0 ACTINIC KERATOSES: ICD-10-CM

## 2023-02-06 DIAGNOSIS — L82.0 SEBORRHEIC KERATOSIS, INFLAMED: ICD-10-CM

## 2023-02-06 DIAGNOSIS — L81.4 SOLAR LENTIGO: ICD-10-CM

## 2023-02-06 DIAGNOSIS — L82.1 SEBORRHEIC KERATOSES: ICD-10-CM

## 2023-02-06 DIAGNOSIS — Z85.828 HISTORY OF NONMELANOMA SKIN CANCER: Primary | ICD-10-CM

## 2023-02-06 DIAGNOSIS — D18.01 CHERRY ANGIOMA: ICD-10-CM

## 2023-02-06 DIAGNOSIS — D22.9 MULTIPLE BENIGN NEVI: ICD-10-CM

## 2023-02-06 DIAGNOSIS — L11.1 GROVER'S DISEASE: ICD-10-CM

## 2023-02-06 PROCEDURE — 99213 OFFICE O/P EST LOW 20 MIN: CPT | Mod: 25 | Performed by: DERMATOLOGY

## 2023-02-06 PROCEDURE — 17003 DESTRUCT PREMALG LES 2-14: CPT | Mod: XS | Performed by: DERMATOLOGY

## 2023-02-06 PROCEDURE — 17110 DESTRUCTION B9 LES UP TO 14: CPT | Performed by: DERMATOLOGY

## 2023-02-06 PROCEDURE — 17000 DESTRUCT PREMALG LESION: CPT | Mod: XS | Performed by: DERMATOLOGY

## 2023-02-06 RX ORDER — TRIAMCINOLONE ACETONIDE 1 MG/G
CREAM TOPICAL 2 TIMES DAILY PRN
Qty: 454 G | Refills: 3 | Status: SHIPPED | OUTPATIENT
Start: 2023-02-06 | End: 2024-04-10

## 2023-02-06 ASSESSMENT — PAIN SCALES - GENERAL: PAINLEVEL: NO PAIN (0)

## 2023-02-06 NOTE — LETTER
2/6/2023         RE: Preston Cai  [address]  [facility] [address]        Dear Colleague,    Thank you for referring your patient, Preston Cai, to the [facility]. Please see a copy of my visit note below.    [facility] Dermatology Note  Encounter Date: Feb 6, 2023  Office Visit     Dermatology Problem List:  Last skin check 2/6/23, recommended yearly  1. 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 AKs, s/p cryo  - pigmented AK - left jaw, s/p by 11/13/18  - HAK - right hand, s/p excision 5/2009  3. 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   ____________________________________________     Assessment & Plan:     # History of nonmelanoma skin cancer, no clinical evidence of recurrence.  - ABCDEs: Counseled ABCDEs of melanoma: Asymmetry, Border (irregularity), Color (not uniform, changes in color), Diameter (greater than 6 mm which is about the size of a pencil eraser), and Evolving (any changes in preexisting moles).  - Sun protection: Counseled SPF30+ sunscreen, UPF clothing, sun avoidance, tanning bed avoidance.   - Recommended regular skin checks.      # Grovers disease. Chronic, stable. Well controlled with topicals.  - Continue triamcinolone 0.1% cream PRN. Refilled.     # Actinic keratosis - left preauricular cheek x 1, right forehead x 1. Based on the location and chronic irritation to this lesion, treatment with cryotherapy is warranted.   - See cryo procedure note.    # Seborrheic keratosis, symptomatic - scalp x 2, right posterior neck x 2, right shoulder x 2, right mid back x 2, left upper back x 1, right dorsal hand x 1. Based on the location and chronic irritation to this lesion, treatment with cryotherapy is warranted.   - See cryo procedure note.     # Benign lichenoid  keratosis - right chest x 1. Based on the location and chronic irritation to this lesion, treatment with cryotherapy is warranted.   - See cryo procedure note.     # Benign lesions: Multiple benign nevi, solar lentigos, seborrheic keratoses, cherry angiomas. Explained to patient benign nature of lesion. No treatment is necessary at this time unless the lesion changes or becomes symptomatic.   - ABCDEs of melanoma were discussed and self skin checks were advised.  - Sun precaution was advised including the use of sun screens of SPF 30 or higher, sun protective clothing, and avoidance of tanning beds.    Procedures Performed:   - Cryotherapy procedure note, location(s): left preauricular cheek, right forehead, right chest, right dorsal hand, right posterior neck, scalp, right shoulder, right mid back, left upper back. After verbal consent and discussion of risks and benefits including, but not limited to, dyspigmentation/scar, blister, and pain, 2 AK(s) and 12 ISK(s) was(were) treated with 1-2 mm freeze border for 1-2 cycles with liquid nitrogen. Post cryotherapy instructions were provided.      Follow-up: 1 year(s) in-person for a skin check, or earlier for new or changing lesions    Staff and Scribe:     Scribe Disclosure:   I, Nicolas López, am serving as a scribe to document services personally performed by this physician, Dr. Andry Oseguera, based on data collection and the provider's statements to me.     Provider Disclosure:   The documentation recorded by the scribe accurately reflects the services I personally performed and the decisions made by me.    Andry Oseguera MD    Department of Dermatology  [facility]: Phone: [phone], Fax:[phone]  [facility]: Phone: [phone] Fax: [phone]  ____________________________________________    CC: Skin Check (FBSC: area of concern-  top of scalp, two spots on left cheek, right cheek, right side of neck, and upper back. Hx of NMSC. )    HPI:  Mr. Preston Cai is a(n) 87 year old male who presents today as a return patient for a skin check.    Last seen 2/7/22 for a skin check. At that time, 2 AKs and 9 inflamed SKs were treated with cryotherapy, and patient was instructed to continue triamcinolone 0.1% cream as needed.     Today, he is concerned about numerous spots:  - scalp spot that itches occasionally.   - left cheek 2 spots that were treated last year but have become crusty recently.   - right cheek that itches, treating with triamcinolone cream  - right neck spot that is itchy and developed about 1 year ago.  - upper back spot that is sensitive when he puts cream on.   - He also reports a spot on the right hand that gets itchy but recurs.    The patient otherwise denies any new or concerning lesions. No bleeding, painful, pruritic, or changing lesions. Health otherwise stable. No other skin concerns. They do use sunscreen and protective clothing when outdoors for sun protection.       Labs Reviewed:  N/A    Physical Exam:  Vitals: There were no vitals taken for this visit.  SKIN: Full skin, which includes the head/face, both arms, chest, back, abdomen,both legs, genitalia and/or groin buttocks, digits and/or nails, was examined.  - Well healed scars at sites of previous NMSC, no repigmentation or nodularity noted.   - There are dome shaped bright red papules on the trunk and extremities.   - Multiple regular brown pigmented macules and papules are identified on the trunk and extremities.   - Scattered brown macules on sun exposed areas.  - There are waxy stuck on tan to brown papules on the trunk and extremities.   - There are erythematous macules with overyling adherent scale on the left preauricular cheek x 1, right forehead x 1.     - There are tan to brown waxy stuck on papules with surrounding erythema on the scalp x 2, right  posterior neck x 2, right shoulder x 2, right mid back x 2, left upper back x 1, right dorsal hand x 1, right chest x 1, right preauricular cheek x 1.     - No other lesions of concern on areas examined.     Medications:  Current Outpatient Medications   Medication     cyanocobalamin (VITAMIN B-12) 1000 MCG tablet     fluticasone (FLONASE) 50 MCG/ACT nasal spray     gabapentin (NEURONTIN) 100 MG capsule     latanoprost (XALATAN) 0.005 % ophthalmic solution     pantoprazole (PROTONIX) 40 MG EC tablet     primidone (MYSOLINE) 250 MG tablet     triamcinolone (KENALOG) 0.1 % external cream     Vitamin D, Cholecalciferol, 25 MCG (1000 UT) CAPS     budesonide (RINOCORT AQUA) 32 MCG/ACT nasal spray     mirtazapine (REMERON) 15 MG tablet     tamsulosin (FLOMAX) 0.4 MG capsule     triamterene-HCTZ (DYAZIDE) 37.5-25 MG capsule     Current Facility-Administered Medications   Medication     methylPREDNISolone (DEPO-MEDROL) injection 40 mg     methylPREDNISolone (DEPO-MEDROL) injection 40 mg      Past Medical History:   Patient Active Problem List   Diagnosis     Seborrheic dermatitis     Seasonal allergic rhinitis due to pollen     Cervical radiculopathy at C6     Personal history of malignant neoplasm of larynx     Essential tremor     CARDIOVASCULAR SCREENING; LDL GOAL LESS THAN 160     Benign prostatic hyperplasia     Hoarse voice quality     Advanced directives, counseling/discussion     Pseudophakia     Hiatal hernia with GERD     Macular degeneration     Supraspinatus sprain, right, initial encounter     CARDIOVASCULAR SCREENING; LDL GOAL LESS THAN 100     Vitamin D deficiency     Chronic midline low back pain without sciatica     Sleep disorder, nonorganic     Other gastritis without hemorrhage, unspecified chronicity     BPH associated with nocturia     Weight loss     Pernicious anemia     Arthritis of right shoulder region     Past Medical History:   Diagnosis Date     Acid reflux disease      Allergic rhinitis, cause  unspecified      H/O magnetic resonance imaging of brain and brain stem 10/10/2016    MRI BRAIN WITH AND WITHOUT CONTRAST August 17, 2009 8:07:00 PM   HISTORY: Severe dizzy spells with imbalance and vomiting.   TECHNIQUE: Routine pulse sequences without and with contrast were performed including thin section images through the IACs. 20 mL Magnevist given.   FINDINGS: Diffusion-weighted images are normal. The brain parenchyma, brainstem, ventricular system, and subarachnoid spaces a     Hematuria     Hematuria  - in 1990's      History of anemia 2002    Anemia-Iron Deficit     History of gastric ulcer      Macular degeneration      Malignant neoplasm of laryngeal cartilages (H)     Laryngeal CA (Radiation 1982)     Nonsenile cataract      PONV (postoperative nausea and vomiting)      Tremor 10/3/2016           Again, thank you for allowing me to participate in the care of your patient.        Sincerely,        Andry Oseguera MD

## 2023-02-06 NOTE — NURSING NOTE
Preston Cai's chief complaint for this visit includes:  Chief Complaint   Patient presents with     Skin Check     FBSC: area of concern- top of scalp, two spots on left cheek, right cheek, right side of neck, and upper back. Hx of NMSC.      PCP: Tyrel Manning    Referring Provider:  No referring provider defined for this encounter.    There were no vitals taken for this visit.  No Pain (0)        Allergies   Allergen Reactions     Seasonal Allergies      Hay fever          Do you need any medication refills at today's visit?  No.       Loren Rain LPN

## 2023-02-06 NOTE — PROGRESS NOTES
[facility] Dermatology Note  Encounter Date: Feb 6, 2023  Office Visit     Dermatology Problem List:  Last skin check 2/6/23, recommended yearly  1. 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 AKs, s/p cryo  - pigmented AK - left jaw, s/p by 11/13/18  - HAK - right hand, s/p excision 5/2009  3. 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   ____________________________________________     Assessment & Plan:     # History of nonmelanoma skin cancer, no clinical evidence of recurrence.  - ABCDEs: Counseled ABCDEs of melanoma: Asymmetry, Border (irregularity), Color (not uniform, changes in color), Diameter (greater than 6 mm which is about the size of a pencil eraser), and Evolving (any changes in preexisting moles).  - Sun protection: Counseled SPF30+ sunscreen, UPF clothing, sun avoidance, tanning bed avoidance.   - Recommended regular skin checks.      # Grovers disease. Chronic, stable. Well controlled with topicals.  - Continue triamcinolone 0.1% cream PRN. Refilled.     # Actinic keratosis - left preauricular cheek x 1, right forehead x 1. Based on the location and chronic irritation to this lesion, treatment with cryotherapy is warranted.   - See cryo procedure note.    # Seborrheic keratosis, symptomatic - scalp x 2, right posterior neck x 2, right shoulder x 2, right mid back x 2, left upper back x 1, right dorsal hand x 1. Based on the location and chronic irritation to this lesion, treatment with cryotherapy is warranted.   - See cryo procedure note.     # Benign lichenoid keratosis - right chest x 1. Based on the location and chronic irritation to this lesion, treatment with cryotherapy is warranted.   - See cryo procedure note.     # Benign lesions: Multiple benign nevi, solar lentigos, seborrheic keratoses, cherry angiomas.  Explained to patient benign nature of lesion. No treatment is necessary at this time unless the lesion changes or becomes symptomatic.   - ABCDEs of melanoma were discussed and self skin checks were advised.  - Sun precaution was advised including the use of sun screens of SPF 30 or higher, sun protective clothing, and avoidance of tanning beds.    Procedures Performed:   - Cryotherapy procedure note, location(s): left preauricular cheek, right forehead, right chest, right dorsal hand, right posterior neck, scalp, right shoulder, right mid back, left upper back. After verbal consent and discussion of risks and benefits including, but not limited to, dyspigmentation/scar, blister, and pain, 2 AK(s) and 12 ISK(s) was(were) treated with 1-2 mm freeze border for 1-2 cycles with liquid nitrogen. Post cryotherapy instructions were provided.      Follow-up: 1 year(s) in-person for a skin check, or earlier for new or changing lesions    Staff and Scribe:     Scribe Disclosure:   I, Nicolas López, am serving as a scribe to document services personally performed by this physician, Dr. Andry Oseguera, based on data collection and the provider's statements to me.     Provider Disclosure:   The documentation recorded by the scribe accurately reflects the services I personally performed and the decisions made by me.    Andry Oseguera MD    Department of Dermatology  [facility]: Phone: [phone], Fax:[phone]  [facility]: Phone: [phone] Fax: [phone]  ____________________________________________    CC: Skin Check (FBSC: area of concern- top of scalp, two spots on left cheek, right cheek, right side of neck, and upper back. Hx of NMSC. )    HPI:  Mr. Preston Cai is a(n) 87 year old male who presents today as a return patient for a skin check.    Last seen 2/7/22 for a skin check. At that  time, 2 AKs and 9 inflamed SKs were treated with cryotherapy, and patient was instructed to continue triamcinolone 0.1% cream as needed.     Today, he is concerned about numerous spots:  - scalp spot that itches occasionally.   - left cheek 2 spots that were treated last year but have become crusty recently.   - right cheek that itches, treating with triamcinolone cream  - right neck spot that is itchy and developed about 1 year ago.  - upper back spot that is sensitive when he puts cream on.   - He also reports a spot on the right hand that gets itchy but recurs.    The patient otherwise denies any new or concerning lesions. No bleeding, painful, pruritic, or changing lesions. Health otherwise stable. No other skin concerns. They do use sunscreen and protective clothing when outdoors for sun protection.       Labs Reviewed:  N/A    Physical Exam:  Vitals: There were no vitals taken for this visit.  SKIN: Full skin, which includes the head/face, both arms, chest, back, abdomen,both legs, genitalia and/or groin buttocks, digits and/or nails, was examined.  - Well healed scars at sites of previous NMSC, no repigmentation or nodularity noted.   - There are dome shaped bright red papules on the trunk and extremities.   - Multiple regular brown pigmented macules and papules are identified on the trunk and extremities.   - Scattered brown macules on sun exposed areas.  - There are waxy stuck on tan to brown papules on the trunk and extremities.   - There are erythematous macules with overyling adherent scale on the left preauricular cheek x 1, right forehead x 1.     - There are tan to brown waxy stuck on papules with surrounding erythema on the scalp x 2, right posterior neck x 2, right shoulder x 2, right mid back x 2, left upper back x 1, right dorsal hand x 1, right chest x 1, right preauricular cheek x 1.     - No other lesions of concern on areas examined.     Medications:  Current Outpatient Medications    Medication     cyanocobalamin (VITAMIN B-12) 1000 MCG tablet     fluticasone (FLONASE) 50 MCG/ACT nasal spray     gabapentin (NEURONTIN) 100 MG capsule     latanoprost (XALATAN) 0.005 % ophthalmic solution     pantoprazole (PROTONIX) 40 MG EC tablet     primidone (MYSOLINE) 250 MG tablet     triamcinolone (KENALOG) 0.1 % external cream     Vitamin D, Cholecalciferol, 25 MCG (1000 UT) CAPS     budesonide (RINOCORT AQUA) 32 MCG/ACT nasal spray     mirtazapine (REMERON) 15 MG tablet     tamsulosin (FLOMAX) 0.4 MG capsule     triamterene-HCTZ (DYAZIDE) 37.5-25 MG capsule     Current Facility-Administered Medications   Medication     methylPREDNISolone (DEPO-MEDROL) injection 40 mg     methylPREDNISolone (DEPO-MEDROL) injection 40 mg      Past Medical History:   Patient Active Problem List   Diagnosis     Seborrheic dermatitis     Seasonal allergic rhinitis due to pollen     Cervical radiculopathy at C6     Personal history of malignant neoplasm of larynx     Essential tremor     CARDIOVASCULAR SCREENING; LDL GOAL LESS THAN 160     Benign prostatic hyperplasia     Hoarse voice quality     Advanced directives, counseling/discussion     Pseudophakia     Hiatal hernia with GERD     Macular degeneration     Supraspinatus sprain, right, initial encounter     CARDIOVASCULAR SCREENING; LDL GOAL LESS THAN 100     Vitamin D deficiency     Chronic midline low back pain without sciatica     Sleep disorder, nonorganic     Other gastritis without hemorrhage, unspecified chronicity     BPH associated with nocturia     Weight loss     Pernicious anemia     Arthritis of right shoulder region     Past Medical History:   Diagnosis Date     Acid reflux disease      Allergic rhinitis, cause unspecified      H/O magnetic resonance imaging of brain and brain stem 10/10/2016    MRI BRAIN WITH AND WITHOUT CONTRAST August 17, 2009 8:07:00 PM   HISTORY: Severe dizzy spells with imbalance and vomiting.   TECHNIQUE: Routine pulse sequences  without and with contrast were performed including thin section images through the IACs. 20 mL Magnevist given.   FINDINGS: Diffusion-weighted images are normal. The brain parenchyma, brainstem, ventricular system, and subarachnoid spaces a     Hematuria     Hematuria  - in 1990's      History of anemia 2002    Anemia-Iron Deficit     History of gastric ulcer      Macular degeneration      Malignant neoplasm of laryngeal cartilages (H)     Laryngeal CA (Radiation 1982)     Nonsenile cataract      PONV (postoperative nausea and vomiting)      Tremor 10/3/2016

## 2023-02-06 NOTE — PATIENT INSTRUCTIONS
Cryotherapy    What is it?  Use of a very cold liquid, such as liquid nitrogen, to freeze and destroy abnormal skin cells that need to be removed    What should I expect?  Tenderness and redness  A small blister that might grow and fill with dark purple blood. There may be crusting.  More than one treatment may be needed if the lesions do not go away.    How do I care for the treated area?  Gently wash the area with your hands when bathing.  Use a thin layer of Vaseline to help with healing. You may use a Band-Aid.   The area should heal within 7-10 days and may leave behind a pink or lighter color.   Do not use an antibiotic or Neosporin ointment.   You may take acetaminophen (Tylenol) for pain.     Call your doctor if you have:  Severe pain  Signs of infection (warmth, redness, cloudy yellow drainage, and or a bad smell)  Questions or concerns    Who should I call with questions?      [facility]: [phone]      [facility]: [phone]      For urgent needs outside of business hours call the [facility] at [phone] and ask for the dermatology resident on call         Patient Education     Checking for Skin Cancer  You can find cancer early by checking your skin each month. There are 3 kinds of skin cancer. They are melanoma, basal cell carcinoma, and squamous cell carcinoma. Doing monthly skin checks is the best way to find new marks or skin changes. Follow the instructions below for checking your skin.   The ABCDEs of checking moles for melanoma   Check your moles or growths for signs of melanoma using ABCDE:   Asymmetry: the sides of the mole or growth don t match  Border: the edges are ragged, notched, or blurred  Color: the color within the mole or growth varies  Diameter: the mole or growth is larger than 6 mm (size of a pencil eraser)  Evolving: the size, shape, or color of the mole or growth is changing (evolving is not shown in  the images below)    Checking for other types of skin cancer  Basal cell carcinoma or squamous cell carcinoma have symptoms such as:     A spot or mole that looks different from all other marks on your skin  Changes in how an area feels, such as itching, tenderness, or pain  Changes in the skin's surface, such as oozing, bleeding, or scaliness  A sore that does not heal  New swelling or redness beyond the border of a mole    Who s at risk?  Anyone can get skin cancer. But you are at greater risk if you have:   Fair skin, light-colored hair, or light-colored eyes  Many moles or abnormal moles on your skin  A history of sunburns from sunlight or tanning beds  A family history of skin cancer  A history of exposure to radiation or chemicals  A weakened immune system  If you have had skin cancer in the past, you are at risk for recurring skin cancer.   How to check your skin  Do your monthly skin checkups in front of a full-length mirror. Check all parts of your body, including your:   Head (ears, face, neck, and scalp)  Torso (front, back, and sides)  Arms (tops, undersides, upper, and lower armpits)  Hands (palms, backs, and fingers, including under the nails)  Buttocks and genitals  Legs (front, back, and sides)  Feet (tops, soles, toes, including under the nails, and between toes)  If you have a lot of moles, take digital photos of them each month. Make sure to take photos both up close and from a distance. These can help you see if any moles change over time.   Most skin changes are not cancer. But if you see any changes in your skin, call your doctor right away. Only he or she can diagnose a problem. If you have skin cancer, seeing your doctor can be the first step toward getting the treatment that could save your life.   [employer] last reviewed this educational content on 4/1/2019 2000-2020 [employer]. [address] [place] [address], [place] [address]. All rights reserved. This information is not intended  as a substitute for professional medical care. Always follow your healthcare professional's instructions.       When should I call my doctor?  If you are worsening or not improving, please, contact us or seek urgent care as noted below.     Who should I call with questions (adults)?  [facility] (adult and pediatric): [phone]  [facility] (adult): [phone]  For urgent needs outside of business hours call the [facility] at [phone] and ask for the dermatology resident on call to be paged  If this is a medical emergency and you are unable to reach an ER, Call 911    Who should I call with questions (pediatric)?  [facility]- Pediatric Dermatology  Dr. Rosa Nelson, Dr. Ted Ann, Dr. Candice Jhaveri, JEREL Gupta, Dr. Yolie Abdi, Dr. Roxy Antoine & Dr. Mau Harmon  Non-urgent nurse triage line; [phone]- Nilsa and Keya VARGAS Care Coordinators   Alexa (/Complex ) [phone]    If you need a prescription refill, please contact your pharmacy. Refills are approved or denied by our Physicians during normal business hours, Monday through Fridays  Per office policy, refills will not be granted if you have not been seen within the past year (or sooner depending on your child's condition)    Scheduling Information:  Pediatric Appointment Scheduling and Call Center [phone]  Radiology Scheduling- [phone]  Sedation Unit Scheduling- [phone]  [facility] Scheduling- General [phone]; Pediatric Dermatology [phone]  Main  Services: [phone]  Romansh: [phone]  Israeli: [phone]  Hmong/Samoan/Bahamian: [phone]  Preadmission Nursing Department Fax Number: [phone] (Fax all pre-operative paperwork to this number)    For urgent matters arising during evenings, weekends, or holidays that cannot wait for normal business hours  please call [phone] and ask for the dermatology resident on call to be paged.

## 2023-02-23 NOTE — PROGRESS NOTES
Diagnosis/Summary/Recommendations:    PATIENT: Preston Cai  87 year old male     : 1935    BEATRIZ: 2023    MRN: [id number]  Address   [address]    [facility] [address] Phone   [phone] (Home) *Preferred*   [phone] (Mobile) E-mail Address   [email or web address]      Edyta cai         Discussed that his blood pressure and heart rate are pretty low that would not recommend a beta blocker except a 10mg dose as needed if he wanted anything     Would not push up the primidone further     Talked about surgical approaches like deep brain stimulation, focused beam ultrasound, gamma knife    Assessment:  (R25.1) Tremor  (primary encounter diagnosis)  Seen  and  by me   Gabapentin neurontin 100mg  Primidone mysoline 250mg at bedtime    Tremor when holding a book  Tremor not present at rest  Has shakiness when writing  Has some problems when eating    Review of diagnosis    tremor    Avoidance of dopamine blockers   Not taking    Motor complication review   n/a    Review of Impulse control disorders   N/a     Review of surgical or medication options   Consider a slow wean on primidone (Mysoline) if no other explanation is there.   Pharmacy consultation with Fior Matos if he goes down th is patch  Consider 50mg tabs x 5 and then every few weeks reduce by one tablet - will have more tremor but would have to see if feels better, etc.      For now will review the 250mg of primidone at his present dose.     Not clear why he has no energy and losing weight     He will visit with Dr. Manning and review his lab work and studies     Gait/Balance/Falls       Exercise/Therapy performed/offered   States his stride is pretty good but has neuropathy so not so straight    Cognitive/Driving   No changes in his driving  Some memory issues    Mood   Some stress -   Lots of stress since they moved  [facility] are living [facility]   They were locked down  Hope  to move to [facility] in [place] but on waiting list  Denies depression       Hallucinations/delusions     Sleep   Sleep disorder  Goes to bed around 9pm and wakes up at 7am  Wakes up 2-3 times per night to urinate  Some times has problems falling back asleep  May have sleep apnea  Snores  Has not had a sleep study  Tired when he wakes up  Naps during the day  30-60 minutes per day     Bladder/Renal/Prostate/Gyn/Other  BPH  Tamsulosin flomax 0.4mg   PSA 3.19 on 1/4/2022    GI/Constipation/GERD   GERD  Dexlansoprazole dexilant 60mg CR capsule - not taking  Pantoprazole (protonix) 40mg EC tablet  helicobacter negative  Lipase normal  Has a good appetite     153 lbs today      Colonoscopy and endoscopy done last week results pending     Final Diagnosis   A. DUODENUM, BIOPSY:   -Duodenal mucosa with no significant histopathologic abnormality  -No evidence of celiac disease     B. TRANSVERSE COLON, POLYPS X 4, BIOPSY:   -Tubular adenoma(s); negative for high-grade dysplasia     C. RECTUM, POLYP, BIOPSY:   - Sessile serrated adenoma; negative for cytologic dysplasia          Comprehensive metabolic was fine 12/17/2021 - no problems with liver      2 years ago had vomiting     He has acid reflux     Has a hernia       12/20/2021  IMPRESSION:  1. Diffuse thickening of the gastric wall raises the question of gastritis, however, this finding is relatively unchanged in appearance when compared to prior exam on 7/30/2017.  2. Prostatomegaly with diffuse bladder wall thickening, likely sequela of chronic bladder outlet obstruction.  3. Colonic diverticulosis without evidence of acute diverticulitis.       ENDO/Lipid/DM/Bone density/Thyroid  Vitamin D deficiency  Vitamin D, Cholecalciferol 1000 units   Thyroid studies were normal 1/4/2022    Cardio/heart/Hyper or Hypotensive   Denies light headedness or fainting  bp was 95/59    Vision/Dry Eyes/Cataracts/Glaucoma/Macular   Macular degeneration  Pseudophaia  Latanoprost  xalatan 0.005% solution  C Minkus monitoring his eyes    Heme/Anticoagulation/Antiplatelet/Anemia/Other  MMA normal  Parietal cell antibody 26.7 high   Intrinsic factor negative  Hemoglobin 12.6 on 12/17/2021    ENT/Resp  Dr Tosha Toledo - ENT    Allergic rhinitis     Hoarse voice    Skin/Cancer/Seborrhea/other      Musculoskeletal/Pain/Headache  Chronic radiculopathy C6  States he has a neuropathy  Gabapentin neurontin 100mg x 2 at bedtime    Other:       Medications      7am 8am 6pm   7pm   Cyanocobalamin Vit B12 1000mcg       Dexlansoprazole dexilant 60mg CR capsule Not taking         Fluticasone flonase 50mcg/act spray       Gabapentin neurontin 100mg       2   Latanoprost xalatan 0.005% solution           Mirtazapine remeron 15mg off      Pantoprazole (protonix) 40mg EC tablet 1         Primidone mysoline 250mg        1   Tamsulosin flomax 0.4mg  off     1     Triamterene-hydrochlorothiazide dyazide 37.5-25mg off      Vitamin D, Cholecalciferol 1000 units 25 mcg    1                        Plan:    Neuropathy affects his balance and has discomfort at night. He is taking gabapentin 100mg x 2 at 7pm and lays down around 930/10pm     July had dizzy spells and cut out medication     CONSIDER KATHLEEN TRIO DEVICE - INFORMATION BELOW.     CONTINUE ON PRIMIDONE 250MG AND CONSIDER 1/4 OF 250MG ADDITIONAL DOSE    CONTINUE ON 100MG GABAPENTIN - DISCUSSING DOSE - 1 - HAD MORE NEUROPATHY SYMPTOMS AND 2 LESS; NOT SURE IF BETTER WITH 3     Please read and confirm the following     Reasons Kathleen Trio Therapy is Required   Family History of tremor  Son may  Have tremor  Uncontrolled shaking  yes  Tremors on action or intention yes  Difficulty holding items yes  Frequently spills/drops items has problems with coffee  Difficulty eating normally has problems  Difficulty with dressing/daily hygiene needs some problems  Difficulty writing some problems  Difficulty using cell phone/computers  Some problems    Tried and failed  therapies   On primidone    Handedness right   Which hand is more severe right hand  Treatment: RIght, Left or both hands RIGHT   Where is the tremor worse - close to the mouth or when arms are extended   extended     Confirm that you do not have any of these contraindications:    An implanted electrical medical device, such as a pacemaker,  defibrillator, heart monitor, insulin pump, bladder stimulator, or  deep brain stimulator or an Insulin pump no      Suspected or diagnosed epilepsy or other seizure disorder  no      Pregnancy no    Confirm that your tremor is not caused by any of the followings no    Thyroid issues (e.g., hyperthyroidism)    Metabolic disorders (e.g., B-12 deficiency)    Size of watch band as measured around your wrist  MEDIUM  Small (13.6-16.4 cm), Medium (16.5-18.4 cm) or large (18.5-20.4 cm)     [employer] [id number]        Coding statement:   Medical Decision Making:  #  Chronic progressive medical conditions addressed  - see above --   Review and/or interpretation of unique test or documentation from a provider outside of neurology YES   Independent historian provided additional details  YES I  Prescription drug management and review of potential side effects and/or monitoring for side effects  -- see above ---  Health impacted by social determinants of health  NO    I have reviewed the note as documented above.  This accurately captures the substance of my conversation with the patient and total time spent preparing for visit, executing visit and completing visit on the day of the visit:  30 minutes.  The portion of this total time included face to face time 22 MINUTES      García Munroe MD     ______________________________________    Last visit date and details:             ______________________________________      Patient was asked about 14 Review of systems including changes in vision (dry eyes, double vision), hearing, heart, lungs, musculoskeletal, depression, anxiety, snoring, RBD,  insomnia, urinary frequency, urinary urgency, constipation, swallowing problems, hematological, ID, allergies, skin problems: seborrhea, endocrinological: thyroid, diabetes, cholesterol; balance, weight changes, and other neurological problems and these were not significant at this time except for   Patient Active Problem List   Diagnosis     Seborrheic dermatitis     Seasonal allergic rhinitis due to pollen     Cervical radiculopathy at C6     Personal history of malignant neoplasm of larynx     Essential tremor     CARDIOVASCULAR SCREENING; LDL GOAL LESS THAN 160     Benign prostatic hyperplasia     Hoarse voice quality     Advanced directives, counseling/discussion     Pseudophakia     Hiatal hernia with GERD     Macular degeneration     Supraspinatus sprain, right, initial encounter     CARDIOVASCULAR SCREENING; LDL GOAL LESS THAN 100     Vitamin D deficiency     Chronic midline low back pain without sciatica     Sleep disorder, nonorganic     Other gastritis without hemorrhage, unspecified chronicity     BPH associated with nocturia     Weight loss     Pernicious anemia     Arthritis of right shoulder region          Allergies   Allergen Reactions     Seasonal Allergies      Hay fever      Past Surgical History:   Procedure Laterality Date     APPENDECTOMY  8 years old     Biopsy of the throat - cancerous mass - got radiation for in larynx  1982     CATARACT IOL, RT/LT       COLONOSCOPY  2/25/2004    Normal     COLONOSCOPY N/A 4/28/2015    Procedure: COLONOSCOPY;  Surgeon: Marvin Adams MD;  Location: [facility] OR     COLONOSCOPY N/A 1/10/2022    Procedure: COLONOSCOPY, WITH POLYPECTOMY AND BIOPSY;  Surgeon: Marsha Asher MD;  Location: [facility]     COLONOSCOPY N/A 1/10/2022    Procedure: COLONOSCOPY, FLEXIBLE, WITH LESION REMOVAL USING SNARE;  Surgeon: Marsha Asher MD;  Location: [facility]     COLONOSCOPY WITH CO2 INSUFFLATION N/A 4/28/2015    Procedure: COLONOSCOPY WITH CO2 INSUFFLATION;  Surgeon:  Marvin Adams MD;  Location: [facility]     COLONOSCOPY WITH CO2 INSUFFLATION N/A 1/10/2022    Procedure: COLONOSCOPY, WITH CO2 INSUFFLATION;  Surgeon: Marsha Asher MD;  Location: [facility]     COMBINED ESOPHAGOSCOPY, GASTROSCOPY, DUODENOSCOPY (EGD) WITH CO2 INSUFFLATION N/A 1/10/2022    Procedure: ESOPHAGOGASTRODUODENOSCOPY, WITH CO2 INSUFFLATION;  Surgeon: Marsha Asher MD;  Location: [facility]     CYSTOSCOPY  1997    for hematuria - negative     ESOPHAGOSCOPY, GASTROSCOPY, DUODENOSCOPY (EGD), COMBINED N/A 1/10/2022    Procedure: ESOPHAGOGASTRODUODENOSCOPY, WITH BIOPSY;  Surgeon: Marsha Asher MD;  Location: [facility]     EYE SURGERY Right     Rt eye.macular repair     EYE SURGERY  2003    cataract     NASAL ENDOSCOPY,DX  4/2007    GERD     Pars plana vitrectomy and epiretinal membrane dissection, right eye  11/8/2002     Phacoemulsification with intraocular lens implantation right eye.  3/11/2003     PHACOEMULSIFICATION WITH STANDARD INTRAOCULAR LENS IMPLANT Left 9/22/2016    Procedure: PHACOEMULSIFICATION WITH STANDARD INTRAOCULAR LENS IMPLANT;  Surgeon: Nghia Lara MD;  Location: [facility]     Thyroglossal Duct Cyst Removal - 2ndary to radiation.  1982     VASECTOMY  1971     ZZ GASTROSCOPY,FL  9/2007    hiatal hernia and errosions     Past Medical History:   Diagnosis Date     Acid reflux disease      Allergic rhinitis, cause unspecified      H/O magnetic resonance imaging of brain and brain stem 10/10/2016    MRI BRAIN WITH AND WITHOUT CONTRAST August 17, 2009 8:07:00 PM   HISTORY: Severe dizzy spells with imbalance and vomiting.   TECHNIQUE: Routine pulse sequences without and with contrast were performed including thin section images through the IACs. 20 mL Magnevist given.   FINDINGS: Diffusion-weighted images are normal. The brain parenchyma, brainstem, ventricular system, and subarachnoid spaces a     Hematuria     Hematuria  - in 1990's      History of anemia 2002    Anemia-Iron  Deficit     History of gastric ulcer      Macular degeneration      Malignant neoplasm of laryngeal cartilages (H)     Laryngeal CA (Radiation 1982)     Nonsenile cataract      PONV (postoperative nausea and vomiting)      Tremor 10/3/2016     Social History     Socioeconomic History     Marital status:      Spouse name: Edyta Cai     Number of children: Not on file     Years of education: Not on file     Highest education level: Not on file   Occupational History     Employer: RETIRED   Tobacco Use     Smoking status: Former     Smokeless tobacco: Never     Tobacco comments:     1969   Substance and Sexual Activity     Alcohol use: Yes     Alcohol/week: 0.0 standard drinks     Comment: A beer once in awhile     Drug use: No     Sexual activity: Yes     Partners: Female     Birth control/protection: None   Other Topics Concern     Parent/sibling w/ CABG, MI or angioplasty before 65F 55M? No   Social History Narrative    seen by Jose M Diaz. lives in [facility].  to Edyta Cai.    2 sons and 2 daughters    Cancer father    Stroke mother        Daughter in [facility]    Son in [place]    Daughter in [place]    Son in [place]         Social Determinants of Health     Financial Resource Strain: Not on file   Food Insecurity: Not on file   Transportation Needs: Not on file   Physical Activity: Not on file   Stress: Not on file   Social Connections: Not on file   Intimate Partner Violence: Not on file   Housing Stability: Not on file       Drug and lactation database from the [place] [employer] of Medicine:  http://toxnet.nlm.nih.gov/cgi-bin/sis/htmlgen?LACT      B/P: Data Unavailable, T: Data Unavailable, P: Data Unavailable, R: Data Unavailable 0 lbs 0 oz  There were no vitals taken for this visit., There is no height or weight on file to calculate BMI.  Medications and Vitals not listed above were documented in the cart and reviewed by me.     Current Outpatient Medications    Medication Sig Dispense Refill     cyanocobalamin (VITAMIN B-12) 1000 MCG tablet Take 1,000 mcg by mouth daily       fluticasone (FLONASE) 50 MCG/ACT nasal spray Spray 1 spray into both nostrils daily       gabapentin (NEURONTIN) 100 MG capsule Takes 2-3 x 100mg capsule by mouth nightly 270 capsule 3     latanoprost (XALATAN) 0.005 % ophthalmic solution Place 1 drop into the right eye At Bedtime 7.5 mL 1     mirtazapine (REMERON) 15 MG tablet Take 1 tablet (15 mg) by mouth At Bedtime (Patient not taking: Reported on 10/25/2022) 90 tablet 1     pantoprazole (PROTONIX) 40 MG EC tablet Take 1 tablet (40 mg) by mouth daily 90 tablet 1     primidone (MYSOLINE) 250 MG tablet TAKE ONE TABLET BY MOUTH AT BEDTIME 90 tablet 0     tamsulosin (FLOMAX) 0.4 MG capsule TAKE TWO CAPSULES BY MOUTH ONCE DAILY (Patient not taking: Reported on 10/25/2022) 180 capsule 2     triamcinolone (KENALOG) 0.1 % external cream Apply topically 2 times daily as needed (itchy rash of shoulders/chest) 454 g 3     triamterene-HCTZ (DYAZIDE) 37.5-25 MG capsule Take 1 capsule by mouth daily (Patient not taking: Reported on 10/25/2022) 40 capsule 3     Vitamin D, Cholecalciferol, 25 MCG (1000 UT) CAPS Take 1,000 Units by mouth daily (Winter only)           García Munroe MD

## 2023-03-06 ENCOUNTER — OFFICE VISIT (OUTPATIENT)
Dept: NEUROLOGY | Facility: CLINIC | Age: 88
End: 2023-03-06
Payer: COMMERCIAL

## 2023-03-06 VITALS
OXYGEN SATURATION: 98 % | DIASTOLIC BLOOD PRESSURE: 66 MMHG | WEIGHT: 156 LBS | HEIGHT: 73 IN | BODY MASS INDEX: 20.67 KG/M2 | HEART RATE: 68 BPM | SYSTOLIC BLOOD PRESSURE: 111 MMHG

## 2023-03-06 DIAGNOSIS — G60.9 HEREDITARY AND IDIOPATHIC PERIPHERAL NEUROPATHY: ICD-10-CM

## 2023-03-06 DIAGNOSIS — G25.0 ESSENTIAL TREMOR: ICD-10-CM

## 2023-03-06 DIAGNOSIS — R25.1 TREMOR: Primary | ICD-10-CM

## 2023-03-06 PROCEDURE — 99214 OFFICE O/P EST MOD 30 MIN: CPT | Performed by: PSYCHIATRY & NEUROLOGY

## 2023-03-06 RX ORDER — GABAPENTIN 100 MG/1
CAPSULE ORAL
Qty: 270 CAPSULE | Refills: 3 | Status: SHIPPED | OUTPATIENT
Start: 2023-03-06 | End: 2024-03-11

## 2023-03-06 RX ORDER — PRIMIDONE 250 MG/1
TABLET ORAL
Qty: 135 TABLET | Refills: 0 | Status: SHIPPED | OUTPATIENT
Start: 2023-03-06 | End: 2023-08-01

## 2023-03-06 NOTE — PATIENT INSTRUCTIONS
Medications      7am 8am 6pm   7pm   Cyanocobalamin Vit B12 1000mcg       Dexlansoprazole dexilant 60mg CR capsule Not taking         Fluticasone flonase 50mcg/act spray       Gabapentin neurontin 100mg       2   Latanoprost xalatan 0.005% solution           Mirtazapine remeron 15mg off      Pantoprazole (protonix) 40mg EC tablet 1         Primidone mysoline 250mg        1   Tamsulosin flomax 0.4mg  off     1     Triamterene-hydrochlorothiazide dyazide 37.5-25mg off      Vitamin D, Cholecalciferol 1000 units 25 mcg    1                        Plan:    Neuropathy affects his balance and has discomfort at night. He is taking gabapentin 100mg x 2 at 7pm and lays down around 930/10pm     July had dizzy spells and cut out medication     CONSIDER KATHLEEN TRIO DEVICE - INFORMATION BELOW.     CONTINUE ON PRIMIDONE 250MG AND CONSIDER 1/4 OF 250MG ADDITIONAL DOSE    CONTINUE ON 100MG GABAPENTIN - DISCUSSING DOSE - 1 - HAD MORE NEUROPATHY SYMPTOMS AND 2 LESS; NOT SURE IF BETTER WITH 3     Please read and confirm the following     Reasons Kathleen Trio Therapy is Required   Family History of tremor  Son may  Have tremor  Uncontrolled shaking  yes  Tremors on action or intention yes  Difficulty holding items yes  Frequently spills/drops items has problems with coffee  Difficulty eating normally has problems  Difficulty with dressing/daily hygiene needs some problems  Difficulty writing some problems  Difficulty using cell phone/computers  Some problems    Tried and failed therapies   On primidone    Handedness right   Which hand is more severe right hand  Treatment: RIght, Left or both hands RIGHT  Where is the tremor worse - close to the mouth or when arms are extended   extended     Confirm that you do not have any of these contraindications:   An implanted electrical medical device, such as a pacemaker,  defibrillator, heart monitor, insulin pump, bladder stimulator, or  deep brain stimulator or an Insulin pump no     Suspected or  diagnosed epilepsy or other seizure disorder  no     Pregnancy no    Confirm that your tremor is not caused by any of the followings no   Thyroid issues (e.g., hyperthyroidism)   Metabolic disorders (e.g., B-12 deficiency)    Size of watch band as measured around your wrist  MEDIUM  Small (13.6-16.4 cm), Medium (16.5-18.4 cm) or large (18.5-20.4 cm)     [employer] [id number]

## 2023-03-06 NOTE — LETTER
3/6/2023         RE: Preston Cai  [address]  [place] [address]        Dear Colleague,    Thank you for referring your patient, Preston Cai, to the [facility] [place]. Please see a copy of my visit note below.        Diagnosis/Summary/Recommendations:    PATIENT: Preston Cai  87 year old male     : 1935    BEATRIZ: 2023    MRN: [id number]  Address   [address]    [place] [address] Phone   [phone] (Home) *Preferred*   [phone] (Mobile) E-mail Address   [email or web address]      Edyta cai         Discussed that his blood pressure and heart rate are pretty low that would not recommend a beta blocker except a 10mg dose as needed if he wanted anything     Would not push up the primidone further     Talked about surgical approaches like deep brain stimulation, focused beam ultrasound, gamma knife    Assessment:  (R25.1) Tremor  (primary encounter diagnosis)  Seen  and  by me   Gabapentin neurontin 100mg  Primidone mysoline 250mg at bedtime    Tremor when holding a book  Tremor not present at rest  Has shakiness when writing  Has some problems when eating    Review of diagnosis    tremor    Avoidance of dopamine blockers   Not taking    Motor complication review   n/a    Review of Impulse control disorders   N/a     Review of surgical or medication options   Consider a slow wean on primidone (Mysoline) if no other explanation is there.   Pharmacy consultation with Fior Matos if he goes down th is patch  Consider 50mg tabs x 5 and then every few weeks reduce by one tablet - will have more tremor but would have to see if feels better, etc.      For now will review the 250mg of primidone at his present dose.     Not clear why he has no energy and losing weight     He will visit with Dr. Manning and review his lab work and studies     Gait/Balance/Falls       Exercise/Therapy performed/offered   States his  stride is pretty good but has neuropathy so not so straight    Cognitive/Driving   No changes in his driving  Some memory issues    Mood   Some stress -   Lots of stress since they moved  [facility] are living [facility] assisted [facility]   They were locked down  Hope to move to [facility] in [place] but on waiting list  Denies depression       Hallucinations/delusions     Sleep   Sleep disorder  Goes to bed around 9pm and wakes up at 7am  Wakes up 2-3 times per night to urinate  Some times has problems falling back asleep  May have sleep apnea  Snores  Has not had a sleep study  Tired when he wakes up  Naps during the day  30-60 minutes per day     Bladder/Renal/Prostate/Gyn/Other  BPH  Tamsulosin flomax 0.4mg   PSA 3.19 on 1/4/2022    GI/Constipation/GERD   GERD  Dexlansoprazole dexilant 60mg CR capsule - not taking  Pantoprazole (protonix) 40mg EC tablet  helicobacter negative  Lipase normal  Has a good appetite     153 lbs today      Colonoscopy and endoscopy done last week results pending     Final Diagnosis   A. DUODENUM, BIOPSY:   -Duodenal mucosa with no significant histopathologic abnormality  -No evidence of celiac disease     B. TRANSVERSE COLON, POLYPS X 4, BIOPSY:   -Tubular adenoma(s); negative for high-grade dysplasia     C. RECTUM, POLYP, BIOPSY:   - Sessile serrated adenoma; negative for cytologic dysplasia          Comprehensive metabolic was fine 12/17/2021 - no problems with liver      2 years ago had vomiting     He has acid reflux     Has a hernia       12/20/2021  IMPRESSION:  1. Diffuse thickening of the gastric wall raises the question of gastritis, however, this finding is relatively unchanged in appearance when compared to prior exam on 7/30/2017.  2. Prostatomegaly with diffuse bladder wall thickening, likely sequela of chronic bladder outlet obstruction.  3. Colonic diverticulosis without evidence of acute diverticulitis.       ENDO/Lipid/DM/Bone density/Thyroid  Vitamin D  deficiency  Vitamin D, Cholecalciferol 1000 units   Thyroid studies were normal 1/4/2022    Cardio/heart/Hyper or Hypotensive   Denies light headedness or fainting  bp was 95/59    Vision/Dry Eyes/Cataracts/Glaucoma/Macular   Macular degeneration  Pseudophaia  Latanoprost xalatan 0.005% solution  C Minkus monitoring his eyes    Heme/Anticoagulation/Antiplatelet/Anemia/Other  MMA normal  Parietal cell antibody 26.7 high   Intrinsic factor negative  Hemoglobin 12.6 on 12/17/2021    ENT/Resp  Dr Tosha Toledo - ENT    Allergic rhinitis     Hoarse voice    Skin/Cancer/Seborrhea/other      Musculoskeletal/Pain/Headache  Chronic radiculopathy C6  States he has a neuropathy  Gabapentin neurontin 100mg x 2 at bedtime    Other:       Medications      7am 8am 6pm   7pm   Cyanocobalamin Vit B12 1000mcg       Dexlansoprazole dexilant 60mg CR capsule Not taking         Fluticasone flonase 50mcg/act spray       Gabapentin neurontin 100mg       2   Latanoprost xalatan 0.005% solution           Mirtazapine remeron 15mg off      Pantoprazole (protonix) 40mg EC tablet 1         Primidone mysoline 250mg        1   Tamsulosin flomax 0.4mg  off     1     Triamterene-hydrochlorothiazide dyazide 37.5-25mg off      Vitamin D, Cholecalciferol 1000 units 25 mcg    1                        Plan:    Neuropathy affects his balance and has discomfort at night. He is taking gabapentin 100mg x 2 at 7pm and lays down around 930/10pm     July had dizzy spells and cut out medication     CONSIDER KATHLEEN TRIO DEVICE - INFORMATION BELOW.     CONTINUE ON PRIMIDONE 250MG AND CONSIDER 1/4 OF 250MG ADDITIONAL DOSE    CONTINUE ON 100MG GABAPENTIN - DISCUSSING DOSE - 1 - HAD MORE NEUROPATHY SYMPTOMS AND 2 LESS; NOT SURE IF BETTER WITH 3     Please read and confirm the following     Reasons Kathleen Trio Therapy is Required   Family History of tremor  Son may  Have tremor  Uncontrolled shaking  yes  Tremors on action or intention yes  Difficulty holding  items yes  Frequently spills/drops items has problems with coffee  Difficulty eating normally has problems  Difficulty with dressing/daily hygiene needs some problems  Difficulty writing some problems  Difficulty using cell phone/computers  Some problems    Tried and failed therapies   On primidone    Handedness right   Which hand is more severe right hand  Treatment: RIght, Left or both hands RIGHT   Where is the tremor worse - close to the mouth or when arms are extended   extended     Confirm that you do not have any of these contraindications:    An implanted electrical medical device, such as a pacemaker,  defibrillator, heart monitor, insulin pump, bladder stimulator, or  deep brain stimulator or an Insulin pump no      Suspected or diagnosed epilepsy or other seizure disorder  no      Pregnancy no    Confirm that your tremor is not caused by any of the followings no    Thyroid issues (e.g., hyperthyroidism)    Metabolic disorders (e.g., B-12 deficiency)    Size of watch band as measured around your wrist  MEDIUM  Small (13.6-16.4 cm), Medium (16.5-18.4 cm) or large (18.5-20.4 cm)     airforce [id number]        Coding statement:   Medical Decision Making:  #  Chronic progressive medical conditions addressed  - see above --   Review and/or interpretation of unique test or documentation from a provider outside of neurology YES   Independent historian provided additional details  YES I  Prescription drug management and review of potential side effects and/or monitoring for side effects  -- see above ---  Health impacted by social determinants of health  NO    I have reviewed the note as documented above.  This accurately captures the substance of my conversation with the patient and total time spent preparing for visit, executing visit and completing visit on the day of the visit:  30 minutes.  The portion of this total time included face to face time 22 MINUTES      García Munroe MD      ______________________________________    Last visit date and details:             ______________________________________      Patient was asked about 14 Review of systems including changes in vision (dry eyes, double vision), hearing, heart, lungs, musculoskeletal, depression, anxiety, snoring, RBD, insomnia, urinary frequency, urinary urgency, constipation, swallowing problems, hematological, ID, allergies, skin problems: seborrhea, endocrinological: thyroid, diabetes, cholesterol; balance, weight changes, and other neurological problems and these were not significant at this time except for   Patient Active Problem List   Diagnosis     Seborrheic dermatitis     Seasonal allergic rhinitis due to pollen     Cervical radiculopathy at C6     Personal history of malignant neoplasm of larynx     Essential tremor     CARDIOVASCULAR SCREENING; LDL GOAL LESS THAN 160     Benign prostatic hyperplasia     Hoarse voice quality     Advanced directives, counseling/discussion     Pseudophakia     Hiatal hernia with GERD     Macular degeneration     Supraspinatus sprain, right, initial encounter     CARDIOVASCULAR SCREENING; LDL GOAL LESS THAN 100     Vitamin D deficiency     Chronic midline low back pain without sciatica     Sleep disorder, nonorganic     Other gastritis without hemorrhage, unspecified chronicity     BPH associated with nocturia     Weight loss     Pernicious anemia     Arthritis of right shoulder region          Allergies   Allergen Reactions     Seasonal Allergies      Hay fever      Past Surgical History:   Procedure Laterality Date     APPENDECTOMY  8 years old     Biopsy of the throat - cancerous mass - got radiation for in larynx  1982     CATARACT IOL, RT/LT       COLONOSCOPY  2/25/2004    Normal     COLONOSCOPY N/A 4/28/2015    Procedure: COLONOSCOPY;  Surgeon: Marvin Adams MD;  Location: [facility]     COLONOSCOPY N/A 1/10/2022    Procedure: COLONOSCOPY, WITH POLYPECTOMY AND BIOPSY;   Surgeon: Marsha Asher MD;  Location: [facility]     COLONOSCOPY N/A 1/10/2022    Procedure: COLONOSCOPY, FLEXIBLE, WITH LESION REMOVAL USING SNARE;  Surgeon: Marsha Asher MD;  Location: [facility]     COLONOSCOPY WITH CO2 INSUFFLATION N/A 4/28/2015    Procedure: COLONOSCOPY WITH CO2 INSUFFLATION;  Surgeon: Marvin Adams MD;  Location: [facility]     COLONOSCOPY WITH CO2 INSUFFLATION N/A 1/10/2022    Procedure: COLONOSCOPY, WITH CO2 INSUFFLATION;  Surgeon: Marsha Asher MD;  Location: [facility]     COMBINED ESOPHAGOSCOPY, GASTROSCOPY, DUODENOSCOPY (EGD) WITH CO2 INSUFFLATION N/A 1/10/2022    Procedure: ESOPHAGOGASTRODUODENOSCOPY, WITH CO2 INSUFFLATION;  Surgeon: Marsha Asher MD;  Location: [facility]     CYSTOSCOPY  1997    for hematuria - negative     ESOPHAGOSCOPY, GASTROSCOPY, DUODENOSCOPY (EGD), COMBINED N/A 1/10/2022    Procedure: ESOPHAGOGASTRODUODENOSCOPY, WITH BIOPSY;  Surgeon: Marsha Asher MD;  Location: [facility]     EYE SURGERY Right     Rt eye.macular repair     EYE SURGERY  2003    cataract     NASAL ENDOSCOPY,DX  4/2007    GERD     Pars plana vitrectomy and epiretinal membrane dissection, right eye  11/8/2002     Phacoemulsification with intraocular lens implantation right eye.  3/11/2003     PHACOEMULSIFICATION WITH STANDARD INTRAOCULAR LENS IMPLANT Left 9/22/2016    Procedure: PHACOEMULSIFICATION WITH STANDARD INTRAOCULAR LENS IMPLANT;  Surgeon: Nghia Lara MD;  Location: [facility] OR     Thyroglossal Duct Cyst Removal - 2ndary to radiation.  1982     VASECTOMY  1971     ZZ GASTROSCOPY,FL  9/2007    hiatal hernia and errosions     Past Medical History:   Diagnosis Date     Acid reflux disease      Allergic rhinitis, cause unspecified      H/O magnetic resonance imaging of brain and brain stem 10/10/2016    MRI BRAIN WITH AND WITHOUT CONTRAST August 17, 2009 8:07:00 PM   HISTORY: Severe dizzy spells with imbalance and vomiting.   TECHNIQUE: Routine pulse sequences without and  with contrast were performed including thin section images through the IACs. 20 mL Magnevist given.   FINDINGS: Diffusion-weighted images are normal. The brain parenchyma, brainstem, ventricular system, and subarachnoid spaces a     Hematuria     Hematuria  - in 1990's      History of anemia 2002    Anemia-Iron Deficit     History of gastric ulcer      Macular degeneration      Malignant neoplasm of laryngeal cartilages (H)     Laryngeal CA (Radiation 1982)     Nonsenile cataract      PONV (postoperative nausea and vomiting)      Tremor 10/3/2016     Social History     Socioeconomic History     Marital status:      Spouse name: Edyta Cai     Number of children: Not on file     Years of education: Not on file     Highest education level: Not on file   Occupational History     Employer: RETIRED   Tobacco Use     Smoking status: Former     Smokeless tobacco: Never     Tobacco comments:     1969   Substance and Sexual Activity     Alcohol use: Yes     Alcohol/week: 0.0 standard drinks     Comment: A beer once in awhile     Drug use: No     Sexual activity: Yes     Partners: Female     Birth control/protection: None   Other Topics Concern     Parent/sibling w/ CABG, MI or angioplasty before 65F 55M? No   Social History Narrative    seen by Jose M Diaz. lives in [facility].  to Edyta Cai.    2 sons and 2 daughters    Cancer father    Stroke mother        Daughter in [employer]    Son in [place]    Daughter in [place]    Son in [place]         Social Determinants of Health     Financial Resource Strain: Not on file   Food Insecurity: Not on file   Transportation Needs: Not on file   Physical Activity: Not on file   Stress: Not on file   Social Connections: Not on file   Intimate Partner Violence: Not on file   Housing Stability: Not on file       Drug and lactation database from the [place] National Library of Medicine:  http://toxnet.nlm.nih.gov/cgi-bin/sis/htmlgen?LACT      B/P:  Data Unavailable, T: Data Unavailable, P: Data Unavailable, R: Data Unavailable 0 lbs 0 oz  There were no vitals taken for this visit., There is no height or weight on file to calculate BMI.  Medications and Vitals not listed above were documented in the cart and reviewed by me.     Current Outpatient Medications   Medication Sig Dispense Refill     cyanocobalamin (VITAMIN B-12) 1000 MCG tablet Take 1,000 mcg by mouth daily       fluticasone (FLONASE) 50 MCG/ACT nasal spray Spray 1 spray into both nostrils daily       gabapentin (NEURONTIN) 100 MG capsule Takes 2-3 x 100mg capsule by mouth nightly 270 capsule 3     latanoprost (XALATAN) 0.005 % ophthalmic solution Place 1 drop into the right eye At Bedtime 7.5 mL 1     mirtazapine (REMERON) 15 MG tablet Take 1 tablet (15 mg) by mouth At Bedtime (Patient not taking: Reported on 10/25/2022) 90 tablet 1     pantoprazole (PROTONIX) 40 MG EC tablet Take 1 tablet (40 mg) by mouth daily 90 tablet 1     primidone (MYSOLINE) 250 MG tablet TAKE ONE TABLET BY MOUTH AT BEDTIME 90 tablet 0     tamsulosin (FLOMAX) 0.4 MG capsule TAKE TWO CAPSULES BY MOUTH ONCE DAILY (Patient not taking: Reported on 10/25/2022) 180 capsule 2     triamcinolone (KENALOG) 0.1 % external cream Apply topically 2 times daily as needed (itchy rash of shoulders/chest) 454 g 3     triamterene-HCTZ (DYAZIDE) 37.5-25 MG capsule Take 1 capsule by mouth daily (Patient not taking: Reported on 10/25/2022) 40 capsule 3     Vitamin D, Cholecalciferol, 25 MCG (1000 UT) CAPS Take 1,000 Units by mouth daily (Winter only)           García Munroe MD        Again, thank you for allowing me to participate in the care of your patient.        Sincerely,        García Munroe MD

## 2023-03-06 NOTE — NURSING NOTE
"Preston Cai's goals for this visit include:   Chief Complaint   Patient presents with     RECHECK     Tremor// last visit 1/7/22 recheck- recs in epic- scheduled per patient       He requests these members of his care team be copied on today's visit information: yes    PCP: Tyrel Manning    Referring Provider:  García Munroe MD  [address] [place] [id number]    /66 (BP Location: Right arm, Patient Position: Sitting, Cuff Size: Adult Regular)   Pulse 68   Ht 1.854 m (6' 1\")   Wt 70.8 kg (156 lb)   SpO2 98%   BMI 20.58 kg/m      Do you need any medication refills at today's visit? No  AYALA Parker, SLICK ([facility])      "

## 2023-07-05 ENCOUNTER — OFFICE VISIT (OUTPATIENT)
Dept: OPTOMETRY | Facility: CLINIC | Age: 88
End: 2023-07-05
Payer: COMMERCIAL

## 2023-07-05 DIAGNOSIS — H10.13 ALLERGIC CONJUNCTIVITIS, BILATERAL: ICD-10-CM

## 2023-07-05 DIAGNOSIS — H52.4 PRESBYOPIA: ICD-10-CM

## 2023-07-05 DIAGNOSIS — H35.3132 INTERMEDIATE STAGE NONEXUDATIVE AGE-RELATED MACULAR DEGENERATION OF BOTH EYES: ICD-10-CM

## 2023-07-05 DIAGNOSIS — H01.02A SQUAMOUS BLEPHARITIS OF UPPER AND LOWER EYELIDS OF BOTH EYES: ICD-10-CM

## 2023-07-05 DIAGNOSIS — H40.10X0 OPEN-ANGLE GLAUCOMA OF RIGHT EYE, UNSPECIFIED GLAUCOMA STAGE, UNSPECIFIED OPEN-ANGLE GLAUCOMA TYPE: ICD-10-CM

## 2023-07-05 DIAGNOSIS — H52.223 REGULAR ASTIGMATISM OF BOTH EYES: ICD-10-CM

## 2023-07-05 DIAGNOSIS — H52.03 HYPERMETROPIA OF BOTH EYES: ICD-10-CM

## 2023-07-05 DIAGNOSIS — H40.051 BORDERLINE GLAUCOMA WITH OCULAR HYPERTENSION, RIGHT: ICD-10-CM

## 2023-07-05 DIAGNOSIS — Z01.00 EXAMINATION OF EYES AND VISION: Primary | ICD-10-CM

## 2023-07-05 DIAGNOSIS — Z96.1 PSEUDOPHAKIA OF BOTH EYES: ICD-10-CM

## 2023-07-05 DIAGNOSIS — H01.02B SQUAMOUS BLEPHARITIS OF UPPER AND LOWER EYELIDS OF BOTH EYES: ICD-10-CM

## 2023-07-05 PROCEDURE — 92015 DETERMINE REFRACTIVE STATE: CPT | Performed by: OPTOMETRIST

## 2023-07-05 PROCEDURE — 92014 COMPRE OPH EXAM EST PT 1/>: CPT | Performed by: OPTOMETRIST

## 2023-07-05 RX ORDER — LATANOPROST 50 UG/ML
1 SOLUTION/ DROPS OPHTHALMIC AT BEDTIME
Qty: 5 ML | Refills: 3 | Status: CANCELLED | OUTPATIENT
Start: 2023-07-05

## 2023-07-05 RX ORDER — LATANOPROST 50 UG/ML
1 SOLUTION/ DROPS OPHTHALMIC AT BEDTIME
Qty: 7.5 ML | Refills: 1 | Status: SHIPPED | OUTPATIENT
Start: 2023-07-05 | End: 2024-02-13

## 2023-07-05 RX ORDER — LATANOPROST 50 UG/ML
1 SOLUTION/ DROPS OPHTHALMIC AT BEDTIME
Qty: 5 ML | Refills: 3 | Status: SHIPPED | OUTPATIENT
Start: 2023-07-05 | End: 2023-08-22

## 2023-07-05 ASSESSMENT — CONF VISUAL FIELD
OD_SUPERIOR_TEMPORAL_RESTRICTION: 0
OD_NORMAL: 1
OS_NORMAL: 1
OS_INFERIOR_NASAL_RESTRICTION: 0
OD_SUPERIOR_NASAL_RESTRICTION: 0
OS_SUPERIOR_NASAL_RESTRICTION: 0
OS_INFERIOR_TEMPORAL_RESTRICTION: 0
METHOD: COUNTING FINGERS
OD_INFERIOR_TEMPORAL_RESTRICTION: 0
OD_INFERIOR_NASAL_RESTRICTION: 0
OS_SUPERIOR_TEMPORAL_RESTRICTION: 0

## 2023-07-05 ASSESSMENT — REFRACTION_WEARINGRX
OS_CYLINDER: +1.75
OS_SPHERE: -0.50
OD_SPHERE: -0.50
OD_ADD: +3.00
OD_SPHERE: -0.75
OD_AXIS: 160
OD_AXIS: 160
OS_SPHERE: -0.50
SPECS_TYPE: TRIFOCAL
SPECS_TYPE: TRIFOCAL
OD_CYLINDER: +1.00
OS_CYLINDER: +1.00
OS_ADD: +2.75
OS_AXIS: 180
OS_AXIS: 180
OS_ADD: +3.00
OD_CYLINDER: +1.25
OD_ADD: +2.75

## 2023-07-05 ASSESSMENT — VISUAL ACUITY
CORRECTION_TYPE: GLASSES
METHOD: SNELLEN - LINEAR
OD_CC: 20/60
OS_CC: J2
OS_CC: 20/25
OD_CC: J5

## 2023-07-05 ASSESSMENT — KERATOMETRY
OD_AXISANGLE2_DEGREES: 063
OD_AXISANGLE_DEGREES: 153
OS_AXISANGLE_DEGREES: 178
OD_K1POWER_DIOPTERS: 43.75
OD_K2POWER_DIOPTERS: 44.25
OS_K1POWER_DIOPTERS: 42.25
OS_K2POWER_DIOPTERS: 44.00
OS_AXISANGLE2_DEGREES: 088

## 2023-07-05 ASSESSMENT — TONOMETRY
OS_IOP_MMHG: 19
OD_IOP_MMHG: 29
IOP_METHOD: TONOPEN

## 2023-07-05 ASSESSMENT — REFRACTION_MANIFEST
OD_CYLINDER: +1.25
OS_CYLINDER: +1.75
OD_SPHERE: -0.50
OS_AXIS: 180
OD_ADD: +3.00
OS_ADD: +3.00
OD_AXIS: 160
OS_SPHERE: -0.50

## 2023-07-05 ASSESSMENT — PACHYMETRY
OS_CT(UM): 559
OD_CT(UM): 564

## 2023-07-05 NOTE — PROGRESS NOTES
Chief Complaint   Patient presents with     Annual Eye Exam         Last Eye Exam: 09/01/2022  Dilated Previously: Yes, side effects of dilation explained today    What are you currently using to see?  glasses    Past Ocular History:  - Pseudophakia each eye (2016 Dr. Lara left eye; right eye with Dr. Turpin in [place] in early 2000s)  - YAG right eye 2016 ([place])  - 2002 right eye retinal repair for macular tear with doctor at [facility] in [place] -- had surgery in OR and had to be face down for several days, thinks issue was in center of vision. Had noticed a year prior that with focusing would get blurred central vision right eye.  - Macular degeneration -- previously followed with Dr. Burns at [facility]- recommended that he take Preservision AREDs 2 vitamins- he is not and is not interested- Last visit ?- he had an appt scheduled this year but cancelled.    - Glaucoma right eye- followed by Dr. Santoyo at [facility]- He hasn't seen for over a year- had an appointment scheduled in July but he cancelled it. (patient thinks that he sees Dr. Santoyo for his macular degeneration)    Distance Vision Acuity: Noticed gradual change in right eye    Near Vision Acuity: Satisfied with vision while reading  with glasses    Eye Comfort: good and teary  Do you use eye drops? : Yes:   systane 2-3x daily  Latanoprost PM - ran out about 3 weeks ago  Occupation or Hobbies: retired    Vision seems more wavy and blurred right eye.  Last BCVA - 9/1/2022 was Right eye-20/30-2, Left eye 20/20-1     RENETTA Lockett       Medical, surgical and family histories reviewed and updated 7/5/2023.       OBJECTIVE: See Ophthalmology exam    ASSESSMENT:    ICD-10-CM    1. Examination of eyes and vision  Z01.00 EYE EXAM (SIMPLE-NONBILLABLE)      2. Presbyopia  H52.4 REFRACTION      3. Hypermetropia of both eyes  H52.03 REFRACTION      4. Regular astigmatism of both eyes  H52.223 REFRACTION      5. Intermediate stage  nonexudative age-related macular degeneration of both eyes  H35.3132 EYE EXAM (SIMPLE-NONBILLABLE)     Vitreoretinal Surgery Referral    Best corrected VA today was 20/60 RE compared to 9/1/2022 BCVA- 20/30-1      6. Borderline glaucoma with ocular hypertension, right  H40.051 EYE EXAM (SIMPLE-NONBILLABLE)      7. Open-angle glaucoma of right eye, unspecified glaucoma stage, unspecified open-angle glaucoma type  H40.10X0 EYE EXAM (SIMPLE-NONBILLABLE)     latanoprost (XALATAN) 0.005 % ophthalmic solution     latanoprost (XALATAN) 0.005 % ophthalmic solution      8. Squamous blepharitis of upper and lower eyelids of both eyes  H01.02A EYE EXAM (SIMPLE-NONBILLABLE)    H01.02B       9. Allergic conjunctivitis, bilateral  H10.13 EYE EXAM (SIMPLE-NONBILLABLE)      10. Pseudophakia of both eyes  Z96.1 EYE EXAM (SIMPLE-NONBILLABLE)          PLAN:     Patient Instructions   Referral back to [facility] for evaluation and possible treatment.  Appointment scheduled July 10th, 2023 at the [place] office.     Follow up with Dr. Santoyo at [facility] as recommended. I will have his office call you.     Start latanoprost- 1 drop right eye at night.  Your eye pressure was increased today since you have been off the eye drops.     Systane Complete 1 drop both eyes 4 x day.     Heat to the eyes daily for 10-15 minutes nightly with warm washcloth or reusable gel masks from the pharmacy or  ViOptix heat masks can be purchased at [employer].      OTC Pataday to be used once or twice daily for itchy eyes.  Use as needed.     Carlos James, CANDI     7/6/2023  Chart notes were faxed to [facility] and to [facility].  A phone call was made to [facility] to please call the patient to reschedule his appointment with Dr Santoyo that he cancelled.    Carlos James, OD

## 2023-07-05 NOTE — LETTER
7/5/2023         RE: Preston Cai  [address]  [place] [facility] [address]        Dear Colleague,    Thank you for referring your patient, Preston Cai, to the [facility]. Please see a copy of my visit note below.    Chief Complaint   Patient presents with     Annual Eye Exam         Last Eye Exam: 09/01/2022  Dilated Previously: Yes, side effects of dilation explained today    What are you currently using to see?  glasses    Past Ocular History:  - Pseudophakia each eye (2016 Dr. Lara left eye; right eye with Dr. Hayes in [place] in early 2000s)  - YAG right eye 2016 ([place])  - 2002 right eye retinal repair for macular tear with doctor at [facility] in [place] -- had surgery in OR and had to be face down for several days, thinks issue was in center of vision. Had noticed a year prior that with focusing would get blurred central vision right eye.  - Macular degeneration -- previously followed with Dr. Burns at [facility]- recommended that he take Preservision AREDs 2 vitamins- he is not and is not interested- Last visit ?- he had an appt scheduled this year but cancelled.    - Glaucoma right eye- followed by Dr. Santoyo at [facility]- He hasn't seen for over a year- had an appointment scheduled in July but he cancelled it. (patient thinks that he sees Dr. Santoyo for his macular degeneration)    Distance Vision Acuity: Noticed gradual change in right eye    Near Vision Acuity: Satisfied with vision while reading  with glasses    Eye Comfort: good and teary  Do you use eye drops? : Yes:   systane 2-3x daily  Latanoprost PM - ran out about 3 weeks ago  Occupation or Hobbies: retired    Vision seems more wavy and blurred right eye.  Last BCVA - 9/1/2022 was Right eye-20/30-2, Left eye 20/20-1     RENETTA Lockett       Medical, surgical and family histories reviewed and updated 7/5/2023.       OBJECTIVE: See Ophthalmology exam    ASSESSMENT:    ICD-10-CM     1. Examination of eyes and vision  Z01.00 EYE EXAM (SIMPLE-NONBILLABLE)      2. Presbyopia  H52.4 REFRACTION      3. Hypermetropia of both eyes  H52.03 REFRACTION      4. Regular astigmatism of both eyes  H52.223 REFRACTION      5. Intermediate stage nonexudative age-related macular degeneration of both eyes  H35.3132 EYE EXAM (SIMPLE-NONBILLABLE)     Vitreoretinal Surgery Referral    Best corrected VA today was 20/60 RE compared to 9/1/2022 BCVA- 20/30-1      6. Borderline glaucoma with ocular hypertension, right  H40.051 EYE EXAM (SIMPLE-NONBILLABLE)      7. Open-angle glaucoma of right eye, unspecified glaucoma stage, unspecified open-angle glaucoma type  H40.10X0 EYE EXAM (SIMPLE-NONBILLABLE)     latanoprost (XALATAN) 0.005 % ophthalmic solution     latanoprost (XALATAN) 0.005 % ophthalmic solution      8. Squamous blepharitis of upper and lower eyelids of both eyes  H01.02A EYE EXAM (SIMPLE-NONBILLABLE)    H01.02B       9. Allergic conjunctivitis, bilateral  H10.13 EYE EXAM (SIMPLE-NONBILLABLE)      10. Pseudophakia of both eyes  Z96.1 EYE EXAM (SIMPLE-NONBILLABLE)          PLAN:     Patient Instructions   Referral back to [facility] for evaluation and possible treatment.  Appointment scheduled July 10th, 2023 at the [place] office.     Follow up with Dr. Santoyo at [facility] as recommended. I will have his office call you.     Start latanoprost- 1 drop right eye at night.  Your eye pressure was increased today since you have been off the eye drops.     Systane Complete 1 drop both eyes 4 x day.     Heat to the eyes daily for 10-15 minutes nightly with warm washcloth or reusable gel masks from the pharmacy or  [employer] heat masks can be purchased at [employer].      OTC Pataday to be used once or twice daily for itchy eyes.  Use as needed.     Carlos James, CANDI           Again, thank you for allowing me to participate in the care of your patient.        Sincerely,        Carlos James, OD

## 2023-07-05 NOTE — PATIENT INSTRUCTIONS
There is no significant change from your most recent pair of glasses.    Referral back to [facility] for evaluation and possible treatment.  Appointment scheduled July 10th, 2023 at the [place] office.     Follow up with Dr. Santoyo at [facility] as recommended. I will have his office call you.     Start latanoprost- 1 drop right eye at night.  Your eye pressure was increased today since you have been off the eye drops.     Systane Complete 1 drop both eyes 4 x day.     Heat to the eyes daily for 10-15 minutes nightly with warm washcloth or reusable gel masks from the pharmacy or  [employer] heat masks can be purchased at [employer].      OTC Pataday to be used once or twice daily for itchy eyes.  Use as needed.     Carlos James OD      Optometry Providers       Clinic Locations                                 Telephone Number   Dr. Shahla Chávez    [facility]  [place] [phone]     [facility] Hours:                [facility] Hours:       [facility] Hours:   [address]   [address]     [address] [place] [address]   [place] [address]    [place] [address]  Phone: [phone]                    Phone: [phone]     Phone: [phone]                      Monday 8:00-6:00                          Monday 8:00-6:00                          Monday 8:00-6:00              Tuesday 8:00-6:00                          Tuesday 8:00-6:00                          Tuesday 8:00-6:00              Wednesday 8:00-6:00                  Wednesday 8:00-6:00                   Wednesday 8:00-6:00      Thursday 8:00-6:00                        Thursday 8:00-6:00                         Thursday 8:00-6:00            Friday 8:00-5:00                              Friday 8:00-5:00                              Friday 8:00-5:00    [place] Hours:   [address] [place] [address]  [phone]    Monday 9:00-6:00  Tuesday 9:00-6:00  Wednesday 9:00-6:00  Thursday 9:00-6:00  Friday 9:00-5:00  As always, Thank you for trusting us with your health care needs!

## 2023-07-06 ASSESSMENT — CUP TO DISC RATIO
OS_RATIO: 0.3
OD_RATIO: 0.5

## 2023-07-06 ASSESSMENT — EXTERNAL EXAM - RIGHT EYE: OD_EXAM: NORMAL

## 2023-07-06 ASSESSMENT — EXTERNAL EXAM - LEFT EYE: OS_EXAM: NORMAL

## 2023-07-08 ENCOUNTER — HEALTH MAINTENANCE LETTER (OUTPATIENT)
Age: 88
End: 2023-07-08

## 2023-07-28 DIAGNOSIS — G25.0 ESSENTIAL TREMOR: ICD-10-CM

## 2023-07-31 NOTE — TELEPHONE ENCOUNTER
Rx Authorization:  Requested Medication/ Dose PRIMIDONE 250MG TABS   Date last refill ordered:   Quantity ordered:   # refills:   Date of last clinic visit with ordering provider:   Date of next clinic visit with ordering provider:   All pertinent protocol data (lab date/result):   Include pertinent information from patients message:

## 2023-08-01 RX ORDER — PRIMIDONE 250 MG/1
TABLET ORAL
Qty: 135 TABLET | Refills: 3 | Status: SHIPPED | OUTPATIENT
Start: 2023-08-01 | End: 2024-03-11

## 2023-08-01 NOTE — CONFIDENTIAL NOTE
Medication: primidone (MYSOLINE) 250 MG tablet   Sig: : 1/4 TAB IN AM AND 1 TAB IN PM   Date last written: 3/6/23  Dispensed amount: 135  Refills: 0    Requested Pharmacy: [facility]     Pt's last office visit: 3/6/23  Next scheduled office visit: 3/4/24      RN called the pt to see how the medication is going for him. He tried increasing this Primidone dose to 1/4 tab in the morning and 1 tab in the PM but did not notice any benefit in his tremor. He is wondering maybe he could try 1/2 tab in the morning and 1 tab in the PM? He is also wondering if he could come off the Gabapentin because he feels like it is causing swelling in his feet and not working very well for his nerve pain. He is taking 200mg at night. Maybe it would be best to have him meet with the pharmacist, Fior.     Per the RN/LPN medication refill protocol, writer is unable to refill this request.     Nikkie Navarro RN Care Coordinator   Neurology/Neurosurgery/PM&R/ Pain Management

## 2023-08-21 ASSESSMENT — ENCOUNTER SYMPTOMS
NERVOUS/ANXIOUS: 0
FEVER: 0
CHILLS: 0
EYE PAIN: 1
CONSTIPATION: 1
DYSURIA: 0
HEMATURIA: 0
SHORTNESS OF BREATH: 0
COUGH: 0
MYALGIAS: 1
FREQUENCY: 1
SORE THROAT: 1
WEAKNESS: 0
ABDOMINAL PAIN: 0
HEMATOCHEZIA: 0
PARESTHESIAS: 0
DIZZINESS: 0
HEARTBURN: 0
NAUSEA: 0
DIARRHEA: 0
ARTHRALGIAS: 1
PALPITATIONS: 0
JOINT SWELLING: 1
HEADACHES: 0

## 2023-08-21 ASSESSMENT — ACTIVITIES OF DAILY LIVING (ADL): CURRENT_FUNCTION: NO ASSISTANCE NEEDED

## 2023-08-22 ENCOUNTER — OFFICE VISIT (OUTPATIENT)
Dept: FAMILY MEDICINE | Facility: CLINIC | Age: 88
End: 2023-08-22
Payer: COMMERCIAL

## 2023-08-22 VITALS
DIASTOLIC BLOOD PRESSURE: 68 MMHG | RESPIRATION RATE: 14 BRPM | WEIGHT: 161 LBS | BODY MASS INDEX: 20.66 KG/M2 | TEMPERATURE: 97.6 F | HEART RATE: 70 BPM | OXYGEN SATURATION: 99 % | HEIGHT: 74 IN | SYSTOLIC BLOOD PRESSURE: 110 MMHG

## 2023-08-22 DIAGNOSIS — R25.1 TREMOR: ICD-10-CM

## 2023-08-22 DIAGNOSIS — K21.9 HIATAL HERNIA WITH GERD: ICD-10-CM

## 2023-08-22 DIAGNOSIS — D51.0 PERNICIOUS ANEMIA: ICD-10-CM

## 2023-08-22 DIAGNOSIS — G60.9 HEREDITARY AND IDIOPATHIC PERIPHERAL NEUROPATHY: ICD-10-CM

## 2023-08-22 DIAGNOSIS — K44.9 HIATAL HERNIA WITH GERD: ICD-10-CM

## 2023-08-22 DIAGNOSIS — Z13.220 LIPID SCREENING: ICD-10-CM

## 2023-08-22 DIAGNOSIS — Z00.00 ENCOUNTER FOR MEDICARE ANNUAL WELLNESS EXAM: Primary | ICD-10-CM

## 2023-08-22 LAB
ALBUMIN SERPL BCG-MCNC: 4.4 G/DL (ref 3.5–5.2)
ALP SERPL-CCNC: 72 U/L (ref 40–129)
ALT SERPL W P-5'-P-CCNC: 17 U/L (ref 0–70)
ANION GAP SERPL CALCULATED.3IONS-SCNC: 9 MMOL/L (ref 7–15)
AST SERPL W P-5'-P-CCNC: 26 U/L (ref 0–45)
BASOPHILS # BLD AUTO: 0 10E3/UL (ref 0–0.2)
BASOPHILS NFR BLD AUTO: 0 %
BILIRUB SERPL-MCNC: 0.3 MG/DL
BUN SERPL-MCNC: 23.1 MG/DL (ref 8–23)
CALCIUM SERPL-MCNC: 9.6 MG/DL (ref 8.8–10.2)
CHLORIDE SERPL-SCNC: 102 MMOL/L (ref 98–107)
CHOLEST SERPL-MCNC: 205 MG/DL
CREAT SERPL-MCNC: 0.95 MG/DL (ref 0.67–1.17)
DEPRECATED HCO3 PLAS-SCNC: 29 MMOL/L (ref 22–29)
EOSINOPHIL # BLD AUTO: 0.1 10E3/UL (ref 0–0.7)
EOSINOPHIL NFR BLD AUTO: 1 %
ERYTHROCYTE [DISTWIDTH] IN BLOOD BY AUTOMATED COUNT: 12.8 % (ref 10–15)
GFR SERPL CREATININE-BSD FRML MDRD: 78 ML/MIN/1.73M2
GLUCOSE SERPL-MCNC: 96 MG/DL (ref 70–99)
HCT VFR BLD AUTO: 36.3 % (ref 40–53)
HDLC SERPL-MCNC: 88 MG/DL
HGB BLD-MCNC: 12 G/DL (ref 13.3–17.7)
IMM GRANULOCYTES # BLD: 0 10E3/UL
IMM GRANULOCYTES NFR BLD: 0 %
LDLC SERPL CALC-MCNC: 106 MG/DL
LYMPHOCYTES # BLD AUTO: 1.5 10E3/UL (ref 0.8–5.3)
LYMPHOCYTES NFR BLD AUTO: 30 %
MCH RBC QN AUTO: 32.7 PG (ref 26.5–33)
MCHC RBC AUTO-ENTMCNC: 33.1 G/DL (ref 31.5–36.5)
MCV RBC AUTO: 99 FL (ref 78–100)
MONOCYTES # BLD AUTO: 0.6 10E3/UL (ref 0–1.3)
MONOCYTES NFR BLD AUTO: 11 %
NEUTROPHILS # BLD AUTO: 2.9 10E3/UL (ref 1.6–8.3)
NEUTROPHILS NFR BLD AUTO: 57 %
NONHDLC SERPL-MCNC: 117 MG/DL
PLATELET # BLD AUTO: 182 10E3/UL (ref 150–450)
POTASSIUM SERPL-SCNC: 4.7 MMOL/L (ref 3.4–5.3)
PROT SERPL-MCNC: 7.1 G/DL (ref 6.4–8.3)
RBC # BLD AUTO: 3.67 10E6/UL (ref 4.4–5.9)
SODIUM SERPL-SCNC: 140 MMOL/L (ref 136–145)
TRIGL SERPL-MCNC: 56 MG/DL
VIT B12 SERPL-MCNC: 685 PG/ML (ref 232–1245)
WBC # BLD AUTO: 5 10E3/UL (ref 4–11)

## 2023-08-22 PROCEDURE — 36415 COLL VENOUS BLD VENIPUNCTURE: CPT | Performed by: PREVENTIVE MEDICINE

## 2023-08-22 PROCEDURE — 82607 VITAMIN B-12: CPT | Performed by: PREVENTIVE MEDICINE

## 2023-08-22 PROCEDURE — G0439 PPPS, SUBSEQ VISIT: HCPCS | Performed by: PREVENTIVE MEDICINE

## 2023-08-22 PROCEDURE — 80061 LIPID PANEL: CPT | Performed by: PREVENTIVE MEDICINE

## 2023-08-22 PROCEDURE — 80053 COMPREHEN METABOLIC PANEL: CPT | Performed by: PREVENTIVE MEDICINE

## 2023-08-22 PROCEDURE — 99213 OFFICE O/P EST LOW 20 MIN: CPT | Mod: 25 | Performed by: PREVENTIVE MEDICINE

## 2023-08-22 PROCEDURE — 85025 COMPLETE CBC W/AUTO DIFF WBC: CPT | Performed by: PREVENTIVE MEDICINE

## 2023-08-22 RX ORDER — FAMOTIDINE 40 MG/1
40 TABLET, FILM COATED ORAL DAILY
Qty: 30 TABLET | Refills: 1 | Status: SHIPPED | OUTPATIENT
Start: 2023-08-22 | End: 2023-10-20

## 2023-08-22 ASSESSMENT — ENCOUNTER SYMPTOMS
SHORTNESS OF BREATH: 0
JOINT SWELLING: 1
NERVOUS/ANXIOUS: 0
FEVER: 0
FREQUENCY: 1
SORE THROAT: 1
DYSURIA: 0
CONSTIPATION: 1
WEAKNESS: 0
MYALGIAS: 1
ABDOMINAL PAIN: 0
NAUSEA: 0
HEARTBURN: 0
EYE PAIN: 1
HEMATURIA: 0
HEMATOCHEZIA: 0
PARESTHESIAS: 0
ARTHRALGIAS: 1
CHILLS: 0
HEADACHES: 0
DIZZINESS: 0
COUGH: 0
PALPITATIONS: 0
DIARRHEA: 0

## 2023-08-22 ASSESSMENT — PAIN SCALES - GENERAL: PAINLEVEL: NO PAIN (0)

## 2023-08-22 ASSESSMENT — ACTIVITIES OF DAILY LIVING (ADL): CURRENT_FUNCTION: NO ASSISTANCE NEEDED

## 2023-08-22 NOTE — PATIENT INSTRUCTIONS
At [facility], we strive to deliver an exceptional experience to you, every time we see you. If you receive a survey, please complete it as we do value your feedback.  If you have MyChart, you can expect to receive results automatically within 24 hours of their completion.  Your provider will send a note interpreting your results as well.   If you do not have MyChart, you should receive your results in about a week by mail.    Your care team:                            Family Medicine Internal Medicine   MD Tyrel Mckeon, MD Aurelia Nava, MD Clau Barnard, PAJUAQUIN Del Rosario, MD Pediatrics   David Sykes, PAJUAQUIN Dos Santos, MD Rochelle Cortez, MD Nancy LAURA CNP   VALENTÍN Mccray CNP, MD Shanda Matias, NP coming October 2023 Same-Day (No follow up visit)    JARROD Raymond PA coming Oct 2023     Clinic hours: Monday - Thursday 7 am-6 pm; Fridays 7 am-5 pm.   Urgent care: Monday - Friday 10 am- 8 pm; Saturday and Sunday 9 am-5 pm.    Clinic: [phone]       [facility]: Monday - Thursday 8 am - 7 pm; Friday 8 am - 6 pm  [facility]: [phone]     Patient Education   Personalized Prevention Plan  You are due for the preventive services outlined below.  Your care team is available to assist you in scheduling these services.  If you have already completed any of these items, please share that information with your care team to update in your medical record.  Health Maintenance Due   Topic Date Due     Diptheria Tetanus Pertussis (DTAP/TDAP/TD) Vaccine (2 - Td or Tdap) 03/07/2022     COVID-19 Vaccine (5 - Moderna series) 03/02/2023     Annual Wellness Visit  06/08/2023     ANNUAL REVIEW OF HM ORDERS  06/08/2023     Learning About Being Physically Active  What is physical activity?    "  Being physically active means doing any kind of activity that gets your body moving.  The types of physical activity that can help you get fit and stay healthy include:  Aerobic or \"cardio\" activities. These make your heart beat faster and make you breathe harder, such as brisk walking, riding a bike, or running. They strengthen your heart and lungs and build up your endurance.  Strength training activities. These make your muscles work against, or \"resist,\" something. Examples include lifting weights or doing push-ups. These activities help tone and strengthen your muscles and bones.  Stretches. These let you move your joints and muscles through their full range of motion. Stretching helps you be more flexible.  Reaching a balance between these three types of physical activity is important because each one contributes to your overall fitness.  What are the benefits of being active?  Being active is one of the best things you can do for your health. It helps you to:  Feel stronger and have more energy to do all the things you like to do.  Focus better at school or work.  Feel, think, and sleep better.  Reach and stay at a healthy weight.  Lose fat and build lean muscle.  Lower your risk for serious health problems, including diabetes, heart attack, high blood pressure, and some cancers.  Keep your heart, lungs, bones, muscles, and joints strong and healthy.  How can you make being active part of your life?  Start slowly. Make it your long-term goal to get at least 30 minutes of exercise on most days of the week. Walking is a good choice. You also may want to do other activities, such as running, swimming, cycling, or playing tennis or team sports.  Pick activities that you like--ones that make your heart beat faster, your muscles stronger, and your muscles and joints more flexible. If you find more than one thing you like doing, do them all. You don't have to do the same thing every day.  Get your heart pumping " "every day. Any activity that makes your heart beat faster and keeps it at that rate for a while counts.  Here are some great ways to get your heart beating faster:  Go for a brisk walk, run, or bike ride.  Go for a hike or swim.  Go in-line skating.  Play a game of touch football, basketball, or soccer.  Ride a bike.  Play tennis or racquetball.  Climb stairs.  Even some household chores can be aerobic--just do them at a faster pace. Vacuuming, raking or mowing the lawn, sweeping the garage, and washing and waxing the car all can help get your heart rate up.  Strengthen your muscles during the week. You don't have to lift heavy weights or grow big, bulky muscles to get stronger. Doing a few simple activities that make your muscles work against, or \"resist,\" something can help you get stronger.  For example, you can:  Do push-ups or sit-ups, which use your own body weight as resistance.  Lift weights or dumbbells or use stretch bands at home or in a gym or community center.  Stretch your muscles often. Stretching will help you as you become more active. It can help you stay flexible, loosen tight muscles, and avoid injury. It can also help improve your balance and posture and can be a great way to relax.  Be sure to stretch the muscles you'll be using when you work out. It's best to warm your muscles slightly before you stretch them. Walk or do some other light aerobic activity for a few minutes, and then start stretching.  When you stretch your muscles:  Do it slowly. Stretching is not about going fast or making sudden movements.  Don't push or bounce during a stretch.  Hold each stretch for at least 15 to 30 seconds, if you can. You should feel a stretch in the muscle, but not pain.  Breathe out as you do the stretch. Then breathe in as you hold the stretch. Don't hold your breath.  If you're worried about how more activity might affect your health, have a checkup before you start. Follow any special advice your " "doctor gives you for getting a smart start.  Where can you learn more?  Go to https://www.[employer].net/patiented  Enter W332 in the search box to learn more about \"Learning About Being Physically Active.\"  Current as of: October 10, 2022               Content Version: 13.7    7312-2836 [employer].   Care instructions adapted under license by your healthcare professional. If you have questions about a medical condition or this instruction, always ask your healthcare professional. [employer] disclaims any warranty or liability for your use of this information.      Bladder Training: Care Instructions  Your Care Instructions     Bladder training is used to treat urge incontinence and stress incontinence. Urge incontinence means that the need to urinate comes on so fast that you can't get to a toilet in time. Stress incontinence means that you leak urine because of pressure on your bladder. For example, it may happen when you laugh, cough, or lift something heavy.  Bladder training can increase how long you can wait before you have to urinate. It can also help your bladder hold more urine. And it can give you better control over the urge to urinate.  It is important to remember that bladder training takes a few weeks to a few months to make a difference. You may not see results right away, but don't give up.  Follow-up care is a key part of your treatment and safety. Be sure to make and go to all appointments, and call your doctor if you are having problems. It's also a good idea to know your test results and keep a list of the medicines you take.  How can you care for yourself at home?  Work with your doctor to come up with a bladder training program that is right for you. You may use one or more of the following methods.  Delayed urination  In the beginning, try to keep from urinating for 5 minutes after you first feel the need to go.  While you wait, take deep, slow breaths to relax. " "Kegel exercises can also help you delay the need to go to the bathroom.  After some practice, when you can easily wait 5 minutes to urinate, try to wait 10 minutes before you urinate.  Slowly increase the waiting period until you are able to control when you have to urinate.  Scheduled urination  Empty your bladder when you first wake up in the morning.  Schedule times throughout the day when you will urinate.  Start by going to the bathroom every hour, even if you don't need to go.  Slowly increase the time between trips to the bathroom.  When you have found a schedule that works well for you, keep doing it.  If you wake up during the night and have to urinate, do it. Apply your schedule to waking hours only.  Kegel exercises  These tighten and strengthen pelvic muscles, which can help you control the flow of urine. (If doing these exercises causes pain, stop doing them and talk with your doctor.) To do Kegel exercises:  Squeeze your muscles as if you were trying not to pass gas. Or squeeze your muscles as if you were stopping the flow of urine. Your belly, legs, and buttocks shouldn't move.  Hold the squeeze for 3 seconds, then relax for 5 to 10 seconds.  Start with 3 seconds, then add 1 second each week until you are able to squeeze for 10 seconds.  Repeat the exercise 10 times a session. Do 3 to 8 sessions a day.  When should you call for help?  Watch closely for changes in your health, and be sure to contact your doctor if:    Your incontinence is getting worse.     You do not get better as expected.   Where can you learn more?  Go to https://www.[employer].net/patiented  Enter V684 in the search box to learn more about \"Bladder Training: Care Instructions.\"  Current as of: March 1, 2023               Content Version: 13.7    1339-1347 [employer].   Care instructions adapted under license by your healthcare professional. If you have questions about a medical condition or this instruction, always " ask your healthcare professional. [employer] disclaims any warranty or liability for your use of this information.

## 2023-08-22 NOTE — PROGRESS NOTES
"SUBJECTIVE:   theodora is a 87 year old who presents for Preventive Visit.      8/22/2023     7:59 AM   Additional Questions   Roomed by deepak       Are you in the first 12 months of your Medicare coverage?  No    Healthy Habits:     In general, how would you rate your overall health?  Good    Frequency of exercise:  1 day/week    Duration of exercise:  15-30 minutes    Do you usually eat at least 4 servings of fruit and vegetables a day, include whole grains    & fiber and avoid regularly eating high fat or \"junk\" foods?  Yes    Taking medications regularly:  Yes    Medication side effects:  Not applicable    Ability to successfully perform activities of daily living:  No assistance needed    Home Safety:  No safety concerns identified    Hearing Impairment:  No hearing concerns    In the past 6 months, have you been bothered by leaking of urine? Yes    In general, how would you rate your overall mental or emotional health?  Good        Have you ever done Advance Care Planning? (For example, a Health Directive, POLST, or a discussion with a medical provider or your loved ones about your wishes): No, advance care planning information given to patient to review.  Patient plans to discuss their wishes with loved ones or provider.        Fall risk  Fallen 2 or more times in the past year?: No  Any fall with injury in the past year?: No  click delete button to remove this line now  Cognitive Screening   1) Repeat 3 items (Leader, Season, Table)    2) Clock draw: NORMAL  3) 3 item recall: Recalls 3 objects  Results: NORMAL clock, 1-2 items recalled: COGNITIVE IMPAIRMENT LESS LIKELY    Mini-CogTM Copyright NEENA Jimenez. Licensed by the author for use in [facility]; reprinted with permission (willian@.[facility]). All rights reserved.      Do you have sleep apnea, excessive snoring or daytime drowsiness? : yes    Reviewed and updated as needed this visit by clinical staff   Tobacco  Allergies  Meds  Problems  Med Hx  " Surg Hx  Fam Hx          Reviewed and updated as needed this visit by Provider   Tobacco  Allergies  Meds  Problems  Med Hx  Surg Hx  Fam Hx         Social History     Tobacco Use     Smoking status: Former     Packs/day: 1.00     Years: 10.00     Pack years: 10.00     Types: Cigarettes, Pipe     Start date: 6/15/1959     Quit date: 1969     Years since quittin.5     Smokeless tobacco: Never     Tobacco comments:        Substance Use Topics     Alcohol use: Yes     Comment: A beer once in awhile             2023     7:32 PM   Alcohol Use   Prescreen: >3 drinks/day or >7 drinks/week? No     Do you have a current opioid prescription? No  Do you use any other controlled substances or medications that are not prescribed by a provider? None        Tremor and Neuropathy:  Has been on Gabapentin, takes in the evening  Followed by Neurology for this     Has had some leg edema  Gets worse at the end of the day      Has had tremor and has been on medication for tremor per Neurology       Current providers sharing in care for this patient include:   Patient Care Team:  Tyrel Manning MD as PCP - General (Internal Medicine)  Tyrel Manning MD as Assigned PCP  García Munroe MD as Assigned Neuroscience Provider  Tosha Ron MD as MD (Otolaryngology)  Christie Jackson MD as Assigned Musculoskeletal Provider  Fior Matos RPH as Pharmacist (Pharmacist)  Fior Matos RPH as Assigned MTM Pharmacist  Tosha Ron MD as Assigned Surgical Provider  Carlos James OD (Optometry)  Mau Padilla MD as MD (Dermatology)    The following health maintenance items are reviewed in Epic and correct as of today:  Health Maintenance   Topic Date Due     DTAP/TDAP/TD IMMUNIZATION (2 - Td or Tdap) 2022     COVID-19 Vaccine (5 - Moderna series) 2023     MEDICARE ANNUAL WELLNESS VISIT  2023     ANNUAL REVIEW OF HM ORDERS  2023      INFLUENZA VACCINE (1) 09/01/2023     EYE EXAM  07/05/2024     FALL RISK ASSESSMENT  08/22/2024     ADVANCE CARE PLANNING  08/22/2028     PHQ-2 (once per calendar year)  Completed     Pneumococcal Vaccine: 65+ Years  Completed     ZOSTER IMMUNIZATION  Completed     IPV IMMUNIZATION  Aged Out     MENINGITIS IMMUNIZATION  Aged Out     COLORECTAL CANCER SCREENING  Discontinued     Lab work is in process  Labs reviewed in EPIC  BP Readings from Last 3 Encounters:   08/22/23 110/68   03/06/23 111/66   01/25/23 116/64    Wt Readings from Last 3 Encounters:   08/22/23 73 kg (161 lb)   03/06/23 70.8 kg (156 lb)   08/01/22 70.8 kg (156 lb)                  Patient Active Problem List   Diagnosis     Seborrheic dermatitis     Seasonal allergic rhinitis due to pollen     Cervical radiculopathy at C6     Personal history of malignant neoplasm of larynx     Essential tremor     CARDIOVASCULAR SCREENING; LDL GOAL LESS THAN 160     Benign prostatic hyperplasia     Hoarse voice quality     Advanced directives, counseling/discussion     Pseudophakia     Hiatal hernia with GERD     Macular degeneration     Supraspinatus sprain, right, initial encounter     CARDIOVASCULAR SCREENING; LDL GOAL LESS THAN 100     Vitamin D deficiency     Chronic midline low back pain without sciatica     Sleep disorder, nonorganic     Other gastritis without hemorrhage, unspecified chronicity     BPH associated with nocturia     Weight loss     Pernicious anemia     Arthritis of right shoulder region     Past Surgical History:   Procedure Laterality Date     APPENDECTOMY  8 years old     BIOPSY  may 2015    face, back     Biopsy of the throat - cancerous mass - got radiation for in larynx  1982     CATARACT IOL, RT/LT       COLONOSCOPY  02/25/2004    Normal     COLONOSCOPY N/A 04/28/2015    Procedure: COLONOSCOPY;  Surgeon: Marvin Adams MD;  Location: [facility]     COLONOSCOPY N/A 01/10/2022    Procedure: COLONOSCOPY, WITH POLYPECTOMY AND BIOPSY;   Surgeon: Marsha Asher MD;  Location: [facility]     COLONOSCOPY N/A 01/10/2022    Procedure: COLONOSCOPY, FLEXIBLE, WITH LESION REMOVAL USING SNARE;  Surgeon: Marsha Asher MD;  Location: [facility]     COLONOSCOPY WITH CO2 INSUFFLATION N/A 2015    Procedure: COLONOSCOPY WITH CO2 INSUFFLATION;  Surgeon: Marvin Adams MD;  Location: [facility]     COLONOSCOPY WITH CO2 INSUFFLATION N/A 01/10/2022    Procedure: COLONOSCOPY, WITH CO2 INSUFFLATION;  Surgeon: Marsha Asher MD;  Location: [facility]     COMBINED ESOPHAGOSCOPY, GASTROSCOPY, DUODENOSCOPY (EGD) WITH CO2 INSUFFLATION N/A 01/10/2022    Procedure: ESOPHAGOGASTRODUODENOSCOPY, WITH CO2 INSUFFLATION;  Surgeon: Marsha Asher MD;  Location: [facility]     CYSTOSCOPY  1997    for hematuria - negative     ESOPHAGOSCOPY, GASTROSCOPY, DUODENOSCOPY (EGD), COMBINED N/A 01/10/2022    Procedure: ESOPHAGOGASTRODUODENOSCOPY, WITH BIOPSY;  Surgeon: Marsha Asher MD;  Location: [facility]     EYE SURGERY Right     Rt eye.macular repair     EYE SURGERY      cataract     NASAL ENDOSCOPY,DX  2007    GERD     Pars plana vitrectomy and epiretinal membrane dissection, right eye  2002     Phacoemulsification with intraocular lens implantation right eye.  2003     PHACOEMULSIFICATION WITH STANDARD INTRAOCULAR LENS IMPLANT Left 2016    Procedure: PHACOEMULSIFICATION WITH STANDARD INTRAOCULAR LENS IMPLANT;  Surgeon: Nghia Lara MD;  Location: [facility]     Thyroglossal Duct Cyst Removal - 2ndary to radiation.  1982     VASECTOMY  1971     ZZ GASTROSCOPY,FL  2007    hiatal hernia and errosions       Social History     Tobacco Use     Smoking status: Former     Packs/day: 1.00     Years: 10.00     Pack years: 10.00     Types: Cigarettes, Pipe     Start date: 6/15/1959     Quit date: 1969     Years since quittin.5     Smokeless tobacco: Never     Tobacco comments:        Substance Use Topics     Alcohol use: Yes      Comment: A beer once in awhile     Family History   Problem Relation Age of Onset     Cerebrovascular Disease Mother         at 86 yo - possibly TIA - befoer     Macular Degeneration Mother      Gastrointestinal Disease Father      Cancer - colorectal Father      Hypertension Father      Cancer Father      Other - See Comments Daughter      Other - See Comments Daughter      Other - See Comments Son      Neurologic Disorder Son         eye and hearing ?     Tremor Son      Other - See Comments Son      Cancer Other      C.A.D. No family hx of      Diabetes No family hx of      Prostate Cancer No family hx of      Glaucoma No family hx of      Thyroid Disease No family hx of          Current Outpatient Medications   Medication Sig Dispense Refill     cyanocobalamin (VITAMIN B-12) 1000 MCG tablet Take 1,000 mcg by mouth daily       famotidine (PEPCID) 40 MG tablet Take 1 tablet (40 mg) by mouth daily 30 tablet 1     gabapentin (NEURONTIN) 100 MG capsule Takes 2-3 x 100mg capsule by mouth nightly 270 capsule 3     latanoprost (XALATAN) 0.005 % ophthalmic solution Place 1 drop into the right eye At Bedtime 7.5 mL 1     primidone (MYSOLINE) 250 MG tablet 1/4 tab in morning and 1 in the evevning 135 tablet 3     triamcinolone (KENALOG) 0.1 % external cream Apply topically 2 times daily as needed (itchy rash of shoulders/chest) 454 g 3     Vitamin D, Cholecalciferol, 25 MCG (1000 UT) CAPS Take 1,000 Units by mouth daily (Winter only)       pantoprazole (PROTONIX) 40 MG EC tablet Take 1 tablet (40 mg) by mouth daily 90 tablet 1     Allergies   Allergen Reactions     Seasonal Allergies      Hay fever            Review of Systems   Constitutional:  Negative for chills and fever.   HENT:  Positive for sore throat. Negative for ear pain and hearing loss.    Eyes:  Positive for pain and visual disturbance.   Respiratory:  Negative for cough and shortness of breath.    Cardiovascular:  Positive for peripheral edema. Negative for  "chest pain and palpitations.   Gastrointestinal:  Positive for constipation. Negative for abdominal pain, diarrhea, heartburn, hematochezia and nausea.   Genitourinary:  Positive for frequency, impotence and urgency. Negative for dysuria, genital sores, hematuria and penile discharge.   Musculoskeletal:  Positive for arthralgias, joint swelling and myalgias.   Skin:  Negative for rash.   Neurological:  Negative for dizziness, weakness, headaches and paresthesias.   Psychiatric/Behavioral:  Negative for mood changes. The patient is not nervous/anxious.        OBJECTIVE:   /68 (BP Location: Right arm, Patient Position: Sitting, Cuff Size: Adult Regular)   Pulse 70   Temp 97.6  F (36.4  C) (Oral)   Resp 14   Ht 1.87 m (6' 1.62\")   Wt 73 kg (161 lb)   SpO2 99%   BMI 20.88 kg/m   Estimated body mass index is 20.88 kg/m  as calculated from the following:    Height as of this encounter: 1.87 m (6' 1.62\").    Weight as of this encounter: 73 kg (161 lb).  Physical Exam  GENERAL APPEARANCE: healthy, alert and no distress  EYES: Eyes grossly normal to inspection and conjunctivae and sclerae normal  HENT: Intact TMs  NECK: no adenopathy and trachea midline and normal to palpation  RESP: lungs clear to auscultation - no rales, rhonchi or wheezes  CV: regular rates and rhythm, normal S1 S2  ABDOMEN: soft, non-tender and no rebound or guarding   MS: extremities normal- no gross deformities noted, trace bilateral pedal edema+   SKIN: no suspicious lesions or rashes  NEURO: Normal strength and tone, mentation intact and speech normal  PSYCH: mentation appears normal      Diagnostic Test Results:  Labs reviewed in Epic  Results for orders placed or performed in visit on 08/22/23 (from the past 24 hour(s))   CBC with Platelets & Differential    Narrative    The following orders were created for panel order CBC with Platelets & Differential.  Procedure                               Abnormality         Status                "      ---------                               -----------         ------                     CBC with platelets and d...[[id number]]  Abnormal            Final result                 Please view results for these tests on the individual orders.   CBC with platelets and differential   Result Value Ref Range    WBC Count 5.0 4.0 - 11.0 10e3/uL    RBC Count 3.67 (L) 4.40 - 5.90 10e6/uL    Hemoglobin 12.0 (L) 13.3 - 17.7 g/dL    Hematocrit 36.3 (L) 40.0 - 53.0 %    MCV 99 78 - 100 fL    MCH 32.7 26.5 - 33.0 pg    MCHC 33.1 31.5 - 36.5 g/dL    RDW 12.8 10.0 - 15.0 %    Platelet Count 182 150 - 450 10e3/uL    % Neutrophils 57 %    % Lymphocytes 30 %    % Monocytes 11 %    % Eosinophils 1 %    % Basophils 0 %    % Immature Granulocytes 0 %    Absolute Neutrophils 2.9 1.6 - 8.3 10e3/uL    Absolute Lymphocytes 1.5 0.8 - 5.3 10e3/uL    Absolute Monocytes 0.6 0.0 - 1.3 10e3/uL    Absolute Eosinophils 0.1 0.0 - 0.7 10e3/uL    Absolute Basophils 0.0 0.0 - 0.2 10e3/uL    Absolute Immature Granulocytes 0.0 <=0.4 10e3/uL       ASSESSMENT / PLAN:   Preston was seen today for physical.    Diagnoses and all orders for this visit:    Encounter for Medicare annual wellness exam  -Preventive guidelines reviewed  -vaccines will be done at pharmacy    Hiatal hernia with GERD  -     famotidine (PEPCID) 40 MG tablet; Take 1 tablet (40 mg) by mouth daily  -has stopped Protonix    Pernicious anemia  -     Comprehensive metabolic panel  -     CBC with Platelets & Differential    Tremor  -     Per Neurology   -patient had questions on if he can take Gabapentin and Primidone together, I said that he may   -     Comprehensive metabolic panel  -     CBC with Platelets & Differential    Hereditary and idiopathic peripheral neuropathy  -     On Gabapentin  -managed by Neurology   -     Comprehensive metabolic panel  -     CBC with Platelets & Differential  -     Vitamin B12    Lipid screening  -     Lipid panel reflex to direct LDL Fasting    Other  orders  -     PRIMARY CARE FOLLOW-UP SCHEDULING; Future          COUNSELING:  Reviewed preventive health counseling, as reflected in patient instructions       Regular exercise       Healthy diet/nutrition       Fall risk prevention        He reports that he quit smoking about 54 years ago. His smoking use included cigarettes and pipe. He started smoking about 64 years ago. He has a 10.00 pack-year smoking history. He has never used smokeless tobacco.      Appropriate preventive services were discussed with this patient, including applicable screening as appropriate for cardiovascular disease, diabetes, osteopenia/osteoporosis, and glaucoma.  As appropriate for age/gender, discussed screening for colorectal cancer, prostate cancer, breast cancer, and cervical cancer. Checklist reviewing preventive services available has been given to the patient.    Reviewed patients plan of care and provided an AVS. The Basic Care Plan (routine screening as documented in Health Maintenance) for Preston meets the Care Plan requirement. This Care Plan has been established and reviewed with the Patient.          Nicole Dos Santos MD MPH    [facility]    Identified Health Risks:  I have reviewed Opioid Use Disorder and Substance Use Disorder risk factors and made any needed referrals.   He is at risk for lack of exercise and has been provided with information to increase physical activity for the benefit of his well-being.  Information on urinary incontinence and treatment options given to patient.

## 2023-08-25 NOTE — RESULT ENCOUNTER NOTE
Preston,     Cholesterol is at goal for you.  Electrolytes, glucose, kidney function and liver function tests are normal.  Basic blood count is showing a low hemoglobin, but this is stable for you.  Vitamin B 12 levels are normal and improved from last check.    Please do not hesitate to call us at [phone] if you have any questions or concerns.    Thank you,    Nicole Dos Santos MD MPH

## 2023-10-20 DIAGNOSIS — K21.9 HIATAL HERNIA WITH GERD: ICD-10-CM

## 2023-10-20 DIAGNOSIS — K44.9 HIATAL HERNIA WITH GERD: ICD-10-CM

## 2023-10-20 RX ORDER — FAMOTIDINE 40 MG/1
40 TABLET, FILM COATED ORAL DAILY
Qty: 30 TABLET | Refills: 1 | Status: SHIPPED | OUTPATIENT
Start: 2023-10-20 | End: 2024-06-06

## 2023-11-15 ENCOUNTER — NURSE TRIAGE (OUTPATIENT)
Dept: FAMILY MEDICINE | Facility: CLINIC | Age: 88
End: 2023-11-15
Payer: COMMERCIAL

## 2023-11-15 NOTE — TELEPHONE ENCOUNTER
"Patient calling in with persisting symptoms of COVID-19 since initial symptoms started on 11/1/23 and then tested positive for COVID via self test at assisted living.    Patient states that initially his symptoms were more severe, including moderate cough, lots of congestion (took Mucinex and this helped), headaches, and fatigue. Currently, patient states that he is still testing positive via self/rapid test but his symptoms have mostly improved - never had SOB, chest pain or fevers, but does still feel quite a bit of fatigue and thinks he may have lost some weight during the last 2 weeks.     Patient states he still has mild congestion, post-nasal drip, states he feels like the congestion has \"settled\" in his throat (patient is clearing throat frequently over the phone). Overall, patient states that he still feels quite \"crummy\" and is wondering if a provider could listen to his lungs and just make sure he's doing OK and recovering well. Patient has not been seen by provider for this since initial diagnosis.     Informed patient we can certainly get him an appointment with our same day provider this week to be assessed if he would like. Patient verbalized understanding, requesting appt for this Friday if able - appt made this Friday, patient aware of appt time. No further questions or concerns.      EFFIE Raymond, RN  [facility]    Reason for Disposition   PERSISTING SYMPTOMS OF COVID-19 and NO medical visit for COVID-19 in past 2 weeks   Patient wants to be seen    Additional Information   Negative: SEVERE difficulty breathing (e.g., struggling for each breath, speaks in single words)   Negative: SEVERE weakness (e.g., can't stand or can barely walk) and new-onset or WORSE   Negative: Difficult to awaken or acting confused (e.g., disoriented, slurred speech)   Negative: Bluish (or gray) lips or face now   Negative: Sounds like a life-threatening emergency to the triager   Negative: " "Typical COVID-19 symptoms and lasting less than 3 weeks   Negative: Chest pain, pressure, or tightness and new-onset or worsening   Negative: Fever and new-onset or worsening   Negative: MODERATE difficulty breathing (e.g., speaks in phrases, SOB even at rest, pulse 100-120) and new-onset or WORSE   Negative: MODERATE difficulty breathing and oxygen level (e.g., pulse oximetry) 91 to 94%   Negative: Oxygen level (e.g., pulse oximetry) 90% or lower   Negative: MODERATE difficulty breathing (e.g., speaks in phrases, SOB even at rest, pulse 100-120)   Negative: Drinking very little and dehydration suspected (e.g., no urine > 12 hours, very dry mouth, very lightheaded)   Negative: Patient sounds very sick or weak to the triager   Negative: MILD difficulty breathing (e.g., minimal/no SOB at rest, SOB with walking, pulse <100) and new-onset   Negative: Oxygen level (e.g., pulse oximetry) 91 to 94%   Negative: PERSISTING SYMPTOMS OF COVID-19 and NEW symptom and could be serious   Negative: Caller has URGENT question and triager unable to answer question   Negative: PERSISTING SYMPTOMS OF COVID-19 and symptoms WORSE   Negative: Caller has NON-URGENT question and triager unable to answer    Answer Assessment - Initial Assessment Questions  1. COVID-19 ONSET: \"When did the symptoms of COVID-19 first start?\"      11/1  2. DIAGNOSIS CONFIRMATION: \"How were you diagnosed?\" (e.g., COVID-19 oral or nasal viral test; COVID-19 antibody test; doctor visit)      Self test rapid  3. MAIN SYMPTOM:  \"What is your main concern or symptom right now?\" (e.g., breathing difficulty, cough, fatigue. loss of smell)      Fatigue, feels run down  4. SYMPTOM ONSET: \"When did the  fatigue  start?\"      11/1  5. BETTER-SAME-WORSE: \"Are you getting better, staying the same, or getting worse over the last 1 to 2 weeks?\"      Bit better  6. RECENT MEDICAL VISIT: \"Have you been seen by a healthcare provider (doctor, NP, PA) for these persisting COVID-19 " "symptoms?\" If Yes, ask: \"When were you seen?\" (e.g., date)      No  7. COUGH: \"Do you have a cough?\" If Yes, ask: \"How bad is the cough?\"        Mild  8. FEVER: \"Do you have a fever?\" If Yes, ask: \"What is your temperature, how was it measured, and when did it start?\"      No  9. BREATHING DIFFICULTY: \"Are you having any trouble breathing?\" If Yes, ask: \"How bad is your breathing?\" (e.g., mild, moderate, severe)     - MILD: No SOB at rest, mild SOB with walking, speaks normally in sentences, can lie down, no retractions, pulse < 100.     - MODERATE: SOB at rest, SOB with minimal exertion and prefers to sit, cannot lie down flat, speaks in phrases, mild retractions, audible wheezing, pulse 100-120.     - SEVERE: Very SOB at rest, speaks in single words, struggling to breathe, sitting hunched forward, retractions, pulse > 120.        None  10. OTHER SYMPTOMS: \"Do you have any other symptoms?\"  (e.g., fatigue, headache, muscle pain, weakness)        Fatigue, head fullness, throat aches, congestion  11. HIGH RISK DISEASE: \"Do you have any chronic medical problems?\" (e.g., asthma, heart or lung disease, weak immune system, obesity, etc.)        None  12. VACCINE: \"Have you gotten the COVID-19 vaccine?\" If Yes, ask: \"Which one, how many shots, when did you get it?\"        Yes  13. PREGNANCY: \"Is there any chance you are pregnant?\" \"When was your last menstrual period?\"        N/A  14. O2 SATURATION MONITOR:  \"Do you use an oxygen saturation monitor (pulse oximeter) at home?\" If Yes, ask \"What is your reading (oxygen level) today?\" \"What is your usual oxygen saturation reading?\" (e.g., 95%)        No    Protocols used: Coronavirus (COVID-19) Persisting Symptoms Follow-up Call-[facility]    "

## 2023-11-15 NOTE — LETTER
7/27/2022         RE: Preston Cai  [address]  [facility] [address]        Dear Colleague,    Thank you for referring your patient, Preston Cai, to the [facility]. Please see a copy of my visit note below.                   HPI    This 86 year old patient who is here for the f/u.He experienced an episode of vertigo a week ago, lasted 6 hours with nausea, vomiting, and aural fullness. States that it was like a spinning and he continues to have right aural pressure. Denies any trauma, otalgia, otorrhea. Describes recent stress and increased salt intake. His BP is always low. His hearing is stable.  Denies any n/v, choking, coughing, difficulty swallowing, vision issues, weakness or numbness. His BP is always low. He did have similar episode of vertigo more than a decade ago. 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 Describes hoarseness for the past several years and he tried PPIs with some benefit. He has no hx of difficulty swallowing or weight changes.     Preston's hearing test showed that 2 kHz sharply sloping to a moderate high freq. 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 Mild low freq. SNHL rising to WNL sloping back to  moderate high freq. 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 Excellent word rec. Tymps WNL. Present 1 kHz ipsi/contra reflexes bilaterally.    ENT Problem List  Allergic rhinitis due to other allergen    Cervical radiculopathy at C6    Personal history of malignant neoplasm of larynx    Pseudophakia    Hiatal hernia with GERD    Macular degeneration    Sleep disorder, nonorganic    Seborrheic dermatitis    Essential tremor    CARDIOVASCULAR SCREENING; LDL GOAL LESS THAN 160    Benign prostatic hyperplasia    Hoarse voice quality    Advanced directives, counseling/discussion    Supraspinatus sprain, right, initial encounter    CARDIOVASCULAR SCREENING; LDL GOAL LESS THAN 100    Vitamin D deficiency    Chronic midline low back pain without sciatica    Other  gastritis without hemorrhage, unspecified chronicity    BPH associated with nocturia    Weight loss    Pernicious anemia    Arthritis of right shoulder region      Medications         budesonide (RINOCORT AQUA) 32 MCG/ACT nasal spray      famotidine (PEPCID) 20 MG tablet      gabapentin (NEURONTIN) 100 MG capsule      latanoprost (XALATAN) 0.005 % ophthalmic solution      mirtazapine (REMERON) 15 MG tablet      pantoprazole (PROTONIX) 40 MG EC tablet      primidone (MYSOLINE) 250 MG tablet      tamsulosin (FLOMAX) 0.4 MG capsule      Vitamin D, Cholecalciferol, 25 MCG (1000 UT) CAPS         MR BRAIN W/O & W CONTRAST 7/22/2019 11:51 AM     History: Vertigo     ICD-10: Meniere's disease, right     Comparison: MRI 9/17/2009     Technique: Axial diffusion-weighted with ADC map, T2-weighted,  turboFLAIR and T1-weighted images of the brain and axial T1-weighted  and coronal T2-weighted with fat saturation images centered on the  internal auditory canals were obtained without intravenous contrast.  Following intravenous administration of gadolinium, axial turboFLAIR  images of the brain and axial T1-weighted with fat saturation and  coronal T1-weighted images centered on the internal auditory canals  were obtained.     Contrast: 7.5ml Gadavist     Findings: No abnormal signal or enhancement along the course of the  seventh and eighth cranial nerves on either side. Vestibular and  auditory structures exhibit normal signal on T2-weighted images and no  abnormal enhancement.     Images of the whole brain demonstrate no mass lesion, no mass effect,  or midline shift. No intracranial hemorrhage on  susceptibility-weighted images. Mild leukokraurosis. There is no  restricted diffusion. Ventricles are proportionate to the cerebral  sulci. No abnormal enhancement.     Pseudophakia. The visualized paranasal sinuses and mastoid air cells  are clear.                                                                       Impression:     1. Normal MRI of the IACs. No evidence of mass.  2. Mild chronic small vessel ischemic disease MR, progressed since  8/17/2009.    Review of Systems   Constitutional: Negative.    HENT: Positive for hearing loss and tinnitus. Negative for congestion, ear discharge, ear pain, nosebleeds, sinus pain and sore throat.    Eyes: Negative for blurred vision and double vision.   Respiratory: Negative for cough and hemoptysis.    Gastrointestinal: Negative for heartburn, nausea and vomiting.   Skin: Negative.    Neurological: Positive for dizziness. Negative for tingling, tremors and headaches.   Endo/Heme/Allergies: Negative for environmental allergies. Does not bruise/bleed easily.     Physical Exam   Constitutional: He appears well-developed and well-nourished.   HENT:   Head: Normocephalic and atraumatic.   Right Ear: Tympanic membrane, external ear and ear canal normal. No drainage, swelling or tenderness. No middle ear effusion. Decreased hearing is noted.   Left Ear: Tympanic membrane, external ear and ear canal normal. No drainage, swelling or tenderness.  No middle ear effusion. Decreased hearing is noted.   Nose: Mucosal edema, rhinorrhea and septal deviation present.   Mouth/Throat: Uvula is midline, oropharynx is clear and moist and mucous membranes are normal.   Eyes: Pupils are equal, round, and reactive to light. EOM are normal.   Neck: Normal range of motion. Neck supple.     A/P  This pleasant patient  Had an episode of vertigo due to right Meniere's disease. Options including caffeine, salt restriction, good hydration, anxiety control, and diuretic medication and a short term tapered steroid were reviewed. Regarding the diuretic, he might have even lower BP that concerns me and the patient. I will complete the steroid treatment and stick with the diet at this time. I will see him in the f/u in 3 months. His questions were answered.          Again, thank you for allowing me to participate in the care of  your patient.        Sincerely,        Tosha Ron MD     no radiation

## 2023-11-17 ENCOUNTER — OFFICE VISIT (OUTPATIENT)
Dept: FAMILY MEDICINE | Facility: CLINIC | Age: 88
End: 2023-11-17
Payer: COMMERCIAL

## 2023-11-17 VITALS
OXYGEN SATURATION: 98 % | HEART RATE: 82 BPM | TEMPERATURE: 97.1 F | HEIGHT: 74 IN | SYSTOLIC BLOOD PRESSURE: 114 MMHG | BODY MASS INDEX: 20.48 KG/M2 | WEIGHT: 159.6 LBS | DIASTOLIC BLOOD PRESSURE: 62 MMHG

## 2023-11-17 DIAGNOSIS — U07.1 INFECTION DUE TO 2019 NOVEL CORONAVIRUS: Primary | ICD-10-CM

## 2023-11-17 PROCEDURE — 99212 OFFICE O/P EST SF 10 MIN: CPT | Performed by: PHYSICIAN ASSISTANT

## 2023-11-17 ASSESSMENT — PAIN SCALES - GENERAL: PAINLEVEL: NO PAIN (0)

## 2023-11-17 NOTE — PROGRESS NOTES
Assessment & Plan     Infection due to 2019 novel coronavirus  Over 2 weeks out, likely noninfectious at this point.  Would expect ongoing improvement.  Exam is reassuring today and no red flags identified.  With his sore throat and ongoing congestion, can continue over-the-counter medications if needed.      Anita Barahona PA-C  [facility]    Alhaji yip is a 87 year old, presenting for the following health issues:  Covid Concern        11/17/2023    10:56 AM   Additional Questions   Roomed by Aldo farmer       History of Present Illness       Reason for visit:  Have had OVID. concern about getting better    He eats 2-3 servings of fruits and vegetables daily.He consumes 1 sweetened beverage(s) daily.He exercises with enough effort to increase his heart rate 9 or less minutes per day.  He exercises with enough effort to increase his heart rate 3 or less days per week.   He is taking medications regularly.           How are you feeling today? Better  In the past 24 hours have you had shortness of breath when speaking, walking, or climbing stairs? I don't have breathing problems  Do you have a cough? I don't have a cough  When is the last time you had a fever greater than 100? No  Are you having any other symptoms?  Congested  and tired   Do you have any other stressors you would like to discuss with your provider? No      Initially developed symptoms on 11/1/2023.  COVID going around his senior building.  Him and his wife both became ill.  He never had much of a cough, no ongoing cough or difficulty breathing.  Mostly having congestion and feels some chest congestion.  This has been gradually improving.  Having a lot of fatigue which is gradually improving as well.  Had some sore throat which seem to resolve and is now back.  Some hoarse voice.  States he does tend towards hoarseness with his underlying reflux.  Wanted to be evaluated to make sure he is healing  "appropriately.  He was not treated with Paxlovid.  Use Mucinex for few days with some improvement.            Objective    /62 (BP Location: Left arm, Patient Position: Sitting, Cuff Size: Adult Regular)   Pulse 82   Temp 97.1  F (36.2  C) (Tympanic)   Ht 1.87 m (6' 1.62\")   Wt 72.4 kg (159 lb 9.6 oz)   SpO2 98%   BMI 20.70 kg/m    Body mass index is 20.7 kg/m .  Physical Exam   GENERAL: healthy, alert and no distress  EYES: Eyes grossly normal to inspection, PERRL and conjunctivae and sclerae normal  HENT: normal cephalic/atraumatic, nose and mouth without ulcers or lesions, oropharynx clear, and oral mucous membranes moist  NECK: no adenopathy, no asymmetry, masses, or scars and thyroid normal to palpation  RESP: lungs clear to auscultation - no rales, rhonchi or wheezes  CV: regular rate and rhythm, normal S1 S2, no S3 or S4, no murmur, click or rub, no peripheral edema and peripheral pulses strong                      "

## 2023-11-28 ENCOUNTER — OFFICE VISIT (OUTPATIENT)
Dept: OTOLARYNGOLOGY | Facility: CLINIC | Age: 88
End: 2023-11-28
Payer: COMMERCIAL

## 2023-11-28 VITALS — HEART RATE: 75 BPM | DIASTOLIC BLOOD PRESSURE: 73 MMHG | SYSTOLIC BLOOD PRESSURE: 120 MMHG

## 2023-11-28 DIAGNOSIS — H81.01 MENIERE'S DISEASE, RIGHT: ICD-10-CM

## 2023-11-28 DIAGNOSIS — K21.9 GASTROESOPHAGEAL REFLUX DISEASE WITHOUT ESOPHAGITIS: Primary | ICD-10-CM

## 2023-11-28 PROCEDURE — 99214 OFFICE O/P EST MOD 30 MIN: CPT | Performed by: OTOLARYNGOLOGY

## 2023-11-28 RX ORDER — FAMOTIDINE 20 MG/1
20 TABLET, FILM COATED ORAL 2 TIMES DAILY
Qty: 120 TABLET | Refills: 1 | Status: SHIPPED | OUTPATIENT
Start: 2023-11-28 | End: 2024-03-11

## 2023-11-28 NOTE — PROGRESS NOTES
Preston Cai's chief complaint for this visit includes:  Chief Complaint   Patient presents with    Follow Up     GERD, Stopped Protonix, stated was dizzy, taking Famotidine and Helped. Did not do Speech PT or Video swallow.   On Flonase and helping congestion.       PCP: Tyrel Manning    Referring Provider:  No referring provider defined for this encounter.    /73   Pulse 75

## 2023-11-28 NOTE — PROGRESS NOTES
Preston Cai's chief complaint for this visit includes:  Chief Complaint   Patient presents with    Follow Up     GERD, Stopped Protonix, stated was dizzy, taking Famotidine and Helped. Did not do Speech PT or Video swallow.   On Flonase and helping congestion.       PCP: Tyrel Manning    Referring Provider:  No referring provider defined for this encounter.    /73   Pulse 75       HPI    This 87 year old patient is here for the f/u. I am glad to see that he is doing well with no vertigo since his last visit. States aural pressure in his left ear on and off. Denies any trauma, otalgia, otorrhea. His BP is always low. His hearing is stable. Denies any n/v, difficulty swallowing, vision issues, weakness or numbness. His BP is always low.   He continues to have frequent throat clearing.  Denies any odynophagia, choking, or dysphagia. He cough from time to time, dry with no phlegm or production. 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 Describes hoarseness for the past several years and he tried PPIs with some benefit. He has no hx of difficulty swallowing or weight changes.     Preston's hearing test showed that 2 kHz sharply sloping to a moderate high freq. 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 Mild low freq. SNHL rising to WNL sloping back to  moderate high freq. 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 Excellent word rec. Tymps WNL. Present 1 kHz ipsi/contra reflexes bilaterally.    ENT Problem List  Allergic rhinitis due to other allergen    Cervical radiculopathy at C6    Personal history of malignant neoplasm of larynx    Pseudophakia    Hiatal hernia with GERD    Macular degeneration    Sleep disorder, nonorganic    Seborrheic dermatitis    Essential tremor    CARDIOVASCULAR SCREENING; LDL GOAL LESS THAN 160    Benign prostatic hyperplasia    Hoarse voice quality    Advanced directives, counseling/discussion    Supraspinatus sprain, right, initial encounter    CARDIOVASCULAR SCREENING; LDL GOAL LESS THAN  100    Vitamin D deficiency    Chronic midline low back pain without sciatica    Other gastritis without hemorrhage, unspecified chronicity    BPH associated with nocturia    Weight loss    Pernicious anemia    Arthritis of right shoulder region      Medications         budesonide (RINOCORT AQUA) 32 MCG/ACT nasal spray      famotidine (PEPCID) 20 MG tablet      gabapentin (NEURONTIN) 100 MG capsule      latanoprost (XALATAN) 0.005 % ophthalmic solution      mirtazapine (REMERON) 15 MG tablet      pantoprazole (PROTONIX) 40 MG EC tablet      primidone (MYSOLINE) 250 MG tablet      tamsulosin (FLOMAX) 0.4 MG capsule      Vitamin D, Cholecalciferol, 25 MCG (1000 UT) CAPS         MR BRAIN W/O & W CONTRAST 7/22/2019 11:51 AM     History: Vertigo     ICD-10: Meniere's disease, right     Comparison: MRI 9/17/2009     Technique: Axial diffusion-weighted with ADC map, T2-weighted,  turboFLAIR and T1-weighted images of the brain and axial T1-weighted  and coronal T2-weighted with fat saturation images centered on the  internal auditory canals were obtained without intravenous contrast.  Following intravenous administration of gadolinium, axial turboFLAIR  images of the brain and axial T1-weighted with fat saturation and  coronal T1-weighted images centered on the internal auditory canals  were obtained.     Contrast: 7.5ml Gadavist     Findings: No abnormal signal or enhancement along the course of the  seventh and eighth cranial nerves on either side. Vestibular and  auditory structures exhibit normal signal on T2-weighted images and no  abnormal enhancement.     Images of the whole brain demonstrate no mass lesion, no mass effect,  or midline shift. No intracranial hemorrhage on  susceptibility-weighted images. Mild leukokraurosis. There is no  restricted diffusion. Ventricles are proportionate to the cerebral  sulci. No abnormal enhancement.     Pseudophakia. The visualized paranasal sinuses and mastoid air cells  are  clear.                                                                       Impression:    1. Normal MRI of the IACs. No evidence of mass.  2. Mild chronic small vessel ischemic disease MR, progressed since  8/17/2009.    Review of Systems   Constitutional: Negative.    HENT: Positive for hearing loss and tinnitus. Negative for congestion, ear discharge, ear pain, nosebleeds, sinus pain and sore throat.    Eyes: Negative for blurred vision and double vision.   Respiratory: Negative for cough and hemoptysis.    Gastrointestinal: Negative for heartburn, nausea and vomiting.   Skin: Negative.    Neurological: Positive for dizziness. Negative for tingling, tremors and headaches.   Endo/Heme/Allergies: Negative for environmental allergies. Does not bruise/bleed easily.     Physical Exam   Constitutional: He appears well-developed and well-nourished.   HENT:   Head: Normocephalic and atraumatic.   Right Ear: Tympanic membrane, external ear and ear canal normal. No drainage, swelling or tenderness. No middle ear effusion. Decreased hearing is noted.   Left Ear: Tympanic membrane, external ear and ear canal normal. No drainage, swelling or tenderness.  No middle ear effusion. Decreased hearing is noted.   Nose: Mucosal edema, rhinorrhea and septal deviation present.   Mouth/Throat: Uvula is midline, oropharynx is clear and moist and mucous membranes are normal.   Eyes: Pupils are equal, round, and reactive to light. EOM are normal.   Neck: Normal range of motion. Neck supple.       A/P  This pleasant patient  Had an episode of vertigo due to right Meniere's disease. Options including caffeine, salt restriction, good hydration, anxiety control, and diuretic medication and a short term tapered steroid were reviewed. 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  I will see him in the f/u in 3 months. His questions were answered.

## 2023-11-28 NOTE — LETTER
11/28/2023         RE: Preston Cai  [address]  [facility] [address]        Dear Colleague,    Thank you for referring your patient, Preston Cai, to the [facility]. Please see a copy of my visit note below.    Perston Cai's chief complaint for this visit includes:  Chief Complaint   Patient presents with     Follow Up     GERD, Stopped Protonix, stated was dizzy, taking Famotidine and Helped. Did not do Speech PT or Video swallow.   On Flonase and helping congestion.       PCP: Tyrel Manning    Referring Provider:  No referring provider defined for this encounter.    /73   Pulse 75       HPI    This 87 year old patient is here for the f/u. I am glad to see that he is doing well with no vertigo since his last visit. States aural pressure in his left ear on and off. Denies any trauma, otalgia, otorrhea. His BP is always low. His hearing is stable. Denies any n/v, difficulty swallowing, vision issues, weakness or numbness. His BP is always low.   He continues to have frequent throat clearing.  Denies any odynophagia, choking, or dysphagia. He cough from time to time, dry with no phlegm or production. 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 Describes hoarseness for the past several years and he tried PPIs with some benefit. He has no hx of difficulty swallowing or weight changes.     Preston's hearing test showed that 2 kHz sharply sloping to a moderate high freq. 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 Mild low freq. SNHL rising to WNL sloping back to  moderate high freq. 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 Excellent word rec. Tymps WNL. Present 1 kHz ipsi/contra reflexes bilaterally.    ENT Problem List  Allergic rhinitis due to other allergen    Cervical radiculopathy at C6    Personal history of malignant neoplasm of larynx    Pseudophakia    Hiatal hernia with GERD    Macular degeneration    Sleep disorder, nonorganic    Seborrheic dermatitis    Essential  tremor    CARDIOVASCULAR SCREENING; LDL GOAL LESS THAN 160    Benign prostatic hyperplasia    Hoarse voice quality    Advanced directives, counseling/discussion    Supraspinatus sprain, right, initial encounter    CARDIOVASCULAR SCREENING; LDL GOAL LESS THAN 100    Vitamin D deficiency    Chronic midline low back pain without sciatica    Other gastritis without hemorrhage, unspecified chronicity    BPH associated with nocturia    Weight loss    Pernicious anemia    Arthritis of right shoulder region      Medications         budesonide (RINOCORT AQUA) 32 MCG/ACT nasal spray      famotidine (PEPCID) 20 MG tablet      gabapentin (NEURONTIN) 100 MG capsule      latanoprost (XALATAN) 0.005 % ophthalmic solution      mirtazapine (REMERON) 15 MG tablet      pantoprazole (PROTONIX) 40 MG EC tablet      primidone (MYSOLINE) 250 MG tablet      tamsulosin (FLOMAX) 0.4 MG capsule      Vitamin D, Cholecalciferol, 25 MCG (1000 UT) CAPS         MR BRAIN W/O & W CONTRAST 7/22/2019 11:51 AM     History: Vertigo     ICD-10: Meniere's disease, right     Comparison: MRI 9/17/2009     Technique: Axial diffusion-weighted with ADC map, T2-weighted,  turboFLAIR and T1-weighted images of the brain and axial T1-weighted  and coronal T2-weighted with fat saturation images centered on the  internal auditory canals were obtained without intravenous contrast.  Following intravenous administration of gadolinium, axial turboFLAIR  images of the brain and axial T1-weighted with fat saturation and  coronal T1-weighted images centered on the internal auditory canals  were obtained.     Contrast: 7.5ml Gadavist     Findings: No abnormal signal or enhancement along the course of the  seventh and eighth cranial nerves on either side. Vestibular and  auditory structures exhibit normal signal on T2-weighted images and no  abnormal enhancement.     Images of the whole brain demonstrate no mass lesion, no mass effect,  or midline shift. No intracranial  hemorrhage on  susceptibility-weighted images. Mild leukokraurosis. There is no  restricted diffusion. Ventricles are proportionate to the cerebral  sulci. No abnormal enhancement.     Pseudophakia. The visualized paranasal sinuses and mastoid air cells  are clear.                                                                       Impression:    1. Normal MRI of the IACs. No evidence of mass.  2. Mild chronic small vessel ischemic disease MR, progressed since  8/17/2009.    Review of Systems   Constitutional: Negative.    HENT: Positive for hearing loss and tinnitus. Negative for congestion, ear discharge, ear pain, nosebleeds, sinus pain and sore throat.    Eyes: Negative for blurred vision and double vision.   Respiratory: Negative for cough and hemoptysis.    Gastrointestinal: Negative for heartburn, nausea and vomiting.   Skin: Negative.    Neurological: Positive for dizziness. Negative for tingling, tremors and headaches.   Endo/Heme/Allergies: Negative for environmental allergies. Does not bruise/bleed easily.     Physical Exam   Constitutional: He appears well-developed and well-nourished.   HENT:   Head: Normocephalic and atraumatic.   Right Ear: Tympanic membrane, external ear and ear canal normal. No drainage, swelling or tenderness. No middle ear effusion. Decreased hearing is noted.   Left Ear: Tympanic membrane, external ear and ear canal normal. No drainage, swelling or tenderness.  No middle ear effusion. Decreased hearing is noted.   Nose: Mucosal edema, rhinorrhea and septal deviation present.   Mouth/Throat: Uvula is midline, oropharynx is clear and moist and mucous membranes are normal.   Eyes: Pupils are equal, round, and reactive to light. EOM are normal.   Neck: Normal range of motion. Neck supple.       A/P  This pleasant patient  Had an episode of vertigo due to right Meniere's disease. Options including caffeine, salt restriction, good hydration, anxiety control, and diuretic medication  and a short term tapered steroid were reviewed. 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  I will see him in the f/u in 3 months. His questions were answered.        Preston Cai's chief complaint for this visit includes:  Chief Complaint   Patient presents with     Follow Up     GERD, Stopped Protonix, stated was dizzy, taking Famotidine and Helped. Did not do Speech PT or Video swallow.   On Flonase and helping congestion.       PCP: Tyrel Manning    Referring Provider:  No referring provider defined for this encounter.    /73   Pulse 75             Again, thank you for allowing me to participate in the care of your patient.        Sincerely,        Tosha Ron MD

## 2024-01-08 ENCOUNTER — TRANSFERRED RECORDS (OUTPATIENT)
Dept: HEALTH INFORMATION MANAGEMENT | Facility: CLINIC | Age: 89
End: 2024-01-08
Payer: COMMERCIAL

## 2024-02-11 NOTE — PROGRESS NOTES
Diagnosis/Summary/Recommendations:    PATIENT: Preston Cai  88 year old male     : 1935    BEATRIZ: 2024    MRN: [id number]    [address] [facility] [address]    [facility] [address] Phone   [phone] (Home) *Preferred*   [phone] (Mobile) E-mail Address   [email or web address]      Edyta cai           Assessment:  (R25.1) Tremor  (primary encounter diagnosis)  Seen  and  by me   Gabapentin neurontin 100mg  Primidone mysoline 250mg at bedtime     Tremor when holding a book  Tremor not present at rest  Has shakiness when writing  Has some problems when eating     Review of diagnosis    tremor     Avoidance of dopamine blockers   Not taking     Motor complication review   n/a     Review of Impulse control disorders   N/a      Review of surgical or medication options   Consider a slow wean on primidone (Mysoline) if no other explanation is there.   Pharmacy consultation with Fior Matos if he goes down th is patch  Consider 50mg tabs x 5 and then every few weeks reduce by one tablet - will have more tremor but would have to see if feels better, etc.      For now will review the 250mg of primidone at his present dose.      Not clear why he has no energy and losing weight     He will visit with Dr. Manning and review his lab work and studies      Gait/Balance/Falls         Exercise/Therapy performed/offered   States his stride is pretty good but has neuropathy so not so straight     Cognitive/Driving   No changes in his driving  Some memory issues     Mood   Some stress -   Lots of stress since they moved  They are living tradition assisted living [facility]   They were locked down  Hope to move to [facility] in [place] but on waiting list  Denies depression        Hallucinations/delusions      Sleep   Sleep disorder  Goes to bed around 9pm and wakes up at 7am  Wakes up 2-3 times per night to urinate  Some times has problems falling back asleep  May have sleep  apnea  Mick  Has not had a sleep study  Tired when he wakes up  Naps during the day  30-60 minutes per day      Bladder/Renal/Prostate/Gyn/Other  BPH  Tamsulosin flomax 0.4mg   Nocturia   PSA 3.19 on 1/4/2022     GI/Constipation/GERD   GERD  Dexlansoprazole dexilant 60mg CR capsule - not taking  Pantoprazole (protonix) 40mg EC tablet  helicobacter negative  Lipase normal  Has a good appetite     153 lbs today      Colonoscopy and endoscopy done last week results pending     Final Diagnosis   A. DUODENUM, BIOPSY:   -Duodenal mucosa with no significant histopathologic abnormality  -No evidence of celiac disease     B. TRANSVERSE COLON, POLYPS X 4, BIOPSY:   -Tubular adenoma(s); negative for high-grade dysplasia     C. RECTUM, POLYP, BIOPSY:   - Sessile serrated adenoma; negative for cytologic dysplasia          Comprehensive metabolic was fine 12/17/2021 - no problems with liver      2 years ago had vomiting     He has acid reflux     Has a hernia        12/20/2021  IMPRESSION:  1. Diffuse thickening of the gastric wall raises the question of gastritis, however, this finding is relatively unchanged in appearance when compared to prior exam on 7/30/2017.  2. Prostatomegaly with diffuse bladder wall thickening, likely sequela of chronic bladder outlet obstruction.  3. Colonic diverticulosis without evidence of acute diverticulitis.      ENDO/Lipid/DM/Bone density/Thyroid  Vitamin D deficiency  Vitamin D, Cholecalciferol 1000 units   Thyroid studies were normal 1/4/2022     Cardio/heart/Hyper or Hypotensive   Denies light headedness or fainting  bp was 95/59     Vision/Dry Eyes/Cataracts/Glaucoma/Macular   Macular degeneration  Pseudophaia  Latanoprost xalatan 0.005% solution  C Minkus monitoring his eyes     Heme/Anticoagulation/Antiplatelet/Anemia/Other  MMA normal  Parietal cell antibody 26.7 high   Intrinsic factor negative  Hemoglobin 12.6 on 12/17/2021     ENT/Resp  Dr Tosha Toledo - ENT     Allergic  rhinitis     Hoarse voice     Skin/Cancer/Seborrhea/other        Musculoskeletal/Pain/Headache  Chronic radiculopathy C6  States he has a neuropathy  Gabapentin neurontin 100mg x 2 at bedtime     Other:      Discussed that his blood pressure and heart rate are pretty low that would not recommend a beta blocker except a 10mg dose as needed if he wanted anything     Would not push up the primidone further     Talked about surgical approaches like deep brain stimulation, focused beam ultrasound, gamma knife      Neuropathy affects his balance and has discomfort at night. He is taking gabapentin 100mg x 2 at 7pm and lays down around 930/10pm      July had dizzy spells and cut out medication      CONSIDER KATHLEEN TRIO DEVICE - INFORMATION BELOW.      CONTINUE ON PRIMIDONE 250MG AND CONSIDER 1/4 OF 250MG ADDITIONAL DOSE     CONTINUE ON 100MG GABAPENTIN - DISCUSSING DOSE - 1 - HAD MORE NEUROPATHY SYMPTOMS AND 2 LESS; NOT SURE IF BETTER WITH 3      airforce [id number]        Medications      7am 8am 6pm   7pm   Acetaminophen tylenol 500mg    2   Cyanocobalamin Vit B12 1000mcg off          Famotidine pepcid 40mg 1      Fluticasone flonase 50mcg/act spray  off         Gabapentin neurontin 100mg     2     Latanoprost xalatan 0.005% solution           Mirtazapine remeron 15mg off         Pantoprazole (protonix) 40mg EC tablet Off          Primidone mysoline 250mg  1/4      1   Triamcinolone kenalog 0.1% cream       Vit D, Cholecalciferol 1000 units 25 mcg    1                      Plan:    Had covid November - coughing and congestion    Balance problems from neuropathy and radiculopathy    Using gabapentin 2 x 100mg - at 6p and   acetaminophen 500m x 500mg at 6-7p  He had had back issues.   Seen Dr. Sage 2020  Message sent as he is interested in another epidural steroid injection  Ordered referral.     Increased the primidone and using 1/4 tab in the morning and 1 at night  Not clear if helped the tremor  He has not had  "side effects.  He has mild postural tremor.   He has some tremor after reading  Can still hold his coffee cup and feed himself.     He is off vitmamin B12 - level obtained 8/2023    He remains on vitamin D    His lipid panel was okay   He is not a medication.     He has a chronic anemia that is stable 8/22/2023  He has normal liver function tests  He is taking primidone that can impact both.     Appetite is good but \"cannot put on weight\"    He has not had falls.     Last seen 3/2023  Refilled primidone  Refilled gabapentin neurontin   Return in 1 year.     Tamsulosin flomax 0.4mg - went off this after he had a dizzy spell  His blood pressure is 107/63  His wife gets up many times per n ight.   Stopped another medication.   Nocturia 2 or 3 times per night   He has daytime urgency  He has not seen a urologist for many y ears.   Referral placed  He is 88 years old  Has not had a recent PSA  Encouraged him to reduce night time water consumption after 6pm  Renal function is fine.     Coding statement:   Medical Decision Making:  #  Chronic progressive medical conditions addressed  - see above --   Review and/or interpretation of unique test or documentation from a provider outside of neurology yes   Independent historian provided additional details  no I  Prescription drug management and review of potential side effects and/or monitoring for side effects  -- see above ---  Health impacted by social determinants of health  no    I have reviewed the note as documented above.  This accurately captures the substance of my conversation with the patient and total time spent preparing for visit, executing visit and completing visit on the day of the visit:  20 minutes.  The portion of this total time included face to face time 15 minutes     The longitudinal plan of care for Preston Cai was addressed during this visit. Due to the added complexity in care, I will continue to support Preston Cai in the subsequent " management of this condition(s) and with the ongoing continuity of care of this condition(s).      García Munroe MD     ______________________________________    Last visit date and details:             ______________________________________      Patient was asked about 14 Review of systems including changes in vision (dry eyes, double vision), hearing, heart, lungs, musculoskeletal, depression, anxiety, snoring, RBD, insomnia, urinary frequency, urinary urgency, constipation, swallowing problems, hematological, ID, allergies, skin problems: seborrhea, endocrinological: thyroid, diabetes, cholesterol; balance, weight changes, and other neurological problems and these were not significant at this time except for   Patient Active Problem List   Diagnosis    Seborrheic dermatitis    Seasonal allergic rhinitis due to pollen    Cervical radiculopathy at C6    Personal history of malignant neoplasm of larynx    Essential tremor    CARDIOVASCULAR SCREENING; LDL GOAL LESS THAN 160    Benign prostatic hyperplasia    Hoarse voice quality    Advanced directives, counseling/discussion    Pseudophakia    Hiatal hernia with GERD    Macular degeneration    Supraspinatus sprain, right, initial encounter    CARDIOVASCULAR SCREENING; LDL GOAL LESS THAN 100    Vitamin D deficiency    Chronic midline low back pain without sciatica    Sleep disorder, nonorganic    Other gastritis without hemorrhage, unspecified chronicity    BPH associated with nocturia    Weight loss    Pernicious anemia    Arthritis of right shoulder region          Allergies   Allergen Reactions    Seasonal Allergies      Hay fever      Past Surgical History:   Procedure Laterality Date    APPENDECTOMY  8 years old    BIOPSY  may 2015    face, back    Biopsy of the throat - cancerous mass - got radiation for in larynx  1982    CATARACT IOL, RT/LT      COLONOSCOPY  02/25/2004    Normal    COLONOSCOPY N/A 04/28/2015    Procedure: COLONOSCOPY;  Surgeon: Marvin Adams  MD Narayan;  Location: [facility] OR    COLONOSCOPY N/A 01/10/2022    Procedure: COLONOSCOPY, WITH POLYPECTOMY AND BIOPSY;  Surgeon: Marsha Asher MD;  Location: [facility]    COLONOSCOPY N/A 01/10/2022    Procedure: COLONOSCOPY, FLEXIBLE, WITH LESION REMOVAL USING SNARE;  Surgeon: Marsha Asher MD;  Location: [facility]    COLONOSCOPY WITH CO2 INSUFFLATION N/A 04/28/2015    Procedure: COLONOSCOPY WITH CO2 INSUFFLATION;  Surgeon: Marvin Adams MD;  Location: [facility]    COLONOSCOPY WITH CO2 INSUFFLATION N/A 01/10/2022    Procedure: COLONOSCOPY, WITH CO2 INSUFFLATION;  Surgeon: Marsha Asher MD;  Location: [facility]    COMBINED ESOPHAGOSCOPY, GASTROSCOPY, DUODENOSCOPY (EGD) WITH CO2 INSUFFLATION N/A 01/10/2022    Procedure: ESOPHAGOGASTRODUODENOSCOPY, WITH CO2 INSUFFLATION;  Surgeon: Marsha Asher MD;  Location: [facility]    CYSTOSCOPY  1997    for hematuria - negative    ESOPHAGOSCOPY, GASTROSCOPY, DUODENOSCOPY (EGD), COMBINED N/A 01/10/2022    Procedure: ESOPHAGOGASTRODUODENOSCOPY, WITH BIOPSY;  Surgeon: Marsha Asher MD;  Location: [facility]    EYE SURGERY Right     Rt eye.macular repair    EYE SURGERY  2003    cataract    NASAL ENDOSCOPY,DX  04/2007    GERD    Pars plana vitrectomy and epiretinal membrane dissection, right eye  11/08/2002    Phacoemulsification with intraocular lens implantation right eye.  03/11/2003    PHACOEMULSIFICATION WITH STANDARD INTRAOCULAR LENS IMPLANT Left 09/22/2016    Procedure: PHACOEMULSIFICATION WITH STANDARD INTRAOCULAR LENS IMPLANT;  Surgeon: Nghia Lara MD;  Location: [facility]    Thyroglossal Duct Cyst Removal - 2ndary to radiation.  1982    VASECTOMY  1971    ZZ GASTROSCOPY,FL  09/2007    hiatal hernia and errosions     Past Medical History:   Diagnosis Date    Acid reflux disease     Allergic rhinitis, cause unspecified     H/O magnetic resonance imaging of brain and brain stem 10/10/2016    MRI BRAIN WITH AND WITHOUT CONTRAST August 17, 2009 8:07:00 PM    HISTORY: Severe dizzy spells with imbalance and vomiting.   TECHNIQUE: Routine pulse sequences without and with contrast were performed including thin section images through the IACs. 20 mL Magnevist given.   FINDINGS: Diffusion-weighted images are normal. The brain parenchyma, brainstem, ventricular system, and subarachnoid spaces a    Hematuria     Hematuria  - in      History of anemia 2002    Anemia-Iron Deficit    History of gastric ulcer     Macular degeneration     Malignant neoplasm of laryngeal cartilages (H)     Laryngeal CA (Radiation )    Nonsenile cataract     PONV (postoperative nausea and vomiting)     Tremor 10/03/2016     Social History     Socioeconomic History    Marital status:      Spouse name: Edyta Cai    Number of children: Not on file    Years of education: Not on file    Highest education level: Not on file   Occupational History     Employer: RETIRED   Tobacco Use    Smoking status: Former     Packs/day: 1.00     Years: 10.00     Additional pack years: 0.00     Total pack years: 10.00     Types: Cigarettes, Pipe     Start date: 6/15/1959     Quit date: 1969     Years since quittin.0    Smokeless tobacco: Never    Tobacco comments:        Substance and Sexual Activity    Alcohol use: Yes     Comment: A beer once in awhile    Drug use: No    Sexual activity: Yes     Partners: Female     Birth control/protection: None   Other Topics Concern    Parent/sibling w/ CABG, MI or angioplasty before 65F 55M? No   Social History Narrative    seen by Jose M Diaz. lives in [facility].  to Edyta Cai.    2 sons and 2 daughters    Cancer father    Stroke mother        Daughter in [facility]    Son in [place]    Daughter in [place]    Son in [place]         Social Determinants of Health     Financial Resource Strain: Low Risk  (11/15/2023)    Financial Resource Strain     Within the past 12 months, have you or your family members you live with been  unable to get utilities (heat, electricity) when it was really needed?: No   Food Insecurity: Low Risk  (11/15/2023)    Food Insecurity     Within the past 12 months, did you worry that your food would run out before you got money to buy more?: No     Within the past 12 months, did the food you bought just not last and you didn t have money to get more?: No   Transportation Needs: Low Risk  (11/15/2023)    Transportation Needs     Within the past 12 months, has lack of transportation kept you from medical appointments, getting your medicines, non-medical meetings or appointments, work, or from getting things that you need?: No   Physical Activity: Not on file   Stress: Not on file   Social Connections: Not on file   Interpersonal Safety: Low Risk  (11/17/2023)    Interpersonal Safety     Do you feel physically and emotionally safe where you currently live?: Yes     Within the past 12 months, have you been hit, slapped, kicked or otherwise physically hurt by someone?: No     Within the past 12 months, have you been humiliated or emotionally abused in other ways by your partner or ex-partner?: No   Housing Stability: Low Risk  (11/15/2023)    Housing Stability     Do you have housing? : Yes     Are you worried about losing your housing?: No       Drug and lactation database from the [place] [employer] of Medicine:  http://toxnet.nlm.nih.gov/cgi-bin/sis/htmlgen?LACT      B/P: Data Unavailable, T: Data Unavailable, P: Data Unavailable, R: Data Unavailable 0 lbs 0 oz  There were no vitals taken for this visit., There is no height or weight on file to calculate BMI.  Medications and Vitals not listed above were documented in the cart and reviewed by me.     Current Outpatient Medications   Medication Sig Dispense Refill    cyanocobalamin (VITAMIN B-12) 1000 MCG tablet Take 1,000 mcg by mouth daily      famotidine (PEPCID) 20 MG tablet Take 1 tablet (20 mg) by mouth 2 times daily 120 tablet 1    famotidine  (PEPCID) 40 MG tablet TAKE ONE TABLET BY MOUTH ONCE DAILY 30 tablet 1    gabapentin (NEURONTIN) 100 MG capsule Takes 2-3 x 100mg capsule by mouth nightly 270 capsule 3    latanoprost (XALATAN) 0.005 % ophthalmic solution Place 1 drop into the right eye At Bedtime 7.5 mL 1    primidone (MYSOLINE) 250 MG tablet 1/4 tab in morning and 1 in the evevning 135 tablet 3    triamcinolone (KENALOG) 0.1 % external cream Apply topically 2 times daily as needed (itchy rash of shoulders/chest) 454 g 3    Vitamin D, Cholecalciferol, 25 MCG (1000 UT) CAPS Take 1,000 Units by mouth daily (Winter only)           García Munroe MD

## 2024-02-13 ENCOUNTER — OFFICE VISIT (OUTPATIENT)
Dept: OPHTHALMOLOGY | Facility: CLINIC | Age: 89
End: 2024-02-13
Payer: COMMERCIAL

## 2024-02-13 DIAGNOSIS — H40.10X0 OPEN-ANGLE GLAUCOMA OF RIGHT EYE, UNSPECIFIED GLAUCOMA STAGE, UNSPECIFIED OPEN-ANGLE GLAUCOMA TYPE: ICD-10-CM

## 2024-02-13 DIAGNOSIS — H04.123 DRY EYE SYNDROME OF BOTH EYES: ICD-10-CM

## 2024-02-13 DIAGNOSIS — H52.13 MYOPIA OF BOTH EYES WITH ASTIGMATISM AND PRESBYOPIA: ICD-10-CM

## 2024-02-13 DIAGNOSIS — Z98.890 HX OF VITRECTOMY: ICD-10-CM

## 2024-02-13 DIAGNOSIS — Z96.1 PSEUDOPHAKIA, BOTH EYES: ICD-10-CM

## 2024-02-13 DIAGNOSIS — H52.4 MYOPIA OF BOTH EYES WITH ASTIGMATISM AND PRESBYOPIA: ICD-10-CM

## 2024-02-13 DIAGNOSIS — H35.30 ARMD (AGE-RELATED MACULAR DEGENERATION), BILATERAL: ICD-10-CM

## 2024-02-13 DIAGNOSIS — H52.203 MYOPIA OF BOTH EYES WITH ASTIGMATISM AND PRESBYOPIA: ICD-10-CM

## 2024-02-13 DIAGNOSIS — H40.1112 PRIMARY OPEN ANGLE GLAUCOMA (POAG) OF RIGHT EYE, MODERATE STAGE: Primary | ICD-10-CM

## 2024-02-13 PROCEDURE — 99207 OCT OPTIC NERVE RNFL SPECTRALIS OU (BOTH EYES): CPT | Performed by: OPHTHALMOLOGY

## 2024-02-13 PROCEDURE — 99214 OFFICE O/P EST MOD 30 MIN: CPT | Performed by: OPHTHALMOLOGY

## 2024-02-13 PROCEDURE — 92134 CPTRZ OPH DX IMG PST SGM RTA: CPT | Performed by: OPHTHALMOLOGY

## 2024-02-13 PROCEDURE — 92015 DETERMINE REFRACTIVE STATE: CPT | Performed by: OPHTHALMOLOGY

## 2024-02-13 PROCEDURE — 92083 EXTENDED VISUAL FIELD XM: CPT | Performed by: OPHTHALMOLOGY

## 2024-02-13 PROCEDURE — 99207 PR NO BILLABLE SERVICE THIS VISIT: CPT | Performed by: OPHTHALMOLOGY

## 2024-02-13 RX ORDER — LATANOPROST 50 UG/ML
1 SOLUTION/ DROPS OPHTHALMIC DAILY
Qty: 2.5 ML | Refills: 11 | Status: SHIPPED | OUTPATIENT
Start: 2024-02-13 | End: 2024-02-13

## 2024-02-13 RX ORDER — LATANOPROST 50 UG/ML
1 SOLUTION/ DROPS OPHTHALMIC AT BEDTIME
Qty: 2.5 ML | Refills: 4 | Status: SHIPPED | OUTPATIENT
Start: 2024-02-13

## 2024-02-13 RX ORDER — BRIMONIDINE TARTRATE AND TIMOLOL MALEATE 2; 5 MG/ML; MG/ML
1 SOLUTION OPHTHALMIC 2 TIMES DAILY
Qty: 10 ML | Refills: 3 | Status: SHIPPED | OUTPATIENT
Start: 2024-02-13

## 2024-02-13 ASSESSMENT — CONF VISUAL FIELD
OS_SUPERIOR_TEMPORAL_RESTRICTION: 0
OD_SUPERIOR_TEMPORAL_RESTRICTION: 3
OS_SUPERIOR_NASAL_RESTRICTION: 0
METHOD: COUNTING FINGERS
OS_INFERIOR_TEMPORAL_RESTRICTION: 0
OD_INFERIOR_NASAL_RESTRICTION: 3
OS_NORMAL: 1
OS_INFERIOR_NASAL_RESTRICTION: 0

## 2024-02-13 ASSESSMENT — VISUAL ACUITY
METHOD: SNELLEN - LINEAR
CORRECTION_TYPE: GLASSES
OS_CC+: -2
OS_CC: 20/20
OD_CC+: -2
OD_CC: 20/40

## 2024-02-13 ASSESSMENT — REFRACTION_WEARINGRX
OS_AXIS: 178
OD_ADD: +2.75
SPECS_TYPE: TRIFOCAL
OD_CYLINDER: +1.00
OS_SPHERE: -0.50
OD_AXIS: 161
OD_SPHERE: -0.75
OS_CYLINDER: +1.50
OS_ADD: +2.75

## 2024-02-13 ASSESSMENT — TONOMETRY
OS_IOP_MMHG: 17
IOP_METHOD: TONOPEN
OS_IOP_MMHG: 15
OD_IOP_MMHG: 23
OD_IOP_MMHG: 26
IOP_METHOD: TONOPEN

## 2024-02-13 ASSESSMENT — REFRACTION_MANIFEST
OS_SPHERE: -0.50
OD_CYLINDER: +0.75
OD_AXIS: 020
OD_SPHERE: -0.50
OS_ADD: +2.75
OS_AXIS: 175
OS_CYLINDER: +2.00
OD_ADD: +2.75

## 2024-02-13 ASSESSMENT — EXTERNAL EXAM - RIGHT EYE: OD_EXAM: NORMAL

## 2024-02-13 ASSESSMENT — EXTERNAL EXAM - LEFT EYE: OS_EXAM: NORMAL

## 2024-02-13 ASSESSMENT — CUP TO DISC RATIO
OD_RATIO: 0.5
OS_RATIO: 0.3

## 2024-02-13 NOTE — NURSING NOTE
Chief Complaints and History of Present Illnesses   Patient presents with    Glaucoma Follow-Up     Chief Complaint(s) and History of Present Illness(es)       Glaucoma Follow-Up              Laterality: both eyes    Quality: blurred    Associated symptoms: dryness, eye pain (intermittent right eye), flashes and floaters    Compliance with Treatment: always              Comments    Here for glaucoma follow up and to establish care. VA is blurry - he feels current glasses do not fully correct vision. He did not get a chance to fill MR from 07/2023. Some eye pain in the right eye in the morning. Some dryness. Stable floaters and flashes.    Ralph Guthrie COT 1:19 PM February 13, 2024

## 2024-02-13 NOTE — PROGRESS NOTES
HPI       Glaucoma Follow-Up    In both eyes.  Vision is blurred.  Associated symptoms include dryness, eye pain (intermittent right eye), flashes and floaters.  Treatment compliance is always.             Comments    Here for glaucoma follow up and to establish care. VA is blurry - he feels current glasses do not fully correct vision. He did not get a chance to fill MR from 07/2023. Some eye pain in the right eye in the morning. Some dryness. Stable floaters and flashes.    Ralph Guthrie COT 1:19 PM February 13, 2024             Last edited by Ralph Guthrie on 2/13/2024  1:19 PM.         Review of systems for the eyes was negative other than the pertinent positives/negatives listed in the HPI.      Assessment & Plan    HPI:  Preston Cai is a 88 year old male with history of vitrectomy for presumed macular hole, pseudophakia, refractive error, Primary open angle glaucoma (POAG) right eye, dry eye, Age related macular degeneration presents for exam. He was recently dilated by [facility]) and is being followed there for Age related macular degeneration. He would like to establish care for glaucoma. Previously followed by Martha Santoyo and [facility]. He notes some pain when waking in the night.    POHx: macular hole, pseudophakia, refractive error, Primary open angle glaucoma (POAG) right eye, dry eye, Age related macular degeneration   PMHx: GERD  Current Medications: cyanocobalamin (VITAMIN B-12) 1000 MCG tablet, Take 1,000 mcg by mouth daily  famotidine (PEPCID) 20 MG tablet, Take 1 tablet (20 mg) by mouth 2 times daily  famotidine (PEPCID) 40 MG tablet, TAKE ONE TABLET BY MOUTH ONCE DAILY  gabapentin (NEURONTIN) 100 MG capsule, Takes 2-3 x 100mg capsule by mouth nightly  latanoprost (XALATAN) 0.005 % ophthalmic solution, Place 1 drop into the right eye At Bedtime  primidone (MYSOLINE) 250 MG tablet, 1/4 tab in morning and 1 in the evevning  triamcinolone (KENALOG) 0.1 % external cream, Apply topically 2 times daily  as needed (itchy rash of shoulders/chest)  Vitamin D, Cholecalciferol, 25 MCG (1000 UT) CAPS, Take 1,000 Units by mouth daily (Winter only)    methylPREDNISolone (DEPO-MEDROL) injection 40 mg  methylPREDNISolone (DEPO-MEDROL) injection 40 mg      FHx: ?vision loss mother  PSHx: Cataract extraction/intraocular lens both eyes, Pars plana vitrectomy (PPV) right eye       Current Eye Medications:  Systane three times a day   Latanoprost at bedtime right eye   Preservision    Assessment & Plan:  (H40.1112) Primary open angle glaucoma (POAG) of right eye, moderate stage  (primary encounter diagnosis)  Maximum intraocular pressure unknown  Family history: No  Trauma history: No  Gonioscopy:   Pachymetry:   Treatment History:   Latanoprost     Today's testing:  Visual field 02/13/24:   Right eye - inferior arcuate/nasal step, VFI 74%, MD -10.48, PSD 9.77, reliable;   Left eye - central defects, VFi 94%, MD -1.97, PSD 2.08 reliable    OCT nerve fiber layer 02/13/24:   Right eye - G(r) 60 NI (y) 61 TI (g) 106 NS (r) 55 TS (r) 73      Left eye - G(g) 91 NI (g) 86 TI (g) 132 NS (g) 95 TS (g) 132    IOP goal: mid teens  Discussed glaucoma diagnosis at length including etiology of disease and treatment options including laser, drops, or surgery  IOP elevated right eye today with moderate/severe thinning  Add cosopt twice a day right eye   Return to clinic 4-8 weeks IOP check  Latanoprost refilled today    (H04.123) Dry eye syndrome of both eyes  Continue artificial tears  Add gel or ointment at bedtime     (H52.13,  H52.203,  H52.4) Myopia of both eyes with astigmatism and presbyopia  Patient has minimal change in myopia but a copy of today's glasses prescription was given.  The patient may wish to update the glasses if the lenses are scratched or the frames are too small.  Presbyopia is difficulty seeing up close and is treated with bifocals or over the counter reading glasses    (H35.30) ARMD (age-related macular degeneration),  bilateral  (Z98.890) Hx of vitrectomy  Following with [facility]  No subretinal fluid/IRF today     (Z96.1) Pseudophakia, both eyes  Doing well      Return in about 4 weeks (around 3/12/2024) for Follow Up-v/t.        rOlando Rose MD     Attending Physician Attestation:  Complete documentation of historical and exam elements from today's encounter can be found in the full encounter summary report (not reduplicated in this progress note).  I personally obtained the chief complaint(s) and history of present illness.  I confirmed and edited as necessary the review of systems, past medical/surgical history, family history, social history, and examination findings as documented by others; and I examined the patient myself.  I personally reviewed the relevant tests, images, and reports as documented above.  I formulated and edited as necessary the assessment and plan and discussed the findings and management plan with the patient and family. - Orlando Rose MD

## 2024-02-21 ENCOUNTER — OFFICE VISIT (OUTPATIENT)
Dept: DERMATOLOGY | Facility: CLINIC | Age: 89
End: 2024-02-21
Payer: COMMERCIAL

## 2024-02-21 DIAGNOSIS — D23.9 DERMATOFIBROMA: ICD-10-CM

## 2024-02-21 DIAGNOSIS — L81.4 SOLAR LENTIGO: ICD-10-CM

## 2024-02-21 DIAGNOSIS — D22.9 MULTIPLE MELANOCYTIC NEVI: ICD-10-CM

## 2024-02-21 DIAGNOSIS — D18.01 CHERRY ANGIOMA: ICD-10-CM

## 2024-02-21 DIAGNOSIS — L57.0 ACTINIC KERATOSIS: ICD-10-CM

## 2024-02-21 DIAGNOSIS — L82.0 SEBORRHEIC KERATOSES, INFLAMED: ICD-10-CM

## 2024-02-21 DIAGNOSIS — L82.1 SEBORRHEIC KERATOSIS: ICD-10-CM

## 2024-02-21 DIAGNOSIS — L11.1 GROVER'S DISEASE: ICD-10-CM

## 2024-02-21 DIAGNOSIS — Z85.828 HISTORY OF NONMELANOMA SKIN CANCER: Primary | ICD-10-CM

## 2024-02-21 PROCEDURE — 17003 DESTRUCT PREMALG LES 2-14: CPT | Mod: XS | Performed by: DERMATOLOGY

## 2024-02-21 PROCEDURE — 99213 OFFICE O/P EST LOW 20 MIN: CPT | Mod: 25 | Performed by: DERMATOLOGY

## 2024-02-21 PROCEDURE — 17000 DESTRUCT PREMALG LESION: CPT | Mod: XS | Performed by: DERMATOLOGY

## 2024-02-21 PROCEDURE — 17110 DESTRUCTION B9 LES UP TO 14: CPT | Performed by: DERMATOLOGY

## 2024-02-21 RX ORDER — LIDOCAINE HYDROCHLORIDE AND EPINEPHRINE 10; 10 MG/ML; UG/ML
3 INJECTION, SOLUTION INFILTRATION; PERINEURAL ONCE
Status: CANCELLED | OUTPATIENT
Start: 2024-02-21 | End: 2024-02-21

## 2024-02-21 NOTE — PROGRESS NOTES
[facility] Dermatology Note  Encounter Date: Feb 21, 2024  Office Visit     Dermatology Problem List:  Last skin check 02/21/2024, recommended yearly  1. 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 AKs, s/p cryo  - x6 face and scalp cryo 02/21/2024  - pigmented AK - left jaw, s/p by 11/13/18  - HAK - right hand, s/p excision 5/2009  3. 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   ____________________________________________     Assessment & Plan:     History of nonmelanoma skin cancer, no clinical evidence of recurrence.  - Recommended regular skin checks.      2. Actinic keratosis - face and scalp x6. Based on the location and chronic irritation to this lesion, treatment with cryotherapy is warranted.   - See cryo procedure note.    3. Seborrheic keratosis, symptomatic - face, trunk, extremities x11. Based on the location and chronic irritation to this lesion, treatment with cryotherapy is warranted.   - See cryo procedure note.     4. ISK x1 R cheek  - EF&C performed today (see procedure note(s) below).    5. Benign lesions: Multiple benign nevi, solar lentigos, dermatofibroma, seborrheic keratoses, cherry angiomas. Explained to patient benign nature of lesion. No treatment is necessary at this time unless the lesion changes or becomes symptomatic.     Procedures Performed:   - Cryotherapy procedure note, location(s): see above. After verbal consent and discussion of risks and benefits including, but not limited to, dyspigmentation/scar, blister, and pain, 6 AK(s) and 11 ISK(s) was(were) treated with 1-2 mm freeze border for 1-2 cycles with liquid nitrogen. Post cryotherapy instructions were provided.    - Electrofulguration and curettage: After verbal consent and discussion of  risks and benefits including, but not limited to pain, bleeding, blister, infection, dyspigmentation, scar, incomplete removal, and/or recurrence, 1 ISK lesion(s) was(were) treated with electrofulguration and curettage. Vaseline and a bandage were applied to the wound sites. Patient tolerated the procedure(s) well and post treatment instructions were provided.      Follow-up: 1 year(s) in-person for a skin check, or earlier for new or changing lesions    Staff and Scribe:   Scribe Disclosure:   By signing my name below, I, Maricarmen Briseno, attest that this documentation has been prepared under the direction and in the presence of Mau Padilla MD.  - Electronically Signed: Maricarmen Briseno 02/21/24       Provider Disclosure:   The documentation recorded by the scribe accurately reflects the services I personally performed and the decisions made by me.    Mau Padilla MD   of Dermatology  Department of Dermatology  [facility] [employer]      ____________________________________________    CC: Skin Check (FBSE.  HX of NMSC.  Area of concern on upper back and a spot on mid back.  Patient is having itching on back.  )    HPI:  Mr. Preston Cai is a(n) 88 year old male who presents today as a return patient for a FBSE.    Last seen 02/06/2023 by Dr. Oseguera for a skin check.     Patient reports he had cryo on the concerning spot on his back, and it scabbed over. He also notes a sensitive spot on his R cheek which he treats with Triamcinolone 0.1% cream. He reports the cream helps but does not resolve the symptoms.    The patient otherwise denies any new or concerning lesions. No bleeding, painful, pruritic, or changing lesions. Health otherwise stable. No other skin concerns. They do use sunscreen and protective clothing when outdoors for sun protection.       Labs Reviewed:  N/A    Physical exam:  Vitals: There were no vitals taken for this visit.  GEN: This is a well  developed, well-nourished male in no acute distress, in a pleasant mood.    SKIN: Glez phototype II  - Full skin, which includes the head/face, both arms, chest, back, abdomen,both legs, genitalia and/or groin buttocks, digits and/or nails, was examined.  - There are erythematous macules with overyling adherent scale on the x6 on face and scalp.   - There is a firm tan/flesh colored papule that dimples with lateral pressure on the R shin.  - There is a tan to brown waxy stuck on papule with surrounding erythema on the face, trunk, and extremities x11.   - There are dome shaped bright red papules on the head/neck, trunk, extremities.   - Multiple regular brown pigmented macules and papules are identified on the head/neck, trunk, extremities.   - Scattered brown macules on sun exposed areas.  - There are waxy stuck on tan to brown papules on the head/neck, trunk, extremities.  - No other lesions of concern on areas examined.       Medications:  Current Outpatient Medications   Medication    brimonidine-timolol (COMBIGAN) 0.2-0.5 % ophthalmic solution    famotidine (PEPCID) 20 MG tablet    famotidine (PEPCID) 40 MG tablet    gabapentin (NEURONTIN) 100 MG capsule    latanoprost (XALATAN) 0.005 % ophthalmic solution    primidone (MYSOLINE) 250 MG tablet    triamcinolone (KENALOG) 0.1 % external cream    cyanocobalamin (VITAMIN B-12) 1000 MCG tablet    Vitamin D, Cholecalciferol, 25 MCG (1000 UT) CAPS     Current Facility-Administered Medications   Medication    methylPREDNISolone (DEPO-MEDROL) injection 40 mg    methylPREDNISolone (DEPO-MEDROL) injection 40 mg      Past Medical History:   Patient Active Problem List   Diagnosis    Seborrheic dermatitis    Seasonal allergic rhinitis due to pollen    Cervical radiculopathy at C6    Personal history of malignant neoplasm of larynx    Essential tremor    CARDIOVASCULAR SCREENING; LDL GOAL LESS THAN 160    Benign prostatic hyperplasia    Hoarse voice quality     Advanced directives, counseling/discussion    Pseudophakia    Hiatal hernia with GERD    Macular degeneration    Supraspinatus sprain, right, initial encounter    CARDIOVASCULAR SCREENING; LDL GOAL LESS THAN 100    Vitamin D deficiency    Chronic midline low back pain without sciatica    Sleep disorder, nonorganic    Other gastritis without hemorrhage, unspecified chronicity    BPH associated with nocturia    Weight loss    Pernicious anemia    Arthritis of right shoulder region     Past Medical History:   Diagnosis Date    Acid reflux disease     Allergic rhinitis, cause unspecified     H/O magnetic resonance imaging of brain and brain stem 10/10/2016    MRI BRAIN WITH AND WITHOUT CONTRAST August 17, 2009 8:07:00 PM   HISTORY: Severe dizzy spells with imbalance and vomiting.   TECHNIQUE: Routine pulse sequences without and with contrast were performed including thin section images through the IACs. 20 mL Magnevist given.   FINDINGS: Diffusion-weighted images are normal. The brain parenchyma, brainstem, ventricular system, and subarachnoid spaces a    Hematuria     Hematuria  - in 1990's     History of anemia 2002    Anemia-Iron Deficit    History of gastric ulcer     Macular degeneration     Malignant neoplasm of laryngeal cartilages (H)     Laryngeal CA (Radiation 1982)    Nonsenile cataract     PONV (postoperative nausea and vomiting)     Tremor 10/03/2016

## 2024-02-21 NOTE — LETTER
2/21/2024         RE: Preston Cai  [address]  [facility] [address]        Dear Colleague,    Thank you for referring your patient, Preston Cai, to the [facility]. Please see a copy of my visit note below.    [facility] Dermatology Note  Encounter Date: Feb 21, 2024  Office Visit     Dermatology Problem List:  Last skin check 02/21/2024, recommended yearly  1. 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 AKs, s/p cryo  - x6 face and scalp cryo 02/21/2024  - pigmented AK - left jaw, s/p by 11/13/18  - HAK - right hand, s/p excision 5/2009  3. 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   ____________________________________________     Assessment & Plan:     History of nonmelanoma skin cancer, no clinical evidence of recurrence.  - Recommended regular skin checks.      2. Actinic keratosis - face and scalp x6. Based on the location and chronic irritation to this lesion, treatment with cryotherapy is warranted.   - See cryo procedure note.    3. Seborrheic keratosis, symptomatic - face, trunk, extremities x11. Based on the location and chronic irritation to this lesion, treatment with cryotherapy is warranted.   - See cryo procedure note.     4. ISK x1 R cheek  - EF&C performed today (see procedure note(s) below).    5. Benign lesions: Multiple benign nevi, solar lentigos, dermatofibroma, seborrheic keratoses, cherry angiomas. Explained to patient benign nature of lesion. No treatment is necessary at this time unless the lesion changes or becomes symptomatic.     Procedures Performed:   - Cryotherapy procedure note, location(s): see above. After verbal consent and discussion of risks and benefits including, but not limited to,  dyspigmentation/scar, blister, and pain, 6 AK(s) and 11 ISK(s) was(were) treated with 1-2 mm freeze border for 1-2 cycles with liquid nitrogen. Post cryotherapy instructions were provided.    - Electrofulguration and curettage: After verbal consent and discussion of risks and benefits including, but not limited to pain, bleeding, blister, infection, dyspigmentation, scar, incomplete removal, and/or recurrence, 1 ISK lesion(s) was(were) treated with electrofulguration and curettage. Vaseline and a bandage were applied to the wound sites. Patient tolerated the procedure(s) well and post treatment instructions were provided.      Follow-up: 1 year(s) in-person for a skin check, or earlier for new or changing lesions    Staff and Scribe:   Scribe Disclosure:   By signing my name below, I, Maricarmen Briseno, attest that this documentation has been prepared under the direction and in the presence of Mau Padilla MD.  - Electronically Signed: Maricarmen Briseno 02/21/24       Provider Disclosure:   The documentation recorded by the scribe accurately reflects the services I personally performed and the decisions made by me.    Mau Padilla MD   of Dermatology  Department of Dermatology  [facility] [employer]      ____________________________________________    CC: Skin Check (FBSE.  HX of NMSC.  Area of concern on upper back and a spot on mid back.  Patient is having itching on back.  )    HPI:  Mr. Preston Cai is a(n) 88 year old male who presents today as a return patient for a FBSE.    Last seen 02/06/2023 by Dr. Oseguera for a skin check.     Patient reports he had cryo on the concerning spot on his back, and it scabbed over. He also notes a sensitive spot on his R cheek which he treats with Triamcinolone 0.1% cream. He reports the cream helps but does not resolve the symptoms.    The patient otherwise denies any new or concerning lesions. No bleeding, painful, pruritic, or  changing lesions. Health otherwise stable. No other skin concerns. They do use sunscreen and protective clothing when outdoors for sun protection.       Labs Reviewed:  N/A    Physical exam:  Vitals: There were no vitals taken for this visit.  GEN: This is a well developed, well-nourished male in no acute distress, in a pleasant mood.    SKIN: Glez phototype II  - Full skin, which includes the head/face, both arms, chest, back, abdomen,both legs, genitalia and/or groin buttocks, digits and/or nails, was examined.  - There are erythematous macules with overyling adherent scale on the x6 on face and scalp.   - There is a firm tan/flesh colored papule that dimples with lateral pressure on the R shin.  - There is a tan to brown waxy stuck on papule with surrounding erythema on the face, trunk, and extremities x11.   - There are dome shaped bright red papules on the head/neck, trunk, extremities.   - Multiple regular brown pigmented macules and papules are identified on the head/neck, trunk, extremities.   - Scattered brown macules on sun exposed areas.  - There are waxy stuck on tan to brown papules on the head/neck, trunk, extremities.  - No other lesions of concern on areas examined.       Medications:  Current Outpatient Medications   Medication     brimonidine-timolol (COMBIGAN) 0.2-0.5 % ophthalmic solution     famotidine (PEPCID) 20 MG tablet     famotidine (PEPCID) 40 MG tablet     gabapentin (NEURONTIN) 100 MG capsule     latanoprost (XALATAN) 0.005 % ophthalmic solution     primidone (MYSOLINE) 250 MG tablet     triamcinolone (KENALOG) 0.1 % external cream     cyanocobalamin (VITAMIN B-12) 1000 MCG tablet     Vitamin D, Cholecalciferol, 25 MCG (1000 UT) CAPS     Current Facility-Administered Medications   Medication     methylPREDNISolone (DEPO-MEDROL) injection 40 mg     methylPREDNISolone (DEPO-MEDROL) injection 40 mg      Past Medical History:   Patient Active Problem List   Diagnosis     Seborrheic  dermatitis     Seasonal allergic rhinitis due to pollen     Cervical radiculopathy at C6     Personal history of malignant neoplasm of larynx     Essential tremor     CARDIOVASCULAR SCREENING; LDL GOAL LESS THAN 160     Benign prostatic hyperplasia     Hoarse voice quality     Advanced directives, counseling/discussion     Pseudophakia     Hiatal hernia with GERD     Macular degeneration     Supraspinatus sprain, right, initial encounter     CARDIOVASCULAR SCREENING; LDL GOAL LESS THAN 100     Vitamin D deficiency     Chronic midline low back pain without sciatica     Sleep disorder, nonorganic     Other gastritis without hemorrhage, unspecified chronicity     BPH associated with nocturia     Weight loss     Pernicious anemia     Arthritis of right shoulder region     Past Medical History:   Diagnosis Date     Acid reflux disease      Allergic rhinitis, cause unspecified      H/O magnetic resonance imaging of brain and brain stem 10/10/2016    MRI BRAIN WITH AND WITHOUT CONTRAST August 17, 2009 8:07:00 PM   HISTORY: Severe dizzy spells with imbalance and vomiting.   TECHNIQUE: Routine pulse sequences without and with contrast were performed including thin section images through the IACs. 20 mL Magnevist given.   FINDINGS: Diffusion-weighted images are normal. The brain parenchyma, brainstem, ventricular system, and subarachnoid spaces a     Hematuria     Hematuria  - in 1990's      History of anemia 2002    Anemia-Iron Deficit     History of gastric ulcer      Macular degeneration      Malignant neoplasm of laryngeal cartilages (H)     Laryngeal CA (Radiation 1982)     Nonsenile cataract      PONV (postoperative nausea and vomiting)      Tremor 10/03/2016       Again, thank you for allowing me to participate in the care of your patient.        Sincerely,        Mau Padilla MD

## 2024-02-21 NOTE — NURSING NOTE
The following medication was given:     MEDICATION:  Lidocaine with epinephrine 1%  ROUTE: [place]  SITE: see procedure note  DOSE: see procedure note  LOT #: [id number]  : [employer]  EXPIRATION DATE: 03/1/25  NDC#: 70445-178-37    Was there drug waste? Yes  Multi-dose vial: Yes    Karen Chopra LPN  February 21, 2024

## 2024-02-21 NOTE — NURSING NOTE
Preston Cai's goals for this visit include:   Chief Complaint   Patient presents with    Skin Check     FBSE.  HX of NMSC.  Area of concern on upper back and a spot on mid back.  Patient is having itching on back.        He requests these members of his care team be copied on today's visit information:     PCP: Tyrel Manning    Referring Provider:  Referred Self, MD  No address on file    There were no vitals taken for this visit.    Do you need any medication refills at today's visit?     Nicol Hannon on 2/21/2024 at 10:18 AM

## 2024-02-23 ENCOUNTER — OFFICE VISIT (OUTPATIENT)
Dept: AUDIOLOGY | Facility: CLINIC | Age: 89
End: 2024-02-23
Payer: COMMERCIAL

## 2024-02-23 DIAGNOSIS — H90.3 SENSORINEURAL HEARING LOSS (SNHL) OF BOTH EARS: Primary | ICD-10-CM

## 2024-02-23 DIAGNOSIS — H81.01 MENIERE'S DISEASE, RIGHT: ICD-10-CM

## 2024-02-23 DIAGNOSIS — H81.01 MENIERE'S DISEASE, RIGHT: Primary | ICD-10-CM

## 2024-02-23 PROCEDURE — 92557 COMPREHENSIVE HEARING TEST: CPT | Performed by: AUDIOLOGIST

## 2024-02-23 PROCEDURE — 92567 TYMPANOMETRY: CPT | Performed by: AUDIOLOGIST

## 2024-02-23 NOTE — PROGRESS NOTES
AUDIOLOGY REPORT    SUMMARY: Audiology visit completed. See audiogram for results.    RECOMMENDATIONS: Follow-up with ENT.    Aisha Darling  Doctor of Audiology  [place] License # [id number]

## 2024-03-11 ENCOUNTER — OFFICE VISIT (OUTPATIENT)
Dept: NEUROLOGY | Facility: CLINIC | Age: 89
End: 2024-03-11
Payer: COMMERCIAL

## 2024-03-11 VITALS
HEART RATE: 76 BPM | HEIGHT: 74 IN | DIASTOLIC BLOOD PRESSURE: 63 MMHG | SYSTOLIC BLOOD PRESSURE: 107 MMHG | BODY MASS INDEX: 20.41 KG/M2 | WEIGHT: 159 LBS

## 2024-03-11 DIAGNOSIS — G60.9 HEREDITARY AND IDIOPATHIC PERIPHERAL NEUROPATHY: ICD-10-CM

## 2024-03-11 DIAGNOSIS — M54.50 CHRONIC MIDLINE LOW BACK PAIN WITHOUT SCIATICA: ICD-10-CM

## 2024-03-11 DIAGNOSIS — R39.9 LOWER URINARY TRACT SYMPTOMS (LUTS): ICD-10-CM

## 2024-03-11 DIAGNOSIS — G25.0 ESSENTIAL TREMOR: ICD-10-CM

## 2024-03-11 DIAGNOSIS — R25.1 TREMOR: Primary | ICD-10-CM

## 2024-03-11 DIAGNOSIS — G89.29 CHRONIC MIDLINE LOW BACK PAIN WITHOUT SCIATICA: ICD-10-CM

## 2024-03-11 PROCEDURE — 99213 OFFICE O/P EST LOW 20 MIN: CPT | Performed by: PSYCHIATRY & NEUROLOGY

## 2024-03-11 RX ORDER — GABAPENTIN 100 MG/1
CAPSULE ORAL
Qty: 270 CAPSULE | Refills: 3 | Status: SHIPPED | OUTPATIENT
Start: 2024-03-11

## 2024-03-11 RX ORDER — PRIMIDONE 250 MG/1
TABLET ORAL
Qty: 135 TABLET | Refills: 3 | Status: SHIPPED | OUTPATIENT
Start: 2024-03-11

## 2024-03-11 NOTE — PATIENT INSTRUCTIONS
"        Medications      7am 8am 6pm   7pm   Acetaminophen tylenol 500mg    2   Cyanocobalamin Vit B12 1000mcg off          Famotidine pepcid 40mg 1      Fluticasone flonase 50mcg/act spray  off         Gabapentin neurontin 100mg     2     Latanoprost xalatan 0.005% solution           Mirtazapine remeron 15mg off         Pantoprazole (protonix) 40mg EC tablet Off          Primidone mysoline 250mg  1/4      1   Triamcinolone kenalog 0.1% cream       Vit D, Cholecalciferol 1000 units 25 mcg    1                      Plan:    Had covid November - coughing and congestion    Balance problems from neuropathy and radiculopathy    Using gabapentin 2 x 100mg - at 6p and   acetaminophen 500m x 500mg at 6-7p  He had had back issues.   Seen Dr. Sage 2020  Message sent as he is interested in another epidural steroid injection  Ordered referral.     Increased the primidone and using 1/4 tab in the morning and 1 at night  Not clear if helped the tremor  He has not had side effects.  He has mild postural tremor.   He has some tremor after reading  Can still hold his coffee cup and feed himself.     He is off vitmamin B12 - level obtained 2023    He remains on vitamin D    His lipid panel was okay   He is not a medication.     He has a chronic anemia that is stable 2023  He has normal liver function tests  He is taking primidone that can impact both.     Appetite is good but \"cannot put on weight\"    He has not had falls.     Last seen 3/2023  Refilled primidone  Refilled gabapentin neurontin   Return in 1 year.     Tamsulosin flomax 0.4mg - went off this after he had a dizzy spell  His blood pressure is 107/63  His wife gets up many times per n ight.   Stopped another medication.   Nocturia 2 or 3 times per night   He has daytime urgency  He has not seen a urologist for many y ears.   Referral placed  He is 88 years old  Has not had a recent PSA  Encouraged him to reduce night time water consumption after " 6pm  Renal function is fine.

## 2024-03-11 NOTE — LETTER
3/11/2024         RE: Preston Cai  [address]  [facility] [address]        Dear Colleague,    Thank you for referring your patient, Preston Cai, to the [facility] [place]. Please see a copy of my visit note below.        Diagnosis/Summary/Recommendations:    PATIENT: Preston Cai  88 year old male     : 1935    BEATRIZ: 2024    MRN: [id number]    [address]    [facility] [address] Phone   [phone] (Home) *Preferred*   [phone] (Mobile) E-mail Address   [email or web address]      Edyta cai           Assessment:  (R25.1) Tremor  (primary encounter diagnosis)  Seen  and  by me   Gabapentin neurontin 100mg  Primidone mysoline 250mg at bedtime     Tremor when holding a book  Tremor not present at rest  Has shakiness when writing  Has some problems when eating     Review of diagnosis    tremor     Avoidance of dopamine blockers   Not taking     Motor complication review   n/a     Review of Impulse control disorders   N/a      Review of surgical or medication options   Consider a slow wean on primidone (Mysoline) if no other explanation is there.   Pharmacy consultation with Fior Matos if he goes down th is patch  Consider 50mg tabs x 5 and then every few weeks reduce by one tablet - will have more tremor but would have to see if feels better, etc.      For now will review the 250mg of primidone at his present dose.      Not clear why he has no energy and losing weight     He will visit with Dr. Manning and review his lab work and studies      Gait/Balance/Falls         Exercise/Therapy performed/offered   States his stride is pretty good but has neuropathy so not so straight     Cognitive/Driving   No changes in his driving  Some memory issues     Mood   Some stress -   Lots of stress since they moved  [facility]   They were locked down  Hope to move to [facility] in  [place] but on waiting list  Denies depression        Hallucinations/delusions      Sleep   Sleep disorder  Goes to bed around 9pm and wakes up at 7am  Wakes up 2-3 times per night to urinate  Some times has problems falling back asleep  May have sleep apnea  [place]  Has not had a sleep study  Tired when he wakes up  Naps during the day  30-60 minutes per day      Bladder/Renal/Prostate/Gyn/Other  BPH  Tamsulosin flomax 0.4mg   Nocturia   PSA 3.19 on 1/4/2022     GI/Constipation/GERD   GERD  Dexlansoprazole dexilant 60mg CR capsule - not taking  Pantoprazole (protonix) 40mg EC tablet  helicobacter negative  Lipase normal  Has a good appetite     153 lbs today      Colonoscopy and endoscopy done last week results pending     Final Diagnosis   A. DUODENUM, BIOPSY:   -Duodenal mucosa with no significant histopathologic abnormality  -No evidence of celiac disease     B. TRANSVERSE COLON, POLYPS X 4, BIOPSY:   -Tubular adenoma(s); negative for high-grade dysplasia     C. RECTUM, POLYP, BIOPSY:   - Sessile serrated adenoma; negative for cytologic dysplasia          Comprehensive metabolic was fine 12/17/2021 - no problems with liver      2 years ago had vomiting     He has acid reflux     Has a hernia        12/20/2021  IMPRESSION:  1. Diffuse thickening of the gastric wall raises the question of gastritis, however, this finding is relatively unchanged in appearance when compared to prior exam on 7/30/2017.  2. Prostatomegaly with diffuse bladder wall thickening, likely sequela of chronic bladder outlet obstruction.  3. Colonic diverticulosis without evidence of acute diverticulitis.      ENDO/Lipid/DM/Bone density/Thyroid  Vitamin D deficiency  Vitamin D, Cholecalciferol 1000 units   Thyroid studies were normal 1/4/2022     Cardio/heart/Hyper or Hypotensive   Denies light headedness or fainting  bp was 95/59     Vision/Dry Eyes/Cataracts/Glaucoma/Macular   Macular degeneration  Pseudophaia  Latanoprost xalatan 0.005%  solution  C Minkus monitoring his eyes     Heme/Anticoagulation/Antiplatelet/Anemia/Other  MMA normal  Parietal cell antibody 26.7 high   Intrinsic factor negative  Hemoglobin 12.6 on 2021     ENT/Resp  Dr Tosha Toledo - ENT     Allergic rhinitis     Hoarse voice     Skin/Cancer/Seborrhea/other        Musculoskeletal/Pain/Headache  Chronic radiculopathy C6  States he has a neuropathy  Gabapentin neurontin 100mg x 2 at bedtime     Other:      Discussed that his blood pressure and heart rate are pretty low that would not recommend a beta blocker except a 10mg dose as needed if he wanted anything     Would not push up the primidone further     Talked about surgical approaches like deep brain stimulation, focused beam ultrasound, gamma knife      Neuropathy affects his balance and has discomfort at night. He is taking gabapentin 100mg x 2 at 7pm and lays down around 930/10pm      July had dizzy spells and cut out medication      CONSIDER KATHLEEN TRIO DEVICE - INFORMATION BELOW.      CONTINUE ON PRIMIDONE 250MG AND CONSIDER 1/4 OF 250MG ADDITIONAL DOSE     CONTINUE ON 100MG GABAPENTIN - DISCUSSING DOSE - 1 - HAD MORE NEUROPATHY SYMPTOMS AND 2 LESS; NOT SURE IF BETTER WITH 3      [employer]force [phone]        Medications      7am 8am 6pm   7pm   Acetaminophen tylenol 500mg    2   Cyanocobalamin Vit B12 1000mcg off          Famotidine pepcid 40mg 1      Fluticasone flonase 50mcg/act spray  off         Gabapentin neurontin 100mg     2     Latanoprost xalatan 0.005% solution           Mirtazapine remeron 15mg off         Pantoprazole (protonix) 40mg EC tablet Off          Primidone mysoline 250mg  /      1   Triamcinolone kenalog 0.1% cream       Vit D, Cholecalciferol 1000 units 25 mcg    1                      Plan:    Had covid November - coughing and congestion    Balance problems from neuropathy and radiculopathy    Using gabapentin 2 x 100mg - at 6p and   acetaminophen 500m x 500mg at 6-7p  He had had  "back issues.   Seen Dr. Sage 12/2020  Message sent as he is interested in another epidural steroid injection  Ordered referral.     Increased the primidone and using 1/4 tab in the morning and 1 at night  Not clear if helped the tremor  He has not had side effects.  He has mild postural tremor.   He has some tremor after reading  Can still hold his coffee cup and feed himself.     He is off vitmamin B12 - level obtained 8/2023    He remains on vitamin D    His lipid panel was okay   He is not a medication.     He has a chronic anemia that is stable 8/22/2023  He has normal liver function tests  He is taking primidone that can impact both.     Appetite is good but \"cannot put on weight\"    He has not had falls.     Last seen 3/2023  Refilled primidone  Refilled gabapentin neurontin   Return in 1 year.     Tamsulosin flomax 0.4mg - went off this after he had a dizzy spell  His blood pressure is 107/63  His wife gets up many times per n ight.   Stopped another medication.   Nocturia 2 or 3 times per night   He has daytime urgency  He has not seen a urologist for many y ears.   Referral placed  He is 88 years old  Has not had a recent PSA  Encouraged him to reduce night time water consumption after 6pm  Renal function is fine.     Coding statement:   Medical Decision Making:  #  Chronic progressive medical conditions addressed  - see above --   Review and/or interpretation of unique test or documentation from a provider outside of neurology yes   Independent historian provided additional details  no I  Prescription drug management and review of potential side effects and/or monitoring for side effects  -- see above ---  Health impacted by social determinants of health  no    I have reviewed the note as documented above.  This accurately captures the substance of my conversation with the patient and total time spent preparing for visit, executing visit and completing visit on the day of the visit:  20 minutes.  The " portion of this total time included face to face time 15 minutes     The longitudinal plan of care for Preston Cai was addressed during this visit. Due to the added complexity in care, I will continue to support Preston Cai in the subsequent management of this condition(s) and with the ongoing continuity of care of this condition(s).      García Munroe MD     ______________________________________    Last visit date and details:             ______________________________________      Patient was asked about 14 Review of systems including changes in vision (dry eyes, double vision), hearing, heart, lungs, musculoskeletal, depression, anxiety, snoring, RBD, insomnia, urinary frequency, urinary urgency, constipation, swallowing problems, hematological, ID, allergies, skin problems: seborrhea, endocrinological: thyroid, diabetes, cholesterol; balance, weight changes, and other neurological problems and these were not significant at this time except for   Patient Active Problem List   Diagnosis     Seborrheic dermatitis     Seasonal allergic rhinitis due to pollen     Cervical radiculopathy at C6     Personal history of malignant neoplasm of larynx     Essential tremor     CARDIOVASCULAR SCREENING; LDL GOAL LESS THAN 160     Benign prostatic hyperplasia     Hoarse voice quality     Advanced directives, counseling/discussion     Pseudophakia     Hiatal hernia with GERD     Macular degeneration     Supraspinatus sprain, right, initial encounter     CARDIOVASCULAR SCREENING; LDL GOAL LESS THAN 100     Vitamin D deficiency     Chronic midline low back pain without sciatica     Sleep disorder, nonorganic     Other gastritis without hemorrhage, unspecified chronicity     BPH associated with nocturia     Weight loss     Pernicious anemia     Arthritis of right shoulder region          Allergies   Allergen Reactions     Seasonal Allergies      Hay fever      Past Surgical History:   Procedure Laterality Date      APPENDECTOMY  8 years old     BIOPSY  may 2015    face, back     Biopsy of the throat - cancerous mass - got radiation for in larynx  1982     CATARACT IOL, RT/LT       COLONOSCOPY  02/25/2004    Normal     COLONOSCOPY N/A 04/28/2015    Procedure: COLONOSCOPY;  Surgeon: Marvin Adams MD;  Location: [facility] OR     COLONOSCOPY N/A 01/10/2022    Procedure: COLONOSCOPY, WITH POLYPECTOMY AND BIOPSY;  Surgeon: Marsha Asher MD;  Location: [facility]     COLONOSCOPY N/A 01/10/2022    Procedure: COLONOSCOPY, FLEXIBLE, WITH LESION REMOVAL USING SNARE;  Surgeon: Marsha Asher MD;  Location: [facility]     COLONOSCOPY WITH CO2 INSUFFLATION N/A 04/28/2015    Procedure: COLONOSCOPY WITH CO2 INSUFFLATION;  Surgeon: Marvin Adams MD;  Location: [facility]     COLONOSCOPY WITH CO2 INSUFFLATION N/A 01/10/2022    Procedure: COLONOSCOPY, WITH CO2 INSUFFLATION;  Surgeon: Marsha Asher MD;  Location: [facility]     COMBINED ESOPHAGOSCOPY, GASTROSCOPY, DUODENOSCOPY (EGD) WITH CO2 INSUFFLATION N/A 01/10/2022    Procedure: ESOPHAGOGASTRODUODENOSCOPY, WITH CO2 INSUFFLATION;  Surgeon: Marsha Asher MD;  Location: [facility]     CYSTOSCOPY  1997    for hematuria - negative     ESOPHAGOSCOPY, GASTROSCOPY, DUODENOSCOPY (EGD), COMBINED N/A 01/10/2022    Procedure: ESOPHAGOGASTRODUODENOSCOPY, WITH BIOPSY;  Surgeon: Marsha Asher MD;  Location: [facility]     EYE SURGERY Right     Rt eye.macular repair     EYE SURGERY  2003    cataract     NASAL ENDOSCOPY,DX  04/2007    GERD     Pars plana vitrectomy and epiretinal membrane dissection, right eye  11/08/2002     Phacoemulsification with intraocular lens implantation right eye.  03/11/2003     PHACOEMULSIFICATION WITH STANDARD INTRAOCULAR LENS IMPLANT Left 09/22/2016    Procedure: PHACOEMULSIFICATION WITH STANDARD INTRAOCULAR LENS IMPLANT;  Surgeon: Nghia Lara MD;  Location: [facility] OR     Thyroglossal Duct Cyst Removal - 2ndary to radiation.  1982     VASECTOMY  1971      ZZ GASTROSCOPY,FL  2007    hiatal hernia and errosions     Past Medical History:   Diagnosis Date     Acid reflux disease      Allergic rhinitis, cause unspecified      H/O magnetic resonance imaging of brain and brain stem 10/10/2016    MRI BRAIN WITH AND WITHOUT CONTRAST 2009 8:07:00 PM   HISTORY: Severe dizzy spells with imbalance and vomiting.   TECHNIQUE: Routine pulse sequences without and with contrast were performed including thin section images through the IACs. 20 mL Magnevist given.   FINDINGS: Diffusion-weighted images are normal. The brain parenchyma, brainstem, ventricular system, and subarachnoid spaces a     Hematuria     Hematuria  - in       History of anemia     Anemia-Iron Deficit     History of gastric ulcer      Macular degeneration      Malignant neoplasm of laryngeal cartilages (H)     Laryngeal CA (Radiation )     Nonsenile cataract      PONV (postoperative nausea and vomiting)      Tremor 10/03/2016     Social History     Socioeconomic History     Marital status:      Spouse name: Edyta Cai     Number of children: Not on file     Years of education: Not on file     Highest education level: Not on file   Occupational History     Employer: RETIRED   Tobacco Use     Smoking status: Former     Packs/day: 1.00     Years: 10.00     Additional pack years: 0.00     Total pack years: 10.00     Types: Cigarettes, Pipe     Start date: 6/15/1959     Quit date: 1969     Years since quittin.0     Smokeless tobacco: Never     Tobacco comments:        Substance and Sexual Activity     Alcohol use: Yes     Comment: A beer once in awhile     Drug use: No     Sexual activity: Yes     Partners: Female     Birth control/protection: None   Other Topics Concern     Parent/sibling w/ CABG, MI or angioplasty before 65F 55M? No   Social History Narrative    seen by Jose M Diaz. lives in [facility].  to Edyta Cai.    2 sons and 2 daughters    Cancer  father    Stroke mother        Daughter in [facility]    Son in [place]    Daughter in [place]    Son in [place]         Social Determinants of Health     Financial Resource Strain: Low Risk  (11/15/2023)    Financial Resource Strain      Within the past 12 months, have you or your family members you live with been unable to get utilities (heat, electricity) when it was really needed?: No   Food Insecurity: Low Risk  (11/15/2023)    Food Insecurity      Within the past 12 months, did you worry that your food would run out before you got money to buy more?: No      Within the past 12 months, did the food you bought just not last and you didn t have money to get more?: No   Transportation Needs: Low Risk  (11/15/2023)    Transportation Needs      Within the past 12 months, has lack of transportation kept you from medical appointments, getting your medicines, non-medical meetings or appointments, work, or from getting things that you need?: No   Physical Activity: Not on file   Stress: Not on file   Social Connections: Not on file   Interpersonal Safety: Low Risk  (11/17/2023)    Interpersonal Safety      Do you feel physically and emotionally safe where you currently live?: Yes      Within the past 12 months, have you been hit, slapped, kicked or otherwise physically hurt by someone?: No      Within the past 12 months, have you been humiliated or emotionally abused in other ways by your partner or ex-partner?: No   Housing Stability: Low Risk  (11/15/2023)    Housing Stability      Do you have housing? : Yes      Are you worried about losing your housing?: No       Drug and lactation database from the [place] [employer] of Medicine:  http://toxnet.nlm.nih.gov/cgi-bin/sis/htmlgen?LACT      B/P: Data Unavailable, T: Data Unavailable, P: Data Unavailable, R: Data Unavailable 0 lbs 0 oz  There were no vitals taken for this visit., There is no height or weight on file to calculate BMI.  Medications  and Vitals not listed above were documented in the cart and reviewed by me.     Current Outpatient Medications   Medication Sig Dispense Refill     cyanocobalamin (VITAMIN B-12) 1000 MCG tablet Take 1,000 mcg by mouth daily       famotidine (PEPCID) 20 MG tablet Take 1 tablet (20 mg) by mouth 2 times daily 120 tablet 1     famotidine (PEPCID) 40 MG tablet TAKE ONE TABLET BY MOUTH ONCE DAILY 30 tablet 1     gabapentin (NEURONTIN) 100 MG capsule Takes 2-3 x 100mg capsule by mouth nightly 270 capsule 3     latanoprost (XALATAN) 0.005 % ophthalmic solution Place 1 drop into the right eye At Bedtime 7.5 mL 1     primidone (MYSOLINE) 250 MG tablet 1/4 tab in morning and 1 in the evevning 135 tablet 3     triamcinolone (KENALOG) 0.1 % external cream Apply topically 2 times daily as needed (itchy rash of shoulders/chest) 454 g 3     Vitamin D, Cholecalciferol, 25 MCG (1000 UT) CAPS Take 1,000 Units by mouth daily (Winter only)           García Munroe MD      Again, thank you for allowing me to participate in the care of your patient.        Sincerely,        García Munroe MD

## 2024-03-11 NOTE — NURSING NOTE
"Preston Cai's goals for this visit include:   Chief Complaint   Patient presents with    Tremors     Follow up 1 Year      He requests these members of his care team be copied on today's visit information: yes    PCP: Tyrel Manning    Referring Provider:  No referring provider defined for this encounter.    /63   Pulse 76   Ht 1.867 m (6' 1.5\")   Wt 72.1 kg (159 lb)   BMI 20.69 kg/m      Do you need any medication refills at today's visit? No  AYALA Parker, SLICK ([facility])      "

## 2024-03-18 ENCOUNTER — OFFICE VISIT (OUTPATIENT)
Dept: OPHTHALMOLOGY | Facility: CLINIC | Age: 89
End: 2024-03-18
Payer: COMMERCIAL

## 2024-03-18 DIAGNOSIS — H40.1112 PRIMARY OPEN ANGLE GLAUCOMA (POAG) OF RIGHT EYE, MODERATE STAGE: Primary | ICD-10-CM

## 2024-03-18 PROCEDURE — 99213 OFFICE O/P EST LOW 20 MIN: CPT | Performed by: OPHTHALMOLOGY

## 2024-03-18 ASSESSMENT — TONOMETRY
OS_IOP_MMHG: 19
IOP_METHOD: TONOPEN
OS_IOP_MMHG: 16
IOP_METHOD: APPLANATION
OD_IOP_MMHG: 17
OD_IOP_MMHG: 14

## 2024-03-18 ASSESSMENT — VISUAL ACUITY
OS_CC+: -1
OS_CC: 20/20
OD_CC+: -2
OD_CC: 20/40
CORRECTION_TYPE: GLASSES
METHOD: SNELLEN - LINEAR

## 2024-03-18 ASSESSMENT — EXTERNAL EXAM - LEFT EYE: OS_EXAM: NORMAL

## 2024-03-18 ASSESSMENT — EXTERNAL EXAM - RIGHT EYE: OD_EXAM: NORMAL

## 2024-03-18 NOTE — NURSING NOTE
Chief Complaints and History of Present Illnesses   Patient presents with    Follow Up      Primary open angle glaucoma , right eye      Chief Complaint(s) and History of Present Illness(es)       Follow Up              Comments:  Primary open angle glaucoma , right eye               Comments    Pt states no change in VA since last visit  C/o some dryness  States occ floaters , not new   No headaches or eye pain   Using:  Combigan twice a day right eye   Latanoprost at bedtime right eye     Ani Pardo COT 10:56 AM March 18, 2024

## 2024-03-18 NOTE — PROGRESS NOTES
HPI       Follow Up     Additional comments:  Primary open angle glaucoma , right eye              Comments    Pt states no change in VA since last visit  C/o some dryness  States occ floaters , not new   No headaches or eye pain   Using:  Combigan twice a day right eye   Latanoprost at bedtime right eye     Ani Pardo COT 10:56 AM March 18, 2024               Last edited by Ani Pardo on 3/18/2024 10:56 AM.         Review of systems for the eyes was negative other than the pertinent positives/negatives listed in the HPI.      Assessment & Plan    HPI:  Preston Cai is a 88 year old male with history of vitrectomy for presumed macular hole, pseudophakia, refractive error, Primary open angle glaucoma (POAG) right eye, dry eye, Age related macular degeneration presents for followup IOP after starting combgian    POHx: macular hole, pseudophakia, refractive error, Primary open angle glaucoma (POAG) right eye, dry eye, Age related macular degeneration   PMHx: GERD  Current Medications: acetaminophen 500 MG CAPS, 2 x 500mg at 630/7p  brimonidine-timolol (COMBIGAN) 0.2-0.5 % ophthalmic solution, Place 1 drop into the right eye 2 times daily  famotidine (PEPCID) 40 MG tablet, TAKE ONE TABLET BY MOUTH ONCE DAILY  gabapentin (NEURONTIN) 100 MG capsule, Takes 2-3 x 100mg capsule by mouth nightly  latanoprost (XALATAN) 0.005 % ophthalmic solution, Place 1 drop into the right eye at bedtime  primidone (MYSOLINE) 250 MG tablet, 1/4 tab in morning and 1 in the evevning  triamcinolone (KENALOG) 0.1 % external cream, Apply topically 2 times daily as needed (itchy rash of shoulders/chest)  Vitamin D, Cholecalciferol, 25 MCG (1000 UT) CAPS, Take 1,000 Units by mouth daily (Winter only)    methylPREDNISolone (DEPO-MEDROL) injection 40 mg  methylPREDNISolone (DEPO-MEDROL) injection 40 mg      FHx: ?vision loss mother  PSHx: Cataract extraction/intraocular lens both eyes, Pars plana vitrectomy (PPV) right eye       Current Eye  Medications:  Systane three times a day   Latanoprost at bedtime right eye   Combgain twice a day right eye   Preservision    Assessment & Plan:  (H40.1112) Primary open angle glaucoma (POAG) of right eye, moderate stage  (primary encounter diagnosis)  Maximum intraocular pressure unknown  Family history: No  Trauma history: No  Gonioscopy:   Pachymetry:   Treatment History:   Latanoprost right eye   Combigan right eye     Today's testing:  Visual field 02/13/24:   Right eye - inferior arcuate/nasal step, VFI 74%, MD -10.48, PSD 9.77, reliable;   Left eye - central defects, VFi 94%, MD -1.97, PSD 2.08 reliable    OCT nerve fiber layer 02/13/24:   Right eye - G(r) 60 NI (y) 61 TI (g) 106 NS (r) 55 TS (r) 73      Left eye - G(g) 91 NI (g) 86 TI (g) 132 NS (g) 95 TS (g) 132    IOP goal: mid teens  Latanoprost refilled today  Combigan twice a day right eye     Very good response to combigan right eye       (H04.123) Dry eye syndrome of both eyes  Continue artificial tears  Continue gel or ointment at bedtime     (H52.13,  H52.203,  H52.4) Myopia of both eyes with astigmatism and presbyopia  Doing well in current MRx    (H35.30) ARMD (age-related macular degeneration), bilateral  (Z98.890) Hx of vitrectomy  Following with [facility]      (Z96.1) Pseudophakia, both eyes  Doing well      Return in about 5 months (around 8/18/2024) for Follow Up-v/t, OCT RNFL, 24-2 VF.        Orlando Rose MD     Attending Physician Attestation:  Complete documentation of historical and exam elements from today's encounter can be found in the full encounter summary report (not reduplicated in this progress note).  I personally obtained the chief complaint(s) and history of present illness.  I confirmed and edited as necessary the review of systems, past medical/surgical history, family history, social history, and examination findings as documented by others; and I examined the patient myself.  I personally reviewed the relevant  tests, images, and reports as documented above.  I formulated and edited as necessary the assessment and plan and discussed the findings and management plan with the patient and family. - Orlando Rose MD

## 2024-04-02 ASSESSMENT — ENCOUNTER SYMPTOMS
EYES NEGATIVE: 1
RESPIRATORY NEGATIVE: 1
CONSTITUTIONAL NEGATIVE: 1

## 2024-04-02 NOTE — PROGRESS NOTES
Chief Complaint   Patient presents with    Follow Up     6 months follow up check up. Pt. Stated having a left ear plugged sensation, also congested in the morning and afternoon.       PCP: Tyrel Manning     Referring Provider: No ref. provider found    /70 (BP Location: Right arm, Patient Position: Sitting, Cuff Size: Adult Large)   Pulse 69   SpO2 96%     ENT Problem List:  Patient Active Problem List   Diagnosis    Seborrheic dermatitis    Seasonal allergic rhinitis due to pollen    Cervical radiculopathy at C6    Personal history of malignant neoplasm of larynx    Essential tremor    CARDIOVASCULAR SCREENING; LDL GOAL LESS THAN 160    Benign prostatic hyperplasia    Hoarse voice quality    Advanced directives, counseling/discussion    Pseudophakia    Hiatal hernia with GERD    Macular degeneration    Supraspinatus sprain, right, initial encounter    CARDIOVASCULAR SCREENING; LDL GOAL LESS THAN 100    Vitamin D deficiency    Chronic midline low back pain without sciatica    Sleep disorder, nonorganic    Other gastritis without hemorrhage, unspecified chronicity    BPH associated with nocturia    Weight loss    Pernicious anemia    Arthritis of right shoulder region      Current Medications:  Current Outpatient Medications   Medication Sig Dispense Refill    acetaminophen 500 MG CAPS 2 x 500mg at 630/7p      brimonidine-timolol (COMBIGAN) 0.2-0.5 % ophthalmic solution Place 1 drop into the right eye 2 times daily 10 mL 3    famotidine (PEPCID) 40 MG tablet TAKE ONE TABLET BY MOUTH ONCE DAILY 30 tablet 1    gabapentin (NEURONTIN) 100 MG capsule Takes 2-3 x 100mg capsule by mouth nightly 270 capsule 3    latanoprost (XALATAN) 0.005 % ophthalmic solution Place 1 drop into the right eye at bedtime 2.5 mL 4    primidone (MYSOLINE) 250 MG tablet 1/4 tab in morning and 1 in the evevning 135 tablet 3    triamcinolone (KENALOG) 0.1 % external cream Apply topically 2 times daily as needed (itchy rash of  shoulders/chest) 454 g 3    Vitamin D, Cholecalciferol, 25 MCG (1000 UT) CAPS Take 1,000 Units by mouth daily (Winter only)       Current Facility-Administered Medications   Medication Dose Route Frequency Provider Last Rate Last Admin    methylPREDNISolone (DEPO-MEDROL) injection 40 mg  40 mg   Mau Miller, DO   40 mg at 09/12/22 1002    methylPREDNISolone (DEPO-MEDROL) injection 40 mg  40 mg   Mau Miller, DO   40 mg at 09/12/22 1002     HPI  Pleasant 88 year old male presents today as a(n) established patient for a month check-up on Meniere's disease, nasal congestion, and laryngo-pharyngeal reflux. He was last seen on 11/28/23. He reports left aural fullness and nasal congestion in the morning and afternoon. He gets occasional loud tinnitus in the right ear and less intense tinnitus in the left ear. He states that his voice is not better and he has a choking sensation when eating. He has difficulty swallowing that onset 1 year ago and takes Pepcid for heartburn. He states that soft foods, such as a muffin, get stuck in his throat. He had a swallowing study done 20 years ago. He uses Vaseline for nasal dryness.    He denies dizziness/vertigo,     Review of Systems   Constitutional: Negative.    HENT:  Positive for congestion and tinnitus.    Eyes: Negative.    Respiratory: Negative.     Gastrointestinal:  Positive for heartburn.   Skin: Negative.    Endo/Heme/Allergies: Negative.        Physical Exam  Vitals and nursing note reviewed.   Constitutional:       Appearance: Normal appearance.   HENT:      Head: Normocephalic and atraumatic.      Jaw: There is normal jaw occlusion.      Right Ear: Hearing, tympanic membrane and ear canal normal. No middle ear effusion.      Left Ear: Hearing, tympanic membrane and ear canal normal.  No middle ear effusion. There is impacted cerumen.      Nose: Congestion present. No mucosal edema or rhinorrhea.      Right Nostril: No occlusion.      Left Nostril: No  occlusion.      Right Turbinates: Swollen. Not enlarged.      Left Turbinates: Swollen. Not enlarged.      Right Sinus: No maxillary sinus tenderness or frontal sinus tenderness.      Left Sinus: No maxillary sinus tenderness or frontal sinus tenderness.      Comments: Dry mucosa present     Mouth/Throat:      Mouth: Mucous membranes are moist.      Pharynx: Oropharynx is clear. Uvula midline.   Eyes:      Extraocular Movements: Extraocular movements intact.      Pupils: Pupils are equal, round, and reactive to light.   Neurological:      Mental Status: He is alert.       Ear wax removal: The patient was seen in the room. An informed consent was obtained from the patient. His ears were evaluated under the microscope. The left ear canal was blocked 70% with ear wax that was suctioned with a 7 tip. He tolerated the procedure well and left the room no complications.    AUDIOGRAM: The patient underwent an audiogram 2/23/24.  Results: WNL sloping to moderately-severe SNHL bilaterally. 100% word rec. right, 92% left. Tymps WNL. Could not seal for reflexes.  Right: Speech reception threshold is 15 dB with 100% word recognition.   Left: Speech reception threshold is 20 dB with 92% word recognition.     A/P  Audiogram was independently reviewed and discussed in detail with the patient. This pleasant patient has  Meniere's disease, laryngo-pharyngeal reflux, nasal dryness, and dysphagia.  For nasal dryness, he is advised to use Vaseline, Aquaphor, or saline gel.  For his esophageal dysphagia, an XR video swallow with SLP or OT is ordered for further investigation. Dryness in his throat could also be causing this issue, and he is advised to get good hydration in the meantime and continue taking reflux medication, Pepcid.    Follow up in clinic in 6 months.    Scribe/Staff:    Scribe Disclosure:   Alayna KIM, am serving as a scribe; to document services personally performed by Tosha Ron MD based on data  collection and the provider's statements to me.     Provider Disclosure:  I agree with above History, Review of Systems, Physical exam and Plan.  I have reviewed the content of the documentation and have edited it as needed. I have personally performed the services documented here and the documentation accurately represents those services and the decisions I have made.      Electronically signed by:  Tosha Ron MD

## 2024-04-03 ENCOUNTER — OFFICE VISIT (OUTPATIENT)
Dept: OTOLARYNGOLOGY | Facility: CLINIC | Age: 89
End: 2024-04-03
Payer: COMMERCIAL

## 2024-04-03 VITALS — HEART RATE: 69 BPM | SYSTOLIC BLOOD PRESSURE: 110 MMHG | OXYGEN SATURATION: 96 % | DIASTOLIC BLOOD PRESSURE: 70 MMHG

## 2024-04-03 DIAGNOSIS — H61.23 BILATERAL IMPACTED CERUMEN: ICD-10-CM

## 2024-04-03 DIAGNOSIS — R13.14 PHARYNGOESOPHAGEAL DYSPHAGIA: Primary | ICD-10-CM

## 2024-04-03 PROCEDURE — 99213 OFFICE O/P EST LOW 20 MIN: CPT | Mod: 25 | Performed by: OTOLARYNGOLOGY

## 2024-04-03 PROCEDURE — 69210 REMOVE IMPACTED EAR WAX UNI: CPT | Mod: LT | Performed by: OTOLARYNGOLOGY

## 2024-04-03 ASSESSMENT — ENCOUNTER SYMPTOMS: HEARTBURN: 1

## 2024-04-03 NOTE — NURSING NOTE
Preston Cai's chief complaint for this visit includes:  Chief Complaint   Patient presents with    Follow Up     6 months follow up check up. Pt. Stated having a left ear plugged sensation, also congested in the morning and afternoon.      PCP: Tyrel Manning    Referring Provider:  No referring provider defined for this encounter.    /70 (BP Location: Right arm, Patient Position: Sitting, Cuff Size: Adult Large)   Pulse 69   SpO2 96%

## 2024-04-03 NOTE — LETTER
4/3/2024         RE: Preston Cai  [address]  [facility] [address]        Dear Colleague,    Thank you for referring your patient, Preston Cai, to the [facility]. Please see a copy of my visit note below.    Chief Complaint   Patient presents with     Follow Up     6 months follow up check up. Pt. Stated having a left ear plugged sensation, also congested in the morning and afternoon.       PCP: Tyrel Manning     Referring Provider: No ref. provider found    /70 (BP Location: Right arm, Patient Position: Sitting, Cuff Size: Adult Large)   Pulse 69   SpO2 96%     ENT Problem List:  Patient Active Problem List   Diagnosis     Seborrheic dermatitis     Seasonal allergic rhinitis due to pollen     Cervical radiculopathy at C6     Personal history of malignant neoplasm of larynx     Essential tremor     CARDIOVASCULAR SCREENING; LDL GOAL LESS THAN 160     Benign prostatic hyperplasia     Hoarse voice quality     Advanced directives, counseling/discussion     Pseudophakia     Hiatal hernia with GERD     Macular degeneration     Supraspinatus sprain, right, initial encounter     CARDIOVASCULAR SCREENING; LDL GOAL LESS THAN 100     Vitamin D deficiency     Chronic midline low back pain without sciatica     Sleep disorder, nonorganic     Other gastritis without hemorrhage, unspecified chronicity     BPH associated with nocturia     Weight loss     Pernicious anemia     Arthritis of right shoulder region      Current Medications:  Current Outpatient Medications   Medication Sig Dispense Refill     acetaminophen 500 MG CAPS 2 x 500mg at 630/7p       brimonidine-timolol (COMBIGAN) 0.2-0.5 % ophthalmic solution Place 1 drop into the right eye 2 times daily 10 mL 3     famotidine (PEPCID) 40 MG tablet TAKE ONE TABLET BY MOUTH ONCE DAILY 30 tablet 1     gabapentin (NEURONTIN) 100 MG capsule Takes 2-3 x 100mg capsule by mouth nightly 270 capsule 3     latanoprost  (XALATAN) 0.005 % ophthalmic solution Place 1 drop into the right eye at bedtime 2.5 mL 4     primidone (MYSOLINE) 250 MG tablet 1/4 tab in morning and 1 in the evevning 135 tablet 3     triamcinolone (KENALOG) 0.1 % external cream Apply topically 2 times daily as needed (itchy rash of shoulders/chest) 454 g 3     Vitamin D, Cholecalciferol, 25 MCG (1000 UT) CAPS Take 1,000 Units by mouth daily (Winter only)       Current Facility-Administered Medications   Medication Dose Route Frequency Provider Last Rate Last Admin     methylPREDNISolone (DEPO-MEDROL) injection 40 mg  40 mg   Mau Miller, DO   40 mg at 09/12/22 1002     methylPREDNISolone (DEPO-MEDROL) injection 40 mg  40 mg   Mau Miller, DO   40 mg at 09/12/22 1002     HPI  Pleasant 88 year old male presents today as a(n) established patient for a month check-up on Meniere's disease, nasal congestion, and laryngo-pharyngeal reflux. He was last seen on 11/28/23. He reports left aural fullness and nasal congestion in the morning and afternoon. He gets occasional loud tinnitus in the right ear and less intense tinnitus in the left ear. He states that his voice is not better and he has a choking sensation when eating. He has difficulty swallowing that onset 1 year ago and takes Pepcid for heartburn. He states that soft foods, such as a muffin, get stuck in his throat. He had a swallowing study done 20 years ago. He uses Vaseline for nasal dryness.    He denies dizziness/vertigo,     Review of Systems   Constitutional: Negative.    HENT:  Positive for congestion and tinnitus.    Eyes: Negative.    Respiratory: Negative.     Gastrointestinal:  Positive for heartburn.   Skin: Negative.    Endo/Heme/Allergies: Negative.        Physical Exam  Vitals and nursing note reviewed.   Constitutional:       Appearance: Normal appearance.   HENT:      Head: Normocephalic and atraumatic.      Jaw: There is normal jaw occlusion.      Right Ear: Hearing, tympanic membrane  and ear canal normal. No middle ear effusion.      Left Ear: Hearing, tympanic membrane and ear canal normal.  No middle ear effusion. There is impacted cerumen.      Nose: Congestion present. No mucosal edema or rhinorrhea.      Right Nostril: No occlusion.      Left Nostril: No occlusion.      Right Turbinates: Swollen. Not enlarged.      Left Turbinates: Swollen. Not enlarged.      Right Sinus: No maxillary sinus tenderness or frontal sinus tenderness.      Left Sinus: No maxillary sinus tenderness or frontal sinus tenderness.      Comments: Dry mucosa present     Mouth/Throat:      Mouth: Mucous membranes are moist.      Pharynx: Oropharynx is clear. Uvula midline.   Eyes:      Extraocular Movements: Extraocular movements intact.      Pupils: Pupils are equal, round, and reactive to light.   Neurological:      Mental Status: He is alert.       Ear wax removal: The patient was seen in the room. An informed consent was obtained from the patient. His ears were evaluated under the microscope. The left ear canal was blocked 70% with ear wax that was suctioned with a 7 tip. He tolerated the procedure well and left the room no complications.    AUDIOGRAM: The patient underwent an audiogram 2/23/24.  Results: WNL sloping to moderately-severe SNHL bilaterally. 100% word rec. right, 92% left. Tymps WNL. Could not seal for reflexes.  Right: Speech reception threshold is 15 dB with 100% word recognition.   Left: Speech reception threshold is 20 dB with 92% word recognition.     A/P  Audiogram was independently reviewed and discussed in detail with the patient. This pleasant patient has  Meniere's disease, laryngo-pharyngeal reflux, nasal dryness, and dysphagia.  For nasal dryness, he is advised to use Vaseline, Aquaphor, or saline gel.  For his esophageal dysphagia, an XR video swallow with SLP or OT is ordered for further investigation. Dryness in his throat could also be causing this issue, and he is advised to get good  hydration in the meantime and continue taking reflux medication, Pepcid.    Follow up in clinic in 6 months.    Scribe/Staff:    Scribe Disclosure:   I, Alayna Bar, am serving as a scribe; to document services personally performed by Tosha Ron MD based on data collection and the provider's statements to me.     Provider Disclosure:  I agree with above History, Review of Systems, Physical exam and Plan.  I have reviewed the content of the documentation and have edited it as needed. I have personally performed the services documented here and the documentation accurately represents those services and the decisions I have made.      Electronically signed by:  Tosha Ron MD       Again, thank you for allowing me to participate in the care of your patient.        Sincerely,        Tosha Ron MD

## 2024-04-10 DIAGNOSIS — L11.1 GROVER'S DISEASE: ICD-10-CM

## 2024-04-10 RX ORDER — TRIAMCINOLONE ACETONIDE 1 MG/G
CREAM TOPICAL 2 TIMES DAILY PRN
Qty: 454 G | Refills: 3 | Status: SHIPPED | OUTPATIENT
Start: 2024-04-10

## 2024-04-10 NOTE — TELEPHONE ENCOUNTER
triamcinolone (KENALOG) 0.1 % external cream   Last Written Prescription Date:  2/6/2023  Last Fill Quantity: 454,   # refills: 3  Last Office Visit : 2/21/2024  Future Office visit:  None  454 g. 3 Refills sent to annel Quñionez RN  Central Triage Red Flags/Med Refills

## 2024-04-10 NOTE — TELEPHONE ENCOUNTER
Refill request for Triamcinolone Acetonide 0.1% cream from [facility] on [address], [address] in [place], [place].  Tel: [phone]  Rx: [id number]    Nelson Painting on 4/10/2024 at 10:38 AM

## 2024-04-18 NOTE — PROGRESS NOTES
Name: Preston Cai    MRN: [id number]   YOB: 1935                 Chief Complaint:   Lower urinary tract symptoms         Assessment and Plan:   88 year old male with BPH with LUTS, predominantly irritative. Previously took Flomax but could not tolerate due to dizziness, and had persistent OAB symptoms. His PVR is low today at 11 mL. We discussed cautious trial of an OAB medication aimed at reducing his irritative symptoms, though discussed the possibility of worsening obstructive LUTS / retention. We also discussed possible referral to Sleep Medicine to be evaluated for sleep apnea, as there is a good chance this could be contributing to nighttime urinary frequency. After a good discussion of available treatment options and the risks/benefits of each, the mutual decision was made to proceed as follows:    -Start trospium ER 60 mg qAM AC. Side effects discussed.   -Continue to limit fluids in the evening.  -Follow up in 2 months to recheck symptoms and PVR.     Anita Humphrey PA-C  April 19, 2024          History of Present Illness:   Preston Cai is an 88 year old male seen in consultation from Dr. Munroe for evaluation of lower urinary tract symptoms. He describes a several year history of urinary frequency, urgency, nocturia 3-4 times per night, slow stream, and urge incontinence. He previously took Flomax but stopped this 1.5-2 years ago due to dizzy spells. While taking Flomax, he was still waking at least 2 times per night to void. He does limit his fluids in the evenings. Reports waking up with a dry mouth and believes he snores. Has never been evaluated for sleep apnea.     He does feel to empty his bladder but reports that it takes a bit longer. No straining to initiate his urine stream.     AUA SS: 0+3+4+3+3+0+3 = 16 (moderate)  QOL score: 5 (unhappy)         Past Medical History:     Past Medical History:   Diagnosis Date    Acid reflux disease     Allergic rhinitis, cause  unspecified     H/O magnetic resonance imaging of brain and brain stem 10/10/2016    MRI BRAIN WITH AND WITHOUT CONTRAST August 17, 2009 8:07:00 PM   HISTORY: Severe dizzy spells with imbalance and vomiting.   TECHNIQUE: Routine pulse sequences without and with contrast were performed including thin section images through the IACs. 20 mL Magnevist given.   FINDINGS: Diffusion-weighted images are normal. The brain parenchyma, brainstem, ventricular system, and subarachnoid spaces a    Hematuria     Hematuria  - in 1990's     History of anemia 2002    Anemia-Iron Deficit    History of gastric ulcer     Macular degeneration     Malignant neoplasm of laryngeal cartilages (H)     Laryngeal CA (Radiation 1982)    Nonsenile cataract     PONV (postoperative nausea and vomiting)     Tremor 10/03/2016            Past Surgical History:     Past Surgical History:   Procedure Laterality Date    APPENDECTOMY  8 years old    BIOPSY  may 2015    face, back    Biopsy of the throat - cancerous mass - got radiation for in larynx  1982    CATARACT IOL, RT/LT      COLONOSCOPY  02/25/2004    Normal    COLONOSCOPY N/A 04/28/2015    Procedure: COLONOSCOPY;  Surgeon: Marvin Adams MD;  Location: [facility]    COLONOSCOPY N/A 01/10/2022    Procedure: COLONOSCOPY, WITH POLYPECTOMY AND BIOPSY;  Surgeon: Marsha Asher MD;  Location: [facility]    COLONOSCOPY N/A 01/10/2022    Procedure: COLONOSCOPY, FLEXIBLE, WITH LESION REMOVAL USING SNARE;  Surgeon: Marsha Asher MD;  Location: [facility]    COLONOSCOPY WITH CO2 INSUFFLATION N/A 04/28/2015    Procedure: COLONOSCOPY WITH CO2 INSUFFLATION;  Surgeon: Marvin Adams MD;  Location: [facility]    COLONOSCOPY WITH CO2 INSUFFLATION N/A 01/10/2022    Procedure: COLONOSCOPY, WITH CO2 INSUFFLATION;  Surgeon: Marsha Asher MD;  Location: [facility]    COMBINED ESOPHAGOSCOPY, GASTROSCOPY, DUODENOSCOPY (EGD) WITH CO2 INSUFFLATION N/A 01/10/2022    Procedure: ESOPHAGOGASTRODUODENOSCOPY, WITH  CO2 INSUFFLATION;  Surgeon: Marsha Asher MD;  Location: [facility]    CYSTOSCOPY      for hematuria - negative    ESOPHAGOSCOPY, GASTROSCOPY, DUODENOSCOPY (EGD), COMBINED N/A 01/10/2022    Procedure: ESOPHAGOGASTRODUODENOSCOPY, WITH BIOPSY;  Surgeon: Marsha Asher MD;  Location: [facility]    EYE SURGERY Right     Rt eye.macular repair    EYE SURGERY      cataract    NASAL ENDOSCOPY,DX  2007    GERD    Pars plana vitrectomy and epiretinal membrane dissection, right eye  2002    Phacoemulsification with intraocular lens implantation right eye.  2003    PHACOEMULSIFICATION WITH STANDARD INTRAOCULAR LENS IMPLANT Left 2016    Procedure: PHACOEMULSIFICATION WITH STANDARD INTRAOCULAR LENS IMPLANT;  Surgeon: Nghia Lara MD;  Location: [facility]    Thyroglossal Duct Cyst Removal - 2ndary to radiation.  1982    VASECTOMY  1971    ZZ GASTROSCOPY,FL  2007    hiatal hernia and errosions            Social History:     Social History     Tobacco Use    Smoking status: Former     Current packs/day: 0.00     Average packs/day: 1 pack/day for 10.0 years (10.0 ttl pk-yrs)     Types: Cigarettes, Pipe     Start date: 6/15/1959     Quit date: 1969     Years since quittin.2    Smokeless tobacco: Never    Tobacco comments:        Substance Use Topics    Alcohol use: Yes     Comment: A beer once in awhile            Family History:     Family History   Problem Relation Age of Onset    Cerebrovascular Disease Mother         at 84 yo - possibly TIA - befoer    Macular Degeneration Mother     Gastrointestinal Disease Father     Cancer - colorectal Father     Hypertension Father     Cancer Father     Other - See Comments Daughter     Other - See Comments Daughter     Other - See Comments Son     Neurologic Disorder Son         eye and hearing ?    Tremor Son     Other - See Comments Son     Cancer Other     C.A.D. No family hx of     Diabetes No family hx of     Prostate Cancer No  family hx of     Glaucoma No family hx of     Thyroid Disease No family hx of             Allergies:     Allergies   Allergen Reactions    Seasonal Allergies      Hay fever             Medications:     Current Outpatient Medications   Medication Sig Dispense Refill    acetaminophen 500 MG CAPS 2 x 500mg at 630/7p      brimonidine-timolol (COMBIGAN) 0.2-0.5 % ophthalmic solution Place 1 drop into the right eye 2 times daily 10 mL 3    famotidine (PEPCID) 40 MG tablet TAKE ONE TABLET BY MOUTH ONCE DAILY 30 tablet 1    gabapentin (NEURONTIN) 100 MG capsule Takes 2-3 x 100mg capsule by mouth nightly 270 capsule 3    latanoprost (XALATAN) 0.005 % ophthalmic solution Place 1 drop into the right eye at bedtime 2.5 mL 4    primidone (MYSOLINE) 250 MG tablet 1/4 tab in morning and 1 in the evevning 135 tablet 3    triamcinolone (KENALOG) 0.1 % external cream Apply topically 2 times daily as needed (itchy rash of shoulders/chest) 454 g 3    Vitamin D, Cholecalciferol, 25 MCG (1000 UT) CAPS Take 1,000 Units by mouth daily (Winter only)       Current Facility-Administered Medications   Medication Dose Route Frequency Provider Last Rate Last Admin    methylPREDNISolone (DEPO-MEDROL) injection 40 mg  40 mg   Mau Miller, DO   40 mg at 09/12/22 1002    methylPREDNISolone (DEPO-MEDROL) injection 40 mg  40 mg   Mau Miller, DO   40 mg at 09/12/22 1002            Physical Exam:   There were no vitals taken for this visit.  GENERAL: alert and no distress  EYES: Eyes grossly normal to inspection.  No discharge or erythema, or obvious scleral/conjunctival abnormalities.  RESP: No audible wheeze, cough, or visible cyanosis.    SKIN: Visible skin clear. No significant rash, abnormal pigmentation or lesions.  NEURO: Cranial nerves grossly intact.  Mentation and speech appropriate for age.  PSYCH: Appropriate affect, tone, and pace of words    PVR: 11 mL by bladder scan       Labs:      Creatinine   Date Value Ref Range Status    08/22/2023 0.95 0.67 - 1.17 mg/dL Final   05/05/2021 1.04 0.66 - 1.25 mg/dL Final       PSA   Date Value Ref Range Status   02/03/2016 2.04 0 - 4 ug/L Final     Prostate Specific Antigen Screen   Date Value Ref Range Status   01/04/2022 3.19 0.00 - 4.00 ug/L Final         Imaging:    CT ABDOMEN PELVIS W CONTRAST 12/20/2021     FINDINGS:  Lower thorax:   Linear fibroatelectasis in the left lung base. Calcified granuloma in  the right lower lobe. 5 mm noncalcified nodule in the posterolateral  right lower lobe (series 3 image 16). No focal airspace consolidation.        Abdomen and pelvis:  Images partially degraded by motion artifact.     Scattered subcentimeter cystic hypodensities throughout the liver, too  small to characterize. No focal hepatic mass. No intra or extrahepatic  biliary dilatation. The gallbladder is normal in appearance. No  pancreatic ductal dilatation. The spleen is normal in appearance.  Thickening of the left adrenal gland without focal nodule. The right  adrenal gland is normal in appearance. Symmetric renal cortical  enhancement. Subcentimeter cystic hypodensity in the left kidney, too  small to characterize, but favored to represent a cyst. Left  peripelvic cysts. Bilateral prominent extrarenal pelves. No  hydronephrosis or nephrolithiasis. Diffuse bladder wall thickening and  enlargement of the prostate, likely related to chronic bladder  obstruction. Calcified pelvic phleboliths. No significant free fluid  within the pelvis. No free intraperitoneal air. No abnormally dilated  loops of large or small bowel. Moderate colonic stool burden. Colonic  diverticulosis without evidence of acute diverticulitis. Diffusely  thickened gastric wall with scattered foci of gas within the thickened  rugal folds. Small fat-containing left inguinal hernia. No inguinal  lymphadenopathy. No abdominal or pelvic lymphadenopathy by size  criteria. Infrarenal aorta is normal in caliber. Mild  aortoiliac  atherosclerotic calcifications. The major intra-abdominal vasculature  is widely patent.     Bones:  Multilevel degenerative changes of the spine, hips, and sacroiliac  joints. No suspicious or aggressive appearing bone lesions.                                                                      IMPRESSION:  1. Diffuse thickening of the gastric wall raises the question of  gastritis, however, this finding is relatively unchanged in appearance  when compared to prior exam on 7/30/2017.  2. Prostatomegaly with diffuse bladder wall thickening, likely sequela  of chronic bladder outlet obstruction.  3. Colonic diverticulosis without evidence of acute diverticulitis.         30 minutes spent on the date of the encounter doing chart review, review of test results, interpretation of tests, patient visit, and documentation

## 2024-04-19 ENCOUNTER — OFFICE VISIT (OUTPATIENT)
Dept: UROLOGY | Facility: CLINIC | Age: 89
End: 2024-04-19
Payer: COMMERCIAL

## 2024-04-19 DIAGNOSIS — N32.81 OVERACTIVE BLADDER: ICD-10-CM

## 2024-04-19 DIAGNOSIS — R39.9 LOWER URINARY TRACT SYMPTOMS (LUTS): Primary | ICD-10-CM

## 2024-04-19 PROCEDURE — 51798 US URINE CAPACITY MEASURE: CPT | Performed by: PHYSICIAN ASSISTANT

## 2024-04-19 PROCEDURE — 99203 OFFICE O/P NEW LOW 30 MIN: CPT | Performed by: PHYSICIAN ASSISTANT

## 2024-04-19 RX ORDER — TROSPIUM CHLORIDE ER 60 MG/1
60 CAPSULE ORAL EVERY MORNING
Qty: 30 CAPSULE | Refills: 11 | Status: SHIPPED | OUTPATIENT
Start: 2024-04-19

## 2024-04-19 RX ORDER — TROSPIUM CHLORIDE 20 MG/1
20 TABLET, FILM COATED ORAL EVERY EVENING
Qty: 60 TABLET | Refills: 11 | Status: SHIPPED | OUTPATIENT
Start: 2024-04-19 | End: 2024-04-19 | Stop reason: ALTCHOICE

## 2024-04-19 NOTE — PATIENT INSTRUCTIONS
UROLOGY CLINIC VISIT PATIENT INSTRUCTIONS    Start taking trospium 60 mg once daily in the morning. Best to take on an empty stomach.    Follow up in ~2 months to recheck. If you start having a more difficult time emptying your bladder after starting this medication, please notify us via phone call or [employer].    If you have any issues, questions or concerns in the meantime, do not hesitate to contact us at [phone] or via [employer].     It was a pleasure meeting with you today.  Thank you for allowing me and my team the privilege of caring for you today.  YOU are the reason we are here, and I truly hope we provided you with the excellent service you deserve.  Please let us know if there is anything else we can do for you so that we can be sure you are leaving completely satisfied with your care experience.

## 2024-04-19 NOTE — NURSING NOTE
Preston Cai's goals for this visit include:   Chief Complaint   Patient presents with    New Patient     Lower urinary tract symptoms (LUTS)       He requests these members of his care team be copied on today's visit information:       PCP: Tyrel Manning    Referring Provider:  García Munroe MD  [address] [place] [id number]    post void residual:11 ml    Do you need any medication refills at today's visit?     Marsha Hawk LPN on 4/19/2024 at 10:40 AM

## 2024-04-22 DIAGNOSIS — K44.9 HIATAL HERNIA WITH GERD: ICD-10-CM

## 2024-04-22 DIAGNOSIS — K21.9 HIATAL HERNIA WITH GERD: ICD-10-CM

## 2024-04-22 DIAGNOSIS — K21.9 GASTROESOPHAGEAL REFLUX DISEASE WITHOUT ESOPHAGITIS: ICD-10-CM

## 2024-04-22 RX ORDER — FAMOTIDINE 20 MG/1
20 TABLET, FILM COATED ORAL 2 TIMES DAILY
Qty: 120 TABLET | Refills: 1 | Status: SHIPPED | OUTPATIENT
Start: 2024-04-22 | End: 2024-09-03

## 2024-04-25 ENCOUNTER — MYC MEDICAL ADVICE (OUTPATIENT)
Dept: UROLOGY | Facility: CLINIC | Age: 89
End: 2024-04-25
Payer: COMMERCIAL

## 2024-04-26 NOTE — TELEPHONE ENCOUNTER
Patient reviewed mychart message. Closing encounter.     Marsha Hawk LPN on 4/26/2024 at 10:53 AM

## 2024-06-04 NOTE — PROGRESS NOTES
SUBJECTIVE:  HPI:  Preston Cai  Is a 88 year old male who presents for new patient evaluation of chronic midline low back pain, upon referral from Dr. García Munroe.  I do not see specific notes regarding this condition.  There is a reported history of neuropathy and radiculopathy for which he is on gabapentin managed by Dr. Rodrigues and, and there is also BPH with some urinary symptoms.  He has severe L4-5 spinal canal stenosis.  Multilevel degenerative joint and disc disease with lesser degrees of bilateral foraminal and spinal stenosis elsewhere.    10/27/2020 left L5-S1 IL JULISA: 5/10-2/10  1/15/2021 left  L4-5 IL JULISA: 7/10-0/10    History today:    Preston reports that in 2020 and also 2021 he was having back pain that shot down his legs and that completely disappeared after the 2 epidurals.  Once in a while if he sits for too long or if he lies down and then stands up he will get a little bit of tingling in the backs of both thighs and it goes away right away.  He has peripheral neuropathy with burning sensation in his feet and some lateral left shin sensory deficit but that is what keeps him from walking more than anything else.  He does not notice any weakness in his legs.  He can walk for 2 blocks without any problem.  His major issue is balance because he cannot feel his feet so well because of the neuropathy.  He has really no significant back pain at all except may be a little stiff from time to time and nothing reported today.  He does have BPH and the urologist follows him for that and he does get a little bit of urinary leakage from time to time but his stream is normally okay and is not generally incontinent.  The medications his urologist has him on have not stopped his need to urinate frequently at night.  He has no bowel dysfunction and no saddle anesthesia.  His gabapentin and Tylenol are for neuropathy.  His family doctor is Dr. Tyrel Manning but he has not seen him in over a year although he does  have a follow-up appointment scheduled.  He does have a history of approximately 20 pounds unexplained weight loss in the last 2 years.  His wife has been depressed and he has been under a lot of stress especially with recent move to assisted living.    Pain score and diagram reviewed.  See questionnaire.      ROS: .  Otherwise negative for bowel/bladder dysfunction, balance changes, headache, leg pain/numbness/weakness, fevers, chills, night sweats, unexplained weight loss;  otherwise unremarkable.   See the patient's intake questionnaire from today for details.    Treatment to Date: He has been taught PT and he does his HEP.    MEDICATIONS:  Reviewed.    ALLERGIES:  Reviewed.     PAST MEDICAL/SURGICAL HISTORY:   Pertinent for essential tremor, C6 radiculopathy, pernicious anemia, gastritis, vitamin D deficiency, laryngeal cancer with radiation therapy 1982, gastric ulcer    SOCIAL HX: He is a retired building maintenance and  worker.  He and his wife live in a senior living facility.  They have 4 children, 10 grandchildren, and 17 great grandchildren.  Sports hobbies and activities: Reading, puzzle books, cooking for his wife and general house activities      OBJECTIVE:    IMAGING: Images and reports reviewed.    MR LUMBAR SPINE W/O CONTRAST 9/16/2020     There is minimal anterolisthesis of L4 on L5.     L1-2: Posterior epidural lipomatosis contributes to mild thecal sac  narrowing. There is bilateral facet arthropathy. No significant neural  foraminal stenosis.     L2-3: Mild disc bulge coupled with posterior epidural lipomatosis and  ligament flavum hypertrophy results in mild spinal canal stenosis.  Mild left and mild-to-moderate right neural foraminal stenosis  secondary to disc bulge and facet arthropathy.     L3-4: Moderate disc bulge coupled with facet arthropathy and the  ligamentum flavum thickening results in mild to moderate spinal canal  stenosis. Moderate bilateral neural  foraminal stenosis is identified  secondary to disc bulge and facet arthropathy.     L4-5: Disc bulge coupled with endplate arthropathy and ligamentum  flavum hypertrophy results in severe spinal canal stenosis.  Mild-to-moderate right and moderate left neural foraminal stenosis  secondary to disc bulge and facet arthropathy.     L5-S1: Disc bulge with superimposed left foraminal protrusion  identified. There is mild spinal canal stenosis. Mild to moderate left  and mild right neural foraminal stenosis secondary to disc bulge and  facet arthropathy.                                                              Impression:      1. Severe spinal canal stenosis at the L4-5 level secondary to a  combination of disc bulge, ligamentum flavum thickening, endplate and  facet arthropathy.  2. Remaining levels of moderate spondylosis throughout the lumbar  spine.    2 views of the lumbar spine dated 7/13/2020.    FINDINGS: AP and lateral views of the lumbar spine were obtained.  There are 5 lumbar-type vertebral bodies for the purposes of this  dictation. Lumbar vertebral bodies are well-maintained without  evidence of compression fracture. Multilevel anterior endplate  spurring and disc space narrowing throughout the lumbar spine. Trace  anterolisthesis of L4 in relation to L5 and trace retrolisthesis of L2  in relation to L3.                                                                      IMPRESSION: Multilevel degenerative disc disease in the lumbar spine.    EXAMINATION:    --CONSTITUTIONAL:   No acute distress.  The patient is well nourished and well groomed.  Transitions without pushoff and moves a bit hesitantly about the room  --SKIN: No lesions on the leg.  --GAIT:  is non-antalgic. Flat foot, heel and toe walking:  normal   .  Squat and rise   normal    .  --STANDING EXAMINATION:    Symmetry of spine/pelvis   unremarkable   .      Range of motion tight hamstrings but is able to forward flex with hands reaching  to the ankles with no back pain.  There is a little bit of back pain and a little bit of right lateral thigh pain with full extension.      --NEUROLOGICAL:     ROMBERG, a bit unsteady, PRONATOR DRIFT:   Normal.   .  SENSATION to light touch is diminished in the tip of the left great toe only and otherwise intact in bilateral thighs, lower legs and feet.   REFLEXES:  patellar trace left absent right, and achilles 1+.  Babinski is negative. No clonus.  MANUAL MOTOR TESTING:  L1- S1 Myotomes, Femoral, Obturator, Peroneal and Tibial nerves 5/5   DURAL STRETCH TESTS:  SLR negative.  Femoral Stretch Test negative.  Initially a little bit of left hamstring spasm which resolved with repeat testing          ASSESSMENT: Preston Cai is a 88 year old male who presents  today for new patient evaluation of:    History of chronic low back pain but currently he is not symptomatic  BPH with a history of secondary mild urinary incontinence  Severe L4-5 central stenosis, with no radicular or cauda equina symptoms at this time and no significant back pain.  History of resolved radiculopathy in 2021 following left L4-5 and left L5-S1 IL JULISA  Longstanding peripheral neuropathy of both feet    PLAN:  There is no indication for spinal intervention at this time.  He has had physical therapy and is well versed in a home exercise program.  He has no findings associated with the central stenosis.  He does not have claudication.  His peripheral neuropathy is what prevents him from walking long distances.  His neurologic exam is reassuring.  He will follow-up with Dr. Tyrel Manning for a general exam and for advice on any other treatments for his peripheral neuropathy symptoms.    Advised patient to call or return early if symptoms worsen, or having problems controlling bladder and bowel function or worsening leg weakness.     Please note: Voice recognition software was used in this dictation.  It may therefore contain typographical  errors.    Ryan Leone MD

## 2024-06-06 ENCOUNTER — OFFICE VISIT (OUTPATIENT)
Dept: NEUROSURGERY | Facility: CLINIC | Age: 89
End: 2024-06-06
Payer: COMMERCIAL

## 2024-06-06 VITALS
DIASTOLIC BLOOD PRESSURE: 70 MMHG | WEIGHT: 156 LBS | BODY MASS INDEX: 20.67 KG/M2 | SYSTOLIC BLOOD PRESSURE: 114 MMHG | HEART RATE: 80 BPM | HEIGHT: 73 IN

## 2024-06-06 DIAGNOSIS — R63.4 ABNORMAL WEIGHT LOSS: Primary | ICD-10-CM

## 2024-06-06 DIAGNOSIS — N40.1 BENIGN PROSTATIC HYPERPLASIA WITH NOCTURIA: ICD-10-CM

## 2024-06-06 DIAGNOSIS — F41.9 ANXIETY: ICD-10-CM

## 2024-06-06 DIAGNOSIS — G62.9 PERIPHERAL POLYNEUROPATHY: ICD-10-CM

## 2024-06-06 DIAGNOSIS — R35.1 BENIGN PROSTATIC HYPERPLASIA WITH NOCTURIA: ICD-10-CM

## 2024-06-06 DIAGNOSIS — M48.061 SPINAL STENOSIS, LUMBAR REGION, WITHOUT NEUROGENIC CLAUDICATION: ICD-10-CM

## 2024-06-06 PROCEDURE — 99204 OFFICE O/P NEW MOD 45 MIN: CPT | Performed by: PREVENTIVE MEDICINE

## 2024-06-06 ASSESSMENT — PAIN SCALES - GENERAL: PAINLEVEL: MODERATE PAIN (4)

## 2024-06-06 NOTE — LETTER
6/6/2024      Preston Cai  [address]  [facility] [address]      Dear Colleague,    Thank you for referring your patient, Preston Cai, to the [facility] [place]. Please see a copy of my visit note below.        SUBJECTIVE:  HPI:  Preston Cai  Is a 88 year old male who presents for new patient evaluation of chronic midline low back pain, upon referral from Dr. García Munroe.  I do not see specific notes regarding this condition.  There is a reported history of neuropathy and radiculopathy for which he is on gabapentin managed by Dr. Rodrigues and, and there is also BPH with some urinary symptoms.  He has severe L4-5 spinal canal stenosis.  Multilevel degenerative joint and disc disease with lesser degrees of bilateral foraminal and spinal stenosis elsewhere.    10/27/2020 left L5-S1 IL JULISA: 5/10-2/10  1/15/2021 left  L4-5 IL JULISA: 7/10-0/10    History today:    Preston reports that in 2020 and also 2021 he was having back pain that shot down his legs and that completely disappeared after the 2 epidurals.  Once in a while if he sits for too long or if he lies down and then stands up he will get a little bit of tingling in the backs of both thighs and it goes away right away.  He has peripheral neuropathy with burning sensation in his feet and some lateral left shin sensory deficit but that is what keeps him from walking more than anything else.  He does not notice any weakness in his legs.  He can walk for 2 blocks without any problem.  His major issue is balance because he cannot feel his feet so well because of the neuropathy.  He has really no significant back pain at all except may be a little stiff from time to time and nothing reported today.  He does have BPH and the urologist follows him for that and he does get a little bit of urinary leakage from time to time but his stream is normally okay and is not generally incontinent.  The medications his urologist has  him on have not stopped his need to urinate frequently at night.  He has no bowel dysfunction and no saddle anesthesia.  His gabapentin and Tylenol are for neuropathy.  His family doctor is Dr. Tyrel Manning but he has not seen him in over a year although he does have a follow-up appointment scheduled.  He does have a history of approximately 20 pounds unexplained weight loss in the last 2 years.  His wife has been depressed and he has been under a lot of stress especially with recent move to assisted living.    Pain score and diagram reviewed.  See questionnaire.      ROS: .  Otherwise negative for bowel/bladder dysfunction, balance changes, headache, leg pain/numbness/weakness, fevers, chills, night sweats, unexplained weight loss;  otherwise unremarkable.   See the patient's intake questionnaire from today for details.    Treatment to Date: He has been taught PT and he does his HEP.    MEDICATIONS:  Reviewed.    ALLERGIES:  Reviewed.     PAST MEDICAL/SURGICAL HISTORY:   Pertinent for essential tremor, C6 radiculopathy, pernicious anemia, gastritis, vitamin D deficiency, laryngeal cancer with radiation therapy 1982, gastric ulcer    SOCIAL HX: He is a retired building maintenance and  worker.  He and his wife live in a senior living facility.  They have 4 children, 10 grandchildren, and 17 great grandchildren.  Sports hobbies and activities: Reading, puzzle books, cooking for his wife and general house activities      OBJECTIVE:    IMAGING: Images and reports reviewed.    MR LUMBAR SPINE W/O CONTRAST 9/16/2020     There is minimal anterolisthesis of L4 on L5.     L1-2: Posterior epidural lipomatosis contributes to mild thecal sac  narrowing. There is bilateral facet arthropathy. No significant neural  foraminal stenosis.     L2-3: Mild disc bulge coupled with posterior epidural lipomatosis and  ligament flavum hypertrophy results in mild spinal canal stenosis.  Mild left and mild-to-moderate  right neural foraminal stenosis  secondary to disc bulge and facet arthropathy.     L3-4: Moderate disc bulge coupled with facet arthropathy and the  ligamentum flavum thickening results in mild to moderate spinal canal  stenosis. Moderate bilateral neural foraminal stenosis is identified  secondary to disc bulge and facet arthropathy.     L4-5: Disc bulge coupled with endplate arthropathy and ligamentum  flavum hypertrophy results in severe spinal canal stenosis.  Mild-to-moderate right and moderate left neural foraminal stenosis  secondary to disc bulge and facet arthropathy.     L5-S1: Disc bulge with superimposed left foraminal protrusion  identified. There is mild spinal canal stenosis. Mild to moderate left  and mild right neural foraminal stenosis secondary to disc bulge and  facet arthropathy.                                                              Impression:      1. Severe spinal canal stenosis at the L4-5 level secondary to a  combination of disc bulge, ligamentum flavum thickening, endplate and  facet arthropathy.  2. Remaining levels of moderate spondylosis throughout the lumbar  spine.    2 views of the lumbar spine dated 7/13/2020.    FINDINGS: AP and lateral views of the lumbar spine were obtained.  There are 5 lumbar-type vertebral bodies for the purposes of this  dictation. Lumbar vertebral bodies are well-maintained without  evidence of compression fracture. Multilevel anterior endplate  spurring and disc space narrowing throughout the lumbar spine. Trace  anterolisthesis of L4 in relation to L5 and trace retrolisthesis of L2  in relation to L3.                                                                      IMPRESSION: Multilevel degenerative disc disease in the lumbar spine.    EXAMINATION:    --CONSTITUTIONAL:   No acute distress.  The patient is well nourished and well groomed.  Transitions without pushoff and moves a bit hesitantly about the room  --SKIN: No lesions on the  leg.  --GAIT:  is non-antalgic. Flat foot, heel and toe walking:  normal   .  Squat and rise   normal    .  --STANDING EXAMINATION:    Symmetry of spine/pelvis   unremarkable   .      Range of motion tight hamstrings but is able to forward flex with hands reaching to the ankles with no back pain.  There is a little bit of back pain and a little bit of right lateral thigh pain with full extension.      --NEUROLOGICAL:     ROMBERG, a bit unsteady, PRONATOR DRIFT:   Normal.   .  SENSATION to light touch is diminished in the tip of the left great toe only and otherwise intact in bilateral thighs, lower legs and feet.   REFLEXES:  patellar trace left absent right, and achilles 1+.  Babinski is negative. No clonus.  MANUAL MOTOR TESTING:  L1- S1 Myotomes, Femoral, Obturator, Peroneal and Tibial nerves 5/5   DURAL STRETCH TESTS:  SLR negative.  Femoral Stretch Test negative.  Initially a little bit of left hamstring spasm which resolved with repeat testing          ASSESSMENT: Preston Cai is a 88 year old male who presents  today for new patient evaluation of:    History of chronic low back pain but currently he is not symptomatic  BPH with a history of secondary mild urinary incontinence  Severe L4-5 central stenosis, with no radicular or cauda equina symptoms at this time and no significant back pain.  History of resolved radiculopathy in 2021 following left L4-5 and left L5-S1 IL JULISA  Longstanding peripheral neuropathy of both feet    PLAN:  There is no indication for spinal intervention at this time.  He has had physical therapy and is well versed in a home exercise program.  He has no findings associated with the central stenosis.  He does not have claudication.  His peripheral neuropathy is what prevents him from walking long distances.  His neurologic exam is reassuring.  He will follow-up with Dr. Tyrel Manning for a general exam and for advice on any other treatments for his peripheral neuropathy  symptoms.    Advised patient to call or return early if symptoms worsen, or having problems controlling bladder and bowel function or worsening leg weakness.     Please note: Voice recognition software was used in this dictation.  It may therefore contain typographical errors.    Ryan Leone MD             Again, thank you for allowing me to participate in the care of your patient.        Sincerely,        Ryan Leone MD

## 2024-06-06 NOTE — NURSING NOTE
"Reason For Visit:   Chief Complaint   Patient presents with    Consult     Pain in legs          Occupation: former building maintenance   Currently working? No.  Work status?  Retired.    Sports: n  Activities: walking             /70   Pulse 80   Ht 1.854 m (6' 1\")   Wt 70.8 kg (156 lb)   BMI 20.58 kg/m        Allergies   Allergen Reactions    Seasonal Allergies      Hay fever        Current Outpatient Medications   Medication Sig Dispense Refill    acetaminophen 500 MG CAPS 2 x 500mg at 630/7p      brimonidine-timolol (COMBIGAN) 0.2-0.5 % ophthalmic solution Place 1 drop into the right eye 2 times daily 10 mL 3    famotidine (PEPCID) 20 MG tablet Take 1 tablet (20 mg) by mouth 2 times daily 120 tablet 1    gabapentin (NEURONTIN) 100 MG capsule Takes 2-3 x 100mg capsule by mouth nightly 270 capsule 3    latanoprost (XALATAN) 0.005 % ophthalmic solution Place 1 drop into the right eye at bedtime 2.5 mL 4    primidone (MYSOLINE) 250 MG tablet 1/4 tab in morning and 1 in the evevning 135 tablet 3    triamcinolone (KENALOG) 0.1 % external cream Apply topically 2 times daily as needed (itchy rash of shoulders/chest) 454 g 3    trospium (SANCTURA XR) 60 MG CP24 24 hr capsule Take 1 capsule (60 mg) by mouth every morning 30 capsule 11    Vitamin D, Cholecalciferol, 25 MCG (1000 UT) CAPS Take 1,000 Units by mouth daily (Winter only)       Current Facility-Administered Medications   Medication Dose Route Frequency Provider Last Rate Last Admin    methylPREDNISolone (DEPO-MEDROL) injection 40 mg  40 mg   Mau Miller, DO   40 mg at 09/12/22 1002    methylPREDNISolone (DEPO-MEDROL) injection 40 mg  40 mg   Mau Miller, DO   40 mg at 09/12/22 1002         Darla Severin-Brown, LPN   "

## 2024-06-06 NOTE — PATIENT INSTRUCTIONS
Just it was a pleasure meeting you.  I do not think we have to treat your spine.  We know that you have a tight spinal canal but I do not think it is causing symptoms at this time.  Talk to Dr. Manning about other options for treating a peripheral neuropathy.  There is no need to inject your spine or do further spinal imaging at this time.  If you start noticing a change in your ability to control your bowel or bladder function or if you are having leg pain that shoots down when you are trying to walk that is a different scenario and then we should see you again.  See the assessment and plan below for further details of our discussion today.    ASSESSMENT: Preston Cai is a 88 year old male who presents  today for new patient evaluation of:    History of chronic low back pain but currently he is not symptomatic  BPH with a history of secondary mild urinary incontinence  Severe L4-5 central stenosis, with no radicular or cauda equina symptoms at this time and no significant back pain.  History of resolved radiculopathy in 2021 following left L4-5 and left L5-S1 IL JULISA  Longstanding peripheral neuropathy of both feet    PLAN:  There is no indication for spinal intervention at this time.  He has had physical therapy and is well versed in a home exercise program.  He has no findings associated with the central stenosis.  He does not have claudication.  His peripheral neuropathy is what prevents him from walking long distances.  His neurologic exam is reassuring.  He will follow-up with Dr. Tyrel Manning for a general exam and for advice on any other treatments for his peripheral neuropathy symptoms.

## 2024-07-08 ENCOUNTER — TRANSFERRED RECORDS (OUTPATIENT)
Dept: HEALTH INFORMATION MANAGEMENT | Facility: CLINIC | Age: 89
End: 2024-07-08
Payer: COMMERCIAL

## 2024-08-15 ENCOUNTER — MYC MEDICAL ADVICE (OUTPATIENT)
Dept: FAMILY MEDICINE | Facility: CLINIC | Age: 89
End: 2024-08-15
Payer: COMMERCIAL

## 2024-08-19 ENCOUNTER — TRANSFERRED RECORDS (OUTPATIENT)
Dept: HEALTH INFORMATION MANAGEMENT | Facility: CLINIC | Age: 89
End: 2024-08-19

## 2024-08-19 ENCOUNTER — OFFICE VISIT (OUTPATIENT)
Dept: OPHTHALMOLOGY | Facility: CLINIC | Age: 89
End: 2024-08-19
Payer: COMMERCIAL

## 2024-08-19 DIAGNOSIS — Z96.1 PSEUDOPHAKIA, BOTH EYES: ICD-10-CM

## 2024-08-19 DIAGNOSIS — H35.30 ARMD (AGE-RELATED MACULAR DEGENERATION), BILATERAL: ICD-10-CM

## 2024-08-19 DIAGNOSIS — H52.203 MYOPIA OF BOTH EYES WITH ASTIGMATISM AND PRESBYOPIA: ICD-10-CM

## 2024-08-19 DIAGNOSIS — H04.123 DRY EYE SYNDROME OF BOTH EYES: ICD-10-CM

## 2024-08-19 DIAGNOSIS — H52.13 MYOPIA OF BOTH EYES WITH ASTIGMATISM AND PRESBYOPIA: ICD-10-CM

## 2024-08-19 DIAGNOSIS — H40.1112 PRIMARY OPEN ANGLE GLAUCOMA (POAG) OF RIGHT EYE, MODERATE STAGE: Primary | ICD-10-CM

## 2024-08-19 DIAGNOSIS — Z98.890 HX OF VITRECTOMY: ICD-10-CM

## 2024-08-19 DIAGNOSIS — H52.4 MYOPIA OF BOTH EYES WITH ASTIGMATISM AND PRESBYOPIA: ICD-10-CM

## 2024-08-19 PROCEDURE — 92020 GONIOSCOPY: CPT | Mod: 59 | Performed by: OPHTHALMOLOGY

## 2024-08-19 PROCEDURE — 99214 OFFICE O/P EST MOD 30 MIN: CPT | Performed by: OPHTHALMOLOGY

## 2024-08-19 PROCEDURE — 92083 EXTENDED VISUAL FIELD XM: CPT | Performed by: OPHTHALMOLOGY

## 2024-08-19 PROCEDURE — 92133 CPTRZD OPH DX IMG PST SGM ON: CPT | Performed by: OPHTHALMOLOGY

## 2024-08-19 RX ORDER — DORZOLAMIDE HCL 20 MG/ML
1 SOLUTION/ DROPS OPHTHALMIC 3 TIMES DAILY
Qty: 10 ML | Refills: 11 | Status: SHIPPED | OUTPATIENT
Start: 2024-08-19

## 2024-08-19 ASSESSMENT — VISUAL ACUITY
OD_CC: 20/40
OD_CC+: -2
METHOD: SNELLEN - LINEAR
CORRECTION_TYPE: GLASSES
OS_CC+: -1
OS_CC: 20/20

## 2024-08-19 ASSESSMENT — TONOMETRY
IOP_METHOD: APPLANATION
OD_IOP_MMHG: 21
OS_IOP_MMHG: 16

## 2024-08-19 ASSESSMENT — PACHYMETRY
OS_CT(UM): 559
OD_CT(UM): 564

## 2024-08-19 ASSESSMENT — GONIOSCOPY
OD_INFERIOR: E0F 1+PTM
OD_TEMPORAL: E0F 1+PTM
METHOD: SUSSMAN, FOUR MIRROR
OD_NASAL: E0F 1+PTM
OS_SUPERIOR: E0F 1+PTM
OS_TEMPORAL: E0F 1+PTM
OS_INFERIOR: E0F 1+PTM
OS_NASAL: E0F 1+PTM
OD_SUPERIOR: E0F 1+PTM

## 2024-08-19 ASSESSMENT — CUP TO DISC RATIO
OD_RATIO: 0.5
OS_RATIO: 0.3

## 2024-08-19 ASSESSMENT — REFRACTION_WEARINGRX
OS_CYLINDER: +2.00
OS_ADD: +1.50
OD_ADD: +3.00
OS_SPHERE: -0.50
OD_CYLINDER: +0.75
OD_AXIS: 020
OD_SPHERE: -0.50
OS_AXIS: 176

## 2024-08-19 ASSESSMENT — EXTERNAL EXAM - RIGHT EYE: OD_EXAM: NORMAL

## 2024-08-19 ASSESSMENT — EXTERNAL EXAM - LEFT EYE: OS_EXAM: NORMAL

## 2024-08-19 NOTE — PROGRESS NOTES
"HPI       Glaucoma Follow-Up    In both eyes.             Comments    Patient is noticing more \"wavy\" in the right eye than it was at his last visit. He says he often closes the right eye to see better. If only the left eye is open he is noticing there is some \"wavy\" vision there too. The new wavy vision started a couple of months ago. He is not due to see [facility] again until January. No new floaters or flashing lights. The left eye has been watering \"a lot\" since July. Upon waking his eyes are dry, mostly noticed if he gets up in the night and when waking in the morning.   Ocular Medications: Systane both eyes three times per day. Combigan right eye twice per day, Latanoprost right eye before bedtime            Last edited by Patience Hansen on 8/19/2024  1:13 PM.         Review of systems for the eyes was negative other than the pertinent positives/negatives listed in the HPI.      Assessment & Plan    HPI:  Preston Cai is a 88 year old male with history of vitrectomy for presumed macular hole, pseudophakia, refractive error, Primary open angle glaucoma (POAG) right eye, dry eye, Age related macular degeneration presents for Primary open angle glaucoma (POAG). He is using latanoprost at bedtime right eye and combigan twice a day right eye. He did go several months without using combigan because his right eye was bothering him, but he has been using it diligently for the last month    He notes some waviness of vision both eyes.     POHx: macular hole, pseudophakia, refractive error, Primary open angle glaucoma (POAG) right eye, dry eye, Age related macular degeneration   PMHx: GERD  Current Medications:   Current Outpatient Medications   Medication Sig Dispense Refill    acetaminophen 500 MG CAPS 2 x 500mg at 630/7p      brimonidine-timolol (COMBIGAN) 0.2-0.5 % ophthalmic solution Place 1 drop into the right eye 2 times daily 10 mL 3    dorzolamide (TRUSOPT) 2 % ophthalmic solution Place 1 drop into the right eye " 3 times daily 10 mL 11    famotidine (PEPCID) 20 MG tablet Take 1 tablet (20 mg) by mouth 2 times daily 120 tablet 1    gabapentin (NEURONTIN) 100 MG capsule Takes 2-3 x 100mg capsule by mouth nightly 270 capsule 3    latanoprost (XALATAN) 0.005 % ophthalmic solution Place 1 drop into the right eye at bedtime 2.5 mL 4    primidone (MYSOLINE) 250 MG tablet 1/4 tab in morning and 1 in the evevning 135 tablet 3    triamcinolone (KENALOG) 0.1 % external cream Apply topically 2 times daily as needed (itchy rash of shoulders/chest) 454 g 3    trospium (SANCTURA XR) 60 MG CP24 24 hr capsule Take 1 capsule (60 mg) by mouth every morning 30 capsule 11    Vitamin D, Cholecalciferol, 25 MCG (1000 UT) CAPS Take 1,000 Units by mouth daily (Winter only)       Current Facility-Administered Medications   Medication Dose Route Frequency Provider Last Rate Last Admin    methylPREDNISolone (DEPO-MEDROL) injection 40 mg  40 mg   Mau Miller, DO   40 mg at 09/12/22 1002    methylPREDNISolone (DEPO-MEDROL) injection 40 mg  40 mg   Mau Miller, DO   40 mg at 09/12/22 1002     FHx: ?vision loss mother  PSHx: Cataract extraction/intraocular lens both eyes, Pars plana vitrectomy (PPV) right eye       Current Eye Medications:  Systane three times a day   Latanoprost at bedtime right eye   Combgain twice a day right eye   Preservision    Assessment & Plan:  (H40.1112) Primary open angle glaucoma (POAG) of right eye, moderate stage  (primary encounter diagnosis)  Maximum intraocular pressure unknown  Family history: No  Trauma history: No  Gonioscopy: flat, open, minimal pigment  Pachymetry:     Treatment History:   Latanoprost right eye   Combigan right eye     Today's testing:  Hsu visual field (HVF) 24-2 02/13/24:   Right eye - inferior arcuate/nasal step, VFI 74%, MD -10.48, PSD 9.77, reliable;   Left eye - central defects, VFi 94%, MD -1.97, PSD 2.08 reliable  Hsu visual field (HVF) 24-2 08/19/24 :   Right eye -  inferior arcuate/nasal step, VFI 80%, MD -8.02, PSD 9.77, reliable;   Left eye - central defects, VFi 97%, MD -0.41, PSD 1.75 reliable    OCT nerve fiber layer 02/13/24:   Right eye - G(r) 60 NI (y) 61 TI (g) 106 NS (r) 55 TS (r) 73      Left eye - G(g) 91 NI (g) 86 TI (g) 132 NS (g) 95 TS (g) 132  OCT nerve fiber layer 08/19/24 :   Right eye - G(r) 56 NI (y) 56 TI (g) 103 NS (r) 51 TS (r) 67      Left eye - G(g) 91 NI (g) 86 TI (g) 136 NS (g) 93 TS (g) 129    IOP goal: mid teens  IOP not at goal today  Add dorzoloamide/trusopt three times a day right eye given IOP not at goal and progression on OCT retinal nerve fiber layer after 6 months  Conitnue Latanoprost at bedtime right eye, Combigan twice a day right eye     IOP was elevated 23,24 right eye on 7/8/24 visit with [facility]    (H04.123) Dry eye syndrome of both eyes  Continue artificial tears  Continue gel or ointment at bedtime     (H52.13,  H52.203,  H52.4) Myopia of both eyes with astigmatism and presbyopia  Doing well in current MRx    (H35.30) ARMD (age-related macular degeneration), bilateral  (Z98.890) Hx of vitrectomy  Following with [facility]  Macular hole right eye, significant drusen both eyes     (Z96.1) Pseudophakia, both eyes  Doing well      Return in about 4 weeks (around 9/16/2024) for Follow Up-v/t.        Orlando Rose MD     Attending Physician Attestation:  Complete documentation of historical and exam elements from today's encounter can be found in the full encounter summary report (not reduplicated in this progress note).  I personally obtained the chief complaint(s) and history of present illness.  I confirmed and edited as necessary the review of systems, past medical/surgical history, family history, social history, and examination findings as documented by others; and I examined the patient myself.  I personally reviewed the relevant tests, images, and reports as documented above.  I formulated and edited as necessary  the assessment and plan and discussed the findings and management plan with the patient and family. - Orlando Rose MD

## 2024-08-19 NOTE — NURSING NOTE
"Chief Complaints and History of Present Illnesses   Patient presents with    Glaucoma Follow-Up       Chief Complaint(s) and History of Present Illness(es)       Glaucoma Follow-Up              Laterality: both eyes              Comments    Patient is noticing more \"wavy\" in the right eye than it was at his last visit. He says he often closes the right eye to see better. If only the left eye is open he is noticing there is some \"wavy\" vision there too. The new wavy vision started a couple of months ago. He is not due to see [facility] again until January. No new floaters or flashing lights. The left eye has been watering \"a lot\" since July. Upon waking his eyes are dry, mostly noticed if he gets up in the night and when waking in the morning.   Ocular Medications: Systane both eyes three times per day. Combigan right eye twice per day, Latanoprost right eye before bedtime                     Patience Hansen, COA    "

## 2024-08-28 ENCOUNTER — OFFICE VISIT (OUTPATIENT)
Dept: DERMATOLOGY | Facility: CLINIC | Age: 89
End: 2024-08-28
Payer: COMMERCIAL

## 2024-08-28 DIAGNOSIS — D18.01 CHERRY ANGIOMA: ICD-10-CM

## 2024-08-28 DIAGNOSIS — L11.1 GROVER'S DISEASE: ICD-10-CM

## 2024-08-28 DIAGNOSIS — L81.4 LENTIGINES: ICD-10-CM

## 2024-08-28 DIAGNOSIS — D49.2 NEOPLASM OF SKIN: ICD-10-CM

## 2024-08-28 DIAGNOSIS — D22.9 MULTIPLE BENIGN NEVI: Primary | ICD-10-CM

## 2024-08-28 DIAGNOSIS — Z85.828 HISTORY OF NONMELANOMA SKIN CANCER: ICD-10-CM

## 2024-08-28 DIAGNOSIS — L82.1 SK (SEBORRHEIC KERATOSIS): ICD-10-CM

## 2024-08-28 DIAGNOSIS — L57.0 ACTINIC KERATOSES: ICD-10-CM

## 2024-08-28 DIAGNOSIS — L82.0 INFLAMED SEBORRHEIC KERATOSIS: ICD-10-CM

## 2024-08-28 DIAGNOSIS — L82.0 SEBORRHEIC KERATOSES, INFLAMED: ICD-10-CM

## 2024-08-28 PROCEDURE — 11102 TANGNTL BX SKIN SINGLE LES: CPT | Mod: XS | Performed by: PHYSICIAN ASSISTANT

## 2024-08-28 PROCEDURE — 17003 DESTRUCT PREMALG LES 2-14: CPT | Mod: XS | Performed by: PHYSICIAN ASSISTANT

## 2024-08-28 PROCEDURE — 99213 OFFICE O/P EST LOW 20 MIN: CPT | Mod: 25 | Performed by: PHYSICIAN ASSISTANT

## 2024-08-28 PROCEDURE — 11103 TANGNTL BX SKIN EA SEP/ADDL: CPT | Mod: XS | Performed by: PHYSICIAN ASSISTANT

## 2024-08-28 PROCEDURE — 88305 TISSUE EXAM BY PATHOLOGIST: CPT | Performed by: DERMATOLOGY

## 2024-08-28 PROCEDURE — 17110 DESTRUCTION B9 LES UP TO 14: CPT | Performed by: PHYSICIAN ASSISTANT

## 2024-08-28 PROCEDURE — 17000 DESTRUCT PREMALG LESION: CPT | Mod: XS | Performed by: PHYSICIAN ASSISTANT

## 2024-08-28 ASSESSMENT — PAIN SCALES - GENERAL: PAINLEVEL: NO PAIN (0)

## 2024-08-28 NOTE — PATIENT INSTRUCTIONS
Patient Education       Proper skin care from [facility]:    -Eliminate harsh soaps as they strip the natural oils from the skin, often resulting in dry itchy skin ( i.e. Dial, Zest, Arabic Spring)  -Use mild soaps such as Cetaphil or Dove Sensitive Skin in the shower. You do not need to use soap on arms, legs, and trunk every time you shower unless visibly soiled.   -Avoid hot or cold showers.  -After showering, lightly dry off and apply moisturizing within 2-3 minutes. This will help trap moisture in the skin.   -Aggressive use of a moisturizer at least 1-2 times a day to the entire body (including -Vanicream, Cetaphil, Aquaphor or Cerave) and moisturize hands after every washing.  -We recommend using moisturizers that come in a tub that needs to be scooped out, not a pump. This has more of an oil base. It will hold moisture in your skin much better than a water base moisturizer. The above recommended are non-pore clogging.      Wear a sunscreen with at least SPF 30 on your face, ears, neck and V of the chest daily. Wear sunscreen on other areas of the body if those areas are exposed to the sun throughout the day. Sunscreens can contain physical and/or chemical blockers. Physical blockers are less likely to clog pores, these include zinc oxide and titanium dioxide. Reapply every two hour and after swimming.     Sunscreen examples: https://www.ewg.org/sunscreen/    UV radiation  UVA radiation remains constant throughout the day and throughout the year. It is a longer wavelength than UVB and therefore penetrates deeper into the skin leading to immediate and delayed tanning, photoaging, and skin cancer. 70-80% of UVA and UVB radiation occurs between the hours of 10am-2pm.  UVB radiation  UVB radiation causes the most harmful effects and is more significant during the summer months. However, snow and ice can reflect UVB radiation leading to skin damage during the winter months as well. UVB radiation is  responsible for tanning, burning, inflammation, delayed erythema (pinkness), pigmentation (brown spots), and skin cancer.     I recommend self monthly full body exams and yearly full body exams with a dermatology provider. If you develop a new or changing lesion please follow up for examination. Most skin cancers are pink and scaly or pink and pearly. However, we do see blue/brown/black skin cancers.  Consider the ABCDEs of melanoma when giving yourself your monthly full body exam ( don't forget the groin, buttocks, feet, toes, etc). A-asymmetry, B-borders, C-color, D-diameter, E-elevation or evolving. If you see any of these changes please follow up in clinic. If you cannot see your back I recommend purchasing a hand held mirror to use with a larger wall mirror.       Checking for Skin Cancer  You can find cancer early by checking your skin each month. There are 3 kinds of skin cancer. They are melanoma, basal cell carcinoma, and squamous cell carcinoma. Doing monthly skin checks is the best way to find new marks or skin changes. Follow the instructions below for checking your skin.   The ABCDEs of checking moles for melanoma   Check your moles or growths for signs of melanoma using ABCDE:   Asymmetry: the sides of the mole or growth don t match  Border: the edges are ragged, notched, or blurred  Color: the color within the mole or growth varies  Diameter: the mole or growth is larger than 6 mm (size of a pencil eraser)  Evolving: the size, shape, or color of the mole or growth is changing (evolving is not shown in the images below)    Checking for other types of skin cancer  Basal cell carcinoma or squamous cell carcinoma have symptoms such as:     A spot or mole that looks different from all other marks on your skin  Changes in how an area feels, such as itching, tenderness, or pain  Changes in the skin's surface, such as oozing, bleeding, or scaliness  A sore that does not heal  New swelling or redness beyond  the border of a mole    Who s at risk?  Anyone can get skin cancer. But you are at greater risk if you have:   Fair skin, light-colored hair, or light-colored eyes  Many moles or abnormal moles on your skin  A history of sunburns from sunlight or tanning beds  A family history of skin cancer  A history of exposure to radiation or chemicals  A weakened immune system  If you have had skin cancer in the past, you are at risk for recurring skin cancer.   How to check your skin  Do your monthly skin checkups in front of a full-length mirror. Check all parts of your body, including your:   Head (ears, face, neck, and scalp)  Torso (front, back, and sides)  Arms (tops, undersides, upper, and lower armpits)  Hands (palms, backs, and fingers, including under the nails)  Buttocks and genitals  Legs (front, back, and sides)  Feet (tops, soles, toes, including under the nails, and between toes)  If you have a lot of moles, take digital photos of them each month. Make sure to take photos both up close and from a distance. These can help you see if any moles change over time.   Most skin changes are not cancer. But if you see any changes in your skin, call your doctor right away. Only he or she can diagnose a problem. If you have skin cancer, seeing your doctor can be the first step toward getting the treatment that could save your life.   [employer] last reviewed this educational content on 4/1/2019 2000-2020 [employer]. [address] [place] [address], [place] [address]. All rights reserved. This information is not intended as a substitute for professional medical care. Always follow your healthcare professional's instructions.         Cryotherapy    What is it?  Use of a very cold liquid, such as liquid nitrogen, to freeze and destroy abnormal skin cells that need to be removed    What should I expect?  Tenderness and redness  A small blister that might grow and fill with dark purple blood. There may be crusting.  More  than one treatment may be needed if the lesions do not go away.    How do I care for the treated area?  Gently wash the area with your hands when bathing.  Use a thin layer of Vaseline to help with healing. You may use a Band-Aid.   The area should heal within 7-10 days and may leave behind a pink or lighter color.   Do not use an antibiotic or Neosporin ointment.   You may take acetaminophen (Tylenol) for pain.     Call your Doctor if you have:  Severe pain  Signs of infection (warmth, redness, cloudy yellow drainage, and or a bad smell)  Questions or concerns    Who should I call with questions?      [facility]: [phone]      [facility]: [phone]      For urgent needs outside of business hours call the [facility] at [phone]        and ask for the dermatology resident on call      Wound Care After a Biopsy    What is a skin biopsy?  A skin biopsy allows the doctor to examine a very small piece of tissue under the microscope to determine the diagnosis and the best treatment for the skin condition. A local anesthetic (numbing medicine)  is injected with a very small needle into the skin area to be tested. A small piece of skin is taken from the area. Sometimes a suture (stitch) is used.     What are the risks of a skin biopsy?  I will experience scar, bleeding, swelling, pain, crusting and redness. I may experience incomplete removal or recurrence. Risks of this procedure are excessive bleeding, bruising, infection, nerve damage, numbness, thick (hypertrophic or keloidal) scar and non-diagnostic biopsy.    How should I care for my wound for the first 24 hours?  Keep the wound dry and covered for 24 hours  If it bleeds, hold direct pressure on the area for 15 minutes. If bleeding does not stop then go to the emergency room  Avoid strenuous exercise the first 1-2 days or as your doctor instructs you    How should I care for the wound  after 24 hours?  After 24 hours, remove the bandage  You may bathe or shower as normal  If you had a scalp biopsy, you can shampoo as usual and can use shower water to clean the biopsy site daily  Clean the wound twice a day with gentle soap and water  Do not scrub, be gentle  Apply white petroleum/Vaseline after cleaning the wound with a cotton swab or a clean finger, and keep the site covered with a Bandaid /bandage. Bandages are not necessary with a scalp biopsy  If you are unable to cover the site with a Bandaid /bandage, re-apply ointment 2-3 times a day to keep the site moist. Moisture will help with healing  Avoid strenuous activity for first 1-2 days  Avoid lakes, rivers, pools, and oceans until the stitches are removed or the site is healed    How do I clean my wound?  Wash hands thoroughly with soap or use hand  before all wound care  Clean the wound with gentle soap and water  Apply white petroleum/Vaseline  to wound after it is clean  Replace the Bandaid /bandage to keep the wound covered for the first few days or as instructed by your doctor  If you had a scalp biopsy, warm shower water to the area on a daily basis should suffice    What should I use to clean my wound?   Cotton-tipped applicators (Qtips )  White petroleum jelly (Vaseline ). Use a clean new container and use Q-tips to apply.  Bandaids   as needed  Gentle soap     How should I care for my wound long term?  Do not get your wound dirty  Keep up with wound care for one week or until the area is healed.  A small scab will form and fall off by itself when the area is completely healed. The area will be red and will become pink in color as it heals. Sun protection is very important for how your scar will turn out. Sunscreen with an SPF 30 or greater is recommended once the area is healed.  If you have stitches, stitches need to be removed in 10 days. You may return to our clinic for this or you may have it done locally at your doctor s  office.  You should have some soreness but it should be mild and slowly go away over several days. Talk to your doctor about using tylenol for pain,    When should I call my doctor?  If you have increased:   Pain or swelling  Pus or drainage (clear or slightly yellow drainage is ok)  Temperature over 100F  Spreading redness or warmth around wound    When will I hear about my results?  The biopsy results can take 2-3 weeks to come back. The clinic will call you with the results, send you a [employer] message, or have you schedule a follow-up clinic or phone time to discuss the results. Contact our clinics if you do not hear from us in 3 weeks.     Who should I call with questions?  [facility]: [phone]   [facility]: [phone]  For urgent needs outside of business hours call the [facility] at [phone] and ask for the dermatology resident on call    Wound Care After a Biopsy    What is a skin biopsy?  A skin biopsy allows the doctor to examine a very small piece of tissue under the microscope to determine the diagnosis and the best treatment for the skin condition. A local anesthetic (numbing medicine) is injected with a very small needle into the skin area to be tested. A small piece of skin is taken from the area. Sometimes a suture (stitch) is used.     What are the risks of a skin biopsy?  I will experience scar, bleeding, swelling, pain, crusting and redness. I may experience incomplete removal or recurrence. Risks of this procedure are excessive bleeding, bruising, infection, nerve damage, numbness, thick (hypertrophic or keloidal) scar and non-diagnostic biopsy.    How should I care for my wound for the first 24 hours?  Keep the wound dry and covered for 24 hours  If it bleeds, hold direct pressure on the area for 15 minutes. If bleeding does not stop, call us or go to the emergency room  Avoid strenuous exercise the first 1-2 days or  as your doctor instructs you    How should I care for the wound after 24 hours?  After 24 hours, remove the bandage  You may bathe or shower as normal  If you had a scalp biopsy, you can shampoo as usual and can use shower water to clean the biopsy site daily  Clean the wound once a day with gentle soap and water  Do not scrub, be gentle  Apply white petroleum/Vaseline after cleaning the wound with a cotton swab or a clean finger, and keep the site covered with a Bandaid /bandage. Bandages are not necessary with a scalp biopsy  If you are unable to cover the site with a Bandaid /bandage, re-apply ointment 2-3 times a day to keep the site moist. Moisture will help with healing  Avoid strenuous activity for first 1-2 days  Avoid lakes, rivers, pools, and oceans until the stitches are removed or the site is healed    How do I clean my wound?  Wash hands thoroughly with soap or use hand  before all wound care  Clean the wound with gentle soap and water  Apply white petroleum/Vaseline  to wound after it is clean  Replace the Bandaid /bandage to keep the wound covered for the first few days or as instructed by your doctor  If you had a scalp biopsy, warm shower water to the area on a daily basis should suffice    What should I use to clean my wound?   Cotton-tipped applicators (Qtips )  White petroleum jelly (Vaseline ). Use a clean new container and use Q-tips to apply.  Bandaids  as needed  Gentle soap     How should I care for my wound long term?  Do not get your wound dirty  Keep up with wound care for one week or until the area is healed.  If you have stitches, stitches need to be removed in  days. You may return to our clinic for this or you may have it done locally at your doctor s office.  A small scab will form and fall off by itself when the area is completely healed. The area will be red and will become pink in color as it heals. Sun protection is very important for how your scar will turn out.  Sunscreen with an SPF 30 or greater is recommended once the area is healed.  You should have some soreness but it should be mild and slowly go away over several days. Talk to your doctor about using tylenol for pain,    When should I call my doctor?  If you have increased:   Pain or swelling  Pus or drainage (clear or slightly yellow drainage is ok)  Temperature over 100F  Spreading redness or warmth around wound    When will I hear about my results?  The biopsy results can take 2 weeks to come back.  Your results will automatically release to [employer] before your provider has even reviewed them.  The clinic will call you with the results, send you a [employer] message, or have you schedule a follow-up clinic or phone time to discuss the results.  Contact our clinics if you do not hear from us in 2 weeks.    Who should I call with questions?  [facility]: [phone]  [facility]: [phone]  For urgent needs outside of business hours call the [facility] at [phone] and ask for the dermatology resident on call

## 2024-08-28 NOTE — PROGRESS NOTES
[facility] Dermatology Note  Encounter Date: Aug 28, 2024  Office Visit      Dermatology Problem List:  FBSE: 8/28/24    #NUB x 3, S/p Biopsy performed on 8/28/24  1. 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 AKs, s/p cryo  - x6 face and scalp cryo 02/21/2024  - pigmented AK - left jaw, s/p by 11/13/18  - HAK - right hand, s/p excision 5/2009  3. 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   ____________________________________________    Assessment & Plan:  # Neoplasm of unspecified behavior of the skin (D49.2) on the R medial canthus. The differential diagnosis includes NMSC vs other .   - Shave biopsy performed today, see procedure note below.   - Photographed today     # AKs x 8  # ISKs x 9  - cryotherapy performed, see procedure note below     # Neoplasm of unspecified behavior of the skin (D49.2) on the L helix  The differential diagnosis includes NMSC vs other .   - Shave biopsy performed today, see procedure note below.   - Photographed today     # Neoplasm of unspecified behavior of the skin (D49.2) on the R frontal hairline . The differential diagnosis includes SK vs MM vs other.   - Shave biopsy performed today, see procedure note below.   - Photographed today     # Transient acantholytic dermatosis - [place] - controlled with triam - uses only on chest and shoulders  - continue triam for pruritus flares    # Hx of NMSC  - Sunscreen: Apply 20 minutes prior to going outdoors and reapply every two hours, when wet or sweating. We recommend using an SPF 30 or higher, and to use one that is water resistant.     - Advised to monitor for changing, non-healing, bleeding, painful, changing, or otherwise symptomatic lesions  - Continue annual skin exams    # Benign findings:  multiple benign nevi, lentigines, cherry angiomas, SKs  - edu on benign etiology  - Signs and Symptoms of non-melanoma skin cancer and ABCDEs of melanoma reviewed with patient. Patient encouraged to perform monthly self skin exams and educated on how to perform them. UV precautions reviewed with patient. Patient was asked about new or changing moles/lesions on body.   - Sunscreen: Apply 20 minutes prior to going outdoors and reapply every two hours, when wet or sweating. We recommend using an SPF 30 or higher, and to use one that is water resistant.     - RTC for changes     Procedures Performed:   - Shave biopsy procedure note x 3 . After discussion of benefits and risks including but not limited to bleeding, infection, scar, incomplete removal, recurrence, and non-diagnostic biopsy, written consent and photographs were obtained. The area was cleaned with isopropyl alcohol. 0.5mL of 1% lidocaine with epinephrine was injected to obtain adequate anesthesia of lesion(s). Shave biopsy at site(s) performed. Hemostasis was achieved with aluminium chloride. Petrolatum ointment and a sterile dressing were applied. The patient tolerated the procedure and no complications were noted. The patient was provided with verbal and written post care instructions.      CRYOTHERAPY PROCEDURE NOTE: 17 lesions in the above locations were treated with liquid nitrogen utilizing a 5-10sec thaw time. Patient was advised that the treated areas will become red, swollen, may develop a blister and then should crust and peal off in the next 1-2 weeks. Post-procedure instructions were provided.    Follow-up: pending path results    Staff and scribe:    Scribe Disclosure:   I, DORIS EDWARDS, am serving as a scribe; to document services personally performed by Jessika Armenta PA-C -based on data collection and the provider's statements to me.     Provider Disclosure:  I agree with above History, Review of Systems, Physical exam and Plan.  I have  reviewed the content of the documentation and have edited it as needed. I have personally performed the services documented here and the documentation accurately represents those services and the decisions I have made.      Electronically signed by:    All risks, benefits and alternatives were discussed with patient.  Patient is in agreement and understands the assessment and plan.  All questions were answered.    Jessika Armenta PA-C, MPAS  [facility]: Phone: [phone], Fax: [phone]  [facility]: Phone: [phone],  Fax: [phone]  [facility]: Phone: [phone], Fax: [phone]  ____________________________________________    CC: Skin Check (Hx NMSC. He has an itchy back and a spot by his nose.)      Reviewed patients past medical history and pertinent chart review prior to patient's visit today.     HPI:  Mr. Preston Cai is a 88 year old male who presents today as a return patient for [facility].     Today patient reported a spot of concern on his back and on his nose. Notes back has started to itch. He stopped using triam on the back because it was difficult to apply. Still uses on chest and shoulders.     Has a hx of NMSC    Patient is otherwise feeling well, without additional concerns.    Labs:  N/A    Physical Exam:  Vitals: There were no vitals taken for this visit.  SKIN: Total skin excluding the undergarment areas was performed. The exam included the head/face, neck, both arms, chest, back, abdomen, both legs, digits and/or nails.    - - Glez's skin type II, has <100 nevi  - There are dome shaped bright red papules on the trunk.   - Multiple regular brown pigmented macules and papules are identified on the trunk and extremities.   - Scattered brown macules on sun exposed areas.  - There are waxy stuck on tan to brown papules on the trunk.     -There are well healed surgical  scars without erythema, nodularity or telangiectasias on the prior NMSC site, NERD  - There is a tan to brown waxy stuck on papule with surrounding erythema on the:  x 9 on back  - There is an erythematous macule with overyling adherent scale on the: vertex scalp x 5, x 2 forehead, x 1 r temple   - R frontal hairline there is a 1 cm brown asymmetrical macule   - 7 mm pink shiny papule   - R medial canthus there is a 1 cm pink pearly lesion with central erosion in two areas   - No other lesions of concern on areas examined.                 Medications:  Current Outpatient Medications   Medication Sig Dispense Refill    acetaminophen 500 MG CAPS 2 x 500mg at 630/7p      brimonidine-timolol (COMBIGAN) 0.2-0.5 % ophthalmic solution Place 1 drop into the right eye 2 times daily 10 mL 3    dorzolamide (TRUSOPT) 2 % ophthalmic solution Place 1 drop into the right eye 3 times daily 10 mL 11    famotidine (PEPCID) 20 MG tablet Take 1 tablet (20 mg) by mouth 2 times daily 120 tablet 1    gabapentin (NEURONTIN) 100 MG capsule Takes 2-3 x 100mg capsule by mouth nightly 270 capsule 3    latanoprost (XALATAN) 0.005 % ophthalmic solution Place 1 drop into the right eye at bedtime 2.5 mL 4    primidone (MYSOLINE) 250 MG tablet 1/4 tab in morning and 1 in the evevning 135 tablet 3    triamcinolone (KENALOG) 0.1 % external cream Apply topically 2 times daily as needed (itchy rash of shoulders/chest) 454 g 3    trospium (SANCTURA XR) 60 MG CP24 24 hr capsule Take 1 capsule (60 mg) by mouth every morning (Patient not taking: Reported on 8/28/2024) 30 capsule 11    Vitamin D, Cholecalciferol, 25 MCG (1000 UT) CAPS Take 1,000 Units by mouth daily (Winter only) (Patient not taking: Reported on 8/28/2024)       Current Facility-Administered Medications   Medication Dose Route Frequency Provider Last Rate Last Admin    methylPREDNISolone (DEPO-MEDROL) injection 40 mg  40 mg   Mau Miller,    40 mg at 09/12/22 1002     methylPREDNISolone (DEPO-MEDROL) injection 40 mg  40 mg   Mau Mliler, DO   40 mg at 09/12/22 1002      Past Medical/Surgical History:   Patient Active Problem List   Diagnosis    Seborrheic dermatitis    Seasonal allergic rhinitis due to pollen    Cervical radiculopathy at C6    Personal history of malignant neoplasm of larynx    Essential tremor    CARDIOVASCULAR SCREENING; LDL GOAL LESS THAN 160    Benign prostatic hyperplasia    Hoarse voice quality    Pseudophakia    Hiatal hernia with GERD    Macular degeneration    Supraspinatus sprain, right, initial encounter    CARDIOVASCULAR SCREENING; LDL GOAL LESS THAN 100    Vitamin D deficiency    Chronic midline low back pain without sciatica    Sleep disorder, nonorganic    Other gastritis without hemorrhage, unspecified chronicity    BPH associated with nocturia    Weight loss    Pernicious anemia    Arthritis of right shoulder region     Past Medical History:   Diagnosis Date    Acid reflux disease     Allergic rhinitis, cause unspecified     H/O magnetic resonance imaging of brain and brain stem 10/10/2016    MRI BRAIN WITH AND WITHOUT CONTRAST August 17, 2009 8:07:00 PM   HISTORY: Severe dizzy spells with imbalance and vomiting.   TECHNIQUE: Routine pulse sequences without and with contrast were performed including thin section images through the IACs. 20 mL Magnevist given.   FINDINGS: Diffusion-weighted images are normal. The brain parenchyma, brainstem, ventricular system, and subarachnoid spaces a    Hematuria     Hematuria  - in 1990's     History of anemia 2002    Anemia-Iron Deficit    History of gastric ulcer     Macular degeneration     Malignant neoplasm of laryngeal cartilages (H)     Laryngeal CA (Radiation 1982)    Nonsenile cataract     PONV (postoperative nausea and vomiting)     Tremor 10/03/2016

## 2024-08-28 NOTE — PROGRESS NOTES
Preston Cai's goals for this visit include:   Chief Complaint   Patient presents with    Skin Check     Hx NMSC. He has an itchy back and a spot by his nose.       He requests these members of his care team be copied on today's visit information: no    PCP: Tyrel Manning    Referring Provider:  Referred Self, MD  No address on file    There were no vitals taken for this visit.    Do you need any medication refills at today's visit? Heather Alcala LPN

## 2024-08-28 NOTE — PROGRESS NOTES
The following medication was given:     MEDICATION:  Lidocaine with epinephrine 1% [id number]  ROUTE: SQ  SITE: see procedure note  DOSE: 2cc  LOT #: [id number]  : [employer]  EXPIRATION DATE: 01/31/2026  NDC#: 93231-702-54  Was there drug waste? none  Multi-dose vial: Yes    Jasmine Alcala LPN  August 28, 2024

## 2024-08-30 LAB
PATH REPORT.COMMENTS IMP SPEC: ABNORMAL
PATH REPORT.COMMENTS IMP SPEC: ABNORMAL
PATH REPORT.COMMENTS IMP SPEC: YES
PATH REPORT.FINAL DX SPEC: ABNORMAL
PATH REPORT.GROSS SPEC: ABNORMAL
PATH REPORT.MICROSCOPIC SPEC OTHER STN: ABNORMAL
PATH REPORT.RELEVANT HX SPEC: ABNORMAL

## 2024-09-03 DIAGNOSIS — K21.9 GASTROESOPHAGEAL REFLUX DISEASE WITHOUT ESOPHAGITIS: ICD-10-CM

## 2024-09-03 RX ORDER — FAMOTIDINE 20 MG/1
20 TABLET, FILM COATED ORAL 2 TIMES DAILY
Qty: 120 TABLET | Refills: 2 | Status: SHIPPED | OUTPATIENT
Start: 2024-09-03

## 2024-09-03 NOTE — PROGRESS NOTES
Refill request for famotidine received from [facility] pharmacy. Last appointment 4/3/24.  Medication refilled as requested. Steph Wilde RN

## 2024-09-06 ENCOUNTER — TELEPHONE (OUTPATIENT)
Dept: DERMATOLOGY | Facility: CLINIC | Age: 89
End: 2024-09-06
Payer: COMMERCIAL

## 2024-09-06 NOTE — TELEPHONE ENCOUNTER
Excision/Mohs previsit information                                                    Diagnosis: basal cell carcinoma  Site(s): R medial canthus     Is the surgical site below the waist?  NO  If yes, instruct the patient to purchase over the counter chlorhexidine surgical soap and wash all skin below the belly button twice before surgery    Allergies   Allergen Reactions    Seasonal Allergies      Hay fever        Review and update allergy and medication list    Do you take the following medications:  Coumadin, Eliquis, Pradaxa, Xarelto:  NO.  If on Coumadin, INR should be checked within 7 days of surgery.  Range should be 3.5 or less or within therapeutic range.    Do you currently or have you previously had any of the following conditions:  Hepatitis:  NO  HIV/AIDS:  NO  Prolonged bleeding or bleeding disorder:  NO  Pacemaker: NO  Defibrillator:  NO  History of artificial or heart valve replacement:  NO  Endocarditis (inflammation of the inner lining of the heart's chambers and valves):  NO  Have you ever had a prosthetic joint infection:  NO  Pregnant or Breastfeeding:  N/A  Mobility device (wheelchair, transfer difficulty): NO    Important Reminders:                                                      -Ok to take all of their medications as prescribed  -Patients can eat, no need to be fasting  -If face is being treated, please come with a make-up free face  -If scalp is being treated, please come with clean hair free from product  -Patient will not be able to get the site wet for 48 hrs  -No submerging wound in standing water (lake, pool, bathtub, hot tub) for 2 weeks  -No physical activity for 48 hrs (further restrictions will be discussed by MD at time of visit)    If any positives, send to RN for further review  Dana Montesinos RN

## 2024-09-06 NOTE — TELEPHONE ENCOUNTER
" Dana Montesinos RN  9/6/2024 11:21 AM CDT Back to Top      I reviewed pathology results with Preston. I scheduled him for Mohs with Dr. Albrecht 10/24/24 at 9:30. Previsit checklist complete.    Patience Collins RN  9/4/2024  2:00 PM CDT       Writer called pt, no answer. Left message for pt to return our call at [phone].        Patience Collins RN on 9/4/2024 at 2:00 PM    Jessika Armenta PA-C  9/4/2024 12:47 PM CDT       Site B) R medial canthus - BCC - MOHS it please     Site A) Benign - no further treatment  Site C) AK - LN2 when he comes in for MOHS if derm surg is willing     Final Diagnosis   A. Right frontal hairline:  - Seborrheic keratosis, inflamed - (see description)     B. Right medial canthus:  - Basal cell carcinoma, nodular type - (see description)     C. Left helix:  - Actinic keratosis - (see description)    Electronically signed by Siddharth Monsalve MD on 8/30/2024 at  4:41 PM   Clinical Information  [facility]   The patient is an 88 year old male.    Gross Description  [facility]   A(1). Skin, R frontal hairline:  The specimen is received in formalin with proper patient identification, labeled \"right frontal hairline\".  The specimen consists of is a 0.9 x 0.6 x 0.1 cm gray-tan to brown, mottled, shiny, firm, waxy skin lesion.  The surgical margin is inked black.  The specimen is serially sectioned into 5 pieces, entirely submitted as A1.           B(2). Skin, R medial canthus:  The specimen is received in formalin with proper patient identification, labeled \"right medial canthus\".  The specimen consists of a 0.8 x 0.7 cm tan-white skin shave biopsy.  Eccentrically located is a 0.6 x 0.4 x 0.1 cm tan-yellow, crusty, plaque-like lesion.  The surgical margin is inked black.  The specimen is trisected, entirely submitted as B1.             C(3). Skin, L helix:  The specimen is received in formalin with proper patient identification, labeled \"left helix\".  The specimen " consists of a 0.6 x 0.4 cm gray-tan to white, mottled, shiny skin shave biopsy.  The surgical margin is inked black.  The specimen is trisected, entirely submitted as C1.           Microscopic Description  [facility]   A. The specimen exhibits compact orthokeratosis, papillomatous epidermal hyperplasia with horn cyst formation and a patchy lichenoid lymphocytic infiltrate.  Focal keratinocyte atypia is interpreted as reactive to this inflammatory infiltrate.     B. The specimen exhibits a tumor of atypical basaloid epithelium arranged as islands in the dermis with fibromyxoid stroma and clefting artifact.     C. The specimen exhibits intermittent parakeratosis with follicular sparing, and mild epidermal hyperplasia with mild keratinocyte atypia accentuated in its lower half.   MCRS  N/A Yes Abnormal  [facility]   Performing Labs  [facility]   The technical component of this testing was completed at [facility].     Stain controls for all stains resulted within this report have been reviewed and show appropriate reactivity.               Specimen Collected: 08/28/24 11:02 AM Last Resulted: 08/30/24  4:41 PM

## 2024-09-30 ENCOUNTER — OFFICE VISIT (OUTPATIENT)
Dept: OPHTHALMOLOGY | Facility: CLINIC | Age: 89
End: 2024-09-30
Payer: COMMERCIAL

## 2024-09-30 DIAGNOSIS — H52.203 MYOPIA OF BOTH EYES WITH ASTIGMATISM AND PRESBYOPIA: ICD-10-CM

## 2024-09-30 DIAGNOSIS — H35.30 ARMD (AGE-RELATED MACULAR DEGENERATION), BILATERAL: ICD-10-CM

## 2024-09-30 DIAGNOSIS — Z96.1 PSEUDOPHAKIA, BOTH EYES: ICD-10-CM

## 2024-09-30 DIAGNOSIS — H40.1112 PRIMARY OPEN ANGLE GLAUCOMA (POAG) OF RIGHT EYE, MODERATE STAGE: Primary | ICD-10-CM

## 2024-09-30 DIAGNOSIS — Z98.890 HX OF VITRECTOMY: ICD-10-CM

## 2024-09-30 DIAGNOSIS — H52.13 MYOPIA OF BOTH EYES WITH ASTIGMATISM AND PRESBYOPIA: ICD-10-CM

## 2024-09-30 DIAGNOSIS — H52.4 MYOPIA OF BOTH EYES WITH ASTIGMATISM AND PRESBYOPIA: ICD-10-CM

## 2024-09-30 DIAGNOSIS — H04.123 DRY EYE SYNDROME OF BOTH EYES: ICD-10-CM

## 2024-09-30 PROCEDURE — 99213 OFFICE O/P EST LOW 20 MIN: CPT | Performed by: OPHTHALMOLOGY

## 2024-09-30 ASSESSMENT — VISUAL ACUITY
OS_CC+: -2
CORRECTION_TYPE: GLASSES
OS_CC: 20/25
OD_CC+: +2
METHOD: SNELLEN - LINEAR
OD_CC: 20/60 ECC

## 2024-09-30 ASSESSMENT — TONOMETRY
OS_IOP_MMHG: 14
OD_IOP_MMHG: 15
IOP_METHOD: APPLANATION

## 2024-09-30 ASSESSMENT — PACHYMETRY
OS_CT(UM): 559
OD_CT(UM): 564

## 2024-09-30 ASSESSMENT — CONF VISUAL FIELD
OS_NORMAL: 1
OS_INFERIOR_NASAL_RESTRICTION: 0
OS_SUPERIOR_TEMPORAL_RESTRICTION: 0
OS_SUPERIOR_NASAL_RESTRICTION: 0
METHOD: COUNTING FINGERS
OS_INFERIOR_TEMPORAL_RESTRICTION: 0
OD_SUPERIOR_TEMPORAL_RESTRICTION: 3

## 2024-09-30 ASSESSMENT — EXTERNAL EXAM - LEFT EYE: OS_EXAM: NORMAL

## 2024-09-30 ASSESSMENT — EXTERNAL EXAM - RIGHT EYE: OD_EXAM: NORMAL

## 2024-09-30 ASSESSMENT — CUP TO DISC RATIO
OS_RATIO: 0.3
OD_RATIO: 0.5

## 2024-09-30 NOTE — PATIENT INSTRUCTIONS
Malik Phelps MD Systane three times a day both eyes     Latanoprost at bedtime right eye   Combgain twice a day right eye   Trusopt/dorzolamide three times a day right eye

## 2024-09-30 NOTE — NURSING NOTE
Chief Complaints and History of Present Illnesses   Patient presents with    Glaucoma Follow Up     Chief Complaint(s) and History of Present Illness(es)       Glaucoma Follow Up              Laterality: right eye              Comments    Pt returns for glaucoma follow-up right eye. He returns specifically for IOP check after adding new drop dorzolamide TID (morning, lunch and dinner time) to his daily drop regime. He continues on combigan BID right eye (typically instills one at 7 pm, and one at 8 pm), and Latanoprost at bedtime right eye. He has been generally compliant with the drops and has no trouble tolerating the new medication. The new drop does sting upon instillation, but this is tolerable.    He does note that the quality of the vision in the right eye is worsening. With both eyes open, he notes that straight lines appear curvy and he has vertical diplopia occasionally. He does feel that the curving of lines in the vision is stable. He often closes the right eye when reading.

## 2024-09-30 NOTE — PROGRESS NOTES
HPI       Glaucoma Follow Up    In right eye.             Comments    Pt returns for glaucoma follow-up right eye. He returns specifically for IOP check after adding new drop dorzolamide TID (morning, lunch and dinner time) to his daily drop regime. He continues on combigan BID right eye (typically instills one at 7 pm, and one at 8 pm), and Latanoprost at bedtime right eye. He has been generally compliant with the drops and has no trouble tolerating the new medication. The new drop does sting upon instillation, but this is tolerable.    He does note that the quality of the vision in the right eye is worsening. With both eyes open, he notes that straight lines appear curvy and he has vertical diplopia occasionally. He does feel that the curving of lines in the vision is stable. He often closes the right eye when reading.          Last edited by Angélica Cole on 9/30/2024 11:10 AM.         Review of systems for the eyes was negative other than the pertinent positives/negatives listed in the HPI.      Assessment & Plan    HPI:  Preston Cai is a 88 year old male with history of vitrectomy for macular hole, pseudophakia, refractive error, Primary open angle glaucoma (POAG) right eye, dry eye, Age related macular degeneration presents for Primary open angle glaucoma (POAG) followup. He is using his drops.       He notes some waviness of vision both eyes.     POHx: macular hole, pseudophakia, refractive error, Primary open angle glaucoma (POAG) right eye, dry eye, Age related macular degeneration   PMHx: GERD  Current Medications:   Current Outpatient Medications   Medication Sig Dispense Refill    acetaminophen 500 MG CAPS 2 x 500mg at 630/7p      brimonidine-timolol (COMBIGAN) 0.2-0.5 % ophthalmic solution Place 1 drop into the right eye 2 times daily 10 mL 3    dorzolamide (TRUSOPT) 2 % ophthalmic solution Place 1 drop into the right eye 3 times daily 10 mL 11    famotidine (PEPCID) 20 MG tablet Take 1 tablet  (20 mg) by mouth 2 times daily. 120 tablet 2    gabapentin (NEURONTIN) 100 MG capsule Takes 2-3 x 100mg capsule by mouth nightly 270 capsule 3    latanoprost (XALATAN) 0.005 % ophthalmic solution Place 1 drop into the right eye at bedtime 2.5 mL 4    primidone (MYSOLINE) 250 MG tablet 1/4 tab in morning and 1 in the evevning 135 tablet 3    triamcinolone (KENALOG) 0.1 % external cream Apply topically 2 times daily as needed (itchy rash of shoulders/chest) 454 g 3    trospium (SANCTURA XR) 60 MG CP24 24 hr capsule Take 1 capsule (60 mg) by mouth every morning 30 capsule 11    Vitamin D, Cholecalciferol, 25 MCG (1000 UT) CAPS Take 1,000 Units by mouth daily (Winter only)       Current Facility-Administered Medications   Medication Dose Route Frequency Provider Last Rate Last Admin    methylPREDNISolone (DEPO-MEDROL) injection 40 mg  40 mg   Mau Miller DO   40 mg at 09/12/22 1002    methylPREDNISolone (DEPO-MEDROL) injection 40 mg  40 mg   Mau Miller DO   40 mg at 09/12/22 1002     FHx: ?vision loss mother  PSHx: Cataract extraction/intraocular lens both eyes, Pars plana vitrectomy (PPV) right eye       Current Eye Medications:  Systane three times a day   Latanoprost at bedtime right eye   Combgain twice a day right eye   Trusopt/dorzolamide three times a day right eye    Preservision    Assessment & Plan:  (H40.1112) Primary open angle glaucoma (POAG) of right eye, moderate stage  (primary encounter diagnosis)  Maximum intraocular pressure unknown  Family history: No  Trauma history: No  Gonioscopy: flat, open, minimal pigment  Pachymetry:     Treatment History:   Latanoprost right eye   Combigan right eye     Today's testing:  Hsu visual field (HVF) 24-2 02/13/24:   Right eye - inferior arcuate/nasal step, VFI 74%, MD -10.48, PSD 9.77, reliable;   Left eye - central defects, VFi 94%, MD -1.97, PSD 2.08 reliable  Hsu visual field (HVF) 24-2 08/19/24 :   Right eye - inferior arcuate/nasal step,  VFI 80%, MD -8.02, PSD 9.77, reliable;   Left eye - central defects, VFi 97%, MD -0.41, PSD 1.75 reliable    OCT nerve fiber layer 02/13/24:   Right eye - G(r) 60 NI (y) 61 TI (g) 106 NS (r) 55 TS (r) 73      Left eye - G(g) 91 NI (g) 86 TI (g) 132 NS (g) 95 TS (g) 132  OCT nerve fiber layer 08/19/24 :   Right eye - G(r) 56 NI (y) 56 TI (g) 103 NS (r) 51 TS (r) 67      Left eye - G(g) 91 NI (g) 86 TI (g) 136 NS (g) 93 TS (g) 129    IOP goal: mid teens  IOP at goal today, 15/14 from 21/16  Continue dorzoloamide/trusopt three times a day right eye  Conitnue Latanoprost at bedtime right eye,   Combigan twice a day right eye       (H04.123) Dry eye syndrome of both eyes  Continue artificial tears  Continue gel or ointment at bedtime     (H52.13,  H52.203,  H52.4) Myopia of both eyes with astigmatism and presbyopia  Doing well in current MRx    (H35.30) ARMD (age-related macular degeneration), bilateral  (Z98.890) Hx of vitrectomy  Following with [facility]  Macular hole right eye, significant drusen both eyes     (Z96.1) Pseudophakia, both eyes  Doing well      Return in about 6 months (around 3/30/2025) for Follow Up-v/t, OCT RNFL, 24-2 VF.        Orlando Rose MD     Attending Physician Attestation:  Complete documentation of historical and exam elements from today's encounter can be found in the full encounter summary report (not reduplicated in this progress note).  I personally obtained the chief complaint(s) and history of present illness.  I confirmed and edited as necessary the review of systems, past medical/surgical history, family history, social history, and examination findings as documented by others; and I examined the patient myself.  I personally reviewed the relevant tests, images, and reports as documented above.  I formulated and edited as necessary the assessment and plan and discussed the findings and management plan with the patient and family. - Orlando Rose MD

## 2024-10-10 SDOH — HEALTH STABILITY: PHYSICAL HEALTH: ON AVERAGE, HOW MANY MINUTES DO YOU ENGAGE IN EXERCISE AT THIS LEVEL?: 0 MIN

## 2024-10-10 SDOH — HEALTH STABILITY: PHYSICAL HEALTH: ON AVERAGE, HOW MANY DAYS PER WEEK DO YOU ENGAGE IN MODERATE TO STRENUOUS EXERCISE (LIKE A BRISK WALK)?: 0 DAYS

## 2024-10-10 ASSESSMENT — SOCIAL DETERMINANTS OF HEALTH (SDOH): HOW OFTEN DO YOU GET TOGETHER WITH FRIENDS OR RELATIVES?: ONCE A WEEK

## 2024-10-15 ENCOUNTER — OFFICE VISIT (OUTPATIENT)
Dept: FAMILY MEDICINE | Facility: CLINIC | Age: 89
End: 2024-10-15
Attending: PREVENTIVE MEDICINE
Payer: COMMERCIAL

## 2024-10-15 VITALS
OXYGEN SATURATION: 97 % | BODY MASS INDEX: 20.81 KG/M2 | HEART RATE: 74 BPM | SYSTOLIC BLOOD PRESSURE: 126 MMHG | HEIGHT: 73 IN | DIASTOLIC BLOOD PRESSURE: 68 MMHG | RESPIRATION RATE: 22 BRPM | WEIGHT: 157 LBS | TEMPERATURE: 97.7 F

## 2024-10-15 DIAGNOSIS — B02.9 HERPES ZOSTER WITHOUT COMPLICATION: ICD-10-CM

## 2024-10-15 DIAGNOSIS — N40.1 BPH ASSOCIATED WITH NOCTURIA: ICD-10-CM

## 2024-10-15 DIAGNOSIS — E53.8 VITAMIN B12 DEFICIENCY (NON ANEMIC): ICD-10-CM

## 2024-10-15 DIAGNOSIS — G25.0 ESSENTIAL TREMOR: ICD-10-CM

## 2024-10-15 DIAGNOSIS — Z00.00 ENCOUNTER FOR MEDICARE ANNUAL WELLNESS EXAM: Primary | ICD-10-CM

## 2024-10-15 DIAGNOSIS — R35.1 BPH ASSOCIATED WITH NOCTURIA: ICD-10-CM

## 2024-10-15 DIAGNOSIS — Z13.6 CARDIOVASCULAR SCREENING; LDL GOAL LESS THAN 100: ICD-10-CM

## 2024-10-15 LAB
BASOPHILS # BLD AUTO: 0 10E3/UL (ref 0–0.2)
BASOPHILS NFR BLD AUTO: 0 %
EOSINOPHIL # BLD AUTO: 0.1 10E3/UL (ref 0–0.7)
EOSINOPHIL NFR BLD AUTO: 1 %
ERYTHROCYTE [DISTWIDTH] IN BLOOD BY AUTOMATED COUNT: 13 % (ref 10–15)
HCT VFR BLD AUTO: 34.1 % (ref 40–53)
HGB BLD-MCNC: 11.1 G/DL (ref 13.3–17.7)
IMM GRANULOCYTES # BLD: 0 10E3/UL
IMM GRANULOCYTES NFR BLD: 0 %
LYMPHOCYTES # BLD AUTO: 1.5 10E3/UL (ref 0.8–5.3)
LYMPHOCYTES NFR BLD AUTO: 28 %
MCH RBC QN AUTO: 33.1 PG (ref 26.5–33)
MCHC RBC AUTO-ENTMCNC: 32.6 G/DL (ref 31.5–36.5)
MCV RBC AUTO: 102 FL (ref 78–100)
MONOCYTES # BLD AUTO: 0.5 10E3/UL (ref 0–1.3)
MONOCYTES NFR BLD AUTO: 10 %
NEUTROPHILS # BLD AUTO: 3.2 10E3/UL (ref 1.6–8.3)
NEUTROPHILS NFR BLD AUTO: 61 %
PLATELET # BLD AUTO: 183 10E3/UL (ref 150–450)
RBC # BLD AUTO: 3.35 10E6/UL (ref 4.4–5.9)
WBC # BLD AUTO: 5.2 10E3/UL (ref 4–11)

## 2024-10-15 PROCEDURE — 80053 COMPREHEN METABOLIC PANEL: CPT | Performed by: INTERNAL MEDICINE

## 2024-10-15 PROCEDURE — 36415 COLL VENOUS BLD VENIPUNCTURE: CPT | Performed by: INTERNAL MEDICINE

## 2024-10-15 PROCEDURE — 85025 COMPLETE CBC W/AUTO DIFF WBC: CPT | Performed by: INTERNAL MEDICINE

## 2024-10-15 PROCEDURE — 80061 LIPID PANEL: CPT | Performed by: INTERNAL MEDICINE

## 2024-10-15 PROCEDURE — 82607 VITAMIN B-12: CPT | Performed by: INTERNAL MEDICINE

## 2024-10-15 PROCEDURE — G0439 PPPS, SUBSEQ VISIT: HCPCS | Performed by: INTERNAL MEDICINE

## 2024-10-15 RX ORDER — PRIMIDONE 250 MG/1
250 TABLET ORAL 2 TIMES DAILY
Qty: 180 TABLET | Refills: 3 | Status: SHIPPED | OUTPATIENT
Start: 2024-10-15

## 2024-10-15 RX ORDER — VALACYCLOVIR HYDROCHLORIDE 1 G/1
1000 TABLET, FILM COATED ORAL 3 TIMES DAILY
Qty: 21 TABLET | Refills: 0 | Status: SHIPPED | OUTPATIENT
Start: 2024-10-15 | End: 2024-10-22

## 2024-10-15 RX ORDER — TAMSULOSIN HYDROCHLORIDE 0.4 MG/1
0.4 CAPSULE ORAL EVERY EVENING
Qty: 90 CAPSULE | Refills: 3 | Status: SHIPPED | OUTPATIENT
Start: 2024-10-15

## 2024-10-15 ASSESSMENT — PAIN SCALES - GENERAL: PAINLEVEL: MILD PAIN (3)

## 2024-10-15 ASSESSMENT — PATIENT HEALTH QUESTIONNAIRE - PHQ9
10. IF YOU CHECKED OFF ANY PROBLEMS, HOW DIFFICULT HAVE THESE PROBLEMS MADE IT FOR YOU TO DO YOUR WORK, TAKE CARE OF THINGS AT HOME, OR GET ALONG WITH OTHER PEOPLE: SOMEWHAT DIFFICULT
SUM OF ALL RESPONSES TO PHQ QUESTIONS 1-9: 15
SUM OF ALL RESPONSES TO PHQ QUESTIONS 1-9: 15

## 2024-10-15 NOTE — PROGRESS NOTES
Health Care Directive  Patient does not have a Health Care Directive or Living Will: Not discussed during today's visit.    Today's PHQ-9 Score:       10/15/2024     1:45 PM   PHQ-9 SCORE   PHQ-9 Total Score MyChart 15 (Moderately severe depression)   PHQ-9 Total Score 15           10/10/2024   General Health   How would you rate your overall physical health? Good   Feel stress (tense, anxious, or unable to sleep) Very much      (!) STRESS CONCERN      10/10/2024   Nutrition   Diet: Regular (no restrictions)            10/10/2024   Exercise   Days per week of moderate/strenous exercise 0 days   Average minutes spent exercising at this level 0 min      (!) EXERCISE CONCERN      10/10/2024   Social Factors   Frequency of gathering with friends or relatives Once a week   Worry food won't last until get money to buy more No   Food not last or not have enough money for food? No   Do you have housing? (Housing is defined as stable permanent housing and does not include staying ouside in a car, in a tent, in an abandoned building, in an overnight shelter, or couch-surfing.) Yes   Are you worried about losing your housing? No   Lack of transportation? No   Unable to get utilities (heat,electricity)? No                 10/10/2024   Activities of Daily Living- Home Safety   Needs help with the following daily activites None of the above   Safety concerns in the home None of the above            10/10/2024   Dental   Dentist two times every year? Yes            10/10/2024   Hearing Screening   Hearing concerns? None of the above            10/10/2024   Driving Risk Screening   Patient/family members have concerns about driving No            10/10/2024   General Alertness/Fatigue Screening   Have you been more tired than usual lately? (!) YES            10/10/2024   Urinary Incontinence Screening   Bothered by leaking urine in past 6 months Yes            10/10/2024   TB Screening   Were you born outside of the [place]? No             10/10/2024   Substance Use   Alcohol more than 3/day or more than 7/wk Not Applicable   Do you have a current opioid prescription? No   How severe/bad is pain from 1 to 10? 3/10   Do you use any other substances recreationally? No        Social History     Tobacco Use    Smoking status: Former     Current packs/day: 0.00     Average packs/day: 1 pack/day for 10.0 years (10.0 ttl pk-yrs)     Types: Cigarettes, Pipe     Start date: 6/15/1959     Quit date: 1969     Years since quittin.7    Smokeless tobacco: Never    Tobacco comments:        Substance Use Topics    Alcohol use: Yes     Comment: A beer once in awhile    Drug use: No             Reviewed and updated as needed this visit by Provider                    Labs reviewed in EPIC  BP Readings from Last 3 Encounters:   10/21/24 99/61   10/15/24 126/68   24 114/70    Wt Readings from Last 3 Encounters:   10/15/24 71.2 kg (157 lb)   24 70.8 kg (156 lb)   24 72.1 kg (159 lb)                  Patient Active Problem List   Diagnosis    Seborrheic dermatitis    Seasonal allergic rhinitis due to pollen    Cervical radiculopathy at C6    Personal history of malignant neoplasm of larynx    Essential tremor    CARDIOVASCULAR SCREENING; LDL GOAL LESS THAN 160    Benign prostatic hyperplasia    Hoarse voice quality    Pseudophakia    Hiatal hernia with GERD    Macular degeneration    Supraspinatus sprain, right, initial encounter    CARDIOVASCULAR SCREENING; LDL GOAL LESS THAN 100    Vitamin D deficiency    Chronic midline low back pain without sciatica    Sleep disorder, nonorganic    Other gastritis without hemorrhage, unspecified chronicity    BPH associated with nocturia    Weight loss    Pernicious anemia    Arthritis of right shoulder region    Basal cell carcinoma (BCC) of medial canthus of right eye     Past Surgical History:   Procedure Laterality Date    APPENDECTOMY  8 years old    BIOPSY  may 2015    face, back    Biopsy  of the throat - cancerous mass - got radiation for in larynx  1982    CATARACT IOL, RT/LT      COLONOSCOPY  02/25/2004    Normal    COLONOSCOPY N/A 04/28/2015    Procedure: COLONOSCOPY;  Surgeon: Marvin Adams MD;  Location: [facility]    COLONOSCOPY N/A 01/10/2022    Procedure: COLONOSCOPY, WITH POLYPECTOMY AND BIOPSY;  Surgeon: Marsha Asher MD;  Location: [facility]    COLONOSCOPY N/A 01/10/2022    Procedure: COLONOSCOPY, FLEXIBLE, WITH LESION REMOVAL USING SNARE;  Surgeon: Marsha Asher MD;  Location: [facility]    COLONOSCOPY WITH CO2 INSUFFLATION N/A 04/28/2015    Procedure: COLONOSCOPY WITH CO2 INSUFFLATION;  Surgeon: Marvin Adams MD;  Location: [facility]    COLONOSCOPY WITH CO2 INSUFFLATION N/A 01/10/2022    Procedure: COLONOSCOPY, WITH CO2 INSUFFLATION;  Surgeon: Marsha Asher MD;  Location: [facility]    COMBINED ESOPHAGOSCOPY, GASTROSCOPY, DUODENOSCOPY (EGD) WITH CO2 INSUFFLATION N/A 01/10/2022    Procedure: ESOPHAGOGASTRODUODENOSCOPY, WITH CO2 INSUFFLATION;  Surgeon: Marsha Asher MD;  Location: [facility]    CYSTOSCOPY  1997    for hematuria - negative    ESOPHAGOSCOPY, GASTROSCOPY, DUODENOSCOPY (EGD), COMBINED N/A 01/10/2022    Procedure: ESOPHAGOGASTRODUODENOSCOPY, WITH BIOPSY;  Surgeon: Marsha Asher MD;  Location: [facility]    EYE SURGERY Right     Rt eye.macular repair    EYE SURGERY  2003    cataract    NASAL ENDOSCOPY,DX  04/2007    GERD    Pars plana vitrectomy and epiretinal membrane dissection, right eye  11/08/2002    Phacoemulsification with intraocular lens implantation right eye.  03/11/2003    PHACOEMULSIFICATION WITH STANDARD INTRAOCULAR LENS IMPLANT Left 09/22/2016    Procedure: PHACOEMULSIFICATION WITH STANDARD INTRAOCULAR LENS IMPLANT;  Surgeon: Nghia Lara MD;  Location: [facility]    Thyroglossal Duct Cyst Removal - 2ndary to radiation.  1982    VASECTOMY  1971    ZZ GASTROSCOPY,FL  09/2007    hiatal hernia and errosions       Social History     Tobacco  Use    Smoking status: Former     Current packs/day: 0.00     Average packs/day: 1 pack/day for 10.0 years (10.0 ttl pk-yrs)     Types: Cigarettes, Pipe     Start date: 6/15/1959     Quit date: 1969     Years since quittin.7    Smokeless tobacco: Never    Tobacco comments:        Substance Use Topics    Alcohol use: Yes     Comment: A beer once in awhile     Family History   Problem Relation Age of Onset    Cerebrovascular Disease Mother         at 84 yo - possibly TIA - befoer    Macular Degeneration Mother     Gastrointestinal Disease Father     Cancer - colorectal Father     Hypertension Father     Cancer Father     Other - See Comments Daughter     Other - See Comments Daughter     Other - See Comments Son     Neurologic Disorder Son         eye and hearing ?    Tremor Son     Other - See Comments Son     Cancer Other     C.A.D. No family hx of     Diabetes No family hx of     Prostate Cancer No family hx of     Glaucoma No family hx of     Thyroid Disease No family hx of          Allergies   Allergen Reactions    Seasonal Allergies      Hay fever        Do you have a current opioid prescription? no  Do you use any other controlled substances or medications that are not prescribed by a provider? none   Current providers sharing in care for this patient include:  Patient Care Team:  Tyrel Manning MD as PCP - General (Internal Medicine)  García Munroe MD as Assigned Neuroscience Provider  Tosha Ron MD as MD (Otolaryngology)  Fior Matos [facility] as Pharmacist (Pharmacist)  Carlos James OD (Optometry)  Mau Padilla MD as MD (Dermatology)  Nicole Dos Santos MD as Assigned PCP  Orlando Rose MD as Physician (Ophthalmology)  Orlando Rose MD as Assigned Surgical Provider    The following health maintenance items are reviewed in Epic and correct as of today:  Health Maintenance   Topic Date Due    COVID-19 Vaccine ( season)  09/01/2024    ANNUAL REVIEW OF HM ORDERS  11/17/2024    EYE EXAM  09/30/2025    MEDICARE ANNUAL WELLNESS VISIT  10/15/2025    FALL RISK ASSESSMENT  10/15/2025    ADVANCE CARE PLANNING  08/22/2028    DTAP/TDAP/TD IMMUNIZATION (3 - Td or Tdap) 08/25/2033    PHQ-2 (once per calendar year)  Completed    INFLUENZA VACCINE  Completed    Pneumococcal Vaccine: 65+ Years  Completed    ZOSTER IMMUNIZATION  Completed    RSV VACCINE  Completed    HPV IMMUNIZATION  Aged Out    MENINGITIS IMMUNIZATION  Aged Out    RSV MONOCLONAL ANTIBODY  Aged Out    COLORECTAL CANCER SCREENING  Discontinued

## 2024-10-15 NOTE — PATIENT INSTRUCTIONS
Patient Education   Preventive Care Advice   This is general advice given by our system to help you stay healthy. However, your care team may have specific advice just for you. Please talk to your care team about your preventive care needs.  Nutrition  Eat 5 or more servings of fruits and vegetables each day.  Try wheat bread, brown rice and whole grain pasta (instead of white bread, rice, and pasta).  Get enough calcium and vitamin D. Check the label on foods and aim for 100% of the RDA (recommended daily allowance).  Lifestyle  Exercise at least 150 minutes each week  (30 minutes a day, 5 days a week).  Do muscle strengthening activities 2 days a week. These help control your weight and prevent disease.  No smoking.  Wear sunscreen to prevent skin cancer.  Have a dental exam and cleaning every 6 months.  Yearly exams  See your health care team every year to talk about:  Any changes in your health.  Any medicines your care team has prescribed.  Preventive care, family planning, and ways to prevent chronic diseases.  Shots (vaccines)   HPV shots (up to age 26), if you've never had them before.  Hepatitis B shots (up to age 59), if you've never had them before.  COVID-19 shot: Get this shot when it's due.  Flu shot: Get a flu shot every year.  Tetanus shot: Get a tetanus shot every 10 years.  Pneumococcal, hepatitis A, and RSV shots: Ask your care team if you need these based on your risk.  Shingles shot (for age 50 and up)  General health tests  Diabetes screening:  Starting at age 35, Get screened for diabetes at least every 3 years.  If you are younger than age 35, ask your care team if you should be screened for diabetes.  Cholesterol test: At age 39, start having a cholesterol test every 5 years, or more often if advised.  Bone density scan (DEXA): At age 50, ask your care team if you should have this scan for osteoporosis (brittle bones).  Hepatitis C: Get tested at least once in your life.  STIs (sexually  transmitted infections)  Before age 24: Ask your care team if you should be screened for STIs.  After age 24: Get screened for STIs if you're at risk. You are at risk for STIs (including HIV) if:  You are sexually active with more than one person.  You don't use condoms every time.  You or a partner was diagnosed with a sexually transmitted infection.  If you are at risk for HIV, ask about PrEP medicine to prevent HIV.  Get tested for HIV at least once in your life, whether you are at risk for HIV or not.  Cancer screening tests  Cervical cancer screening: If you have a cervix, begin getting regular cervical cancer screening tests starting at age 21.  Breast cancer scan (mammogram): If you've ever had breasts, begin having regular mammograms starting at age 40. This is a scan to check for breast cancer.  Colon cancer screening: It is important to start screening for colon cancer at age 45.  Have a colonoscopy test every 10 years (or more often if you're at risk) Or, ask your provider about stool tests like a FIT test every year or Cologuard test every 3 years.  To learn more about your testing options, visit:   .  For help making a decision, visit:   https://bit.ly/bj32119.  Prostate cancer screening test: If you have a prostate, ask your care team if a prostate cancer screening test (PSA) at age 55 is right for you.  Lung cancer screening: If you are a current or former smoker ages 50 to 80, ask your care team if ongoing lung cancer screenings are right for you.  For informational purposes only. Not to replace the advice of your health care provider. Copyright   2023 [facility]. All rights reserved. Clinically reviewed by the [facility] Transitions Program. [employer] [id number] - REV 01/24.  Preventing Falls: Care Instructions  Injuries and health problems such as trouble walking or poor eyesight can increase your risk of falling. So can some medicines. But there are things you can do to help  "prevent falls. You can exercise to get stronger. You can also arrange your home to make it safer.    Talk to your doctor about the medicines you take. Ask if any of them increase the risk of falls and whether they can be changed or stopped.   Try to exercise regularly. It can help improve your strength and balance. This can help lower your risk of falling.         Practice fall safety and prevention.   Wear low-heeled shoes that fit well and give your feet good support. Talk to your doctor if you have foot problems that make this hard.  Carry a cellphone or wear a medical alert device that you can use to call for help.  Use stepladders instead of chairs to reach high objects. Don't climb if you're at risk for falls. Ask for help, if needed.  Wear the correct eyeglasses, if you need them.        Make your home safer.   Remove rugs, cords, clutter, and furniture from walkways.  Keep your house well lit. Use night-lights in hallways and bathrooms.  Install and use sturdy handrails on stairways.  Wear nonskid footwear, even inside. Don't walk barefoot or in socks without shoes.        Be safe outside.   Use handrails, curb cuts, and ramps whenever possible.  Keep your hands free by using a shoulder bag or backpack.  Try to walk in well-lit areas. Watch out for uneven ground, changes in pavement, and debris.  Be careful in the winter. Walk on the grass or gravel when sidewalks are slippery. Use de-icer on steps and walkways. Add non-slip devices to shoes.    Put grab bars and nonskid mats in your shower or tub and near the toilet. Try to use a shower chair or bath bench when bathing.   Get into a tub or shower by putting in your weaker leg first. Get out with your strong side first. Have a phone or medical alert device in the bathroom with you.   Where can you learn more?  Go to https://www.[employer].net/patiented  Enter G117 in the search box to learn more about \"Preventing Falls: Care Instructions.\"  Current as of: " July 17, 2023  Content Version: 14.2 2024 [employer].   Care instructions adapted under license by your healthcare professional. If you have questions about a medical condition or this instruction, always ask your healthcare professional. Health[employer] Incorporated disclaims any warranty or liability for your use of this information.    Learning About Stress  What is stress?     Stress is your body's response to a hard situation. Your body can have a physical, emotional, or mental response. Stress is a fact of life for most people, and it affects everyone differently. What causes stress for you may not be stressful for someone else.  A lot of things can cause stress. You may feel stress when you go on a job interview, take a test, or run a race. This kind of short-term stress is normal and even useful. It can help you if you need to work hard or react quickly. For example, stress can help you finish an important job on time.  Long-term stress is caused by ongoing stressful situations or events. Examples of long-term stress include long-term health problems, ongoing problems at work, or conflicts in your family. Long-term stress can harm your health.  How does stress affect your health?  When you are stressed, your body responds as though you are in danger. It makes hormones that speed up your heart, make you breathe faster, and give you a burst of energy. This is called the fight-or-flight stress response. If the stress is over quickly, your body goes back to normal and no harm is done.  But if stress happens too often or lasts too long, it can have bad effects. Long-term stress can make you more likely to get sick, and it can make symptoms of some diseases worse. If you tense up when you are stressed, you may develop neck, shoulder, or low back pain. Stress is linked to high blood pressure and heart disease.  Stress also harms your emotional health. It can make you skelton, tense, or depressed. Your  relationships may suffer, and you may not do well at work or school.  What can you do to manage stress?  You can try these things to help manage stress:   Do something active. Exercise or activity can help reduce stress. Walking is a great way to get started. Even everyday activities such as housecleaning or yard work can help.  Try yoga or mary chi. These techniques combine exercise and meditation. You may need some training at first to learn them.  Do something you enjoy. For example, listen to music or go to a movie. Practice your hobby or do volunteer work.  Meditate. This can help you relax, because you are not worrying about what happened before or what may happen in the future.  Do guided imagery. Imagine yourself in any setting that helps you feel calm. You can use online videos, books, or a teacher to guide you.  Do breathing exercises. For example:  From a standing position, bend forward from the waist with your knees slightly bent. Let your arms dangle close to the floor.  Breathe in slowly and deeply as you return to a standing position. Roll up slowly and lift your head last.  Hold your breath for just a few seconds in the standing position.  Breathe out slowly and bend forward from the waist.  Let your feelings out. Talk, laugh, cry, and express anger when you need to. Talking with supportive friends or family, a counselor, or a deborah leader about your feelings is a healthy way to relieve stress. Avoid discussing your feelings with people who make you feel worse.  Write. It may help to write about things that are bothering you. This helps you find out how much stress you feel and what is causing it. When you know this, you can find better ways to cope.  What can you do to prevent stress?  You might try some of these things to help prevent stress:  Manage your time. This helps you find time to do the things you want and need to do.  Get enough sleep. Your body recovers from the stresses of the day while  "you are sleeping.  Get support. Your family, friends, and community can make a difference in how you experience stress.  Limit your news feed. Avoid or limit time on social media or news that may make you feel stressed.  Do something active. Exercise or activity can help reduce stress. Walking is a great way to get started.  Where can you learn more?  Go to https://www.[employer].net/patiented  Enter N032 in the search box to learn more about \"Learning About Stress.\"  Current as of: October 24, 2023  Content Version: 14.2 2024 [employer].   Care instructions adapted under license by your healthcare professional. If you have questions about a medical condition or this instruction, always ask your healthcare professional. Healthwise, Incorporated disclaims any warranty or liability for your use of this information.    Learning About Sleeping Well  What does sleeping well mean?     Sleeping well means getting enough sleep to feel good and stay healthy. How much sleep is enough varies among people.  The number of hours you sleep and how you feel when you wake up are both important. If you do not feel refreshed, you probably need more sleep. Another sign of not getting enough sleep is feeling tired during the day.  Experts recommend that adults get at least 7 or more hours of sleep per day. Children and older adults need more sleep.  Why is getting enough sleep important?  Getting enough quality sleep is a basic part of good health. When your sleep suffers, your physical health, mood, and your thoughts can suffer too. You may find yourself feeling more grumpy or stressed. Not getting enough sleep also can lead to serious problems, including injury, accidents, anxiety, and depression.  What might cause poor sleeping?  Many things can cause sleep problems, including:  Changes to your sleep schedule.  Stress. Stress can be caused by fear about a single event, such as giving a speech. Or you may have ongoing " "stress, such as worry about work or school.  Depression, anxiety, and other mental or emotional conditions.  Changes in your sleep habits or surroundings. This includes changes that happen where you sleep, such as noise, light, or sleeping in a different bed. It also includes changes in your sleep pattern, such as having jet lag or working a late shift.  Health problems, such as pain, breathing problems, and restless legs syndrome.  Lack of regular exercise.  Using alcohol, nicotine, or caffeine before bed.  How can you help yourself?  Here are some tips that may help you sleep more soundly and wake up feeling more refreshed.  Your sleeping area   Use your bedroom only for sleeping and sex. A bit of light reading may help you fall asleep. But if it doesn't, do your reading elsewhere in the house. Try not to use your TV, computer, smartphone, or tablet while you are in bed.  Be sure your bed is big enough to stretch out comfortably, especially if you have a sleep partner.  Keep your bedroom quiet, dark, and cool. Use curtains, blinds, or a sleep mask to block out light. To block out noise, use earplugs, soothing music, or a \"white noise\" machine.  Your evening and bedtime routine   Create a relaxing bedtime routine. You might want to take a warm shower or bath, or listen to soothing music.  Go to bed at the same time every night. And get up at the same time every morning, even if you feel tired.  What to avoid   Limit caffeine (coffee, tea, caffeinated sodas) during the day, and don't have any for at least 6 hours before bedtime.  Avoid drinking alcohol before bedtime. Alcohol can cause you to wake up more often during the night.  Try not to smoke or use tobacco, especially in the evening. Nicotine can keep you awake.  Limit naps during the day, especially close to bedtime.  Avoid lying in bed awake for too long. If you can't fall asleep or if you wake up in the middle of the night and can't get back to sleep within " "about 20 minutes, get out of bed and go to another room until you feel sleepy.  Avoid taking medicine right before bed that may keep you awake or make you feel hyper or energized. Your doctor can tell you if your medicine may do this and if you can take it earlier in the day.  If you can't sleep   Imagine yourself in a peaceful, pleasant scene. Focus on the details and feelings of being in a place that is relaxing.  Get up and do a quiet or boring activity until you feel sleepy.  Avoid drinking any liquids before going to bed to help prevent waking up often to use the bathroom.  Where can you learn more?  Go to https://www.[employer]net/patiented  Enter J942 in the search box to learn more about \"Learning About Sleeping Well.\"  Current as of: July 10, 2023  Content Version: 14.2 2024 [employer].   Care instructions adapted under license by your healthcare professional. If you have questions about a medical condition or this instruction, always ask your healthcare professional. Healthwise, Incorporated disclaims any warranty or liability for your use of this information.    Bladder Training: Care Instructions  Your Care Instructions     Bladder training is used to treat urge incontinence and stress incontinence. Urge incontinence means that the need to urinate comes on so fast that you can't get to a toilet in time. Stress incontinence means that you leak urine because of pressure on your bladder. For example, it may happen when you laugh, cough, or lift something heavy.  Bladder training can increase how long you can wait before you have to urinate. It can also help your bladder hold more urine. And it can give you better control over the urge to urinate.  It is important to remember that bladder training takes a few weeks to a few months to make a difference. You may not see results right away, but don't give up.  Follow-up care is a key part of your treatment and safety. Be sure to make and go to " all appointments, and call your doctor if you are having problems. It's also a good idea to know your test results and keep a list of the medicines you take.  How can you care for yourself at home?  Work with your doctor to come up with a bladder training program that is right for you. You may use one or more of the following methods.  Delayed urination  In the beginning, try to keep from urinating for 5 minutes after you first feel the need to go.  While you wait, take deep, slow breaths to relax. Kegel exercises can also help you delay the need to go to the bathroom.  After some practice, when you can easily wait 5 minutes to urinate, try to wait 10 minutes before you urinate.  Slowly increase the waiting period until you are able to control when you have to urinate.  Scheduled urination  Empty your bladder when you first wake up in the morning.  Schedule times throughout the day when you will urinate.  Start by going to the bathroom every hour, even if you don't need to go.  Slowly increase the time between trips to the bathroom.  When you have found a schedule that works well for you, keep doing it.  If you wake up during the night and have to urinate, do it. Apply your schedule to waking hours only.  Kegel exercises  These tighten and strengthen pelvic muscles, which can help you control the flow of urine. (If doing these exercises causes pain, stop doing them and talk with your doctor.) To do Kegel exercises:  Squeeze your muscles as if you were trying not to pass gas. Or squeeze your muscles as if you were stopping the flow of urine. Your belly, legs, and buttocks shouldn't move.  Hold the squeeze for 3 seconds, then relax for 5 to 10 seconds.  Start with 3 seconds, then add 1 second each week until you are able to squeeze for 10 seconds.  Repeat the exercise 10 times a session. Do 3 to 8 sessions a day.  When should you call for help?  Watch closely for changes in your health, and be sure to contact your  "doctor if:    Your incontinence is getting worse.     You do not get better as expected.   Where can you learn more?  Go to https://www.[employer]net/patiented  Enter V684 in the search box to learn more about \"Bladder Training: Care Instructions.\"  Current as of: November 15, 2023  Content Version: 14.2 2024 [employer].   Care instructions adapted under license by your healthcare professional. If you have questions about a medical condition or this instruction, always ask your healthcare professional. Healthwise, Incorporated disclaims any warranty or liability for your use of this information.    Learning About Depression Screening  What is depression screening?  Depression screening is a way to see if you have depression symptoms. It may be done by a doctor or counselor. It's often part of a routine checkup. That's because your mental health is just as important as your physical health.  Depression is a mental health condition that affects how you feel, think, and act. You may:  Have less energy.  Lose interest in your daily activities.  Feel sad and grouchy for a long time.  Depression is very common. It affects people of all ages.  Many things can lead to depression. Some people become depressed after they have a stroke or find out they have a major illness like cancer or heart disease. The death of a loved one or a breakup may lead to depression. It can run in families. Most experts believe that a combination of inherited genes and stressful life events can cause it.  What happens during screening?  You may be asked to fill out a form about your depression symptoms. You and the doctor will discuss your answers. The doctor may ask you more questions to learn more about how you think, act, and feel.  What happens after screening?  If you have symptoms of depression, your doctor will talk to you about your options.  Doctors usually treat depression with medicines or counseling. Often, combining " "the two works best. Many people don't get help because they think that they'll get over the depression on their own. But people with depression may not get better unless they get treatment.  The cause of depression is not well understood. There may be many factors involved. But if you have depression, it's not your fault.  A serious symptom of depression is thinking about death or suicide. If you or someone you care about talks about this or about feeling hopeless, get help right away.  It's important to know that depression can be treated. Medicine, counseling, and self-care may help.  Where can you learn more?  Go to https://www.[employer].net/patiented  Enter T185 in the search box to learn more about \"Learning About Depression Screening.\"  Current as of: June 24, 2023  Content Version: 14.2 2024 [facility].   Care instructions adapted under license by your healthcare professional. If you have questions about a medical condition or this instruction, always ask your healthcare professional. Healthwise, Incorporated disclaims any warranty or liability for your use of this information.       "

## 2024-10-16 ENCOUNTER — TELEPHONE (OUTPATIENT)
Dept: DERMATOLOGY | Facility: CLINIC | Age: 89
End: 2024-10-16
Payer: COMMERCIAL

## 2024-10-16 LAB
ALBUMIN SERPL BCG-MCNC: 4.5 G/DL (ref 3.5–5.2)
ALP SERPL-CCNC: 74 U/L (ref 40–150)
ALT SERPL W P-5'-P-CCNC: 18 U/L (ref 0–70)
ANION GAP SERPL CALCULATED.3IONS-SCNC: 10 MMOL/L (ref 7–15)
AST SERPL W P-5'-P-CCNC: 28 U/L (ref 0–45)
BILIRUB SERPL-MCNC: 0.2 MG/DL
BUN SERPL-MCNC: 26 MG/DL (ref 8–23)
CALCIUM SERPL-MCNC: 9.2 MG/DL (ref 8.8–10.4)
CHLORIDE SERPL-SCNC: 103 MMOL/L (ref 98–107)
CHOLEST SERPL-MCNC: 189 MG/DL
CREAT SERPL-MCNC: 0.96 MG/DL (ref 0.67–1.17)
EGFRCR SERPLBLD CKD-EPI 2021: 77 ML/MIN/1.73M2
FASTING STATUS PATIENT QL REPORTED: NO
FASTING STATUS PATIENT QL REPORTED: NO
GLUCOSE SERPL-MCNC: 94 MG/DL (ref 70–99)
HCO3 SERPL-SCNC: 27 MMOL/L (ref 22–29)
HDLC SERPL-MCNC: 77 MG/DL
LDLC SERPL CALC-MCNC: 94 MG/DL
NONHDLC SERPL-MCNC: 112 MG/DL
POTASSIUM SERPL-SCNC: 4.8 MMOL/L (ref 3.4–5.3)
PROT SERPL-MCNC: 7.3 G/DL (ref 6.4–8.3)
SODIUM SERPL-SCNC: 140 MMOL/L (ref 135–145)
TRIGL SERPL-MCNC: 91 MG/DL
VIT B12 SERPL-MCNC: 277 PG/ML (ref 232–1245)

## 2024-10-16 NOTE — TELEPHONE ENCOUNTER
2nd attempt to reach patient.  Will have Mohs team try to call in the morning.  Angeles Irizarry RN

## 2024-10-16 NOTE — TELEPHONE ENCOUNTER
[facility]    Dr. Albrecht would like a face to face consult with patient to assess lesion on canthus and determine if repair is needed with Dr. Cervantes.  His Mohs surgery that is currently scheduled may need to be rescheduled based on the consult findings.    I tentatively scheduled patient for a consult on 10/17/24 at 3pm.      Angeles Irizarry RN

## 2024-10-17 ENCOUNTER — TELEPHONE (OUTPATIENT)
Dept: DERMATOLOGY | Facility: CLINIC | Age: 89
End: 2024-10-17

## 2024-10-17 ENCOUNTER — VIRTUAL VISIT (OUTPATIENT)
Dept: DERMATOLOGY | Facility: CLINIC | Age: 89
End: 2024-10-17
Payer: COMMERCIAL

## 2024-10-17 DIAGNOSIS — C44.111 BASAL CELL CARCINOMA (BCC) OF MEDIAL CANTHUS OF RIGHT EYE: Primary | ICD-10-CM

## 2024-10-17 PROCEDURE — 99441 PR PHYSICIAN TELEPHONE EVALUATION 5-10 MIN: CPT | Mod: 93 | Performed by: DERMATOLOGY

## 2024-10-17 NOTE — LETTER
10/17/2024      Preston Cai  [address]  [facility] [address]      Dear Colleague,    Thank you for referring your patient, Preston Cai, to the [facility]. Please see a copy of my visit note below.    [facility] Dermatology Note  Encounter Date: Oct 17, 2024  Store-and-Forward and Telephone. Location of teledermatologist: [facility].  Start time: 1221. End time: 1226.    Dermatologic Surgery Telemedicine Consult Note    Dermatology Problem List:  FBSE: 8/28/24    1. 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 AKs, s/p cryo  - x6 face and scalp cryo 02/21/2024  - pigmented AK - left jaw, s/p by 11/13/18  - HAK - right hand, s/p excision 5/2009  3. 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     CC: Basal Cell Carcinoma (R medial canthus)      Subjective: Preston Cai is a 88 year old male who presents today for Mohs micrographic surgery consultation for a recent diagnosis of skin cancer.  - Skin cancer(s): BCC  - Location(s): right inner canthus  - The spot used to bleed with minimal friction, but has stopped after the biopsy. It looks and feels small to him, 1/4 inch.     - no other concerns today      Objective:   Skin: Focused examination of the right inner canthus within the teledermatology photograph(s) on 10/17/24 was performed.   - right inner canthus, there is poorly defined pink atrophic macule            Pt emailed photos in.                   Janee Trinidad on 10/17/2024 at 11:50 AM           [id number] Path report:   B(2). Skin, R medial canthus: Basal cell carcinoma, nodular type   The specimen is received in formalin with proper  "patient identification, labeled \"right medial canthus\".  The specimen consists of a 0.8 x 0.7 cm tan-white skin shave biopsy.  Eccentrically located is a 0.6 x 0.4 x 0.1 cm tan-yellow, crusty, plaque-like lesion.  The surgical margin is inked black.  The specimen is trisected, entirely submitted as B1.            Assessment and Plan:     1. Plan for Mohs micrographic surgery for skin cancer(s) above:  *Review lab result(s): Dermpath report   - We discussed the nature of the diagnosis/condition above. We discussed the treatment options, including the risks benefits and expectations of these options. We recommend micrographic surgery as the most effective and most tissue sparing option for treatment, and the patient agrees to proceed with this.  The patient is aware of the risks, benefits and expectations of this procedure. The patient will be scheduled for this procedure, if not already done so.  - We anticipate the following closure type: Sliding or lifting flap  - I do not anticipate needing Oculoplastics to assist with the wound repair.     The patient was discussed with and evaluated by attending physician, Tonio Albrecht DO.    Tonio Albrecht DO    Department of Dermatology  [facility]: Phone: [phone], Fax:[phone]  [facility]: Phone: [phone], Fax: [phone]      Again, thank you for allowing me to participate in the care of your patient.        Sincerely,        Tonio Albrecht MD  "

## 2024-10-17 NOTE — Clinical Note
I spoke with the patient today. The BCC looks ok to treat and repair in the dermatology clinic. Will keep Monday's appointment as scheduled. Thank you.

## 2024-10-17 NOTE — TELEPHONE ENCOUNTER
Patient called the clinic and I told him we would like to have him come in at 3pm today for a face to face consult he is going to look at his schedule and get back to us.     [place] EMT

## 2024-10-17 NOTE — PROGRESS NOTES
"[facility] Dermatology Note  Encounter Date: Oct 17, 2024  Store-and-Forward and Telephone. Location of teledermatologist: [facility].  Start time: 1221. End time: 1226.    Dermatologic Surgery Telemedicine Consult Note    Dermatology Problem List:  FBSE: 8/28/24    1. 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 AKs, s/p cryo  - x6 face and scalp cryo 02/21/2024  - pigmented AK - left jaw, s/p by 11/13/18  - HAK - right hand, s/p excision 5/2009  3. 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     CC: Basal Cell Carcinoma (R medial canthus)      Subjective: Preston Cai is a 88 year old male who presents today for Mohs micrographic surgery consultation for a recent diagnosis of skin cancer.  - Skin cancer(s): BCC  - Location(s): right inner canthus  - The spot used to bleed with minimal friction, but has stopped after the biopsy. It looks and feels small to him, 1/4 inch.     - no other concerns today      Objective:   Skin: Focused examination of the right inner canthus within the teledermatology photograph(s) on 10/17/24 was performed.   - right inner canthus, there is poorly defined pink atrophic macule            Pt emailed photos in.                   Janee Trinidad on 10/17/2024 at 11:50 AM           [id number] Path report:   B(2). Skin, R medial canthus: Basal cell carcinoma, nodular type   The specimen is received in formalin with proper patient identification, labeled \"right medial canthus\".  The specimen consists of a 0.8 x 0.7 cm tan-white skin shave biopsy.  Eccentrically located is a 0.6 x 0.4 x 0.1 cm tan-yellow, crusty, plaque-like lesion.  The surgical margin is inked black.  The " specimen is trisected, entirely submitted as B1.            Assessment and Plan:     1. Plan for Mohs micrographic surgery for skin cancer(s) above:  *Review lab result(s): Dermpath report   - We discussed the nature of the diagnosis/condition above. We discussed the treatment options, including the risks benefits and expectations of these options. We recommend micrographic surgery as the most effective and most tissue sparing option for treatment, and the patient agrees to proceed with this.  The patient is aware of the risks, benefits and expectations of this procedure. The patient will be scheduled for this procedure, if not already done so.  - We anticipate the following closure type: Sliding or lifting flap  - I do not anticipate needing Oculoplastics to assist with the wound repair.     The patient was discussed with and evaluated by attending physician, Tonio Albrecht DO.    Tonio Albrecht DO    Department of Dermatology  [facility]: Phone: [phone], Fax:[phone]  [facility]: Phone: [phone], Fax: [phone]

## 2024-10-18 PROBLEM — C44.111: Status: ACTIVE | Noted: 2024-10-18

## 2024-10-21 ENCOUNTER — OFFICE VISIT (OUTPATIENT)
Dept: DERMATOLOGY | Facility: CLINIC | Age: 89
End: 2024-10-21
Payer: COMMERCIAL

## 2024-10-21 VITALS — HEART RATE: 82 BPM | SYSTOLIC BLOOD PRESSURE: 99 MMHG | OXYGEN SATURATION: 95 % | DIASTOLIC BLOOD PRESSURE: 61 MMHG

## 2024-10-21 DIAGNOSIS — C44.111 BASAL CELL CARCINOMA (BCC) OF MEDIAL CANTHUS OF RIGHT EYE: Primary | ICD-10-CM

## 2024-10-21 PROCEDURE — 17000 DESTRUCT PREMALG LESION: CPT | Mod: XS | Performed by: DERMATOLOGY

## 2024-10-21 PROCEDURE — 14060 TIS TRNFR E/N/E/L 10 SQ CM/<: CPT | Performed by: DERMATOLOGY

## 2024-10-21 PROCEDURE — 17311 MOHS 1 STAGE H/N/HF/G: CPT | Performed by: DERMATOLOGY

## 2024-10-21 ASSESSMENT — PAIN SCALES - GENERAL: PAINLEVEL: MILD PAIN (3)

## 2024-10-21 NOTE — LETTER
10/21/2024      Preston Cai  [address]  [facility] [address]      Dear Colleague,    Thank you for referring your patient, Preston Cai, to the [facility]. Please see a copy of my visit note below.    [facility] Dermatologic Surgery Clinic [place] Procedure Note    Dermatology Problem List:  FBSE: 8/28/24     1. 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 AKs, s/p cryo  - x6 face and scalp cryo 02/21/2024  - pigmented AK - left jaw, s/p by 11/13/18  - HAK - right hand, s/p excision 5/2009  3. 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   _______________________    Date of Service:  Oct 21, 2024  Surgery: Mohs micrographic surgery    Case 1  Repair Type: local flap (rhombic transposition flap)  Repair Size: 3.0x2.0 cm  Suture Material: 5-0 monocryl, Fast Absorbing Gut 5-0  Tumor Type: BCC - Basal cell carcinoma  Location: right medial canthus  Derm-Path Accession #: [id number]  PreOp Size: 0.7x0.5 cm  PostOp Size: 1.3x1.1 cm  Mohs Accession #: [id number]  Level of Defect: muscle    Procedure:  We discussed the principles of treatment and most likely complications including scarring, bleeding, infection, swelling, pain, crusting, nerve damage, large wound,  incomplete excision, wound dehiscence,  nerve damage, recurrence, and a second procedure may be recommended to obtain the best cosmetic or functional result.    Informed consent was obtained and the patient underwent the procedure as follows:  The patient was placed supine on the operating table.  The cancer was identified, outlined with a marker, and verified by the patient.  The entire surgical field was prepped with Providone.  The surgical site was  anesthetized using Lidocaine 1% with epi 1:100,000.    The area of clinically apparent tumor was debulked. The layer of tissue was then surgically excised using a #15 blade and was then transferred onto a specimen sheet maintaining the orientation of the specimen. Hemostasis was obtained using bipolar electrocoagulation. The wound site was then covered with a dressing while the tissue samples were processed for examination.    The excised tissue was transported to the Mohs histology laboratory maintaining the tissue orientation.  The tissue specimen was relaxed so that the entire surgical margin was in a a single horizontal plane for sectioning and inked for precise mapping.  A precise reference map was drawn to reflect the sectioning of the specimen, colored inking of the margins, and orientation on the patient. The tissue was processed using horizontal sectioning of the base and continuous peripheral margins.  The histopathologic sections were reviewed in conjunction with the reference map.    Total blocks: 1    Total slides:  1    There were no cancer cells visualized on examination, therefore Mohs surgery was complete.     Reconstruction: Rhombic Transposition Flap    PROCEDURE:  The patient was taken to the operative suite and placed supine on the operating room table. The wound was identified and infiltrated with 1% lidocaine with epinephrine. The defect was then cleansed and prepped with PVP swab and draped with sterile drapes. Using a marker, a rhomboid transposition flap repair was planned. The wound edges were then de-beveled and the wound was undermined bluntly in all directions. The transposition flap was incised sharply to the level of fat. The flap was undermined from all surrounding tissue. Hemostasis was obtained with bipolar electrocoagulation. The flap was transposed into (carried over) the primary defect.  The secondary defect and flap closed with deep dermal 5-0 Monocryl sutures. Epidermal tissue  was carefully approximated using 5-0  Fast Absorbing Gutsimple running  sutures throughout the length of the flap. Redundant skin was excised by the triangulation technique, and closed in similar fashion. The wound was cleansed with sterile saline and polysporin was applied. A sterile non-adherent pressure dressing was placed. The patient left the operating suite in stable condition.The patient was counseled that revisionary procedures may need to be used as a second stage of this reconstruction.     The attending surgeon was present for the entire procedure and always immediately available.     Staff Involved:  Scribe/Staff    Additional Procedures Performed:   - Cryotherapy procedure note, location(s): left helix. After verbal consent and discussion of risks and benefits including, but not limited to, dyspigmentation/scar, blister, and pain, 1 lesion(s) was(were) treated with 1-2 mm freeze border for 1-2 cycles with liquid nitrogen. Post cryotherapy instructions were provided.     A/P:  # Actinic Keratosis, left helix  - Cryotherapy performed today (see procedure notes above).     Scribe Disclosure:   I, Barbara Griffin, am serving as a scribe; to document services personally performed by Dr. Tonio Albrecht - -based on data collection and the provider's statements to me.     Staff Physician Comments:   I saw and evaluated the patient with the Physician Assistant (Varsha Petit PA-C) and I agree with the assessment and plan and the above description of the procedure as documented by the scribe. I personally performed the key portions of the procedure and entire exam. I was immediately available in the clinic throughout the procedures.     Tonio Albrecht DO    Department of Dermatology  [facility]: Phone: [phone], Fax:[phone]  [facility]: Phone: [phone], Fax:  [phone]    Care and Laboratory Testing Performed at:  [facility]   Dermatology [facility]  [address] [place] [address]      Again, thank you for allowing me to participate in the care of your patient.        Sincerely,    Tonio Albrecht MD

## 2024-10-21 NOTE — TELEPHONE ENCOUNTER
Pt was unable to come in last Thursday. [facility] with provider about plan for today's procedure.    Patience Collins RN on 10/21/2024 at 8:18 AM

## 2024-10-21 NOTE — NURSING NOTE
Preston Cai's chief complaint for this visit includes:  Chief Complaint   Patient presents with    Procedure     Mohs- right medial canthus, bcc.   Cryotherapy- left helix     PCP: Tyrel Manning    Referring Provider:  Jessika Armenta PA-C  [id number] [place] [address]    BP 99/61   Pulse 82   SpO2 95%   Mild Pain (3)        Allergies   Allergen Reactions    Seasonal Allergies      Hay fever          Do you need any medication refills at today's visit?  No.      Loren Rain RN on 10/21/2024 at 9:33 AM

## 2024-10-21 NOTE — PATIENT INSTRUCTIONS
Caring for your skin after surgery    After your surgery, a pressure bandage will be placed over the area. This will prevent bleeding. Please follow these instructions over the next 1 to 2 weeks. Following this regimen will help to prevent complications as your wound heals.     For the first 48 hours after your surgery:    Leave the pressure dressing on and keep it dry. If it should come loose, you may re-tape it, but do not take it off.  Relax and take it easy. Do not do any vigorous exercise, heavy lifting or bending forward. This could cause the wound to bleed.  Post-operative pain is usually mild. You may alternate between 1000 mg of Tylenol (acetaminophen) and 400 mg of Ibuprofen every 4 hours.  Do not take more than 4,000 mg of acetaminophen in a 24-hour period or 3200 mg of Ibuprofen in a 24-hr period.  Avoid alcohol and vitamin E as these may increase your tendency to bleed.  You may put an ice pack around the bandaged area for 20 minutes at a time as needed. This may help reduce swelling, bruising, and pain. Make sure the ice pack is waterproof so that the pressure bandage doesn't get wet.  You may see a small amount of drainage or blood on your pressure bandage. This is normal. However:  If drainage or bleeding continues or saturates the bandage, you will need to apply firm pressure over the bandage with a clean washcloth for 15 minutes.  If bleeding continues after applying pressure for 15 minutes, apply an ice pack with gentle pressure to the bandaged area for another 15 minutes.  If bleeding still continues, call our office or go to the nearest emergency room.    48 Hours After Surgery:  Carefully remove the pressure bandage. If it seems sticky or too difficult to get off, you may need to soak it off in the shower.  Wash wound with a mild soap and water.  Use caution when washing the wound, be gentle and do not let the forceful shower stream hit the wound directly.  Pat dry.  Apply Vaseline (from a new  container or tube) over the suture line with a Q-tip.  Cover the site with a bandage.  Do this daily until the sutures have  dissolved.      What to expect:    The first couple of days your wound may be tender and may bleed slightly when doing wound care.  There may be swelling and bruising around the wound, especially if it is near the eyes. For your comfort, you may apply ice or cold compresses to the area.  The area around your wound may be numb for several weeks or even months.  You may experience periodic sharp pain or mild itching around the wound as it heals.   The suture line will look dark pink at first and the edges of the wound will be reddened. This will lighten up each day.    Call Us If:    You have bleeding that will not stop after applying pressure and ice.  You have pain that is not controlled with Tylenol and Ibuprofen.  You have signs or symptoms of an infection such as fever over 100 degrees Fahrenheit, redness, warmth or foul-smelling drainage from the wound    [facility]: [phone]   [facility]: [phone]  For urgent needs outside of business hours call the [facility] at [phone] and ask to speak with the dermatology resident on call

## 2024-10-21 NOTE — PROGRESS NOTES
[facility] Dermatologic Surgery Clinic [place] Procedure Note    Dermatology Problem List:  FBSE: 8/28/24     1. 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 AKs, s/p cryo  - x6 face and scalp cryo 02/21/2024  - pigmented AK - left jaw, s/p by 11/13/18  - HAK - right hand, s/p excision 5/2009  3. 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   _______________________    Date of Service:  Oct 21, 2024  Surgery: Mohs micrographic surgery    Case 1  Repair Type: local flap (rhombic transposition flap)  Repair Size: 3.0x2.0 cm  Suture Material: 5-0 monocryl, Fast Absorbing Gut 5-0  Tumor Type: BCC - Basal cell carcinoma  Location: right medial canthus  Derm-Path Accession #: [id number]  PreOp Size: 0.7x0.5 cm  PostOp Size: 1.3x1.1 cm  Mohs Accession #: [id number]  Level of Defect: muscle      Procedure:  We discussed the principles of treatment and most likely complications including scarring, bleeding, infection, swelling, pain, crusting, nerve damage, large wound,  incomplete excision, wound dehiscence,  nerve damage, recurrence, and a second procedure may be recommended to obtain the best cosmetic or functional result.    Informed consent was obtained and the patient underwent the procedure as follows:  The patient was placed supine on the operating table.  The cancer was identified, outlined with a marker, and verified by the patient.  The entire surgical field was prepped with Providone.  The surgical site was anesthetized using Lidocaine 1% with epi 1:100,000.      The area of clinically apparent tumor was debulked. The layer of tissue was then surgically excised using a #15 blade and was then transferred onto a specimen sheet maintaining the  orientation of the specimen. Hemostasis was obtained using bipolar electrocoagulation. The wound site was then covered with a dressing while the tissue samples were processed for examination.    The excised tissue was transported to the Mohs histology laboratory maintaining the tissue orientation.  The tissue specimen was relaxed so that the entire surgical margin was in a a single horizontal plane for sectioning and inked for precise mapping.  A precise reference map was drawn to reflect the sectioning of the specimen, colored inking of the margins, and orientation on the patient. The tissue was processed using horizontal sectioning of the base and continuous peripheral margins.  The histopathologic sections were reviewed in conjunction with the reference map.    Total blocks: 1    Total slides:  1    There were no cancer cells visualized on examination, therefore Mohs surgery was complete.     Reconstruction: Rhombic Transposition Flap    PROCEDURE:  The patient was taken to the operative suite and placed supine on the operating room table. The wound was identified and infiltrated with 1% lidocaine with epinephrine. The defect was then cleansed and prepped with PVP swab and draped with sterile drapes. Using a marker, a rhomboid transposition flap repair was planned. The wound edges were then de-beveled and the wound was undermined bluntly in all directions. The transposition flap was incised sharply to the level of fat. The flap was undermined from all surrounding tissue. Hemostasis was obtained with bipolar electrocoagulation. The flap was transposed into (carried over) the primary defect.  The secondary defect and flap closed with deep dermal 5-0 Monocryl sutures. Epidermal tissue was carefully approximated using 5-0  Fast Absorbing Gutsimple running  sutures throughout the length of the flap. Redundant skin was excised by the triangulation technique, and closed in similar fashion. The wound was cleansed with  sterile saline and polysporin was applied. A sterile non-adherent pressure dressing was placed. The patient left the operating suite in stable condition.The patient was counseled that revisionary procedures may need to be used as a second stage of this reconstruction.     The attending surgeon was present for the entire procedure and always immediately available.     Staff Involved:  Scribe/Staff      Additional Procedures Performed:   - Cryotherapy procedure note, location(s): left helix. After verbal consent and discussion of risks and benefits including, but not limited to, dyspigmentation/scar, blister, and pain, 1 lesion(s) was(were) treated with 1-2 mm freeze border for 1-2 cycles with liquid nitrogen. Post cryotherapy instructions were provided.     A/P:  # Actinic Keratosis, left helix  - Cryotherapy performed today (see procedure notes above).     Scribe Disclosure:   I, Barbara Griffin, am serving as a scribe; to document services personally performed by Dr. Tonio Albrecht - -based on data collection and the provider's statements to me.     Staff Physician Comments:   I saw and evaluated the patient with the Physician Assistant (Varsha Petit PA-C) and I agree with the assessment and plan and the above description of the procedure as documented by the scribe. I personally performed the key portions of the procedure and entire exam. I was immediately available in the clinic throughout the procedures.     Tonio Albrecht DO    Department of Dermatology  [facility]: Phone: [phone], Fax:[phone]  [facility]: Phone: [phone], Fax: [phone]    Care and Laboratory Testing Performed at:  [facility]   Dermatology Clinic  [address] [place] [address]

## 2024-10-21 NOTE — NURSING NOTE
The following medication was given:     MEDICATION:  Lidocaine with epinephrine 1% 1:970215  ROUTE: SQ  SITE: see procedure note  DOSE: 7.5 mL  LOT #: [id number]  : [id number]  EXPIRATION DATE: 4/30/25  NDC#: 25954-595-56  Was there drug waste? No  Multi-dose vial: Yes    Vaseline and pressure dressing applied to Mohs site on right medial canthus.  Wound care instructions reviewed with patient and AVS provided.  Patient verbalized understanding.  Patient will follow up for suture removal: N/A.  No further questions or concerns at this time.    Angeles Irizarry RN  October 21, 2024

## 2024-10-24 NOTE — PROGRESS NOTES
SUBJECTIVE:   This is a 88 year old, male presenting today for an annual physical exam.    Health Care Directive  Patient does not have a Health Care Directive or Living Will: Not discussed during today's visit.    Today's PHQ-9 Score:       10/15/2024     1:45 PM   PHQ-9 SCORE   PHQ-9 Total Score MyChart 15 (Moderately severe depression)   PHQ-9 Total Score 15           10/10/2024   General Health   How would you rate your overall physical health? Good   Feel stress (tense, anxious, or unable to sleep) Very much      (!) STRESS CONCERN      10/10/2024   Nutrition   Diet: Regular (no restrictions)            10/10/2024   Exercise   Days per week of moderate/strenous exercise 0 days   Average minutes spent exercising at this level 0 min      (!) EXERCISE CONCERN      10/10/2024   Social Factors   Frequency of gathering with friends or relatives Once a week   Worry food won't last until get money to buy more No   Food not last or not have enough money for food? No   Do you have housing? (Housing is defined as stable permanent housing and does not include staying ouside in a car, in a tent, in an abandoned building, in an overnight shelter, or couch-surfing.) Yes   Are you worried about losing your housing? No   Lack of transportation? No   Unable to get utilities (heat,electricity)? No                 10/10/2024   Activities of Daily Living- Home Safety   Needs help with the following daily activites None of the above   Safety concerns in the home None of the above            10/10/2024   Dental   Dentist two times every year? Yes            10/10/2024   Hearing Screening   Hearing concerns? None of the above            10/10/2024   Driving Risk Screening   Patient/family members have concerns about driving No            10/10/2024   General Alertness/Fatigue Screening   Have you been more tired than usual lately? (!) YES            10/10/2024   Urinary Incontinence Screening   Bothered by leaking urine in past 6  months Yes            10/10/2024   TB Screening   Were you born outside of the [place]? No            10/10/2024   Substance Use   Alcohol more than 3/day or more than 7/wk Not Applicable   Do you have a current opioid prescription? No   How severe/bad is pain from 1 to 10? 3/10   Do you use any other substances recreationally? No        Social History     Tobacco Use    Smoking status: Former     Current packs/day: 0.00     Average packs/day: 1 pack/day for 10.0 years (10.0 ttl pk-yrs)     Types: Cigarettes, Pipe     Start date: 6/15/1959     Quit date: 1969     Years since quittin.7    Smokeless tobacco: Never    Tobacco comments:        Substance Use Topics    Alcohol use: Yes     Comment: A beer once in awhile    Drug use: No             Reviewed and updated as needed this visit by Provider                    Labs reviewed in EPIC  BP Readings from Last 3 Encounters:   10/21/24 99/61   10/15/24 126/68   24 114/70    Wt Readings from Last 3 Encounters:   10/15/24 71.2 kg (157 lb)   24 70.8 kg (156 lb)   24 72.1 kg (159 lb)                  Patient Active Problem List   Diagnosis    Seborrheic dermatitis    Seasonal allergic rhinitis due to pollen    Cervical radiculopathy at C6    Personal history of malignant neoplasm of larynx    Essential tremor    CARDIOVASCULAR SCREENING; LDL GOAL LESS THAN 160    Benign prostatic hyperplasia    Hoarse voice quality    Pseudophakia    Hiatal hernia with GERD    Macular degeneration    Supraspinatus sprain, right, initial encounter    CARDIOVASCULAR SCREENING; LDL GOAL LESS THAN 100    Vitamin D deficiency    Chronic midline low back pain without sciatica    Sleep disorder, nonorganic    Other gastritis without hemorrhage, unspecified chronicity    BPH associated with nocturia    Weight loss    Pernicious anemia    Arthritis of right shoulder region    Basal cell carcinoma (BCC) of medial canthus of right eye     Past Surgical History:   Procedure  Laterality Date    APPENDECTOMY  8 years old    BIOPSY  may 2015    face, back    Biopsy of the throat - cancerous mass - got radiation for in larynx  1982    CATARACT IOL, RT/LT      COLONOSCOPY  02/25/2004    Normal    COLONOSCOPY N/A 04/28/2015    Procedure: COLONOSCOPY;  Surgeon: Marvin Adams MD;  Location: [facility]    COLONOSCOPY N/A 01/10/2022    Procedure: COLONOSCOPY, WITH POLYPECTOMY AND BIOPSY;  Surgeon: Marsha Asher MD;  Location: [facility]    COLONOSCOPY N/A 01/10/2022    Procedure: COLONOSCOPY, FLEXIBLE, WITH LESION REMOVAL USING SNARE;  Surgeon: Marsha Asher MD;  Location: [facility]    COLONOSCOPY WITH CO2 INSUFFLATION N/A 04/28/2015    Procedure: COLONOSCOPY WITH CO2 INSUFFLATION;  Surgeon: Marvin Adams MD;  Location: [facility]    COLONOSCOPY WITH CO2 INSUFFLATION N/A 01/10/2022    Procedure: COLONOSCOPY, WITH CO2 INSUFFLATION;  Surgeon: Marsha Asher MD;  Location: [facility]    COMBINED ESOPHAGOSCOPY, GASTROSCOPY, DUODENOSCOPY (EGD) WITH CO2 INSUFFLATION N/A 01/10/2022    Procedure: ESOPHAGOGASTRODUODENOSCOPY, WITH CO2 INSUFFLATION;  Surgeon: Marsha Asher MD;  Location: [facility]    CYSTOSCOPY  1997    for hematuria - negative    ESOPHAGOSCOPY, GASTROSCOPY, DUODENOSCOPY (EGD), COMBINED N/A 01/10/2022    Procedure: ESOPHAGOGASTRODUODENOSCOPY, WITH BIOPSY;  Surgeon: Marsha Asher MD;  Location: [facility]    EYE SURGERY Right     Rt eye.macular repair    EYE SURGERY  2003    cataract    NASAL ENDOSCOPY,DX  04/2007    GERD    Pars plana vitrectomy and epiretinal membrane dissection, right eye  11/08/2002    Phacoemulsification with intraocular lens implantation right eye.  03/11/2003    PHACOEMULSIFICATION WITH STANDARD INTRAOCULAR LENS IMPLANT Left 09/22/2016    Procedure: PHACOEMULSIFICATION WITH STANDARD INTRAOCULAR LENS IMPLANT;  Surgeon: Nghia Lara MD;  Location: [facility]    Thyroglossal Duct Cyst Removal - 2ndary to radiation.  1982    VASECTOMY  1971    ZZ  GASTROSCOPY,FL  2007    hiatal hernia and errosions       Social History     Tobacco Use    Smoking status: Former     Current packs/day: 0.00     Average packs/day: 1 pack/day for 10.0 years (10.0 ttl pk-yrs)     Types: Cigarettes, Pipe     Start date: 6/15/1959     Quit date: 1969     Years since quittin.7    Smokeless tobacco: Never    Tobacco comments:        Substance Use Topics    Alcohol use: Yes     Comment: A beer once in awhile     Family History   Problem Relation Age of Onset    Cerebrovascular Disease Mother         at 86 yo - possibly TIA - befoer    Macular Degeneration Mother     Gastrointestinal Disease Father     Cancer - colorectal Father     Hypertension Father     Cancer Father     Other - See Comments Daughter     Other - See Comments Daughter     Other - See Comments Son     Neurologic Disorder Son         eye and hearing ?    Tremor Son     Other - See Comments Son     Cancer Other     C.A.D. No family hx of     Diabetes No family hx of     Prostate Cancer No family hx of     Glaucoma No family hx of     Thyroid Disease No family hx of          Allergies   Allergen Reactions    Seasonal Allergies      Hay fever        Do you have a current opioid prescription? no  Do you use any other controlled substances or medications that are not prescribed by a provider? none   Current providers sharing in care for this patient include:  Patient Care Team:  Tyrel Manning MD as PCP - General (Internal Medicine)  García Munroe MD as Assigned Neuroscience Provider  Tosha Ron MD as MD (Otolaryngology)  [facility] Pharmacist (Pharmacist)  Carlos James OD (Optometry)  Mau Padilla MD as MD (Dermatology)  Nicole Dos Santos MD as Assigned PCP  Orlando Rose MD as Physician (Ophthalmology)  Orlando Rose MD as Assigned Surgical Provider    The following health maintenance items are reviewed in Epic and correct as of  "today:  Health Maintenance   Topic Date Due    COVID-19 Vaccine (6 - 2024-25 season) 09/01/2024    ANNUAL REVIEW OF HM ORDERS  11/17/2024    EYE EXAM  09/30/2025    MEDICARE ANNUAL WELLNESS VISIT  10/15/2025    FALL RISK ASSESSMENT  10/15/2025    ADVANCE CARE PLANNING  08/22/2028    DTAP/TDAP/TD IMMUNIZATION (3 - Td or Tdap) 08/25/2033    PHQ-2 (once per calendar year)  Completed    INFLUENZA VACCINE  Completed    Pneumococcal Vaccine: 65+ Years  Completed    ZOSTER IMMUNIZATION  Completed    RSV VACCINE  Completed    HPV IMMUNIZATION  Aged Out    MENINGITIS IMMUNIZATION  Aged Out    RSV MONOCLONAL ANTIBODY  Aged Out    COLORECTAL CANCER SCREENING  Discontinued         Reviewed and updated as needed this visit by clinical staff    Allergies  Meds              Reviewed and updated as needed this visit by Provider     Meds              REVIEW OF SYSTEMS  CONSTITUTIONAL: NEGATIVE for fever, chills, change in weight  INTEGUMENTARY/SKIN: POSITIVE for herpes zoster.  EYES: NEGATIVE for vision changes or irritation  ENT/MOUTH: NEGATIVE for ear, mouth and throat problems  RESP: NEGATIVE for significant cough or SOB  BREAST: NEGATIVE for masses, tenderness or discharge  CV: NEGATIVE for chest pain, palpitations or peripheral edema  GI: NEGATIVE for nausea, abdominal pain, heartburn, or change in bowel habits   male :positive for nocturia.  MUSCULOSKELETAL: NEGATIVE for significant arthralgias or myalgia  NEURO: POSITIVE for essential tremor.  ENDOCRINE: NEGATIVE for temperature intolerance, skin/hair changes  HEME/ALLERGY/IMMUNE: POSITIVE  for Vitamin B12 deficiency.  PSYCHIATRIC: NEGATIVE for changes in mood or affect    OBJECTIVE:   /68 (BP Location: Left arm, Patient Position: Chair, Cuff Size: Adult Regular)   Pulse 74   Temp 97.7  F (36.5  C)   Resp 22   Ht 1.854 m (6' 1\")   Wt 71.2 kg (157 lb)   SpO2 97%   BMI 20.71 kg/m     Estimated body mass index is 20.71 kg/m  as calculated from the following:    " "Height as of this encounter: 1.854 m (6' 1\").    Weight as of this encounter: 71.2 kg (157 lb).  Physical Exam  GENERAL: alert and no distress  EYES: Eyes grossly normal to inspection and conjunctivae and sclerae normal  HENT: normal cephalic/atraumatic and oral mucous membranes moist  NECK: no adenopathy and thyroid normal to palpation  RESP: lungs clear to auscultation - no rales, rhonchi or wheezes  CV: regular rates and rhythm and no peripheral edema  ABDOMEN: soft, nontender, no hepatosplenomegaly, no masses and bowel sounds normal  MS: no gross musculoskeletal defects noted, no edema  SKIN: no suspicious lesions or rashes  NEURO: Normal strength and tone and mentation intact  PSYCH: mentation appears normal, affect normal/bright    Diagnostic Test Results:  Results for orders placed or performed in visit on 10/15/24   Vitamin B12     Status: Normal   Result Value Ref Range    Vitamin B12 277 232 - 1,245 pg/mL   Lipid panel reflex to direct LDL Fasting     Status: None   Result Value Ref Range    Cholesterol 189 <200 mg/dL    Triglycerides 91 <150 mg/dL    Direct Measure HDL 77 >=40 mg/dL    LDL Cholesterol Calculated 94 <100 mg/dL    Non HDL Cholesterol 112 <130 mg/dL    Patient Fasting > 8hrs? No     Narrative    Cholesterol  Desirable: < 200 mg/dL  Borderline High: 200 - 239 mg/dL  High: >= 240 mg/dL    Triglycerides  Normal: < 150 mg/dL  Borderline High: 150 - 199 mg/dL  High: 200-499 mg/dL  Very High: >= 500 mg/dL    Direct Measure HDL  Female: >= 50 mg/dL   Male: >= 40 mg/dL    LDL Cholesterol  Desirable: < 100 mg/dL  Above Desirable: 100 - 129 mg/dL   Borderline High: 130 - 159 mg/dL   High:  160 - 189 mg/dL   Very High: >= 190 mg/dL    Non HDL Cholesterol  Desirable: < 130 mg/dL  Above Desirable: 130 - 159 mg/dL  Borderline High: 160 - 189 mg/dL  High: 190 - 219 mg/dL  Very High: >= 220 mg/dL   Comprehensive metabolic panel     Status: Abnormal   Result Value Ref Range    Sodium 140 135 - 145 mmol/L    " Potassium 4.8 3.4 - 5.3 mmol/L    Carbon Dioxide (CO2) 27 22 - 29 mmol/L    Anion Gap 10 7 - 15 mmol/L    Urea Nitrogen 26.0 (H) 8.0 - 23.0 mg/dL    Creatinine 0.96 0.67 - 1.17 mg/dL    GFR Estimate 77 >60 mL/min/1.73m2    Calcium 9.2 8.8 - 10.4 mg/dL    Chloride 103 98 - 107 mmol/L    Glucose 94 70 - 99 mg/dL    Alkaline Phosphatase 74 40 - 150 U/L    AST 28 0 - 45 U/L    ALT 18 0 - 70 U/L    Protein Total 7.3 6.4 - 8.3 g/dL    Albumin 4.5 3.5 - 5.2 g/dL    Bilirubin Total 0.2 <=1.2 mg/dL    Patient Fasting > 8hrs? No    CBC with platelets and differential     Status: Abnormal   Result Value Ref Range    WBC Count 5.2 4.0 - 11.0 10e3/uL    RBC Count 3.35 (L) 4.40 - 5.90 10e6/uL    Hemoglobin 11.1 (L) 13.3 - 17.7 g/dL    Hematocrit 34.1 (L) 40.0 - 53.0 %     (H) 78 - 100 fL    MCH 33.1 (H) 26.5 - 33.0 pg    MCHC 32.6 31.5 - 36.5 g/dL    RDW 13.0 10.0 - 15.0 %    Platelet Count 183 150 - 450 10e3/uL    % Neutrophils 61 %    % Lymphocytes 28 %    % Monocytes 10 %    % Eosinophils 1 %    % Basophils 0 %    % Immature Granulocytes 0 %    Absolute Neutrophils 3.2 1.6 - 8.3 10e3/uL    Absolute Lymphocytes 1.5 0.8 - 5.3 10e3/uL    Absolute Monocytes 0.5 0.0 - 1.3 10e3/uL    Absolute Eosinophils 0.1 0.0 - 0.7 10e3/uL    Absolute Basophils 0.0 0.0 - 0.2 10e3/uL    Absolute Immature Granulocytes 0.0 <=0.4 10e3/uL   CBC with Platelets & Differential     Status: Abnormal    Narrative    The following orders were created for panel order CBC with Platelets & Differential.  Procedure                               Abnormality         Status                     ---------                               -----------         ------                     CBC with platelets and d...[[id number]]  Abnormal            Final result                 Please view results for these tests on the individual orders.       ASSESSMENT/PLAN:     Encounter for Medicare annual wellness exam  - CBC with Platelets & Differential  - Comprehensive  metabolic panel    BPH associated with nocturia  - tamsulosin (FLOMAX) 0.4 MG capsule; Take 1 capsule (0.4 mg) by mouth every evening.  - Comprehensive metabolic panel    Vitamin B12 deficiency (non anemic)  - Vitamin B12    CARDIOVASCULAR SCREENING; LDL GOAL LESS THAN 100  - Lipid panel reflex to direct LDL Fasting    Herpes zoster without complication  - valACYclovir (VALTREX) 1000 mg tablet; Take 1 tablet (1,000 mg) by mouth 3 times daily for 7 days.    Essential tremor  - primidone (MYSOLINE) 250 MG tablet; Take 1 tablet (250 mg) by mouth 2 times daily.     Patient has been advised of split billing requirements and indicates understanding: Yes      Counseling  Special attention given to:        Regular exercise       Healthy diet/nutrition       The ASCVD Risk score (Reny DK, et al., 2019) failed to calculate for the following reasons:    The 2019 ASCVD risk score is only valid for ages 40 to 79        He reports that he quit smoking about 55 years ago. His smoking use included cigarettes and pipe. He started smoking about 65 years ago. He has a 10 pack-year smoking history. He has never used smokeless tobacco.            Signed Electronically by: Tyrel Manning MD    Identified Health Risks:  At higher risk of falls.

## 2024-11-07 ENCOUNTER — TELEPHONE (OUTPATIENT)
Dept: DERMATOLOGY | Facility: CLINIC | Age: 89
End: 2024-11-07
Payer: COMMERCIAL

## 2024-11-07 NOTE — TELEPHONE ENCOUNTER
Pt called, reviewed photos. Wound appears to be healing well. He denied any symptoms of infection. He was concerned about the crusting. Writer advised he continue to apply Vaseline until resolved. He asked if they stitches are gone and writer explained that it can take 3 months for them to dissolve. Writer asked if he is having any issues with his vision which he denied. He will reach out if things change or worsen.    Patience Collins RN on 11/7/2024 at 4:50 PM

## 2024-11-07 NOTE — TELEPHONE ENCOUNTER
Called and talked with patient's son Ed, as his number was provided earlier. Ed is not with his dad currently but is on his way and should be with Preston in about 45 minutes.     Ed sent in pictures to the Derm email yesterday and would like advice on how it looks. Writer found no photos in the email and asked for Ed to resend pictures or call us back at [phone] when he gets to his fathers house.     Advised patient that we close at 5 pm so if we do not hear from him before then, we will contact tomorrow. Did not specify what his concern was other than it was crusty.     Loren Rain RN on 11/7/2024 at 4:06 PM

## 2024-11-07 NOTE — TELEPHONE ENCOUNTER
Patients son called to let us know that the photo was sent and also no concern other then the site is crusty.     INFECTION CONCERNS:  Do you have any of the following symptoms:    Fever, temperature over 100 degrees F? NO  Redness expanding away from incision site? NO  Swelling? NO  Increase heat/warmth at incision site? NO  Body aches or chills? NO  Pain?  NO  Is pain worsening since procedure date? NO  Green or yellow drainage which may or may not have a foul odor? NO    Wound care:  What kind of soap are you using?   Are you applying Vaseline from a new container? YES  Are you applying anything other than Vaseline?  No  Have you been keeping it covered? YES    Ask patient to send photos for RN to review. Patients son did send in Photos    If yes to any of the above symptoms offer wound check appointment on NURSE POST OP or NURSE ONLY schedule    maciel message RN TASK for RN to review.    Notified RN Jacques Shaikh EMT

## 2024-11-07 NOTE — TELEPHONE ENCOUNTER
[facility] Call Center    Phone Message    May a detailed message be left on voicemail: yes     Reason for Call: Other:   Pt sent in pictures via email yesterday of his nose. Post MOHS pt. Pt states it is now crusty and would like to speak to a nurse please     Action Taken: TE SENT    Travel Screening: Not Applicable     Date of Service:                     Thank you!  [facility]

## 2025-01-08 ENCOUNTER — OFFICE VISIT (OUTPATIENT)
Dept: DERMATOLOGY | Facility: CLINIC | Age: 89
End: 2025-01-08
Payer: COMMERCIAL

## 2025-01-08 DIAGNOSIS — Z85.828 HISTORY OF BASAL CELL CARCINOMA OF SKIN: Primary | ICD-10-CM

## 2025-01-08 ASSESSMENT — PAIN SCALES - GENERAL: PAINLEVEL_OUTOF10: NO PAIN (0)

## 2025-01-08 NOTE — LETTER
1/8/2025      Preston Cai  [address]  [facility] [place] [facility] [address]      Dear Colleague,    Thank you for referring your patient, Preston Cai, to the [facility]. Please see a copy of my visit note below.    Dermatologic Surgery Post-Op Scar Check     CC: Surgical Followup (Mohs right medial canthus DOS: 10/21/2024)      Dermatology Problem List:  FBSE: 8/28/24     1. 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 AKs, s/p cryo  - x6 face and scalp cryo 02/21/2024  - pigmented AK - left jaw, s/p by 11/13/18  - HAK - right hand, s/p excision 5/2009  3. 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   _________________________________________________________________    Subjective: Preston Cai is a 89 year old male who presents today for scar evaluation after Mohs surgery on 10/21/24.   - He's noticed some firm texture at the leading edge of the flap scar, but no bleeding, weeping or crust from the surface.   - He does have the intermittent sensation of a foreign body in the corner of his eye, however, no foreign body has been revealed.   - His baseline dry eye symptoms are no worse than prior to the surgery.   - no other concerns today    Objective: An exam of the right medial canthus was performed today   - well-healed scar on the right medial canthus. There is moderate focal hypertrophy on the leading edge of the flap.    - The eyelid is largely in contact with the globe. When he closes his eyes full occlusion is achieved.           Assessment and Plan:     1. Post-surgical scar, right medial canthus, s/p Mohs 10/21/24  - Surgical site healed. Focal hypertrophic scar vs suture reaction present on the leading edge of the  flap.  - We discussed starting scar massage. Offered intralesional steroid injection to soften the scar tissue, but the patient prefers to do some scar massage and re-evaluate at his general dermatology visit this Spring. As suture buried sutures dissolve, symptoms may resolve.   - The patient should follow up with dermatologic surgery PRN, as well as continue with regular skin exams in general dermatology clinic.    Patient was discussed with and evaluated by attending physician Dr. Tonio Albrecht.    Scribe Disclosure:   I, Barbara Griffin, am serving as a scribe; to document services personally performed by Dr. Tonio Albrecht - -based on data collection and the provider's statements to me.     Provider Disclosure:   The documentation recorded by the scribe accurately reflects the services I personally performed and the decisions made by me.    Tonio Albrecht DO    Department of Dermatology  [facility]: Phone: [phone], Fax:[phone]  [facility]: Phone: [phone], Fax: [phone]          Again, thank you for allowing me to participate in the care of your patient.        Sincerely,        Tonio Albrecht MD    Electronically signed

## 2025-01-08 NOTE — PROGRESS NOTES
Dermatologic Surgery Post-Op Scar Check     CC: Surgical Followup (Mohs right medial canthus DOS: 10/21/2024)      Dermatology Problem List:  FBSE: 8/28/24     1. 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 AKs, s/p cryo  - x6 face and scalp cryo 02/21/2024  - pigmented AK - left jaw, s/p by 11/13/18  - HAK - right hand, s/p excision 5/2009  3. 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   _________________________________________________________________    Subjective: Preston Cai is a 89 year old male who presents today for scar evaluation after Mohs surgery on 10/21/24.   - He's noticed some firm texture at the leading edge of the flap scar, but no bleeding, weeping or crust from the surface.   - He does have the intermittent sensation of a foreign body in the corner of his eye, however, no foreign body has been revealed.   - His baseline dry eye symptoms are no worse than prior to the surgery.   - no other concerns today    Objective: An exam of the right medial canthus was performed today   - well-healed scar on the right medial canthus. There is moderate focal hypertrophy on the leading edge of the flap.    - The eyelid is largely in contact with the globe. When he closes his eyes full occlusion is achieved.           Assessment and Plan:     1. Post-surgical scar, right medial canthus, s/p Mohs 10/21/24  - Surgical site healed. Focal hypertrophic scar vs suture reaction present on the leading edge of the flap.  - We discussed starting scar massage. Offered intralesional steroid injection to soften the scar tissue, but the patient prefers to do some scar massage and re-evaluate at his general dermatology visit this Spring. As suture buried sutures  dissolve, symptoms may resolve.   - The patient should follow up with dermatologic surgery PRN, as well as continue with regular skin exams in general dermatology clinic.    Patient was discussed with and evaluated by attending physician Dr. Tonio Albrecht.    Scribe Disclosure:   I, Barbara Griffin, am serving as a scribe; to document services personally performed by Dr. Tonio Albrecht - -based on data collection and the provider's statements to me.     Provider Disclosure:   The documentation recorded by the scribe accurately reflects the services I personally performed and the decisions made by me.    Tonio Albrecht DO    Department of Dermatology  [facility]: Phone: [phone], Fax:[phone]  [facility]: Phone: [phone], Fax: [phone]

## 2025-01-08 NOTE — NURSING NOTE
Preston Cai's chief complaint for this visit includes:  Chief Complaint   Patient presents with    Surgical Followup     Mohs right medial canthus DOS: 10/21/2024     PCP: Tyrel Manning    Referring Provider:  Referred Self, MD  No address on file    There were no vitals taken for this visit.  No Pain (0)        Allergies   Allergen Reactions    Seasonal Allergies      Hay fever          Do you need any medication refills at today's visit? No    Radha Clifton, [facility]

## 2025-01-13 ENCOUNTER — TRANSFERRED RECORDS (OUTPATIENT)
Dept: HEALTH INFORMATION MANAGEMENT | Facility: CLINIC | Age: 89
End: 2025-01-13
Payer: COMMERCIAL

## 2025-02-25 NOTE — PROGRESS NOTES
[facility] Dermatology Note    Encounter Date: Feb 26, 2025    Dermatology Problem List:  FBSE: 2/26/25     1. 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 AKs, s/p cryo  - AK, L helix, s/p bx 8/28/24  - pigmented AK - left jaw, s/p by 11/13/18  - HAK - right hand, s/p excision 5/2009  3. Inflamed SKs  - s/p shave removals and cryo  - ISK, R frontal hairline, s/p bx 8/28/24  - x11 s/p cryo 02/21/2024  - x1 s/p ED&C 02/21/2024  4. 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     ______________________________________    Impression/Plan:    1. History of nonmelanoma skin cancer, no clincial evidence of recurrence    2. Reassurance provided for benign lesions not treated today including cherry angiomata, solar lentigines, seborrheic keratoses, and banal-appearing melanocytic nevi.    3. Transient acantholytic dermatosis - [place] - controlled with triam - uses only on chest and shoulders  - continue triamcinolone for pruritus flares, refilled today    4. Seborrheic keratosis, irritated,torso x  12  - Cryotherapy performed today. See procedure section.    5. Actinic keratosis, scalp x 1  - Cryotherapy performed today. See procedure section.        Procedures:  Cryotherapy procedure note: After verbal consent and discussion of risks and benefits including, but not limited to, dyspigmentation/scar, blister, and pain, 1 AK and 12 ISKs was(were) treated with 1-2 mm freeze border for 1-2 cycles with liquid nitrogen. Post cryotherapy instructions were provided.         Follow-up in 9/3/25 as scheduled with Geovany and 1 year with me.      Staff Involved:  Staff and Scribe  Scribe Disclosure:   I, Mary Mariano, am serving as a scribe to document services personally performed by Mau Padilla MD based on data collection and the provider's statements  to me.       Provider Disclosure:   The documentation recorded by the scribe accurately reflects the services I personally performed and the decisions made by me.    Mau Padilla MD   of Dermatology  Department of Dermatology  [facility] [employer]        CC:   Chief Complaint   Patient presents with    Derm Problem     FBSC, hx of NMSC, spots on back of left ear. Itchy and dry skin       History of Present Illness:  Mr. Preston Cai is a 89 year old male who presents as a return patient.    Today, patient reports an area of concern on the L ear. Also reports dry itchy skin, especially on the back.     Labs:  Last Derm Path 8/28/24  Final Diagnosis   A. Right frontal hairline:  - Seborrheic keratosis, inflamed - (see description)     B. Right medial canthus:  - Basal cell carcinoma, nodular type - (see description)     C. Left helix:  - Actinic keratosis         Physical exam:  Vitals: There were no vitals taken for this visit.  GEN: This is a well developed, well-nourished male in no acute distress, in a pleasant mood.    SKIN: Glez phototype II  - Full skin, which includes the head/face, both arms, chest, back, abdomen,both legs, genitalia and/or groin buttocks, digits and/or nails, was examined.  - There are dome shaped bright red papules on the trunk and extremities .  - Multiple regular brown pigmented macules and papules are identified on the trunk and extremities. .   - Scattered brown macules on sun exposed areas.  - Waxy stuck on papules and plaques on trunk and extremities.   - There are erythematous waxy stuck on papules on the torso x 12.  - There is an erythematous scaly macule on the scalp x 1.   - No other lesions of concern on areas examined.     Past Medical History:   Past Medical History:   Diagnosis Date    Acid reflux disease     Allergic rhinitis, cause unspecified     H/O magnetic resonance imaging of brain and brain stem 10/10/2016     MRI BRAIN WITH AND WITHOUT CONTRAST August 17, 2009 8:07:00 PM   HISTORY: Severe dizzy spells with imbalance and vomiting.   TECHNIQUE: Routine pulse sequences without and with contrast were performed including thin section images through the IACs. 20 mL Magnevist given.   FINDINGS: Diffusion-weighted images are normal. The brain parenchyma, brainstem, ventricular system, and subarachnoid spaces a    Hematuria     Hematuria  - in 1990's     History of anemia 2002    Anemia-Iron Deficit    History of gastric ulcer     Macular degeneration     Malignant neoplasm of laryngeal cartilages (H)     Laryngeal CA (Radiation 1982)    Nonsenile cataract     PONV (postoperative nausea and vomiting)     Tremor 10/03/2016     Past Surgical History:   Procedure Laterality Date    APPENDECTOMY  8 years old    BIOPSY  may 2015    face, back    Biopsy of the throat - cancerous mass - got radiation for in larynx  1982    CATARACT IOL, RT/LT      COLONOSCOPY  02/25/2004    Normal    COLONOSCOPY N/A 04/28/2015    Procedure: COLONOSCOPY;  Surgeon: Marvin Adams MD;  Location: [facility]    COLONOSCOPY N/A 01/10/2022    Procedure: COLONOSCOPY, WITH POLYPECTOMY AND BIOPSY;  Surgeon: Marsha Asher MD;  Location: [facility]    COLONOSCOPY N/A 01/10/2022    Procedure: COLONOSCOPY, FLEXIBLE, WITH LESION REMOVAL USING SNARE;  Surgeon: Marsha Asher MD;  [facility]: [facility]    COLONOSCOPY WITH CO2 INSUFFLATION N/A 04/28/2015    Procedure: COLONOSCOPY WITH CO2 INSUFFLATION;  Surgeon: Marvin Adams MD;  [facility]    COLONOSCOPY WITH CO2 INSUFFLATION N/A 01/10/2022    Procedure: COLONOSCOPY, WITH CO2 INSUFFLATION;  Surgeon: Marsha Asher MD;  [facility]: [facility]    COMBINED ESOPHAGOSCOPY, GASTROSCOPY, DUODENOSCOPY (EGD) WITH CO2 INSUFFLATION N/A 01/10/2022    Procedure: ESOPHAGOGASTRODUODENOSCOPY, WITH CO2 INSUFFLATION;  Surgeon: Marsha Asher MD;  Location: [facility]    CYSTOSCOPY  1997    for hematuria - negative     ESOPHAGOSCOPY, GASTROSCOPY, DUODENOSCOPY (EGD), COMBINED N/A 01/10/2022    Procedure: ESOPHAGOGASTRODUODENOSCOPY, WITH BIOPSY;  Surgeon: Marsha Asher MD;  Location: [facility]    EYE SURGERY Right     Rt eye.macular repair    EYE SURGERY  2003    cataract    NASAL ENDOSCOPY,DX  04/2007    GERD    Pars plana vitrectomy and epiretinal membrane dissection, right eye  11/08/2002    Phacoemulsification with intraocular lens implantation right eye.  03/11/2003    PHACOEMULSIFICATION WITH STANDARD INTRAOCULAR LENS IMPLANT Left 09/22/2016    Procedure: PHACOEMULSIFICATION WITH STANDARD INTRAOCULAR LENS IMPLANT;  Surgeon: Nghia Lara MD;  Location: [facility]    Thyroglossal Duct Cyst Removal - 2ndary to radiation.  1982    VASECTOMY  1971    ZZ GASTROSCOPY,FL  09/2007    hiatal hernia and errosions       Social History:   reports that he quit smoking about 56 years ago. His smoking use included cigarettes and pipe. He started smoking about 65 years ago. He has a 10 pack-year smoking history. He has never used smokeless tobacco. He reports current alcohol use. He reports that he does not use drugs.    Family History:  Family History   Problem Relation Age of Onset    Cerebrovascular Disease Mother         at 84 yo - possibly TIA - befoer    Macular Degeneration Mother     Gastrointestinal Disease Father     Cancer - colorectal Father     Hypertension Father     Cancer Father     Other - See Comments Daughter     Other - See Comments Daughter     Other - See Comments Son     Neurologic Disorder Son         eye and hearing ?    Tremor Son     Other - See Comments Son     Cancer Other     C.A.D. No family hx of     Diabetes No family hx of     Prostate Cancer No family hx of     Glaucoma No family hx of     Thyroid Disease No family hx of        Medications:  Current Outpatient Medications   Medication Sig Dispense Refill    acetaminophen 500 MG CAPS 2 x 500mg at 630/7p      brimonidine-timolol (COMBIGAN) 0.2-0.5 %  ophthalmic solution Place 1 drop into the right eye 2 times daily 10 mL 3    dorzolamide (TRUSOPT) 2 % ophthalmic solution Place 1 drop into the right eye 3 times daily 10 mL 11    famotidine (PEPCID) 20 MG tablet Take 1 tablet (20 mg) by mouth 2 times daily. 120 tablet 2    gabapentin (NEURONTIN) 100 MG capsule Takes 2-3 x 100mg capsule by mouth nightly 270 capsule 3    latanoprost (XALATAN) 0.005 % ophthalmic solution Place 1 drop into the right eye at bedtime 2.5 mL 4    primidone (MYSOLINE) 250 MG tablet Take 1 tablet (250 mg) by mouth 2 times daily. 180 tablet 3    tamsulosin (FLOMAX) 0.4 MG capsule Take 1 capsule (0.4 mg) by mouth every evening. 90 capsule 3    triamcinolone (KENALOG) 0.1 % external cream Apply topically 2 times daily as needed (itchy rash of shoulders/chest) 454 g 3    trospium (SANCTURA XR) 60 MG CP24 24 hr capsule Take 1 capsule (60 mg) by mouth every morning 30 capsule 11    Vitamin D, Cholecalciferol, 25 MCG (1000 UT) CAPS Take 1,000 Units by mouth daily. (Winter only)      valACYclovir (VALTREX) 1000 mg tablet Take 1 tablet (1,000 mg) by mouth 3 times daily for 7 days. 21 tablet 0     Allergies   Allergen Reactions    Seasonal Allergies      Hay fever

## 2025-02-26 ENCOUNTER — OFFICE VISIT (OUTPATIENT)
Dept: DERMATOLOGY | Facility: CLINIC | Age: 89
End: 2025-02-26
Attending: DERMATOLOGY
Payer: COMMERCIAL

## 2025-02-26 DIAGNOSIS — D22.9 MULTIPLE MELANOCYTIC NEVI: ICD-10-CM

## 2025-02-26 DIAGNOSIS — L11.1 GROVER'S DISEASE: ICD-10-CM

## 2025-02-26 DIAGNOSIS — L81.4 SOLAR LENTIGO: ICD-10-CM

## 2025-02-26 DIAGNOSIS — L82.1 SEBORRHEIC KERATOSIS: ICD-10-CM

## 2025-02-26 DIAGNOSIS — Z85.828 HISTORY OF SKIN CANCER: Primary | ICD-10-CM

## 2025-02-26 DIAGNOSIS — L82.0 INFLAMED SEBORRHEIC KERATOSIS: ICD-10-CM

## 2025-02-26 DIAGNOSIS — L57.0 ACTINIC KERATOSIS: ICD-10-CM

## 2025-02-26 RX ORDER — TRIAMCINOLONE ACETONIDE 1 MG/G
CREAM TOPICAL 2 TIMES DAILY PRN
Qty: 454 G | Refills: 3 | Status: SHIPPED | OUTPATIENT
Start: 2025-02-26 | End: 2025-02-26

## 2025-02-26 RX ORDER — TRIAMCINOLONE ACETONIDE 1 MG/G
CREAM TOPICAL 2 TIMES DAILY PRN
Qty: 454 G | Refills: 3 | Status: SHIPPED | OUTPATIENT
Start: 2025-02-26

## 2025-02-26 NOTE — LETTER
2/26/2025      Preston Cai  [address]  [place] [address]      Dear Colleague,    Thank you for referring your patient, Preston Cai, to the [facility]. Please see a copy of my visit note below.    [facility] Dermatology Note    Encounter Date: Feb 26, 2025    Dermatology Problem List:  FBSE: 2/26/25     1. 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 AKs, s/p cryo  - AK, L helix, s/p bx 8/28/24  - pigmented AK - left jaw, s/p by 11/13/18  - HAK - right hand, s/p excision 5/2009  3. Inflamed SKs  - s/p shave removals and cryo  - ISK, R frontal hairline, s/p bx 8/28/24  - x11 s/p cryo 02/21/2024  - x1 s/p ED&C 02/21/2024  4. 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     ______________________________________    Impression/Plan:    1. History of nonmelanoma skin cancer, no clincial evidence of recurrence    2. Reassurance provided for benign lesions not treated today including cherry angiomata, solar lentigines, seborrheic keratoses, and banal-appearing melanocytic nevi.    3. Transient acantholytic dermatosis - [place] - controlled with triam - uses only on chest and shoulders  - continue triamcinolone for pruritus flares, refilled today    4. Seborrheic keratosis, irritated,torso x  12  - Cryotherapy performed today. See procedure section.    5. Actinic keratosis, scalp x 1  - Cryotherapy performed today. See procedure section.        Procedures:  Cryotherapy procedure note: After verbal consent and discussion of risks and benefits including, but not limited to, dyspigmentation/scar, blister, and pain, 1 AK and 12 ISKs was(were) treated with 1-2 mm freeze border for 1-2 cycles with liquid nitrogen. Post cryotherapy instructions were provided.         Follow-up in 9/3/25 as scheduled with  Geovany and 1 year with me.      Staff Involved:  Staff and Scribe  Scribe Disclosure:   I, Mary Mariano, am serving as a scribe to document services personally performed by Mau Padilla MD based on data collection and the provider's statements to me.       Provider Disclosure:   The documentation recorded by the scribe accurately reflects the services I personally performed and the decisions made by me.    Mau Padilla MD   of Dermatology  Department of Dermatology  [facility] [employer]        CC:   Chief Complaint   Patient presents with     Derm Problem     FBSC, hx of NMSC, spots on back of left ear. Itchy and dry skin       History of Present Illness:  Mr. Preston Cai is a 89 year old male who presents as a return patient.    Today, patient reports an area of concern on the L ear. Also reports dry itchy skin, especially on the back.     Labs:  Last Derm Path 8/28/24  Final Diagnosis   A. Right frontal hairline:  - Seborrheic keratosis, inflamed - (see description)     B. Right medial canthus:  - Basal cell carcinoma, nodular type - (see description)     C. Left helix:  - Actinic keratosis         Physical exam:  Vitals: There were no vitals taken for this visit.  GEN: This is a well developed, well-nourished male in no acute distress, in a pleasant mood.    SKIN: Glez phototype II  - Full skin, which includes the head/face, both arms, chest, back, abdomen,both legs, genitalia and/or groin buttocks, digits and/or nails, was examined.  - There are dome shaped bright red papules on the trunk and extremities .  - Multiple regular brown pigmented macules and papules are identified on the trunk and extremities. .   - Scattered brown macules on sun exposed areas.  - Waxy stuck on papules and plaques on trunk and extremities.   - There are erythematous waxy stuck on papules on the torso x 12.  - There is an erythematous scaly macule on the scalp x 1.   - No  other lesions of concern on areas examined.     Past Medical History:   Past Medical History:   Diagnosis Date     Acid reflux disease      Allergic rhinitis, cause unspecified      H/O magnetic resonance imaging of brain and brain stem 10/10/2016    MRI BRAIN WITH AND WITHOUT CONTRAST August 17, 2009 8:07:00 PM   HISTORY: Severe dizzy spells with imbalance and vomiting.   TECHNIQUE: Routine pulse sequences without and with contrast were performed including thin section images through the IACs. 20 mL Magnevist given.   FINDINGS: Diffusion-weighted images are normal. The brain parenchyma, brainstem, ventricular system, and subarachnoid spaces a     Hematuria     Hematuria  - in 1990's      History of anemia 2002    Anemia-Iron Deficit     History of gastric ulcer      Macular degeneration      Malignant neoplasm of laryngeal cartilages (H)     Laryngeal CA (Radiation 1982)     Nonsenile cataract      PONV (postoperative nausea and vomiting)      Tremor 10/03/2016     Past Surgical History:   Procedure Laterality Date     APPENDECTOMY  8 years old     BIOPSY  may 2015    face, back     Biopsy of the throat - cancerous mass - got radiation for in larynx  1982     CATARACT IOL, RT/LT       COLONOSCOPY  02/25/2004    Normal     COLONOSCOPY N/A 04/28/2015    Procedure: COLONOSCOPY;  Surgeon: Marvin Adams MD;  [facility]: [facility]     COLONOSCOPY N/A 01/10/2022    Procedure: COLONOSCOPY, WITH POLYPECTOMY AND BIOPSY;  Surgeon: Marsha Asher MD;  [facility]: [facility]     COLONOSCOPY N/A 01/10/2022    Procedure: COLONOSCOPY, FLEXIBLE, WITH LESION REMOVAL USING SNARE;  Surgeon: Marsha Asher MD;  [facility]: [facility]     COLONOSCOPY WITH CO2 INSUFFLATION N/A 04/28/2015    Procedure: COLONOSCOPY WITH CO2 INSUFFLATION;  Surgeon: Marvin Adams MD;  [facility]: [facility]     COLONOSCOPY WITH CO2 INSUFFLATION N/A 01/10/2022    Procedure: COLONOSCOPY, WITH CO2 INSUFFLATION;  Surgeon: Marsha Asher MD;  Location:  [facility]     COMBINED ESOPHAGOSCOPY, GASTROSCOPY, DUODENOSCOPY (EGD) WITH CO2 INSUFFLATION N/A 01/10/2022    Procedure: ESOPHAGOGASTRODUODENOSCOPY, WITH CO2 INSUFFLATION;  Surgeon: Marsha Asher MD;  Location: [facility]     CYSTOSCOPY  1997    for hematuria - negative     ESOPHAGOSCOPY, GASTROSCOPY, DUODENOSCOPY (EGD), COMBINED N/A 01/10/2022    Procedure: ESOPHAGOGASTRODUODENOSCOPY, WITH BIOPSY;  Surgeon: Marsha Asher MD;  Location: [facility]     EYE SURGERY Right     Rt eye.macular repair     EYE SURGERY  2003    cataract     NASAL ENDOSCOPY,DX  04/2007    GERD     Pars plana vitrectomy and epiretinal membrane dissection, right eye  11/08/2002     Phacoemulsification with intraocular lens implantation right eye.  03/11/2003     PHACOEMULSIFICATION WITH STANDARD INTRAOCULAR LENS IMPLANT Left 09/22/2016    Procedure: PHACOEMULSIFICATION WITH STANDARD INTRAOCULAR LENS IMPLANT;  Surgeon: Nghia Lara MD;  Location: [facility]     Thyroglossal Duct Cyst Removal - 2ndary to radiation.  1982     VASECTOMY  1971     ZZ GASTROSCOPY,FL  09/2007    hiatal hernia and errosions       Social History:   reports that he quit smoking about 56 years ago. His smoking use included cigarettes and pipe. He started smoking about 65 years ago. He has a 10 pack-year smoking history. He has never used smokeless tobacco. He reports current alcohol use. He reports that he does not use drugs.    Family History:  Family History   Problem Relation Age of Onset     Cerebrovascular Disease Mother         at 84 yo - possibly TIA - befoer     Macular Degeneration Mother      Gastrointestinal Disease Father      Cancer - colorectal Father      Hypertension Father      Cancer Father      Other - See Comments Daughter      Other - See Comments Daughter      Other - See Comments Son      Neurologic Disorder Son         eye and hearing ?     Tremor Son      Other - See Comments Son      Cancer Other      C.A.D. No family hx of      Diabetes No  family hx of      Prostate Cancer No family hx of      Glaucoma No family hx of      Thyroid Disease No family hx of        Medications:  Current Outpatient Medications   Medication Sig Dispense Refill     acetaminophen 500 MG CAPS 2 x 500mg at 630/7p       brimonidine-timolol (COMBIGAN) 0.2-0.5 % ophthalmic solution Place 1 drop into the right eye 2 times daily 10 mL 3     dorzolamide (TRUSOPT) 2 % ophthalmic solution Place 1 drop into the right eye 3 times daily 10 mL 11     famotidine (PEPCID) 20 MG tablet Take 1 tablet (20 mg) by mouth 2 times daily. 120 tablet 2     gabapentin (NEURONTIN) 100 MG capsule Takes 2-3 x 100mg capsule by mouth nightly 270 capsule 3     latanoprost (XALATAN) 0.005 % ophthalmic solution Place 1 drop into the right eye at bedtime 2.5 mL 4     primidone (MYSOLINE) 250 MG tablet Take 1 tablet (250 mg) by mouth 2 times daily. 180 tablet 3     tamsulosin (FLOMAX) 0.4 MG capsule Take 1 capsule (0.4 mg) by mouth every evening. 90 capsule 3     triamcinolone (KENALOG) 0.1 % external cream Apply topically 2 times daily as needed (itchy rash of shoulders/chest) 454 g 3     trospium (SANCTURA XR) 60 MG CP24 24 hr capsule Take 1 capsule (60 mg) by mouth every morning 30 capsule 11     Vitamin D, Cholecalciferol, 25 MCG (1000 UT) CAPS Take 1,000 Units by mouth daily. (Winter only)       valACYclovir (VALTREX) 1000 mg tablet Take 1 tablet (1,000 mg) by mouth 3 times daily for 7 days. 21 tablet 0     Allergies   Allergen Reactions     Seasonal Allergies      Hay fever                  Again, thank you for allowing me to participate in the care of your patient.        Sincerely,        Mau Padilla MD    Electronically signed

## 2025-02-26 NOTE — NURSING NOTE
Preston Cai's goals for this visit include: rfv    He requests these members of his care team be copied on today's visit information: no    PCP: Tyrel Manning    Referring Provider:  Mau Padilla MD  [address] [place] [id number]    There were no vitals taken for this visit.    Do you need any medication refills at today's visit? No    Karen Chopra LPN    .

## 2025-03-04 ENCOUNTER — OFFICE VISIT (OUTPATIENT)
Dept: OPHTHALMOLOGY | Facility: CLINIC | Age: 89
End: 2025-03-04
Payer: COMMERCIAL

## 2025-03-04 DIAGNOSIS — H40.10X0 OPEN-ANGLE GLAUCOMA OF RIGHT EYE, UNSPECIFIED GLAUCOMA STAGE, UNSPECIFIED OPEN-ANGLE GLAUCOMA TYPE: ICD-10-CM

## 2025-03-04 DIAGNOSIS — H40.1112 PRIMARY OPEN ANGLE GLAUCOMA (POAG) OF RIGHT EYE, MODERATE STAGE: ICD-10-CM

## 2025-03-04 RX ORDER — LATANOPROST 50 UG/ML
1 SOLUTION/ DROPS OPHTHALMIC AT BEDTIME
Qty: 2.5 ML | Refills: 4 | Status: SHIPPED | OUTPATIENT
Start: 2025-03-04

## 2025-03-04 RX ORDER — DORZOLAMIDE HCL 20 MG/ML
1 SOLUTION/ DROPS OPHTHALMIC 3 TIMES DAILY
Qty: 10 ML | Refills: 11 | Status: SHIPPED | OUTPATIENT
Start: 2025-03-04

## 2025-03-04 RX ORDER — BRIMONIDINE TARTRATE AND TIMOLOL MALEATE 2; 5 MG/ML; MG/ML
1 SOLUTION OPHTHALMIC 2 TIMES DAILY
Qty: 10 ML | Refills: 3 | Status: SHIPPED | OUTPATIENT
Start: 2025-03-04

## 2025-03-04 ASSESSMENT — REFRACTION_MANIFEST
OD_ADD: +2.75
OD_SPHERE: -0.50
OS_ADD: +2.75
OS_CYLINDER: +2.00
OS_AXIS: 173
OS_SPHERE: -0.25
OD_AXIS: 015
OD_CYLINDER: +0.75

## 2025-03-04 ASSESSMENT — REFRACTION_WEARINGRX
OS_CYLINDER: +2.00
OS_ADD: +2.75
OS_AXIS: 176
OD_AXIS: 020
OD_SPHERE: -0.50
OD_CYLINDER: +0.75
OD_ADD: +2.75
OS_SPHERE: -0.50

## 2025-03-04 ASSESSMENT — VISUAL ACUITY
CORRECTION_TYPE: GLASSES
METHOD: SNELLEN - LINEAR
OS_CC+: -1
OS_CC: 20/25
OD_CC: 20/40 ECC

## 2025-03-04 ASSESSMENT — CONF VISUAL FIELD
OD_SUPERIOR_TEMPORAL_RESTRICTION: 3
OD_INFERIOR_NASAL_RESTRICTION: 3
OD_INFERIOR_TEMPORAL_RESTRICTION: 3

## 2025-03-04 ASSESSMENT — TONOMETRY
IOP_METHOD: APPLANATION
OD_IOP_MMHG: 20
OS_IOP_MMHG: 17

## 2025-03-04 ASSESSMENT — CUP TO DISC RATIO
OS_RATIO: 0.3
OD_RATIO: 0.5

## 2025-03-04 ASSESSMENT — EXTERNAL EXAM - LEFT EYE: OS_EXAM: NORMAL

## 2025-03-04 ASSESSMENT — PACHYMETRY
OS_CT(UM): 559
OD_CT(UM): 564

## 2025-03-04 ASSESSMENT — EXTERNAL EXAM - RIGHT EYE: OD_EXAM: NORMAL

## 2025-03-04 NOTE — PROGRESS NOTES
HPI       Follow Up    Associated symptoms include eye pain, tearing, flashes and floaters.  Treatments tried include eye drops and artificial tears.             Comments    Here for follow up. VA has worsened. Some eye pain and tearing. Occasional flashes and floaters. Uses latanoprost and trusopt as instructed. Has been out of combigan for 3 weeks and has not been using.    KRISTA Hines 10:33 AM March 4, 2025             Last edited by Ralph Guthrie COMT on 3/4/2025 10:33 AM.         Review of systems for the eyes was negative other than the pertinent positives/negatives listed in the HPI.      Assessment & Plan    HPI:  Preston Cai is a 89 year old male with history of vitrectomy for macular hole, pseudophakia, refractive error, Primary open angle glaucoma (POAG) right eye, dry eye, Age related macular degeneration presents for Primary open angle glaucoma (POAG) followup. He has not used combigan x 3 weeks    He notes some waviness of vision both eyes.     POHx: macular hole, pseudophakia, refractive error, Primary open angle glaucoma (POAG) right eye, dry eye, Age related macular degeneration   PMHx: GERD  Current Medications:   Current Outpatient Medications   Medication Sig Dispense Refill    brimonidine-timolol (COMBIGAN) 0.2-0.5 % ophthalmic solution Place 1 drop into the right eye 2 times daily. 10 mL 3    dorzolamide (TRUSOPT) 2 % ophthalmic solution Place 1 drop into the right eye 3 times daily. 10 mL 11    latanoprost (XALATAN) 0.005 % ophthalmic solution Place 1 drop into the right eye at bedtime. 2.5 mL 4    acetaminophen 500 MG CAPS 2 x 500mg at 630/7p      famotidine (PEPCID) 20 MG tablet Take 1 tablet (20 mg) by mouth 2 times daily. 120 tablet 2    gabapentin (NEURONTIN) 100 MG capsule Takes 2-3 x 100mg capsule by mouth nightly 270 capsule 3    primidone (MYSOLINE) 250 MG tablet Take 1 tablet (250 mg) by mouth 2 times daily. 180 tablet 3    tamsulosin (FLOMAX) 0.4 MG capsule Take 1 capsule (0.4  mg) by mouth every evening. 90 capsule 3    triamcinolone (KENALOG) 0.1 % external cream Apply topically 2 times daily as needed (itchy rash of shoulders/chest). 454 g 3    trospium (SANCTURA XR) 60 MG CP24 24 hr capsule Take 1 capsule (60 mg) by mouth every morning 30 capsule 11    valACYclovir (VALTREX) 1000 mg tablet Take 1 tablet (1,000 mg) by mouth 3 times daily for 7 days. 21 tablet 0    Vitamin D, Cholecalciferol, 25 MCG (1000 UT) CAPS Take 1,000 Units by mouth daily. (Winter only)       No current facility-administered medications for this visit.     FHx: ?vision loss mother  PSHx: Cataract extraction/intraocular lens both eyes, Pars plana vitrectomy (PPV) right eye       Current Eye Medications:  Systane three times a day   Latanoprost at bedtime right eye   Trusopt/dorzolamide three times a day right eye    Preservision/AREDS2    Assessment & Plan:  (H40.1112) Primary open angle glaucoma (POAG) of right eye, moderate stage  (primary encounter diagnosis)  Maximum intraocular pressure unknown  Family history: No  Trauma history: No  Gonioscopy: flat, open, minimal pigment  Pachymetry:     Treatment History:   Latanoprost right eye   Combigan right eye     Today's testing:  Hsu visual field (HVF) 24-2 02/13/24:   Right eye - inferior arcuate/nasal step, VFI 74%, MD -10.48, PSD 9.77, reliable;   Left eye - central defects, VFi 94%, MD -1.97, PSD 2.08 reliable  Hsu visual field (HVF) 24-2 08/19/24 :   Right eye - inferior arcuate/nasal step, VFI 80%, MD -8.02, PSD 9.77, reliable;   Left eye - central defects, VFi 97%, MD -0.41, PSD 1.75 reliable  Hsu visual field (HVF) 24-2 LUIS A Faster 03/04/25  :   Right eye - inferior arcuate/nasal step, VFI 81%, MD -8.79, PSD 10.04, reliable;   Left eye - central defects, VFi 96%, MD -0.59 PSD 2.16 reliable    OCT nerve fiber layer 02/13/24:   Right eye - G(r) 60 NI (y) 61 TI (g) 106 NS (r) 55 TS (r) 73      Left eye - G(g) 91 NI (g) 86 TI (g) 132 NS (g) 95 TS  (g) 132  OCT nerve fiber layer 08/19/24 :   Right eye - G(r) 56 NI (y) 56 TI (g) 103 NS (r) 51 TS (r) 67      Left eye - G(g) 91 NI (g) 86 TI (g) 136 NS (g) 93 TS (g) 129  OCT nerve fiber layer 03/04/25 :   Right eye - G(r) 58 NI (y) 55 TI (g) 102 NS (r) 53 TS (r) 69      Left eye - G(g) 91 NI (g) 87 TI (g) 133 NS (g) 96 TS (g) 131    IOP goal: mid teens  OCT and visual field stable today     IOP not at goal today, has not been on combigan x 3 weeks  Continue dorzoloamide/trusopt three times a day right eye  Conitnue Latanoprost at bedtime right eye,   Restart Combigan twice a day right eye   All refills sent today    (H04.123) Dry eye syndrome of both eyes  Continue artificial tears  Continue gel or ointment at bedtime     (H52.13,  H52.203,  H52.4) Myopia of both eyes with astigmatism and presbyopia  Patient has minimal change in myopia but a copy of today's glasses prescription was given.  The patient may wish to update the glasses if the lenses are scratched or the frames are too small.  Presbyopia is difficulty seeing up close and is treated with bifocals or over the counter reading glasses      (H35.30) ARMD (age-related macular degeneration), bilateral  (Z98.890) Hx of vitrectomy  Following with [facility]  Macular hole right eye, significant drusen both eyes     (Z96.1) Pseudophakia, both eyes  Doing well      Return in about 6 months (around 9/4/2025) for Follow Up-v/t, OCT RNFL, 24-2 VF.        Orlando Rose MD     Attending Physician Attestation:  Complete documentation of historical and exam elements from today's encounter can be found in the full encounter summary report (not reduplicated in this progress note).  I personally obtained the chief complaint(s) and history of present illness.  I confirmed and edited as necessary the review of systems, past medical/surgical history, family history, social history, and examination findings as documented by others; and I examined the patient myself.   I personally reviewed the relevant tests, images, and reports as documented above.  I formulated and edited as necessary the assessment and plan and discussed the findings and management plan with the patient and family. - Orlando Rose MD

## 2025-04-14 ENCOUNTER — TRANSFERRED RECORDS (OUTPATIENT)
Dept: HEALTH INFORMATION MANAGEMENT | Facility: CLINIC | Age: 89
End: 2025-04-14
Payer: COMMERCIAL

## 2025-04-14 DIAGNOSIS — K21.9 GASTROESOPHAGEAL REFLUX DISEASE WITHOUT ESOPHAGITIS: ICD-10-CM

## 2025-04-14 RX ORDER — FAMOTIDINE 20 MG/1
20 TABLET, FILM COATED ORAL 2 TIMES DAILY
Qty: 120 TABLET | Refills: 0 | Status: SHIPPED | OUTPATIENT
Start: 2025-04-14

## 2025-04-14 NOTE — PROGRESS NOTES
Refill request for famotidine received from [facility] pharmacy.  Last appointment 4/2024. One refill sent as requested with message to schedule next appointment.  Steph Wilde RN

## 2025-04-18 DIAGNOSIS — G60.9 HEREDITARY AND IDIOPATHIC PERIPHERAL NEUROPATHY: ICD-10-CM

## 2025-04-21 ENCOUNTER — OFFICE VISIT (OUTPATIENT)
Dept: NEUROLOGY | Facility: CLINIC | Age: 89
End: 2025-04-21
Payer: COMMERCIAL

## 2025-04-21 VITALS
WEIGHT: 157 LBS | HEART RATE: 69 BPM | BODY MASS INDEX: 20.81 KG/M2 | DIASTOLIC BLOOD PRESSURE: 66 MMHG | SYSTOLIC BLOOD PRESSURE: 112 MMHG | HEIGHT: 73 IN

## 2025-04-21 DIAGNOSIS — G60.9 HEREDITARY AND IDIOPATHIC PERIPHERAL NEUROPATHY: ICD-10-CM

## 2025-04-21 DIAGNOSIS — R25.1 TREMOR: Primary | ICD-10-CM

## 2025-04-21 DIAGNOSIS — G25.0 ESSENTIAL TREMOR: ICD-10-CM

## 2025-04-21 PROCEDURE — G2211 COMPLEX E/M VISIT ADD ON: HCPCS | Performed by: PSYCHIATRY & NEUROLOGY

## 2025-04-21 PROCEDURE — 3074F SYST BP LT 130 MM HG: CPT | Performed by: PSYCHIATRY & NEUROLOGY

## 2025-04-21 PROCEDURE — 3078F DIAST BP <80 MM HG: CPT | Performed by: PSYCHIATRY & NEUROLOGY

## 2025-04-21 PROCEDURE — 99213 OFFICE O/P EST LOW 20 MIN: CPT | Performed by: PSYCHIATRY & NEUROLOGY

## 2025-04-21 RX ORDER — GABAPENTIN 100 MG/1
CAPSULE ORAL
Qty: 270 CAPSULE | Refills: 3 | OUTPATIENT
Start: 2025-04-21

## 2025-04-21 RX ORDER — GABAPENTIN 100 MG/1
CAPSULE ORAL
Qty: 360 CAPSULE | Refills: 3 | Status: SHIPPED | OUTPATIENT
Start: 2025-04-21

## 2025-04-21 RX ORDER — PRIMIDONE 250 MG/1
250 TABLET ORAL 2 TIMES DAILY
Qty: 180 TABLET | Refills: 3 | Status: SHIPPED | OUTPATIENT
Start: 2025-04-21

## 2025-04-21 NOTE — PATIENT INSTRUCTIONS
Still getting up 3/night. Of gabapentin.   Goes to bed at 930/10    Pharmacy ([facility]) consultation and medication management  Please call the scheduling number @ [phone] to set up an appointment with pharmacists Fior Matos, Rena Paniagua, or Concepción Phan.     He wonders about his timing of evening medications    He may want to increase his primidone 1/2 to 3/4 in the morning and continue the 1 primidone at night  Monitor it for a few weeks and decide if better.    May then consider dose increase of gabapentin in the future -   Right now he is taking 100mg of gabapentin at 2pm and 2 x 100mg at 630p  He may increase the dose to 2 @ 2pm and 2 @ 630p    Last visit was 3/2024    Return in one  year.

## 2025-04-21 NOTE — LETTER
2025      Preston Cai  [address]  [facility] [address]      Dear Colleague,    Thank you for referring your patient, Preston Cai, to the [facility] [place]. Please see a copy of my visit note below.        Diagnosis/Summary/Recommendations:    PATIENT: Preston Cai  89 year old male     : 1935    BEATRIZ: 2025       MRN: [id number]  [address]    [facility] [address] Phone   [phone] (Home) *Preferred*   [phone] (Mobile) E-mail Address   [email or web address]      Edyta cai            Assessment:  (R25.1) Tremor  (primary encounter diagnosis)  Seen  and  by me   Gabapentin neurontin 100mg  Primidone mysoline 250mg at bedtime     Tremor when holding a book  Tremor not present at rest  Has shakiness when writing  Has some problems when eating     Review of diagnosis    tremor     Avoidance of dopamine blockers   Not taking     Motor complication review   n/a     Review of Impulse control disorders   N/a      Review of surgical or medication options   Consider a slow wean on primidone (Mysoline) if no other explanation is there.   Pharmacy consultation with Fior Matos if he goes down th is patch  Consider 50mg tabs x 5 and then every few weeks reduce by one tablet - will have more tremor but would have to see if feels better, etc.      For now will review the 250mg of primidone at his present dose.      Not clear why he has no energy and losing weight     He will visit with Dr. Manning and review his lab work and studies      Gait/Balance/Falls         Exercise/Therapy performed/offered   States his stride is pretty good but has neuropathy so not so straight     Cognitive/Driving   No changes in his driving  Some memory issues     Mood   Some stress -   Lots of stress since they moved  [facility] are living [facility] living [facility]   They were locked down  Hope to move to [facility] in [place]  but on waiting list  Denies depression        Hallucinations/delusions      Sleep   Sleep disorder  Goes to bed around 9pm and wakes up at 7am  Wakes up 2-3 times per night to urinate  Some times has problems falling back asleep  May have sleep apnea  Snores  Has not had a sleep study  Tired when he wakes up  Naps during the day  30-60 minutes per day      Bladder/Renal/Prostate/Gyn/Other  BPH  Tamsulosin flomax 0.4mg   Nocturia   PSA 3.19 on 1/4/2022     GI/Constipation/GERD   GERD  Dexlansoprazole dexilant 60mg CR capsule - not taking  Pantoprazole (protonix) 40mg EC tablet  helicobacter negative  Lipase normal  Has a good appetite     153 lbs today      Colonoscopy and endoscopy done last week results pending     Final Diagnosis   A. DUODENUM, BIOPSY:   -Duodenal mucosa with no significant histopathologic abnormality  -No evidence of celiac disease     B. TRANSVERSE COLON, POLYPS X 4, BIOPSY:   -Tubular adenoma(s); negative for high-grade dysplasia     C. RECTUM, POLYP, BIOPSY:   - Sessile serrated adenoma; negative for cytologic dysplasia          Comprehensive metabolic was fine 12/17/2021 - no problems with liver      2 years ago had vomiting     He has acid reflux     Has a hernia        12/20/2021  IMPRESSION:  1. Diffuse thickening of the gastric wall raises the question of gastritis, however, this finding is relatively unchanged in appearance when compared to prior exam on 7/30/2017.  2. Prostatomegaly with diffuse bladder wall thickening, likely sequela of chronic bladder outlet obstruction.  3. Colonic diverticulosis without evidence of acute diverticulitis.      ENDO/Lipid/DM/Bone density/Thyroid  Vitamin D deficiency  Vitamin D, Cholecalciferol 1000 units   Thyroid studies were normal 1/4/2022     Cardio/heart/Hyper or Hypotensive   Denies light headedness or fainting  bp was 95/59     Vision/Dry Eyes/Cataracts/Glaucoma/Macular   Macular degeneration  Pseudophaia  Latanoprost xalatan 0.005% solution  C  Minkus monitoring his eyes     Heme/Anticoagulation/Antiplatelet/Anemia/Other  MMA normal  Parietal cell antibody 26.7 high   Intrinsic factor negative  Hemoglobin 12.6 on 2021     ENT/Resp  Dr Tosha Toledo - ENT     Allergic rhinitis     Hoarse voice     Skin/Cancer/Seborrhea/other        Musculoskeletal/Pain/Headache  Chronic radiculopathy C6  States he has a neuropathy  Gabapentin neurontin 100mg x 2 at bedtime     Other:      Discussed that his blood pressure and heart rate are pretty low that would not recommend a beta blocker except a 10mg dose as needed if he wanted anything     Would not push up the primidone further     Talked about surgical approaches like deep brain stimulation, focused beam ultrasound, gamma knife      Neuropathy affects his balance and has discomfort at night. He is taking gabapentin 100mg x 2 at 7pm and lays down around 930/10pm      July had dizzy spells and cut out medication      CONSIDER KATHLEEN TRIO DEVICE - INFORMATION BELOW.      CONTINUE ON PRIMIDONE 250MG AND CONSIDER 1/4 OF 250MG ADDITIONAL DOSE     CONTINUE ON 100MG GABAPENTIN - DISCUSSING DOSE - 1 - HAD MORE NEUROPATHY SYMPTOMS AND 2 LESS; NOT SURE IF BETTER WITH 3      airforce [phone]     Had covid November - coughing and congestion     Balance problems from neuropathy and radiculopathy     Using gabapentin 2 x 100mg - at 6p and   acetaminophen 500m x 500mg at 6-7p  He had had back issues.   Seen Dr. Sage 2020  Message sent as he is interested in another epidural steroid injection  Ordered referral.      Increased the primidone and using 1/4 tab in the morning and 1 at night  Not clear if helped the tremor  He has not had side effects.  He has mild postural tremor.   He has some tremor after reading  Can still hold his coffee cup and feed himself.      He is off vitmamin B12 - level obtained 2023     He remains on vitamin D     His lipid panel was okay   He is not a medication.      He has a  "chronic anemia that is stable 8/22/2023  He has normal liver function tests  He is taking primidone that can impact both.      Appetite is good but \"cannot put on weight\"     He has not had falls.      Last seen 3/2023  Refilled primidone  Refilled gabapentin neurontin   Return in 1 year.      Tamsulosin flomax 0.4mg - went off this after he had a dizzy spell  His blood pressure is 107/63  His wife gets up many times per n ight.   Stopped another medication.   Nocturia 2 or 3 times per night   He has daytime urgency  He has not seen a urologist for many y ears.   Referral placed  He is 88 years old  Has not had a recent PSA  Encouraged him to reduce night time water consumption after 6pm  Renal function is fine.      Medications      7am 8am 2p 5/530  630p 4/5a   Acetaminophen tylenol 500mg       2 2   Bromidine timolol 2/d        Cyanocobalamin Vit B12 1000mcg off           Dorzolamide trusopt 3/d        Famotidine pepcid 40mg 1          Fluticasone flonase 50mcg/act spray  off          Gabapentin neurontin 100mg     1  2     Latanoprost xalatan 0.005% solution         right    Mirtazapine remeron 15mg off           Pantoprazole (protonix) 40mg EC tablet Off            Primidone mysoline 250mg  1/2      1    Tamsulosin flomax 0.4mg     1    Triamcinolone kenalog 0.1% cream             Trospium sanctura XR 60mg off        Valacyclovir valtrex 1000mg off        Vit D, Cholecalciferol 1000 units 25  off                             Plan:    Still getting up 3/night. Of gabapentin.   Goes to bed at 930/10    Pharmacy ([facility]) consultation and medication management  Please call the scheduling number @ [phone] to set up an appointment with pharmacists Fior Matos, Rena Paniagua, or Concepción Phan.     He wonders about his timing of evening medications    He may want to increase his primidone 1/2 to 3/4 in the morning and continue the 1 primidone at night  Monitor it for a few weeks and decide if better.    May then " consider dose increase of gabapentin in the future -   Right now he is taking 100mg of gabapentin at 2pm and 2 x 100mg at 630p  He may increase the dose to 2 @ 2pm and 2 @ 630p    Last visit was 3/2024    Return in one  year.     Coding statement:   Medical Decision Making:  #  Chronic progressive medical conditions addressed  - see above --   Review and/or interpretation of unique test or documentation from a provider outside of neurology yes   Independent historian provided additional details  no I  Prescription drug management and review of potential side effects and/or monitoring for side effects  -- see above ---  Health impacted by social determinants of health  no    I have reviewed the note as documented above.  This accurately captures the substance of my conversation with the patient and total time spent preparing for visit, executing visit and completing visit on the day of the visit:  20 minutes.  The portion of this total time included face to face time     The longitudinal plan of care for Preston Cai was addressed during this visit. Due to the added complexity in care, I will continue to support Preston Cai in the subsequent management of this condition(s) and with the ongoing continuity of care of this condition(s).      García Munroe MD     ______________________________________    Last visit date and details:             ______________________________________      Patient was asked about 14 Review of systems including changes in vision (dry eyes, double vision), hearing, heart, lungs, musculoskeletal, depression, anxiety, snoring, RBD, insomnia, urinary frequency, urinary urgency, constipation, swallowing problems, hematological, ID, allergies, skin problems: seborrhea, endocrinological: thyroid, diabetes, cholesterol; balance, weight changes, and other neurological problems and these were not significant at this time except for   Patient Active Problem List   Diagnosis     Seborrheic  dermatitis     Seasonal allergic rhinitis due to pollen     Cervical radiculopathy at C6     Personal history of malignant neoplasm of larynx     Essential tremor     CARDIOVASCULAR SCREENING; LDL GOAL LESS THAN 160     Benign prostatic hyperplasia     Hoarse voice quality     Pseudophakia     Hiatal hernia with GERD     Macular degeneration     Supraspinatus sprain, right, initial encounter     CARDIOVASCULAR SCREENING; LDL GOAL LESS THAN 100     Vitamin D deficiency     Chronic midline low back pain without sciatica     Sleep disorder, nonorganic     Other gastritis without hemorrhage, unspecified chronicity     BPH associated with nocturia     Weight loss     Pernicious anemia     Arthritis of right shoulder region     Basal cell carcinoma (BCC) of medial canthus of right eye          Allergies   Allergen Reactions     Seasonal Allergies      Hay fever      Past Surgical History:   Procedure Laterality Date     APPENDECTOMY  8 years old     BIOPSY  may 2015    face, back     Biopsy of the throat - cancerous mass - got radiation for in larynx  1982     CATARACT IOL, RT/LT       COLONOSCOPY  02/25/2004    Normal     COLONOSCOPY N/A 04/28/2015    Procedure: COLONOSCOPY;  Surgeon: Marvin Adams MD;  Location: [facility] OR     COLONOSCOPY N/A 01/10/2022    Procedure: COLONOSCOPY, WITH POLYPECTOMY AND BIOPSY;  Surgeon: Marsha Asher MD;  Location: [facility] OR     COLONOSCOPY N/A 01/10/2022    Procedure: COLONOSCOPY, FLEXIBLE, WITH LESION REMOVAL USING SNARE;  Surgeon: Marsha Asher MD;  Location: [facility]     COLONOSCOPY WITH CO2 INSUFFLATION N/A 04/28/2015    Procedure: COLONOSCOPY WITH CO2 INSUFFLATION;  Surgeon: Marvin Adams MD;  Location: [facility]     COLONOSCOPY WITH CO2 INSUFFLATION N/A 01/10/2022    Procedure: COLONOSCOPY, WITH CO2 INSUFFLATION;  Surgeon: Marsha Asher MD;  Location: [facility]     COMBINED ESOPHAGOSCOPY, GASTROSCOPY, DUODENOSCOPY (EGD) WITH CO2 INSUFFLATION N/A 01/10/2022     Procedure: ESOPHAGOGASTRODUODENOSCOPY, WITH CO2 INSUFFLATION;  Surgeon: Marsha Asher MD;  Location: [facility] OR     CYSTOSCOPY  1997    for hematuria - negative     ESOPHAGOSCOPY, GASTROSCOPY, DUODENOSCOPY (EGD), COMBINED N/A 01/10/2022    Procedure: ESOPHAGOGASTRODUODENOSCOPY, WITH BIOPSY;  Surgeon: Marsha Asher MD;  Location: [facility] OR     EYE SURGERY Right     Rt eye.macular repair     EYE SURGERY  2003    cataract     NASAL ENDOSCOPY,DX  04/2007    GERD     Pars plana vitrectomy and epiretinal membrane dissection, right eye  11/08/2002     Phacoemulsification with intraocular lens implantation right eye.  03/11/2003     PHACOEMULSIFICATION WITH STANDARD INTRAOCULAR LENS IMPLANT Left 09/22/2016    Procedure: PHACOEMULSIFICATION WITH STANDARD INTRAOCULAR LENS IMPLANT;  Surgeon: Nghia Lara MD;  Location: [facility] OR     Thyroglossal Duct Cyst Removal - 2ndary to radiation.  1982     VASECTOMY  1971     ZZ GASTROSCOPY,FL  09/2007    hiatal hernia and errosions     Past Medical History:   Diagnosis Date     Acid reflux disease      Allergic rhinitis, cause unspecified      H/O magnetic resonance imaging of brain and brain stem 10/10/2016    MRI BRAIN WITH AND WITHOUT CONTRAST August 17, 2009 8:07:00 PM   HISTORY: Severe dizzy spells with imbalance and vomiting.   TECHNIQUE: Routine pulse sequences without and with contrast were performed including thin section images through the IACs. 20 mL Magnevist given.   FINDINGS: Diffusion-weighted images are normal. The brain parenchyma, brainstem, ventricular system, and subarachnoid spaces a     Hematuria     Hematuria  - in 1990's      History of anemia 2002    Anemia-Iron Deficit     History of gastric ulcer      Macular degeneration      Malignant neoplasm of laryngeal cartilages (H)     Laryngeal CA (Radiation 1982)     Nonsenile cataract      PONV (postoperative nausea and vomiting)      Tremor 10/03/2016     Social History     Socioeconomic History      Marital status:      Spouse name: Edyta Cai     Number of children: Not on file     Years of education: Not on file     Highest education level: Not on file   Occupational History     Employer: RETIRED   Tobacco Use     Smoking status: Former     Current packs/day: 0.00     Average packs/day: 1 pack/day for 10.0 years (10.0 ttl pk-yrs)     Types: Cigarettes, Pipe     Start date: 6/15/1959     Quit date: 1969     Years since quittin.2     Smokeless tobacco: Never     Tobacco comments:        Substance and Sexual Activity     Alcohol use: Yes     Comment: A beer once in awhile     Drug use: No     Sexual activity: Yes     Partners: Female     Birth control/protection: None   Other Topics Concern     Parent/sibling w/ CABG, MI or angioplasty before 65F 55M? No   Social History Narrative    seen by Jose M Diaz. lives in [place].  to Edyta Cai.    2 sons and 2 daughters    Cancer father    Stroke mother        Daughter in [facility]    Son in [place]    Daughter in [place]    Son in [place]         Social Drivers of Health     Financial Resource Strain: Low Risk  (10/10/2024)    Financial Resource Strain      Within the past 12 months, have you or your family members you live with been unable to get utilities (heat, electricity) when it was really needed?: No   Food Insecurity: Low Risk  (10/10/2024)    Food Insecurity      Within the past 12 months, did you worry that your food would run out before you got money to buy more?: No      Within the past 12 months, did the food you bought just not last and you didn t have money to get more?: No   Transportation Needs: Low Risk  (10/10/2024)    Transportation Needs      Within the past 12 months, has lack of transportation kept you from medical appointments, getting your medicines, non-medical meetings or appointments, work, or from getting things that you need?: No   Physical Activity: Inactive (10/10/2024)    Exercise  "Vital Sign      Days of Exercise per Week: 0 days      Minutes of Exercise per Session: 0 min   Stress: Stress Concern Present (10/10/2024)    [employer] [employer] - Occupational Stress Questionnaire      Feeling of Stress : Very much   Social Connections: Unknown (10/10/2024)    Social Connection and Isolation Panel [NHANES]      Frequency of Communication with Friends and Family: Not on file      Frequency of Social Gatherings with Friends and Family: Once a week      Attends Yarsani Services: Not on file      Active Member of Clubs or Organizations: Not on file      Attends Club or Organization Meetings: Not on file      Marital Status: Not on file   Interpersonal Safety: Low Risk  (10/15/2024)    Interpersonal Safety      Do you feel physically and emotionally safe where you currently live?: Yes      Within the past 12 months, have you been hit, slapped, kicked or otherwise physically hurt by someone?: No      Within the past 12 months, have you been humiliated or emotionally abused in other ways by your partner or ex-partner?: No   Housing Stability: Low Risk  (10/10/2024)    Housing Stability      Do you have housing? : Yes      Are you worried about losing your housing?: No       Drug and lactation database from the [place] [employer] of [employer]:  http://toxnet.nlm.nih.gov/cgi-bin/sis/htmlgen?LACT      B/P: 112/66, T: Data Unavailable, P: 69, R: Data Unavailable 157 lbs 0 oz  Blood pressure 112/66, pulse 69, height 1.854 m (6' 1\"), weight 71.2 kg (157 lb)., Body mass index is 20.71 kg/m .  Medications and Vitals not listed above were documented in the cart and reviewed by me.     Current Outpatient Medications   Medication Sig Dispense Refill     acetaminophen 500 MG CAPS 2 x 500mg at 630/7p       brimonidine-timolol (COMBIGAN) 0.2-0.5 % ophthalmic solution Place 1 drop into the right eye 2 times daily. 10 mL 3     dorzolamide (TRUSOPT) 2 % ophthalmic solution Place 1 drop " into the right eye 3 times daily. 10 mL 11     famotidine (PEPCID) 20 MG tablet Take 1 tablet (20 mg) by mouth 2 times daily. Appointment due, call [phone] to schedule. 120 tablet 0     gabapentin (NEURONTIN) 100 MG capsule Takes 2-3 x 100mg capsule by mouth nightly 270 capsule 3     latanoprost (XALATAN) 0.005 % ophthalmic solution Place 1 drop into the right eye at bedtime. 2.5 mL 4     primidone (MYSOLINE) 250 MG tablet Take 1 tablet (250 mg) by mouth 2 times daily. 180 tablet 3     tamsulosin (FLOMAX) 0.4 MG capsule Take 1 capsule (0.4 mg) by mouth every evening. 90 capsule 3     triamcinolone (KENALOG) 0.1 % external cream Apply topically 2 times daily as needed (itchy rash of shoulders/chest). 454 g 3     trospium (SANCTURA XR) 60 MG CP24 24 hr capsule Take 1 capsule (60 mg) by mouth every morning (Patient not taking: Reported on 4/21/2025) 30 capsule 11     valACYclovir (VALTREX) 1000 mg tablet Take 1 tablet (1,000 mg) by mouth 3 times daily for 7 days. 21 tablet 0     Vitamin D, Cholecalciferol, 25 MCG (1000 UT) CAPS Take 1,000 Units by mouth daily. (Winter only) (Patient not taking: Reported on 4/21/2025)           García Munroe MD      Again, thank you for allowing me to participate in the care of your patient.        Sincerely,        García Munroe MD    Electronically signed

## 2025-04-21 NOTE — PROGRESS NOTES
Diagnosis/Summary/Recommendations:    PATIENT: Preston Cai  89 year old male     : 1935    BEATRIZ: 2025       MRN: [id number]  [address] [facility] [address]    [facility] [address] Phone   [phone] (Home) *Preferred*   [phone] (Mobile) E-mail Address   [email or web address]      Edyta cai            Assessment:  (R25.1) Tremor  (primary encounter diagnosis)  Seen  and  by me   Gabapentin neurontin 100mg  Primidone mysoline 250mg at bedtime     Tremor when holding a book  Tremor not present at rest  Has shakiness when writing  Has some problems when eating     Review of diagnosis    tremor     Avoidance of dopamine blockers   Not taking     Motor complication review   n/a     Review of Impulse control disorders   N/a      Review of surgical or medication options   Consider a slow wean on primidone (Mysoline) if no other explanation is there.   Pharmacy consultation with Fior Matos if he goes down th is patch  Consider 50mg tabs x 5 and then every few weeks reduce by one tablet - will have more tremor but would have to see if feels better, etc.      For now will review the 250mg of primidone at his present dose.      Not clear why he has no energy and losing weight     He will visit with Dr. Manning and review his lab work and studies      Gait/Balance/Falls         Exercise/Therapy performed/offered   States his stride is pretty good but has neuropathy so not so straight     Cognitive/Driving   No changes in his driving  Some memory issues     Mood   Some stress -   Lots of stress since they moved  They are living tradition assisted living [facility]   They were locked down  Hope to move to [facility] in [place] but on waiting list  Denies depression        Hallucinations/delusions      Sleep   Sleep disorder  Goes to bed around 9pm and wakes up at 7am  Wakes up 2-3 times per night to urinate  Some times has problems falling back asleep  May have sleep  apnea  Mick  Has not had a sleep study  Tired when he wakes up  Naps during the day  30-60 minutes per day      Bladder/Renal/Prostate/Gyn/Other  BPH  Tamsulosin flomax 0.4mg   Nocturia   PSA 3.19 on 1/4/2022     GI/Constipation/GERD   GERD  Dexlansoprazole dexilant 60mg CR capsule - not taking  Pantoprazole (protonix) 40mg EC tablet  helicobacter negative  Lipase normal  Has a good appetite     153 lbs today      Colonoscopy and endoscopy done last week results pending     Final Diagnosis   A. DUODENUM, BIOPSY:   -Duodenal mucosa with no significant histopathologic abnormality  -No evidence of celiac disease     B. TRANSVERSE COLON, POLYPS X 4, BIOPSY:   -Tubular adenoma(s); negative for high-grade dysplasia     C. RECTUM, POLYP, BIOPSY:   - Sessile serrated adenoma; negative for cytologic dysplasia          Comprehensive metabolic was fine 12/17/2021 - no problems with liver      2 years ago had vomiting     He has acid reflux     Has a hernia        12/20/2021  IMPRESSION:  1. Diffuse thickening of the gastric wall raises the question of gastritis, however, this finding is relatively unchanged in appearance when compared to prior exam on 7/30/2017.  2. Prostatomegaly with diffuse bladder wall thickening, likely sequela of chronic bladder outlet obstruction.  3. Colonic diverticulosis without evidence of acute diverticulitis.      ENDO/Lipid/DM/Bone density/Thyroid  Vitamin D deficiency  Vitamin D, Cholecalciferol 1000 units   Thyroid studies were normal 1/4/2022     Cardio/heart/Hyper or Hypotensive   Denies light headedness or fainting  bp was 95/59     Vision/Dry Eyes/Cataracts/Glaucoma/Macular   Macular degeneration  Pseudophaia  Latanoprost xalatan 0.005% solution  C Minkus monitoring his eyes     Heme/Anticoagulation/Antiplatelet/Anemia/Other  MMA normal  Parietal cell antibody 26.7 high   Intrinsic factor negative  Hemoglobin 12.6 on 12/17/2021     ENT/Resp  Dr Tosha Toledo - ENT     Allergic  "rhinitis     Hoarse voice     Skin/Cancer/Seborrhea/other        Musculoskeletal/Pain/Headache  Chronic radiculopathy C6  States he has a neuropathy  Gabapentin neurontin 100mg x 2 at bedtime     Other:      Discussed that his blood pressure and heart rate are pretty low that would not recommend a beta blocker except a 10mg dose as needed if he wanted anything     Would not push up the primidone further     Talked about surgical approaches like deep brain stimulation, focused beam ultrasound, gamma knife      Neuropathy affects his balance and has discomfort at night. He is taking gabapentin 100mg x 2 at 7pm and lays down around 930/10pm      July had dizzy spells and cut out medication      CONSIDER KATHLEEN TRIO DEVICE - INFORMATION BELOW.      CONTINUE ON PRIMIDONE 250MG AND CONSIDER 1/4 OF 250MG ADDITIONAL DOSE     CONTINUE ON 100MG GABAPENTIN - DISCUSSING DOSE - 1 - HAD MORE NEUROPATHY SYMPTOMS AND 2 LESS; NOT SURE IF BETTER WITH 3      airforce [id number]     Had covid November - coughing and congestion     Balance problems from neuropathy and radiculopathy     Using gabapentin 2 x 100mg - at 6p and   acetaminophen 500m x 500mg at 6-7p  He had had back issues.   Seen Dr. Sage 2020  Message sent as he is interested in another epidural steroid injection  Ordered referral.      Increased the primidone and using 1/4 tab in the morning and 1 at night  Not clear if helped the tremor  He has not had side effects.  He has mild postural tremor.   He has some tremor after reading  Can still hold his coffee cup and feed himself.      He is off vitmamin B12 - level obtained 2023     He remains on vitamin D     His lipid panel was okay   He is not a medication.      He has a chronic anemia that is stable 2023  He has normal liver function tests  He is taking primidone that can impact both.      Appetite is good but \"cannot put on weight\"     He has not had falls.      Last seen 3/2023  Refilled " primidone  Refilled gabapentin neurontin   Return in 1 year.      Tamsulosin flomax 0.4mg - went off this after he had a dizzy spell  His blood pressure is 107/63  His wife gets up many times per n ight.   Stopped another medication.   Nocturia 2 or 3 times per night   He has daytime urgency  He has not seen a urologist for many y ears.   Referral placed  He is 88 years old  Has not had a recent PSA  Encouraged him to reduce night time water consumption after 6pm  Renal function is fine.      Medications      7am 8am 2p 5/530  630p 4/5a   Acetaminophen tylenol 500mg       2 2   Bromidine timolol 2/d        Cyanocobalamin Vit B12 1000mcg off           Dorzolamide trusopt 3/d        Famotidine pepcid 40mg 1          Fluticasone flonase 50mcg/act spray  off          Gabapentin neurontin 100mg     1  2     Latanoprost xalatan 0.005% solution         right    Mirtazapine remeron 15mg off           Pantoprazole (protonix) 40mg EC tablet Off            Primidone mysoline 250mg  1/2      1    Tamsulosin flomax 0.4mg     1    Triamcinolone kenalog 0.1% cream             Trospium sanctura XR 60mg off        Valacyclovir valtrex 1000mg off        Vit D, Cholecalciferol 1000 units 25  off                             Plan:    Still getting up 3/night. Of gabapentin.   Goes to bed at 930/10    Pharmacy ([facility]) consultation and medication management  Please call the scheduling number @ [phone] to set up an appointment with pharmacists Fior Matos, Rena Paniagua, or Concepción Phan.     He wonders about his timing of evening medications    He may want to increase his primidone 1/2 to 3/4 in the morning and continue the 1 primidone at night  Monitor it for a few weeks and decide if better.    May then consider dose increase of gabapentin in the future -   Right now he is taking 100mg of gabapentin at 2pm and 2 x 100mg at 630p  He may increase the dose to 2 @ 2pm and 2 @ 630p    Last visit was 3/2024    Return in one  year.      Coding statement:   Medical Decision Making:  #  Chronic progressive medical conditions addressed  - see above --   Review and/or interpretation of unique test or documentation from a provider outside of neurology yes   Independent historian provided additional details  no I  Prescription drug management and review of potential side effects and/or monitoring for side effects  -- see above ---  Health impacted by social determinants of health  no    I have reviewed the note as documented above.  This accurately captures the substance of my conversation with the patient and total time spent preparing for visit, executing visit and completing visit on the day of the visit:  20 minutes.  The portion of this total time included face to face time     The longitudinal plan of care for Preston Cai was addressed during this visit. Due to the added complexity in care, I will continue to support Preston Cai in the subsequent management of this condition(s) and with the ongoing continuity of care of this condition(s).      García Munroe MD     ______________________________________    Last visit date and details:             ______________________________________      Patient was asked about 14 Review of systems including changes in vision (dry eyes, double vision), hearing, heart, lungs, musculoskeletal, depression, anxiety, snoring, RBD, insomnia, urinary frequency, urinary urgency, constipation, swallowing problems, hematological, ID, allergies, skin problems: seborrhea, endocrinological: thyroid, diabetes, cholesterol; balance, weight changes, and other neurological problems and these were not significant at this time except for   Patient Active Problem List   Diagnosis    Seborrheic dermatitis    Seasonal allergic rhinitis due to pollen    Cervical radiculopathy at C6    Personal history of malignant neoplasm of larynx    Essential tremor    CARDIOVASCULAR SCREENING; LDL GOAL LESS THAN 160    Benign prostatic  hyperplasia    Hoarse voice quality    Pseudophakia    Hiatal hernia with GERD    Macular degeneration    Supraspinatus sprain, right, initial encounter    CARDIOVASCULAR SCREENING; LDL GOAL LESS THAN 100    Vitamin D deficiency    Chronic midline low back pain without sciatica    Sleep disorder, nonorganic    Other gastritis without hemorrhage, unspecified chronicity    BPH associated with nocturia    Weight loss    Pernicious anemia    Arthritis of right shoulder region    Basal cell carcinoma (BCC) of medial canthus of right eye          Allergies   Allergen Reactions    Seasonal Allergies      Hay fever      Past Surgical History:   Procedure Laterality Date    APPENDECTOMY  8 years old    BIOPSY  may 2015    face, back    Biopsy of the throat - cancerous mass - got radiation for in larynx  1982    CATARACT IOL, RT/LT      COLONOSCOPY  02/25/2004    Normal    COLONOSCOPY N/A 04/28/2015    Procedure: COLONOSCOPY;  Surgeon: Marvin Adams MD;  Location: [facility] OR    COLONOSCOPY N/A 01/10/2022    Procedure: COLONOSCOPY, WITH POLYPECTOMY AND BIOPSY;  Surgeon: Marsha Asher MD;  Location: [facility] OR    COLONOSCOPY N/A 01/10/2022    Procedure: COLONOSCOPY, FLEXIBLE, WITH LESION REMOVAL USING SNARE;  Surgeon: aMrsha Asher MD;  Location: [facility] OR    COLONOSCOPY WITH CO2 INSUFFLATION N/A 04/28/2015    Procedure: COLONOSCOPY WITH CO2 INSUFFLATION;  Surgeon: Marvin Adams MD;  Location: [facility]    COLONOSCOPY WITH CO2 INSUFFLATION N/A 01/10/2022    Procedure: COLONOSCOPY, WITH CO2 INSUFFLATION;  Surgeon: Marsha Asher MD;  Location: [facility]    COMBINED ESOPHAGOSCOPY, GASTROSCOPY, DUODENOSCOPY (EGD) WITH CO2 INSUFFLATION N/A 01/10/2022    Procedure: ESOPHAGOGASTRODUODENOSCOPY, WITH CO2 INSUFFLATION;  Surgeon: Marsha Asher MD;  Location: [facility] OR    CYSTOSCOPY  1997    for hematuria - negative    ESOPHAGOSCOPY, GASTROSCOPY, DUODENOSCOPY (EGD), COMBINED N/A 01/10/2022    Procedure:  ESOPHAGOGASTRODUODENOSCOPY, WITH BIOPSY;  Surgeon: Marsha Asher MD;  Location: [facility] OR    EYE SURGERY Right     Rt eye.macular repair    EYE SURGERY  2003    cataract    NASAL ENDOSCOPY,DX  04/2007    GERD    Pars plana vitrectomy and epiretinal membrane dissection, right eye  11/08/2002    Phacoemulsification with intraocular lens implantation right eye.  03/11/2003    PHACOEMULSIFICATION WITH STANDARD INTRAOCULAR LENS IMPLANT Left 09/22/2016    Procedure: PHACOEMULSIFICATION WITH STANDARD INTRAOCULAR LENS IMPLANT;  Surgeon: Nghia Lara MD;  Location: [facility] OR    Thyroglossal Duct Cyst Removal - 2ndary to radiation.  1982    VASECTOMY  1971    ZZ GASTROSCOPY,FL  09/2007    hiatal hernia and errosions     Past Medical History:   Diagnosis Date    Acid reflux disease     Allergic rhinitis, cause unspecified     H/O magnetic resonance imaging of brain and brain stem 10/10/2016    MRI BRAIN WITH AND WITHOUT CONTRAST August 17, 2009 8:07:00 PM   HISTORY: Severe dizzy spells with imbalance and vomiting.   TECHNIQUE: Routine pulse sequences without and with contrast were performed including thin section images through the IACs. 20 mL Magnevist given.   FINDINGS: Diffusion-weighted images are normal. The brain parenchyma, brainstem, ventricular system, and subarachnoid spaces a    Hematuria     Hematuria  - in 1990's     History of anemia 2002    Anemia-Iron Deficit    History of gastric ulcer     Macular degeneration     Malignant neoplasm of laryngeal cartilages (H)     Laryngeal CA (Radiation 1982)    Nonsenile cataract     PONV (postoperative nausea and vomiting)     Tremor 10/03/2016     Social History     Socioeconomic History    Marital status:      Spouse name: Edyta Cai    Number of children: Not on file    Years of education: Not on file    Highest education level: Not on file   Occupational History     Employer: RETIRED   Tobacco Use    Smoking status: Former     Current packs/day: 0.00      Average packs/day: 1 pack/day for 10.0 years (10.0 ttl pk-yrs)     Types: Cigarettes, Pipe     Start date: 6/15/1959     Quit date: 1969     Years since quittin.2    Smokeless tobacco: Never    Tobacco comments:        Substance and Sexual Activity    Alcohol use: Yes     Comment: A beer once in awhile    Drug use: No    Sexual activity: Yes     Partners: Female     Birth control/protection: None   Other Topics Concern    Parent/sibling w/ CABG, MI or angioplasty before 65F 55M? No   Social History Narrative    seen by Jose M Diaz. lives in [facility].  to Edyta Cai.    2 sons and 2 daughters    Cancer father    Stroke mother        Daughter in [facility]    Son in [place]    Daughter in [place]    Son in [place]         Social Drivers of Health     Financial Resource Strain: Low Risk  (10/10/2024)    Financial Resource Strain     Within the past 12 months, have you or your family members you live with been unable to get utilities (heat, electricity) when it was really needed?: No   Food Insecurity: Low Risk  (10/10/2024)    Food Insecurity     Within the past 12 months, did you worry that your food would run out before you got money to buy more?: No     Within the past 12 months, did the food you bought just not last and you didn t have money to get more?: No   Transportation Needs: Low Risk  (10/10/2024)    Transportation Needs     Within the past 12 months, has lack of transportation kept you from medical appointments, getting your medicines, non-medical meetings or appointments, work, or from getting things that you need?: No   Physical Activity: Inactive (10/10/2024)    Exercise Vital Sign     Days of Exercise per Week: 0 days     Minutes of Exercise per Session: 0 min   Stress: Stress Concern Present (10/10/2024)    [employer] - Occupational Stress Questionnaire     Feeling of Stress : Very much   Social Connections: Unknown (10/10/2024)  "   Social Connection and Isolation Panel [NHANES]     Frequency of Communication with Friends and Family: Not on file     Frequency of Social Gatherings with Friends and Family: Once a week     Attends Holiness Services: Not on file     Active Member of Clubs or Organizations: Not on file     Attends Club or Organization Meetings: Not on file     Marital Status: Not on file   Interpersonal Safety: Low Risk  (10/15/2024)    Interpersonal Safety     Do you feel physically and emotionally safe where you currently live?: Yes     Within the past 12 months, have you been hit, slapped, kicked or otherwise physically hurt by someone?: No     Within the past 12 months, have you been humiliated or emotionally abused in other ways by your partner or ex-partner?: No   Housing Stability: Low Risk  (10/10/2024)    Housing Stability     Do you have housing? : Yes     Are you worried about losing your housing?: No       Drug and lactation database from the [place] [employer] of Medicine:  http://toxnet.nlm.nih.gov/cgi-bin/sis/htmlgen?LACT      B/P: 112/66, T: Data Unavailable, P: 69, R: Data Unavailable 157 lbs 0 oz  Blood pressure 112/66, pulse 69, height 1.854 m (6' 1\"), weight 71.2 kg (157 lb)., Body mass index is 20.71 kg/m .  Medications and Vitals not listed above were documented in the cart and reviewed by me.     Current Outpatient Medications   Medication Sig Dispense Refill    acetaminophen 500 MG CAPS 2 x 500mg at 630/7p      brimonidine-timolol (COMBIGAN) 0.2-0.5 % ophthalmic solution Place 1 drop into the right eye 2 times daily. 10 mL 3    dorzolamide (TRUSOPT) 2 % ophthalmic solution Place 1 drop into the right eye 3 times daily. 10 mL 11    famotidine (PEPCID) 20 MG tablet Take 1 tablet (20 mg) by mouth 2 times daily. Appointment due, call [phone] to schedule. 120 tablet 0    gabapentin (NEURONTIN) 100 MG capsule Takes 2-3 x 100mg capsule by mouth nightly 270 capsule 3    latanoprost (XALATAN) " 0.005 % ophthalmic solution Place 1 drop into the right eye at bedtime. 2.5 mL 4    primidone (MYSOLINE) 250 MG tablet Take 1 tablet (250 mg) by mouth 2 times daily. 180 tablet 3    tamsulosin (FLOMAX) 0.4 MG capsule Take 1 capsule (0.4 mg) by mouth every evening. 90 capsule 3    triamcinolone (KENALOG) 0.1 % external cream Apply topically 2 times daily as needed (itchy rash of shoulders/chest). 454 g 3    trospium (SANCTURA XR) 60 MG CP24 24 hr capsule Take 1 capsule (60 mg) by mouth every morning (Patient not taking: Reported on 4/21/2025) 30 capsule 11    valACYclovir (VALTREX) 1000 mg tablet Take 1 tablet (1,000 mg) by mouth 3 times daily for 7 days. 21 tablet 0    Vitamin D, Cholecalciferol, 25 MCG (1000 UT) CAPS Take 1,000 Units by mouth daily. (Winter only) (Patient not taking: Reported on 4/21/2025)           García Munroe MD

## 2025-04-21 NOTE — TELEPHONE ENCOUNTER
Called and spoke with patient as he has not had a follow up in over a year. Scheduled follow up for today. Patient is interested in increasing dose, so will have him discuss with Dr. Munroe today.

## 2025-06-18 ENCOUNTER — VIRTUAL VISIT (OUTPATIENT)
Dept: PHARMACY | Facility: CLINIC | Age: 89
End: 2025-06-18
Attending: PSYCHIATRY & NEUROLOGY
Payer: COMMERCIAL

## 2025-06-18 ENCOUNTER — TELEPHONE (OUTPATIENT)
Dept: PHARMACY | Facility: CLINIC | Age: 89
End: 2025-06-18
Payer: COMMERCIAL

## 2025-06-18 DIAGNOSIS — G25.0 ESSENTIAL TREMOR: Primary | ICD-10-CM

## 2025-06-18 DIAGNOSIS — R35.1 BENIGN PROSTATIC HYPERPLASIA WITH NOCTURIA: ICD-10-CM

## 2025-06-18 DIAGNOSIS — J30.1 SEASONAL ALLERGIC RHINITIS DUE TO POLLEN: ICD-10-CM

## 2025-06-18 DIAGNOSIS — H40.9 GLAUCOMA: ICD-10-CM

## 2025-06-18 DIAGNOSIS — G60.9 HEREDITARY AND IDIOPATHIC PERIPHERAL NEUROPATHY: ICD-10-CM

## 2025-06-18 DIAGNOSIS — N40.1 BENIGN PROSTATIC HYPERPLASIA WITH NOCTURIA: ICD-10-CM

## 2025-06-18 RX ORDER — FLUTICASONE PROPIONATE 50 MCG
1-2 SPRAY, SUSPENSION (ML) NASAL DAILY
COMMUNITY

## 2025-06-18 NOTE — CONFIDENTIAL NOTE
PharmD Outbound Call:     Reason for call: missed MTM appointment    Call attempt: no answer, left VM for patient to call and/or reschedule. Provided MTM scheduling line [phone].    Concepción Phan, Pharm.D., MPH  Medication Therapy Management Pharmacist   [facility]

## 2025-06-18 NOTE — PROGRESS NOTES
Medication Therapy Management (MTM) Encounter    ASSESSMENT:                            Medication Adherence/Access: No issues identified.    Essential Tremor/Neuropathy/Pain:  Tremors are somewhat controlled on primidone. Recommend patient increase primidone and gabapentin as recommended by neurologist.  Recommend patient first increase primidone and then increase gabapentin to allow for one change at a time and better assessment of each change. Educated on dosing, administration and side effects of increasing primidone and gabapentin, particularly increased drowsiness during the day.  All questions answered.     Glaucoma:   No concerns at this time. Managed by ophthalmology.    GERD    Controlled. No recommendations at this time.     Genitourinary Symptoms   BPH and Overactive Bladder  Symptoms are not well controlled on tamsulosin currently. This medication is managed by urology. Encourage him to reach out to urology as it has been 1 year since his last visit.    Allergies:  Controlled. No recommendations at this time.    PLAN:                            Medication list updated and reviewed   Increase primidone dose to 1 tablet (250 mg) twice daily.  If drowsiness increases or you develop any other side effects, reduce dose back to 3/4 tablet in the morning, and 1 tablet in the evening. T  After you have made the change to primidone for about 1 week, then you can increase gabapentin 100 mg to 1 capsule in the afternoon, and 3 capsules in the evening.  If daytime fatigue worsens or you develop any other, please decrease back to only taking gabapentin at night  Contact urology to regarding urinary symptoms since it has been about 1 year since your last visit with them.    Follow-up: 1 month with Neuro MTM, reach out on MyChart before then if you have concerns or questions.  Appointments in Next Year      Jul 22, 2025 8:00 AM  Pharmacist Visit with Concepción Phan [facility] Neurology MTM (JERMAINE  [facility] [phone]     Sep 03, 2025 10:30 AM  (Arrive by 10:15 AM)  Return Dermatology with Jessika Armenta PA-C  [facility] ([facility]) [phone]     Sep 08, 2025 10:00 AM  (Arrive by 9:45 AM)  RETURN GLAUCOMA with Orlando Rose MD  [facility] ([facility]) [phone]     Feb 23, 2026 1:00 PM  (Arrive by 12:45 PM)  Return Dermatology with Mau Padilla MD  [facility] ([facility]) [phone]     Apr 20, 2026 10:30 AM  (Arrive by 10:15 AM)  Return Movement Disorder with García Munroe MD  [facility] Neurology [facility] ([facility]) [phone]            SUBJECTIVE/OBJECTIVE:                          theodora Cai is a 89 year old male called for an initial visit. He was referred to me from Ludwig Augustin.      Reason for visit: MTM initial visit.    Allergies/ADRs: Reviewed in chart  Past Medical History: Reviewed in chart  Tobacco: He reports that he quit smoking about 56 years ago. His smoking use included cigarettes and pipe. He started smoking about 66 years ago. He has a 10 pack-year smoking history. He has never used smokeless tobacco.  Alcohol: none    Medication Adherence/Access: no issues reported. Uses pill box to separate medications.    Essential Tremor/Neuropathy/Pain:  - Primidone 250 mg; taking 3/4 tablet in the morning and 1 tablet at night  - Gabapentin 300mg ([id number]) at bedtime    Currently takes gabapentin to help with trouble sleeping at night due to his neuropathy. Neuropathy is still bothersome during the day, and he is interested in increasing his dose of gabapentin to help manage this. Dr. Munroe did increase his prescription recently (up to 2 tablets twice daily) but patient has not made this change yet.    Still having  difficulty writing and holding objects due to tremors. Tremors have improved since increasing dose of primidone but are present and consistent throughout the day.     He is not very concerned about being sleepy in the afternoon as he already takes a nap around 1pm each day.     Glaucoma:   - Brimonidine-timolol 0.2-0.5% eye drops, 1 drop into right eye 2 times daily.  - Dorzolamide 2% eye drops, 1 drop into right eye three times daily  - Latanoprost 0.005% eye drops, 1 drop into right eye at bedtime.  Managed by ophthalmology.    GERD    - Famotidine 20 mg twice daily   Patient feels that current regimen is effective.       Genitourinary Symptoms   BPH and Overactive Bladder  - Tamsulosin 0.4 mg every evening  Patient reports no current medication side effects.    Current symptoms include: Some of the time  Patient feels that current therapy is somewhat effective.  Symptoms are better but he is still waking up 3-4 times per night. Has seen urology but this was over a year ago.      Allergies:  - Fluticasone 50 mcg nasal spray, 1-2 sprays into each nostril daily   No concerns with medications.  No medication side effects reported.      Today's Vitals: There were no vitals taken for this visit.  ----------------    I spent 23 minutes with this patient today.    A summary of these recommendations was sent via [employer].    Mack Cronin  Pharmacy Student    I was present with the pharmacy student who participated in the documentation of this note. I have verified the history, personally performed the medical decision making, and have verified the content of the note, which accurately reflects my assessment of the patient and the plan of care     Concepción Phan, Pharm.D., MPH  Medication Therapy Management Pharmacist   [facility]    Telemedicine Visit Details  The patient's medications can be safely assessed via a telemedicine encounter.  Type of service:  Telephone visit  Originating Location (pt.  Location): Home    Distant Location (provider location):  Off-site  Start Time: 9:26 AM  End Time: 9:49 AM     Medication Therapy Recommendations  No medication therapy recommendations to display

## 2025-06-18 NOTE — PATIENT INSTRUCTIONS
Recommendations from today's MTM visit:                                                      Medication list updated and reviewed   Increase primidone dose to 1 tablet (250 mg) twice daily.  If drowsiness increases or you develop any other side effects, reduce dose back to 3/4 tablet in the morning, and 1 tablet in the evening. T  After you have made the change to primidone for about 1 week, then you can increase gabapentin 100 mg to 1 capsule in the afternoon, and 3 capsules in the evening.  If daytime fatigue worsens or you develop any other, please decrease back to only taking gabapentin at night  Contact urology to regarding urinary symptoms since it has been about 1 year since your last visit with them.    Follow-up: 1 month with Neuro MTM, reach out on MyChart before then if you have concerns or questions.  Appointments in Next Year      Jul 22, 2025 8:00 AM  Pharmacist Visit with Concepción Phan [facility] Neurology [facility] ([facility] and [facility] [place] ) [phone]     Sep 03, 2025 10:30 AM  (Arrive by 10:15 AM)  Return Dermatology with Jessika Armenta PA-C  [facility] ([facility]) [phone]     Sep 08, 2025 10:00 AM  (Arrive by 9:45 AM)  RETURN GLAUCOMA with rOlando Rose MD  [facility] ([facility]) [phone]     Feb 23, 2026 1:00 PM  (Arrive by 12:45 PM)  Return Dermatology with Mau Padilla MD  [facility] ([facility]) [phone]     Apr 20, 2026 10:30 AM  (Arrive by 10:15 AM)  Return Movement Disorder with García Munroe MD  [facility] [place] ([facility]) [phone]       It was great speaking with you today.  I value your experience and would be very thankful for your time in providing feedback in our clinic survey. In the  "next few days, you may receive an email or text message from [facility] [employer] with a link to a survey related to your  clinical pharmacist.\"     To schedule another MTM appointment, please call the clinic directly or you may call the MTM scheduling line at [phone].    My Clinical Pharmacist's contact information:                                                      Please feel free to contact me with any questions or concerns you have.      Concepción Phan, Pharm.D., MPH  Medication Therapy Management Pharmacist   [facility]   "

## 2025-06-18 NOTE — CONFIDENTIAL NOTE
Patient called back to MTM line.     MTM pharmacist called patient x2 directly after he spoke with MTM coordinator and no answer.     Voicemail left for patient to call back to complete MTM visit.     Concepción Phan, Pharm.D., MPH  Medication Therapy Management Pharmacist   [facility]rology [facility]

## 2025-07-14 ENCOUNTER — TRANSFERRED RECORDS (OUTPATIENT)
Dept: HEALTH INFORMATION MANAGEMENT | Facility: CLINIC | Age: 89
End: 2025-07-14
Payer: COMMERCIAL

## 2025-07-22 ENCOUNTER — VIRTUAL VISIT (OUTPATIENT)
Dept: PHARMACY | Facility: CLINIC | Age: 89
End: 2025-07-22
Attending: PSYCHIATRY & NEUROLOGY
Payer: COMMERCIAL

## 2025-07-22 DIAGNOSIS — G60.9 HEREDITARY AND IDIOPATHIC PERIPHERAL NEUROPATHY: ICD-10-CM

## 2025-07-22 DIAGNOSIS — G25.0 ESSENTIAL TREMOR: Primary | ICD-10-CM

## 2025-07-22 NOTE — PROGRESS NOTES
Medication Therapy Management (MTM) Encounter    ASSESSMENT:                            Medication Adherence/Access: See below for considerations.    Essential Tremor/Neuropathy/Pain:  May benefit from taking gabapentin early in the afternoon consistently to see if this helps with both neuropathy and essential tremor.  Discussed administration timing and potential onset of action for gabapentin.  All questions answered.    PLAN:                            Please take 1 capsule of gabapentin 100mg at lunch time to help with afternoon neuropathy and further tremor control. Continue taking 3 capsules (300mg) at dinner/bedtime    Follow-up:   Appointments in Next Year      Sep 03, 2025 10:30 AM  (Arrive by 10:15 AM)  Return Dermatology with Jessika Armenta PA-C  [facility] ([facility]) [phone]     Sep 08, 2025 10:00 AM  (Arrive by 9:45 AM)  RETURN GLAUCOMA with Orlando Rose MD  [facility] ([facility]) [phone]     Oct 21, 2025 10:30 AM  Pharmacist Visit with Concepción Phan [facility] Neurology [facility] ([facility] ) [phone]     Feb 23, 2026 1:00 PM  (Arrive by 12:45 PM)  Return Dermatology with Mau Padilla MD  [facility] ([facility]) [phone]     Apr 20, 2026 10:30 AM  (Arrive by 10:15 AM)  Return Movement Disorder with García Munroe MD  [facility] Neurology [facility] ([facility]) [phone]            SUBJECTIVE/OBJECTIVE:                          theodora Cai is a 89 year old male seen for a follow-up visit.       Reason for visit: MTM Follow Up.    Allergies/ADRs: Reviewed in chart  Past Medical History: Reviewed in chart  Tobacco: He reports that he quit smoking about 56 years ago. His smoking use included cigarettes  and pipe. He started smoking about 66 years ago. He has a 10 pack-year smoking history. He has never used smokeless tobacco.  Alcohol: none  Additional Providers: Neurologist: Dr. Munroe    Medication Adherence/Access: see below     Essential Tremor/Neuropathy/Pain:  - Primidone 250 mg twice daily   - Gabapentin 300mg ([id number]) at supper     About a month ago at last [facility] visit, it was recommended patient start taking gabapentin 100 mg in the afternoon and continue with 300 mg at supper.  Patient does take an afternoon nap and has been finding it hard to be consistent with taking the gabapentin in the afternoon.  He is wondering if he can take gabapentin at an early afternoon instead of later in the afternoon.  He does note that the burning sensation in his feet does start in the afternoon but improves with his evening dose of gabapentin.  Has found that taking primidone 1 full tablet twice a day to be beneficial for his tremors.  No medication side effects reported at this time.    Today's Vitals: There were no vitals taken for this visit.  ----------------    I spent 10 minutes with this patient today. All changes were made via collaborative practice agreement with García Munroe.     A summary of these recommendations was sent via [employer].    Concepción Phan, Pharm.D., MPH  Medication Therapy Management Pharmacist   [facility]    Telemedicine Visit Details  The patient's medications can be safely assessed via a telemedicine encounter.  Type of service:  Telephone visit  Originating Location (pt. Location): Home    Distant Location (provider location):  Off-site  Start Time: 8:05 AM  End Time: 8:15 AM     Medication Therapy Recommendations  Essential tremor   1 Current Medication: gabapentin (NEURONTIN) 100 MG capsule   Current Medication Si-2 at @ 2pm and 2 at 630pm =4/day   Rationale: Patient forgets to take - Adherence - Adherence   Recommendation: Provide Adherence Intervention    Status: Patient Agreed - Adherence/Education   Identified Date: 7/22/2025 Completed Date: 7/22/2025

## 2025-07-22 NOTE — PATIENT INSTRUCTIONS
"Recommendations from today's MTM visit:                                                    Please take 1 capsule of gabapentin 100mg at lunch time to help with afternoon neuropathy and further tremor control. Continue taking 3 capsules (300mg) at dinner/bedtime    Follow-up:   Appointments in Next Year      Sep 03, 2025 10:30 AM  (Arrive by 10:15 AM)  Return Dermatology with Jessika Armenta PA-C  [facility] ([facility]) [phone]     Sep 08, 2025 10:00 AM  (Arrive by 9:45 AM)  RETURN GLAUCOMA with Orlando Rose MD  [facility] ([facility]) [phone]     Oct 21, 2025 10:30 AM  Pharmacist Visit with Concepción Phan [facility] Neurology [facility] ([facility] ) [phone]     Feb 23, 2026 1:00 PM  (Arrive by 12:45 PM)  Return Dermatology with Mau Padilla MD  [facility] ([facility]) [phone]     Apr 20, 2026 10:30 AM  (Arrive by 10:15 AM)  Return Movement Disorder with García Munroe MD  [facility]rology [facility] ([facility]) [phone]            It was great speaking with you today.  I value your experience and would be very thankful for your time in providing feedback in our clinic survey. In the next few days, you may receive an email or text message from [employer] [facility] [employer] with a link to a survey related to your  clinical pharmacist.\"     To schedule another MTM appointment, please call the clinic directly or you may call the MTM scheduling line at [phone].    My Clinical Pharmacist's contact information:                                                      Please feel free to contact me with any questions or concerns you have.      Concepción Phan, Pharm.D., MPH  Medication Therapy Management Pharmacist   [facility] " Neurology Clinic

## 2025-08-04 DIAGNOSIS — K21.9 GASTROESOPHAGEAL REFLUX DISEASE WITHOUT ESOPHAGITIS: ICD-10-CM

## 2025-08-04 RX ORDER — FAMOTIDINE 20 MG/1
20 TABLET, FILM COATED ORAL 2 TIMES DAILY
Qty: 180 TABLET | Refills: 2 | Status: SHIPPED | OUTPATIENT
Start: 2025-08-04

## 2025-09-03 ENCOUNTER — OFFICE VISIT (OUTPATIENT)
Dept: DERMATOLOGY | Facility: CLINIC | Age: 89
End: 2025-09-03
Attending: PHYSICIAN ASSISTANT
Payer: COMMERCIAL

## 2025-09-03 DIAGNOSIS — D22.9 MULTIPLE BENIGN NEVI: ICD-10-CM

## 2025-09-03 DIAGNOSIS — Z12.83 SKIN CANCER SCREENING: Primary | ICD-10-CM

## 2025-09-03 DIAGNOSIS — L82.0 INFLAMED SEBORRHEIC KERATOSIS: ICD-10-CM

## 2025-09-03 DIAGNOSIS — L82.1 SEBORRHEIC KERATOSES: ICD-10-CM

## 2025-09-03 DIAGNOSIS — D18.01 CHERRY ANGIOMA: ICD-10-CM

## 2025-09-03 DIAGNOSIS — L81.4 LENTIGINES: ICD-10-CM

## 2025-09-03 DIAGNOSIS — Z85.828 HISTORY OF NONMELANOMA SKIN CANCER: ICD-10-CM

## 2025-09-03 DIAGNOSIS — L57.0 ACTINIC KERATOSIS: ICD-10-CM

## (undated) DEVICE — SOL WATER IRRIG 1000ML BOTTLE 07139-09

## (undated) DEVICE — PREP CHLORAPREP 26ML TINTED ORANGE  260815

## (undated) RX ORDER — FENTANYL CITRATE 50 UG/ML
INJECTION, SOLUTION INTRAMUSCULAR; INTRAVENOUS
Status: DISPENSED
Start: 2022-01-10

## (undated) RX ORDER — SIMETHICONE 40MG/0.6ML
SUSPENSION, DROPS(FINAL DOSAGE FORM)(ML) ORAL
Status: DISPENSED
Start: 2022-01-10